# Patient Record
Sex: FEMALE | Race: WHITE | Employment: OTHER | URBAN - METROPOLITAN AREA
[De-identification: names, ages, dates, MRNs, and addresses within clinical notes are randomized per-mention and may not be internally consistent; named-entity substitution may affect disease eponyms.]

---

## 2017-06-15 PROBLEM — K21.9 GASTROESOPHAGEAL REFLUX DISEASE WITHOUT ESOPHAGITIS: Status: ACTIVE | Noted: 2017-06-15

## 2017-06-15 PROBLEM — J00 ACUTE NASOPHARYNGITIS: Status: ACTIVE | Noted: 2017-06-15

## 2017-10-05 PROBLEM — N95.0 POSTMENOPAUSAL BLEEDING: Status: ACTIVE | Noted: 2017-10-05

## 2017-10-05 PROBLEM — M79.672 LEFT FOOT PAIN: Status: ACTIVE | Noted: 2017-10-05

## 2017-11-07 PROBLEM — M48.00 CENTRAL STENOSIS OF SPINAL CANAL: Status: ACTIVE | Noted: 2017-11-07

## 2017-11-07 PROBLEM — M43.16 SPONDYLOLISTHESIS AT L4-L5 LEVEL: Status: ACTIVE | Noted: 2017-11-07

## 2017-12-08 ENCOUNTER — CARE COORDINATION (OUTPATIENT)
Dept: CARE COORDINATION | Age: 62
End: 2017-12-08
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
## 2017-12-11 PROBLEM — S00.12XA: Status: ACTIVE | Noted: 2017-12-11
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
## 2017-12-11 PROBLEM — S31.815A: Status: ACTIVE | Noted: 2017-12-11

## 2017-12-28 PROBLEM — M54.16 LUMBAR RADICULITIS: Status: ACTIVE | Noted: 2017-12-28

## 2018-01-05 PROBLEM — J00 ACUTE NASOPHARYNGITIS: Status: RESOLVED | Noted: 2017-06-15 | Resolved: 2018-01-05

## 2018-01-05 PROBLEM — S00.12XA: Status: RESOLVED | Noted: 2017-12-11 | Resolved: 2018-01-05
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
## 2018-01-05 PROBLEM — M10.072 ACUTE IDIOPATHIC GOUT INVOLVING TOE OF LEFT FOOT: Status: ACTIVE | Noted: 2018-01-05

## 2018-01-05 PROBLEM — S31.815A: Status: RESOLVED | Noted: 2017-12-11 | Resolved: 2018-01-05

## 2018-04-10 ENCOUNTER — OFFICE VISIT (OUTPATIENT)
Dept: FAMILY MEDICINE CLINIC | Age: 63
End: 2018-04-10
Payer: MEDICAID

## 2018-04-10 VITALS
DIASTOLIC BLOOD PRESSURE: 80 MMHG | HEIGHT: 60 IN | SYSTOLIC BLOOD PRESSURE: 124 MMHG | RESPIRATION RATE: 17 BRPM | TEMPERATURE: 97.6 F | WEIGHT: 199 LBS | OXYGEN SATURATION: 97 % | BODY MASS INDEX: 39.07 KG/M2 | HEART RATE: 81 BPM

## 2018-04-10 DIAGNOSIS — M10.072 ACUTE IDIOPATHIC GOUT INVOLVING TOE OF LEFT FOOT: Primary | ICD-10-CM

## 2018-04-10 PROCEDURE — 99213 OFFICE O/P EST LOW 20 MIN: CPT | Performed by: NURSE PRACTITIONER

## 2018-04-10 PROCEDURE — 3017F COLORECTAL CA SCREEN DOC REV: CPT | Performed by: NURSE PRACTITIONER

## 2018-04-10 PROCEDURE — G8417 CALC BMI ABV UP PARAM F/U: HCPCS | Performed by: NURSE PRACTITIONER

## 2018-04-10 PROCEDURE — 1036F TOBACCO NON-USER: CPT | Performed by: NURSE PRACTITIONER

## 2018-04-10 PROCEDURE — 3014F SCREEN MAMMO DOC REV: CPT | Performed by: NURSE PRACTITIONER

## 2018-04-10 PROCEDURE — G8427 DOCREV CUR MEDS BY ELIG CLIN: HCPCS | Performed by: NURSE PRACTITIONER

## 2018-04-10 ASSESSMENT — ENCOUNTER SYMPTOMS
VOMITING: 0
COUGH: 0
SHORTNESS OF BREATH: 0
COLOR CHANGE: 0
NAUSEA: 0
DIARRHEA: 0
CONSTIPATION: 1
ABDOMINAL PAIN: 0
WHEEZING: 0
BACK PAIN: 1
ABDOMINAL DISTENTION: 0
ANAL BLEEDING: 0
BLOOD IN STOOL: 0
RECTAL PAIN: 0

## 2018-05-29 LAB
BASOPHILS ABSOLUTE: 30 /ΜL
BASOPHILS RELATIVE PERCENT: 0 %
EOSINOPHILS ABSOLUTE: 600 /ΜL
EOSINOPHILS RELATIVE PERCENT: 7 %
HCT VFR BLD CALC: 39.7 % (ref 36–46)
HEMOGLOBIN: 13.3 G/DL (ref 12–16)
LYMPHOCYTES ABSOLUTE: 2030 /ΜL
LYMPHOCYTES RELATIVE PERCENT: 25 %
MCH RBC QN AUTO: 30 PG
MCHC RBC AUTO-ENTMCNC: 33.5 G/DL
MCV RBC AUTO: 89.4 FL
MONOCYTES ABSOLUTE: 590 /ΜL
MONOCYTES RELATIVE PERCENT: 7 %
NEUTROPHILS ABSOLUTE: 4860 /ΜL
NEUTROPHILS RELATIVE PERCENT: 61 %
PDW BLD-RTO: 13.6 %
PLATELET # BLD: 208 K/ΜL
PMV BLD AUTO: NORMAL FL
RBC # BLD: 4.44 10^6/ΜL
WBC # BLD: 8.1 10^3/ML

## 2018-07-10 ENCOUNTER — OFFICE VISIT (OUTPATIENT)
Dept: FAMILY MEDICINE CLINIC | Age: 63
End: 2018-07-10
Payer: MEDICAID

## 2018-07-10 VITALS
HEIGHT: 60 IN | SYSTOLIC BLOOD PRESSURE: 128 MMHG | TEMPERATURE: 97.8 F | WEIGHT: 198 LBS | OXYGEN SATURATION: 97 % | DIASTOLIC BLOOD PRESSURE: 76 MMHG | HEART RATE: 84 BPM | RESPIRATION RATE: 19 BRPM | BODY MASS INDEX: 38.87 KG/M2

## 2018-07-10 DIAGNOSIS — Z13.31 DEPRESSION SCREENING: ICD-10-CM

## 2018-07-10 DIAGNOSIS — L98.9 BACK SKIN LESION: ICD-10-CM

## 2018-07-10 DIAGNOSIS — E78.2 MIXED HYPERLIPIDEMIA: Primary | ICD-10-CM

## 2018-07-10 DIAGNOSIS — G89.29 CHRONIC PAIN OF LEFT KNEE: ICD-10-CM

## 2018-07-10 DIAGNOSIS — Z72.89 OTHER PROBLEMS RELATED TO LIFESTYLE: ICD-10-CM

## 2018-07-10 DIAGNOSIS — Z23 NEED FOR PROPHYLACTIC VACCINATION AND INOCULATION AGAINST VARICELLA: ICD-10-CM

## 2018-07-10 DIAGNOSIS — M25.562 CHRONIC PAIN OF LEFT KNEE: ICD-10-CM

## 2018-07-10 DIAGNOSIS — R73.03 PREDIABETES: ICD-10-CM

## 2018-07-10 DIAGNOSIS — M1A.0721 IDIOPATHIC CHRONIC GOUT OF LEFT FOOT WITH TOPHUS: ICD-10-CM

## 2018-07-10 DIAGNOSIS — I10 ESSENTIAL HYPERTENSION: ICD-10-CM

## 2018-07-10 LAB
GLUCOSE BLD-MCNC: 120 MG/DL
HBA1C MFR BLD: 6 %

## 2018-07-10 PROCEDURE — 83036 HEMOGLOBIN GLYCOSYLATED A1C: CPT | Performed by: NURSE PRACTITIONER

## 2018-07-10 PROCEDURE — 3017F COLORECTAL CA SCREEN DOC REV: CPT | Performed by: NURSE PRACTITIONER

## 2018-07-10 PROCEDURE — 82962 GLUCOSE BLOOD TEST: CPT | Performed by: NURSE PRACTITIONER

## 2018-07-10 PROCEDURE — G8427 DOCREV CUR MEDS BY ELIG CLIN: HCPCS | Performed by: NURSE PRACTITIONER

## 2018-07-10 PROCEDURE — 99214 OFFICE O/P EST MOD 30 MIN: CPT | Performed by: NURSE PRACTITIONER

## 2018-07-10 PROCEDURE — G8417 CALC BMI ABV UP PARAM F/U: HCPCS | Performed by: NURSE PRACTITIONER

## 2018-07-10 PROCEDURE — 1036F TOBACCO NON-USER: CPT | Performed by: NURSE PRACTITIONER

## 2018-07-10 RX ORDER — MELOXICAM 15 MG/1
TABLET ORAL
Refills: 4 | COMMUNITY
Start: 2018-06-21 | End: 2018-09-17 | Stop reason: SDUPTHER

## 2018-07-10 RX ORDER — OMEPRAZOLE 20 MG/1
20 CAPSULE, DELAYED RELEASE ORAL DAILY
Qty: 30 CAPSULE | Refills: 3 | Status: SHIPPED | OUTPATIENT
Start: 2018-07-10 | End: 2018-11-09 | Stop reason: SDUPTHER

## 2018-07-10 RX ORDER — FEBUXOSTAT 40 MG/1
TABLET, FILM COATED ORAL
Qty: 30 TABLET | Refills: 5 | Status: SHIPPED | OUTPATIENT
Start: 2018-07-10 | End: 2018-10-31 | Stop reason: SDUPTHER

## 2018-07-10 ASSESSMENT — ENCOUNTER SYMPTOMS
SORE THROAT: 0
TROUBLE SWALLOWING: 0
COUGH: 0
SINUS PRESSURE: 0
VOMITING: 0
VOICE CHANGE: 0
SHORTNESS OF BREATH: 0
FACIAL SWELLING: 0
NAUSEA: 0
DIARRHEA: 0
COLOR CHANGE: 0
BACK PAIN: 0
CHEST TIGHTNESS: 0
ABDOMINAL PAIN: 0
RHINORRHEA: 0
WHEEZING: 0
SINUS PAIN: 0
CONSTIPATION: 0

## 2018-07-10 ASSESSMENT — PATIENT HEALTH QUESTIONNAIRE - PHQ9
SUM OF ALL RESPONSES TO PHQ9 QUESTIONS 1 & 2: 2
2. FEELING DOWN, DEPRESSED OR HOPELESS: 1
SUM OF ALL RESPONSES TO PHQ QUESTIONS 1-9: 2
1. LITTLE INTEREST OR PLEASURE IN DOING THINGS: 1

## 2018-07-10 NOTE — PATIENT INSTRUCTIONS
any other component of shingles vaccine. Tell your doctor if you have any severe allergies. · Has a weakened immune system because of current:  ¨ AIDS or another disease that affects the immune system. ¨ Treatment with drugs that affect the immune system, such as prolonged use of high-dose steroids. ¨ Cancer treatment such as radiation or chemotherapy. ¨ Cancer affecting the bone marrow or lymphatic system, such as leukemia or lymphoma. · Is pregnant, or might be pregnant. Women should not become pregnant until at least 4 weeks after getting shingles vaccine. Someone with a minor acute illness, such as a cold, may be vaccinated. But anyone with a moderate or severe acute illness should usually wait until they recover before getting the vaccine. This includes anyone with a temperature of 101.3° F or higher. What are the risks from shingles vaccine? A vaccine, like any medicine, could possibly cause serious problems, such as severe allergic reactions. However, the risk of a vaccine causing serious harm, or death, is extremely small. No serious problems have been identified with shingles vaccine. Mild problems  · Redness, soreness, swelling, or itching at the site of the injection (about 1 person in 3)  · Headache (about 1 person in 70)  Like all vaccines, shingles vaccine is being closely monitored for unusual or severe problems. What if there is a serious reaction? What should I look for? · Look for anything that concerns you, such as signs of a severe allergic reaction, very high fever, or behavior changes. Signs of a severe allergic reaction can include hives, swelling of the face and throat, difficulty breathing, a fast heartbeat, dizziness, and weakness. These would start a few minutes to a few hours after the vaccination. What should I do?   · If you think it is a severe allergic reaction or other emergency that can't wait, call 9-1-1 or get the person to the nearest hospital. Otherwise, call your

## 2018-08-09 PROBLEM — Z13.31 DEPRESSION SCREENING: Status: RESOLVED | Noted: 2018-07-10 | Resolved: 2018-08-09

## 2018-08-14 RX ORDER — ATORVASTATIN CALCIUM 10 MG/1
10 TABLET, FILM COATED ORAL NIGHTLY
Qty: 30 TABLET | Refills: 3 | Status: SHIPPED | OUTPATIENT
Start: 2018-08-14 | End: 2018-10-31 | Stop reason: SDUPTHER

## 2018-08-21 ENCOUNTER — TELEPHONE (OUTPATIENT)
Dept: FAMILY MEDICINE CLINIC | Age: 63
End: 2018-08-21

## 2018-08-21 NOTE — TELEPHONE ENCOUNTER
Ava called and rescheduled her 9/10/18 AWV to 9/17/18 due to being scheduled for her colonoscopy on 9/10/18.     Thanks

## 2018-09-17 ENCOUNTER — OFFICE VISIT (OUTPATIENT)
Dept: FAMILY MEDICINE CLINIC | Age: 63
End: 2018-09-17
Payer: MEDICARE

## 2018-09-17 VITALS
TEMPERATURE: 97.8 F | RESPIRATION RATE: 16 BRPM | HEART RATE: 78 BPM | OXYGEN SATURATION: 94 % | HEIGHT: 64 IN | BODY MASS INDEX: 34.16 KG/M2 | SYSTOLIC BLOOD PRESSURE: 122 MMHG | WEIGHT: 200.12 LBS | DIASTOLIC BLOOD PRESSURE: 70 MMHG

## 2018-09-17 DIAGNOSIS — Z72.89 OTHER PROBLEMS RELATED TO LIFESTYLE: ICD-10-CM

## 2018-09-17 DIAGNOSIS — Z11.59 NEED FOR HEPATITIS C SCREENING TEST: ICD-10-CM

## 2018-09-17 DIAGNOSIS — Z00.00 ROUTINE GENERAL MEDICAL EXAMINATION AT A HEALTH CARE FACILITY: ICD-10-CM

## 2018-09-17 DIAGNOSIS — Z00.00 WELCOME TO MEDICARE PREVENTIVE VISIT: Primary | ICD-10-CM

## 2018-09-17 PROCEDURE — G0404 EKG TRACING FOR INITIAL PREV: HCPCS | Performed by: NURSE PRACTITIONER

## 2018-09-17 PROCEDURE — G0402 INITIAL PREVENTIVE EXAM: HCPCS | Performed by: NURSE PRACTITIONER

## 2018-09-17 RX ORDER — MELOXICAM 15 MG/1
15 TABLET ORAL DAILY
Qty: 30 TABLET | Refills: 4 | Status: SHIPPED | OUTPATIENT
Start: 2018-09-17 | End: 2018-10-31 | Stop reason: SDUPTHER

## 2018-09-17 RX ORDER — MELOXICAM 15 MG/1
15 TABLET ORAL DAILY
Qty: 30 TABLET | Refills: 4 | Status: CANCELLED | OUTPATIENT
Start: 2018-09-17

## 2018-09-17 ASSESSMENT — PATIENT HEALTH QUESTIONNAIRE - PHQ9
SUM OF ALL RESPONSES TO PHQ QUESTIONS 1-9: 0
SUM OF ALL RESPONSES TO PHQ QUESTIONS 1-9: 0

## 2018-09-17 ASSESSMENT — ANXIETY QUESTIONNAIRES: GAD7 TOTAL SCORE: 0

## 2018-09-17 NOTE — PROGRESS NOTES
LIGATION         Family History   Problem Relation Age of Onset   Jeet Jasmine Asthma Mother     Mental Illness Mother     Heart Disease Father     Thyroid Disease Sister     Thyroid Disease Sister     Thyroid Disease Sister        CareTeam (Including outside providers/suppliers regularly involved in providing care):   Patient Care Team:  VISHNU Valdez - CNP as PCP - General (Nurse Practitioner)    Wt Readings from Last 3 Encounters:   09/17/18 200 lb 1.9 oz (90.8 kg)   07/10/18 198 lb (89.8 kg)   04/10/18 199 lb (90.3 kg)     Vitals:    09/17/18 1027   BP: 122/70   Pulse: 78   Resp: 16   Temp: 97.8 °F (36.6 °C)   TempSrc: Temporal   SpO2: 94%   Weight: 200 lb 1.9 oz (90.8 kg)   Height: 5' 4\" (1.626 m)     Body mass index is 34.35 kg/m².     General Appearance: alert and oriented to person, place and time, overweight, well developed and well- nourished, in no acute distress  Skin: warm and dry, small red bites to the ankles  Eyes: pupils equal, round, and reactive to light, extraocular eye movements intact, conjunctivae normal  ENT: tympanic membrane, external ear and ear canal normal bilaterally, nose without deformity, nasal mucosa and turbinates normal without polyps  Neck: supple and non-tender without mass, no thyromegaly or thyroid nodules, no cervical lymphadenopathy  Pulmonary/Chest: clear to auscultation bilaterally- no wheezes, rales or rhonchi, normal air movement, no respiratory distress  Cardiovascular: normal rate, regular rhythm, normal S1 and S2, no murmurs, rubs, clicks, or gallops, distal pulses intact, no carotid bruits  Abdomen: soft, non-tender, non-distended, normal bowel sounds, no hepatosplenomegaly  Extremities: no cyanosis, clubbing or edema  Musculoskeletal: normal range of motion, no joint swelling, deformity or tenderness  Neurologic: reflexes normal and symmetric, no cranial nerve deficit, gait, coordination and speech normal    Patient's complete Health Risk Assessment and screening values have been reviewed and are found in Flowsheets. The following problems were reviewed today and where indicated follow up appointments were made and/or referrals ordered. Positive Risk Factor Screenings with Interventions:     General Health:  General  In the past 7 days, have you experienced any of the following?: (P) None of These  Do you get the social and emotional support that you need?: (P) Yes  Do you have a Living Will?: (!) (P) No  General Health Risk Interventions:  · No Living Will: additional information provided    Health Habits/Nutrition:  Health Habits/Nutrition  Do you exercise for at least 20 minutes 2-3 times per week?: (P) Yes  Have you lost any weight without trying in the past 3 months?: (P) No  Do you eat fewer than 2 meals per day?: (P) No  Have you seen a dentist within the past year?: (P) Yes  Body mass index is 34.35 kg/m².   Health Habits/Nutrition Interventions:  · Inadequate physical activity:  educational materials provided to promote increased physical activity    Hearing/Vision:  Hearing/Vision  Do you or your family notice any trouble with your hearing?: (P) No  Do you have difficulty driving, watching TV, or doing any of your daily activities because of your eyesight?: (P) No  Have you had an eye exam within the past year?: (!) (P) No  Hearing/Vision Interventions:  · Vision concerns:  patient encouraged to make appointment with his/her eye specialist    Safety:  Safety  Do you have working smoke detectors?: (P) Yes  Have all throw rugs been removed or fastened?: (P) Yes  Do you have non-slip mats in all bathtubs?: (!) (P) No  Do all of your stairways have a railing or banister?: (P) Yes  Are your doorways, halls and stairs free of clutter?: (P) Yes  Do you always fasten your seatbelt when you are in a car?: (P) Yes  Safety Interventions:  · Home safety tips provided    ADL:  ADLs  In the past 7 days, did you need help from others to perform any of the following everyday

## 2018-09-19 ENCOUNTER — TELEPHONE (OUTPATIENT)
Dept: FAMILY MEDICINE CLINIC | Age: 63
End: 2018-09-19

## 2018-10-08 ENCOUNTER — HOSPITAL ENCOUNTER (OUTPATIENT)
Age: 63
Discharge: HOME | End: 2018-10-10
Payer: MEDICARE

## 2018-10-08 DIAGNOSIS — Z11.59 NEED FOR HEPATITIS C SCREENING TEST: ICD-10-CM

## 2018-10-08 DIAGNOSIS — Z72.89 OTHER PROBLEMS RELATED TO LIFESTYLE: ICD-10-CM

## 2018-10-08 PROCEDURE — 86803 HEPATITIS C AB TEST: CPT

## 2018-10-08 PROCEDURE — 86703 HIV-1/HIV-2 1 RESULT ANTBDY: CPT

## 2018-10-09 LAB
HEPATITIS C ANTIBODY INTERPRETATION: NORMAL
HIV-1 AND HIV-2 ANTIBODIES: NORMAL

## 2018-10-31 ENCOUNTER — OFFICE VISIT (OUTPATIENT)
Dept: FAMILY MEDICINE CLINIC | Age: 63
End: 2018-10-31
Payer: MEDICARE

## 2018-10-31 VITALS
TEMPERATURE: 98.7 F | BODY MASS INDEX: 40.68 KG/M2 | HEART RATE: 77 BPM | WEIGHT: 201.8 LBS | SYSTOLIC BLOOD PRESSURE: 138 MMHG | OXYGEN SATURATION: 98 % | RESPIRATION RATE: 20 BRPM | DIASTOLIC BLOOD PRESSURE: 74 MMHG | HEIGHT: 59 IN

## 2018-10-31 DIAGNOSIS — M54.16 LUMBAR RADICULITIS: ICD-10-CM

## 2018-10-31 DIAGNOSIS — E78.2 MIXED HYPERLIPIDEMIA: ICD-10-CM

## 2018-10-31 DIAGNOSIS — M48.00 CENTRAL STENOSIS OF SPINAL CANAL: ICD-10-CM

## 2018-10-31 DIAGNOSIS — M25.562 CHRONIC PAIN OF LEFT KNEE: ICD-10-CM

## 2018-10-31 DIAGNOSIS — I10 ESSENTIAL HYPERTENSION: Primary | ICD-10-CM

## 2018-10-31 DIAGNOSIS — M1A.0721 IDIOPATHIC CHRONIC GOUT OF LEFT FOOT WITH TOPHUS: ICD-10-CM

## 2018-10-31 DIAGNOSIS — E66.01 MORBID OBESITY WITH BMI OF 40.0-44.9, ADULT (HCC): ICD-10-CM

## 2018-10-31 DIAGNOSIS — G89.29 CHRONIC PAIN OF LEFT KNEE: ICD-10-CM

## 2018-10-31 DIAGNOSIS — M43.16 SPONDYLOLISTHESIS AT L4-L5 LEVEL: ICD-10-CM

## 2018-10-31 PROCEDURE — 99214 OFFICE O/P EST MOD 30 MIN: CPT | Performed by: NURSE PRACTITIONER

## 2018-10-31 PROCEDURE — G8484 FLU IMMUNIZE NO ADMIN: HCPCS | Performed by: NURSE PRACTITIONER

## 2018-10-31 PROCEDURE — 3017F COLORECTAL CA SCREEN DOC REV: CPT | Performed by: NURSE PRACTITIONER

## 2018-10-31 PROCEDURE — G8427 DOCREV CUR MEDS BY ELIG CLIN: HCPCS | Performed by: NURSE PRACTITIONER

## 2018-10-31 PROCEDURE — G8417 CALC BMI ABV UP PARAM F/U: HCPCS | Performed by: NURSE PRACTITIONER

## 2018-10-31 PROCEDURE — 1036F TOBACCO NON-USER: CPT | Performed by: NURSE PRACTITIONER

## 2018-10-31 RX ORDER — ATORVASTATIN CALCIUM 10 MG/1
10 TABLET, FILM COATED ORAL NIGHTLY
Qty: 90 TABLET | Refills: 1 | Status: SHIPPED | OUTPATIENT
Start: 2018-10-31 | End: 2019-02-26 | Stop reason: SDUPTHER

## 2018-10-31 RX ORDER — FEBUXOSTAT 40 MG/1
TABLET, FILM COATED ORAL
Qty: 90 TABLET | Refills: 1 | Status: SHIPPED | OUTPATIENT
Start: 2018-10-31 | End: 2019-02-26 | Stop reason: ALTCHOICE

## 2018-10-31 RX ORDER — MELOXICAM 15 MG/1
15 TABLET ORAL DAILY
Qty: 90 TABLET | Refills: 1 | Status: SHIPPED | OUTPATIENT
Start: 2018-10-31 | End: 2019-02-26 | Stop reason: SDUPTHER

## 2018-10-31 RX ORDER — ACETAMINOPHEN 160 MG
2000 TABLET,DISINTEGRATING ORAL DAILY
COMMUNITY

## 2018-10-31 ASSESSMENT — ENCOUNTER SYMPTOMS
COLOR CHANGE: 0
SHORTNESS OF BREATH: 0
COUGH: 0
ABDOMINAL PAIN: 0
VOICE CHANGE: 0
TROUBLE SWALLOWING: 0
VOMITING: 0
WHEEZING: 0
CHEST TIGHTNESS: 0
RHINORRHEA: 0
CONSTIPATION: 0
FACIAL SWELLING: 0
BACK PAIN: 1
NAUSEA: 0
DIARRHEA: 0
SINUS PAIN: 0
SINUS PRESSURE: 0
SORE THROAT: 0

## 2018-10-31 NOTE — PROGRESS NOTES
pressure, sneezing, sore throat, tinnitus, trouble swallowing and voice change. Eyes: Negative for visual disturbance. Respiratory: Negative for cough, chest tightness, shortness of breath and wheezing. Cardiovascular: Negative for chest pain, palpitations and leg swelling. Gastrointestinal: Negative for abdominal pain, constipation, diarrhea, nausea and vomiting. Endocrine: Negative for cold intolerance, heat intolerance, polydipsia, polyphagia and polyuria. Genitourinary: Negative for difficulty urinating, frequency and urgency. Musculoskeletal: Positive for arthralgias, back pain and gait problem ( left knee pain, arthritis). Negative for joint swelling, myalgias, neck pain and neck stiffness. Skin: Negative for color change, pallor, rash and wound. Allergic/Immunologic: Negative for environmental allergies, food allergies and immunocompromised state. Neurological: Negative for dizziness, tremors, seizures, syncope, facial asymmetry, speech difficulty, weakness, light-headedness, numbness and headaches. Hematological: Negative for adenopathy. Does not bruise/bleed easily. Psychiatric/Behavioral: Negative for agitation, behavioral problems, confusion, decreased concentration, dysphoric mood, hallucinations, self-injury, sleep disturbance and suicidal ideas. The patient is not nervous/anxious and is not hyperactive.         OBJECTIVE:     VS:  Wt Readings from Last 3 Encounters:   10/31/18 201 lb 12.8 oz (91.5 kg)   09/17/18 200 lb 1.9 oz (90.8 kg)   07/10/18 198 lb (89.8 kg)                       Vitals:    10/31/18 1016 10/31/18 1021 10/31/18 1102   BP: (!) 148/72 (!) 146/74 138/74   Site:   Left Upper Arm   Position:   Sitting   Cuff Size:   Medium Adult   Pulse: 77     Resp: 20     Temp: 98.7 °F (37.1 °C)     TempSrc: Temporal     SpO2: 98%     Weight: 201 lb 12.8 oz (91.5 kg)     Height: 4' 11\" (1.499 m)         General: Alert and oriented to person, place, and time, well developed and well nourished, in no acute distress  SKIN: Warm and dry, intact without any rash, masses or lesions  HEAD: normocephalic, atraumatic  Eyes: sclera/conjunctiva clear, PERRLA, EOMI's intact  Neck: supple and non-tender without mass, trachea midline, no cervical lymphadenopathy, no bruit, no thyromegaly or nodules  Cardiovascular: regular rate and regular rhythm, normal S1 and S2,  no murmurs, rubs, clicks, or gallop. Distal pulses intact, no carotid bruits. No edema  Pulmonary/Chest: clear to auscultation bilaterally, no wheezes, rales or rhonchi, normal air movement, no respiratory distress  Abdomen: soft, non-tender, non-distended, normal bowel sounds, no masses or hepatosplenomegaly  Musculoskeletal: Normal ROM noted on exam the left leg is 1-1/2 inches shorter, patella is off, pelvis and left shoulder concern this is what is causing some of her pain, Negative straight leg raises until 70 degrees bilateral.   Neurologic: reflexes normal and symmetric, no cranial nerve deficit, gait, coordination and speech normal  Extremities: no clubbing, cyanosis, or edema. Psychiatric: Good eye contact, normal mood and affect, answers questions appropriately    ASSESSMENT/PLAN   Rocío Min was seen today for 3 month follow-up and hypertension. Diagnoses and all orders for this visit:    Essential hypertension stable  -Continue atenolol  -Discussed taking medication and directed every day. -Discussed exercising daily 30 minutes 5 times a week for 150 minutes weekly.  -Discussed weight reduction if needed.  -Discussed low sodium diet. Chronic pain of left knee worsening  -     Amb External Referral To Orthopedic Surgery  -     meloxicam (MOBIC) 15 MG tablet;  Take 1 tablet by mouth daily  -Discussed the combination of her back and the arthritis with her weight is making the knee worse.   -Discussed walking every day if can only do 15 minutes then try to do twice a day    Morbid obesity with BMI of 40.0-44.9, adult

## 2018-11-06 PROBLEM — Z85.038 PERSONAL HISTORY OF COLON CANCER: Status: ACTIVE | Noted: 2017-08-01

## 2018-11-09 ENCOUNTER — OFFICE VISIT (OUTPATIENT)
Dept: FAMILY MEDICINE CLINIC | Age: 63
End: 2018-11-09
Payer: MEDICARE

## 2018-11-09 VITALS
OXYGEN SATURATION: 97 % | SYSTOLIC BLOOD PRESSURE: 136 MMHG | TEMPERATURE: 98.1 F | RESPIRATION RATE: 19 BRPM | DIASTOLIC BLOOD PRESSURE: 74 MMHG | HEART RATE: 86 BPM | BODY MASS INDEX: 40.66 KG/M2 | HEIGHT: 59 IN | WEIGHT: 201.69 LBS

## 2018-11-09 DIAGNOSIS — M10.071 ACUTE IDIOPATHIC GOUT OF RIGHT FOOT: Primary | ICD-10-CM

## 2018-11-09 PROCEDURE — 3017F COLORECTAL CA SCREEN DOC REV: CPT | Performed by: NURSE PRACTITIONER

## 2018-11-09 PROCEDURE — 99213 OFFICE O/P EST LOW 20 MIN: CPT | Performed by: NURSE PRACTITIONER

## 2018-11-09 PROCEDURE — G8427 DOCREV CUR MEDS BY ELIG CLIN: HCPCS | Performed by: NURSE PRACTITIONER

## 2018-11-09 PROCEDURE — G8484 FLU IMMUNIZE NO ADMIN: HCPCS | Performed by: NURSE PRACTITIONER

## 2018-11-09 PROCEDURE — 1036F TOBACCO NON-USER: CPT | Performed by: NURSE PRACTITIONER

## 2018-11-09 PROCEDURE — G8417 CALC BMI ABV UP PARAM F/U: HCPCS | Performed by: NURSE PRACTITIONER

## 2018-11-09 RX ORDER — OMEPRAZOLE 20 MG/1
20 CAPSULE, DELAYED RELEASE ORAL DAILY
Qty: 90 CAPSULE | Refills: 3 | Status: SHIPPED | OUTPATIENT
Start: 2018-11-09 | End: 2019-11-18 | Stop reason: SDUPTHER

## 2018-11-09 RX ORDER — ATORVASTATIN CALCIUM 10 MG/1
10 TABLET, FILM COATED ORAL NIGHTLY
Qty: 90 TABLET | Refills: 1 | Status: CANCELLED | OUTPATIENT
Start: 2018-11-09

## 2018-11-09 RX ORDER — METHYLPREDNISOLONE 4 MG/1
TABLET ORAL
Qty: 1 KIT | Refills: 0 | Status: SHIPPED | OUTPATIENT
Start: 2018-11-09 | End: 2018-11-15

## 2018-11-09 ASSESSMENT — ENCOUNTER SYMPTOMS
COUGH: 0
SHORTNESS OF BREATH: 0
BACK PAIN: 0
COLOR CHANGE: 1

## 2018-11-09 NOTE — PROGRESS NOTES
OFFICE PROGRESS NOTE  [facility] PRIMARY CARE  NITZA Harrington [address] [place] [id number]  Dept: [phone]   Chief Complaint   Patient presents with    Foot Pain     Patient complains of right foot pain and swelling on the top of the foot. HPI:     Here today for right foot pain, started on Monday with cramping sensation in the top of her toes and moving up the foot, pain is described as constant stabbing pain, redness, and slightly swollen. She does have history of gout but usually affecting the left foot. She has been using ice and tried heat in the shower which has helped some. She woke up in the night as it was aching and put a sock on the foot felt cold to touch. She did take a tylenol with codeine from DR. Samantha Roldan. Which didn't do anything.      Current Outpatient Prescriptions:     omeprazole (PRILOSEC) 20 MG delayed release capsule, Take 1 capsule by mouth daily, Disp: 90 capsule, Rfl: 3    methylPREDNISolone (MEDROL DOSEPACK) 4 MG tablet, Take by mouth., Disp: 1 kit, Rfl: 0    Cholecalciferol (VITAMIN D3) 2000 units CAPS, Take 2,000 Units by mouth daily, Disp: , Rfl:     atorvastatin (LIPITOR) 10 MG tablet, Take 1 tablet by mouth nightly, Disp: 90 tablet, Rfl: 1    meloxicam (MOBIC) 15 MG tablet, Take 1 tablet by mouth daily, Disp: 90 tablet, Rfl: 1    febuxostat (ULORIC) 40 MG TABS tablet, TAKE 1 TABLET BY MOUTH DAILY (STOP ALLOPURINOL), Disp: 90 tablet, Rfl: 1    atenolol (TENORMIN) 50 MG tablet, Take 1 tablet by mouth daily, Disp: 90 tablet, Rfl: 3    clotrimazole-betamethasone (LOTRISONE) 1-0.05 % cream, APPLY TOPICALLY TWICE A DAY, Disp: 45 g, Rfl: 0    Biotin 1000 MCG TABS, Take 1,000 mcg by mouth daily, Disp: , Rfl:   Social History     Social History    Marital status: Single     Spouse name: N/A    Number of children: N/A    Years of education: N/A     Social History Main Topics    Smoking status: Never Smoker    Smokeless tobacco: Never Used    Alcohol use 0.0 oz/week      Comment: Rarely    Drug use: No    Sexual activity: Not Asked     Other Topics Concern    None     Social History Narrative    None       I have reviewed Margaret's allergies, medications, problem list, medical, social and family history and have updated as needed in the electronic medical record    Review of Systems   Constitutional: Positive for activity change. Negative for appetite change, chills, diaphoresis, fatigue, fever and unexpected weight change. Respiratory: Negative for cough and shortness of breath. Cardiovascular: Negative for chest pain, palpitations and leg swelling. Musculoskeletal: Positive for gait problem and joint swelling ( foot swelling and painful). Negative for arthralgias, back pain, myalgias, neck pain and neck stiffness. Skin: Positive for color change. OBJECTIVE:     VS:  Wt Readings from Last 3 Encounters:   11/09/18 201 lb 11 oz (91.5 kg)   10/31/18 201 lb 12.8 oz (91.5 kg)   09/17/18 200 lb 1.9 oz (90.8 kg)                       Vitals:    11/09/18 1047   BP: 136/74   Pulse: 86   Resp: 19   Temp: 98.1 °F (36.7 °C)   TempSrc: Temporal   SpO2: 97%   Weight: 201 lb 11 oz (91.5 kg)   Height: 4' 11\" (1.499 m)       General: Alert and oriented to person, place, and time, well developed and well nourished, in no acute distress  SKIN: Warm and dry, intact without any rash, masses or lesions  Neck: supple and non-tender without mass, trachea midline, no cervical lymphadenopathy, no bruit, no thyromegaly or nodules  Cardiovascular: regular rate and regular rhythm, normal S1 and S2,  no murmurs, rubs, clicks, or gallop. Distal pulses intact, no carotid bruits.  No edema  Pulmonary/Chest: clear to auscultation bilaterally, no wheezes, rales or rhonchi, normal air movement, no respiratory distress  Abdomen: soft, non-tender, non-distended, normal bowel sounds, no masses or hepatosplenomegaly  Musculoskeletal: Limited ROM in right foot with

## 2018-11-26 DIAGNOSIS — M1A.0721 IDIOPATHIC CHRONIC GOUT OF LEFT FOOT WITH TOPHUS: ICD-10-CM

## 2018-11-26 DIAGNOSIS — E78.2 MIXED HYPERLIPIDEMIA: ICD-10-CM

## 2018-11-27 ENCOUNTER — PREP FOR PROCEDURE (OUTPATIENT)
Dept: PHYSICAL MEDICINE AND REHAB | Age: 63
End: 2018-11-27

## 2018-11-27 ENCOUNTER — OFFICE VISIT (OUTPATIENT)
Dept: PHYSICAL MEDICINE AND REHAB | Age: 63
End: 2018-11-27
Payer: MEDICARE

## 2018-11-27 VITALS
WEIGHT: 202 LBS | BODY MASS INDEX: 40.72 KG/M2 | SYSTOLIC BLOOD PRESSURE: 124 MMHG | DIASTOLIC BLOOD PRESSURE: 61 MMHG | HEIGHT: 59 IN | HEART RATE: 75 BPM

## 2018-11-27 DIAGNOSIS — M46.1 SACROILIITIS (HCC): ICD-10-CM

## 2018-11-27 DIAGNOSIS — M46.1 SACROILIITIS (HCC): Primary | ICD-10-CM

## 2018-11-27 PROCEDURE — G8427 DOCREV CUR MEDS BY ELIG CLIN: HCPCS | Performed by: PHYSICAL MEDICINE & REHABILITATION

## 2018-11-27 PROCEDURE — 99213 OFFICE O/P EST LOW 20 MIN: CPT | Performed by: PHYSICAL MEDICINE & REHABILITATION

## 2018-11-27 PROCEDURE — G8417 CALC BMI ABV UP PARAM F/U: HCPCS | Performed by: PHYSICAL MEDICINE & REHABILITATION

## 2018-11-27 PROCEDURE — 1036F TOBACCO NON-USER: CPT | Performed by: PHYSICAL MEDICINE & REHABILITATION

## 2018-11-27 PROCEDURE — 3017F COLORECTAL CA SCREEN DOC REV: CPT | Performed by: PHYSICAL MEDICINE & REHABILITATION

## 2018-11-27 PROCEDURE — G8484 FLU IMMUNIZE NO ADMIN: HCPCS | Performed by: PHYSICAL MEDICINE & REHABILITATION

## 2018-11-29 ENCOUNTER — HOSPITAL ENCOUNTER (OUTPATIENT)
Age: 63
Setting detail: OUTPATIENT SURGERY
Discharge: HOME | End: 2018-11-29
Attending: PHYSICAL MEDICINE & REHABILITATION | Admitting: PHYSICAL MEDICINE & REHABILITATION
Payer: MEDICARE

## 2018-11-29 ENCOUNTER — HOSPITAL ENCOUNTER (OUTPATIENT)
Dept: OPERATING ROOM | Age: 63
Setting detail: OUTPATIENT SURGERY
Discharge: HOME | End: 2018-11-29
Attending: PHYSICAL MEDICINE & REHABILITATION
Payer: MEDICARE

## 2018-11-29 VITALS
SYSTOLIC BLOOD PRESSURE: 149 MMHG | RESPIRATION RATE: 16 BRPM | OXYGEN SATURATION: 98 % | HEART RATE: 76 BPM | TEMPERATURE: 98 F | DIASTOLIC BLOOD PRESSURE: 71 MMHG

## 2018-11-29 DIAGNOSIS — M46.1 SACROILIITIS (HCC): ICD-10-CM

## 2018-11-29 PROCEDURE — 3600000005 HC SURGERY LEVEL 5 BASE: Performed by: PHYSICAL MEDICINE & REHABILITATION

## 2018-11-29 PROCEDURE — 6360000004 HC RX CONTRAST MEDICATION: Performed by: PHYSICAL MEDICINE & REHABILITATION

## 2018-11-29 PROCEDURE — 2709999900 HC NON-CHARGEABLE SUPPLY: Performed by: PHYSICAL MEDICINE & REHABILITATION

## 2018-11-29 PROCEDURE — 27096 INJECT SACROILIAC JOINT: CPT | Performed by: PHYSICAL MEDICINE & REHABILITATION

## 2018-11-29 PROCEDURE — 7100000010 HC PHASE II RECOVERY - FIRST 15 MIN: Performed by: PHYSICAL MEDICINE & REHABILITATION

## 2018-11-29 PROCEDURE — 7100000011 HC PHASE II RECOVERY - ADDTL 15 MIN: Performed by: PHYSICAL MEDICINE & REHABILITATION

## 2018-11-29 PROCEDURE — 3209999900 FLUORO FOR SURGICAL PROCEDURES

## 2018-11-29 PROCEDURE — 6360000002 HC RX W HCPCS: Performed by: PHYSICAL MEDICINE & REHABILITATION

## 2018-11-29 PROCEDURE — 2500000003 HC RX 250 WO HCPCS: Performed by: PHYSICAL MEDICINE & REHABILITATION

## 2018-11-29 PROCEDURE — 2580000003 HC RX 258: Performed by: PHYSICAL MEDICINE & REHABILITATION

## 2018-11-29 RX ORDER — LIDOCAINE HYDROCHLORIDE 10 MG/ML
INJECTION, SOLUTION EPIDURAL; INFILTRATION; INTRACAUDAL; PERINEURAL PRN
Status: DISCONTINUED | OUTPATIENT
Start: 2018-11-29 | End: 2018-11-29 | Stop reason: HOSPADM

## 2018-11-29 ASSESSMENT — PAIN DESCRIPTION - ORIENTATION: ORIENTATION: LEFT

## 2018-11-29 ASSESSMENT — PAIN - FUNCTIONAL ASSESSMENT: PAIN_FUNCTIONAL_ASSESSMENT: 0-10

## 2018-11-29 ASSESSMENT — PAIN SCALES - GENERAL
PAINLEVEL_OUTOF10: 4
PAINLEVEL_OUTOF10: 4

## 2018-11-29 ASSESSMENT — PAIN DESCRIPTION - PAIN TYPE
TYPE: CHRONIC PAIN
TYPE: CHRONIC PAIN

## 2018-11-29 ASSESSMENT — PAIN DESCRIPTION - LOCATION
LOCATION: BACK
LOCATION: BACK

## 2018-11-29 ASSESSMENT — PAIN DESCRIPTION - DESCRIPTORS: DESCRIPTORS: ACHING;DISCOMFORT

## 2018-12-19 ENCOUNTER — OFFICE VISIT (OUTPATIENT)
Dept: PHYSICAL MEDICINE AND REHAB | Age: 63
End: 2018-12-19
Payer: MEDICARE

## 2018-12-19 VITALS
SYSTOLIC BLOOD PRESSURE: 121 MMHG | HEIGHT: 60 IN | DIASTOLIC BLOOD PRESSURE: 64 MMHG | WEIGHT: 208 LBS | HEART RATE: 73 BPM | BODY MASS INDEX: 40.84 KG/M2

## 2018-12-19 DIAGNOSIS — M43.16 SPONDYLOLISTHESIS AT L4-L5 LEVEL: ICD-10-CM

## 2018-12-19 DIAGNOSIS — M54.16 LUMBAR RADICULITIS: ICD-10-CM

## 2018-12-19 DIAGNOSIS — M53.3 SACROILIAC JOINT DYSFUNCTION: Primary | ICD-10-CM

## 2018-12-19 PROCEDURE — 1036F TOBACCO NON-USER: CPT | Performed by: PHYSICAL MEDICINE & REHABILITATION

## 2018-12-19 PROCEDURE — 3017F COLORECTAL CA SCREEN DOC REV: CPT | Performed by: PHYSICAL MEDICINE & REHABILITATION

## 2018-12-19 PROCEDURE — G8427 DOCREV CUR MEDS BY ELIG CLIN: HCPCS | Performed by: PHYSICAL MEDICINE & REHABILITATION

## 2018-12-19 PROCEDURE — 99213 OFFICE O/P EST LOW 20 MIN: CPT | Performed by: PHYSICAL MEDICINE & REHABILITATION

## 2018-12-19 PROCEDURE — G8484 FLU IMMUNIZE NO ADMIN: HCPCS | Performed by: PHYSICAL MEDICINE & REHABILITATION

## 2018-12-19 PROCEDURE — G8417 CALC BMI ABV UP PARAM F/U: HCPCS | Performed by: PHYSICAL MEDICINE & REHABILITATION

## 2018-12-19 NOTE — PROGRESS NOTES
palpitations, edema      Gastrointestinal: Denies abdominal pain,  N/V, constipation, or diarrhea    Genitourinary: Denies urinary symptoms    Neurologic: See HPI.     MSK: See HPI. Psychiatric: Denies sleep disturbance, anxiety, depression    Hematologic/Lymphatic/Immunologic: Denies bruising       Physical Exam:   Blood pressure 121/64, pulse 73, height 5' (1.524 m), weight 208 lb (94.3 kg), not currently breastfeeding. General: well developed and well nourished in no acute distress. Body habitus is obese  HEENT: No rhinorrhea, sneezing, yawning, or lacrimation. No scleral icterus or conjunctival injection. Resp: symmetrical chest expansion, unlabored breathing, respirations unlabored. CV: Heart rate is regular. Peripheral pulses are palpable  Lymph: No visible regional lymphadenopathy. Skin: No rashes or ecchymosis. Normal turgor. Psych: Mood is calm. Affect is normal.   Ext: No edema noted     MSK:   Back/Hip Exam:   Inspection: Pelvis was asymmetric. Lumbar lordotic curvature was decreased. There was no scoliosis. No ecchymoses or erythema. Palpation: Palpatory exam revealed no tenderness along lumbosacral paraspinals, midline spine, concordant tenderness left SI joint sulcus- much less. There was no paraspinal spasms. There were no trigger points. Imaging: (personally reviewed by me 12/19/18)  MRI L Spine 11/2017:  T11-T12: Seen only on the axial images, there is a mild diffuse disc   bulge with mild bilateral facet hypertrophy. No central canal   narrowing.       T12-L1: Mild diffuse disc bulge with superimposed shallow right   paracentral disc protrusion with slight superior migration. No central   canal or foraminal narrowing.       L1-L2: Mild diffuse disc bulge with superimposed shallow left   paracentral disc protrusion with slight inferior migration. No central   canal or foraminal narrowing.       L2-L3: Diffuse disc bulge.  Mild bilateral facet hypertrophy and   ligamentum

## 2019-01-11 DIAGNOSIS — Z12.39 BREAST CANCER SCREENING: Primary | ICD-10-CM

## 2019-01-15 ENCOUNTER — OFFICE VISIT (OUTPATIENT)
Dept: FAMILY MEDICINE CLINIC | Age: 64
End: 2019-01-15
Payer: MEDICARE

## 2019-01-15 VITALS
SYSTOLIC BLOOD PRESSURE: 124 MMHG | HEIGHT: 60 IN | OXYGEN SATURATION: 98 % | DIASTOLIC BLOOD PRESSURE: 64 MMHG | TEMPERATURE: 97.8 F | RESPIRATION RATE: 16 BRPM | HEART RATE: 83 BPM | WEIGHT: 208 LBS | BODY MASS INDEX: 40.84 KG/M2

## 2019-01-15 DIAGNOSIS — B35.9 RINGWORM: Primary | ICD-10-CM

## 2019-01-15 DIAGNOSIS — I10 ESSENTIAL HYPERTENSION: ICD-10-CM

## 2019-01-15 PROCEDURE — 99213 OFFICE O/P EST LOW 20 MIN: CPT | Performed by: NURSE PRACTITIONER

## 2019-01-15 PROCEDURE — 1036F TOBACCO NON-USER: CPT | Performed by: NURSE PRACTITIONER

## 2019-01-15 PROCEDURE — 3017F COLORECTAL CA SCREEN DOC REV: CPT | Performed by: NURSE PRACTITIONER

## 2019-01-15 PROCEDURE — G8417 CALC BMI ABV UP PARAM F/U: HCPCS | Performed by: NURSE PRACTITIONER

## 2019-01-15 PROCEDURE — G8427 DOCREV CUR MEDS BY ELIG CLIN: HCPCS | Performed by: NURSE PRACTITIONER

## 2019-01-15 PROCEDURE — G8484 FLU IMMUNIZE NO ADMIN: HCPCS | Performed by: NURSE PRACTITIONER

## 2019-01-15 RX ORDER — ATENOLOL 50 MG/1
50 TABLET ORAL DAILY
Qty: 90 TABLET | Refills: 3 | Status: SHIPPED | OUTPATIENT
Start: 2019-01-15 | End: 2019-11-27 | Stop reason: SDUPTHER

## 2019-01-15 RX ORDER — CLOTRIMAZOLE AND BETAMETHASONE DIPROPIONATE 10; .64 MG/G; MG/G
CREAM TOPICAL
Qty: 45 G | Refills: 1 | Status: SHIPPED | OUTPATIENT
Start: 2019-01-15 | End: 2019-02-19 | Stop reason: SDUPTHER

## 2019-01-15 ASSESSMENT — ENCOUNTER SYMPTOMS
SHORTNESS OF BREATH: 0
COUGH: 0
WHEEZING: 0
COLOR CHANGE: 1

## 2019-01-29 ENCOUNTER — HOSPITAL ENCOUNTER (OUTPATIENT)
Dept: MAMMOGRAPHY | Age: 64
Discharge: HOME | End: 2019-01-31
Payer: MEDICARE

## 2019-01-29 DIAGNOSIS — Z12.39 BREAST CANCER SCREENING: ICD-10-CM

## 2019-01-29 PROCEDURE — 77063 BREAST TOMOSYNTHESIS BI: CPT

## 2019-02-26 ENCOUNTER — HOSPITAL ENCOUNTER (OUTPATIENT)
Age: 64
Discharge: HOME | End: 2019-02-28
Payer: MEDICARE

## 2019-02-26 ENCOUNTER — OFFICE VISIT (OUTPATIENT)
Dept: FAMILY MEDICINE CLINIC | Age: 64
End: 2019-02-26
Payer: MEDICARE

## 2019-02-26 VITALS
OXYGEN SATURATION: 99 % | TEMPERATURE: 97 F | RESPIRATION RATE: 16 BRPM | WEIGHT: 205 LBS | DIASTOLIC BLOOD PRESSURE: 78 MMHG | SYSTOLIC BLOOD PRESSURE: 130 MMHG | HEART RATE: 68 BPM | BODY MASS INDEX: 40.25 KG/M2 | HEIGHT: 60 IN

## 2019-02-26 DIAGNOSIS — E55.9 VITAMIN D INSUFFICIENCY: ICD-10-CM

## 2019-02-26 DIAGNOSIS — M25.562 CHRONIC PAIN OF LEFT KNEE: ICD-10-CM

## 2019-02-26 DIAGNOSIS — M10.072 ACUTE IDIOPATHIC GOUT INVOLVING TOE OF LEFT FOOT: ICD-10-CM

## 2019-02-26 DIAGNOSIS — E78.2 MIXED HYPERLIPIDEMIA: ICD-10-CM

## 2019-02-26 DIAGNOSIS — R73.03 PREDIABETES: ICD-10-CM

## 2019-02-26 DIAGNOSIS — E66.01 MORBID OBESITY WITH BMI OF 40.0-44.9, ADULT (HCC): ICD-10-CM

## 2019-02-26 DIAGNOSIS — B35.9 RINGWORM: Primary | ICD-10-CM

## 2019-02-26 DIAGNOSIS — Z13.31 SCREENING FOR DEPRESSION: ICD-10-CM

## 2019-02-26 DIAGNOSIS — M43.16 SPONDYLOLISTHESIS AT L4-L5 LEVEL: ICD-10-CM

## 2019-02-26 DIAGNOSIS — I10 ESSENTIAL HYPERTENSION: ICD-10-CM

## 2019-02-26 DIAGNOSIS — G89.29 CHRONIC PAIN OF LEFT KNEE: ICD-10-CM

## 2019-02-26 LAB
ALBUMIN SERPL-MCNC: 4.3 G/DL (ref 3.5–5.2)
ALP BLD-CCNC: 88 U/L (ref 35–104)
ALT SERPL-CCNC: 20 U/L (ref 0–32)
ANION GAP SERPL CALCULATED.3IONS-SCNC: 11 MMOL/L (ref 7–16)
AST SERPL-CCNC: 18 U/L (ref 0–31)
BASOPHILS ABSOLUTE: 0.07 E9/L (ref 0–0.2)
BASOPHILS RELATIVE PERCENT: 0.9 % (ref 0–2)
BILIRUB SERPL-MCNC: 0.6 MG/DL (ref 0–1.2)
BUN BLDV-MCNC: 14 MG/DL (ref 8–23)
CALCIUM SERPL-MCNC: 9.8 MG/DL (ref 8.6–10.2)
CHLORIDE BLD-SCNC: 99 MMOL/L (ref 98–107)
CHOLESTEROL, TOTAL: 159 MG/DL (ref 0–199)
CO2: 28 MMOL/L (ref 22–29)
CREAT SERPL-MCNC: 0.7 MG/DL (ref 0.5–1)
EOSINOPHILS ABSOLUTE: 0.47 E9/L (ref 0.05–0.5)
EOSINOPHILS RELATIVE PERCENT: 6.1 % (ref 0–6)
GFR AFRICAN AMERICAN: >60
GFR NON-AFRICAN AMERICAN: >60 ML/MIN/1.73
GLUCOSE BLD-MCNC: 112 MG/DL (ref 74–99)
HBA1C MFR BLD: 6.2 % (ref 4–5.6)
HCT VFR BLD CALC: 42 % (ref 34–48)
HDLC SERPL-MCNC: 43 MG/DL
HEMOGLOBIN: 13.6 G/DL (ref 11.5–15.5)
IMMATURE GRANULOCYTES #: 0.07 E9/L
IMMATURE GRANULOCYTES %: 0.9 % (ref 0–5)
LDL CHOLESTEROL CALCULATED: 67 MG/DL (ref 0–99)
LYMPHOCYTES ABSOLUTE: 2.11 E9/L (ref 1.5–4)
LYMPHOCYTES RELATIVE PERCENT: 27.2 % (ref 20–42)
MCH RBC QN AUTO: 29.8 PG (ref 26–35)
MCHC RBC AUTO-ENTMCNC: 32.4 % (ref 32–34.5)
MCV RBC AUTO: 91.9 FL (ref 80–99.9)
MONOCYTES ABSOLUTE: 0.6 E9/L (ref 0.1–0.95)
MONOCYTES RELATIVE PERCENT: 7.7 % (ref 2–12)
NEUTROPHILS ABSOLUTE: 4.44 E9/L (ref 1.8–7.3)
NEUTROPHILS RELATIVE PERCENT: 57.2 % (ref 43–80)
PDW BLD-RTO: 13.7 FL (ref 11.5–15)
PLATELET # BLD: 253 E9/L (ref 130–450)
PMV BLD AUTO: 10.7 FL (ref 7–12)
POTASSIUM SERPL-SCNC: 4.2 MMOL/L (ref 3.5–5)
RBC # BLD: 4.57 E12/L (ref 3.5–5.5)
SODIUM BLD-SCNC: 138 MMOL/L (ref 132–146)
TOTAL PROTEIN: 7.2 G/DL (ref 6.4–8.3)
TRIGL SERPL-MCNC: 246 MG/DL (ref 0–149)
URIC ACID, SERUM: 3.5 MG/DL (ref 2.4–5.7)
VITAMIN D 25-HYDROXY: 37 NG/ML (ref 30–100)
VLDLC SERPL CALC-MCNC: 49 MG/DL
WBC # BLD: 7.8 E9/L (ref 4.5–11.5)

## 2019-02-26 PROCEDURE — G0444 DEPRESSION SCREEN ANNUAL: HCPCS | Performed by: NURSE PRACTITIONER

## 2019-02-26 PROCEDURE — 80053 COMPREHEN METABOLIC PANEL: CPT

## 2019-02-26 PROCEDURE — G8427 DOCREV CUR MEDS BY ELIG CLIN: HCPCS | Performed by: NURSE PRACTITIONER

## 2019-02-26 PROCEDURE — 82306 VITAMIN D 25 HYDROXY: CPT

## 2019-02-26 PROCEDURE — 3017F COLORECTAL CA SCREEN DOC REV: CPT | Performed by: NURSE PRACTITIONER

## 2019-02-26 PROCEDURE — G8484 FLU IMMUNIZE NO ADMIN: HCPCS | Performed by: NURSE PRACTITIONER

## 2019-02-26 PROCEDURE — 1036F TOBACCO NON-USER: CPT | Performed by: NURSE PRACTITIONER

## 2019-02-26 PROCEDURE — 85025 COMPLETE CBC W/AUTO DIFF WBC: CPT

## 2019-02-26 PROCEDURE — 84550 ASSAY OF BLOOD/URIC ACID: CPT

## 2019-02-26 PROCEDURE — 80061 LIPID PANEL: CPT

## 2019-02-26 PROCEDURE — 99214 OFFICE O/P EST MOD 30 MIN: CPT | Performed by: NURSE PRACTITIONER

## 2019-02-26 PROCEDURE — G8417 CALC BMI ABV UP PARAM F/U: HCPCS | Performed by: NURSE PRACTITIONER

## 2019-02-26 PROCEDURE — 83036 HEMOGLOBIN GLYCOSYLATED A1C: CPT

## 2019-02-26 RX ORDER — ALLOPURINOL 300 MG/1
300 TABLET ORAL DAILY
Qty: 30 TABLET | Refills: 3 | Status: SHIPPED | OUTPATIENT
Start: 2019-02-26 | End: 2019-05-21 | Stop reason: SDUPTHER

## 2019-02-26 RX ORDER — OXYBUTYNIN CHLORIDE 5 MG/1
TABLET ORAL
Refills: 4 | COMMUNITY
Start: 2019-02-19 | End: 2019-07-08

## 2019-02-26 ASSESSMENT — PATIENT HEALTH QUESTIONNAIRE - PHQ9
SUM OF ALL RESPONSES TO PHQ QUESTIONS 1-9: 0
1. LITTLE INTEREST OR PLEASURE IN DOING THINGS: 0
SUM OF ALL RESPONSES TO PHQ9 QUESTIONS 1 & 2: 0
2. FEELING DOWN, DEPRESSED OR HOPELESS: 0
SUM OF ALL RESPONSES TO PHQ QUESTIONS 1-9: 0

## 2019-02-26 ASSESSMENT — ENCOUNTER SYMPTOMS
CHEST TIGHTNESS: 0
SORE THROAT: 0
NAUSEA: 0
WHEEZING: 0
COUGH: 0
BACK PAIN: 1
DIARRHEA: 0
VOICE CHANGE: 0
COLOR CHANGE: 0
SINUS PAIN: 0
VOMITING: 0
CONSTIPATION: 0
ABDOMINAL PAIN: 0
FACIAL SWELLING: 0
RHINORRHEA: 0
SHORTNESS OF BREATH: 0
SINUS PRESSURE: 0
TROUBLE SWALLOWING: 0

## 2019-02-27 RX ORDER — MELOXICAM 15 MG/1
15 TABLET ORAL DAILY
Qty: 90 TABLET | Refills: 1 | Status: SHIPPED | OUTPATIENT
Start: 2019-02-27 | End: 2019-05-14 | Stop reason: ALTCHOICE

## 2019-02-27 RX ORDER — ATORVASTATIN CALCIUM 10 MG/1
10 TABLET, FILM COATED ORAL NIGHTLY
Qty: 90 TABLET | Refills: 1 | Status: SHIPPED | OUTPATIENT
Start: 2019-02-27 | End: 2019-06-10 | Stop reason: SDUPTHER

## 2019-04-22 ENCOUNTER — OFFICE VISIT (OUTPATIENT)
Dept: FAMILY MEDICINE CLINIC | Age: 64
End: 2019-04-22
Payer: MEDICARE

## 2019-04-22 VITALS
HEIGHT: 60 IN | WEIGHT: 202.25 LBS | RESPIRATION RATE: 17 BRPM | TEMPERATURE: 98 F | BODY MASS INDEX: 39.71 KG/M2 | SYSTOLIC BLOOD PRESSURE: 112 MMHG | DIASTOLIC BLOOD PRESSURE: 68 MMHG | OXYGEN SATURATION: 95 % | HEART RATE: 80 BPM

## 2019-04-22 DIAGNOSIS — R53.82 CHRONIC FATIGUE: Primary | ICD-10-CM

## 2019-04-22 DIAGNOSIS — J20.9 BRONCHITIS, ACUTE, WITH BRONCHOSPASM: ICD-10-CM

## 2019-04-22 PROBLEM — M79.672 LEFT FOOT PAIN: Status: RESOLVED | Noted: 2017-10-05 | Resolved: 2019-04-22

## 2019-04-22 PROBLEM — M10.072 ACUTE IDIOPATHIC GOUT INVOLVING TOE OF LEFT FOOT: Status: RESOLVED | Noted: 2018-01-05 | Resolved: 2019-04-22

## 2019-04-22 PROBLEM — L65.0 TELOGEN EFFLUVIUM: Status: ACTIVE | Noted: 2019-04-22

## 2019-04-22 PROBLEM — L25.9 CONTACT DERMATITIS: Status: ACTIVE | Noted: 2019-04-22

## 2019-04-22 LAB
INFLUENZA A ANTIBODY: NORMAL
INFLUENZA B ANTIBODY: NORMAL

## 2019-04-22 PROCEDURE — 94640 AIRWAY INHALATION TREATMENT: CPT | Performed by: NURSE PRACTITIONER

## 2019-04-22 PROCEDURE — 3017F COLORECTAL CA SCREEN DOC REV: CPT | Performed by: NURSE PRACTITIONER

## 2019-04-22 PROCEDURE — 99214 OFFICE O/P EST MOD 30 MIN: CPT | Performed by: NURSE PRACTITIONER

## 2019-04-22 PROCEDURE — 87804 INFLUENZA ASSAY W/OPTIC: CPT | Performed by: NURSE PRACTITIONER

## 2019-04-22 PROCEDURE — G8427 DOCREV CUR MEDS BY ELIG CLIN: HCPCS | Performed by: NURSE PRACTITIONER

## 2019-04-22 PROCEDURE — 1036F TOBACCO NON-USER: CPT | Performed by: NURSE PRACTITIONER

## 2019-04-22 PROCEDURE — G8417 CALC BMI ABV UP PARAM F/U: HCPCS | Performed by: NURSE PRACTITIONER

## 2019-04-22 RX ORDER — LEVOFLOXACIN 500 MG/1
500 TABLET, FILM COATED ORAL DAILY
Qty: 7 TABLET | Refills: 0 | Status: SHIPPED | OUTPATIENT
Start: 2019-04-22 | End: 2019-04-29

## 2019-04-22 RX ORDER — ALBUTEROL SULFATE 2.5 MG/3ML
2.5 SOLUTION RESPIRATORY (INHALATION) ONCE
Status: COMPLETED | OUTPATIENT
Start: 2019-04-22 | End: 2019-04-22

## 2019-04-22 RX ADMIN — ALBUTEROL SULFATE 2.5 MG: 2.5 SOLUTION RESPIRATORY (INHALATION) at 14:06

## 2019-04-22 RX ADMIN — Medication 0.5 MG: at 14:07

## 2019-04-22 ASSESSMENT — ENCOUNTER SYMPTOMS
SINUS PRESSURE: 0
RHINORRHEA: 1
COUGH: 1
CHEST TIGHTNESS: 0
SORE THROAT: 0
ABDOMINAL PAIN: 0
FACIAL SWELLING: 0
RECTAL PAIN: 0
CONSTIPATION: 0
SINUS PAIN: 0
COLOR CHANGE: 0
VOMITING: 0
SHORTNESS OF BREATH: 0
NAUSEA: 0
DIARRHEA: 1
TROUBLE SWALLOWING: 0
VOICE CHANGE: 0
WHEEZING: 1

## 2019-04-22 NOTE — PROGRESS NOTES
OFFICE PROGRESS NOTE  [address] [place] [id number]  Dept: [phone]   Chief Complaint   Patient presents with    Fatigue    Cough    Diarrhea         HPI:     Acute Bronchitis: Patient presents for presents evaluation of chills, dyspnea, fever, nasal congestion, nonproductive cough, rhinorrhea bilateral ear pressure, sneezing, sweats, wheezing and fatigue as she can't sleep from the cough. Symptoms began 6 days ago and are gradually improving since that time. Past history is significant for occasional episodes of bronchitis.       Current Outpatient Medications:     levofloxacin (LEVAQUIN) 500 MG tablet, Take 1 tablet by mouth daily for 7 days, Disp: 7 tablet, Rfl: 0    meloxicam (MOBIC) 15 MG tablet, TAKE 1 TABLET BY MOUTH DAILY, Disp: 90 tablet, Rfl: 1    atorvastatin (LIPITOR) 10 MG tablet, TAKE 1 TABLET BY MOUTH NIGHTLY, Disp: 90 tablet, Rfl: 1    oxybutynin (DITROPAN) 5 MG tablet, TK 1 T PO UP TO TID, Disp: , Rfl: 4    allopurinol (ZYLOPRIM) 300 MG tablet, Take 1 tablet by mouth daily, Disp: 30 tablet, Rfl: 3    atenolol (TENORMIN) 50 MG tablet, Take 1 tablet by mouth daily, Disp: 90 tablet, Rfl: 3    omeprazole (PRILOSEC) 20 MG delayed release capsule, Take 1 capsule by mouth daily, Disp: 90 capsule, Rfl: 3    Cholecalciferol (VITAMIN D3) 2000 units CAPS, Take 2,000 Units by mouth daily, Disp: , Rfl:     Biotin 1000 MCG TABS, Take 1,000 mcg by mouth daily, Disp: , Rfl:   Social History     Socioeconomic History    Marital status: Single     Spouse name: None    Number of children: None    Years of education: None    Highest education level: None   Occupational History    None   Social Needs    Financial resource strain: None    Food insecurity:     Worry: None     Inability: None    Transportation needs:     Medical: None     Non-medical: None   Tobacco Use    Smoking status: Never Smoker    Smokeless tobacco: Never state.   Neurological: Positive for headaches. Negative for dizziness, tremors, seizures, syncope, facial asymmetry, speech difficulty, weakness, light-headedness and numbness. Psychiatric/Behavioral: Positive for sleep disturbance. Negative for agitation, behavioral problems, confusion, decreased concentration, dysphoric mood, hallucinations, self-injury and suicidal ideas. The patient is not nervous/anxious and is not hyperactive. OBJECTIVE:     VS:  Wt Readings from Last 3 Encounters:   04/22/19 202 lb 4 oz (91.7 kg)   02/26/19 205 lb (93 kg)   01/15/19 208 lb (94.3 kg)                       Vitals:    04/22/19 1331   BP: 112/68   Pulse: 80   Resp: 17   Temp: 98 °F (36.7 °C)   TempSrc: Temporal   SpO2: 95%   Weight: 202 lb 4 oz (91.7 kg)   Height: 5' (1.524 m)       General: Alert and oriented to person, place, and time, well developed and well nourished, in no acute distress  SKIN: Warm and dry, intact without any rash, masses or lesions  HEAD: normocephalic, atraumatic  Eyes: sclera/conjunctiva clear, PERRLA, EOMI's intact  ENT: tympanic membranes, external ear and ear canal normal bilaterally, normal hearing, Nose without deformity, nasal mucosa and turbinates normal without polyps   Throat: clear, tongue midline,clear drainage, no masses or lesions noted, good dentition  Neck: supple and non-tender without mass, trachea midline, no cervical lymphadenopathy, no bruit, no thyromegaly or nodules  Cardiovascular: regular rate and regular rhythm, normal S1 and S2,  no murmurs, rubs, clicks, or gallop. Distal pulses intact, no carotid bruits. No edema  Pulmonary/Chest: Right lung fields with faint expiratory wheeze, crackles in base to auscultation,  no  rhonchi, diminished air movement, no respiratory distress. Nebulizer with albuterol/ipratropium given with good improvement in air flow in right lung, improvement in breathing and coughing.    Abdomen: soft, non-tender, non-distended, normal bowel sounds,

## 2019-04-22 NOTE — PATIENT INSTRUCTIONS
Patient Education        Bronchitis: Care Instructions  Your Care Instructions    Bronchitis is inflammation of the bronchial tubes, which carry air to the lungs. The tubes swell and produce mucus, or phlegm. The mucus and inflamed bronchial tubes make you cough. You may have trouble breathing. Most cases of bronchitis are caused by viruses like those that cause colds. Antibiotics usually do not help and they may be harmful. Bronchitis usually develops rapidly and lasts about 2 to 3 weeks in otherwise healthy people. Follow-up care is a key part of your treatment and safety. Be sure to make and go to all appointments, and call your doctor if you are having problems. It's also a good idea to know your test results and keep a list of the medicines you take. How can you care for yourself at home? · Take all medicines exactly as prescribed. Call your doctor if you think you are having a problem with your medicine. · Get some extra rest.  · Take an over-the-counter pain medicine, such as acetaminophen (Tylenol), ibuprofen (Advil, Motrin), or naproxen (Aleve) to reduce fever and relieve body aches. Read and follow all instructions on the label. · Do not take two or more pain medicines at the same time unless the doctor told you to. Many pain medicines have acetaminophen, which is Tylenol. Too much acetaminophen (Tylenol) can be harmful. · Take an over-the-counter cough medicine that contains dextromethorphan to help quiet a dry, hacking cough so that you can sleep. Avoid cough medicines that have more than one active ingredient. Read and follow all instructions on the label. · Breathe moist air from a humidifier, hot shower, or sink filled with hot water. The heat and moisture will thin mucus so you can cough it out. · Do not smoke. Smoking can make bronchitis worse. If you need help quitting, talk to your doctor about stop-smoking programs and medicines.  These can increase your chances of quitting for good.  When should you call for help? Call 911 anytime you think you may need emergency care. For example, call if:    · You have severe trouble breathing.    Call your doctor now or seek immediate medical care if:    · You have new or worse trouble breathing.     · You cough up dark brown or bloody mucus (sputum).     · You have a new or higher fever.     · You have a new rash.    Watch closely for changes in your health, and be sure to contact your doctor if:    · You cough more deeply or more often, especially if you notice more mucus or a change in the color of your mucus.     · You are not getting better as expected. Where can you learn more? Go to https://LeadCloud.MeBeam. org and sign in to your HOMETRAX account. Enter H333 in the Acer box to learn more about \"Bronchitis: Care Instructions. \"     If you do not have an account, please click on the \"Sign Up Now\" link. Current as of: September 5, 2018  Content Version: 11.9  © 0034-8994 [employer], [employer]. Care instructions adapted under license by [facility] ([facility]). If you have questions about a medical condition or this instruction, always ask your healthcare professional. [place] [address] any warranty or liability for your use of this information. Patient Education        Fatigue: Care Instructions  Your Care Instructions    Fatigue is a feeling of tiredness, exhaustion, or lack of energy. You may feel fatigue because of too much or not enough activity. It can also come from stress, lack of sleep, boredom, and poor diet. Many medical problems, such as viral infections, can cause fatigue. Emotional problems, especially depression, are often the cause of fatigue. Fatigue is most often a symptom of another problem. Treatment for fatigue depends on the cause. For example, if you have fatigue because you have a certain health problem, treating this problem also treats your fatigue.  If depression or anxiety is the cause, treatment may help. Follow-up care is a key part of your treatment and safety. Be sure to make and go to all appointments, and call your doctor if you are having problems. It's also a good idea to know your test results and keep a list of the medicines you take. How can you care for yourself at home? · Get regular exercise. But don't overdo it. Go back and forth between rest and exercise. · Get plenty of rest.  · Eat a healthy diet. Do not skip meals, especially breakfast.  · Reduce your use of caffeine, tobacco, and alcohol. Caffeine is most often found in coffee, tea, cola drinks, and chocolate. · Limit medicines that can cause fatigue. This includes tranquilizers and cold and allergy medicines. When should you call for help? Watch closely for changes in your health, and be sure to contact your doctor if:    · You have new symptoms such as fever or a rash.     · Your fatigue gets worse.     · You have been feeling down, depressed, or hopeless. Or you may have lost interest in things that you usually enjoy.     · You are not getting better as expected. Where can you learn more? Go to [email or web address] org and sign in to your Flow Studio account. Enter O047 in the TapTrak box to learn more about \"Fatigue: Care Instructions. \"     If you do not have an account, please click on the \"Sign Up Now\" link. Current as of: September 23, 2018  Content Version: 11.9  © 7038-9198 [employer]. Care instructions adapted under license by [facility] ([facility]). If you have questions about a medical condition or this instruction, always ask your healthcare professional. [place] [address] any warranty or liability for your use of this information. Patient Education        levofloxacin (oral)  Pronunciation:  HUMA GUERRERO a sin  Brand:  Levaquin  What is the most important information I should know about levofloxacin?   Levofloxacin may cause swelling or tearing of a tendon, especially if you are over 60, if you take steroid medication, or if you have had a kidney, heart, or lung transplant. What is levofloxacin? Levofloxacin is a fluoroquinolone (iqzo-g-IZUS-o-lone) antibiotic that fights bacteria in the body. Levofloxacin is used to treat bacterial infections of the skin, sinuses, kidneys, bladder, or prostate. Levofloxacin is also used to treat bacterial infections that cause bronchitis or pneumonia, and to treat people who have been exposed to anthrax. Fluoroquinolone antibiotics can cause serious or disabling side effects that may not be reversible. Levofloxacin should be used only for infections that cannot be treated with a safer antibiotic. Levofloxacin may also be used for purposes not listed in this medication guide. What should I discuss with my healthcare provider before taking levofloxacin? You should not use this medicine if you are allergic to levofloxacin or other fluoroquinolones (ciprofloxacin, gemifloxacin, moxifloxacin, norfloxacin, ofloxacin, and others). To make sure levofloxacin is safe for you, tell your doctor if you have ever had:  · tendon problems, bone problems, arthritis or other joint problems (especially in children);  · slow heartbeats or other heart rhythm disorder (especially if you take medication to treat it);  · long QT syndrome (in you or a family member);  · liver or kidney disease;  · epilepsy or other seizure disorder;  · a nerve disorder;  · low levels of potassium in your blood (hypokalemia); or  · if you use a blood thinner (warfarin, Coumadin, Severa Fritter) and have \"INR\" or prothrombin time tests. Levofloxacin may cause swelling or tearing of a tendon (the fiber that connects bones to muscles in the body), especially in the Achilles' tendon of the heel. This can happen during treatment or up to several months after you stop taking levofloxacin.  Tendon problems may be more likely to occur if you are over 61, if you take steroid medication, or if you have had a kidney, heart, or lung transplant. Do not give this medicine to a child without medical advice. Tendon and joint problems may be more likely in a child taking levofloxacin. It is not known whether this medicine will harm an unborn baby. Tell your doctor if you are pregnant or plan to become pregnant. Levofloxacin can pass into breast milk and may harm a nursing baby. You should not breast-feed while using this medicine. How should I take levofloxacin? Follow all directions on your prescription label. Do not take this medicine in larger or smaller amounts or for longer than recommended. Take levofloxacin with water, at the same time each day. Drink extra fluids to keep your kidneys working properly while taking this medicine. You may take levofloxacin tablets with or without food. Take levofloxacin oral solution (liquid)  on an empty stomach, at least 1 hour before or 2 hours after a meal.  Measure liquid medicine with the dosing syringe provided, or with a special dose-measuring spoon or medicine cup. If you do not have a dose-measuring device, ask your pharmacist for one. Use this medicine for the full prescribed length of time. Your symptoms may improve before the infection is completely cleared. Skipping doses may also increase your risk of further infection that is resistant to antibiotics. Levofloxacin will not treat a viral infection such as the flu or a common cold. Do not share this medication with another person (especially a child), even if they have the same symptoms you have. This medication can cause you to have a false positive drug screening test. If you provide a urine sample for drug screening, tell the laboratory staff that you are taking levofloxacin. Store at room temperature away from moisture and heat. Keep the bottle tightly closed when not in use. What happens if I miss a dose? Take the missed dose as soon as you remember. Skip the missed dose if it is almost time for your next scheduled dose. Do not take extra medicine to make up the missed dose. What happens if I overdose? Seek emergency medical attention or call the Poison Help line at 1-448.209.1878. What should I avoid while taking levofloxacin? This medication may impair your thinking or reactions. Be careful if you drive or do anything that requires you to be alert. Antibiotic medicines can cause diarrhea, which may be a sign of a new infection. If you have diarrhea that is watery or bloody, call your doctor. Do not use anti-diarrhea medicine unless your doctor tells you to. Avoid exposure to sunlight or tanning beds. Levofloxacin can make you sunburn more easily. Wear protective clothing and use sunscreen (SPF 30 or higher) when you are outdoors. Call your doctor if you have severe burning, redness, itching, rash, or swelling after being in the sun. What are the possible side effects of levofloxacin? Get emergency medical help if you have signs of an allergic reaction (hives, difficult breathing, swelling in your face or throat) or a severe skin reaction (fever, sore throat, burning in your eyes, skin pain, red or purple skin rash that spreads and causes blistering and peeling). Levofloxacin may cause swelling or tearing of (rupture) a tendon. Levofloxacin can also have serious effects on your nerves, and may cause permanent nerve damage. Stop using this medicine and call your doctor at once if you have:  · signs of tendon rupture --sudden pain, swelling, bruising, tenderness, stiffness, movement problems, or a snapping or popping sound in any of your joints (rest the joint until you receive medical care or instructions); or  · nerve symptoms --numbness, tingling, burning pain, or being more sensitive to temperature, light touch, or the sense of your body position.   Stop taking levofloxacin and call your doctor at once if you have:  · severe stomach pain, diarrhea clomipramine, desipramine, iloperidone, imipramine, nortriptyline, and others; or  · NSAIDs (nonsteroidal anti-inflammatory drugs) --aspirin, ibuprofen (Advil, Motrin), naproxen (Aleve), celecoxib, diclofenac, indomethacin, meloxicam, and others. This list is not complete. Other drugs may interact with levofloxacin, including prescription and over-the-counter medicines, vitamins, and herbal products. Not all possible interactions are listed in this medication guide. Where can I get more information? Your pharmacist can provide more information about levofloxacin. Remember, keep this and all other medicines out of the reach of children, never share your medicines with others, and use this medication only for the indication prescribed. Every effort has been made to ensure that the information provided by Imani Cast[address]eda [address] is accurate, up-to-date, and complete, but no guarantee is made to that effect. Drug information contained herein may be time sensitive. [facility] information has been compiled for use by healthcare practitioners and consumers in the [place] and therefore [facility] does not warrant that uses outside of the [place] are appropriate, unless specifically indicated otherwise. [facility]'s drug information does not endorse drugs, diagnose patients or recommend therapy. [facility] drug information is an informational resource designed to assist licensed healthcare practitioners in caring for their patients and/or to serve consumers viewing this service as a supplement to, and not a substitute for, the expertise, skill, knowledge and judgment of healthcare practitioners. The absence of a warning for a given drug or drug combination in no way should be construed to indicate that the drug or drug combination is safe, effective or appropriate for any given patient. [facility] does not assume any responsibility for any aspect of healthcare administered with the aid of information [facility] provides. The information contained herein is not intended to cover all possible uses, directions, precautions, warnings, drug interactions, allergic reactions, or adverse effects. If you have questions about the drugs you are taking, check with your doctor, nurse or pharmacist.  Copyright 4265-6966 [address]: 12.01. Revision date: 12/21/2017. Care instructions adapted under license by [facility]. If you have questions about a medical condition or this instruction, always ask your healthcare professional. [place] [address] any warranty or liability for your use of this information.

## 2019-05-01 ENCOUNTER — OFFICE VISIT (OUTPATIENT)
Dept: FAMILY MEDICINE CLINIC | Age: 64
End: 2019-05-01
Payer: MEDICARE

## 2019-05-01 VITALS
TEMPERATURE: 97.8 F | BODY MASS INDEX: 40.05 KG/M2 | HEART RATE: 98 BPM | RESPIRATION RATE: 20 BRPM | SYSTOLIC BLOOD PRESSURE: 118 MMHG | OXYGEN SATURATION: 99 % | DIASTOLIC BLOOD PRESSURE: 70 MMHG | WEIGHT: 204 LBS | HEIGHT: 60 IN

## 2019-05-01 DIAGNOSIS — J20.9 BRONCHITIS, ACUTE, WITH BRONCHOSPASM: Primary | ICD-10-CM

## 2019-05-01 PROCEDURE — 1036F TOBACCO NON-USER: CPT | Performed by: NURSE PRACTITIONER

## 2019-05-01 PROCEDURE — G8427 DOCREV CUR MEDS BY ELIG CLIN: HCPCS | Performed by: NURSE PRACTITIONER

## 2019-05-01 PROCEDURE — 3017F COLORECTAL CA SCREEN DOC REV: CPT | Performed by: NURSE PRACTITIONER

## 2019-05-01 PROCEDURE — 99213 OFFICE O/P EST LOW 20 MIN: CPT | Performed by: NURSE PRACTITIONER

## 2019-05-01 PROCEDURE — G8417 CALC BMI ABV UP PARAM F/U: HCPCS | Performed by: NURSE PRACTITIONER

## 2019-05-01 RX ORDER — GUAIFENESIN AND CODEINE PHOSPHATE 100; 10 MG/5ML; MG/5ML
5 SOLUTION ORAL 3 TIMES DAILY PRN
Qty: 45 ML | Refills: 0 | Status: SHIPPED | OUTPATIENT
Start: 2019-05-01 | End: 2019-05-04

## 2019-05-01 ASSESSMENT — ENCOUNTER SYMPTOMS
SINUS PRESSURE: 0
COLOR CHANGE: 0
COUGH: 1
VOICE CHANGE: 0
WHEEZING: 0
SHORTNESS OF BREATH: 0
FACIAL SWELLING: 0
TROUBLE SWALLOWING: 0
RHINORRHEA: 1
SORE THROAT: 0
SINUS PAIN: 0

## 2019-05-01 NOTE — PROGRESS NOTES
OFFICE PROGRESS NOTE  [address] [facility] [address]  Dept: [phone]   Chief Complaint   Patient presents with    Cough     patient is still coughing with head/nasal congestion    Nasal Congestion         HPI:     Here for follow up on bronchitis, She finished the Levaquin but is continuing to cough dry non productive. She now has a runny nose, feels some nasal congestion. Denies any fever, chills. Has been taking mucinex DM, increased fluids, having bronchospasm. Current Outpatient Medications:     guaiFENesin-codeine (TUSSI-ORGANIDIN NR) 100-10 MG/5ML syrup, Take 5 mLs by mouth 3 times daily as needed for Cough for up to 3 days. , Disp: 45 mL, Rfl: 0    meloxicam (MOBIC) 15 MG tablet, TAKE 1 TABLET BY MOUTH DAILY, Disp: 90 tablet, Rfl: 1    atorvastatin (LIPITOR) 10 MG tablet, TAKE 1 TABLET BY MOUTH NIGHTLY, Disp: 90 tablet, Rfl: 1    oxybutynin (DITROPAN) 5 MG tablet, TK 1 T PO UP TO TID, Disp: , Rfl: 4    allopurinol (ZYLOPRIM) 300 MG tablet, Take 1 tablet by mouth daily, Disp: 30 tablet, Rfl: 3    atenolol (TENORMIN) 50 MG tablet, Take 1 tablet by mouth daily, Disp: 90 tablet, Rfl: 3    omeprazole (PRILOSEC) 20 MG delayed release capsule, Take 1 capsule by mouth daily, Disp: 90 capsule, Rfl: 3    Cholecalciferol (VITAMIN D3) 2000 units CAPS, Take 2,000 Units by mouth daily, Disp: , Rfl:     Biotin 1000 MCG TABS, Take 1,000 mcg by mouth daily, Disp: , Rfl:   Social History     Socioeconomic History    Marital status: Single     Spouse name: None    Number of children: None    Years of education: None    Highest education level: None   Occupational History    None   Social Needs    Financial resource strain: None    Food insecurity:     Worry: None     Inability: None    Transportation needs:     Medical: None     Non-medical: None   Tobacco Use    Smoking status: Never Smoker    Smokeless tobacco: Never Used Substance and Sexual Activity    Alcohol use: Yes     Alcohol/week: 0.0 oz     Comment: Rarely    Drug use: No    Sexual activity: None   Lifestyle    Physical activity:     Days per week: None     Minutes per session: None    Stress: None   Relationships    Social connections:     Talks on phone: None     Gets together: None     Attends Restorationism service: None     Active member of club or organization: None     Attends meetings of clubs or organizations: None     Relationship status: None    Intimate partner violence:     Fear of current or ex partner: None     Emotionally abused: None     Physically abused: None     Forced sexual activity: None   Other Topics Concern    None   Social History Narrative    None       I have reviewed Margaret's allergies, medications, problem list, medical, social and family history and have updated as needed in the electronic medical record    Review of Systems   Constitutional: Negative for activity change, appetite change, chills, diaphoresis, fatigue, fever and unexpected weight change. HENT: Positive for congestion, postnasal drip and rhinorrhea. Negative for dental problem, drooling, ear discharge, ear pain, facial swelling, hearing loss, mouth sores, nosebleeds, sinus pressure, sinus pain, sneezing, sore throat, tinnitus, trouble swallowing and voice change. Respiratory: Positive for cough. Negative for shortness of breath and wheezing. Cardiovascular: Negative for chest pain, palpitations and leg swelling. Skin: Negative for color change, pallor, rash and wound. Neurological: Negative for dizziness, tremors, seizures, syncope, facial asymmetry, speech difficulty, weakness, light-headedness, numbness and headaches.        OBJECTIVE:     VS:  Wt Readings from Last 3 Encounters:   05/01/19 204 lb (92.5 kg)   04/22/19 202 lb 4 oz (91.7 kg)   02/26/19 205 lb (93 kg)                       Vitals:    05/01/19 1418 05/01/19 1512   BP: (!) 148/82 118/70   Site: Left Upper Arm   Position:  Sitting   Cuff Size:  Medium Adult   Pulse: 98    Resp: 20    Temp: 97.8 °F (36.6 °C)    TempSrc: Temporal    SpO2: 99%    Weight: 204 lb (92.5 kg)    Height: 5' (1.524 m)        General: Alert and oriented to person, place, and time, well developed and well nourished, in no acute distress  SKIN: Warm and dry, intact without any rash, masses or lesions  HEAD: normocephalic, atraumatic  Eyes: sclera/conjunctiva clear, PERRLA, EOMI's intact  ENT: tympanic membranes, external ear and ear canal normal bilaterally, normal hearing, Nose without deformity, nasal mucosa and turbinates normal without polyps   Throat: clear, tongue midline, clear post nasal drainage, no masses or lesions noted, good dentition  Neck: supple and non-tender without mass, trachea midline, no cervical lymphadenopathy, no bruit, no thyromegaly or nodules  Cardiovascular: regular rate and regular rhythm, normal S1 and S2,  no murmurs, rubs, clicks, or gallop. Distal pulses intact, no carotid bruits. No edema  Pulmonary/Chest: clear to auscultation bilaterally, no wheezes, rales or rhonchi, normal air movement, no respiratory distress  Abdomen: soft, non-tender, non-distended, normal bowel sounds, no masses or hepatosplenomegaly  Musculoskeletal: Normal ROM, no joint swelling, deformity or tenderness   Neurologic: reflexes normal and symmetric, no cranial nerve deficit, gait, coordination and speech normal  Extremities: no clubbing, cyanosis, or edema. Psychiatric: Good eye contact, normal mood and affect, answers questions appropriately    ASSESSMENT/PLAN   Cata Au was seen today for cough and nasal congestion. Diagnoses and all orders for this visit:    Bronchitis, acute, with bronchospasm  -     guaiFENesin-codeine (TUSSI-ORGANIDIN NR) 100-10 MG/5ML syrup; Take 5 mLs by mouth 3 times daily as needed for Cough for up to 3 days. Do not drive taking codeine cough syrup            Return for keep appt on 5/8/19. Discussed taking medications as directed and adverse effects        I have reviewed my findings and recommendations with Jyoti Galaviz.     Ryan Davidson, NP-C, FNP-BC

## 2019-05-08 ENCOUNTER — OFFICE VISIT (OUTPATIENT)
Dept: FAMILY MEDICINE CLINIC | Age: 64
End: 2019-05-08
Payer: MEDICARE

## 2019-05-08 VITALS
WEIGHT: 208.06 LBS | HEIGHT: 60 IN | TEMPERATURE: 98.1 F | RESPIRATION RATE: 17 BRPM | DIASTOLIC BLOOD PRESSURE: 80 MMHG | HEART RATE: 76 BPM | BODY MASS INDEX: 40.85 KG/M2 | OXYGEN SATURATION: 96 % | SYSTOLIC BLOOD PRESSURE: 158 MMHG

## 2019-05-08 DIAGNOSIS — J20.9 BRONCHITIS, ACUTE, WITH BRONCHOSPASM: Primary | ICD-10-CM

## 2019-05-08 DIAGNOSIS — R05.9 COUGH: ICD-10-CM

## 2019-05-08 DIAGNOSIS — M25.511 ACUTE PAIN OF RIGHT SHOULDER: ICD-10-CM

## 2019-05-08 PROBLEM — N95.0 POSTMENOPAUSAL BLEEDING: Status: RESOLVED | Noted: 2017-10-05 | Resolved: 2019-05-08

## 2019-05-08 PROCEDURE — G8427 DOCREV CUR MEDS BY ELIG CLIN: HCPCS | Performed by: NURSE PRACTITIONER

## 2019-05-08 PROCEDURE — G8417 CALC BMI ABV UP PARAM F/U: HCPCS | Performed by: NURSE PRACTITIONER

## 2019-05-08 PROCEDURE — 1036F TOBACCO NON-USER: CPT | Performed by: NURSE PRACTITIONER

## 2019-05-08 PROCEDURE — 3017F COLORECTAL CA SCREEN DOC REV: CPT | Performed by: NURSE PRACTITIONER

## 2019-05-08 PROCEDURE — 99213 OFFICE O/P EST LOW 20 MIN: CPT | Performed by: NURSE PRACTITIONER

## 2019-05-08 RX ORDER — CLOBETASOL PROPIONATE 0.5 MG/G
CREAM TOPICAL
Refills: 0 | COMMUNITY
Start: 2019-05-01 | End: 2019-07-08

## 2019-05-08 ASSESSMENT — ENCOUNTER SYMPTOMS
SORE THROAT: 0
RHINORRHEA: 0
BACK PAIN: 0
SINUS PAIN: 0
SHORTNESS OF BREATH: 0
COLOR CHANGE: 0
CHEST TIGHTNESS: 0
SINUS PRESSURE: 0
VOMITING: 0
VOICE CHANGE: 0
TROUBLE SWALLOWING: 0
DIARRHEA: 0
FACIAL SWELLING: 0
CONSTIPATION: 0
NAUSEA: 0
ABDOMINAL PAIN: 0
COUGH: 1
WHEEZING: 0

## 2019-05-08 NOTE — PROGRESS NOTES
OFFICE PROGRESS NOTE  [address] [facility] [address]  Dept: [phone]   Chief Complaint   Patient presents with    2 Week Follow-Up     bronchitis    Shoulder Pain     right shoulder pain         HPI:     Here today for follow up on bronchitis she still feels tired and the cough was gone and now returned after she stopped the Tussi-organdin. She feels some congestion in the head and chest, this morning just a small amount of yellow mucous then clear again. She had trouble catching her breath last night in the middle of the night. She is complaining of right shoulder pain since she saw the chiropractor a week ago, she feels like a stab at times but then will come down into the upper arm area. Difficulty sleeping at night, has been using Meloxicam with some relief, denies any injury. Difficulty raising her over her head due to the pain.          Current Outpatient Medications:     clobetasol (TEMOVATE) 0.05 % cream, , Disp: , Rfl: 0    meloxicam (MOBIC) 15 MG tablet, TAKE 1 TABLET BY MOUTH DAILY, Disp: 90 tablet, Rfl: 1    atorvastatin (LIPITOR) 10 MG tablet, TAKE 1 TABLET BY MOUTH NIGHTLY, Disp: 90 tablet, Rfl: 1    oxybutynin (DITROPAN) 5 MG tablet, TK 1 T PO UP TO TID, Disp: , Rfl: 4    allopurinol (ZYLOPRIM) 300 MG tablet, Take 1 tablet by mouth daily, Disp: 30 tablet, Rfl: 3    atenolol (TENORMIN) 50 MG tablet, Take 1 tablet by mouth daily, Disp: 90 tablet, Rfl: 3    omeprazole (PRILOSEC) 20 MG delayed release capsule, Take 1 capsule by mouth daily, Disp: 90 capsule, Rfl: 3    Cholecalciferol (VITAMIN D3) 2000 units CAPS, Take 2,000 Units by mouth daily, Disp: , Rfl:     Biotin 1000 MCG TABS, Take 1,000 mcg by mouth daily, Disp: , Rfl:   Social History     Socioeconomic History    Marital status: Single     Spouse name: None    Number of children: None    Years of education: None    Highest education level: None Occupational History    None   Social Needs    Financial resource strain: None    Food insecurity:     Worry: None     Inability: None    Transportation needs:     Medical: None     Non-medical: None   Tobacco Use    Smoking status: Never Smoker    Smokeless tobacco: Never Used   Substance and Sexual Activity    Alcohol use: Yes     Alcohol/week: 0.0 oz     Comment: Rarely    Drug use: No    Sexual activity: None   Lifestyle    Physical activity:     Days per week: None     Minutes per session: None    Stress: None   Relationships    Social connections:     Talks on phone: None     Gets together: None     Attends [employer] service: None     Active member of club or organization: None     Attends meetings of clubs or organizations: None     Relationship status: None    Intimate partner violence:     Fear of current or ex partner: None     Emotionally abused: None     Physically abused: None     Forced sexual activity: None   Other Topics Concern    None   Social History Narrative    None       I have reviewed Margaret's allergies, medications, problem list, medical, social and family history and have updated as needed in the electronic medical record    Review of Systems   Constitutional: Negative for activity change, appetite change, chills, diaphoresis, fatigue, fever and unexpected weight change. HENT: Positive for postnasal drip. Negative for congestion, dental problem, drooling, ear discharge, ear pain, facial swelling, hearing loss, mouth sores, nosebleeds, rhinorrhea, sinus pressure, sinus pain, sneezing, sore throat, tinnitus, trouble swallowing and voice change. Eyes: Negative for visual disturbance. Respiratory: Positive for cough. Negative for chest tightness, shortness of breath and wheezing. Cardiovascular: Negative for chest pain, palpitations and leg swelling. Gastrointestinal: Negative for abdominal pain, constipation, diarrhea, nausea and vomiting.    Endocrine: Negative for cold intolerance, heat intolerance, polydipsia, polyphagia and polyuria. Genitourinary: Negative for difficulty urinating, frequency and urgency. Musculoskeletal: Positive for arthralgias ( right shoulder). Negative for back pain, gait problem, joint swelling, myalgias, neck pain and neck stiffness. Skin: Negative for color change, pallor, rash and wound. Allergic/Immunologic: Negative for environmental allergies, food allergies and immunocompromised state. Neurological: Negative for dizziness, tremors, seizures, syncope, facial asymmetry, speech difficulty, weakness, light-headedness, numbness and headaches. Hematological: Negative for adenopathy. Does not bruise/bleed easily. Psychiatric/Behavioral: Positive for sleep disturbance. Negative for agitation, behavioral problems, confusion, decreased concentration, dysphoric mood, hallucinations, self-injury and suicidal ideas. The patient is not nervous/anxious and is not hyperactive.         OBJECTIVE:     VS:  Wt Readings from Last 3 Encounters:   05/08/19 208 lb 1 oz (94.4 kg)   05/01/19 204 lb (92.5 kg)   04/22/19 202 lb 4 oz (91.7 kg)                       Vitals:    05/08/19 0910   BP: (!) 158/80   Pulse: 76   Resp: 17   Temp: 98.1 °F (36.7 °C)   TempSrc: Temporal   SpO2: 96%   Weight: 208 lb 1 oz (94.4 kg)   Height: 5' (1.524 m)       General: Alert and oriented to person, place, and time, well developed and well nourished, in no acute distress  SKIN: Warm and dry, intact without any rash, masses or lesions  HEAD: normocephalic, atraumatic  Eyes: sclera/conjunctiva clear, PERRLA, EOMI's intact  ENT: tympanic membranes, external ear and ear canal normal bilaterally, normal hearing, Nose without deformity, nasal mucosa and turbinates normal without polyps   Throat: clear, tongue midline, white post nasal  drainage, no masses or lesions noted, good dentition  Neck: supple and non-tender without mass, trachea midline, no cervical lymphadenopathy, no bruit, no thyromegaly or nodules  Cardiovascular: regular rate and regular rhythm, normal S1 and S2,  no murmurs, rubs, clicks, or gallop. Distal pulses intact, no carotid bruits. No edema  Pulmonary/Chest: clear to auscultation bilaterally, no wheezes, rales or rhonchi, normal air movement, no respiratory distress  Abdomen: soft, non-tender, non-distended, normal bowel sounds, no masses or hepatosplenomegaly  Musculoskeletal: Limited  ROM in right shoulder and tender along the scapular area and in the anterior biceps region on palpation, her right  shoulder is much lower than the left, no joint swelling, deformity  Neurologic: reflexes normal and symmetric, no cranial nerve deficit, gait, coordination and speech normal  Extremities: no clubbing, cyanosis, or edema. Psychiatric: Good eye contact, normal mood and affect, answers questions appropriately    ASSESSMENT/PLAN   Vu Lemus was seen today for 2 week follow-up and shoulder pain. Diagnoses and all orders for this visit:    Bronchitis, acute, with bronchospasm resolving    Cough secondary to the post nasal drip  Discussed salt water irrigations  Discussed Ocean Sinus Complete irrigate 2 - 3 times a day as needed  Discussed plain mucinex as directed with full glass of water  Avoid using Afrin to prevent increased nasal congestion    Acute pain of right shoulder  exercises given do not go to chiropractor 3 days a week. Return in about 1 month (around 6/8/2019), or if symptoms worsen or fail to improve, for shoulder pain. Discussed weight loss, Discussed exercising 30 minutes daily and Discussed taking medications as directed and adverse effects        I have reviewed my findings and recommendations with Danna Jones.     Jasmyn Mitchell, NP-C, FNP-BC

## 2019-05-08 NOTE — PATIENT INSTRUCTIONS
Patient Education        Shoulder Stretches: Exercises  Your Care Instructions  Here are some examples of exercises for your shoulder. Start each exercise slowly. Ease off the exercise if you start to have pain. Your doctor or physical therapist will tell you when you can start these exercises and which ones will work best for you. How to do the exercises  Shoulder stretch    1.  a doorway and place one arm against the door frame. Your elbow should be a little higher than your shoulder. 2. Relax your shoulders as you lean forward, allowing your chest and shoulder muscles to stretch. You can also turn your body slightly away from your arm to stretch the muscles even more. 3. Hold for 15 to 30 seconds. 4. Repeat 2 to 4 times with each arm. Shoulder and chest stretch    1. Shoulder and chest stretch  2. While sitting, relax your upper body so you slump slightly in your chair. 3. As you breathe in, straighten your back and open your arms out to the sides. 4. Gently pull your shoulder blades back and downward. 5. Hold for 15 to 30 seconds as your breathe normally. 6. Repeat 2 to 4 times. Overhead stretch    1. Reach up over your head with both arms. 2. Hold for 15 to 30 seconds. 3. Repeat 2 to 4 times. Follow-up care is a key part of your treatment and safety. Be sure to make and go to all appointments, and call your doctor if you are having problems. It's also a good idea to know your test results and keep a list of the medicines you take. Where can you learn more? Go to [email or web address] and sign in to your [employer] account. Enter S254 in the [facility] box to learn more about \"Shoulder Stretches: Exercises. \"     If you do not have an account, please click on the \"Sign Up Now\" link. Current as of: September 20, 2018  Content Version: 12.0  © 1881-8424 Healthwise, Incorporated. Care instructions adapted under license by [facility] [facility] ([facility]).  If you have questions about a medical condition or this instruction, always ask your healthcare professional. [address] any warranty or liability for your use of this information. Patient Education        Saline Nasal Washes: Care Instructions  Your Care Instructions  Saline nasal washes help keep the nasal passages open by washing out thick or dried mucus. This simple remedy can help relieve symptoms of allergies, sinusitis, and colds. It also can make the nose feel more comfortable by keeping the mucous membranes moist. You may notice a little burning sensation in your nose the first few times you use the solution, but this usually gets better in a few days. Follow-up care is a key part of your treatment and safety. Be sure to make and go to all appointments, and call your doctor if you are having problems. It's also a good idea to know your test results and keep a list of the medicines you take. How can you care for yourself at home? · You can buy premixed saline solution in a squeeze bottle or other sinus rinse products at a drugstore. Read and follow the instructions on the label. · You also can make your own saline solution by adding 1 teaspoon of salt and 1 teaspoon of baking soda to 2 cups of distilled water. · If you use a homemade solution, pour a small amount into a clean bowl. Using a rubber bulb syringe, squeeze the syringe and place the tip in the salt water. Pull a small amount of the salt water into the syringe by relaxing your hand. · Sit down with your head tilted slightly back. Do not lie down. Put the tip of the bulb syringe or the squeeze bottle a little way into one of your nostrils. Gently drip or squirt a few drops into the nostril. Repeat with the other nostril. Some sneezing and gagging are normal at first.  · Gently blow your nose. · Wipe the syringe or bottle tip clean after each use. · Repeat this 2 or 3 times a day.   · Use nasal washes gently if you have

## 2019-05-13 ENCOUNTER — TELEPHONE (OUTPATIENT)
Dept: FAMILY MEDICINE CLINIC | Age: 64
End: 2019-05-13

## 2019-05-13 DIAGNOSIS — M25.511 ACUTE PAIN OF RIGHT SHOULDER: Primary | ICD-10-CM

## 2019-05-13 NOTE — TELEPHONE ENCOUNTER
Pt was seen last week (5.8.19) about her shoulder pain. Says you advised her to call Dr Keely Dominguez. Pt says she called and was advised since this is a new problem (pt says pain in neck and back also) she'll need a referral to Dr Davis Mixon. Do you want to see her or just refer? Please advise.

## 2019-05-14 ENCOUNTER — OFFICE VISIT (OUTPATIENT)
Dept: PHYSICAL MEDICINE AND REHAB | Age: 64
End: 2019-05-14
Payer: MEDICARE

## 2019-05-14 ENCOUNTER — TELEPHONE (OUTPATIENT)
Dept: PHYSICAL MEDICINE AND REHAB | Age: 64
End: 2019-05-14

## 2019-05-14 VITALS
BODY MASS INDEX: 39.46 KG/M2 | DIASTOLIC BLOOD PRESSURE: 72 MMHG | HEIGHT: 60 IN | SYSTOLIC BLOOD PRESSURE: 127 MMHG | HEART RATE: 72 BPM | WEIGHT: 201 LBS

## 2019-05-14 DIAGNOSIS — G89.29 CHRONIC NECK PAIN: ICD-10-CM

## 2019-05-14 DIAGNOSIS — M75.01 ADHESIVE CAPSULITIS OF RIGHT SHOULDER: ICD-10-CM

## 2019-05-14 DIAGNOSIS — M54.2 CHRONIC NECK PAIN: ICD-10-CM

## 2019-05-14 DIAGNOSIS — M48.02 FORAMINAL STENOSIS OF CERVICAL REGION: ICD-10-CM

## 2019-05-14 DIAGNOSIS — M54.12 CERVICAL RADICULITIS: ICD-10-CM

## 2019-05-14 DIAGNOSIS — M47.812 SPONDYLOSIS OF CERVICAL REGION WITHOUT MYELOPATHY OR RADICULOPATHY: ICD-10-CM

## 2019-05-14 DIAGNOSIS — S13.4XXS WHIPLASH INJURIES, SEQUELA: Primary | ICD-10-CM

## 2019-05-14 PROCEDURE — 1036F TOBACCO NON-USER: CPT | Performed by: PHYSICAL MEDICINE & REHABILITATION

## 2019-05-14 PROCEDURE — G8427 DOCREV CUR MEDS BY ELIG CLIN: HCPCS | Performed by: PHYSICAL MEDICINE & REHABILITATION

## 2019-05-14 PROCEDURE — 3017F COLORECTAL CA SCREEN DOC REV: CPT | Performed by: PHYSICAL MEDICINE & REHABILITATION

## 2019-05-14 PROCEDURE — G8417 CALC BMI ABV UP PARAM F/U: HCPCS | Performed by: PHYSICAL MEDICINE & REHABILITATION

## 2019-05-14 PROCEDURE — 99214 OFFICE O/P EST MOD 30 MIN: CPT | Performed by: PHYSICAL MEDICINE & REHABILITATION

## 2019-05-14 RX ORDER — PREDNISONE 20 MG/1
60 TABLET ORAL DAILY
Qty: 21 TABLET | Refills: 0 | Status: SHIPPED | OUTPATIENT
Start: 2019-05-14 | End: 2019-05-21

## 2019-05-14 RX ORDER — CYCLOBENZAPRINE HCL 10 MG
10 TABLET ORAL NIGHTLY PRN
Qty: 30 TABLET | Refills: 0 | Status: SHIPPED | OUTPATIENT
Start: 2019-05-14 | End: 2019-05-24
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
## 2019-05-14 NOTE — PATIENT INSTRUCTIONS
We appreciate that you chose [facility] to provide your healthcare needs today. We took great pleasure in caring for you. You may receive a survey to help us learn how to make your next visit to a [facility] facility better than the last.   Your feedback is important to help us continually improve the service we can provide you. Please take the time to complete it thoughtfully if you receive one as we value the feedback in every survey. In this survey we would appreciate that you answer \"always\" or \"yes\" for as many questions as possible (this is the answer we get credit for doing a good job). If you feel there is a question you cannot answer \"always\" or \"yes\", please let us know before you leave today so we can remedy the situation right away. We look forward to continuing to provide you with excellent care. Considering the survey questions related to access to care, please keep in mind the urgency of your problem (i.e. was it an emergent or urgent visit or more routine)  and whether the urgency of that problem was handled appropriately by our office. We always do our best to prioritize the patients who need the care most urgently given our specialty is in short supply and there is a high demand for visits. Thank you for your time and consideration.

## 2019-05-15 ENCOUNTER — TELEPHONE (OUTPATIENT)
Dept: PHYSICAL MEDICINE AND REHAB | Age: 64
End: 2019-05-15

## 2019-05-15 NOTE — TELEPHONE ENCOUNTER
Called and spoke with the patient to inform her that her x-ray of her shoulder was normal.  Patient aware and voiced understanding.

## 2019-05-15 NOTE — TELEPHONE ENCOUNTER
----- Message from Juvenal Fox DO sent at 5/14/2019  5:38 PM EDT -----  Test results are normal please notify patient.

## 2019-05-21 ENCOUNTER — TELEPHONE (OUTPATIENT)
Dept: PHYSICAL MEDICINE AND REHAB | Age: 64
End: 2019-05-21

## 2019-05-21 ENCOUNTER — OFFICE VISIT (OUTPATIENT)
Dept: PHYSICAL MEDICINE AND REHAB | Age: 64
End: 2019-05-21
Payer: MEDICARE

## 2019-05-21 VITALS — WEIGHT: 195 LBS | HEIGHT: 60 IN | BODY MASS INDEX: 38.28 KG/M2

## 2019-05-21 DIAGNOSIS — G56.03 BILATERAL CARPAL TUNNEL SYNDROME: Primary | ICD-10-CM

## 2019-05-21 DIAGNOSIS — M10.072 ACUTE IDIOPATHIC GOUT INVOLVING TOE OF LEFT FOOT: ICD-10-CM

## 2019-05-21 PROCEDURE — 95886 MUSC TEST DONE W/N TEST COMP: CPT | Performed by: PHYSICAL MEDICINE & REHABILITATION

## 2019-05-21 PROCEDURE — 99999 PR OFFICE/OUTPT VISIT,PROCEDURE ONLY: CPT | Performed by: PHYSICAL MEDICINE & REHABILITATION

## 2019-05-21 PROCEDURE — 95912 NRV CNDJ TEST 11-12 STUDIES: CPT | Performed by: PHYSICAL MEDICINE & REHABILITATION

## 2019-05-21 RX ORDER — CELECOXIB 200 MG/1
200 CAPSULE ORAL 2 TIMES DAILY
Qty: 60 CAPSULE | Refills: 2 | Status: SHIPPED | OUTPATIENT
Start: 2019-05-21 | End: 2019-06-28 | Stop reason: DRUGHIGH

## 2019-05-21 RX ORDER — ALLOPURINOL 300 MG/1
300 TABLET ORAL DAILY
Qty: 90 TABLET | Refills: 3 | Status: SHIPPED
Start: 2019-05-21 | End: 2020-06-09

## 2019-05-21 NOTE — PATIENT INSTRUCTIONS
Electrodiagnotic Laboratory  Accredited by the [facility] with Exemplary status  JAMILA Monet D.O. [facility]  [address] [place]  Phone: [phone]  Fax: [phone]        Today you had an electrodiagnostic exam which included nerve conduction studies (NCS) and electromyography (EMG). This test evaluated the electrical activity of your nerves and muscles to help determine if you have a nerve or muscle disease. This test can help determine the location and type of a nerve or muscle problem. This will help your referring doctor diagnose your condition and determine the appropriate next step in your treatment plan. After your test:    1. There are no long lasting side effects of the test.     2. You may resume your normal activities without restrictions. 3.  Resume any medications that were stopped for the test.     4  If you have sore areas or bruising in your muscles where the needle was placed, apply a cold pack to the sore area for 15-20 minutes three to four times a day as needed for pain. The soreness should go away in about 1-2 days. 5. Your results were provided  Briefly at the end of your test and the final detailed report will be provided to your referring physician, and/or primary care physician and any other parties you requested within 1-2 days of the examination. You may wish to contact your referring provider after a few days to determine what they would like you to do next. 6.  Please call [phone] with any questions or concerns and if you develop increased body temperature/fever, swelling, tenderness, increased pain and/or drainage from the sites where the needle was placed. Thank you for choosing us for your health care needs.

## 2019-05-21 NOTE — TELEPHONE ENCOUNTER
Patient called stating that she was in earlier for a 1pm EMG. She stated that she was told you were going to write her a prescription for celebrex and wanted to check on the status of that prescription. Please advise.

## 2019-05-21 NOTE — PROGRESS NOTES
[address] [facility]  Electrodiagnostic Laboratory  *Accredited by the [address] [place] [address] with exemplary status  [address] [place]  Phone: [phone]  Fax: [phone]    Referring Provider: VISHNU Miguel - *  Primary Care Physician: VISHNU Bose - CNP  Patient Name: Britton Yu  Patient YOB: 1955  Gender: female  BMI: Body mass index is 38.08 kg/m². Height 5' (1.524 m), weight 195 lb (88.5 kg), not currently breastfeeding.    5/21/2019    Description of clinical problem:   Chief Complaint   Patient presents with    Neck Pain     Generalized neck pain with radiation to the right upper extremity     Extremity Weakness     Right upper extremity weakness     Pain Yes  Pain Score:   5; Numbness/tingling  No; Weakness  Yes       Brief physical exam:   Sensory deficit No; Weakness No; Atrophy  No; Reflex abnormality No    Sensory NCS      Nerve / Sites Rec. Site Peak Lat PP Amp Segments Distance Velocity Temp. ms µV  cm m/s °C   R Median - Digit II (Antidromic)      Palm Dig II 2.03 22.9 Palm - Dig II 7 50 32.6      Wrist Dig II 3.91 17.1 Wrist - Dig II 14 46 32.6   L Median - Digit II (Antidromic)      Palm Dig II 1.82 34.4 Palm - Dig II 7 58 33.2      Wrist Dig II 4.17* 26.4 Wrist - Dig II 14 41 33.2   R Ulnar - Digit V (Antidromic)      Wrist Dig V 3.28 24.7 Wrist - Dig V 14 57 33.4   R Radial - Anatomical snuff box (Forearm)      Forearm Wrist 2.50 31.0 Forearm - Wrist 10 55 33.5       Combined Sensory Index      Nerve / Sites Rec. Site Peak Lat NP Amp PP Amp Segments Dist. Peak Diff Temp.      ms µV µV  cm ms °C   R Median - CSI      Median Thumb 3.44 13.4 18.2 Median - Radial 10 1.25 32.8      Radial Thumb 2.19 7.4 10.7 Median - Ulnar 14        CSI     CSI  1.25*        Motor NCS      Nerve / Sites Muscle Onset Amplitude Segments Distance Velocity Temp.     ms mV  cm m/s °C   R Median - APB      Palm APB 1.93 8.3 Palm - APB   33.7      Wrist APB 4.48* 4.9 Wrist - Palm 8 31* 33.7      Elbow APB 7.92 3.7 Elbow - Wrist 18.5 54 33.7   L Median - APB      Palm APB 1.98 7.8 Palm - APB   33.5      Wrist APB 4.27 6.2 Wrist - Palm 8 35* 33.5      Elbow APB 7.76 5.6 Elbow - Wrist 18.5 53 33.5   R Ulnar - ADM      Wrist ADM 2.81 14.6 Wrist - ADM 8  33.5      B. Elbow ADM 5.68 12.0 B. Elbow - Wrist 16.5 58 33.5      A. Elbow ADM 7.34 11.8 A. Elbow - B. Elbow 10 60 33.5   R Musculocutaneous - Biceps      Axilla Biceps 2.66 4.6    32.1       F Wave      Nerve Fmin % F    ms %   R Median - APB 26.77 80   R Ulnar - ADM 25.42 80   L Median - APB 26.98 90       EMG      EMG Summary Table     Spontaneous MUAP Recruitment   Muscle Nerve Roots IA Fib PSW Fasc Amp Dur. PPP Pattern   L. Biceps brachii Musculocutaneous C5-C6 N None None None N N N N   L. Triceps brachii Radial C6-C8 N None None None N N N N   L. Pronator teres Median C6-C7 N None None None N N N N   L. First dorsal interosseous Ulnar C8-T1 N None None None N N N N   L. Abductor pollicis brevis Median H8-F3 N None None None N N N N   R. Biceps brachii Musculocutaneous C5-C6 N None None None N N N N   R. Triceps brachii Radial C6-C8 N None None None N N N N   R. Pronator teres Median C6-C7 N None None None N N N N   R. First dorsal interosseous Ulnar C8-T1 N None None None N N N N   R. Abductor pollicis brevis Median D0-Y8 N None None None N N N N   R. Cervical paraspinals (low)  - N None None None N N N N          Study Limitations:  obesity    Summary of Findings:   Nerve conduction studies:   · The following nerve conduction studies were abnormal:   · The left median sensory latency at the wrist is prolonged. · The right median combined sensory index is abnormal.   · The bilateral median motor is focally slow across the wrist. The latency at the right wrist is prolonged. · All other nerve conduction studies listed in the table above were normal in latency, amplitude and conduction velocity.       Needle EMG:   · Needle EMG

## 2019-06-07 ENCOUNTER — TELEPHONE (OUTPATIENT)
Dept: ADMINISTRATIVE | Age: 64
End: 2019-06-07

## 2019-06-07 NOTE — TELEPHONE ENCOUNTER
Patient called to get her MRI results from 6/7/19. She did not want to wait until July, which was next available appointment. Please call her if she can get the results. Thank you.

## 2019-06-07 NOTE — TELEPHONE ENCOUNTER
This was just completed a short time ago today. The doctor has been seeing patients all day and has not reviewed this. I will send patient a [employer] message letting her know we will contact her when dr Regina Youssef reviews the testing.

## 2019-06-10 ENCOUNTER — TELEPHONE (OUTPATIENT)
Dept: PHYSICAL MEDICINE AND REHAB | Age: 64
End: 2019-06-10

## 2019-06-10 ENCOUNTER — OFFICE VISIT (OUTPATIENT)
Dept: FAMILY MEDICINE CLINIC | Age: 64
End: 2019-06-10
Payer: MEDICARE

## 2019-06-10 VITALS
BODY MASS INDEX: 39.93 KG/M2 | TEMPERATURE: 97.5 F | WEIGHT: 203.4 LBS | HEART RATE: 86 BPM | HEIGHT: 60 IN | SYSTOLIC BLOOD PRESSURE: 148 MMHG | DIASTOLIC BLOOD PRESSURE: 76 MMHG | RESPIRATION RATE: 20 BRPM | OXYGEN SATURATION: 96 %

## 2019-06-10 DIAGNOSIS — M48.00 CENTRAL STENOSIS OF SPINAL CANAL: Primary | ICD-10-CM

## 2019-06-10 DIAGNOSIS — M25.511 ACUTE PAIN OF RIGHT SHOULDER: ICD-10-CM

## 2019-06-10 DIAGNOSIS — E78.2 MIXED HYPERLIPIDEMIA: ICD-10-CM

## 2019-06-10 DIAGNOSIS — K21.9 GASTROESOPHAGEAL REFLUX DISEASE WITHOUT ESOPHAGITIS: ICD-10-CM

## 2019-06-10 DIAGNOSIS — I10 ESSENTIAL HYPERTENSION: ICD-10-CM

## 2019-06-10 PROBLEM — B35.9 RINGWORM: Status: RESOLVED | Noted: 2019-01-15 | Resolved: 2019-06-10

## 2019-06-10 PROCEDURE — 3017F COLORECTAL CA SCREEN DOC REV: CPT | Performed by: NURSE PRACTITIONER

## 2019-06-10 PROCEDURE — 99214 OFFICE O/P EST MOD 30 MIN: CPT | Performed by: NURSE PRACTITIONER

## 2019-06-10 PROCEDURE — G8417 CALC BMI ABV UP PARAM F/U: HCPCS | Performed by: NURSE PRACTITIONER

## 2019-06-10 PROCEDURE — 1036F TOBACCO NON-USER: CPT | Performed by: NURSE PRACTITIONER

## 2019-06-10 PROCEDURE — G8427 DOCREV CUR MEDS BY ELIG CLIN: HCPCS | Performed by: NURSE PRACTITIONER

## 2019-06-10 RX ORDER — CYCLOBENZAPRINE HCL 10 MG
10 TABLET ORAL 3 TIMES DAILY PRN
COMMUNITY
End: 2019-08-02 | Stop reason: ALTCHOICE

## 2019-06-10 RX ORDER — ATORVASTATIN CALCIUM 10 MG/1
10 TABLET, FILM COATED ORAL NIGHTLY
Qty: 90 TABLET | Refills: 1 | Status: SHIPPED | OUTPATIENT
Start: 2019-06-10 | End: 2019-11-27 | Stop reason: SDUPTHER

## 2019-06-10 NOTE — PROGRESS NOTES
OFFICE PROGRESS NOTE  [address] [facility] [address]  Dept: [phone]   Chief Complaint   Patient presents with    3 Month Follow-Up    Hypertension    Gastroesophageal Reflux    Results         HPI:     She is having a lot of pain today in her neck and right arm, she had MRI done. She states her pain is 9/10 today in neck and shoulder. She has been using celebrex BID but doesn't help much. She had Tylenol #3 at home from Dr. Rhianna Zapata but it didn't help much either other than she could sleep a little longer. She is uncomfortable no matter what she is doing. She is having pain in the neck, right shoulder, left thumb and left knee, if she lays flat on her back then her lower back starts to hurt. Discussed calling DR Maria D Miles and see if DR Maru mSyth can do her epidural sooner than the end of July  Reading location: [address]           INDICATION: Pain       FINDINGS: Multiplanar, multisequence MR images of the cervical spine.       Unremarkable prevertebral soft tissues. Intact cervical vertebral   height and alignment. No acute osseous signal abnormality. Multilevel   disc space narrowing with marginal spurring. Cervical spinal cord   caliber and signal appear normal.       C2-3, no disc herniation or central spinal canal or foraminal   stenosis.       C3-4, no disc herniation or significant central spinal canal stenosis. Vertebral and facet spurs result in mild bilateral neural foraminal   stenosis.       C4-5, no disc herniation or central spinal canal stenosis. Facet and   vertebral spurs result in moderate right and mild left neural   foraminal stenosis.       C5-6, broad-based disc protrusion partially effacing the ventral   epidural space.  Facet and vertebral spurs and disc material results in   very severe right and severe left neural foraminal stenosis.       C6-7, disc bulge and facet and vertebral spurs with partial effacement   of the ventral epidural space. Very severe right and moderate to   severe left neural foraminal stenosis.       C7-T1, disc and facet spurring resulting in mild right and moderate to   severe left neural foraminal stenosis.         Impression   1. No evidence of cervical disc herniation or high-grade central   spinal canal stenosis. 2. Significant multilevel neural foraminal stenosis. Hypertension: Patient here for follow-up of elevated blood pressure. She is not exercising and is adherent to low salt diet. Blood pressure is not checking at home. Cardiac symptoms none. Patient denies chest pain, chest pressure/discomfort, claudication, dyspnea, exertional chest pressure/discomfort, fatigue, irregular heart beat, lower extremity edema, near-syncope, orthopnea, palpitations, paroxysmal nocturnal dyspnea and syncope. Cardiovascular risk factors: dyslipidemia, hypertension, obesity (BMI >= 30 kg/m2) and sedentary lifestyle. Use of agents associated with hypertension: NSAIDS. History of target organ damage: none. GERD has been stable but worse in the evenings before the second celebrex, she doesn't always take it with food.      Current Outpatient Medications:     cyclobenzaprine (FLEXERIL) 10 MG tablet, Take 10 mg by mouth 3 times daily as needed for Muscle spasms, Disp: , Rfl:     atorvastatin (LIPITOR) 10 MG tablet, Take 1 tablet by mouth nightly Indications: High Amount of Fats in the Blood, Disp: 90 tablet, Rfl: 1    allopurinol (ZYLOPRIM) 300 MG tablet, Take 1 tablet by mouth daily, Disp: 90 tablet, Rfl: 3    celecoxib (CELEBREX) 200 MG capsule, Take 1 capsule by mouth 2 times daily, Disp: 60 capsule, Rfl: 2    clobetasol (TEMOVATE) 0.05 % cream, , Disp: , Rfl: 0    oxybutynin (DITROPAN) 5 MG tablet, TK 1 T PO UP TO TID, Disp: , Rfl: 4    atenolol (TENORMIN) 50 MG tablet, Take 1 tablet by mouth daily, Disp: 90 tablet, Rfl: 3    omeprazole (PRILOSEC) 20 MG delayed release capsule, Take 1 capsule by sore throat, tinnitus, trouble swallowing and voice change. Eyes: Negative for visual disturbance. Respiratory: Negative for cough, chest tightness, shortness of breath and wheezing. Cardiovascular: Negative for chest pain, palpitations and leg swelling. Gastrointestinal: Negative for abdominal pain, constipation, diarrhea, nausea and vomiting. GERD, heartburn in evenings   Endocrine: Negative for cold intolerance, heat intolerance, polydipsia, polyphagia and polyuria. Genitourinary: Negative for difficulty urinating, frequency and urgency. Musculoskeletal: Positive for arthralgias, back pain, gait problem, neck pain and neck stiffness. Negative for joint swelling and myalgias. Skin: Negative for color change, pallor, rash and wound. Allergic/Immunologic: Negative for environmental allergies, food allergies and immunocompromised state. Neurological: Negative for dizziness, tremors, seizures, syncope, facial asymmetry, speech difficulty, weakness, light-headedness, numbness and headaches. Hematological: Negative for adenopathy. Does not bruise/bleed easily. Psychiatric/Behavioral: Positive for sleep disturbance ( due to pain). Negative for agitation, behavioral problems, confusion, decreased concentration, dysphoric mood, hallucinations, self-injury and suicidal ideas. The patient is not nervous/anxious and is not hyperactive.         OBJECTIVE:     VS:  Wt Readings from Last 3 Encounters:   06/10/19 203 lb 6.4 oz (92.3 kg)   05/21/19 195 lb (88.5 kg)   05/14/19 201 lb (91.2 kg)                       Vitals:    06/10/19 1040 06/10/19 1045   BP: (!) 160/82 (!) 148/76   Pulse: 86    Resp: 20    Temp: 97.5 °F (36.4 °C)    TempSrc: Temporal    SpO2: 96%    Weight: 203 lb 6.4 oz (92.3 kg)    Height: 5' (1.524 m)        General: Alert and oriented to person, place, and time, well developed and well nourished, in no acute distress  SKIN: Warm and dry, intact without any rash, masses or lesions  HEAD: normocephalic, atraumatic  Eyes: sclera/conjunctiva clear, PERRLA, EOMI's intact  Neck: supple and non-tender without mass, trachea midline, no cervical lymphadenopathy, no bruit, no thyromegaly or nodules  Cardiovascular: regular rate and regular rhythm, normal S1 and S2,  no murmurs, rubs, clicks, or gallop. Distal pulses intact, no carotid bruits. No edema  Pulmonary/Chest: clear to auscultation bilaterally, no wheezes, rales or rhonchi, normal air movement, no respiratory distress  Abdomen: soft, non-tender, non-distended, normal bowel sounds, no masses or hepatosplenomegaly  Musculoskeletal: Decreased ROM in cervical spine, tenderness on palpation and pain radiating into the right upper arm/shoulder  Neurologic: reflexes normal and symmetric, no cranial nerve deficit, antalgic gait, coordination and speech normal  Extremities: no clubbing, cyanosis, or edema. Psychiatric: Good eye contact, normal mood and affect, answers questions appropriately    ASSESSMENT/PLAN   Collin Garza was seen today for 3 month follow-up, hypertension, gastroesophageal reflux and results. Diagnoses and all orders for this visit:    Central stenosis of spinal canal worsening  -     [address] Garduno DO, Pain Medicine, [place]    Acute pain of right shoulder  -     Cassidy Mackay DO, Pain Medicine, [place]    Essential hypertension uncontrolled today  Monitor BP at home rise may be secondary to either her pain or the Celebrex if continue to be elevated will need to adjust her medications. Continue current medications at this time    Gastroesophageal reflux disease without esophagitis  Continue omeprazole as directed and eat with the Celebrex each time it is taken. Mixed hyperlipidemia  -     atorvastatin (LIPITOR) 10 MG tablet;  Take 1 tablet by mouth nightly Indications: High Amount of Fats in the Blood                  Return in about 2 weeks (around 6/24/2019) for HTN bring log with you, check and see

## 2019-06-10 NOTE — PATIENT INSTRUCTIONS
Monitor BP at home rise may be secondary to either her pain or the Celebrex if continue to be elevated will need to adjust her medications.

## 2019-06-10 NOTE — TELEPHONE ENCOUNTER
Patient was just seen with Ivett Kan and she came down to the office to let us know that is is willing to have the epidural that was discussed. Please advise if you want her to come back in or just place order.

## 2019-06-11 ASSESSMENT — ENCOUNTER SYMPTOMS
VOICE CHANGE: 0
ABDOMINAL PAIN: 0
RHINORRHEA: 0
SINUS PAIN: 0
WHEEZING: 0
DIARRHEA: 0
SINUS PRESSURE: 0
SHORTNESS OF BREATH: 0
COLOR CHANGE: 0
CHEST TIGHTNESS: 0
COUGH: 0
TROUBLE SWALLOWING: 0
VOMITING: 0
BACK PAIN: 1
FACIAL SWELLING: 0
SORE THROAT: 0
NAUSEA: 0
CONSTIPATION: 0

## 2019-06-12 NOTE — TELEPHONE ENCOUNTER
Called and spoke with the patient to inform her that she would need cervical epidurals and Dr. Donell Curtis does not do those and Daylin Arnett referred her to Dr. Tyra Gonzalez. Patient stated that Dr. Tyra Gonzalez office called and left message to schedule her.

## 2019-06-20 NOTE — PROGRESS NOTES
severe right and moderate to   severe left neural foraminal stenosis.       C7-T1, disc and facet spurring resulting in mild right and moderate to   severe left neural foraminal stenosis. EMG BUE 2019 -   Diagnostic Interpretation: This study was abnormal.      Electrodiagnosis: There is electrodiagnostic evidence of a median mononeuropathy. · Location: bilateral at the wrist.   · Nature: [  ] Axonal   [ X ] Demyelinating  [  ] Mixed axonal and demyelinating                     [  ] Sensory [  ] Motor               Brittaney.Sicard ] Mixed sensorimotor                     [  ] with active denervation       [ X ] without active denervation  · Duration: Acute  · Severity: moderate  · Prognosis: Good. The prognosis for recovery of demyelinating lesions is good if the cause is alleviated. Previous treatments: medications. .      Past Medical History:   Diagnosis Date    Cancer ([address]     Chronic back pain     Fibromyalgia     GERD (gastroesophageal reflux disease)     Hypertension     Obesity     Osteoarthritis     Postmenopausal bleeding 10/5/2017    Ringworm 1/15/2019    Urinary incontinence        Past Surgical History:   Procedure Laterality Date     SECTION      CHOLECYSTECTOMY      COLON SURGERY      FOOT SURGERY Bilateral     NERVE BLOCK Right 2017    right L4-5 transforaminal epidural #1    NERVE BLOCK Left 2018    si inj    IA INJECT SI JOINT ARTHRGRPHY&/ANES/STEROID W/IMAGE Left 2018    LEFT SACROILIAC JOINT INJECTION WITH X-RAY performed by Kris Lr DO at [address] [facility] Left     TONSILLECTOMY  28 YRS OLD    TUBAL LIGATION         Prior to Admission medications    Medication Sig Start Date End Date Taking?  Authorizing Provider   cyclobenzaprine (FLEXERIL) 10 MG tablet Take 10 mg by mouth 3 times daily as needed for Muscle spasms   Yes Historical Provider, MD   atorvastatin (LIPITOR) 10 MG tablet Take 1 tablet by mouth nightly Indications: High Amount of Fats in the Blood 6/10/19  Yes VISHNU Shipman CNP   allopurinol (ZYLOPRIM) 300 MG tablet Take 1 tablet by mouth daily 5/21/19  Yes VISHNU Shipman CNP   clobetasol (TEMOVATE) 0.05 % cream  5/1/19  Yes Historical Provider, MD   atenolol (TENORMIN) 50 MG tablet Take 1 tablet by mouth daily 1/15/19  Yes VISHNU Shipman CNP   omeprazole (PRILOSEC) 20 MG delayed release capsule Take 1 capsule by mouth daily 11/9/18  Yes VISHNU Shipman CNP   Cholecalciferol (VITAMIN D3) 2000 units CAPS Take 2,000 Units by mouth daily   Yes Historical Provider, MD   Biotin 1000 MCG TABS Take 1,000 mcg by mouth daily   Yes Historical Provider, MD   celecoxib (CELEBREX) 200 MG capsule Take 1 capsule by mouth 2 times daily 5/21/19 6/20/19  Nikia Baxtre,    oxybutynin (DITROPAN) 5 MG tablet TK 1 T PO UP TO TID 2/19/19   Historical Provider, MD       Allergies   Allergen Reactions    Lyrica [Pregabalin] Other (See Comments)     Confusion    Sulfa Antibiotics Rash       Social History     Socioeconomic History    Marital status: Single     Spouse name: Not on file    Number of children: Not on file    Years of education: Not on file    Highest education level: Not on file   Occupational History    Not on file   Social Needs    Financial resource strain: Not on file    Food insecurity:     Worry: Not on file     Inability: Not on file    Transportation needs:     Medical: Not on file     Non-medical: Not on file   Tobacco Use    Smoking status: Never Smoker    Smokeless tobacco: Never Used   Substance and Sexual Activity    Alcohol use:  Yes     Alcohol/week: 0.0 oz     Comment: Rarely    Drug use: No    Sexual activity: Not on file   Lifestyle    Physical activity:     Days per week: Not on file     Minutes per session: Not on file    Stress: Not on file   Relationships    Social connections:     Talks on phone: Not on file     Gets together: Not on file     Attends [employer] service: Not on file     Active member of club or organization: Not on file     Attends meetings of clubs or organizations: Not on file     Relationship status: Not on file    Intimate partner violence:     Fear of current or ex partner: Not on file     Emotionally abused: Not on file     Physically abused: Not on file     Forced sexual activity: Not on file   Other Topics Concern    Not on file   Social History Narrative    Not on file       Family History   Problem Relation Age of Onset    Asthma Mother     Mental Illness Mother     Heart Disease Father     Thyroid Disease Sister     Thyroid Disease Sister     Thyroid Disease Sister        REVIEW OF SYSTEMS:     Patient specifically denies fever/chills, chest pain, shortness of breath, new bowel or bladder complaints. All other review of systems was negative. PHYSICAL EXAMINATION:      BP (!) 140/78   Pulse 80   Temp 98.6 °F (37 °C)   Resp 18   Ht 5' (1.524 m)   Wt 201 lb (91.2 kg)   LMP  (LMP Unknown)   SpO2 98%   BMI 39.26 kg/m²     General:      General appearance:pleasant and well-hydrated, in no distress and A & O x3  Build:Overweight  Function:Rises from a seated position with difficulty    HEENT:    Head:normocephalic, atraumatic  Pupils:regular, round, equal  Sclera: icterus absent    Lungs:    Breathing:normal breathing pattern    Abdomen:    Shape:non-distended  Tenderness:none  Guarding:none    Cervical spine:    Inspection:normal  Palpation:tenderness paravertebral muscles, tenderness trapezium, left negative, right positive. Range of motion:abnormal mildly in flexion, extension rotation bilateral and is painful.     Musculoskeletal:    Trigger points in trapezius:absent bilaterally  Trigger points in rhomboids:absent bilaterally  Trigger points in Paraveteral:absent bilaterally  Trigger points in supraspinatus/infraspinatus:absent  Spurling's:positive right, negative left    Ardon's:negative right, negative left Extremities:    Tremors:None bilaterally upper and lower  Range of motion:Generally normal shoulders  Intact:Yes  Varicose veins:absent   Pulses:present Lt radial  Cyanosis:none  Edema:none x all 4 extremities    Neurological:    Sensory:normal to light touch BUE    Motor:     Right Grip5/5              Left Grip5/5               Right Bicep5/5           Left Bicep5/5              Right Triceps5/5       Left Triceps5/5          Right Deltoid5/5     Left Deltoid5/5                    Reflexes:      Right Brachioradialis reflex2+  Left Brachioradialis reflex2+  Right Biceps reflex2+  Left Biceps reflex2+  Right Triceps reflex2+  Left Triceps reflex2+    Gait:normal    Dermatology:    Skin:no rashes or lesions noted    Assessment/Plan:  Right cervical pain radiating into the RUE (deltoid/upper arm region)  Cervical MRI shows severe and very severe, respectively, right-sided foraminal stenosis at C5-6 and C6-7  Follows with Dr. Kala Wilson and Dr. Hal Peña for lumbar pain  PMHx: colon CA treated surgically, HTN, GERD, DLD    Reviewed referral documents and imaging  OARRS report reviewed  Pt would like to avoid additional medications  C7-T1 MAKAYLA #1 - r/b discussed extensively  Patient encouraged to stay active and to lose weight  Treatment plan discussed with the patient including medication and procedure side effects     CC:  Referring physician    NORA Guerrero.

## 2019-06-21 ENCOUNTER — PREP FOR PROCEDURE (OUTPATIENT)
Dept: PAIN MANAGEMENT | Age: 64
End: 2019-06-21

## 2019-06-21 ENCOUNTER — OFFICE VISIT (OUTPATIENT)
Dept: PAIN MANAGEMENT | Age: 64
End: 2019-06-21
Payer: MEDICARE

## 2019-06-21 VITALS
BODY MASS INDEX: 39.46 KG/M2 | HEIGHT: 60 IN | WEIGHT: 201 LBS | DIASTOLIC BLOOD PRESSURE: 78 MMHG | TEMPERATURE: 98.6 F | RESPIRATION RATE: 18 BRPM | OXYGEN SATURATION: 98 % | HEART RATE: 80 BPM | SYSTOLIC BLOOD PRESSURE: 140 MMHG

## 2019-06-21 DIAGNOSIS — M54.12 CERVICAL RADICULOPATHY: ICD-10-CM

## 2019-06-21 DIAGNOSIS — M54.12 CERVICAL RADICULOPATHY: Primary | ICD-10-CM

## 2019-06-21 DIAGNOSIS — M50.90 CERVICAL DISC DISORDER: ICD-10-CM

## 2019-06-21 DIAGNOSIS — G89.4 CHRONIC PAIN SYNDROME: Primary | ICD-10-CM

## 2019-06-21 PROCEDURE — 99204 OFFICE O/P NEW MOD 45 MIN: CPT | Performed by: PAIN MEDICINE

## 2019-06-21 PROCEDURE — G8417 CALC BMI ABV UP PARAM F/U: HCPCS | Performed by: PAIN MEDICINE

## 2019-06-21 PROCEDURE — 1036F TOBACCO NON-USER: CPT | Performed by: PAIN MEDICINE

## 2019-06-21 PROCEDURE — G8427 DOCREV CUR MEDS BY ELIG CLIN: HCPCS | Performed by: PAIN MEDICINE

## 2019-06-21 PROCEDURE — 3017F COLORECTAL CA SCREEN DOC REV: CPT | Performed by: PAIN MEDICINE

## 2019-06-28 ENCOUNTER — OFFICE VISIT (OUTPATIENT)
Dept: FAMILY MEDICINE CLINIC | Age: 64
End: 2019-06-28
Payer: MEDICARE

## 2019-06-28 VITALS
OXYGEN SATURATION: 96 % | DIASTOLIC BLOOD PRESSURE: 76 MMHG | HEIGHT: 60 IN | TEMPERATURE: 97.5 F | SYSTOLIC BLOOD PRESSURE: 138 MMHG | WEIGHT: 202.5 LBS | HEART RATE: 97 BPM | RESPIRATION RATE: 17 BRPM | BODY MASS INDEX: 39.76 KG/M2

## 2019-06-28 DIAGNOSIS — I10 ESSENTIAL HYPERTENSION: Primary | ICD-10-CM

## 2019-06-28 DIAGNOSIS — K90.9 DIARRHEA DUE TO MALABSORPTION: ICD-10-CM

## 2019-06-28 DIAGNOSIS — R19.7 DIARRHEA DUE TO MALABSORPTION: ICD-10-CM

## 2019-06-28 PROCEDURE — 99214 OFFICE O/P EST MOD 30 MIN: CPT | Performed by: NURSE PRACTITIONER

## 2019-06-28 PROCEDURE — G8427 DOCREV CUR MEDS BY ELIG CLIN: HCPCS | Performed by: NURSE PRACTITIONER

## 2019-06-28 PROCEDURE — G8417 CALC BMI ABV UP PARAM F/U: HCPCS | Performed by: NURSE PRACTITIONER

## 2019-06-28 PROCEDURE — 1036F TOBACCO NON-USER: CPT | Performed by: NURSE PRACTITIONER

## 2019-06-28 PROCEDURE — 3017F COLORECTAL CA SCREEN DOC REV: CPT | Performed by: NURSE PRACTITIONER

## 2019-06-28 RX ORDER — CELECOXIB 200 MG/1
200 CAPSULE ORAL DAILY
Qty: 60 CAPSULE | Refills: 2 | Status: SHIPPED
Start: 2019-06-28 | End: 2019-08-08

## 2019-06-28 ASSESSMENT — ENCOUNTER SYMPTOMS
COLOR CHANGE: 0
FACIAL SWELLING: 0
CONSTIPATION: 0
RHINORRHEA: 0
VOICE CHANGE: 0
SINUS PRESSURE: 0
CHEST TIGHTNESS: 0
DIARRHEA: 1
WHEEZING: 0
SHORTNESS OF BREATH: 0
NAUSEA: 0
ABDOMINAL PAIN: 0
SORE THROAT: 0
COUGH: 0
VOMITING: 0
SINUS PAIN: 0
BACK PAIN: 1
TROUBLE SWALLOWING: 0

## 2019-07-11 ENCOUNTER — HOSPITAL ENCOUNTER (OUTPATIENT)
Age: 64
Setting detail: OUTPATIENT SURGERY
Discharge: HOME | End: 2019-07-11
Attending: PAIN MEDICINE | Admitting: PAIN MEDICINE
Payer: MEDICARE

## 2019-07-11 ENCOUNTER — HOSPITAL ENCOUNTER (OUTPATIENT)
Dept: GENERAL RADIOLOGY | Age: 64
Discharge: HOME | End: 2019-07-13
Attending: PAIN MEDICINE
Payer: MEDICARE

## 2019-07-11 VITALS
DIASTOLIC BLOOD PRESSURE: 86 MMHG | HEART RATE: 84 BPM | WEIGHT: 200 LBS | BODY MASS INDEX: 39.27 KG/M2 | TEMPERATURE: 97 F | HEIGHT: 60 IN | OXYGEN SATURATION: 97 % | RESPIRATION RATE: 16 BRPM | SYSTOLIC BLOOD PRESSURE: 177 MMHG

## 2019-07-11 DIAGNOSIS — R52 PAIN: ICD-10-CM

## 2019-07-11 PROCEDURE — 3209999900 FLUORO FOR SURGICAL PROCEDURES

## 2019-07-11 PROCEDURE — 2500000003 HC RX 250 WO HCPCS: Performed by: PAIN MEDICINE

## 2019-07-11 PROCEDURE — 3600000002 HC SURGERY LEVEL 2 BASE: Performed by: PAIN MEDICINE

## 2019-07-11 PROCEDURE — 6360000004 HC RX CONTRAST MEDICATION: Performed by: PAIN MEDICINE

## 2019-07-11 PROCEDURE — 6360000002 HC RX W HCPCS: Performed by: PAIN MEDICINE

## 2019-07-11 PROCEDURE — 62321 NJX INTERLAMINAR CRV/THRC: CPT | Performed by: PAIN MEDICINE

## 2019-07-11 PROCEDURE — 7100000010 HC PHASE II RECOVERY - FIRST 15 MIN: Performed by: PAIN MEDICINE

## 2019-07-11 PROCEDURE — 7100000011 HC PHASE II RECOVERY - ADDTL 15 MIN: Performed by: PAIN MEDICINE

## 2019-07-11 PROCEDURE — 2580000003 HC RX 258: Performed by: PAIN MEDICINE

## 2019-07-11 PROCEDURE — 2709999900 HC NON-CHARGEABLE SUPPLY: Performed by: PAIN MEDICINE

## 2019-07-11 RX ORDER — 0.9 % SODIUM CHLORIDE 0.9 %
VIAL (ML) INJECTION PRN
Status: DISCONTINUED | OUTPATIENT
Start: 2019-07-11 | End: 2019-07-11 | Stop reason: ALTCHOICE

## 2019-07-11 RX ORDER — METHYLPREDNISOLONE ACETATE 40 MG/ML
INJECTION, SUSPENSION INTRA-ARTICULAR; INTRALESIONAL; INTRAMUSCULAR; SOFT TISSUE PRN
Status: DISCONTINUED | OUTPATIENT
Start: 2019-07-11 | End: 2019-07-11 | Stop reason: ALTCHOICE

## 2019-07-11 RX ORDER — LIDOCAINE HYDROCHLORIDE 5 MG/ML
INJECTION, SOLUTION INFILTRATION; INTRAVENOUS PRN
Status: DISCONTINUED | OUTPATIENT
Start: 2019-07-11 | End: 2019-07-11 | Stop reason: ALTCHOICE

## 2019-07-11 ASSESSMENT — PAIN - FUNCTIONAL ASSESSMENT: PAIN_FUNCTIONAL_ASSESSMENT: 0-10

## 2019-07-11 NOTE — PROGRESS NOTES
[address] [facility]        [address] [facility] [place]  Dept: [phone]        Follow up Note      Karissa Oliva     Date of Visit:  07/24/19     CC:  Patient presents for follow up   Chief Complaint   Patient presents with    Pain     knee and back      HPI:    Pain is better. Change in quality of symptoms:no. Medication side effects:not applicable . Recent diagnostic testing:none. Recent interventional procedures:C7-T1 MAKAYLA with 60% relief that has faded somewhat in the past week but she is still improved from the injection (she feels her relief has been significant). She has been on anticoagulation medications to include NSAIDS (Celebrex) and has not been on herbal supplements. She is not diabetic.     Imaging:   Cervical MRI 2019 -  FINDINGS: Multiplanar, multisequence MR images of the cervical spine.       Unremarkable prevertebral soft tissues. Intact cervical vertebral   height and alignment. No acute osseous signal abnormality. Multilevel   disc space narrowing with marginal spurring. Cervical spinal cord   caliber and signal appear normal.       C2-3, no disc herniation or central spinal canal or foraminal   stenosis.       C3-4, no disc herniation or significant central spinal canal stenosis. Vertebral and facet spurs result in mild bilateral neural foraminal   stenosis.       C4-5, no disc herniation or central spinal canal stenosis. Facet and   vertebral spurs result in moderate right and mild left neural   foraminal stenosis.       C5-6, broad-based disc protrusion partially effacing the ventral   epidural space. Facet and vertebral spurs and disc material results in   very severe right and severe left neural foraminal stenosis.       C6-7, disc bulge and facet and vertebral spurs with partial effacement   of the ventral epidural space.  Very severe right and moderate to   severe left neural foraminal stenosis.       C7-T1, disc and facet spurring resulting in mild right and moderate to   severe left neural foraminal stenosis.      EMG BUE 2019 -   Diagnostic Interpretation: This study was abnormal.      Electrodiagnosis: There is electrodiagnostic evidence of a median mononeuropathy. · Location: bilateral at the wrist.   · Nature: [  ] Axonal   [ X ] Demyelinating  [  ] Mixed axonal and demyelinating                     [  ] Sensory [  ] Motor               [X ] Mixed sensorimotor                     [  ] with active denervation       [ X ] without active denervation  · Duration: Acute  · Severity: moderate  · Prognosis: Good. The prognosis for recovery of demyelinating lesions is good if the cause is alleviated. ·      Potential Aberrant Drug-Related Behavior:  no    Urine Drug Screening:    OARRS report:    Past Medical History:   Diagnosis Date    Cancer ([facility] 2017    colon (treated surgically)    Chronic back pain     Fibromyalgia     GERD (gastroesophageal reflux disease)     Hyperlipidemia     Hypertension     Obesity     Osteoarthritis     PONV (postoperative nausea and vomiting)     Postmenopausal bleeding 10/5/2017    Urinary incontinence        Past Surgical History:   Procedure Laterality Date     SECTION      CHOLECYSTECTOMY      COLON SURGERY      EPIDURAL STEROID INJECTION Right 2019    C7-T1 EPIDURAL STEROID INJECTION #1 TOWARD THE RIGHT performed by Terris Dancer, DO at [address] Bilateral     NERVE BLOCK Right 2017    right L4-5 transforaminal epidural #1    NERVE BLOCK Left 2018    si inj    AL INJECT SI JOINT ARTHRGRPHY&/ANES/STEROID W/IMAGE Left 2018    LEFT SACROILIAC JOINT INJECTION WITH X-RAY performed by Ariella Carvalho DO at [address] [facility]     TONSILLECTOMY  28 YRS OLD    TUBAL LIGATION         Prior to Admission medications    Medication Sig Start Date End Date Taking?  Authorizing Provider   celecoxib (CELEBREX) 200 MG capsule Take 1 capsule by mouth daily 6/28/19 7/28/19 Yes VISHNU Colorado CNP   cyclobenzaprine (FLEXERIL) 10 MG tablet Take 10 mg by mouth 3 times daily as needed for Muscle spasms   Yes Historical Provider, MD   atorvastatin (LIPITOR) 10 MG tablet Take 1 tablet by mouth nightly Indications: High Amount of Fats in the Blood 6/10/19  Yes VISHNU Ambrocio CNP   allopurinol (ZYLOPRIM) 300 MG tablet Take 1 tablet by mouth daily 5/21/19  Yes VISHNU Ambrocio CNP   atenolol (TENORMIN) 50 MG tablet Take 1 tablet by mouth daily 1/15/19  Yes VISHNU Ambrocio CNP   omeprazole (PRILOSEC) 20 MG delayed release capsule Take 1 capsule by mouth daily 11/9/18  Yes VISHNU Ambrocio CNP   Cholecalciferol (VITAMIN D3) 2000 units CAPS Take 2,000 Units by mouth daily   Yes Historical Provider, MD   Biotin 1000 MCG TABS Take 1,000 mcg by mouth daily   Yes Historical Provider, MD       Allergies   Allergen Reactions    Lyrica [Pregabalin] Other (See Comments)     Confusion    Sulfa Antibiotics Rash       Social History     Socioeconomic History    Marital status: Single     Spouse name: Not on file    Number of children: Not on file    Years of education: Not on file    Highest education level: Not on file   Occupational History    Not on file   Social Needs    Financial resource strain: Not on file    Food insecurity:     Worry: Not on file     Inability: Not on file    Transportation needs:     Medical: Not on file     Non-medical: Not on file   Tobacco Use    Smoking status: Never Smoker    Smokeless tobacco: Never Used   Substance and Sexual Activity    Alcohol use: Not Currently     Alcohol/week: 0.0 standard drinks     Comment: Rarely    Drug use: No    Sexual activity: Not on file   Lifestyle    Physical activity:     Days per week: Not on file     Minutes per session: Not on file    Stress: Not on file   Relationships    Social connections:     Talks on phone: Not on file     Gets together: Not on

## 2019-07-11 NOTE — H&P
[facility] Pain [facility]        [address] [facility], [address]  Dept: [phone]    Procedure History & Physical      Rosalva Zuleta     HPI:    Patient  is here for cervical and RUE pain for C7-T1 MAKAYLA  Labs/imaging studies reviewed   All question and concerns addressed including R/B/A associated with the procedure    Past Medical History:   Diagnosis Date    Cancer [facility])     colon (treated surgically)    Chronic back pain     Fibromyalgia     GERD (gastroesophageal reflux disease)     Hyperlipidemia     Hypertension     Obesity     Osteoarthritis     PONV (postoperative nausea and vomiting)     Postmenopausal bleeding 10/5/2017    Urinary incontinence        Past Surgical History:   Procedure Laterality Date     SECTION      CHOLECYSTECTOMY      COLON SURGERY      FOOT SURGERY Bilateral     NERVE BLOCK Right 2017    right L4-5 transforaminal epidural #1    NERVE BLOCK Left 2018    si inj    AR INJECT SI JOINT ARTHRGRPHY&/ANES/STEROID W/IMAGE Left 2018    LEFT SACROILIAC JOINT INJECTION WITH X-RAY performed by Kenny Babin DO at [address] [facility]     TONSILLECTOMY  28 YRS OLD    TUBAL LIGATION         Prior to Admission medications    Medication Sig Start Date End Date Taking?  Authorizing Provider   celecoxib (CELEBREX) 200 MG capsule Take 1 capsule by mouth daily 19 Yes VISHNU Bray CNP   cyclobenzaprine (FLEXERIL) 10 MG tablet Take 10 mg by mouth 3 times daily as needed for Muscle spasms   Yes Historical Provider, MD   atorvastatin (LIPITOR) 10 MG tablet Take 1 tablet by mouth nightly Indications: High Amount of Fats in the Blood 6/10/19  Yes VISHNU Gan CNP   allopurinol (ZYLOPRIM) 300 MG tablet Take 1 tablet by mouth daily 19  Yes VISHNU Gan CNP   atenolol (TENORMIN) 50 MG tablet Take 1 tablet by mouth daily 1/15/19  Yes VISHNU Bray CNP   omeprazole

## 2019-07-11 NOTE — OP NOTE
2019    Patient: Ajay Salas  :  1955  Age:  66 y.o. Sex:  female     PRE-PROCEDURE DIAGNOSIS: Cervical degenerative disc disease, cervical radicular pain. POST-PROCEDURE DIAGNOSIS: Same. PROCEDURE: Fluoroscopic guided cervical epidural steroid injection, #1 at C7-T1 level. SURGEON: NORA Rose. ANESTHESIA: local    ESTIMATED BLOOD LOSS: None.  ______________________________________________________________________  BRIEF HISTORY:  Ajay Salas comes in today for the first  therapeutic cervical epidural injection under fluoroscopic guidance. The potential complications of this procedure were discussed with the her again today. She has elected to undergo the aforementioned procedure. Amado complete History & Physical examination were reviewed in depth, a copy of which is in the chart. DESCRIPTION OF PROCEDURE:    After confirming written and informed consent, a time-out was performed and Amado name and date of birth, the procedure to be performed as well as the plan for the location of the needle insertion were confirmed. The patient was brought into the procedure room and placed in the prone position with the head flexed midline on the fluoroscopy table. A pillow was placed under the patient's chest and forehead to increase cervical interlaminar space. Standard monitors were placed, and vital signs were observed throughout the procedure. The area was prepped with chloraprep and the C7-T1 interspace was marked under fluoroscopy. The skin and subcutaneous tissues at the above level were anesthestized with 0.5% lidocaine. A # 18 gauge 3 1/2 tuohy epidural needle was inserted and advanced toward the inferior lamina until bony contact was made. The needle was then advanced superiorly toward the epidural space.  From this point on the loss of resistance technique with a 5 cc glass syringe was used to confirm entrance of the needle into the epidural space under intermittent lateral fluoroscopy. Once in the epidural space , negative aspiration for blood and CSF was confirmed . Epidural needle tip placement was confirmed by visualizing epidural spread of 2 ml of Isovue-M 200 in both live lateral and live AP fluoroscopic views. Then after negative aspiration, a solution of preservative free saline, 2 ml, and 40 mg DepoMedrol was easily injected. The needle was gently removed intact. The patient neck was cleaned and a Band-Aid was placed over the needle insertion point. Disposition the patient tolerated the procedure well and there were no complications . Vital signs remained stable throughout the procedure. The patient was escorted to the recovery area where they remained until discharge and written discharge instructions for the procedure were given. Plan: Jocelyn Garces will return to our pain management center as scheduled.      Norma Kern DO

## 2019-07-24 ENCOUNTER — OFFICE VISIT (OUTPATIENT)
Dept: PAIN MANAGEMENT | Age: 64
End: 2019-07-24
Payer: MEDICARE

## 2019-07-24 VITALS
BODY MASS INDEX: 38.28 KG/M2 | OXYGEN SATURATION: 98 % | HEIGHT: 60 IN | RESPIRATION RATE: 18 BRPM | SYSTOLIC BLOOD PRESSURE: 138 MMHG | WEIGHT: 195 LBS | DIASTOLIC BLOOD PRESSURE: 78 MMHG | HEART RATE: 75 BPM

## 2019-07-24 DIAGNOSIS — M54.12 CERVICAL RADICULOPATHY: ICD-10-CM

## 2019-07-24 DIAGNOSIS — G89.4 CHRONIC PAIN SYNDROME: Primary | ICD-10-CM

## 2019-07-24 DIAGNOSIS — M50.90 CERVICAL DISC DISORDER: ICD-10-CM

## 2019-07-24 PROCEDURE — G8417 CALC BMI ABV UP PARAM F/U: HCPCS | Performed by: PAIN MEDICINE

## 2019-07-24 PROCEDURE — 99213 OFFICE O/P EST LOW 20 MIN: CPT | Performed by: PAIN MEDICINE

## 2019-07-24 PROCEDURE — 1036F TOBACCO NON-USER: CPT | Performed by: PAIN MEDICINE

## 2019-07-24 PROCEDURE — G8427 DOCREV CUR MEDS BY ELIG CLIN: HCPCS | Performed by: PAIN MEDICINE

## 2019-07-24 PROCEDURE — 3017F COLORECTAL CA SCREEN DOC REV: CPT | Performed by: PAIN MEDICINE

## 2019-08-01 ENCOUNTER — OFFICE VISIT (OUTPATIENT)
Dept: FAMILY MEDICINE CLINIC | Age: 64
End: 2019-08-01
Payer: MEDICARE

## 2019-08-01 ENCOUNTER — HOSPITAL ENCOUNTER (OUTPATIENT)
Age: 64
Discharge: HOME | End: 2019-08-03
Payer: MEDICARE

## 2019-08-01 VITALS
WEIGHT: 199.31 LBS | RESPIRATION RATE: 17 BRPM | OXYGEN SATURATION: 97 % | SYSTOLIC BLOOD PRESSURE: 124 MMHG | HEIGHT: 60 IN | HEART RATE: 93 BPM | BODY MASS INDEX: 39.13 KG/M2 | DIASTOLIC BLOOD PRESSURE: 72 MMHG | TEMPERATURE: 97.9 F

## 2019-08-01 DIAGNOSIS — E11.9 TYPE 2 DIABETES MELLITUS WITHOUT COMPLICATION, WITHOUT LONG-TERM CURRENT USE OF INSULIN (HCC): Primary | ICD-10-CM

## 2019-08-01 DIAGNOSIS — L25.8 CONTACT DERMATITIS DUE TO OTHER AGENT, UNSPECIFIED CONTACT DERMATITIS TYPE: ICD-10-CM

## 2019-08-01 DIAGNOSIS — E11.9 TYPE 2 DIABETES MELLITUS WITHOUT COMPLICATION, WITHOUT LONG-TERM CURRENT USE OF INSULIN (HCC): ICD-10-CM

## 2019-08-01 LAB
ALBUMIN SERPL-MCNC: 4.7 G/DL (ref 3.5–5.2)
ALP BLD-CCNC: 100 U/L (ref 35–104)
ALT SERPL-CCNC: 23 U/L (ref 0–32)
ANION GAP SERPL CALCULATED.3IONS-SCNC: 8 MMOL/L (ref 7–16)
AST SERPL-CCNC: 20 U/L (ref 0–31)
BASOPHILS ABSOLUTE: 0.04 E9/L (ref 0–0.2)
BASOPHILS RELATIVE PERCENT: 0.7 % (ref 0–2)
BILIRUB SERPL-MCNC: 0.5 MG/DL (ref 0–1.2)
BUN BLDV-MCNC: 11 MG/DL (ref 8–23)
CALCIUM SERPL-MCNC: 10.4 MG/DL (ref 8.6–10.2)
CHLORIDE BLD-SCNC: 103 MMOL/L (ref 98–107)
CHP ED QC CHECK: NORMAL
CO2: 31 MMOL/L (ref 22–29)
CREAT SERPL-MCNC: 0.7 MG/DL (ref 0.5–1)
CREATININE URINE POCT: NORMAL
EOSINOPHILS ABSOLUTE: 0.59 E9/L (ref 0.05–0.5)
EOSINOPHILS RELATIVE PERCENT: 9.8 % (ref 0–6)
GFR AFRICAN AMERICAN: >60
GFR NON-AFRICAN AMERICAN: >60 ML/MIN/1.73
GLUCOSE BLD-MCNC: 116 MG/DL (ref 74–99)
GLUCOSE BLD-MCNC: 118 MG/DL
HBA1C MFR BLD: 6.8 %
HCT VFR BLD CALC: 41.6 % (ref 34–48)
HEMOGLOBIN: 13.2 G/DL (ref 11.5–15.5)
IMMATURE GRANULOCYTES #: 0.02 E9/L
IMMATURE GRANULOCYTES %: 0.3 % (ref 0–5)
LYMPHOCYTES ABSOLUTE: 1.21 E9/L (ref 1.5–4)
LYMPHOCYTES RELATIVE PERCENT: 20.1 % (ref 20–42)
MCH RBC QN AUTO: 29.6 PG (ref 26–35)
MCHC RBC AUTO-ENTMCNC: 31.7 % (ref 32–34.5)
MCV RBC AUTO: 93.3 FL (ref 80–99.9)
MICROALBUMIN/CREAT 24H UR: NORMAL MG/G{CREAT}
MICROALBUMIN/CREAT UR-RTO: NORMAL
MONOCYTES ABSOLUTE: 0.46 E9/L (ref 0.1–0.95)
MONOCYTES RELATIVE PERCENT: 7.6 % (ref 2–12)
NEUTROPHILS ABSOLUTE: 3.71 E9/L (ref 1.8–7.3)
NEUTROPHILS RELATIVE PERCENT: 61.5 % (ref 43–80)
PDW BLD-RTO: 14.6 FL (ref 11.5–15)
PLATELET # BLD: 221 E9/L (ref 130–450)
PMV BLD AUTO: 10.7 FL (ref 7–12)
POTASSIUM SERPL-SCNC: 4.8 MMOL/L (ref 3.5–5)
RBC # BLD: 4.46 E12/L (ref 3.5–5.5)
SODIUM BLD-SCNC: 142 MMOL/L (ref 132–146)
TOTAL PROTEIN: 7.7 G/DL (ref 6.4–8.3)
WBC # BLD: 6 E9/L (ref 4.5–11.5)

## 2019-08-01 PROCEDURE — 85025 COMPLETE CBC W/AUTO DIFF WBC: CPT

## 2019-08-01 PROCEDURE — 2022F DILAT RTA XM EVC RTNOPTHY: CPT | Performed by: NURSE PRACTITIONER

## 2019-08-01 PROCEDURE — 3017F COLORECTAL CA SCREEN DOC REV: CPT | Performed by: NURSE PRACTITIONER

## 2019-08-01 PROCEDURE — 82044 UR ALBUMIN SEMIQUANTITATIVE: CPT | Performed by: NURSE PRACTITIONER

## 2019-08-01 PROCEDURE — 3044F HG A1C LEVEL LT 7.0%: CPT | Performed by: NURSE PRACTITIONER

## 2019-08-01 PROCEDURE — 1036F TOBACCO NON-USER: CPT | Performed by: NURSE PRACTITIONER

## 2019-08-01 PROCEDURE — 83036 HEMOGLOBIN GLYCOSYLATED A1C: CPT | Performed by: NURSE PRACTITIONER

## 2019-08-01 PROCEDURE — G8427 DOCREV CUR MEDS BY ELIG CLIN: HCPCS | Performed by: NURSE PRACTITIONER

## 2019-08-01 PROCEDURE — 82962 GLUCOSE BLOOD TEST: CPT | Performed by: NURSE PRACTITIONER

## 2019-08-01 PROCEDURE — 99214 OFFICE O/P EST MOD 30 MIN: CPT | Performed by: NURSE PRACTITIONER

## 2019-08-01 PROCEDURE — 36415 COLL VENOUS BLD VENIPUNCTURE: CPT

## 2019-08-01 PROCEDURE — G8417 CALC BMI ABV UP PARAM F/U: HCPCS | Performed by: NURSE PRACTITIONER

## 2019-08-01 PROCEDURE — 80053 COMPREHEN METABOLIC PANEL: CPT

## 2019-08-01 RX ORDER — GLUCOSAMINE HCL/CHONDROITIN SU 500-400 MG
CAPSULE ORAL
Qty: 50 STRIP | Refills: 3 | Status: SHIPPED | OUTPATIENT
Start: 2019-08-01 | End: 2020-06-17 | Stop reason: SDUPTHER

## 2019-08-01 RX ORDER — LANCETS 28 GAUGE
1 EACH MISCELLANEOUS DAILY
Qty: 100 EACH | Refills: 3 | Status: SHIPPED | OUTPATIENT
Start: 2019-08-01 | End: 2020-09-21

## 2019-08-01 RX ORDER — BLOOD-GLUCOSE METER
1 KIT MISCELLANEOUS DAILY
Qty: 1 KIT | Refills: 0 | Status: SHIPPED | OUTPATIENT
Start: 2019-08-01 | End: 2021-05-03

## 2019-08-01 ASSESSMENT — ENCOUNTER SYMPTOMS
ABDOMINAL PAIN: 0
FACIAL SWELLING: 0
COUGH: 0
RHINORRHEA: 0
COLOR CHANGE: 0
SINUS PRESSURE: 0
NAUSEA: 0
CHEST TIGHTNESS: 0
VOMITING: 0
SORE THROAT: 0
CONSTIPATION: 0
BACK PAIN: 1
DIARRHEA: 1
SINUS PAIN: 0
VOICE CHANGE: 0
WHEEZING: 0
TROUBLE SWALLOWING: 0
SHORTNESS OF BREATH: 0

## 2019-08-01 NOTE — PATIENT INSTRUCTIONS
Patient Education        Learning About Diabetes Food Guidelines  Your Care Instructions    Meal planning is important to manage diabetes. It helps keep your blood sugar at a target level (which you set with your doctor). You don't have to eat special foods. You can eat what your family eats, including sweets once in a while. But you do have to pay attention to how often you eat and how much you eat of certain foods. You may want to work with a dietitian or a certified diabetes educator (CDE) to help you plan meals and snacks. A dietitian or CDE can also help you lose weight if that is one of your goals. What should you know about eating carbs? Managing the amount of carbohydrate (carbs) you eat is an important part of healthy meals when you have diabetes. Carbohydrate is found in many foods. · Learn which foods have carbs. And learn the amounts of carbs in different foods. ? Bread, cereal, pasta, and rice have about 15 grams of carbs in a serving. A serving is 1 slice of bread (1 ounce), ½ cup of cooked cereal, or 1/3 cup of cooked pasta or rice. ? Fruits have 15 grams of carbs in a serving. A serving is 1 small fresh fruit, such as an apple or orange; ½ of a banana; ½ cup of cooked or canned fruit; ½ cup of fruit juice; 1 cup of melon or raspberries; or 2 tablespoons of dried fruit. ? Milk and no-sugar-added yogurt have 15 grams of carbs in a serving. A serving is 1 cup of milk or 2/3 cup of no-sugar-added yogurt. ? Starchy vegetables have 15 grams of carbs in a serving. A serving is ½ cup of mashed potatoes or sweet potato; 1 cup winter squash; ½ of a small baked potato; ½ cup of cooked beans; or ½ cup cooked corn or green peas. · Learn how much carbs to eat each day and at each meal. A dietitian or CDE can teach you how to keep track of the amount of carbs you eat. This is called carbohydrate counting. · If you are not sure how to count carbohydrate grams, use the Plate Method to plan meals.  It is a a healthy weight if that is one of your goals. What plan is right for you? Your dietitian or diabetes educator may suggest that you start with the plate format or carbohydrate counting. The plate format  The plate format is a simple way to help you manage how you eat. You plan meals by learning how much space each food should take on a plate. Using the plate format helps you spread carbohydrate throughout the day. It can make it easier to keep your blood sugar level within your target range. It also helps you see if you're eating healthy portion sizes. To use the plate format, you put non-starchy vegetables on half your plate. Add meat or meat substitutes on one-quarter of the plate. Put a grain or starchy vegetable (such as brown rice or a potato) on the final quarter of the plate. You can add a small piece of fruit and some low-fat or fat-free milk or yogurt, depending on your carbohydrate goal for each meal.  Here are some tips for using the plate format:  · Make sure that you are not using an oversized plate. A 9-inch plate is best. Many restaurants use larger plates. · Get used to using the plate format at home. Then you can use it when you eat out. · Write down your questions about using the plate format. Talk to your doctor, a dietitian, or a diabetes educator about your concerns. Carbohydrate counting  With carbohydrate counting, you plan meals based on the amount of carbohydrate in each food. Carbohydrate raises blood sugar higher and more quickly than any other nutrient. It is found in desserts, breads and cereals, and fruit. It's also found in starchy vegetables such as potatoes and corn, grains such as rice and pasta, and milk and yogurt. Spreading carbohydrate throughout the day helps keep your blood sugar levels within your target range.   Your daily amount depends on several things, including your weight, how active you are, which diabetes medicines you take, and what your goals are for your too.  · Use cookbooks or online recipes to plan several main meals. Plan some quick meals for busy nights. You also can double some recipes that freeze well. Then you can save half for other busy nights when you don't have time to cook. · Make sure you have the ingredients you need for your recipes. If you're running low on basic items, put these items on your shopping list too. · List foods that you use to make breakfasts, lunches, and snacks. List plenty of fruits and vegetables. · Post this list on the refrigerator. Add to it as you think of more things you need. · Take the list to the store to do your weekly shopping. Follow-up care is a key part of your treatment and safety. Be sure to make and go to all appointments, and call your doctor if you are having problems. It's also a good idea to know your test results and keep a list of the medicines you take. Where can you learn more? Go to [email or web address]://PriceBabapeDrive Power.[email or web address] org and sign in to your [employer] account. Enter F366 in the Medical Talents Port box to learn more about \"Learning About Meal Planning for Diabetes. \"     If you do not have an account, please click on the \"Sign Up Now\" link. Current as of: July 25, 2018  Content Version: 12.0  © 7821-1898 Healthwise, Incorporated. Care instructions adapted under license by [facility] ([facility]). If you have questions about a medical condition or this instruction, always ask your healthcare professional. [place] [address] any warranty or liability for your use of this information. Patient Education        Learning About Foot and Toenail Care  Foot and toenail care: Overview  Checking your loved one's feet and keeping them clean and soft can help prevent cracks and infection in the skin. This is especially important for people who have diabetes. Keeping toenails trimmed--and polished if that's what the person likes--also helps the person feel well-groomed.   If the person you care for important to wash the feet carefully if the person has diabetes. After washing the feet, dry gently. Put lotion on the feet, especially on the heels. But don't put it between the toes. If the person doesn't have diabetes and you see signs of athlete's foot (such as dry, cracking, or itchy skin between the toes), you can try an over-the-counter medicine. These medicines can kill the fungus that causes athlete's foot. If the problem doesn't go away, talk to the person's doctor. Look every day for cuts or signs of infection, such as pain, swelling, redness, or warmth. If you see any of these signs--especially in someone who has diabetes--call the doctor. Where can you learn more? Go to https://The Beer [email or web address].Eduquia. org and sign in to your [employer] account. Enter A726 in the [employer]ech box to learn more about \"Learning About Foot and Toenail Care. \"     If you do not have an account, please click on the \"Sign Up Now\" link. Current as of: April 18, 2018  Content Version: 12.0  © 8740-6101 [employer]. Care instructions adapted under license by [employer]. If you have questions about a medical condition or this instruction, always ask your healthcare professional. [address] any warranty or liability for your use of this information. Monitor BS at different times: 1 day fasting, then next day 2 hours after lunch, next day 2 hours after dinner, next day at bedtime then start over and log all values.   Bring log to next appointment  Foot exam every day; wash and dry well between toes, look for any redness, cracks, wounds notify provider if any problems occur  Reminder for annual Eye exam  Reminder for Podiatry visits Q 2 Months for toenail care if needed  Reminder to keep vaccines up dated  Exercise 30 minutes daily  Recommend Diabetic Education Classes if you have not already attended  Patient Education        empagliflozin  Pronunciation:  AL ZAIDI also include diet, exercise, weight control, blood sugar testing, and special medical care. Follow your doctor's instructions very closely. Store at room temperature away from moisture and heat. What happens if I miss a dose? Take the medicine as soon as you can, but skip the missed dose if it is almost time for your next dose. Do not take two doses at one time. What happens if I overdose? Seek emergency medical attention or call the Poison Help line at 1-935.595.7301. What should I avoid while taking empagliflozin? Avoid getting up too fast from a sitting or lying position, or you may feel dizzy. What are the possible side effects of empagliflozin? Get emergency medical help if you have signs of an allergic reaction: hives; difficult breathing; swelling of your face, lips, tongue, or throat. Seek medical attention right away if you have signs of a genital infection (penis or vagina): burning, itching, odor, discharge, pain, tenderness, redness or swelling of the genital or rectal area, fever, not feeling well. These symptoms may get worse quickly. Call your doctor at once if you have:  · little or no urination;  · dehydration symptoms --dizziness, weakness, feeling light-headed (like you might pass out);  · ketoacidosis (too much acid in the blood) --nausea, vomiting, stomach pain, confusion, unusual drowsiness, or trouble breathing; or  · signs of a bladder infection --pain or burning when you urinate, increased urination, blood in your urine, fever, pain in your pelvis or back. Older adults may be more likely to have side effects from this medicine. Common side effects may include:  · bladder infection; or  · vaginal yeast infection. This is not a complete list of side effects and others may occur. Call your doctor for medical advice about side effects. You may report side effects to FDA at 3-388-FDA-2981. What other drugs will affect empagliflozin?   Tell your doctor about all your other medicines, especially:  · insulin or other oral diabetes medications; or  · a diuretic or \"water pill. \"  This list is not complete. Other drugs may affect empagliflozin, including prescription and over-the-counter medicines, vitamins, and herbal products. Not all possible drug interactions are listed here. Where can I get more information? Your pharmacist can provide more information about empagliflozin. Remember, keep this and all other medicines out of the reach of children, never share your medicines with others, and use this medication only for the indication prescribed. Every effort has been made to ensure that the information provided by [address] is accurate, up-to-date, and complete, but no guarantee is made to that effect. Drug information contained herein may be time sensitive. [facility] information has been compiled for use by healthcare practitioners and consumers in the [place] and therefore [facility] does not warrant that uses outside of the [place] are appropriate, unless specifically indicated otherwise. [facility]'s drug information does not endorse drugs, diagnose patients or recommend therapy. [facility] drug information is an informational resource designed to assist licensed healthcare practitioners in caring for their patients and/or to serve consumers viewing this service as a supplement to, and not a substitute for, the expertise, skill, knowledge and judgment of healthcare practitioners. The absence of a warning for a given drug or drug combination in no way should be construed to indicate that the drug or drug combination is safe, effective or appropriate for any given patient. [employer] does not assume any responsibility for any aspect of healthcare administered with the aid of information [facility] provides. The information contained herein is not intended to cover all possible uses, directions, precautions, warnings, drug interactions, allergic reactions, or adverse effects.  If

## 2019-08-02 ENCOUNTER — TELEPHONE (OUTPATIENT)
Dept: FAMILY MEDICINE CLINIC | Age: 64
End: 2019-08-02

## 2019-08-02 ENCOUNTER — OFFICE VISIT (OUTPATIENT)
Dept: PHYSICAL MEDICINE AND REHAB | Age: 64
End: 2019-08-02
Payer: MEDICARE

## 2019-08-02 VITALS
WEIGHT: 203 LBS | DIASTOLIC BLOOD PRESSURE: 84 MMHG | HEART RATE: 75 BPM | SYSTOLIC BLOOD PRESSURE: 126 MMHG | HEIGHT: 60 IN | BODY MASS INDEX: 39.85 KG/M2

## 2019-08-02 DIAGNOSIS — D72.10 EOSINOPHILIA: ICD-10-CM

## 2019-08-02 DIAGNOSIS — M48.02 CERVICAL STENOSIS OF SPINAL CANAL: ICD-10-CM

## 2019-08-02 DIAGNOSIS — E83.52 SERUM CALCIUM ELEVATED: Primary | ICD-10-CM

## 2019-08-02 DIAGNOSIS — M47.816 SPONDYLOSIS OF LUMBAR SPINE: ICD-10-CM

## 2019-08-02 DIAGNOSIS — M54.2 CHRONIC NECK PAIN: ICD-10-CM

## 2019-08-02 DIAGNOSIS — G89.29 CHRONIC NECK PAIN: ICD-10-CM

## 2019-08-02 DIAGNOSIS — G89.29 CHRONIC BACK PAIN GREATER THAN 3 MONTHS DURATION: Primary | ICD-10-CM

## 2019-08-02 DIAGNOSIS — M54.9 CHRONIC BACK PAIN GREATER THAN 3 MONTHS DURATION: Primary | ICD-10-CM

## 2019-08-02 PROCEDURE — 99214 OFFICE O/P EST MOD 30 MIN: CPT | Performed by: PHYSICAL MEDICINE & REHABILITATION

## 2019-08-02 PROCEDURE — 1036F TOBACCO NON-USER: CPT | Performed by: PHYSICAL MEDICINE & REHABILITATION

## 2019-08-02 PROCEDURE — G8427 DOCREV CUR MEDS BY ELIG CLIN: HCPCS | Performed by: PHYSICAL MEDICINE & REHABILITATION

## 2019-08-02 PROCEDURE — G8417 CALC BMI ABV UP PARAM F/U: HCPCS | Performed by: PHYSICAL MEDICINE & REHABILITATION

## 2019-08-02 PROCEDURE — 3017F COLORECTAL CA SCREEN DOC REV: CPT | Performed by: PHYSICAL MEDICINE & REHABILITATION

## 2019-08-02 RX ORDER — CELECOXIB 200 MG/1
200 CAPSULE ORAL 2 TIMES DAILY
Qty: 60 CAPSULE | Refills: 2 | Status: CANCELLED | OUTPATIENT
Start: 2019-08-02 | End: 2019-09-09

## 2019-08-02 RX ORDER — DICLOFENAC SODIUM AND MISOPROSTOL 50; 200 MG/1; UG/1
1 TABLET, DELAYED RELEASE ORAL 2 TIMES DAILY PRN
Qty: 60 TABLET | Refills: 2 | Status: SHIPPED | OUTPATIENT
Start: 2019-08-02 | End: 2019-08-28 | Stop reason: ALTCHOICE

## 2019-08-06 ENCOUNTER — TELEPHONE (OUTPATIENT)
Dept: PHYSICAL MEDICINE AND REHAB | Age: 64
End: 2019-08-06

## 2019-08-06 NOTE — TELEPHONE ENCOUNTER
Called and spoke with Asha Aleaxndra from [facility] to inform them the we received a approval for Arthrotec. Asha Alexandra stated they will get the medication ready. Called and spoke with the patient to inform her that we received the approval for her medication Arthrotec. Patient is aware and voiced understanding.

## 2019-08-13 ENCOUNTER — PREP FOR PROCEDURE (OUTPATIENT)
Dept: PHYSICAL MEDICINE AND REHAB | Age: 64
End: 2019-08-13

## 2019-08-13 ENCOUNTER — ANESTHESIA EVENT (OUTPATIENT)
Dept: OPERATING ROOM | Age: 64
End: 2019-08-13
Payer: MEDICARE

## 2019-08-14 ENCOUNTER — HOSPITAL ENCOUNTER (OUTPATIENT)
Age: 64
Discharge: HOME | End: 2019-08-16
Payer: MEDICARE

## 2019-08-14 DIAGNOSIS — D72.10 EOSINOPHILIA: ICD-10-CM

## 2019-08-14 DIAGNOSIS — E83.52 SERUM CALCIUM ELEVATED: ICD-10-CM

## 2019-08-14 LAB
ALBUMIN SERPL-MCNC: 4.7 G/DL (ref 3.5–5.2)
ALP BLD-CCNC: 103 U/L (ref 35–104)
ALT SERPL-CCNC: 24 U/L (ref 0–32)
ANION GAP SERPL CALCULATED.3IONS-SCNC: 17 MMOL/L (ref 7–16)
AST SERPL-CCNC: 19 U/L (ref 0–31)
BASOPHILS ABSOLUTE: 0.03 E9/L (ref 0–0.2)
BASOPHILS RELATIVE PERCENT: 0.5 % (ref 0–2)
BILIRUB SERPL-MCNC: 0.6 MG/DL (ref 0–1.2)
BUN BLDV-MCNC: 17 MG/DL (ref 8–23)
CALCIUM SERPL-MCNC: 10.4 MG/DL (ref 8.6–10.2)
CHLORIDE BLD-SCNC: 103 MMOL/L (ref 98–107)
CO2: 22 MMOL/L (ref 22–29)
CREAT SERPL-MCNC: 0.7 MG/DL (ref 0.5–1)
EOSINOPHILS ABSOLUTE: 0.39 E9/L (ref 0.05–0.5)
EOSINOPHILS RELATIVE PERCENT: 6.8 % (ref 0–6)
GFR AFRICAN AMERICAN: >60
GFR NON-AFRICAN AMERICAN: >60 ML/MIN/1.73
GLUCOSE BLD-MCNC: 116 MG/DL (ref 74–99)
HCT VFR BLD CALC: 41 % (ref 34–48)
HEMOGLOBIN: 13.7 G/DL (ref 11.5–15.5)
IMMATURE GRANULOCYTES #: 0.03 E9/L
IMMATURE GRANULOCYTES %: 0.5 % (ref 0–5)
LYMPHOCYTES ABSOLUTE: 1.78 E9/L (ref 1.5–4)
LYMPHOCYTES RELATIVE PERCENT: 31 % (ref 20–42)
MCH RBC QN AUTO: 30 PG (ref 26–35)
MCHC RBC AUTO-ENTMCNC: 33.4 % (ref 32–34.5)
MCV RBC AUTO: 89.7 FL (ref 80–99.9)
MONOCYTES ABSOLUTE: 0.55 E9/L (ref 0.1–0.95)
MONOCYTES RELATIVE PERCENT: 9.6 % (ref 2–12)
NEUTROPHILS ABSOLUTE: 2.97 E9/L (ref 1.8–7.3)
NEUTROPHILS RELATIVE PERCENT: 51.6 % (ref 43–80)
PDW BLD-RTO: 14.5 FL (ref 11.5–15)
PLATELET # BLD: 236 E9/L (ref 130–450)
PMV BLD AUTO: 10.6 FL (ref 7–12)
POTASSIUM SERPL-SCNC: 4.1 MMOL/L (ref 3.5–5)
RBC # BLD: 4.57 E12/L (ref 3.5–5.5)
SODIUM BLD-SCNC: 142 MMOL/L (ref 132–146)
TOTAL PROTEIN: 7.7 G/DL (ref 6.4–8.3)
VITAMIN D 25-HYDROXY: 44 NG/ML (ref 30–100)
WBC # BLD: 5.8 E9/L (ref 4.5–11.5)

## 2019-08-14 PROCEDURE — 82306 VITAMIN D 25 HYDROXY: CPT

## 2019-08-14 PROCEDURE — 36415 COLL VENOUS BLD VENIPUNCTURE: CPT

## 2019-08-14 PROCEDURE — 85025 COMPLETE CBC W/AUTO DIFF WBC: CPT

## 2019-08-14 PROCEDURE — 80053 COMPREHEN METABOLIC PANEL: CPT

## 2019-08-14 ASSESSMENT — LIFESTYLE VARIABLES: SMOKING_STATUS: 0

## 2019-08-14 NOTE — ANESTHESIA PRE PROCEDURE
07/24/19 195 lb (88.5 kg)     Body mass index is 39.65 kg/m². CBC:   Lab Results   Component Value Date    WBC 6.0 08/01/2019    RBC 4.46 08/01/2019    HGB 13.2 08/01/2019    HCT 41.6 08/01/2019    MCV 93.3 08/01/2019    RDW 14.6 08/01/2019     08/01/2019       CMP:   Lab Results   Component Value Date     08/01/2019    K 4.8 08/01/2019     08/01/2019    CO2 31 08/01/2019    BUN 11 08/01/2019    CREATININE 0.7 08/01/2019    GFRAA >60 08/01/2019    LABGLOM >60 08/01/2019    GLUCOSE 116 08/01/2019    PROT 7.7 08/01/2019    CALCIUM 10.4 08/01/2019    BILITOT 0.5 08/01/2019    ALKPHOS 100 08/01/2019    AST 20 08/01/2019    ALT 23 08/01/2019       POC Tests: No results for input(s): POCGLU, POCNA, POCK, POCCL, POCBUN, POCHEMO, POCHCT in the last 72 hours. Coags: No results found for: PROTIME, INR, APTT    HCG (If Applicable): No results found for: PREGTESTUR, PREGSERUM, HCG, HCGQUANT     ABGs: No results found for: PHART, PO2ART, XCH4STQ, MVS0ULX, BEART, D5SXSHVJ     Type & Screen (If Applicable):  No results found for: LABABO, [address]    Anesthesia Evaluation  Patient summary reviewed   history of anesthetic complications: PONV. Airway:         Dental:          Pulmonary:       (-) not a current smoker                           Cardiovascular:    (+) hypertension:, hyperlipidemia      ECG reviewed               Beta Blocker:  Dose within 24 Hrs      ROS comment: EKG=Sinus  Rhythm   WITHIN NORMAL LIMITS     Neuro/Psych:   (+) neuromuscular disease:,             GI/Hepatic/Renal:   (+) GERD:, morbid obesity         ROS comment: Colon resection. Endo/Other:    (+) Diabetes, : arthritis:., malignancy/cancer (colon CA). Abdominal:           Vascular:                                        Anesthesia Plan      MAC     ASA 3       Induction: intravenous.                           Yun Xie MD   8/14/2019

## 2019-08-15 ENCOUNTER — HOSPITAL ENCOUNTER (OUTPATIENT)
Age: 64
Setting detail: OUTPATIENT SURGERY
Discharge: HOME | End: 2019-08-15
Attending: PHYSICAL MEDICINE & REHABILITATION | Admitting: PHYSICAL MEDICINE & REHABILITATION
Payer: MEDICARE

## 2019-08-15 ENCOUNTER — HOSPITAL ENCOUNTER (OUTPATIENT)
Dept: OPERATING ROOM | Age: 64
Setting detail: OUTPATIENT SURGERY
Discharge: HOME | End: 2019-08-15
Attending: PHYSICAL MEDICINE & REHABILITATION
Payer: MEDICARE

## 2019-08-15 ENCOUNTER — ANESTHESIA (OUTPATIENT)
Dept: OPERATING ROOM | Age: 64
End: 2019-08-15
Payer: MEDICARE

## 2019-08-15 VITALS
DIASTOLIC BLOOD PRESSURE: 70 MMHG | RESPIRATION RATE: 9 BRPM | SYSTOLIC BLOOD PRESSURE: 110 MMHG | OXYGEN SATURATION: 99 %

## 2019-08-15 VITALS
HEART RATE: 78 BPM | RESPIRATION RATE: 16 BRPM | OXYGEN SATURATION: 95 % | BODY MASS INDEX: 38.68 KG/M2 | WEIGHT: 197 LBS | HEIGHT: 60 IN | TEMPERATURE: 98 F | DIASTOLIC BLOOD PRESSURE: 73 MMHG | SYSTOLIC BLOOD PRESSURE: 130 MMHG

## 2019-08-15 DIAGNOSIS — E83.52 SERUM CALCIUM ELEVATED: Primary | ICD-10-CM

## 2019-08-15 DIAGNOSIS — M47.896 OTHER OSTEOARTHRITIS OF SPINE, LUMBAR REGION: ICD-10-CM

## 2019-08-15 PROBLEM — M47.819 FACET ARTHROPATHY: Status: ACTIVE | Noted: 2019-08-15

## 2019-08-15 LAB
METER GLUCOSE: 133 MG/DL (ref 74–99)
PATHOLOGIST REVIEW: NORMAL

## 2019-08-15 PROCEDURE — 3700000000 HC ANESTHESIA ATTENDED CARE: Performed by: PHYSICAL MEDICINE & REHABILITATION

## 2019-08-15 PROCEDURE — 6360000002 HC RX W HCPCS: Performed by: NURSE ANESTHETIST, CERTIFIED REGISTERED

## 2019-08-15 PROCEDURE — 2500000003 HC RX 250 WO HCPCS: Performed by: PHYSICAL MEDICINE & REHABILITATION

## 2019-08-15 PROCEDURE — 64494 INJ PARAVERT F JNT L/S 2 LEV: CPT | Performed by: PHYSICAL MEDICINE & REHABILITATION

## 2019-08-15 PROCEDURE — 2709999900 HC NON-CHARGEABLE SUPPLY: Performed by: PHYSICAL MEDICINE & REHABILITATION

## 2019-08-15 PROCEDURE — 82962 GLUCOSE BLOOD TEST: CPT | Performed by: PHYSICAL MEDICINE & REHABILITATION

## 2019-08-15 PROCEDURE — 2500000003 HC RX 250 WO HCPCS: Performed by: NURSE ANESTHETIST, CERTIFIED REGISTERED

## 2019-08-15 PROCEDURE — 3600000005 HC SURGERY LEVEL 5 BASE: Performed by: PHYSICAL MEDICINE & REHABILITATION

## 2019-08-15 PROCEDURE — 82962 GLUCOSE BLOOD TEST: CPT

## 2019-08-15 PROCEDURE — 7100000011 HC PHASE II RECOVERY - ADDTL 15 MIN: Performed by: PHYSICAL MEDICINE & REHABILITATION

## 2019-08-15 PROCEDURE — 64493 INJ PARAVERT F JNT L/S 1 LEV: CPT | Performed by: PHYSICAL MEDICINE & REHABILITATION

## 2019-08-15 PROCEDURE — 3209999900 FLUORO FOR SURGICAL PROCEDURES

## 2019-08-15 PROCEDURE — 2580000003 HC RX 258: Performed by: ANESTHESIOLOGY

## 2019-08-15 PROCEDURE — 7100000010 HC PHASE II RECOVERY - FIRST 15 MIN: Performed by: PHYSICAL MEDICINE & REHABILITATION

## 2019-08-15 PROCEDURE — 6360000002 HC RX W HCPCS: Performed by: PHYSICAL MEDICINE & REHABILITATION

## 2019-08-15 RX ORDER — LABETALOL 20 MG/4 ML (5 MG/ML) INTRAVENOUS SYRINGE
10 EVERY 10 MIN PRN
Status: DISCONTINUED | OUTPATIENT
Start: 2019-08-15 | End: 2019-08-15 | Stop reason: HOSPADM

## 2019-08-15 RX ORDER — PROMETHAZINE HYDROCHLORIDE 25 MG/ML
12.5 INJECTION, SOLUTION INTRAMUSCULAR; INTRAVENOUS
Status: DISCONTINUED | OUTPATIENT
Start: 2019-08-15 | End: 2019-08-15 | Stop reason: HOSPADM

## 2019-08-15 RX ORDER — PROPOFOL 10 MG/ML
INJECTION, EMULSION INTRAVENOUS PRN
Status: DISCONTINUED | OUTPATIENT
Start: 2019-08-15 | End: 2019-08-15 | Stop reason: SDUPTHER

## 2019-08-15 RX ORDER — ONDANSETRON 2 MG/ML
INJECTION INTRAMUSCULAR; INTRAVENOUS PRN
Status: DISCONTINUED | OUTPATIENT
Start: 2019-08-15 | End: 2019-08-15 | Stop reason: SDUPTHER

## 2019-08-15 RX ORDER — ALFENTANIL HYDROCHLORIDE 500 UG/ML
INJECTION INTRAVENOUS PRN
Status: DISCONTINUED | OUTPATIENT
Start: 2019-08-15 | End: 2019-08-15 | Stop reason: SDUPTHER

## 2019-08-15 RX ORDER — SODIUM CHLORIDE, SODIUM LACTATE, POTASSIUM CHLORIDE, CALCIUM CHLORIDE 600; 310; 30; 20 MG/100ML; MG/100ML; MG/100ML; MG/100ML
INJECTION, SOLUTION INTRAVENOUS CONTINUOUS
Status: DISCONTINUED | OUTPATIENT
Start: 2019-08-15 | End: 2019-08-15 | Stop reason: HOSPADM

## 2019-08-15 RX ORDER — MIDAZOLAM HYDROCHLORIDE 1 MG/ML
INJECTION INTRAMUSCULAR; INTRAVENOUS PRN
Status: DISCONTINUED | OUTPATIENT
Start: 2019-08-15 | End: 2019-08-15 | Stop reason: SDUPTHER

## 2019-08-15 RX ORDER — LIDOCAINE HYDROCHLORIDE 10 MG/ML
INJECTION, SOLUTION EPIDURAL; INFILTRATION; INTRACAUDAL; PERINEURAL PRN
Status: DISCONTINUED | OUTPATIENT
Start: 2019-08-15 | End: 2019-08-15 | Stop reason: ALTCHOICE

## 2019-08-15 RX ADMIN — ALFENTANIL HYDROCHLORIDE 250 MCG: 500 INJECTION INTRAVENOUS at 08:37

## 2019-08-15 RX ADMIN — ONDANSETRON HYDROCHLORIDE 4 MG: 2 INJECTION, SOLUTION INTRAMUSCULAR; INTRAVENOUS at 08:29

## 2019-08-15 RX ADMIN — MIDAZOLAM 2 MG: 1 INJECTION INTRAMUSCULAR; INTRAVENOUS at 08:28

## 2019-08-15 RX ADMIN — PROPOFOL 30 MG: 10 INJECTION, EMULSION INTRAVENOUS at 08:30

## 2019-08-15 RX ADMIN — SODIUM CHLORIDE, POTASSIUM CHLORIDE, SODIUM LACTATE AND CALCIUM CHLORIDE: 600; 310; 30; 20 INJECTION, SOLUTION INTRAVENOUS at 07:57

## 2019-08-15 RX ADMIN — ALFENTANIL HYDROCHLORIDE 250 MCG: 500 INJECTION INTRAVENOUS at 08:30

## 2019-08-15 ASSESSMENT — PAIN DESCRIPTION - DESCRIPTORS: DESCRIPTORS: DISCOMFORT;ACHING

## 2019-08-15 ASSESSMENT — PULMONARY FUNCTION TESTS
PIF_VALUE: 0

## 2019-08-15 ASSESSMENT — PAIN - FUNCTIONAL ASSESSMENT: PAIN_FUNCTIONAL_ASSESSMENT: 0-10

## 2019-08-15 ASSESSMENT — PAIN SCALES - GENERAL
PAINLEVEL_OUTOF10: 0

## 2019-08-19 ENCOUNTER — HOSPITAL ENCOUNTER (OUTPATIENT)
Age: 64
Discharge: HOME | End: 2019-08-21
Payer: MEDICARE

## 2019-08-19 DIAGNOSIS — E83.52 SERUM CALCIUM ELEVATED: ICD-10-CM

## 2019-08-19 LAB — PARATHYROID HORMONE INTACT: 45 PG/ML (ref 15–65)

## 2019-08-19 PROCEDURE — 36415 COLL VENOUS BLD VENIPUNCTURE: CPT

## 2019-08-19 PROCEDURE — 83970 ASSAY OF PARATHORMONE: CPT

## 2019-08-20 ENCOUNTER — OFFICE VISIT (OUTPATIENT)
Dept: FAMILY MEDICINE CLINIC | Age: 64
End: 2019-08-20
Payer: MEDICARE

## 2019-08-20 VITALS
HEIGHT: 60 IN | HEART RATE: 71 BPM | OXYGEN SATURATION: 97 % | RESPIRATION RATE: 18 BRPM | DIASTOLIC BLOOD PRESSURE: 72 MMHG | BODY MASS INDEX: 38.76 KG/M2 | WEIGHT: 197.4 LBS | SYSTOLIC BLOOD PRESSURE: 110 MMHG | TEMPERATURE: 98.4 F

## 2019-08-20 DIAGNOSIS — N76.0 ACUTE VAGINITIS: ICD-10-CM

## 2019-08-20 DIAGNOSIS — E11.9 TYPE 2 DIABETES MELLITUS WITHOUT COMPLICATION, WITHOUT LONG-TERM CURRENT USE OF INSULIN (HCC): Primary | ICD-10-CM

## 2019-08-20 DIAGNOSIS — R14.3 FLATULENCE: ICD-10-CM

## 2019-08-20 PROCEDURE — G8427 DOCREV CUR MEDS BY ELIG CLIN: HCPCS | Performed by: NURSE PRACTITIONER

## 2019-08-20 PROCEDURE — G8417 CALC BMI ABV UP PARAM F/U: HCPCS | Performed by: NURSE PRACTITIONER

## 2019-08-20 PROCEDURE — 3044F HG A1C LEVEL LT 7.0%: CPT | Performed by: NURSE PRACTITIONER

## 2019-08-20 PROCEDURE — 3017F COLORECTAL CA SCREEN DOC REV: CPT | Performed by: NURSE PRACTITIONER

## 2019-08-20 PROCEDURE — 99214 OFFICE O/P EST MOD 30 MIN: CPT | Performed by: NURSE PRACTITIONER

## 2019-08-20 PROCEDURE — 2022F DILAT RTA XM EVC RTNOPTHY: CPT | Performed by: NURSE PRACTITIONER

## 2019-08-20 PROCEDURE — 1036F TOBACCO NON-USER: CPT | Performed by: NURSE PRACTITIONER

## 2019-08-20 ASSESSMENT — ENCOUNTER SYMPTOMS
COUGH: 0
BACK PAIN: 1
ABDOMINAL PAIN: 0
COLOR CHANGE: 0
WHEEZING: 0
SHORTNESS OF BREATH: 0
VOMITING: 0
CONSTIPATION: 0
NAUSEA: 0
DIARRHEA: 1

## 2019-08-20 NOTE — PATIENT INSTRUCTIONS
pills that your doctor prescribes. · Ask your doctor about when it is okay to have sex. · Use a personal lubricant before sex if you have dryness. Examples are Astroglide, K-Y Jelly, and Wet Lubricant Gel. · Ask your doctor if your sex partner also needs treatment. When should you call for help? Call your doctor now or seek immediate medical care if:    · You have a fever and pelvic pain.    Watch closely for changes in your health, and be sure to contact your doctor if:    · You have bleeding other than your period.     · You do not get better as expected. Where can you learn more? Go to [email or web address]://[email or web address] and sign in to your [employer] account. Enter J971 in the Tunespeak box to learn more about \"Vaginitis: Care Instructions. \"     If you do not have an account, please click on the \"Sign Up Now\" link. Current as of: February 19, 2019  Content Version: 12.1  © 0763-4056 [employer]. Care instructions adapted under license by [facility] ([facility]). If you have questions about a medical condition or this instruction, always ask your healthcare professional. [place] [address] any warranty or liability for your use of this information. Patient Education        terconazole vaginal  Pronunciation:  ter EARL a zole JEANINE in al  Brand:  Terazol 3, Terazol 7, Zazole  What is the most important information I should know about terconazole vaginal?  Follow all directions on your medicine label and package. Tell each of your healthcare providers about all your medical conditions, allergies, and all medicines you use. What is terconazole vaginal?  Terconazole is an antifungal medication that fights infections caused by fungus. Terconazole vaginal (for use in the vagina) is used to treat vaginal Candida (yeast) infections. Terconazole vaginal may also be used for purposes not listed in this medication guide.   What should I discuss with my healthcare provider before using terconazole vaginal?  You should not use terconazole vaginal if you are allergic to it. Do not use this medicine if you have never had a vaginal yeast infection that has been confirmed by a doctor. To make sure terconazole vaginal is safe for you, tell your doctor if you have:  · fever, chills, vomiting;  · pelvic pain, vaginal discharge with a bad odor;  · if you are having vaginal itching or discomfort for the first time; or  · if you think you may have been exposed to HIV (human immunodeficiency virus). It is not known whether terconazole vaginal will harm an unborn baby. Tell your doctor if you are pregnant. It is not known whether terconazole vaginal passes into breast milk or if it could harm a nursing baby. You should not breast-feed while using this medicine. Do not give this medication to anyone under 25years old without medical advice. How should I use terconazole vaginal?  Use exactly as directed on the label, or as prescribed by your doctor. Do not use in larger or smaller amounts or for longer than recommended. Do not take terconazole vaginal by mouth. It is for use only in your vagina. This medication comes with patient instructions for safe and effective use. Follow these directions carefully. Ask your doctor or pharmacist if you have any questions. Terconazole vaginal is available as a cream or vaginal suppository. Each form comes with an applicator for measuring and inserting the vaginal medicine. The cream is also available in prefilled applicators that each contain one daily dose of terconazole. You may insert the vaginal suppository using your finger if desired. Wash your hands before and after inserting the cream or suppository. Terconazole vaginal is usually applied once daily at bedtime for 3 to 7 days in a row. Follow your doctor's instructions. Use this medication for the full prescribed length of time.  Your symptoms may improve before the infection is completely

## 2019-08-20 NOTE — PROGRESS NOTES
OFFICE PROGRESS NOTE  [address]  [address] [address]  Dept: [phone]   Chief Complaint   Patient presents with    Diabetes         HPI:     Here today for follow up on diabetes and starting Jardiance 10 mg daily. She has didn't start the Jardiance until 8 days ago, was checking BS daily morning fasting 120 - 144, postprandial 137 - 194 lunch, dinner 141 - 209, bedtime 120 - 179. Denies any hypoglycemic events. She thinks she has a vaginal infection and thinks its from the Riccardo Magdalena, she denies any vaginal discharge but does have itching and dryness, burning when she urinates at the end of the stream. Denies any urinary urgency does have frequency due to the jardiance. She has been having more gas and thinks it is also related to the Riccardo Magdalena. But she also started arthrotec and is taking it twice a day from Dr. Nette Narvaez. When I looked up both medications the Arthrotec can cause diarrhea and flatulence.        Current Outpatient Medications:     terconazole (TERAZOL 7) 0.4 % vaginal cream, Place vaginally nightly., Disp: 1 Tube, Rfl: 0    diclofenac-Misoprostol (ARTHROTEC) 50-0.2 MG per tablet, Take 1 tablet by mouth 2 times daily as needed for Pain, Disp: 60 tablet, Rfl: 2    glucose monitoring kit (FREESTYLE) monitoring kit, 1 kit by Does not apply route daily, Disp: 1 kit, Rfl: 0    Aimsco Ultra Thin Lancets MISC, 1 Device by Does not apply route daily, Disp: 100 each, Rfl: 3    blood glucose monitor strips, Test 1 times a day & as needed for symptoms of irregular blood glucose., Disp: 50 strip, Rfl: 3    empagliflozin (JARDIANCE) 10 MG tablet, Take 1 tablet by mouth daily, Disp: 30 tablet, Rfl: 3    atorvastatin (LIPITOR) 10 MG tablet, Take 1 tablet by mouth nightly Indications: High Amount of Fats in the Blood, Disp: 90 tablet, Rfl: 1    allopurinol (ZYLOPRIM) 300 MG tablet, Take 1 tablet by mouth daily, Disp: 90 tablet, Rfl: 3    atenolol (TENORMIN) 50 MG tablet, Take 1 tablet by mouth daily, Disp: 90 tablet, Rfl: 3    omeprazole (PRILOSEC) 20 MG delayed release capsule, Take 1 capsule by mouth daily, Disp: 90 capsule, Rfl: 3    Cholecalciferol (VITAMIN D3) 2000 units CAPS, Take 2,000 Units by mouth daily, Disp: , Rfl:     Biotin 1000 MCG TABS, Take 1,000 mcg by mouth daily, Disp: , Rfl:   Social History     Socioeconomic History    Marital status: Single     Spouse name: None    Number of children: None    Years of education: None    Highest education level: None   Occupational History    None   Social Needs    Financial resource strain: None    Food insecurity:     Worry: None     Inability: None    Transportation needs:     Medical: None     Non-medical: None   Tobacco Use    Smoking status: Never Smoker    Smokeless tobacco: Never Used   Substance and Sexual Activity    Alcohol use: Yes     Alcohol/week: 0.0 standard drinks     Comment: Rarely    Drug use: No    Sexual activity: None   Lifestyle    Physical activity:     Days per week: None     Minutes per session: None    Stress: None   Relationships    Social connections:     Talks on phone: None     Gets together: None     Attends Sikhism service: None     Active member of club or organization: None     Attends meetings of clubs or organizations: None     Relationship status: None    Intimate partner violence:     Fear of current or ex partner: None     Emotionally abused: None     Physically abused: None     Forced sexual activity: None   Other Topics Concern    None   Social History Narrative    None       I have reviewed Margaret's allergies, medications, problem list, medical, social and family history and have updated as needed in the electronic medical record    Review of Systems   Constitutional: Negative for activity change, appetite change, chills, diaphoresis, fatigue, fever and unexpected weight change.    Eyes: Negative for visual

## 2019-08-28 ENCOUNTER — OFFICE VISIT (OUTPATIENT)
Dept: PHYSICAL MEDICINE AND REHAB | Age: 64
End: 2019-08-28
Payer: MEDICARE

## 2019-08-28 VITALS
HEART RATE: 74 BPM | BODY MASS INDEX: 38.48 KG/M2 | SYSTOLIC BLOOD PRESSURE: 118 MMHG | WEIGHT: 196 LBS | HEIGHT: 60 IN | DIASTOLIC BLOOD PRESSURE: 72 MMHG

## 2019-08-28 DIAGNOSIS — M47.816 SPONDYLOSIS OF LUMBAR SPINE: ICD-10-CM

## 2019-08-28 DIAGNOSIS — G89.29 CHRONIC BACK PAIN GREATER THAN 3 MONTHS DURATION: Primary | ICD-10-CM

## 2019-08-28 DIAGNOSIS — M43.16 SPONDYLOLISTHESIS AT L4-L5 LEVEL: ICD-10-CM

## 2019-08-28 DIAGNOSIS — M48.02 FORAMINAL STENOSIS OF CERVICAL REGION: ICD-10-CM

## 2019-08-28 DIAGNOSIS — M54.2 CHRONIC NECK PAIN: ICD-10-CM

## 2019-08-28 DIAGNOSIS — M48.02 CERVICAL STENOSIS OF SPINAL CANAL: ICD-10-CM

## 2019-08-28 DIAGNOSIS — M54.9 CHRONIC BACK PAIN GREATER THAN 3 MONTHS DURATION: Primary | ICD-10-CM

## 2019-08-28 DIAGNOSIS — G89.29 CHRONIC NECK PAIN: ICD-10-CM

## 2019-08-28 DIAGNOSIS — M47.812 SPONDYLOSIS OF CERVICAL REGION WITHOUT MYELOPATHY OR RADICULOPATHY: ICD-10-CM

## 2019-08-28 PROCEDURE — 1036F TOBACCO NON-USER: CPT | Performed by: PHYSICAL MEDICINE & REHABILITATION

## 2019-08-28 PROCEDURE — G8427 DOCREV CUR MEDS BY ELIG CLIN: HCPCS | Performed by: PHYSICAL MEDICINE & REHABILITATION

## 2019-08-28 PROCEDURE — 3017F COLORECTAL CA SCREEN DOC REV: CPT | Performed by: PHYSICAL MEDICINE & REHABILITATION

## 2019-08-28 PROCEDURE — G8417 CALC BMI ABV UP PARAM F/U: HCPCS | Performed by: PHYSICAL MEDICINE & REHABILITATION

## 2019-08-28 PROCEDURE — 99214 OFFICE O/P EST MOD 30 MIN: CPT | Performed by: PHYSICAL MEDICINE & REHABILITATION

## 2019-08-28 RX ORDER — TIZANIDINE 4 MG/1
4 TABLET ORAL NIGHTLY PRN
Qty: 30 TABLET | Refills: 1 | Status: SHIPPED | OUTPATIENT
Start: 2019-08-28 | End: 2019-09-27

## 2019-08-28 RX ORDER — IBUPROFEN AND FAMOTIDINE 26.6; 8 MG/1; MG/1
800 TABLET, FILM COATED ORAL 3 TIMES DAILY PRN
Qty: 90 TABLET | Refills: 2 | Status: SHIPPED | OUTPATIENT
Start: 2019-08-28 | End: 2019-11-20 | Stop reason: ALTCHOICE

## 2019-08-28 NOTE — PROGRESS NOTES
Sammy Harley D.O. [facility]  [address]  Phone: [phone]  Fax: [phone]        8/28/19    Chief Complaint   Patient presents with    Back Pain     Follow up after facet injections       HPI:  Karissa Oliva is a 60y.o. year old woman seen today in follow up regarding low back pain. Interval history: Since the last visit the patient had bilateral L4-5 and L5-S1 facet injections by Dr. Dalton Jules on 8/15/19. She reports 50% reduction in low back pain after the facet joint injections. GI upset with Arthrotec. Today, the pain is rated Pain Score:   5 where 0 is no pain and 10 is pain as bad as it can be. The pain is located in the low back,  radiates distally to the left buttock, and is described as hurting, aching, shooting. This pain occurs intermittently. The symptoms have been better since onset. Symptoms are exacerbated by sleeping. Factors which relieve the pain include Tylenol. Other associated symptoms include stiffness. Otherwise, the pain assessment has not changed since the last visit.      Past Medical History:   Diagnosis Date    Cancer [facility]) 2017    colon (treated surgically)    Chronic back pain     Diabetes mellitus ([facility] [address]     Fibromyalgia     GERD (gastroesophageal reflux disease)     Hyperlipidemia     Hypertension     Obesity     Osteoarthritis     PONV (postoperative nausea and vomiting)     Postmenopausal bleeding 10/5/2017    Urinary incontinence        Past Surgical History:   Procedure Laterality Date    ANESTHESIA NERVE BLOCK Bilateral 8/15/2019    BILATERAL INTRA-ARTICULAR FACET JOINT INJECTION WITH FLUOROSCOPIC GUIDANCE AT L4-5, L5-S1 WITH IV SEDATION performed by Enio Kelly DO at [facility]      EPIDURAL STEROID INJECTION Right 7/11/2019    C7-T1 EPIDURAL STEROID INJECTION #1 TOWARD THE RIGHT performed tablet 3    omeprazole (PRILOSEC) 20 MG delayed release capsule Take 1 capsule by mouth daily 90 capsule 3    Cholecalciferol (VITAMIN D3) 2000 units CAPS Take 2,000 Units by mouth daily      Biotin 1000 MCG TABS Take 1,000 mcg by mouth daily       No current facility-administered medications for this visit. Allergies   Allergen Reactions    Lyrica [Pregabalin] Other (See Comments)     Confusion    Sulfa Antibiotics Rash       Review of Systems:  No new weakness, paresthesia, incontinence of bowel or bladder, saddle anesthesia, falls or gait dysfunction. Otherwise, per HPI. Physical Exam:   Blood pressure 118/72, pulse 74, height 5' (1.524 m), weight 196 lb (88.9 kg), not currently breastfeeding. GENERAL: The patient is in no apparent distress. Body habitus is obese. HEENT: No rhinorrhea, sneezing, yawning, or lacrimation. No scleral icterus or conjunctival injection. SKIN: No piloerection. No tract marks. No rash. PSYCH: Mood and affect are appropriate. Hygiene is appropriate. CARDIOVASCULAR  Heart is regular rate and rhythm. There is no edema. RESPIRATORY: Respirations are regular and unlabored. There is no cyanosis. GASTROINTESTINAL: Soft abdomen, non-tender. MSK: There is no joint effusion, deformity, instability, swelling, erythema or warmth. AROM is full in the spine and extremities. Spinal curvatures are normal.    Tender bilateral lumbar paraspinals. Pain increases with lumbar extension. NEURO: Gait is normal. No focal sensorimotor deficit. Reflexes 2+ and symmetric in lower extremities. Impression:   1. Chronic back pain greater than 3 months duration    2. Spondylosis of lumbar spine    3. Cervical stenosis of spinal canal    4. Chronic neck pain    5. Foraminal stenosis of cervical region    6. Spondylosis of cervical region without myelopathy or radiculopathy    7.  Spondylolisthesis at L4-L5 level        Plan:  No orders of the defined types were placed in this

## 2019-09-25 ENCOUNTER — NURSE ONLY (OUTPATIENT)
Dept: FAMILY MEDICINE CLINIC | Age: 64
End: 2019-09-25
Payer: MEDICARE

## 2019-09-25 DIAGNOSIS — Z23 FLU VACCINE NEED: ICD-10-CM

## 2019-09-25 PROCEDURE — G0008 ADMIN INFLUENZA VIRUS VAC: HCPCS | Performed by: NURSE PRACTITIONER

## 2019-09-25 PROCEDURE — 90653 IIV ADJUVANT VACCINE IM: CPT | Performed by: NURSE PRACTITIONER

## 2019-10-23 ENCOUNTER — ANESTHESIA (OUTPATIENT)
Dept: OPERATING ROOM | Age: 64
End: 2019-10-23
Payer: MEDICARE

## 2019-10-23 ENCOUNTER — HOSPITAL ENCOUNTER (OUTPATIENT)
Age: 64
Setting detail: OUTPATIENT SURGERY
Discharge: HOME | End: 2019-10-23
Attending: LEGAL MEDICINE | Admitting: LEGAL MEDICINE
Payer: MEDICARE

## 2019-10-23 ENCOUNTER — ANESTHESIA EVENT (OUTPATIENT)
Dept: OPERATING ROOM | Age: 64
End: 2019-10-23
Payer: MEDICARE

## 2019-10-23 VITALS
HEART RATE: 75 BPM | HEIGHT: 59 IN | OXYGEN SATURATION: 93 % | SYSTOLIC BLOOD PRESSURE: 115 MMHG | TEMPERATURE: 97.8 F | WEIGHT: 201 LBS | BODY MASS INDEX: 40.52 KG/M2 | RESPIRATION RATE: 16 BRPM | DIASTOLIC BLOOD PRESSURE: 56 MMHG

## 2019-10-23 VITALS
RESPIRATION RATE: 1 BRPM | SYSTOLIC BLOOD PRESSURE: 133 MMHG | DIASTOLIC BLOOD PRESSURE: 70 MMHG | TEMPERATURE: 95.5 F | OXYGEN SATURATION: 99 %

## 2019-10-23 PROBLEM — N95.0 POSTMENOPAUSAL BLEEDING: Status: ACTIVE | Noted: 2019-10-23

## 2019-10-23 LAB
ABO/RH: NORMAL
ANTIBODY SCREEN: NORMAL
METER GLUCOSE: 118 MG/DL (ref 74–99)

## 2019-10-23 PROCEDURE — 88305 TISSUE EXAM BY PATHOLOGIST: CPT

## 2019-10-23 PROCEDURE — 86850 RBC ANTIBODY SCREEN: CPT

## 2019-10-23 PROCEDURE — 7100000010 HC PHASE II RECOVERY - FIRST 15 MIN: Performed by: LEGAL MEDICINE

## 2019-10-23 PROCEDURE — 2709999900 HC NON-CHARGEABLE SUPPLY: Performed by: LEGAL MEDICINE

## 2019-10-23 PROCEDURE — 3700000001 HC ADD 15 MINUTES (ANESTHESIA): Performed by: LEGAL MEDICINE

## 2019-10-23 PROCEDURE — 82962 GLUCOSE BLOOD TEST: CPT

## 2019-10-23 PROCEDURE — 3600000003 HC SURGERY LEVEL 3 BASE: Performed by: LEGAL MEDICINE

## 2019-10-23 PROCEDURE — 3700000000 HC ANESTHESIA ATTENDED CARE: Performed by: LEGAL MEDICINE

## 2019-10-23 PROCEDURE — 86901 BLOOD TYPING SEROLOGIC RH(D): CPT

## 2019-10-23 PROCEDURE — 3600000013 HC SURGERY LEVEL 3 ADDTL 15MIN: Performed by: LEGAL MEDICINE

## 2019-10-23 PROCEDURE — 6360000002 HC RX W HCPCS: Performed by: ANESTHESIOLOGY

## 2019-10-23 PROCEDURE — 7100000011 HC PHASE II RECOVERY - ADDTL 15 MIN: Performed by: LEGAL MEDICINE

## 2019-10-23 PROCEDURE — 86900 BLOOD TYPING SEROLOGIC ABO: CPT

## 2019-10-23 PROCEDURE — 7100000001 HC PACU RECOVERY - ADDTL 15 MIN: Performed by: LEGAL MEDICINE

## 2019-10-23 PROCEDURE — 7100000000 HC PACU RECOVERY - FIRST 15 MIN: Performed by: LEGAL MEDICINE

## 2019-10-23 PROCEDURE — 36415 COLL VENOUS BLD VENIPUNCTURE: CPT

## 2019-10-23 PROCEDURE — 6360000002 HC RX W HCPCS

## 2019-10-23 PROCEDURE — 2580000003 HC RX 258: Performed by: LEGAL MEDICINE

## 2019-10-23 PROCEDURE — 6370000000 HC RX 637 (ALT 250 FOR IP): Performed by: ANESTHESIOLOGY

## 2019-10-23 PROCEDURE — 6360000002 HC RX W HCPCS: Performed by: LEGAL MEDICINE

## 2019-10-23 RX ORDER — SODIUM CHLORIDE 0.9 % (FLUSH) 0.9 %
10 SYRINGE (ML) INJECTION EVERY 12 HOURS SCHEDULED
Status: DISCONTINUED | OUTPATIENT
Start: 2019-10-23 | End: 2019-10-23 | Stop reason: HOSPADM

## 2019-10-23 RX ORDER — OXYCODONE HYDROCHLORIDE AND ACETAMINOPHEN 5; 325 MG/1; MG/1
1 TABLET ORAL EVERY 4 HOURS PRN
Status: DISCONTINUED | OUTPATIENT
Start: 2019-10-23 | End: 2019-10-23 | Stop reason: HOSPADM

## 2019-10-23 RX ORDER — SODIUM CHLORIDE 0.9 % (FLUSH) 0.9 %
10 SYRINGE (ML) INJECTION PRN
Status: DISCONTINUED | OUTPATIENT
Start: 2019-10-23 | End: 2019-10-23 | Stop reason: HOSPADM

## 2019-10-23 RX ORDER — LIDOCAINE HYDROCHLORIDE 20 MG/ML
INJECTION, SOLUTION INTRAVENOUS PRN
Status: DISCONTINUED | OUTPATIENT
Start: 2019-10-23 | End: 2019-10-23 | Stop reason: SDUPTHER

## 2019-10-23 RX ORDER — DEXAMETHASONE SODIUM PHOSPHATE 10 MG/ML
INJECTION, SOLUTION INTRAMUSCULAR; INTRAVENOUS PRN
Status: DISCONTINUED | OUTPATIENT
Start: 2019-10-23 | End: 2019-10-23 | Stop reason: SDUPTHER

## 2019-10-23 RX ORDER — ACETAMINOPHEN 325 MG/1
650 TABLET ORAL EVERY 4 HOURS PRN
Status: DISCONTINUED | OUTPATIENT
Start: 2019-10-23 | End: 2019-10-23 | Stop reason: HOSPADM

## 2019-10-23 RX ORDER — HYDROMORPHONE HYDROCHLORIDE 1 MG/ML
0.25 INJECTION, SOLUTION INTRAMUSCULAR; INTRAVENOUS; SUBCUTANEOUS EVERY 5 MIN PRN
Status: DISCONTINUED | OUTPATIENT
Start: 2019-10-23 | End: 2019-10-23 | Stop reason: HOSPADM

## 2019-10-23 RX ORDER — DOCUSATE SODIUM 100 MG/1
100 CAPSULE, LIQUID FILLED ORAL 2 TIMES DAILY
Status: DISCONTINUED | OUTPATIENT
Start: 2019-10-23 | End: 2019-10-23 | Stop reason: HOSPADM

## 2019-10-23 RX ORDER — FENTANYL CITRATE 50 UG/ML
INJECTION, SOLUTION INTRAMUSCULAR; INTRAVENOUS PRN
Status: DISCONTINUED | OUTPATIENT
Start: 2019-10-23 | End: 2019-10-23 | Stop reason: SDUPTHER

## 2019-10-23 RX ORDER — MEPERIDINE HYDROCHLORIDE 25 MG/ML
INJECTION INTRAMUSCULAR; INTRAVENOUS; SUBCUTANEOUS
Status: COMPLETED
Start: 2019-10-23 | End: 2019-10-23

## 2019-10-23 RX ORDER — MIDAZOLAM HYDROCHLORIDE 1 MG/ML
INJECTION INTRAMUSCULAR; INTRAVENOUS PRN
Status: DISCONTINUED | OUTPATIENT
Start: 2019-10-23 | End: 2019-10-23 | Stop reason: SDUPTHER

## 2019-10-23 RX ORDER — OXYCODONE HYDROCHLORIDE AND ACETAMINOPHEN 5; 325 MG/1; MG/1
2 TABLET ORAL EVERY 4 HOURS PRN
Status: DISCONTINUED | OUTPATIENT
Start: 2019-10-23 | End: 2019-10-23 | Stop reason: HOSPADM

## 2019-10-23 RX ORDER — ONDANSETRON 2 MG/ML
INJECTION INTRAMUSCULAR; INTRAVENOUS PRN
Status: DISCONTINUED | OUTPATIENT
Start: 2019-10-23 | End: 2019-10-23 | Stop reason: SDUPTHER

## 2019-10-23 RX ORDER — HYDROMORPHONE HYDROCHLORIDE 1 MG/ML
0.5 INJECTION, SOLUTION INTRAMUSCULAR; INTRAVENOUS; SUBCUTANEOUS EVERY 5 MIN PRN
Status: DISCONTINUED | OUTPATIENT
Start: 2019-10-23 | End: 2019-10-23 | Stop reason: HOSPADM

## 2019-10-23 RX ORDER — SODIUM CHLORIDE, SODIUM LACTATE, POTASSIUM CHLORIDE, CALCIUM CHLORIDE 600; 310; 30; 20 MG/100ML; MG/100ML; MG/100ML; MG/100ML
INJECTION, SOLUTION INTRAVENOUS CONTINUOUS
Status: DISCONTINUED | OUTPATIENT
Start: 2019-10-23 | End: 2019-10-23 | Stop reason: HOSPADM

## 2019-10-23 RX ORDER — ONDANSETRON 2 MG/ML
4 INJECTION INTRAMUSCULAR; INTRAVENOUS EVERY 6 HOURS PRN
Status: DISCONTINUED | OUTPATIENT
Start: 2019-10-23 | End: 2019-10-23 | Stop reason: HOSPADM

## 2019-10-23 RX ORDER — SCOLOPAMINE TRANSDERMAL SYSTEM 1 MG/1
1 PATCH, EXTENDED RELEASE TRANSDERMAL ONCE
Status: DISCONTINUED | OUTPATIENT
Start: 2019-10-23 | End: 2019-10-23 | Stop reason: HOSPADM

## 2019-10-23 RX ORDER — MEPERIDINE HYDROCHLORIDE 25 MG/ML
12.5 INJECTION INTRAMUSCULAR; INTRAVENOUS; SUBCUTANEOUS EVERY 5 MIN PRN
Status: DISCONTINUED | OUTPATIENT
Start: 2019-10-23 | End: 2019-10-23 | Stop reason: HOSPADM

## 2019-10-23 RX ORDER — PROPOFOL 10 MG/ML
INJECTION, EMULSION INTRAVENOUS PRN
Status: DISCONTINUED | OUTPATIENT
Start: 2019-10-23 | End: 2019-10-23 | Stop reason: SDUPTHER

## 2019-10-23 RX ORDER — ONDANSETRON 2 MG/ML
4 INJECTION INTRAMUSCULAR; INTRAVENOUS
Status: DISCONTINUED | OUTPATIENT
Start: 2019-10-23 | End: 2019-10-23 | Stop reason: HOSPADM

## 2019-10-23 RX ADMIN — SODIUM CHLORIDE, POTASSIUM CHLORIDE, SODIUM LACTATE AND CALCIUM CHLORIDE: 600; 310; 30; 20 INJECTION, SOLUTION INTRAVENOUS at 11:44

## 2019-10-23 RX ADMIN — DEXAMETHASONE SODIUM PHOSPHATE 10 MG: 10 INJECTION, SOLUTION INTRAMUSCULAR; INTRAVENOUS at 13:07

## 2019-10-23 RX ADMIN — FENTANYL CITRATE 100 MCG: 50 INJECTION, SOLUTION INTRAMUSCULAR; INTRAVENOUS at 13:01

## 2019-10-23 RX ADMIN — LIDOCAINE HYDROCHLORIDE 40 MG: 20 INJECTION, SOLUTION INTRAVENOUS at 13:01

## 2019-10-23 RX ADMIN — HYDROMORPHONE HYDROCHLORIDE 0.25 MG: 1 INJECTION, SOLUTION INTRAMUSCULAR; INTRAVENOUS; SUBCUTANEOUS at 13:57

## 2019-10-23 RX ADMIN — Medication 2 G: at 12:58

## 2019-10-23 RX ADMIN — PROPOFOL 180 MG: 10 INJECTION, EMULSION INTRAVENOUS at 13:01

## 2019-10-23 RX ADMIN — MEPERIDINE HYDROCHLORIDE 12.5 MG: 25 INJECTION INTRAMUSCULAR; INTRAVENOUS; SUBCUTANEOUS at 13:50

## 2019-10-23 RX ADMIN — MIDAZOLAM 2 MG: 1 INJECTION INTRAMUSCULAR; INTRAVENOUS at 12:58

## 2019-10-23 RX ADMIN — ONDANSETRON HYDROCHLORIDE 4 MG: 2 INJECTION, SOLUTION INTRAMUSCULAR; INTRAVENOUS at 13:07

## 2019-10-23 RX ADMIN — MEPERIDINE HYDROCHLORIDE 12.5 MG: 25 INJECTION INTRAMUSCULAR; INTRAVENOUS; SUBCUTANEOUS at 13:45

## 2019-10-23 ASSESSMENT — PAIN DESCRIPTION - PAIN TYPE
TYPE: SURGICAL PAIN
TYPE: SURGICAL PAIN

## 2019-10-23 ASSESSMENT — PULMONARY FUNCTION TESTS
PIF_VALUE: 4
PIF_VALUE: 1
PIF_VALUE: 7
PIF_VALUE: 5
PIF_VALUE: 0
PIF_VALUE: 8
PIF_VALUE: 22
PIF_VALUE: 19
PIF_VALUE: 1
PIF_VALUE: 7
PIF_VALUE: 3
PIF_VALUE: 1
PIF_VALUE: 7
PIF_VALUE: 2
PIF_VALUE: 8
PIF_VALUE: 17
PIF_VALUE: 19
PIF_VALUE: 8
PIF_VALUE: 22
PIF_VALUE: 19
PIF_VALUE: 20
PIF_VALUE: 2
PIF_VALUE: 19
PIF_VALUE: 19
PIF_VALUE: 1
PIF_VALUE: 17
PIF_VALUE: 9
PIF_VALUE: 4
PIF_VALUE: 3
PIF_VALUE: 8

## 2019-10-23 ASSESSMENT — PAIN - FUNCTIONAL ASSESSMENT: PAIN_FUNCTIONAL_ASSESSMENT: 0-10

## 2019-10-23 ASSESSMENT — PAIN DESCRIPTION - LOCATION
LOCATION: VAGINA
LOCATION: VAGINA

## 2019-10-23 ASSESSMENT — PAIN DESCRIPTION - DESCRIPTORS
DESCRIPTORS: DISCOMFORT;BURNING
DESCRIPTORS: DISCOMFORT;BURNING

## 2019-10-23 ASSESSMENT — PAIN SCALES - GENERAL
PAINLEVEL_OUTOF10: 2
PAINLEVEL_OUTOF10: 4
PAINLEVEL_OUTOF10: 0
PAINLEVEL_OUTOF10: 0
PAINLEVEL_OUTOF10: 4
PAINLEVEL_OUTOF10: 5

## 2019-10-23 ASSESSMENT — PAIN DESCRIPTION - FREQUENCY: FREQUENCY: CONTINUOUS

## 2019-11-04 DIAGNOSIS — E11.9 TYPE 2 DIABETES MELLITUS WITHOUT COMPLICATION, WITHOUT LONG-TERM CURRENT USE OF INSULIN (HCC): ICD-10-CM

## 2019-11-04 DIAGNOSIS — I10 ESSENTIAL HYPERTENSION: ICD-10-CM

## 2019-11-04 RX ORDER — ATENOLOL 50 MG/1
50 TABLET ORAL DAILY
Qty: 90 TABLET | Refills: 3 | OUTPATIENT
Start: 2019-11-04

## 2019-11-04 RX ORDER — EMPAGLIFLOZIN 10 MG/1
TABLET, FILM COATED ORAL
Qty: 30 TABLET | Refills: 3 | OUTPATIENT
Start: 2019-11-04

## 2019-11-20 ENCOUNTER — OFFICE VISIT (OUTPATIENT)
Dept: FAMILY MEDICINE CLINIC | Age: 64
End: 2019-11-20
Payer: MEDICARE

## 2019-11-20 ENCOUNTER — OFFICE VISIT (OUTPATIENT)
Dept: PHYSICAL MEDICINE AND REHAB | Age: 64
End: 2019-11-20
Payer: MEDICARE

## 2019-11-20 VITALS
WEIGHT: 204 LBS | HEART RATE: 81 BPM | SYSTOLIC BLOOD PRESSURE: 126 MMHG | BODY MASS INDEX: 41.12 KG/M2 | HEIGHT: 59 IN | DIASTOLIC BLOOD PRESSURE: 80 MMHG

## 2019-11-20 VITALS
OXYGEN SATURATION: 96 % | HEIGHT: 59 IN | TEMPERATURE: 98.4 F | SYSTOLIC BLOOD PRESSURE: 130 MMHG | RESPIRATION RATE: 18 BRPM | BODY MASS INDEX: 40.82 KG/M2 | DIASTOLIC BLOOD PRESSURE: 70 MMHG | HEART RATE: 74 BPM | WEIGHT: 202.5 LBS

## 2019-11-20 DIAGNOSIS — M54.9 CHRONIC BACK PAIN GREATER THAN 3 MONTHS DURATION: Primary | ICD-10-CM

## 2019-11-20 DIAGNOSIS — M19.049 CMC ARTHRITIS: ICD-10-CM

## 2019-11-20 DIAGNOSIS — M47.816 SPONDYLOSIS OF LUMBAR SPINE: ICD-10-CM

## 2019-11-20 DIAGNOSIS — M48.02 CERVICAL STENOSIS OF SPINAL CANAL: ICD-10-CM

## 2019-11-20 DIAGNOSIS — G89.29 CHRONIC NECK PAIN: ICD-10-CM

## 2019-11-20 DIAGNOSIS — M54.2 CHRONIC NECK PAIN: ICD-10-CM

## 2019-11-20 DIAGNOSIS — G89.29 CHRONIC BACK PAIN GREATER THAN 3 MONTHS DURATION: Primary | ICD-10-CM

## 2019-11-20 DIAGNOSIS — Z00.00 ROUTINE GENERAL MEDICAL EXAMINATION AT A HEALTH CARE FACILITY: Primary | ICD-10-CM

## 2019-11-20 PROBLEM — R89.8 EOSINOPHIL COUNT RAISED: Status: ACTIVE | Noted: 2019-11-20

## 2019-11-20 PROBLEM — E83.52 HYPERCALCEMIA: Status: ACTIVE | Noted: 2019-11-20

## 2019-11-20 PROCEDURE — G0438 PPPS, INITIAL VISIT: HCPCS | Performed by: NURSE PRACTITIONER

## 2019-11-20 PROCEDURE — 99214 OFFICE O/P EST MOD 30 MIN: CPT | Performed by: PHYSICAL MEDICINE & REHABILITATION

## 2019-11-20 PROCEDURE — G8482 FLU IMMUNIZE ORDER/ADMIN: HCPCS | Performed by: NURSE PRACTITIONER

## 2019-11-20 PROCEDURE — 1036F TOBACCO NON-USER: CPT | Performed by: PHYSICAL MEDICINE & REHABILITATION

## 2019-11-20 PROCEDURE — G8482 FLU IMMUNIZE ORDER/ADMIN: HCPCS | Performed by: PHYSICAL MEDICINE & REHABILITATION

## 2019-11-20 PROCEDURE — 3017F COLORECTAL CA SCREEN DOC REV: CPT | Performed by: NURSE PRACTITIONER

## 2019-11-20 PROCEDURE — 3017F COLORECTAL CA SCREEN DOC REV: CPT | Performed by: PHYSICAL MEDICINE & REHABILITATION

## 2019-11-20 PROCEDURE — G8427 DOCREV CUR MEDS BY ELIG CLIN: HCPCS | Performed by: PHYSICAL MEDICINE & REHABILITATION

## 2019-11-20 PROCEDURE — G8417 CALC BMI ABV UP PARAM F/U: HCPCS | Performed by: PHYSICAL MEDICINE & REHABILITATION

## 2019-11-20 RX ORDER — ACETAMINOPHEN AND CODEINE PHOSPHATE 300; 30 MG/1; MG/1
1 TABLET ORAL DAILY PRN
Qty: 20 TABLET | Refills: 0 | Status: SHIPPED | OUTPATIENT
Start: 2019-11-20 | End: 2019-12-20

## 2019-11-20 RX ORDER — DULOXETIN HYDROCHLORIDE 60 MG/1
60 CAPSULE, DELAYED RELEASE ORAL DAILY
Qty: 30 CAPSULE | Refills: 2 | Status: SHIPPED | OUTPATIENT
Start: 2019-11-20 | End: 2019-11-27 | Stop reason: DRUGHIGH

## 2019-11-20 RX ORDER — OMEPRAZOLE 20 MG/1
20 CAPSULE, DELAYED RELEASE ORAL DAILY
Qty: 90 CAPSULE | Refills: 0 | Status: SHIPPED | OUTPATIENT
Start: 2019-11-20 | End: 2019-11-27 | Stop reason: SDUPTHER

## 2019-11-20 RX ORDER — ACETAMINOPHEN AND CODEINE PHOSPHATE 300; 30 MG/1; MG/1
TABLET ORAL
COMMUNITY
Start: 2019-10-23 | End: 2019-11-20 | Stop reason: SDUPTHER

## 2019-11-20 RX ORDER — DULOXETIN HYDROCHLORIDE 20 MG/1
CAPSULE, DELAYED RELEASE ORAL
Qty: 18 CAPSULE | Refills: 0 | Status: SHIPPED | OUTPATIENT
Start: 2019-11-20 | End: 2019-11-27 | Stop reason: SDUPTHER

## 2019-11-20 RX ORDER — KETOCONAZOLE 20 MG/G
CREAM TOPICAL
Refills: 0 | COMMUNITY
Start: 2019-09-05 | End: 2020-02-09

## 2019-11-20 ASSESSMENT — PATIENT HEALTH QUESTIONNAIRE - PHQ9
SUM OF ALL RESPONSES TO PHQ QUESTIONS 1-9: 0
SUM OF ALL RESPONSES TO PHQ QUESTIONS 1-9: 0

## 2019-11-20 ASSESSMENT — LIFESTYLE VARIABLES: HOW OFTEN DO YOU HAVE A DRINK CONTAINING ALCOHOL: 0

## 2019-11-21 ENCOUNTER — TELEPHONE (OUTPATIENT)
Dept: PHYSICAL MEDICINE AND REHAB | Age: 64
End: 2019-11-21

## 2019-11-25 ENCOUNTER — OFFICE VISIT (OUTPATIENT)
Dept: PHYSICAL MEDICINE AND REHAB | Age: 64
End: 2019-11-25
Payer: MEDICARE

## 2019-11-25 VITALS
BODY MASS INDEX: 39.27 KG/M2 | HEART RATE: 72 BPM | DIASTOLIC BLOOD PRESSURE: 75 MMHG | WEIGHT: 200 LBS | TEMPERATURE: 97.6 F | SYSTOLIC BLOOD PRESSURE: 134 MMHG | HEIGHT: 60 IN

## 2019-11-25 DIAGNOSIS — M19.049 CMC ARTHRITIS: Primary | ICD-10-CM

## 2019-11-25 PROCEDURE — 20604 DRAIN/INJ JOINT/BURSA W/US: CPT | Performed by: PHYSICAL MEDICINE & REHABILITATION

## 2019-11-25 RX ORDER — TRIAMCINOLONE ACETONIDE 40 MG/ML
40 INJECTION, SUSPENSION INTRA-ARTICULAR; INTRAMUSCULAR ONCE
Status: COMPLETED | OUTPATIENT
Start: 2019-11-25 | End: 2019-11-25

## 2019-11-25 RX ORDER — LIDOCAINE HYDROCHLORIDE 10 MG/ML
1 INJECTION, SOLUTION INFILTRATION; PERINEURAL ONCE
Status: COMPLETED | OUTPATIENT
Start: 2019-11-25 | End: 2019-11-25

## 2019-11-25 RX ORDER — DULOXETIN HYDROCHLORIDE 20 MG/1
20 CAPSULE, DELAYED RELEASE ORAL DAILY
Qty: 30 CAPSULE | Refills: 0 | Status: SHIPPED | OUTPATIENT
Start: 2019-11-25 | End: 2019-12-26 | Stop reason: SDUPTHER

## 2019-11-25 RX ADMIN — TRIAMCINOLONE ACETONIDE 40 MG: 40 INJECTION, SUSPENSION INTRA-ARTICULAR; INTRAMUSCULAR at 10:03

## 2019-11-25 RX ADMIN — LIDOCAINE HYDROCHLORIDE 1 ML: 10 INJECTION, SOLUTION INFILTRATION; PERINEURAL at 09:53

## 2019-11-27 ENCOUNTER — OFFICE VISIT (OUTPATIENT)
Dept: FAMILY MEDICINE CLINIC | Age: 64
End: 2019-11-27
Payer: MEDICARE

## 2019-11-27 VITALS
OXYGEN SATURATION: 96 % | HEIGHT: 60 IN | DIASTOLIC BLOOD PRESSURE: 72 MMHG | HEART RATE: 77 BPM | SYSTOLIC BLOOD PRESSURE: 128 MMHG | BODY MASS INDEX: 39.27 KG/M2 | WEIGHT: 200 LBS

## 2019-11-27 DIAGNOSIS — M25.562 CHRONIC PAIN OF LEFT KNEE: ICD-10-CM

## 2019-11-27 DIAGNOSIS — M43.16 SPONDYLOLISTHESIS AT L4-L5 LEVEL: ICD-10-CM

## 2019-11-27 DIAGNOSIS — G89.29 CHRONIC BACK PAIN GREATER THAN 3 MONTHS DURATION: ICD-10-CM

## 2019-11-27 DIAGNOSIS — I10 ESSENTIAL HYPERTENSION: ICD-10-CM

## 2019-11-27 DIAGNOSIS — Z23 NEED FOR 23-POLYVALENT PNEUMOCOCCAL POLYSACCHARIDE VACCINE: ICD-10-CM

## 2019-11-27 DIAGNOSIS — G89.29 CHRONIC PAIN OF LEFT KNEE: ICD-10-CM

## 2019-11-27 DIAGNOSIS — M17.12 PRIMARY OSTEOARTHRITIS OF LEFT KNEE: ICD-10-CM

## 2019-11-27 DIAGNOSIS — E11.9 TYPE 2 DIABETES MELLITUS WITHOUT COMPLICATION, WITHOUT LONG-TERM CURRENT USE OF INSULIN (HCC): Primary | ICD-10-CM

## 2019-11-27 DIAGNOSIS — M48.00 CENTRAL STENOSIS OF SPINAL CANAL: ICD-10-CM

## 2019-11-27 DIAGNOSIS — M54.9 CHRONIC BACK PAIN GREATER THAN 3 MONTHS DURATION: ICD-10-CM

## 2019-11-27 DIAGNOSIS — M54.16 LUMBAR RADICULITIS: ICD-10-CM

## 2019-11-27 DIAGNOSIS — K21.9 GASTROESOPHAGEAL REFLUX DISEASE WITHOUT ESOPHAGITIS: ICD-10-CM

## 2019-11-27 DIAGNOSIS — E78.2 MIXED HYPERLIPIDEMIA: ICD-10-CM

## 2019-11-27 LAB
CHP ED QC CHECK: NORMAL
GLUCOSE BLD-MCNC: 128 MG/DL
HBA1C MFR BLD: 6.5 %

## 2019-11-27 PROCEDURE — G0009 ADMIN PNEUMOCOCCAL VACCINE: HCPCS | Performed by: NURSE PRACTITIONER

## 2019-11-27 PROCEDURE — 83036 HEMOGLOBIN GLYCOSYLATED A1C: CPT | Performed by: NURSE PRACTITIONER

## 2019-11-27 PROCEDURE — G8482 FLU IMMUNIZE ORDER/ADMIN: HCPCS | Performed by: NURSE PRACTITIONER

## 2019-11-27 PROCEDURE — 2022F DILAT RTA XM EVC RTNOPTHY: CPT | Performed by: NURSE PRACTITIONER

## 2019-11-27 PROCEDURE — G8427 DOCREV CUR MEDS BY ELIG CLIN: HCPCS | Performed by: NURSE PRACTITIONER

## 2019-11-27 PROCEDURE — 99214 OFFICE O/P EST MOD 30 MIN: CPT | Performed by: NURSE PRACTITIONER

## 2019-11-27 PROCEDURE — 1036F TOBACCO NON-USER: CPT | Performed by: NURSE PRACTITIONER

## 2019-11-27 PROCEDURE — 3017F COLORECTAL CA SCREEN DOC REV: CPT | Performed by: NURSE PRACTITIONER

## 2019-11-27 PROCEDURE — 3044F HG A1C LEVEL LT 7.0%: CPT | Performed by: NURSE PRACTITIONER

## 2019-11-27 PROCEDURE — 82962 GLUCOSE BLOOD TEST: CPT | Performed by: NURSE PRACTITIONER

## 2019-11-27 PROCEDURE — G8417 CALC BMI ABV UP PARAM F/U: HCPCS | Performed by: NURSE PRACTITIONER

## 2019-11-27 PROCEDURE — 90732 PPSV23 VACC 2 YRS+ SUBQ/IM: CPT | Performed by: NURSE PRACTITIONER

## 2019-11-27 RX ORDER — ATENOLOL 50 MG/1
50 TABLET ORAL DAILY
Qty: 90 TABLET | Refills: 3 | Status: SHIPPED
Start: 2019-11-27 | End: 2020-10-15 | Stop reason: SDUPTHER

## 2019-11-27 RX ORDER — ATORVASTATIN CALCIUM 10 MG/1
10 TABLET, FILM COATED ORAL NIGHTLY
Qty: 90 TABLET | Refills: 3 | Status: SHIPPED
Start: 2019-11-27 | End: 2020-02-28 | Stop reason: DRUGHIGH

## 2019-11-27 RX ORDER — OMEPRAZOLE 20 MG/1
20 CAPSULE, DELAYED RELEASE ORAL DAILY
Qty: 90 CAPSULE | Refills: 3 | Status: SHIPPED
Start: 2019-11-27 | End: 2020-10-15 | Stop reason: SDUPTHER

## 2019-11-27 ASSESSMENT — ENCOUNTER SYMPTOMS
VOICE CHANGE: 0
CONSTIPATION: 0
BACK PAIN: 1
SORE THROAT: 0
SHORTNESS OF BREATH: 0
DIARRHEA: 0
ABDOMINAL PAIN: 0
VOMITING: 0
SINUS PRESSURE: 0
NAUSEA: 0
TROUBLE SWALLOWING: 0
WHEEZING: 0
CHEST TIGHTNESS: 0
COUGH: 1
FACIAL SWELLING: 0
RHINORRHEA: 0
SINUS PAIN: 0
COLOR CHANGE: 0

## 2019-12-18 ENCOUNTER — OFFICE VISIT (OUTPATIENT)
Dept: PHYSICAL MEDICINE AND REHAB | Age: 64
End: 2019-12-18
Payer: MEDICARE

## 2019-12-18 VITALS
SYSTOLIC BLOOD PRESSURE: 126 MMHG | WEIGHT: 198 LBS | DIASTOLIC BLOOD PRESSURE: 78 MMHG | HEIGHT: 60 IN | HEART RATE: 86 BPM | BODY MASS INDEX: 38.87 KG/M2

## 2019-12-18 DIAGNOSIS — M43.16 SPONDYLOLISTHESIS AT L4-L5 LEVEL: ICD-10-CM

## 2019-12-18 DIAGNOSIS — M54.2 CHRONIC NECK PAIN: ICD-10-CM

## 2019-12-18 DIAGNOSIS — G89.29 CHRONIC BACK PAIN GREATER THAN 3 MONTHS DURATION: Primary | ICD-10-CM

## 2019-12-18 DIAGNOSIS — G56.03 BILATERAL CARPAL TUNNEL SYNDROME: ICD-10-CM

## 2019-12-18 DIAGNOSIS — M54.9 CHRONIC BACK PAIN GREATER THAN 3 MONTHS DURATION: Primary | ICD-10-CM

## 2019-12-18 DIAGNOSIS — M47.812 SPONDYLOSIS OF CERVICAL REGION WITHOUT MYELOPATHY OR RADICULOPATHY: ICD-10-CM

## 2019-12-18 DIAGNOSIS — M48.02 CERVICAL STENOSIS OF SPINAL CANAL: ICD-10-CM

## 2019-12-18 DIAGNOSIS — M47.816 SPONDYLOSIS OF LUMBAR SPINE: ICD-10-CM

## 2019-12-18 DIAGNOSIS — G89.29 CHRONIC NECK PAIN: ICD-10-CM

## 2019-12-18 PROCEDURE — 3017F COLORECTAL CA SCREEN DOC REV: CPT | Performed by: PHYSICAL MEDICINE & REHABILITATION

## 2019-12-18 PROCEDURE — 99214 OFFICE O/P EST MOD 30 MIN: CPT | Performed by: PHYSICAL MEDICINE & REHABILITATION

## 2019-12-18 PROCEDURE — G8427 DOCREV CUR MEDS BY ELIG CLIN: HCPCS | Performed by: PHYSICAL MEDICINE & REHABILITATION

## 2019-12-18 PROCEDURE — G8482 FLU IMMUNIZE ORDER/ADMIN: HCPCS | Performed by: PHYSICAL MEDICINE & REHABILITATION

## 2019-12-18 PROCEDURE — G8417 CALC BMI ABV UP PARAM F/U: HCPCS | Performed by: PHYSICAL MEDICINE & REHABILITATION

## 2019-12-18 PROCEDURE — 1036F TOBACCO NON-USER: CPT | Performed by: PHYSICAL MEDICINE & REHABILITATION

## 2019-12-18 RX ORDER — DULOXETIN HYDROCHLORIDE 30 MG/1
30 CAPSULE, DELAYED RELEASE ORAL DAILY
Qty: 7 CAPSULE | Refills: 0 | Status: SHIPPED | OUTPATIENT
Start: 2019-12-18 | End: 2019-12-26

## 2019-12-26 ENCOUNTER — TELEPHONE (OUTPATIENT)
Dept: PHYSICAL MEDICINE AND REHAB | Age: 64
End: 2019-12-26

## 2019-12-26 RX ORDER — DULOXETIN HYDROCHLORIDE 20 MG/1
20 CAPSULE, DELAYED RELEASE ORAL DAILY
Qty: 30 CAPSULE | Refills: 0 | Status: SHIPPED
Start: 2019-12-26 | End: 2020-02-13 | Stop reason: SDUPTHER

## 2020-01-14 ENCOUNTER — TELEPHONE (OUTPATIENT)
Dept: PHYSICAL MEDICINE AND REHAB | Age: 65
End: 2020-01-14

## 2020-01-14 NOTE — TELEPHONE ENCOUNTER
Called and left message on patient voicemail that we need to reschedule her appointment due to physician is sick.

## 2020-01-16 ENCOUNTER — TELEPHONE (OUTPATIENT)
Dept: PHYSICAL MEDICINE AND REHAB | Age: 65
End: 2020-01-16

## 2020-01-16 NOTE — TELEPHONE ENCOUNTER
Patient called and left message on office voicemail that she was calling to reschedule her appointment that was canceled on 1- due to the physician was sick. Called and spoke with the patient to rescheduled her she wanted to see if we had any opening on 1- patient was informed that we were full she stated that she will call us back when she comes back from [place].

## 2020-02-06 ENCOUNTER — TELEPHONE (OUTPATIENT)
Dept: FAMILY MEDICINE CLINIC | Age: 65
End: 2020-02-06

## 2020-02-06 NOTE — TELEPHONE ENCOUNTER
Patient called asking for an appt to see Sonali Dash on Friday, stating she fell yesterday, her back is bruised, lt arm hurts and she hit her head. Jaquan Patel MA overheard my conversation and instructed me to advise patient to go to the ED. Patient stated she is traveling back from [facility] and is in [address] [place] [address]. Patient asked to see [facility] on Friday. I advised patient that she should go to ED, especially if she hit her head, because we would not want anything to happen to patient between now and tomorrow. Patient asked again for appt. I advised there are no opening on the schedule and that I will send Chichi a msg. I advised patient to go to ED and that the ofc would call back w/response. Patient stated she will go to ED when she gets home.

## 2020-02-09 ENCOUNTER — APPOINTMENT (OUTPATIENT)
Dept: GENERAL RADIOLOGY | Age: 65
End: 2020-02-09
Payer: MEDICARE

## 2020-02-09 ENCOUNTER — HOSPITAL ENCOUNTER (EMERGENCY)
Age: 65
Discharge: HOME | End: 2020-02-09
Payer: MEDICARE

## 2020-02-09 VITALS
WEIGHT: 195 LBS | HEIGHT: 59 IN | RESPIRATION RATE: 18 BRPM | DIASTOLIC BLOOD PRESSURE: 69 MMHG | BODY MASS INDEX: 39.31 KG/M2 | OXYGEN SATURATION: 98 % | HEART RATE: 80 BPM | TEMPERATURE: 98.1 F | SYSTOLIC BLOOD PRESSURE: 145 MMHG

## 2020-02-09 PROCEDURE — 99283 EMERGENCY DEPT VISIT LOW MDM: CPT

## 2020-02-09 PROCEDURE — 71101 X-RAY EXAM UNILAT RIBS/CHEST: CPT

## 2020-02-09 PROCEDURE — 6360000002 HC RX W HCPCS: Performed by: PHYSICIAN ASSISTANT

## 2020-02-09 PROCEDURE — 72072 X-RAY EXAM THORAC SPINE 3VWS: CPT

## 2020-02-09 PROCEDURE — 96372 THER/PROPH/DIAG INJ SC/IM: CPT

## 2020-02-09 RX ORDER — NAPROXEN 500 MG/1
500 TABLET ORAL 2 TIMES DAILY WITH MEALS
Qty: 28 TABLET | Refills: 0 | Status: SHIPPED | OUTPATIENT
Start: 2020-02-09 | End: 2020-02-28 | Stop reason: ALTCHOICE

## 2020-02-09 RX ORDER — KETOROLAC TROMETHAMINE 30 MG/ML
30 INJECTION, SOLUTION INTRAMUSCULAR; INTRAVENOUS ONCE
Status: COMPLETED | OUTPATIENT
Start: 2020-02-09 | End: 2020-02-09

## 2020-02-09 RX ORDER — LIDOCAINE 50 MG/G
1 PATCH TOPICAL EVERY 24 HOURS
Qty: 10 PATCH | Refills: 0 | Status: SHIPPED | OUTPATIENT
Start: 2020-02-09 | End: 2020-02-19

## 2020-02-09 RX ORDER — CYCLOBENZAPRINE HCL 10 MG
10 TABLET ORAL 3 TIMES DAILY PRN
Qty: 30 TABLET | Refills: 0 | Status: SHIPPED | OUTPATIENT
Start: 2020-02-09 | End: 2020-02-19

## 2020-02-09 RX ADMIN — KETOROLAC TROMETHAMINE 30 MG: 30 INJECTION, SOLUTION INTRAMUSCULAR; INTRAVENOUS at 16:59

## 2020-02-09 ASSESSMENT — ENCOUNTER SYMPTOMS
BACK PAIN: 1
NAUSEA: 0
CONSTIPATION: 0
VOMITING: 0
DIARRHEA: 0
SHORTNESS OF BREATH: 0
COLOR CHANGE: 0
CHEST TIGHTNESS: 0
COUGH: 0

## 2020-02-09 ASSESSMENT — PAIN SCALES - GENERAL
PAINLEVEL_OUTOF10: 7
PAINLEVEL_OUTOF10: 7

## 2020-02-09 ASSESSMENT — PAIN DESCRIPTION - LOCATION: LOCATION: BACK;NECK

## 2020-02-09 ASSESSMENT — PAIN DESCRIPTION - PAIN TYPE: TYPE: ACUTE PAIN;CHRONIC PAIN

## 2020-02-09 ASSESSMENT — PAIN DESCRIPTION - DESCRIPTORS: DESCRIPTORS: THROBBING

## 2020-02-09 NOTE — ED PROVIDER NOTES
[facility]        Department of Emergency Medicine   ED  Provider Note  Admit Date/RoomTime: 2/9/2020  4:23 PM  ED Room: [place] [address]/INT-04  HPI:  2/9/20, Time: 6:09 PM      The patient is a 73-year-old female presenting to the emergency department with upper left-sided back and rib pain after a mechanical fall. The fall happened 2 days ago while she was on vacation. She states that her shoes got stuck on the carpet and she fell backward landing mostly on her left side. She not hit her head or have loss of consciousness. She does state that she had some bruising to her left upper back. The pain is not getting better over the last 3 days. She has been taking her regular medications and has tried Tylenol without any relief. She denies any chest pain, shortness of breath, numbness/tingling/weakness of the extremities, headache, dizziness. The history is provided by the patient. No  was used. REVIEW OF SYSTEMS:  Review of Systems   Constitutional: Negative for activity change, chills, fatigue and fever. Respiratory: Negative for cough, chest tightness and shortness of breath. Cardiovascular: Negative for chest pain, palpitations and leg swelling. Gastrointestinal: Negative for constipation, diarrhea, nausea and vomiting. Musculoskeletal: Positive for back pain. Negative for neck pain and neck stiffness. Left-sided rib pain. Skin: Negative for color change, pallor and rash. Neurological: Negative for dizziness, weakness, light-headedness and headaches. Psychiatric/Behavioral: Negative for agitation, behavioral problems and confusion.       Pertinent positives and negatives are stated within HPI, all other systems reviewed and are negative.      --------------------------------------------- PAST HISTORY ---------------------------------------------  Past Medical History:  has a past medical history of Cancer ([facility] Chronic back pain, Diabetes mellitus ([facility] Fibromyalgia, GERD (gastroesophageal reflux disease), Hyperlipidemia, Hypertension, Obesity, Osteoarthritis, PONV (postoperative nausea and vomiting), Postmenopausal bleeding, and Urinary incontinence. Past Surgical History:  has a past surgical history that includes  section; Tubal ligation; Tonsillectomy (28 YRS OLD); Foot surgery (Bilateral); shoulder surgery (Left, ); Cholecystectomy; Colon surgery; Nerve Block (Right, 2017); Nerve Block (Left, 2018); pr inject si joint arthrgrphy&/anes/steroid w/image (Left, 2018); epidural steroid injection (Right, 2019); Nerve Block (Bilateral, 08/15/2019); Anesthesia Nerve Block (Bilateral, 8/15/2019); and Dilation and curettage of uterus (N/A, 10/23/2019). Social History:  reports that she has never smoked. She has never used smokeless tobacco. She reports current alcohol use. She reports that she does not use drugs. Family History: family history includes Asthma in her mother; Heart Disease in her father; Mental Illness in her mother; Thyroid Disease in her sister, sister, and sister. The patients home medications have been reviewed. Allergies: Diclofenac sodium; Lisinopril; Lyrica [pregabalin]; and Sulfa antibiotics    -------------------------------------------------- RESULTS -------------------------------------------------  All laboratory and radiology results have been personally reviewed by myself   LABS:  No results found for this visit on 20. RADIOLOGY:  Interpreted by Radiologist.  XR RIBS LEFT INCLUDE CHEST (MIN 3 VIEWS)   Final Result   1. No acute displaced rib fracture is identified. 2. No acute cardiopulmonary disease. XR THORACIC SPINE (3 VIEWS)   Final Result   1. No acute thoracic spine fractures identified.    2. Mild to moderate multilevel thoracic spondylosis.          ------------------------- NURSING NOTES AND VITALS REVIEWED ---------------------------   The nursing notes within the ED encounter and vital signs as below have been reviewed. BP (!) 145/69   Pulse 80   Temp 98.1 °F (36.7 °C) (Oral)   Resp 18   Ht 4' 11\" (1.499 m)   Wt 195 lb (88.5 kg)   LMP  (LMP Unknown)   SpO2 98%   BMI 39.39 kg/m²   Oxygen Saturation Interpretation: Normal      ---------------------------------------------------PHYSICAL EXAM--------------------------------------    Physical Exam  Vitals signs and nursing note reviewed. Constitutional:       General: She is not in acute distress. Appearance: Normal appearance. She is well-developed. She is not ill-appearing. HENT:      Head: Normocephalic and atraumatic. Mouth/Throat:      Pharynx: No oropharyngeal exudate or posterior oropharyngeal erythema. Neck:      Musculoskeletal: Normal range of motion and neck supple. Vascular: No JVD. Trachea: No tracheal deviation. Cardiovascular:      Rate and Rhythm: Normal rate and regular rhythm. Heart sounds: Normal heart sounds. No murmur. Pulmonary:      Effort: Pulmonary effort is normal. No respiratory distress. Breath sounds: Normal breath sounds. Abdominal:      General: Bowel sounds are normal. There is no distension. Palpations: Abdomen is soft. Musculoskeletal: Normal range of motion. General: No deformity. Comments: ML thoracic tenderness. No crepitus or step-off. FROM of BUE with +5/5 BUE strength. Faint aging ecchymosis over the lateral left thoracic paraspinal muscle. Left lateral rib tenderness just adjacent to the breast.  No crepitus or deformity. Skin:     General: Skin is warm and dry. Capillary Refill: Capillary refill takes less than 2 seconds. Coloration: Skin is not pale. Findings: No erythema. Neurological:      Mental Status: She is alert and oriented to person, place, and time. Cranial Nerves: No cranial nerve deficit. Psychiatric:         Behavior: Behavior normal.         Thought Content:  Thought content normal.            ------------------------------ ED COURSE/MEDICAL DECISION MAKING----------------------  Medications   ketorolac (TORADOL) injection 30 mg (30 mg Intramuscular Given 2/9/20 0116)         ED COURSE:      XR RIBS LEFT INCLUDE CHEST (MIN 3 VIEWS)   Final Result   1. No acute displaced rib fracture is identified. 2. No acute cardiopulmonary disease. XR THORACIC SPINE (3 VIEWS)   Final Result   1. No acute thoracic spine fractures identified. 2. Mild to moderate multilevel thoracic spondylosis. Procedures:  Procedures     Medical Decision Making:   MDM   21-year-old female presenting to the ED with upper back and left-sided rib pain after mechanical fall 3 days ago. X-rays of the thoracic spine and of the ribs showed no acute fractures. This is likely rib and back contusion. She has faint aging ecchymosis. She is afebrile and hemodynamically stable. She is given IM Toradol in the ED. Patient will be treated with short course of naproxen, Flexeril and Lidoderm patches. She is encouraged to follow-up with her PCP or immediate return with any new or worsening symptoms. Counseling: The emergency provider has spoken with the patient and spouse/SO and discussed todays results, in addition to providing specific details for the plan of care and counseling regarding the diagnosis and prognosis. Questions are answered at this time and they are agreeable with the plan.      --------------------------------- IMPRESSION AND DISPOSITION ---------------------------------    IMPRESSION  1. Contusion of rib on left side, initial encounter    2. Acute left-sided thoracic back pain        DISPOSITION  Disposition: Discharge to home  Patient condition is good      Electronically signed by Willis Akhtar PA-C   DD: 2/9/20  **This report was transcribed using voice recognition software.  Every effort was made to ensure accuracy; however, inadvertent computerized transcription errors may be present.   END OF ED PROVIDER NOTE          Gio Saenz PA-C  02/09/20 1812

## 2020-02-13 ENCOUNTER — OFFICE VISIT (OUTPATIENT)
Dept: PHYSICAL MEDICINE AND REHAB | Age: 65
End: 2020-02-13
Payer: MEDICARE

## 2020-02-13 VITALS
HEART RATE: 78 BPM | HEIGHT: 59 IN | DIASTOLIC BLOOD PRESSURE: 82 MMHG | SYSTOLIC BLOOD PRESSURE: 138 MMHG | BODY MASS INDEX: 41.73 KG/M2 | WEIGHT: 207 LBS

## 2020-02-13 PROCEDURE — G8427 DOCREV CUR MEDS BY ELIG CLIN: HCPCS | Performed by: PHYSICAL MEDICINE & REHABILITATION

## 2020-02-13 PROCEDURE — 3017F COLORECTAL CA SCREEN DOC REV: CPT | Performed by: PHYSICAL MEDICINE & REHABILITATION

## 2020-02-13 PROCEDURE — 96372 THER/PROPH/DIAG INJ SC/IM: CPT | Performed by: PHYSICAL MEDICINE & REHABILITATION

## 2020-02-13 PROCEDURE — 1036F TOBACCO NON-USER: CPT | Performed by: PHYSICAL MEDICINE & REHABILITATION

## 2020-02-13 PROCEDURE — G8482 FLU IMMUNIZE ORDER/ADMIN: HCPCS | Performed by: PHYSICAL MEDICINE & REHABILITATION

## 2020-02-13 PROCEDURE — 99214 OFFICE O/P EST MOD 30 MIN: CPT | Performed by: PHYSICAL MEDICINE & REHABILITATION

## 2020-02-13 PROCEDURE — G8417 CALC BMI ABV UP PARAM F/U: HCPCS | Performed by: PHYSICAL MEDICINE & REHABILITATION

## 2020-02-13 RX ORDER — DULOXETIN HYDROCHLORIDE 20 MG/1
20 CAPSULE, DELAYED RELEASE ORAL DAILY
Qty: 90 CAPSULE | Refills: 3 | Status: SHIPPED
Start: 2020-02-13 | End: 2020-06-01

## 2020-02-13 RX ORDER — KETOROLAC TROMETHAMINE 15 MG/ML
15 INJECTION, SOLUTION INTRAMUSCULAR; INTRAVENOUS ONCE
Status: COMPLETED | OUTPATIENT
Start: 2020-02-13 | End: 2020-02-13

## 2020-02-13 RX ADMIN — KETOROLAC TROMETHAMINE 15 MG: 15 INJECTION, SOLUTION INTRAMUSCULAR; INTRAVENOUS at 12:17

## 2020-02-13 NOTE — PROGRESS NOTES
STEROID INJECTION #1 TOWARD THE RIGHT performed by Jose R Hare DO at [address] Bilateral     NERVE BLOCK Right 12/28/2017    right L4-5 transforaminal epidural #1    NERVE BLOCK Left 11/29/2018    si inj    NERVE BLOCK Bilateral 08/15/2019    bilateral intra-articular facet joint injection with flouroscopic guidance at L4-S1 with iv sedation    AL INJECT SI JOINT ARTHRGRPHY&/ANES/STEROID W/IMAGE Left 11/29/2018    LEFT SACROILIAC JOINT INJECTION WITH X-RAY performed by Micah Suárez DO at [address] Left 2005    TONSILLECTOMY  28 YRS OLD    TUBAL LIGATION         Social History     Tobacco Use    Smoking status: Never Smoker    Smokeless tobacco: Never Used   Substance Use Topics    Alcohol use: Yes     Alcohol/week: 0.0 standard drinks     Comment: Rarely    Drug use: No       Family History   Problem Relation Age of Onset    Asthma Mother     Mental Illness Mother     Heart Disease Father     Thyroid Disease Sister     Thyroid Disease Sister     Thyroid Disease Sister        Current Outpatient Medications   Medication Sig Dispense Refill    DULoxetine (CYMBALTA) 20 MG extended release capsule Take 1 capsule by mouth daily 90 capsule 3    naproxen (NAPROSYN) 500 MG tablet Take 1 tablet by mouth 2 times daily (with meals) for 14 days 28 tablet 0    cyclobenzaprine (FLEXERIL) 10 MG tablet Take 1 tablet by mouth 3 times daily as needed for Muscle spasms 30 tablet 0    lidocaine (LIDODERM) 5 % Place 1 patch onto the skin every 24 hours for 10 days 12 hours on, 12 hours off.  10 patch 0    empagliflozin (JARDIANCE) 10 MG tablet Take 1 tablet by mouth daily 90 tablet 3    atenolol (TENORMIN) 50 MG tablet Take 1 tablet by mouth daily 90 tablet 3    atorvastatin (LIPITOR) 10 MG tablet Take 1 tablet by mouth nightly Indications: High Amount of Fats in the Blood 90 tablet 3    omeprazole (PRILOSEC) 20 MG delayed release capsule Take 1 capsule by bruising on the left flank. NEURO: Gait is normal. No focal sensorimotor deficit. Reflexes 2+ and symmetric in lower extremities. Impression:   1. Chronic back pain greater than 3 months duration    2. Spondylosis of lumbar spine    3. Cervical stenosis of spinal canal    4. Spondylosis of cervical region without myelopathy or radiculopathy    5. Spondylolisthesis at L4-L5 level    6. Bilateral carpal tunnel syndrome    7. CMC arthritis    8. Foraminal stenosis of cervical region        Plan:      Orders Placed This Encounter   Medications    DULoxetine (CYMBALTA) 20 MG extended release capsule     Sig: Take 1 capsule by mouth daily     Dispense:  90 capsule     Refill:  3    ketorolac (TORADOL) injection 15 mg       The patient was educated about the diagnosis, prognosis, indications, risks and benefits of treatment. An opportunity to ask questions was given to the patient and questions were answered. The patient agreed to proceed with the recommended treatment as described above. Follow prn. Thank you for allowing me to participate in the care of your patient. Shelly Gutierrez D.O., P.T.   Board Certified Physical Medicine and Rehabilitation  Board Certified Electrodiagnostic Medicine

## 2020-02-13 NOTE — PATIENT INSTRUCTIONS
bowel movement. · Support your feet with a small step stool when you sit on the toilet. This helps flex your hips and places your pelvis in a squatting position. · Your doctor may recommend an over-the-counter laxative to relieve your constipation. Examples are Milk of Magnesia and MiraLax. Read and follow all instructions on the label. Do not use laxatives on a long-term basis. When should you call for help? Call your doctor now or seek immediate medical care if:    · You have new or worse belly pain.     · You have new or worse nausea or vomiting.     · You have blood in your stools.    Watch closely for changes in your health, and be sure to contact your doctor if:    · Your constipation is getting worse.     · You do not get better as expected. Where can you learn more? Go to [email or web address] org and sign in to your [employer] account. Enter 21 [phone] in the [employer] box to learn more about \"Constipation: Care Instructions. \"     If you do not have an account, please click on the \"Sign Up Now\" link. Current as of: June 26, 2019  Content Version: 12.3  © 8333-4358 Healthwise, Incorporated. Care instructions adapted under license by [facility] ([facility]). If you have questions about a medical condition or this instruction, always ask your healthcare professional. [address] any warranty or liability for your use of this information.

## 2020-02-18 ENCOUNTER — OFFICE VISIT (OUTPATIENT)
Dept: FAMILY MEDICINE CLINIC | Age: 65
End: 2020-02-18
Payer: MEDICARE

## 2020-02-18 VITALS
HEIGHT: 59 IN | RESPIRATION RATE: 18 BRPM | DIASTOLIC BLOOD PRESSURE: 80 MMHG | SYSTOLIC BLOOD PRESSURE: 124 MMHG | OXYGEN SATURATION: 98 % | WEIGHT: 207.1 LBS | BODY MASS INDEX: 41.75 KG/M2 | HEART RATE: 68 BPM

## 2020-02-18 PROBLEM — S20.212A RIB CONTUSION, LEFT, INITIAL ENCOUNTER: Status: ACTIVE | Noted: 2020-02-18
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
## 2020-02-18 PROBLEM — K59.03 DRUG-INDUCED CONSTIPATION: Status: ACTIVE | Noted: 2020-02-18

## 2020-02-18 PROCEDURE — G8427 DOCREV CUR MEDS BY ELIG CLIN: HCPCS | Performed by: NURSE PRACTITIONER

## 2020-02-18 PROCEDURE — 99214 OFFICE O/P EST MOD 30 MIN: CPT | Performed by: NURSE PRACTITIONER

## 2020-02-18 PROCEDURE — G8417 CALC BMI ABV UP PARAM F/U: HCPCS | Performed by: NURSE PRACTITIONER

## 2020-02-18 PROCEDURE — 3046F HEMOGLOBIN A1C LEVEL >9.0%: CPT | Performed by: NURSE PRACTITIONER

## 2020-02-18 PROCEDURE — G8482 FLU IMMUNIZE ORDER/ADMIN: HCPCS | Performed by: NURSE PRACTITIONER

## 2020-02-18 PROCEDURE — 2022F DILAT RTA XM EVC RTNOPTHY: CPT | Performed by: NURSE PRACTITIONER

## 2020-02-18 PROCEDURE — 1036F TOBACCO NON-USER: CPT | Performed by: NURSE PRACTITIONER

## 2020-02-18 PROCEDURE — 3017F COLORECTAL CA SCREEN DOC REV: CPT | Performed by: NURSE PRACTITIONER

## 2020-02-18 ASSESSMENT — ENCOUNTER SYMPTOMS
COUGH: 0
NAUSEA: 0
FACIAL SWELLING: 0
WHEEZING: 0
ABDOMINAL PAIN: 0
COLOR CHANGE: 0
SINUS PRESSURE: 0
SHORTNESS OF BREATH: 0
RHINORRHEA: 0
CONSTIPATION: 1
DIARRHEA: 0
BACK PAIN: 0
VOMITING: 0
VOICE CHANGE: 0
TROUBLE SWALLOWING: 0
SORE THROAT: 0
SINUS PAIN: 0
CHEST TIGHTNESS: 0

## 2020-02-18 NOTE — PROGRESS NOTES
OFFICE PROGRESS NOTE  [address] [place] [address] [address]  [address] [id number]  Dept: [phone]   Chief Complaint   Patient presents with    Back Pain     Patient fell. Bruised middle of back and down. Patient went to ER 02/09/2020. Pain in back is gone but has pain in ribs on the left side. HPI:     Here today for ER follow up 2/9/2020 for a fall while on vacation she was getting up out of bed and her slipper got caught in the carpet and she fell backwards onto the night stand. She did have X rays which were negative in the ED of her thoracic spine and left ribs. She is doing better but still having some pain in the left anterior lateral rib area. Reading location: 200       Indication: Back pain post fall       Comparison: None available.       Technique: 5 views of the thoracic spine were obtained.       Findings: There is normal thoracic kyphosis. Vertebral bodies maintain normal   height. Alignment is maintained. The pedicles are preserved. No acute   fracture is identified. There is multilevel intervertebral disc space   narrowing with hypertrophic endplate changes. Visualized portion   bilateral lungs are clear.           Impression   1. No acute thoracic spine fractures identified. 2. Mild to moderate multilevel thoracic spondylosis. Reading location: 200       Indication: Injury, pain.       Comparison: None available.       Technique: PA chest and 4 left rib series radiographs were obtained.       Findings:   No acute displaced rib fracture is identified.       The cardiomediastinal silhouette is stable in size and contours. Bilateral lungs and costophrenic angles are clear. There is no   evidence of pneumothorax.            Impression   1. No acute displaced rib fracture is identified. 2. No acute cardiopulmonary disease.      She is having problems with constipation since starting the Duloxetine which has made it worse but not having diarrhea all the time like she was after her bowel surgery. She hasn't tried any stool softener.        Current Outpatient Medications:     DULoxetine (CYMBALTA) 20 MG extended release capsule, Take 1 capsule by mouth daily, Disp: 90 capsule, Rfl: 3    naproxen (NAPROSYN) 500 MG tablet, Take 1 tablet by mouth 2 times daily (with meals) for 14 days, Disp: 28 tablet, Rfl: 0    cyclobenzaprine (FLEXERIL) 10 MG tablet, Take 1 tablet by mouth 3 times daily as needed for Muscle spasms, Disp: 30 tablet, Rfl: 0    lidocaine (LIDODERM) 5 %, Place 1 patch onto the skin every 24 hours for 10 days 12 hours on, 12 hours off., Disp: 10 patch, Rfl: 0    empagliflozin (JARDIANCE) 10 MG tablet, Take 1 tablet by mouth daily, Disp: 90 tablet, Rfl: 3    atenolol (TENORMIN) 50 MG tablet, Take 1 tablet by mouth daily, Disp: 90 tablet, Rfl: 3    atorvastatin (LIPITOR) 10 MG tablet, Take 1 tablet by mouth nightly Indications: High Amount of Fats in the Blood, Disp: 90 tablet, Rfl: 3    omeprazole (PRILOSEC) 20 MG delayed release capsule, Take 1 capsule by mouth daily, Disp: 90 capsule, Rfl: 3    glucose monitoring kit (FREESTYLE) monitoring kit, 1 kit by Does not apply route daily, Disp: 1 kit, Rfl: 0    Aimsco Ultra Thin Lancets MISC, 1 Device by Does not apply route daily, Disp: 100 each, Rfl: 3    blood glucose monitor strips, Test 1 times a day & as needed for symptoms of irregular blood glucose., Disp: 50 strip, Rfl: 3    allopurinol (ZYLOPRIM) 300 MG tablet, Take 1 tablet by mouth daily, Disp: 90 tablet, Rfl: 3    Cholecalciferol (VITAMIN D3) 2000 units CAPS, Take 2,000 Units by mouth daily, Disp: , Rfl:     Biotin 1000 MCG TABS, Take 1,000 mcg by mouth daily, Disp: , Rfl:   Social History     Socioeconomic History    Marital status:      Spouse name: None    Number of children: None    Years of education: None    Highest education level: None   Occupational History    None   Social Needs    Financial and polyuria. Genitourinary: Negative for difficulty urinating, frequency and urgency. Musculoskeletal: Positive for arthralgias ( left ribs). Negative for back pain, gait problem, joint swelling, myalgias, neck pain and neck stiffness. Skin: Negative for color change, pallor, rash and wound. Allergic/Immunologic: Negative for environmental allergies, food allergies and immunocompromised state. Neurological: Negative for dizziness, tremors, seizures, syncope, facial asymmetry, speech difficulty, weakness, light-headedness, numbness and headaches. Hematological: Negative for adenopathy. Does not bruise/bleed easily. Psychiatric/Behavioral: Negative for agitation, behavioral problems, confusion, decreased concentration, dysphoric mood, hallucinations, self-injury, sleep disturbance and suicidal ideas. The patient is not nervous/anxious and is not hyperactive. OBJECTIVE:     VS:  Wt Readings from Last 3 Encounters:   02/18/20 207 lb 1.6 oz (93.9 kg)   02/13/20 207 lb (93.9 kg)   02/09/20 195 lb (88.5 kg)                       Vitals:    02/18/20 1153   BP: 124/80   Pulse: 68   Resp: 18   SpO2: 98%   Weight: 207 lb 1.6 oz (93.9 kg)   Height: 4' 11\" (1.499 m)       General: Alert and oriented to person, place, and time, well developed and well nourished, in no acute distress  SKIN: Warm and dry, intact without any rash, masses or lesions  HEAD: normocephalic, atraumatic  Eyes: sclera/conjunctiva clear, PERRLA, EOMI's intact  Neck: supple and non-tender without mass, trachea midline, no cervical lymphadenopathy, no bruit, no thyromegaly or nodules  Cardiovascular: regular rate and regular rhythm, normal S1 and S2,  no murmurs, rubs, clicks, or gallop. Distal pulses intact, no carotid bruits.  No edema  Pulmonary/Chest: clear to auscultation bilaterally, no wheezes, rales or rhonchi, normal air movement, no respiratory distress  Abdomen: soft, non-tender, non-distended, normal bowel sounds, no masses or

## 2020-02-25 ENCOUNTER — HOSPITAL ENCOUNTER (OUTPATIENT)
Age: 65
Discharge: HOME | End: 2020-02-27
Payer: MEDICARE

## 2020-02-25 LAB
ALBUMIN SERPL-MCNC: 4.5 G/DL (ref 3.5–5.2)
ALP BLD-CCNC: 102 U/L (ref 35–104)
ALT SERPL-CCNC: 19 U/L (ref 0–32)
ANION GAP SERPL CALCULATED.3IONS-SCNC: 14 MMOL/L (ref 7–16)
AST SERPL-CCNC: 17 U/L (ref 0–31)
BASOPHILS ABSOLUTE: 0.06 E9/L (ref 0–0.2)
BASOPHILS RELATIVE PERCENT: 0.7 % (ref 0–2)
BILIRUB SERPL-MCNC: 0.5 MG/DL (ref 0–1.2)
BUN BLDV-MCNC: 20 MG/DL (ref 8–23)
CALCIUM SERPL-MCNC: 10 MG/DL (ref 8.6–10.2)
CHLORIDE BLD-SCNC: 102 MMOL/L (ref 98–107)
CHOLESTEROL, TOTAL: 221 MG/DL (ref 0–199)
CO2: 26 MMOL/L (ref 22–29)
CREAT SERPL-MCNC: 0.8 MG/DL (ref 0.5–1)
EOSINOPHILS ABSOLUTE: 0.51 E9/L (ref 0.05–0.5)
EOSINOPHILS RELATIVE PERCENT: 5.6 % (ref 0–6)
GFR AFRICAN AMERICAN: >60
GFR NON-AFRICAN AMERICAN: >60 ML/MIN/1.73
GLUCOSE BLD-MCNC: 128 MG/DL (ref 74–99)
HBA1C MFR BLD: 6.7 % (ref 4–5.6)
HCT VFR BLD CALC: 43.9 % (ref 34–48)
HDLC SERPL-MCNC: 41 MG/DL
HEMOGLOBIN: 13.9 G/DL (ref 11.5–15.5)
IMMATURE GRANULOCYTES #: 0.13 E9/L
IMMATURE GRANULOCYTES %: 1.4 % (ref 0–5)
LDL CHOLESTEROL CALCULATED: 122 MG/DL (ref 0–99)
LYMPHOCYTES ABSOLUTE: 2.22 E9/L (ref 1.5–4)
LYMPHOCYTES RELATIVE PERCENT: 24.5 % (ref 20–42)
MCH RBC QN AUTO: 29.3 PG (ref 26–35)
MCHC RBC AUTO-ENTMCNC: 31.7 % (ref 32–34.5)
MCV RBC AUTO: 92.6 FL (ref 80–99.9)
MONOCYTES ABSOLUTE: 0.65 E9/L (ref 0.1–0.95)
MONOCYTES RELATIVE PERCENT: 7.2 % (ref 2–12)
NEUTROPHILS ABSOLUTE: 5.49 E9/L (ref 1.8–7.3)
NEUTROPHILS RELATIVE PERCENT: 60.6 % (ref 43–80)
PDW BLD-RTO: 14.3 FL (ref 11.5–15)
PLATELET # BLD: 249 E9/L (ref 130–450)
PMV BLD AUTO: 10.9 FL (ref 7–12)
POTASSIUM SERPL-SCNC: 4.4 MMOL/L (ref 3.5–5)
RBC # BLD: 4.74 E12/L (ref 3.5–5.5)
SODIUM BLD-SCNC: 142 MMOL/L (ref 132–146)
TOTAL PROTEIN: 7.1 G/DL (ref 6.4–8.3)
TRIGL SERPL-MCNC: 290 MG/DL (ref 0–149)
VLDLC SERPL CALC-MCNC: 58 MG/DL
WBC # BLD: 9.1 E9/L (ref 4.5–11.5)

## 2020-02-25 PROCEDURE — 80053 COMPREHEN METABOLIC PANEL: CPT

## 2020-02-25 PROCEDURE — 83036 HEMOGLOBIN GLYCOSYLATED A1C: CPT

## 2020-02-25 PROCEDURE — 85025 COMPLETE CBC W/AUTO DIFF WBC: CPT

## 2020-02-25 PROCEDURE — 36415 COLL VENOUS BLD VENIPUNCTURE: CPT

## 2020-02-25 PROCEDURE — 80061 LIPID PANEL: CPT

## 2020-02-28 ENCOUNTER — OFFICE VISIT (OUTPATIENT)
Dept: FAMILY MEDICINE CLINIC | Age: 65
End: 2020-02-28
Payer: MEDICARE

## 2020-02-28 VITALS
BODY MASS INDEX: 40.7 KG/M2 | DIASTOLIC BLOOD PRESSURE: 82 MMHG | SYSTOLIC BLOOD PRESSURE: 126 MMHG | RESPIRATION RATE: 16 BRPM | OXYGEN SATURATION: 95 % | HEART RATE: 86 BPM | HEIGHT: 59 IN | WEIGHT: 201.9 LBS

## 2020-02-28 PROCEDURE — 1036F TOBACCO NON-USER: CPT | Performed by: NURSE PRACTITIONER

## 2020-02-28 PROCEDURE — 3017F COLORECTAL CA SCREEN DOC REV: CPT | Performed by: NURSE PRACTITIONER

## 2020-02-28 PROCEDURE — 2022F DILAT RTA XM EVC RTNOPTHY: CPT | Performed by: NURSE PRACTITIONER

## 2020-02-28 PROCEDURE — G8417 CALC BMI ABV UP PARAM F/U: HCPCS | Performed by: NURSE PRACTITIONER

## 2020-02-28 PROCEDURE — G8427 DOCREV CUR MEDS BY ELIG CLIN: HCPCS | Performed by: NURSE PRACTITIONER

## 2020-02-28 PROCEDURE — 99214 OFFICE O/P EST MOD 30 MIN: CPT | Performed by: NURSE PRACTITIONER

## 2020-02-28 PROCEDURE — 3044F HG A1C LEVEL LT 7.0%: CPT | Performed by: NURSE PRACTITIONER

## 2020-02-28 PROCEDURE — G8482 FLU IMMUNIZE ORDER/ADMIN: HCPCS | Performed by: NURSE PRACTITIONER

## 2020-02-28 RX ORDER — ATORVASTATIN CALCIUM 10 MG/1
20 TABLET, FILM COATED ORAL NIGHTLY
Qty: 90 TABLET | Refills: 3 | Status: SHIPPED
Start: 2020-02-28 | End: 2020-03-18 | Stop reason: DRUGHIGH

## 2020-02-28 RX ORDER — ATORVASTATIN CALCIUM 10 MG/1
10 TABLET, FILM COATED ORAL NIGHTLY
Qty: 90 TABLET | Refills: 3 | Status: CANCELLED | OUTPATIENT
Start: 2020-02-28

## 2020-02-28 ASSESSMENT — ENCOUNTER SYMPTOMS
WHEEZING: 0
ABDOMINAL PAIN: 0
NAUSEA: 0
VOMITING: 0
COUGH: 0
TROUBLE SWALLOWING: 0
BACK PAIN: 0
SORE THROAT: 0
CHEST TIGHTNESS: 0
COLOR CHANGE: 0
SHORTNESS OF BREATH: 0
SINUS PRESSURE: 0
RHINORRHEA: 0
SINUS PAIN: 0
VOICE CHANGE: 0
CONSTIPATION: 0
FACIAL SWELLING: 0
DIARRHEA: 0

## 2020-02-28 ASSESSMENT — PATIENT HEALTH QUESTIONNAIRE - PHQ9
SUM OF ALL RESPONSES TO PHQ9 QUESTIONS 1 & 2: 0
1. LITTLE INTEREST OR PLEASURE IN DOING THINGS: 0
2. FEELING DOWN, DEPRESSED OR HOPELESS: 0
SUM OF ALL RESPONSES TO PHQ QUESTIONS 1-9: 0
SUM OF ALL RESPONSES TO PHQ QUESTIONS 1-9: 0

## 2020-02-28 NOTE — PROGRESS NOTES
CAPS, Take 2,000 Units by mouth daily, Disp: , Rfl:     Biotin 1000 MCG TABS, Take 1,000 mcg by mouth daily, Disp: , Rfl:   Social History     Socioeconomic History    Marital status:      Spouse name: None    Number of children: None    Years of education: None    Highest education level: None   Occupational History    None   Social Needs    Financial resource strain: None    Food insecurity:     Worry: None     Inability: None    Transportation needs:     Medical: None     Non-medical: None   Tobacco Use    Smoking status: Never Smoker    Smokeless tobacco: Never Used   Substance and Sexual Activity    Alcohol use: Yes     Alcohol/week: 0.0 standard drinks     Comment: Rarely    Drug use: No    Sexual activity: None   Lifestyle    Physical activity:     Days per week: None     Minutes per session: None    Stress: None   Relationships    Social connections:     Talks on phone: None     Gets together: None     Attends Denominational service: None     Active member of club or organization: None     Attends meetings of clubs or organizations: None     Relationship status: None    Intimate partner violence:     Fear of current or ex partner: None     Emotionally abused: None     Physically abused: None     Forced sexual activity: None   Other Topics Concern    None   Social History Narrative    None       I have reviewed Margaret's allergies, medications, problem list, medical, social and family history and have updated as needed in the electronic medical record    Review of Systems   Constitutional: Positive for fatigue. Negative for activity change, appetite change, chills, diaphoresis, fever and unexpected weight change. HENT: Negative for congestion, dental problem, drooling, ear discharge, ear pain, facial swelling, hearing loss, mouth sores, nosebleeds, postnasal drip, rhinorrhea, sinus pressure, sinus pain, sneezing, sore throat, tinnitus, trouble swallowing and voice change.     Eyes: tablet; Take 2 tablets by mouth nightly Indications: High Amount of Fats in the Blood  -     Lipid Panel; Future  -     Hepatic Function Panel; Future  -Discussed low fat diet, limit fast food, goodies, breads and pastas if consuming several days a week,  limit any alcohol consumption.  -Discussed weight reduction and exercise 30 minutes 5 days a week for total of 150 minutes weekly.  -Discussed if any unusual muscle aching/pain to contact the office, discussed medication and risk of muscle pain/damage from Rhabdomyolysis. -Discussed repeat labs in 8 weeks. Return in about 1 month (around 3/28/2020) for fatigue. Discussed weight loss, Discussed exercising 30 minutes daily and Discussed taking medications as directed and adverse effects        I have reviewed my findings and recommendations with Sharron Zhou.     Delfino Estrella, NP-C, FNP-BC

## 2020-03-16 ENCOUNTER — HOSPITAL ENCOUNTER (OUTPATIENT)
Age: 65
Discharge: HOME | End: 2020-03-18
Payer: MEDICARE

## 2020-03-16 LAB
ALBUMIN SERPL-MCNC: 4.3 G/DL (ref 3.5–5.2)
ALP BLD-CCNC: 104 U/L (ref 35–104)
ALT SERPL-CCNC: 18 U/L (ref 0–32)
AST SERPL-CCNC: 19 U/L (ref 0–31)
BILIRUB SERPL-MCNC: 0.8 MG/DL (ref 0–1.2)
BILIRUBIN DIRECT: <0.2 MG/DL (ref 0–0.3)
BILIRUBIN, INDIRECT: NORMAL MG/DL (ref 0–1)
CHOLESTEROL, TOTAL: 133 MG/DL (ref 0–199)
HBA1C MFR BLD: 6.6 % (ref 4–5.6)
HDLC SERPL-MCNC: 41 MG/DL
LDL CHOLESTEROL CALCULATED: 44 MG/DL (ref 0–99)
TOTAL PROTEIN: 7 G/DL (ref 6.4–8.3)
TRIGL SERPL-MCNC: 241 MG/DL (ref 0–149)
VLDLC SERPL CALC-MCNC: 48 MG/DL

## 2020-03-16 PROCEDURE — 83036 HEMOGLOBIN GLYCOSYLATED A1C: CPT

## 2020-03-16 PROCEDURE — 80076 HEPATIC FUNCTION PANEL: CPT

## 2020-03-16 PROCEDURE — 80061 LIPID PANEL: CPT

## 2020-03-16 PROCEDURE — 36415 COLL VENOUS BLD VENIPUNCTURE: CPT

## 2020-03-18 ENCOUNTER — OFFICE VISIT (OUTPATIENT)
Dept: FAMILY MEDICINE CLINIC | Age: 65
End: 2020-03-18
Payer: MEDICARE

## 2020-03-18 VITALS
HEART RATE: 71 BPM | OXYGEN SATURATION: 99 % | RESPIRATION RATE: 18 BRPM | WEIGHT: 203.2 LBS | TEMPERATURE: 97.8 F | HEIGHT: 59 IN | DIASTOLIC BLOOD PRESSURE: 70 MMHG | BODY MASS INDEX: 40.96 KG/M2 | SYSTOLIC BLOOD PRESSURE: 118 MMHG

## 2020-03-18 PROBLEM — R89.8 EOSINOPHIL COUNT RAISED: Status: RESOLVED | Noted: 2019-11-20 | Resolved: 2020-03-18

## 2020-03-18 PROBLEM — N95.0 POSTMENOPAUSAL BLEEDING: Status: RESOLVED | Noted: 2019-10-23 | Resolved: 2020-03-18

## 2020-03-18 PROBLEM — S20.212A RIB CONTUSION, LEFT, INITIAL ENCOUNTER: Status: RESOLVED | Noted: 2020-02-18 | Resolved: 2020-03-18

## 2020-03-18 PROBLEM — E83.52 HYPERCALCEMIA: Status: RESOLVED | Noted: 2019-11-20 | Resolved: 2020-03-18

## 2020-03-18 LAB
T4 FREE: 1.26 NG/DL (ref 0.93–1.7)
TSH SERPL DL<=0.05 MIU/L-ACNC: 4.18 UIU/ML (ref 0.27–4.2)

## 2020-03-18 PROCEDURE — 36415 COLL VENOUS BLD VENIPUNCTURE: CPT

## 2020-03-18 PROCEDURE — 3017F COLORECTAL CA SCREEN DOC REV: CPT | Performed by: NURSE PRACTITIONER

## 2020-03-18 PROCEDURE — G8482 FLU IMMUNIZE ORDER/ADMIN: HCPCS | Performed by: NURSE PRACTITIONER

## 2020-03-18 PROCEDURE — 99214 OFFICE O/P EST MOD 30 MIN: CPT | Performed by: NURSE PRACTITIONER

## 2020-03-18 PROCEDURE — G8427 DOCREV CUR MEDS BY ELIG CLIN: HCPCS | Performed by: NURSE PRACTITIONER

## 2020-03-18 PROCEDURE — 1036F TOBACCO NON-USER: CPT | Performed by: NURSE PRACTITIONER

## 2020-03-18 PROCEDURE — 84443 ASSAY THYROID STIM HORMONE: CPT

## 2020-03-18 PROCEDURE — 84439 ASSAY OF FREE THYROXINE: CPT

## 2020-03-18 PROCEDURE — G8417 CALC BMI ABV UP PARAM F/U: HCPCS | Performed by: NURSE PRACTITIONER

## 2020-03-18 RX ORDER — GABAPENTIN 300 MG/1
CAPSULE ORAL
COMMUNITY
Start: 2020-03-09 | End: 2020-06-01

## 2020-03-18 RX ORDER — ATORVASTATIN CALCIUM 10 MG/1
10 TABLET, FILM COATED ORAL NIGHTLY
Qty: 90 TABLET | Refills: 3 | Status: SHIPPED
Start: 2020-03-18 | End: 2020-12-17 | Stop reason: SDUPTHER

## 2020-03-18 RX ORDER — TRIAMCINOLONE ACETONIDE 1 MG/G
CREAM TOPICAL
COMMUNITY
Start: 2020-02-24 | End: 2022-08-01

## 2020-03-18 ASSESSMENT — ENCOUNTER SYMPTOMS
FACIAL SWELLING: 0
CONSTIPATION: 0
COLOR CHANGE: 0
BACK PAIN: 1
VOMITING: 0
NAUSEA: 0
SINUS PRESSURE: 0
COUGH: 0
SHORTNESS OF BREATH: 0
RHINORRHEA: 0
ABDOMINAL PAIN: 0
TROUBLE SWALLOWING: 0
VOICE CHANGE: 0
WHEEZING: 0
DIARRHEA: 0
SINUS PAIN: 0
SORE THROAT: 0
CHEST TIGHTNESS: 0

## 2020-03-18 NOTE — PROGRESS NOTES
OFFICE PROGRESS NOTE  [address]  [address] [facility] [address]  Dept: [phone]   Chief Complaint   Patient presents with    Fatigue         HPI:     Fatigue: Patient complains of fatigue. Symptoms began several months ago. Sentinal symptom the patient feels fatigue began with: cold intolerance, constipation and change in hair texture. . Symptoms of her fatigue have been change in appetite, fatigue with paradoxical insomnia, general malaise and hypersomnolence. Patient describes the following psychologic symptoms: none. Patient denies none. Symptoms have gradually worsened. Severity has been severe, symptoms bothersome, but easily able to carry out all usual work/school/family activities. Previous visits for this problem: none. Lab Results   Component Value Date     02/25/2020    K 4.4 02/25/2020     02/25/2020    CO2 26 02/25/2020    BUN 20 02/25/2020    CREATININE 0.8 02/25/2020    GLUCOSE 128 (H) 02/25/2020    CALCIUM 10.0 02/25/2020    PROT 7.0 03/16/2020    LABALBU 4.3 03/16/2020    BILITOT 0.8 03/16/2020    ALKPHOS 104 03/16/2020    AST 19 03/16/2020    ALT 18 03/16/2020    LABGLOM >60 02/25/2020    GFRAA >60 02/25/2020       Lab Results   Component Value Date    WBC 9.1 02/25/2020    HGB 13.9 02/25/2020    HCT 43.9 02/25/2020    MCV 92.6 02/25/2020     02/25/2020    LYMPHOPCT 24.5 02/25/2020    RBC 4.74 02/25/2020    MCH 29.3 02/25/2020    MCHC 31.7 (L) 02/25/2020    RDW 14.3 02/25/2020       She did get cervical injection today from [facility]. She was started on gabapentin 1 week ago not sure she likes it due to the increased fatigue.      She has lost a little bit of weight, having trouble exercising due to chronic back pain, sacroiliitis, central stenosis of spinal canal.     Current Outpatient Medications:     gabapentin (NEURONTIN) 300 MG capsule, , Disp: , Rfl:     triamcinolone (KENALOG) 0.1 % Stress: None   Relationships    Social connections     Talks on phone: None     Gets together: None     Attends Quaker service: None     Active member of club or organization: None     Attends meetings of clubs or organizations: None     Relationship status: None    Intimate partner violence     Fear of current or ex partner: None     Emotionally abused: None     Physically abused: None     Forced sexual activity: None   Other Topics Concern    None   Social History Narrative    None       I have reviewed Margaret's allergies, medications, problem list, medical, social and family history and have updated as needed in the electronic medical record    Review of Systems   Constitutional: Positive for appetite change and fatigue. Negative for activity change, chills, diaphoresis, fever and unexpected weight change. HENT: Negative for congestion, dental problem, drooling, ear discharge, ear pain, facial swelling, hearing loss, mouth sores, nosebleeds, postnasal drip, rhinorrhea, sinus pressure, sinus pain, sneezing, sore throat, tinnitus, trouble swallowing and voice change. Eyes: Negative for visual disturbance. Respiratory: Negative for cough, chest tightness, shortness of breath and wheezing. Cardiovascular: Negative for chest pain, palpitations and leg swelling. Gastrointestinal: Negative for abdominal pain, constipation, diarrhea, nausea and vomiting. Endocrine: Positive for cold intolerance. Negative for heat intolerance, polydipsia, polyphagia and polyuria. Genitourinary: Negative for difficulty urinating, frequency and urgency. Musculoskeletal: Positive for back pain and neck pain. Negative for arthralgias, gait problem, joint swelling, myalgias and neck stiffness. Skin: Negative for color change, pallor, rash and wound. Allergic/Immunologic: Negative for environmental allergies, food allergies and immunocompromised state.    Neurological: Negative for dizziness, tremors, seizures, cranial nerve deficit, gait, coordination and speech normal  Extremities: no clubbing, cyanosis, or edema. Psychiatric: Good eye contact, normal mood and affect, answers questions appropriately    ASSESSMENT/PLAN   Debra Granda was seen today for fatigue. Diagnoses and all orders for this visit:    Fatigue, unspecified type New  -     TSH without Reflex; Future  -     T4, Free; Future    Morbid obesity with BMI of 40.0-44.9, adult ([facility] [address]  -Discussed weight loss and to walk a little better several times a day to help decrease her weight.   -Use my Fitness pal to track food intake and exercise can give incentive for weight loss. -Cut back on carbohydrate intake and try to avoid eating out multiple meals a day. Mixed hyperlipidemia improving  -     atorvastatin (LIPITOR) 10 MG tablet; Take 1 tablet by mouth nightly Indications: High Amount of Fats in the Blood    Screening mammogram, encounter for  -     MAGO DIGITAL SCREEN W CAD BILATERAL; Future                    Return in about 3 months (around 6/18/2020) for fatigue. Discussed weight loss, Discussed exercising 30 minutes daily and Discussed taking medications as directed and adverse effects        I have reviewed my findings and recommendations with Ian Rogel.     Kasie Reyes, NP-C, FNP-BC

## 2020-03-19 RX ORDER — ALLOPURINOL 300 MG/1
300 TABLET ORAL DAILY
Qty: 90 TABLET | Refills: 3 | OUTPATIENT
Start: 2020-03-19

## 2020-04-08 VITALS
HEIGHT: 60 IN | BODY MASS INDEX: 39.27 KG/M2 | DIASTOLIC BLOOD PRESSURE: 108 MMHG | HEART RATE: 78 BPM | WEIGHT: 200 LBS | SYSTOLIC BLOOD PRESSURE: 156 MMHG

## 2020-05-05 ENCOUNTER — TELEPHONE (OUTPATIENT)
Dept: FAMILY MEDICINE CLINIC | Age: 65
End: 2020-05-05

## 2020-05-05 NOTE — TELEPHONE ENCOUNTER
Pt called saying she's scheduled on Thursday at [facility] for a steroid injection and she's wondering if you want her to have a HgBA1C done prior. Please advise.

## 2020-05-29 RX ORDER — ALLOPURINOL 300 MG/1
300 TABLET ORAL DAILY
Qty: 90 TABLET | Refills: 3 | OUTPATIENT
Start: 2020-05-29

## 2020-06-01 ENCOUNTER — OFFICE VISIT (OUTPATIENT)
Dept: FAMILY MEDICINE CLINIC | Age: 65
End: 2020-06-01
Payer: MEDICARE

## 2020-06-01 VITALS
BODY MASS INDEX: 40.52 KG/M2 | WEIGHT: 201 LBS | SYSTOLIC BLOOD PRESSURE: 126 MMHG | OXYGEN SATURATION: 98 % | HEART RATE: 77 BPM | DIASTOLIC BLOOD PRESSURE: 72 MMHG | RESPIRATION RATE: 18 BRPM | TEMPERATURE: 97.8 F | HEIGHT: 59 IN

## 2020-06-01 PROBLEM — M25.572 ACUTE LEFT ANKLE PAIN: Status: ACTIVE | Noted: 2020-06-01

## 2020-06-01 PROCEDURE — 99213 OFFICE O/P EST LOW 20 MIN: CPT | Performed by: NURSE PRACTITIONER

## 2020-06-01 PROCEDURE — G8417 CALC BMI ABV UP PARAM F/U: HCPCS | Performed by: NURSE PRACTITIONER

## 2020-06-01 PROCEDURE — 1036F TOBACCO NON-USER: CPT | Performed by: NURSE PRACTITIONER

## 2020-06-01 PROCEDURE — G8427 DOCREV CUR MEDS BY ELIG CLIN: HCPCS | Performed by: NURSE PRACTITIONER

## 2020-06-01 PROCEDURE — 3017F COLORECTAL CA SCREEN DOC REV: CPT | Performed by: NURSE PRACTITIONER

## 2020-06-01 ASSESSMENT — ENCOUNTER SYMPTOMS
WHEEZING: 0
COLOR CHANGE: 0
COUGH: 0
SHORTNESS OF BREATH: 0
BACK PAIN: 1

## 2020-06-01 ASSESSMENT — PATIENT HEALTH QUESTIONNAIRE - PHQ9
SUM OF ALL RESPONSES TO PHQ QUESTIONS 1-9: 0
SUM OF ALL RESPONSES TO PHQ9 QUESTIONS 1 & 2: 0
SUM OF ALL RESPONSES TO PHQ QUESTIONS 1-9: 0
1. LITTLE INTEREST OR PLEASURE IN DOING THINGS: 0
2. FEELING DOWN, DEPRESSED OR HOPELESS: 0

## 2020-06-01 NOTE — PROGRESS NOTES
OFFICE PROGRESS NOTE  [address] [place] [address] Rd  [address] [facility] [address]  Dept: [phone]   Chief Complaint   Patient presents with    Ankle Pain     x3 days         HPI:     Ankle Pain: Patient complains of left ankle pain. Onset of the symptoms was 3 days ago. Inciting event: none known. Current symptoms include ability to bear weight, but with some pain, pain at the medial aspect of the ankle, pain with eversion of the foot and worsening symptoms after a period of inactivity. Aggravating symptoms: standing and walking. Patient's overall course: stable. Patient has had no prior ankle problems. Previous visits for this problem: none. Evaluation to date: none. Treatment to date: ice. She did miss an allopurinol dose not quite a week ago.          Current Outpatient Medications:     triamcinolone (KENALOG) 0.1 % cream, MARY EXT AA 2 TO 3 TIMES PER DAY UNTIL RESOLVED, Disp: , Rfl:     atorvastatin (LIPITOR) 10 MG tablet, Take 1 tablet by mouth nightly Indications: High Amount of Fats in the Blood, Disp: 90 tablet, Rfl: 3    empagliflozin (JARDIANCE) 10 MG tablet, Take 1 tablet by mouth daily, Disp: 90 tablet, Rfl: 3    atenolol (TENORMIN) 50 MG tablet, Take 1 tablet by mouth daily, Disp: 90 tablet, Rfl: 3    omeprazole (PRILOSEC) 20 MG delayed release capsule, Take 1 capsule by mouth daily, Disp: 90 capsule, Rfl: 3    glucose monitoring kit (FREESTYLE) monitoring kit, 1 kit by Does not apply route daily, Disp: 1 kit, Rfl: 0    Aimsco Ultra Thin Lancets MISC, 1 Device by Does not apply route daily, Disp: 100 each, Rfl: 3    blood glucose monitor strips, Test 1 times a day & as needed for symptoms of irregular blood glucose., Disp: 50 strip, Rfl: 3    allopurinol (ZYLOPRIM) 300 MG tablet, Take 1 tablet by mouth daily, Disp: 90 tablet, Rfl: 3    Cholecalciferol (VITAMIN D3) 2000 units CAPS, Take 2,000 Units by mouth daily, Disp: , Rfl:     Biotin 1000

## 2020-06-01 NOTE — PATIENT INSTRUCTIONS
Patient Education        Foot Pain: Care Instructions  Your Care Instructions  Foot injuries that cause pain and swelling are fairly common. Almost all sports or home repair projects can cause a misstep that ends up as foot pain. Normal wear and tear, especially as you get older, also can cause foot pain. Most minor foot injuries will heal on their own, and home treatment is usually all you need to do. If you have a severe injury, you may need tests and treatment. Follow-up care is a key part of your treatment and safety. Be sure to make and go to all appointments, and call your doctor if you are having problems. It's also a good idea to know your test results and keep a list of the medicines you take. How can you care for yourself at home? · Take pain medicines exactly as directed. ? If the doctor gave you a prescription medicine for pain, take it as prescribed. ? If you are not taking a prescription pain medicine, ask your doctor if you can take an over-the-counter medicine. · Rest and protect your foot. Take a break from any activity that may cause pain. · Put ice or a cold pack on your foot for 10 to 20 minutes at a time. Put a thin cloth between the ice and your skin. · Prop up the sore foot on a pillow when you ice it or anytime you sit or lie down during the next 3 days. Try to keep it above the level of your heart. This will help reduce swelling. · Your doctor may recommend that you wrap your foot with an elastic bandage. Keep your foot wrapped for as long as your doctor advises. · If your doctor recommends crutches, use them as directed. · Wear roomy footwear. · As soon as pain and swelling end, begin gentle exercises of your foot. Your doctor can tell you which exercises will help. When should you call for help? [id number] anytime you think you may need emergency care. For example, call if:  · Your foot turns pale, white, blue, or cold.   Call your doctor now or seek immediate medical care

## 2020-06-09 RX ORDER — ALLOPURINOL 300 MG/1
300 TABLET ORAL DAILY
Qty: 90 TABLET | Refills: 3 | Status: SHIPPED
Start: 2020-06-09 | End: 2021-04-28

## 2020-06-17 ENCOUNTER — OFFICE VISIT (OUTPATIENT)
Dept: FAMILY MEDICINE CLINIC | Age: 65
End: 2020-06-17
Payer: MEDICARE

## 2020-06-17 VITALS
HEART RATE: 94 BPM | RESPIRATION RATE: 18 BRPM | SYSTOLIC BLOOD PRESSURE: 153 MMHG | HEIGHT: 59 IN | BODY MASS INDEX: 40.72 KG/M2 | WEIGHT: 202 LBS | DIASTOLIC BLOOD PRESSURE: 89 MMHG | TEMPERATURE: 96.7 F | OXYGEN SATURATION: 98 %

## 2020-06-17 PROBLEM — C18.7 MALIGNANT NEOPLASM OF SIGMOID COLON (HCC): Status: ACTIVE | Noted: 2020-06-17
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
## 2020-06-17 PROBLEM — M25.572 ACUTE LEFT ANKLE PAIN: Status: RESOLVED | Noted: 2020-06-01 | Resolved: 2020-06-17
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
## 2020-06-17 PROBLEM — K59.03 DRUG-INDUCED CONSTIPATION: Status: RESOLVED | Noted: 2020-02-18 | Resolved: 2020-06-17

## 2020-06-17 LAB
CHP ED QC CHECK: ABNORMAL
GLUCOSE BLD-MCNC: 140 MG/DL
HBA1C MFR BLD: 6.9 %

## 2020-06-17 PROCEDURE — 82962 GLUCOSE BLOOD TEST: CPT | Performed by: NURSE PRACTITIONER

## 2020-06-17 PROCEDURE — G8417 CALC BMI ABV UP PARAM F/U: HCPCS | Performed by: NURSE PRACTITIONER

## 2020-06-17 PROCEDURE — 99214 OFFICE O/P EST MOD 30 MIN: CPT | Performed by: NURSE PRACTITIONER

## 2020-06-17 PROCEDURE — 83036 HEMOGLOBIN GLYCOSYLATED A1C: CPT | Performed by: NURSE PRACTITIONER

## 2020-06-17 PROCEDURE — 3017F COLORECTAL CA SCREEN DOC REV: CPT | Performed by: NURSE PRACTITIONER

## 2020-06-17 PROCEDURE — G8427 DOCREV CUR MEDS BY ELIG CLIN: HCPCS | Performed by: NURSE PRACTITIONER

## 2020-06-17 PROCEDURE — 1036F TOBACCO NON-USER: CPT | Performed by: NURSE PRACTITIONER

## 2020-06-17 PROCEDURE — 3044F HG A1C LEVEL LT 7.0%: CPT | Performed by: NURSE PRACTITIONER

## 2020-06-17 PROCEDURE — 2022F DILAT RTA XM EVC RTNOPTHY: CPT | Performed by: NURSE PRACTITIONER

## 2020-06-17 RX ORDER — MELOXICAM 15 MG/1
15 TABLET ORAL DAILY
COMMUNITY
End: 2020-07-02 | Stop reason: ALTCHOICE

## 2020-06-17 RX ORDER — GLUCOSAMINE HCL/CHONDROITIN SU 500-400 MG
CAPSULE ORAL
Qty: 50 STRIP | Refills: 11 | Status: SHIPPED | OUTPATIENT
Start: 2020-06-17

## 2020-06-17 ASSESSMENT — ENCOUNTER SYMPTOMS
DIARRHEA: 0
WHEEZING: 0
COLOR CHANGE: 0
BACK PAIN: 1
ABDOMINAL PAIN: 0
TROUBLE SWALLOWING: 0
FACIAL SWELLING: 0
NAUSEA: 0
SORE THROAT: 0
SHORTNESS OF BREATH: 0
CONSTIPATION: 0
SINUS PAIN: 0
COUGH: 1
VOICE CHANGE: 0
RHINORRHEA: 0
SINUS PRESSURE: 0
VOMITING: 0
CHEST TIGHTNESS: 0

## 2020-06-17 NOTE — PATIENT INSTRUCTIONS

## 2020-06-17 NOTE — PROGRESS NOTES
OFFICE PROGRESS NOTE  [address] [place] [address] Rd  [address] [id number]  Dept: [phone]   Chief Complaint   Patient presents with    Diabetes         HPI:        60 y.o. female presents today for follow-up of diabetes kurtitius. In-office bloodsugar is:   Results for POC orders placed in visit on 06/17/20   POCT glycosylated hemoglobin (Hb A1C)   Result Value Ref Range    Hemoglobin A1C 6.9 %   POCT Glucose   Result Value Ref Range    Glucose 140 mg/dL    QC OK? Patient reports being compliant to low carbohydrate diet and increasing weekly exercises. But A1c is worsening up from 6.7% to 6.9%  Currently, the patient is treated with medication(s): Jardiance 10 mg daily she would like to come off as she feels it is causing her problems with vaginal dryness. Unable to take metformin due to diarrhea. Patient reports checking home blood sugar 1 times per day. Patient states home blood sugar ranges fasting  from 111 - 156. She isn't eating 3 meals a day some days only one. Patient denies hypoglycemic episodes and understand to take sweetened beverages or a snack if hypoglycemic symptoms are suspected. Hypertension: Patient here for follow-up of elevated blood pressure. She is exercising walking more and is adherent to low salt diet. Blood pressure is well controlled at home. Cardiac symptoms none. Patient denies chest pain, chest pressure/discomfort, claudication, dyspnea, exertional chest pressure/discomfort, fatigue, irregular heart beat, lower extremity edema, near-syncope, orthopnea, palpitations, paroxysmal nocturnal dyspnea, syncope and tachypnea. Cardiovascular risk factors: diabetes mellitus, dyslipidemia, hypertension, obesity (BMI >= 30 kg/m2) and sedentary lifestyle. Use of agents associated with hypertension: NSAIDS. History of target organ damage: none. She took her medication at 8:50 this morning and is also now taking Meloxicam daily. bankruptcy  -Discussed taking medication and directed every day. -Discussed exercising daily 30 minutes 5 times a week for 150 minutes weekly.  -Discussed weight reduction if needed.  -Discussed low sodium diet.  -Discussed limiting caffeine consumption and tobacco cessation and the effects they have on the heart and blood pressure. Sacroiliitis ([facility] [address] stable    [facility]                Return in about 1 month (around 7/17/2020) for DM, HTN. Discussed weight loss, Discussed exercising 30 minutes daily and Discussed taking medications as directed and adverse effects        I have reviewed my findings and recommendations with Ace Quan.     Teofilo Corey, NP-C, FNP-BC

## 2020-07-02 ENCOUNTER — TELEPHONE (OUTPATIENT)
Dept: FAMILY MEDICINE CLINIC | Age: 65
End: 2020-07-02

## 2020-07-02 RX ORDER — NAPROXEN 500 MG
500 TABLET, DELAYED RELEASE (ENTERIC COATED) ORAL DAILY
Qty: 30 TABLET | Refills: 1 | Status: SHIPPED
Start: 2020-07-02 | End: 2020-08-11

## 2020-07-02 NOTE — TELEPHONE ENCOUNTER
The pharmacist called the script for EC Naprosyn is not covered by insurance and is expensive if she pays out of pocket.   Did you mean to send in the plain Naprosyn

## 2020-07-15 ENCOUNTER — OFFICE VISIT (OUTPATIENT)
Dept: FAMILY MEDICINE CLINIC | Age: 65
End: 2020-07-15
Payer: MEDICAID

## 2020-07-15 ENCOUNTER — HOSPITAL ENCOUNTER (OUTPATIENT)
Age: 65
Discharge: HOME | End: 2020-07-17
Payer: COMMERCIAL

## 2020-07-15 VITALS
HEART RATE: 73 BPM | HEIGHT: 59 IN | SYSTOLIC BLOOD PRESSURE: 132 MMHG | OXYGEN SATURATION: 98 % | RESPIRATION RATE: 18 BRPM | DIASTOLIC BLOOD PRESSURE: 78 MMHG | TEMPERATURE: 97.8 F | BODY MASS INDEX: 42.13 KG/M2 | WEIGHT: 209 LBS

## 2020-07-15 PROBLEM — G89.29 CHRONIC PAIN OF MULTIPLE JOINTS: Status: ACTIVE | Noted: 2020-07-15

## 2020-07-15 PROBLEM — M25.50 CHRONIC PAIN OF MULTIPLE JOINTS: Status: ACTIVE | Noted: 2020-07-15

## 2020-07-15 LAB
RHEUMATOID FACTOR: <10 IU/ML (ref 0–13)
SEDIMENTATION RATE, ERYTHROCYTE: 9 MM/HR (ref 0–20)

## 2020-07-15 PROCEDURE — 86200 CCP ANTIBODY: CPT

## 2020-07-15 PROCEDURE — 99214 OFFICE O/P EST MOD 30 MIN: CPT | Performed by: NURSE PRACTITIONER

## 2020-07-15 PROCEDURE — 3017F COLORECTAL CA SCREEN DOC REV: CPT | Performed by: NURSE PRACTITIONER

## 2020-07-15 PROCEDURE — 1036F TOBACCO NON-USER: CPT | Performed by: NURSE PRACTITIONER

## 2020-07-15 PROCEDURE — 86431 RHEUMATOID FACTOR QUANT: CPT

## 2020-07-15 PROCEDURE — 36415 COLL VENOUS BLD VENIPUNCTURE: CPT

## 2020-07-15 PROCEDURE — 85651 RBC SED RATE NONAUTOMATED: CPT

## 2020-07-15 PROCEDURE — G8427 DOCREV CUR MEDS BY ELIG CLIN: HCPCS | Performed by: NURSE PRACTITIONER

## 2020-07-15 PROCEDURE — 3044F HG A1C LEVEL LT 7.0%: CPT | Performed by: NURSE PRACTITIONER

## 2020-07-15 PROCEDURE — 2022F DILAT RTA XM EVC RTNOPTHY: CPT | Performed by: NURSE PRACTITIONER

## 2020-07-15 PROCEDURE — 86038 ANTINUCLEAR ANTIBODIES: CPT

## 2020-07-15 PROCEDURE — G8417 CALC BMI ABV UP PARAM F/U: HCPCS | Performed by: NURSE PRACTITIONER

## 2020-07-15 ASSESSMENT — ENCOUNTER SYMPTOMS
SORE THROAT: 0
VOICE CHANGE: 0
SINUS PRESSURE: 0
COUGH: 0
ABDOMINAL PAIN: 0
CHEST TIGHTNESS: 0
WHEEZING: 0
RHINORRHEA: 0
BACK PAIN: 1
NAUSEA: 0
SINUS PAIN: 0
VOMITING: 0
DIARRHEA: 0
CONSTIPATION: 0
COLOR CHANGE: 0
SHORTNESS OF BREATH: 0
TROUBLE SWALLOWING: 0
FACIAL SWELLING: 0

## 2020-07-15 NOTE — PROGRESS NOTES
OFFICE PROGRESS NOTE  [address] [place] [address] [address]  [address] [id number]  Dept: [phone]   Chief Complaint   Patient presents with    Diabetes    Hypertension    Arthritis         HPI:    Here today for follow up on diabetes and changing medications from [place]  to Januvia due to vaginal dryness. She is tolerating it well and her BS is ranging 123 - 154 . She denies any hypoglycemic events. She has started eating 3 meals a day and is walking daily but has gained 7 pounds in a month. Hypertension: Patient here for follow-up of elevated blood pressure. She is exercising walking more and is adherent to low salt diet. Blood pressure is well controlled at home. Cardiac symptoms none. Patient denies chest pain, chest pressure/discomfort, claudication, dyspnea, exertional chest pressure/discomfort, fatigue, irregular heart beat, lower extremity edema, near-syncope, orthopnea, palpitations, paroxysmal nocturnal dyspnea, syncope and tachypnea. Cardiovascular risk factors: diabetes mellitus, dyslipidemia, hypertension, obesity (BMI >= 30 kg/m2) and sedentary lifestyle. Use of agents associated with hypertension: NSAIDS. History of target organ damage: none. Follow up on arthritis which is a little better since starting the Naprosyn EC better than the Meloxicam. She tells me today her mom had RA and she would like to be checked due to all the arthritis in her body. She had cervical injections into her neck and back has seen a few different pain management people but just isn't getting good relief. Which helps some but not enough as the pain is already back. She is getting the squeezing feeling in the right upper arm and into the hand just like prior to the injections. She isn't sure she wants to get any more injections. At one time she was seeing Dr Trey Michel and he was Rx pain medication which she really didn't want.  Explained today she may be at the level that No    Sexual activity: None   Lifestyle    Physical activity     Days per week: None     Minutes per session: None    Stress: None   Relationships    Social connections     Talks on phone: None     Gets together: None     Attends [employer] service: None     Active member of club or organization: None     Attends meetings of clubs or organizations: None     Relationship status: None    Intimate partner violence     Fear of current or ex partner: None     Emotionally abused: None     Physically abused: None     Forced sexual activity: None   Other Topics Concern    None   Social History Narrative    None       I have reviewed Margaret's allergies, medications, problem list, medical, social and family history and have updated as needed in the electronic medical record    Review of Systems   Constitutional: Positive for unexpected weight change. Negative for activity change, appetite change, chills, diaphoresis, fatigue and fever. HENT: Negative for congestion, dental problem, drooling, ear discharge, ear pain, facial swelling, hearing loss, mouth sores, nosebleeds, postnasal drip, rhinorrhea, sinus pressure, sinus pain, sneezing, sore throat, tinnitus, trouble swallowing and voice change. Eyes: Negative for visual disturbance. Respiratory: Negative for cough, chest tightness, shortness of breath and wheezing. Cardiovascular: Negative for chest pain, palpitations and leg swelling. Gastrointestinal: Negative for abdominal pain, constipation, diarrhea, nausea and vomiting. Endocrine: Negative for cold intolerance, heat intolerance, polydipsia, polyphagia and polyuria. Genitourinary: Negative for difficulty urinating, frequency and urgency. Musculoskeletal: Positive for arthralgias, back pain, gait problem and neck pain. Negative for joint swelling, myalgias and neck stiffness. Skin: Negative for color change, pallor, rash and wound.    Allergic/Immunologic: Negative for environmental allergies, food allergies and immunocompromised state. Neurological: Negative for dizziness, tremors, seizures, syncope, facial asymmetry, speech difficulty, weakness, light-headedness, numbness and headaches. Hematological: Negative for adenopathy. Does not bruise/bleed easily. Psychiatric/Behavioral: Negative for agitation, behavioral problems, confusion, decreased concentration, dysphoric mood, hallucinations, self-injury, sleep disturbance and suicidal ideas. The patient is not nervous/anxious and is not hyperactive. OBJECTIVE:     VS:  Wt Readings from Last 3 Encounters:   07/15/20 209 lb (94.8 kg)   06/17/20 202 lb (91.6 kg)   06/01/20 201 lb (91.2 kg)                       Vitals:    07/15/20 1126   BP: 132/78   Pulse: 73   Resp: 18   Temp: 97.8 °F (36.6 °C)   SpO2: 98%   Weight: 209 lb (94.8 kg)   Height: 4' 11\" (1.499 m)       General: Alert and oriented to person, place, and time, well developed and well nourished, in no acute distress  SKIN: Warm and dry, intact without any rash, masses or lesions  HEAD: normocephalic, atraumatic  Eyes: sclera/conjunctiva clear, PERRLA, EOMI's intact  Neck: supple and non-tender without mass, trachea midline, no cervical lymphadenopathy, no bruit, no thyromegaly or nodules  Cardiovascular: regular rate and regular rhythm, normal S1 and S2,  no murmurs, rubs, clicks, or gallop. Distal pulses intact, no carotid bruits. No edema  Pulmonary/Chest: clear to auscultation bilaterally, no wheezes, rales or rhonchi, normal air movement, no respiratory distress  Abdomen: soft, non-tender, non-distended, normal bowel sounds, no masses or hepatosplenomegaly  Musculoskeletal:limited ROM in back and neck tenderness along entire spine, no joint swelling, deformity   Neurologic: reflexes normal and symmetric, no cranial nerve deficit, antalgic gait, coordination and speech normal  Extremities: no clubbing, cyanosis, or edema.    Psychiatric: Good eye contact, normal mood and affect, answers questions appropriately    ASSESSMENT/PLAN   Rafi Or was seen today for diabetes, hypertension and arthritis. Diagnoses and all orders for this visit:    Type 2 diabetes mellitus without complication, without long-term current use of insulin (HCC)  Continue Januvia   Monitor BS at different times: 1 day fasting, then next day 2 hours after lunch, next day 2 hours after dinner, next day at bedtime then start over and log all values. Bring log to next appointment  Foot exam every day; wash and dry well between toes, look for any redness, cracks, wounds notify provider if any problems occur  Reminder for annual Eye exam  Reminder for Podiatry visits Q 2 Months for toenail care if needed  Reminder to keep vaccines up dated  Exercise 30 minutes daily  Recommend Diabetic Education Classes if you have not already attended    Essential hypertension improving    Chronic pain syndrome    Chronic pain of multiple joints  -     Sedimentation Rate; Future  -     Rheumatoid Factor; Future  -     GORAN; Future  -     CCP Antibodies, IGG/IGA; Future    If labs are positive will refer to Rheumatology    Return in about 3 months (around 10/15/2020) for DM, HTN. Discussed weight loss, Discussed exercising 30 minutes daily and Discussed taking medications as directed and adverse effects        I have reviewed my findings and recommendations with Leslie Escalante.     Kirill Garcia, NP-C, FNP-BC

## 2020-07-16 LAB — ANTI-NUCLEAR ANTIBODY (ANA): NEGATIVE

## 2020-07-18 LAB — CCP IGG ANTIBODIES: 2 UNITS (ref 0–19)

## 2020-08-17 NOTE — TELEPHONE ENCOUNTER
Patient called to follow up on RX. Saran Rodrigues was sent 8/11/20. Patient stated just came from pharmacy and it was not rec'd. I called [facility], spoke w/ Dominique Ortiz, pharmacist, and gave verbal order for Naproxen.

## 2020-09-16 RX ORDER — EMPAGLIFLOZIN 10 MG/1
TABLET, FILM COATED ORAL
Qty: 90 TABLET | Refills: 3 | OUTPATIENT
Start: 2020-09-16

## 2020-09-21 RX ORDER — LANCETS 28 GAUGE
EACH MISCELLANEOUS
Qty: 100 EACH | Refills: 3 | Status: SHIPPED
Start: 2020-09-21 | End: 2021-05-03

## 2020-10-15 ENCOUNTER — OFFICE VISIT (OUTPATIENT)
Dept: FAMILY MEDICINE CLINIC | Age: 65
End: 2020-10-15
Payer: COMMERCIAL

## 2020-10-15 VITALS
TEMPERATURE: 97.7 F | RESPIRATION RATE: 18 BRPM | HEIGHT: 59 IN | SYSTOLIC BLOOD PRESSURE: 126 MMHG | WEIGHT: 204.5 LBS | OXYGEN SATURATION: 97 % | HEART RATE: 77 BPM | DIASTOLIC BLOOD PRESSURE: 80 MMHG | BODY MASS INDEX: 41.23 KG/M2

## 2020-10-15 PROBLEM — M15.9 OSTEOARTHRITIS INVOLVING MULTIPLE JOINTS ON BOTH SIDES OF BODY: Status: ACTIVE | Noted: 2020-10-15

## 2020-10-15 PROBLEM — K59.09 OTHER CONSTIPATION: Status: ACTIVE | Noted: 2020-02-18

## 2020-10-15 LAB
CHP ED QC CHECK: NORMAL
CREATININE URINE POCT: 50
GLUCOSE BLD-MCNC: 170 MG/DL
HBA1C MFR BLD: 6.7 %
MICROALBUMIN/CREAT 24H UR: 10 MG/G{CREAT}
MICROALBUMIN/CREAT UR-RTO: ABNORMAL

## 2020-10-15 PROCEDURE — 99214 OFFICE O/P EST MOD 30 MIN: CPT | Performed by: NURSE PRACTITIONER

## 2020-10-15 PROCEDURE — G8484 FLU IMMUNIZE NO ADMIN: HCPCS | Performed by: NURSE PRACTITIONER

## 2020-10-15 PROCEDURE — 90694 VACC AIIV4 NO PRSRV 0.5ML IM: CPT | Performed by: NURSE PRACTITIONER

## 2020-10-15 PROCEDURE — 83036 HEMOGLOBIN GLYCOSYLATED A1C: CPT | Performed by: NURSE PRACTITIONER

## 2020-10-15 PROCEDURE — 82044 UR ALBUMIN SEMIQUANTITATIVE: CPT | Performed by: NURSE PRACTITIONER

## 2020-10-15 PROCEDURE — 2022F DILAT RTA XM EVC RTNOPTHY: CPT | Performed by: NURSE PRACTITIONER

## 2020-10-15 PROCEDURE — G0008 ADMIN INFLUENZA VIRUS VAC: HCPCS | Performed by: NURSE PRACTITIONER

## 2020-10-15 PROCEDURE — 1036F TOBACCO NON-USER: CPT | Performed by: NURSE PRACTITIONER

## 2020-10-15 PROCEDURE — G8427 DOCREV CUR MEDS BY ELIG CLIN: HCPCS | Performed by: NURSE PRACTITIONER

## 2020-10-15 PROCEDURE — 82962 GLUCOSE BLOOD TEST: CPT | Performed by: NURSE PRACTITIONER

## 2020-10-15 PROCEDURE — G8417 CALC BMI ABV UP PARAM F/U: HCPCS | Performed by: NURSE PRACTITIONER

## 2020-10-15 PROCEDURE — 3044F HG A1C LEVEL LT 7.0%: CPT | Performed by: NURSE PRACTITIONER

## 2020-10-15 PROCEDURE — 3017F COLORECTAL CA SCREEN DOC REV: CPT | Performed by: NURSE PRACTITIONER

## 2020-10-15 RX ORDER — ATENOLOL 50 MG/1
50 TABLET ORAL DAILY
Qty: 90 TABLET | Refills: 3 | Status: SHIPPED
Start: 2020-10-15 | End: 2021-08-04 | Stop reason: SDUPTHER

## 2020-10-15 RX ORDER — SULINDAC 200 MG/1
TABLET ORAL
COMMUNITY
Start: 2020-09-15 | End: 2020-11-23 | Stop reason: ALTCHOICE

## 2020-10-15 RX ORDER — OMEPRAZOLE 20 MG/1
20 CAPSULE, DELAYED RELEASE ORAL DAILY
Qty: 90 CAPSULE | Refills: 3 | Status: SHIPPED
Start: 2020-10-15 | End: 2021-06-01

## 2020-10-15 ASSESSMENT — ENCOUNTER SYMPTOMS
DIARRHEA: 0
COUGH: 1
BACK PAIN: 1
VOICE CHANGE: 0
COLOR CHANGE: 0
VOMITING: 0
CONSTIPATION: 1
SINUS PRESSURE: 0
SINUS PAIN: 0
SORE THROAT: 0
CHEST TIGHTNESS: 0
ABDOMINAL PAIN: 0
TROUBLE SWALLOWING: 0
WHEEZING: 0
SHORTNESS OF BREATH: 0
NAUSEA: 0
FACIAL SWELLING: 0
RHINORRHEA: 0

## 2020-10-15 NOTE — PROGRESS NOTES
OFFICE PROGRESS NOTE  [address]  [address] [place] [address]  Dept: [phone]   Chief Complaint   Patient presents with    Diabetes    Hypertension    Health Maintenance     dr Jess Benitez feet([place]), pap(carlos manuel),          HPI:     63 y.o. female presents today for follow-up of diabetes [place]. In-office bloodsugar is:   Results for POC orders placed in visit on 10/15/20   POCT glycosylated hemoglobin (Hb A1C)   Result Value Ref Range    Hemoglobin A1C 6.7 %   POCT Glucose   Result Value Ref Range    Glucose 170 mg/dL    QC OK? Patient reports being compliant to low carbohydrate diet and increasing weekly exercises. Currently, the patient is treated with medication(s): Januvia 50 mg daily. Patient reports checking home blood sugar 1 times per day. Patient states home blood sugar ranges from 118 - 167. Patient denies hypoglycemic episodes and understand to take sweetened beverages or a snack if hypoglycemic symptoms are suspected. Hypertension: Patient here for follow-up of elevated blood pressure. She is exercising walking more and is adherent to low salt diet.  Blood pressure is well controlled at home. Cardiac symptoms none. Patient denies chest pain, chest pressure/discomfort, claudication, dyspnea, exertional chest pressure/discomfort, fatigue, irregular heart beat, lower extremity edema, near-syncope, orthopnea, palpitations, paroxysmal nocturnal dyspnea, syncope and tachypnea.  Cardiovascular risk factors: diabetes mellitus, dyslipidemia, hypertension, obesity (BMI >= 30 kg/m2) and sedentary lifestyle. Use of agents associated with hypertension: NSAIDS. History of target organ damage: none. She saw Dr Cesar North at [place] [address] and told her she has OA she was Rx Sulindac 200 mg BID which was really working but is getting constipated with it.  She called them and they tried Etodoloc 400 mg daily but didn't feel it was helping so she went back to the Sulindac 200 mg once a day. She has an appointment with  [facility] 10/27/@ 11:30. She is complaining of pain in the left knee medial aspect again today but never had the x ray done last week. If she keeps the leg straight in the night and then bends it will wake her up. She was seeing [facility] pain management and they placed her on Norco and Tramadol but she didn't like how she felt on them so she stopped them. She did  a laxative and took it last night but no results at this time. She really isn't eating any fruit but is eating vegetables. She is drinking plenty of water and had no problems until the Sulindac. Last BM last week.      Today's vital signs are as follows:  /80   Pulse 77   Temp 97.7 °F (36.5 °C)   Resp 18   Ht 4' 11\" (1.499 m)   Wt 204 lb 8 oz (92.8 kg)   LMP  (LMP Unknown)   SpO2 97%   BMI 41.30 kg/m²     Lab Results   Component Value Date    LABA1C 6.7 10/15/2020     Pain scale: 4/10        Current Outpatient Medications:     sulindac (CLINORIL) 200 MG tablet, , Disp: , Rfl:     atenolol (TENORMIN) 50 MG tablet, Take 1 tablet by mouth daily, Disp: 90 tablet, Rfl: 3    SITagliptin (JANUVIA) 50 MG tablet, TAKE 1 TABLET BY MOUTH DAILY, Disp: 30 tablet, Rfl: 5    omeprazole (PRILOSEC) 20 MG delayed release capsule, Take 1 capsule by mouth daily, Disp: 90 capsule, Rfl: 3    metFORMIN (GLUCOPHAGE) 500 MG tablet, Take 1 tablet by mouth daily (with breakfast), Disp: 30 tablet, Rfl: 5    FreeStyle Lancets MISC, TEST EVERY DAY, Disp: 100 each, Rfl: 3    blood glucose monitor strips, Test 1 times a day & as needed for symptoms of irregular blood glucose., Disp: 50 strip, Rfl: 11    allopurinol (ZYLOPRIM) 300 MG tablet, TAKE 1 TABLET BY MOUTH DAILY, Disp: 90 tablet, Rfl: 3    triamcinolone (KENALOG) 0.1 % cream, MARY EXT AA 2 TO 3 TIMES PER DAY UNTIL RESOLVED, Disp: , Rfl:     atorvastatin (LIPITOR) 10 MG tablet, Take 1 tablet by mouth nightly Indications: High Amount of Fats in the Blood, Disp: 90 tablet, Rfl: 3    glucose monitoring kit (FREESTYLE) monitoring kit, 1 kit by Does not apply route daily, Disp: 1 kit, Rfl: 0    Cholecalciferol (VITAMIN D3) 2000 units CAPS, Take 2,000 Units by mouth daily, Disp: , Rfl:     Biotin 1000 MCG TABS, Take 1,000 mcg by mouth daily, Disp: , Rfl:   Social History     Socioeconomic History    Marital status:      Spouse name: None    Number of children: None    Years of education: None    Highest education level: None   Occupational History    None   Social Needs    Financial resource strain: None    Food insecurity     Worry: None     Inability: None    Transportation needs     Medical: None     Non-medical: None   Tobacco Use    Smoking status: Never Smoker    Smokeless tobacco: Never Used   Substance and Sexual Activity    Alcohol use: Yes     Alcohol/week: 0.0 standard drinks     Comment: Rarely    Drug use: No    Sexual activity: None   Lifestyle    Physical activity     Days per week: None     Minutes per session: None    Stress: None   Relationships    Social connections     Talks on phone: None     Gets together: None     Attends Muslim service: None     Active member of club or organization: None     Attends meetings of clubs or organizations: None     Relationship status: None    Intimate partner violence     Fear of current or ex partner: None     Emotionally abused: None     Physically abused: None     Forced sexual activity: None   Other Topics Concern    None   Social History Narrative    None       I have reviewed Margaret's allergies, medications, problem list, medical, social and family history and have updated as needed in the electronic medical record    Review of Systems   Constitutional: Negative for activity change, appetite change, chills, diaphoresis, fatigue, fever and unexpected weight change.    HENT: Negative for congestion, dental problem, drooling, ear discharge, ear pain, facial swelling, hearing loss, mouth sores, nosebleeds, postnasal drip, rhinorrhea, sinus pressure, sinus pain, sneezing, sore throat, tinnitus, trouble swallowing and voice change. Eyes: Negative for visual disturbance. Respiratory: Positive for cough. Negative for chest tightness, shortness of breath and wheezing. Cardiovascular: Negative for chest pain, palpitations and leg swelling. Gastrointestinal: Positive for constipation. Negative for abdominal pain, diarrhea, nausea and vomiting. Endocrine: Negative for cold intolerance, heat intolerance, polydipsia, polyphagia and polyuria. Genitourinary: Negative for difficulty urinating, frequency and urgency. Musculoskeletal: Positive for arthralgias, back pain and gait problem. Negative for joint swelling, myalgias, neck pain and neck stiffness. Skin: Negative for color change, pallor, rash and wound. Allergic/Immunologic: Negative for environmental allergies, food allergies and immunocompromised state. Neurological: Negative for dizziness, tremors, seizures, syncope, facial asymmetry, speech difficulty, weakness, light-headedness, numbness and headaches. Hematological: Negative for adenopathy. Does not bruise/bleed easily. Psychiatric/Behavioral: Negative for agitation, behavioral problems, confusion, decreased concentration, dysphoric mood, hallucinations, self-injury, sleep disturbance and suicidal ideas. The patient is not nervous/anxious and is not hyperactive.         OBJECTIVE:     VS:  Wt Readings from Last 3 Encounters:   10/15/20 204 lb 8 oz (92.8 kg)   07/15/20 209 lb (94.8 kg)   06/17/20 202 lb (91.6 kg)                       Vitals:    10/15/20 1111   BP: 126/80   Pulse: 77   Resp: 18   Temp: 97.7 °F (36.5 °C)   SpO2: 97%   Weight: 204 lb 8 oz (92.8 kg)   Height: 4' 11\" (1.499 m)       General: Alert and oriented to person, place, and time, well developed and well nourished, obese, in no acute distress  SKIN: Warm and dry, intact without any rash, masses or lesions  HEAD: normocephalic, atraumatic  Eyes: sclera/conjunctiva clear, PERRLA, EOMI's intact  Neck: supple and non-tender without mass, trachea midline, no cervical lymphadenopathy, no bruit, no thyromegaly or nodules  Cardiovascular: regular rate and regular rhythm, normal S1 and S2,  no murmurs, rubs, clicks, or gallop. Distal pulses intact, no carotid bruits. No edema  Pulmonary/Chest: clear to auscultation bilaterally, no wheezes, rales or rhonchi, normal air movement, no respiratory distress  Abdomen: soft, non-tender, non-distended, normal bowel sounds, no masses or hepatosplenomegaly  Musculoskeletal: Limited ROM in several joints and painful on exam, no joint swelling,    Neurologic: reflexes normal and symmetric, no cranial nerve deficit, antalgic gait, coordination and speech normal  Extremities: no clubbing, cyanosis, or edema. Psychiatric: Good eye contact, normal mood and affect, answers questions appropriately  Foot Exam:  No signs of infection and/or necrosis noted. Sensation intact and equal bilaterally. Monofilament normal bilateral,  Interdigit spaces exhibit no abnormal skin changes and/or surface growth. ASSESSMENT/PLAN   Alexia Steiner was seen today for diabetes, hypertension and health maintenance. Diagnoses and all orders for this visit:    Type 2 diabetes mellitus without complication, without long-term current use of insulin (HCC) improving down from 6.9% to 6.7%  -     SITagliptin (JANUVIA) 50 MG tablet; TAKE 1 TABLET BY MOUTH DAILY  -     POCT glycosylated hemoglobin (Hb A1C)  -     POCT Microalbumin + today will repeat at next visit  -     POCT Glucose  -      DIABETES FOOT EXAM  -     metFORMIN (GLUCOPHAGE) 500 MG tablet;  Take 1 tablet by mouth daily (with breakfast)  Monitor BS at different times: 1 day fasting, then next day 2 hours after lunch, next day 2 hours after dinner, next day at bedtime then start over and log all values. Bring log to next appointment  Foot exam every day; wash and dry well between toes, look for any redness, cracks, wounds notify provider if any problems occur  Reminder for annual Eye exam  Reminder for Podiatry visits Q 2 Months for toenail care if needed  Reminder to keep vaccines up dated  Exercise 30 minutes daily  Recommend Diabetic Education Classes if you have not already attended    Essential hypertension stable  -     atenolol (TENORMIN) 50 MG tablet; Take 1 tablet by mouth daily  -Discussed taking medication and directed every day. -Discussed exercising daily 30 minutes 5 times a week for 150 minutes weekly.  -Discussed weight reduction if needed.  -Discussed low sodium diet.  -Discussed limiting caffeine consumption and tobacco cessation and the effects they have on the heart and blood pressure. Osteoarthritis involving multiple joints on both sides of body worsening  Continue Sulindac and keep follow up appointment with  [facility] [place]    Drug induced constipation New  Increase water, fresh fruits and vegetables, daily walking    Gastroesophageal reflux disease without esophagitis  -     omeprazole (PRILOSEC) 20 MG delayed release capsule; Take 1 capsule by mouth daily    Flu vaccine need  -     INFLUENZA, QUADV, ADJUVANTED, 65 YRS =, IM, PF, PREFILL SYR, 0.5ML (FLUAD)  · Wash your hands regularly, and keep your hands away from your face. · Cover your mouth when you cough or sneeze. · Rest, plenty of fluids, Tylenol for body aches, fever. · Stay home from school, work, and other public places until you are feeling better and your fever has been gone for at least 24 hours. The fever needs to have gone away on its own without the help of medicine. · Ask people living with you to talk to their doctors about preventing the flu. They may get antiviral medicine to keep from getting the flu from you. · To prevent the flu in the future, get a flu vaccine every fall.  Encourage people living with you to get the vaccine. ·         Return in about 3 months (around 1/15/2021) for keep november appt as scheduled then see me in 3 months for DM. Discussed weight loss, Discussed exercising 30 minutes daily and Discussed taking medications as directed and adverse effects        I have reviewed my findings and recommendations with Justin Marino.     Daljit Cole, NP-C, FNP-BC

## 2020-10-15 NOTE — PATIENT INSTRUCTIONS
Patient Education        Constipation: Care Instructions  Your Care Instructions     Constipation means that you have a hard time passing stools (bowel movements). People pass stools from 3 times a day to once every 3 days. What is normal for you may be different. Constipation may occur with pain in the rectum and cramping. The pain may get worse when you try to pass stools. Sometimes there are small amounts of bright red blood on toilet paper or the surface of stools. This is because of enlarged veins near the rectum (hemorrhoids). A few changes in your diet and lifestyle may help you avoid ongoing constipation. Your doctor may also prescribe medicine to help loosen your stool. Some medicines can cause constipation. These include pain medicines and antidepressants. Tell your doctor about all the medicines you take. Your doctor may want to make a medicine change to ease your symptoms. Follow-up care is a key part of your treatment and safety. Be sure to make and go to all appointments, and call your doctor if you are having problems. It's also a good idea to know your test results and keep a list of the medicines you take. How can you care for yourself at home? · Drink plenty of fluids, enough so that your urine is light yellow or clear like water. If you have kidney, heart, or liver disease and have to limit fluids, talk with your doctor before you increase the amount of fluids you drink. · Include high-fiber foods in your diet each day. These include fruits, vegetables, beans, and whole grains. · Get at least 30 minutes of exercise on most days of the week. Walking is a good choice. You also may want to do other activities, such as running, swimming, cycling, or playing tennis or team sports. · Take a fiber supplement, such as Citrucel or Metamucil, every day. Read and follow all instructions on the label. · Schedule time each day for a bowel movement. A daily routine may help.  Take your time having your bowel movement. · Support your feet with a small step stool when you sit on the toilet. This helps flex your hips and places your pelvis in a squatting position. · Your doctor may recommend an over-the-counter laxative to relieve your constipation. Examples are Milk of Magnesia and MiraLax. Read and follow all instructions on the label. Do not use laxatives on a long-term basis. When should you call for help? Call your doctor now or seek immediate medical care if:    · You have new or worse belly pain.     · You have new or worse nausea or vomiting.     · You have blood in your stools. Watch closely for changes in your health, and be sure to contact your doctor if:    · Your constipation is getting worse.     · You do not get better as expected. Where can you learn more? Go to [email or web address] org and sign in to your [employer] account. Enter 21 [phone] in the [employer] box to learn more about \"Constipation: Care Instructions. \"     If you do not have an account, please click on the \"Sign Up Now\" link. Current as of: June 26, 2019               Content Version: 12.6  © 9309-5784 [employer]. Care instructions adapted under license by [facility] ([facility]). If you have questions about a medical condition or this instruction, always ask your healthcare professional. [address] any warranty or liability for your use of this information.

## 2020-11-06 RX ORDER — SAXAGLIPTIN 2.5 MG/1
TABLET, FILM COATED ORAL
Qty: 30 TABLET | Refills: 0 | Status: SHIPPED
Start: 2020-11-06 | End: 2020-11-23 | Stop reason: SDUPTHER

## 2020-11-23 ENCOUNTER — OFFICE VISIT (OUTPATIENT)
Dept: FAMILY MEDICINE CLINIC | Age: 65
End: 2020-11-23
Payer: COMMERCIAL

## 2020-11-23 VITALS
DIASTOLIC BLOOD PRESSURE: 78 MMHG | RESPIRATION RATE: 16 BRPM | HEART RATE: 78 BPM | TEMPERATURE: 98.1 F | OXYGEN SATURATION: 97 % | WEIGHT: 205.5 LBS | SYSTOLIC BLOOD PRESSURE: 124 MMHG | HEIGHT: 59 IN | BODY MASS INDEX: 41.43 KG/M2

## 2020-11-23 PROBLEM — M51.36 DDD (DEGENERATIVE DISC DISEASE), LUMBAR: Status: ACTIVE | Noted: 2020-10-27

## 2020-11-23 PROBLEM — M47.812 CERVICAL SPONDYLOSIS WITHOUT MYELOPATHY: Status: ACTIVE | Noted: 2020-10-27

## 2020-11-23 PROBLEM — M50.30 DDD (DEGENERATIVE DISC DISEASE), CERVICAL: Status: ACTIVE | Noted: 2020-10-27

## 2020-11-23 PROBLEM — M51.369 DDD (DEGENERATIVE DISC DISEASE), LUMBAR: Status: ACTIVE | Noted: 2020-10-27

## 2020-11-23 PROCEDURE — G0439 PPPS, SUBSEQ VISIT: HCPCS | Performed by: NURSE PRACTITIONER

## 2020-11-23 PROCEDURE — 3044F HG A1C LEVEL LT 7.0%: CPT | Performed by: NURSE PRACTITIONER

## 2020-11-23 PROCEDURE — G8484 FLU IMMUNIZE NO ADMIN: HCPCS | Performed by: NURSE PRACTITIONER

## 2020-11-23 PROCEDURE — 3017F COLORECTAL CA SCREEN DOC REV: CPT | Performed by: NURSE PRACTITIONER

## 2020-11-23 RX ORDER — AMITRIPTYLINE HYDROCHLORIDE 25 MG/1
25 TABLET, FILM COATED ORAL
COMMUNITY
Start: 2020-10-27 | End: 2021-11-09

## 2020-11-23 RX ORDER — ETODOLAC 400 MG/1
400 TABLET, FILM COATED ORAL 2 TIMES DAILY
COMMUNITY
End: 2021-02-01 | Stop reason: ALTCHOICE

## 2020-11-23 ASSESSMENT — PATIENT HEALTH QUESTIONNAIRE - PHQ9
1. LITTLE INTEREST OR PLEASURE IN DOING THINGS: 0
SUM OF ALL RESPONSES TO PHQ QUESTIONS 1-9: 0
SUM OF ALL RESPONSES TO PHQ9 QUESTIONS 1 & 2: 0
SUM OF ALL RESPONSES TO PHQ QUESTIONS 1-9: 0
2. FEELING DOWN, DEPRESSED OR HOPELESS: 0
SUM OF ALL RESPONSES TO PHQ QUESTIONS 1-9: 0

## 2020-11-23 NOTE — PATIENT INSTRUCTIONS
Learning About Healthy Weight  What is a healthy weight? A healthy weight is the weight at which you feel good about yourself and have energy for work and play. It's also one that lowers your risk for health problems. What can you do to stay at a healthy weight? It can be hard to stay at a healthy weight, especially when fast food, vending-machine snacks, and processed foods are so easy to find. And with your busy lifestyle, activity may be low on your list of things to do. But staying at a healthy weight may be easier than you think. Here are some dos and don'ts for staying at a healthy weight:  Do eat healthy foods  The kinds of foods you eat have a big impact on both your weight and your health. Reaching and staying at a healthy weight is not about going on a diet. It's about making healthier food choices every day and changing your diet for good. Healthy eating means eating a variety of foods so that you get all the nutrients you need. Your body needs protein, carbohydrate, and fats for energy. They keep your heart beating, your brain active, and your muscles working. On most days, try to eat from each food group. This means eating a variety of:  · Whole grains, such as whole wheat breads and pastas. · Fruits and vegetables. · Dairy products, such as low-fat milk, yogurt, and cheese. · Lean proteins, such as all types of fish, chicken without the skin, and beans. Don't have too much or too little of one thing. All foods, if eaten in moderation, can be part of healthy eating. Even sweets can be okay. If your favorite foods are high in fat, salt, sugar, or calories, limit how often you eat them. Eat smaller servings, or look for healthy substitutes. Do watch what you eat  Many people eat more than their bodies need. Part of staying at a healthy weight means learning how much food you really need from day to day and not eating more than that.  Even with healthy foods, eating too much can make you gain weight. Having a well-balanced diet means that you eat enough, but not too much, and that your food gives you the nutrients you need to stay healthy. So listen to your body. Eat when you're hungry. Stop when you feel satisfied. It's a good idea to have healthy snacks ready for when you get hungry. Keep healthy snacks with you at work, in your car, and at home. If you have a healthy snack easily available, you'll be less likely to pick a candy bar or bag of chips from a vending machine instead. Some healthy snacks you might want to keep on hand are fruit, low-fat yogurt, string cheese, low-fat microwave popcorn, raisins and other dried fruit, nuts, whole wheat crackers, pretzels, carrots, celery sticks, and broccoli. Do some physical activity   A big part of reaching and staying at a healthy weight is being active. When you're active, you burn calories. This makes it easier to reach and stay at a healthy weight. When you're active on a regular basis, your body burns more calories, even when you're at rest. Being active helps you lose fat and build lean muscle. Try to be active for at least 1 hour every day. This may sound like a lot, but it's okay to be active in smaller blocks of time that add up to 1 hour a day. Any activity that makes your heart beat faster and keeps it there for a while counts. A brisk walk, run, or swim will get your heart beating faster. So will climbing stairs, shooting baskets, or cycling. Even some household chores like vacuuming and mowing the lawn will get your heart rate up. Pick activities that you enjoy--ones that make your heart beat faster, your muscles stronger, and your muscles and joints more flexible. If you find more than one thing you like doing, do them all. You don't have to do the same thing every day. Don't diet  Diets don't work. Diets are temporary. Because you give up so much when you diet, you may be hungry and think about food all the time.  And after you stop dieting, you also may overeat to make up for what you missed. Most people who diet end up gaining back the pounds they lost--and more. Remember that healthy bodies come in lots of shapes and sizes. Everyone can get healthier by eating better and being more active. Where can you learn more? Go to [email or web address] org and sign in to your vushaper account. Enter 801 1645 in the [address] box to learn more about \"Learning About Healthy Weight. \"     If you do not have an account, please click on the \"Sign Up Now\" link. Current as of: December 11, 2019               Content Version: 12.6  © 1457-5015 [employer], [employer]. Care instructions adapted under license by [facility] [facility] If you have questions about a medical condition or this instruction, always ask your healthcare professional. [address] any warranty or liability for your use of this information. Learning About Low-Carbohydrate Diets  What is a low-carbohydrate diet? A low-carbohydrate (or \"low-carb\") diet limits foods and drinks that have carbohydrates. This includes grains, fruits, milk and yogurt, and starchy vegetables like potatoes, beans, and corn. It also avoids foods and drinks that have added sugar. Instead, low-carb diets include foods that are high in protein and fat. Why might you follow a low-carb diet? Low-carb diets may be used for a variety of reasons, such as for weight loss. People who have diabetes may use a low-carb diet to help manage their blood sugar levels. What should you do before you start the diet? Talk to your doctor before you try any diet. This is even more important if you have health problems like kidney disease, heart disease, or diabetes. Your doctor may suggest that you meet with a registered dietitian. A dietitian can help you make an eating plan that works for you. What foods do you eat on a low-carb diet?   On a low-carb diet, you or download the FREE \"Exercise & Physical Activity: Your Everyday Guide\" from [employer]. Call 3-607.575.6758 or search [employer] online. · You need 7924-7443 mg of calcium and 7941-0573 IU of vitamin D per day. It is possible to meet your calcium requirement with diet alone, but a vitamin D supplement is usually necessary to meet this goal.  · When exposed to the sun, use a sunscreen that protects against both UVA and UVB radiation with an SPF of 30 or greater. Reapply every 2 to 3 hours or after sweating, drying off with a towel, or swimming. · Always wear a seat belt when traveling in a car. Always wear a helmet when riding a bicycle or motorcycle.

## 2020-11-23 NOTE — PROGRESS NOTES
Medicare Annual Wellness Visit  Name: Yovani Quiñones Date: 2020   MRN: [id number] Sex: Female   Age: 63 y.o. Ethnicity: Non-/Non    : 1955 Race: Caroline Victor is here for Medicare AWV (needs handicap letter) and Health Maintenance (due for shingles, pap with khavari,)    Screenings for behavioral, psychosocial and functional/safety risks, and cognitive dysfunction are all negative except as indicated below. These results, as well as other patient data from the [address] form, are documented in Flowsheets linked to this Encounter. Allergies   Allergen Reactions    Diclofenac     Diclofenac Sodium      Nauseated, Diarrhea    Lisinopril Other (See Comments)     Profound malaise    Lyrica [Pregabalin] Other (See Comments)     Confusion    Sulfa Antibiotics Rash         Prior to Visit Medications    Medication Sig Taking? Authorizing Provider   amitriptyline (ELAVIL) 25 MG tablet Take 25 mg by mouth At bedtime as needed Yes Historical Provider, MD   etodolac (LODINE) 400 MG tablet Take 400 mg by mouth 2 times daily Yes Historical Provider, MD   sAXagliptin (ONGLYZA) 2.5 MG TABS tablet TAKE 1 TABLET DAILY Yes VISHNU Grace CNP   zoster recombinant adjuvanted vaccine (SHINGRIX) 50 MCG/0.5ML SUSR injection Inject 0.5 mLs into the muscle once for 1 dose Yes VISHNU Grace CNP   atenolol (TENORMIN) 50 MG tablet Take 1 tablet by mouth daily Yes VISHNU Grace CNP   omeprazole (PRILOSEC) 20 MG delayed release capsule Take 1 capsule by mouth daily Yes VISHNU Grace CNP   metFORMIN (GLUCOPHAGE) 500 MG tablet Take 1 tablet by mouth daily (with breakfast) Yes VISHNU Grace CNP   FreeStyle Lancets MISC TEST EVERY DAY Yes VISHNU Grace CNP   blood glucose monitor strips Test 1 times a day & as needed for symptoms of irregular blood glucose.  Yes VISHNU Grace CNP   allopurinol (ZYLOPRIM) 300 MG tablet TAKE 1 TABLET BY MOUTH DAILY Yes VISHNU Montez CNP   triamcinolone (KENALOG) 0.1 % cream MARY EXT AA 2 TO 3 TIMES PER DAY UNTIL RESOLVED Yes Historical Provider, MD   atorvastatin (LIPITOR) 10 MG tablet Take 1 tablet by mouth nightly Indications: High Amount of Fats in the Blood Yes VISHNU Montez CNP   glucose monitoring kit (FREESTYLE) monitoring kit 1 kit by Does not apply route daily Yes VISHNU Montez CNP   Cholecalciferol (VITAMIN D3) 2000 units CAPS Take 2,000 Units by mouth daily Yes Historical Provider, MD   Biotin 1000 MCG TABS Take 1,000 mcg by mouth daily Yes Historical Provider, MD         Past Medical History:   Diagnosis Date    Acute left ankle pain 6/1/2020    Cancer ([facility] [address] 2017    colon (treated surgically)    Chronic back pain     Diabetes mellitus ([facility] [address]     Drug-induced constipation 2/18/2020    Eosinophil count raised 11/20/2019    Fall at home 12/11/2017    Fibromyalgia     GERD (gastroesophageal reflux disease)     Hypercalcemia 11/20/2019    Hyperlipidemia     Hypertension     Obesity     Osteoarthritis     PONV (postoperative nausea and vomiting)     Postmenopausal bleeding 10/5/2017    Rib contusion, left, initial encounter 2/18/2020    Urinary incontinence        Past Surgical History:   Procedure Laterality Date    ANESTHESIA NERVE BLOCK Bilateral 8/15/2019    BILATERAL INTRA-ARTICULAR FACET JOINT INJECTION WITH FLUOROSCOPIC GUIDANCE AT L4-5, L5-S1 WITH IV SEDATION performed by Jacquie Donaldson DO at [address] N/A 10/23/2019    DILATATION AND CURETTAGE HYSTEROSCOPY performed by Neeraj Crisostomo MD at [address] [facility] Right 7/11/2019    C7-T1 EPIDURAL STEROID INJECTION #1 TOWARD THE RIGHT performed by Ibeth Delgado DO at [facility] Bilateral     NERVE BLOCK Right 12/28/2017    right L4-5 transforaminal epidural #1  NERVE BLOCK Left 11/29/2018    si inj    NERVE BLOCK Bilateral 08/15/2019    bilateral intra-articular facet joint injection with flouroscopic guidance at L4-S1 with iv sedation    NC INJECT SI JOINT ARTHRGRPHY&/ANES/STEROID W/IMAGE Left 11/29/2018    LEFT SACROILIAC JOINT INJECTION WITH X-RAY performed by Sarbjit Negrete DO at [address] Left 2005    TONSILLECTOMY  28 YRS OLD    TUBAL LIGATION           Family History   Problem Relation Age of Onset    Asthma Mother     Mental Illness Mother     Heart Disease Father     Thyroid Disease Sister     Thyroid Disease Sister     Thyroid Disease Sister        CareTeam (Including outside providers/suppliers regularly involved in providing care):   Patient Care Team:  VISHNU Hooper CNP as PCP - General (Nurse Practitioner)  VISHNU Hooper CNP as PCP - [facility] Leonel Paganled Provider    Wt Readings from Last 3 Encounters:   11/23/20 205 lb 8 oz (93.2 kg)   10/15/20 204 lb 8 oz (92.8 kg)   07/15/20 209 lb (94.8 kg)     Vitals:    11/23/20 0828   BP: 124/78   Pulse: 78   Resp: 16   Temp: 98.1 °F (36.7 °C)   SpO2: 97%   Weight: 205 lb 8 oz (93.2 kg)   Height: 4' 11\" (1.499 m)     Body mass index is 41.51 kg/m². Based upon direct observation of the patient, evaluation of cognition reveals recent and remote memory intact.     General Appearance: alert and oriented to person, place and time, well developed and well- nourished, in no acute distress  Skin: warm and dry, no rash or erythema  Head: normocephalic and atraumatic  Eyes: pupils equal, round, and reactive to light, extraocular eye movements intact, conjunctivae normal  ENT: tympanic membrane, external ear and ear canal normal bilaterally, nose without deformity, nasal mucosa and turbinates normal without polyps  Neck: supple and non-tender without mass, no thyromegaly or thyroid nodules, no cervical lymphadenopathy  Pulmonary/Chest: clear to auscultation bilaterally- no wheezes, rales or rhonchi, normal air movement, no respiratory distress  Cardiovascular: normal rate, regular rhythm, normal S1 and S2, no murmurs, rubs, clicks, or gallops, distal pulses intact, no carotid bruits  Abdomen: soft, non-tender, non-distended, normal bowel sounds, no masses or organomegaly  Breast: appear normal, no suspicious masses, no skin or nipple changes or axillary nodes  Extremities: no cyanosis, clubbing or edema  Musculoskeletal: Limited range of motion in neck, left knee, no joint swelling, deformity   Neurologic: reflexes normal and symmetric, no cranial nerve deficit, gait, coordination and speech normal    Patient's complete Health Risk Assessment and screening values have been reviewed and are found in Flowsheets. The following problems were reviewed today and where indicated follow up appointments were made and/or referrals ordered. Positive Risk Factor Screenings with Interventions:       General Health and ACP:  General  In general, how would you say your health is?: Good  In the past 7 days, have you experienced any of the following?  New or Increased Pain, New or Increased Fatigue, Loneliness, Social Isolation, Stress or Anger?: None of These  Do you get the social and emotional support that you need?: (!) No  Do you have a Living Will?: (!) No  Advance Directives     Power of [address] [place] [address] Will ACP-Advance Directive ACP-Power of     Not on File Not on File Filed [address] [place] [address] Risk Interventions:  · Inadequate social/emotional support: patient states she is getting the social and emotional support  · No Living Will: Advance Care Planning addressed with patient today    Health Habits/Nutrition:  Health Habits/Nutrition  Do you exercise for at least 20 minutes 2-3 times per week?: Yes  Have you lost any weight without trying in the past 3 months?: No  Do you eat fewer than 2 meals per day?: No  Have you seen a dentist within the past year?: Yes  Body mass index: (!) 41.5  Health Habits/Nutrition Interventions:  · She has lost 5 pounds    Personalized Preventive Plan   Current Health Maintenance Status  Immunization History   Administered Date(s) Administered    Influenza 11/05/2015    Influenza Vaccine, unspecified formulation 12/19/2014, 10/17/2016    Influenza Virus Vaccine 11/04/2003, 01/07/2005, 10/21/2005, 11/08/2006, 10/07/2008, 09/13/2011, 12/01/2014, 11/05/2015, 10/10/2017, 09/17/2018    Influenza, MDCK Quadv, with preserv IM (Flucelvax 4 yrs and older) 10/05/2017    Influenza, Quadv, adjuvanted, 65 yrs +, IM, PF (Fluad) 10/15/2020    Influenza, Triv, inactivated, subunit, adjuvanted, IM (Fluad 65 yrs and older) 09/25/2019    Pneumococcal Polysaccharide ([id number]) 11/27/2019    Td Vaccine >6yo [facility] 07/08/1998    Tdap (Boostrix, Adacel) 12/07/2017    Zoster Recombinant (Shingrix) 07/12/2018        Health Maintenance   Topic Date Due    Shingles Vaccine (2 of 2) 09/06/2018    Annual Wellness Visit (AWV)  05/29/2019    Cervical cancer screen  10/11/2020    Potassium monitoring  02/25/2021    Creatinine monitoring  02/25/2021    Lipid screen  03/16/2021    Diabetic retinal exam  04/28/2021    Colon cancer screen colonoscopy  09/24/2021    Diabetic foot exam  10/15/2021    A1C test (Diabetic or Prediabetic)  10/15/2021    Diabetic microalbuminuria test  10/15/2021    Breast cancer screen  08/12/2022    DTaP/Tdap/Td vaccine (2 - Td) 12/07/2027    Flu vaccine  Completed    Hepatitis C screen  Completed    HIV screen  Completed    Hepatitis A vaccine  Aged Out    Hib vaccine  Aged Out    Meningococcal (ACWY) vaccine  Aged Out    Pneumococcal 0-64 years Vaccine  Aged Out     Recommendations for [facility] Due: see orders and patient instructions/AVS.  .   Recommended screening schedule for the next 5-10 years is provided to the patient in written form: see Patient Instructions/AVS.    Ann White was seen today for medicare awv and health maintenance. Diagnoses and all orders for this visit:    Routine general medical examination at a health care facility    Need for prophylactic vaccination and inoculation against varicella  -     zoster recombinant adjuvanted vaccine [facility]) 50 MCG/0.5ML SUSR injection; Inject 0.5 mLs into the muscle once for 1 dose      Not addressed only refill medication  Type 2 diabetes mellitus without complication, without long-term current use of insulin (HCC)  -     sAXagliptin (ONGLYZA) 2.5 MG TABS tablet; TAKE 1 TABLET DAILY                Advance Care Planning   Advanced Care Planning: Discussed the patients choices for care and treatment in case of a health event that adversely affects decision-making abilities. Also discussed the patients long-term treatment options. Reviewed with the patient the [address] [place] [address] of [address] [place] [address] Declaration forms  Reviewed the process of designating a competent adult as an Agent (or -in-fact) that could take make health care decisions for the patient if incompetent. Patient was asked to complete the declaration forms, either acknowledge the forms by a public notary or an eligible witness and provide a signed copy to the practice office. Time spent (minutes): 15      Obesity Counseling: Assessed behavioral health risks and factors affecting choice of behavior. Suggested weight control approaches, including dietary changes behavioral modification and follow up plan. Provided educational and support documentation. She is getting set up at the weight center at [facility]. Time spent (minutes): 10    Cardiovascular Disease Risk Counseling: Assessed the patient's risk to develop cardiovascular disease and reviewed main risk factors.    Reviewed steps to reduce disease risk including:   · Quitting tobacco use, reducing amount smoked, or not starting the habit  · Making healthy food choices  · Being physically active and gradualy increasing activity levels   · Reduce weight and determine a healthy BMI goal  · Monitor blood pressure and treat if higher than 140/90 mmHg  · Maintain blood total cholesterol levels under 5 mmol/l or 190 mg/dl  · Maintain LDL cholesterol levels under 3.0 mmol/l or 115 mg/dl   · Control blood glucose levels  · Consider taking aspirin (75 mg daily), once blood pressure is controlled   Provided a follow up plan.   Time spent (minutes): 10

## 2020-12-14 ENCOUNTER — HOSPITAL ENCOUNTER (EMERGENCY)
Age: 65
Discharge: HOME | End: 2020-12-14
Payer: COMMERCIAL

## 2020-12-14 ENCOUNTER — APPOINTMENT (OUTPATIENT)
Dept: CT IMAGING | Age: 65
End: 2020-12-14
Payer: COMMERCIAL

## 2020-12-14 ENCOUNTER — TELEPHONE (OUTPATIENT)
Dept: FAMILY MEDICINE CLINIC | Age: 65
End: 2020-12-14

## 2020-12-14 VITALS
HEART RATE: 72 BPM | RESPIRATION RATE: 18 BRPM | WEIGHT: 190 LBS | BODY MASS INDEX: 38.3 KG/M2 | TEMPERATURE: 98.2 F | DIASTOLIC BLOOD PRESSURE: 76 MMHG | OXYGEN SATURATION: 95 % | SYSTOLIC BLOOD PRESSURE: 186 MMHG | HEIGHT: 59 IN

## 2020-12-14 PROCEDURE — 72131 CT LUMBAR SPINE W/O DYE: CPT

## 2020-12-14 PROCEDURE — 99283 EMERGENCY DEPT VISIT LOW MDM: CPT

## 2020-12-14 RX ORDER — OXYCODONE HYDROCHLORIDE AND ACETAMINOPHEN 5; 325 MG/1; MG/1
1-2 TABLET ORAL EVERY 6 HOURS PRN
Qty: 20 TABLET | Refills: 0 | Status: SHIPPED | OUTPATIENT
Start: 2020-12-14 | End: 2020-12-19

## 2020-12-14 RX ORDER — DEXAMETHASONE SODIUM PHOSPHATE 10 MG/ML
10 INJECTION, SOLUTION INTRAMUSCULAR; INTRAVENOUS ONCE
Status: DISCONTINUED | OUTPATIENT
Start: 2020-12-14 | End: 2020-12-14 | Stop reason: HOSPADM

## 2020-12-14 RX ORDER — MORPHINE SULFATE 10 MG/ML
6 INJECTION, SOLUTION INTRAMUSCULAR; INTRAVENOUS ONCE
Status: DISCONTINUED | OUTPATIENT
Start: 2020-12-14 | End: 2020-12-14 | Stop reason: HOSPADM

## 2020-12-14 ASSESSMENT — PAIN SCALES - GENERAL: PAINLEVEL_OUTOF10: 10

## 2020-12-14 ASSESSMENT — PAIN DESCRIPTION - PAIN TYPE: TYPE: CHRONIC PAIN

## 2020-12-14 ASSESSMENT — PAIN DESCRIPTION - LOCATION: LOCATION: BACK

## 2020-12-14 ASSESSMENT — PAIN DESCRIPTION - FREQUENCY: FREQUENCY: CONTINUOUS

## 2020-12-14 NOTE — ED PROVIDER NOTES
[facility]                                                                                                                                    Department of Emergency Medicine   ED  Provider Note  Admit Date/RoomTime: 12/14/2020  3:24 PM  ED Room: [place] [address]/[facility]-02        HPI:  12/14/20,   Time: 3:34 PM ANNE MARIE Parks is a 63 y.o. female presenting to the ED for back and left leg pain, beginning over a week ago. The complaint has been persistent, moderate in severity, and worsened by walking. Pt states she has had back pain off and on for years. She is in process of establishing with new specialist at [facility]. Last MRI was 2017. Denies new fall or trauma. States pain just got worse out of the blue. Feels like her left leg is weak and numb. .   She has been using a walker at home. She is on NSAIDs for pain without relief. New onset DM Type 2. No insulin at this time. Denies fever/chills. Denies loss of B/B control   No saddle numbness or parasthesia.            ROS:     Constitutional: Negative for fever and chills  HENT: Negative for ear pain, sore throat and sinus pressure  Eyes: Negative for pain, discharge and redness  Respiratory:  Negative for shortness of breath, cough and wheezing  Cardiovascular: Negative for CP, edema or palpitations  Gastrointestinal: Negative for nausea, vomiting, diarrhea and abdominal distention  Genitourinary: Negative for dysuria and frequency  Musculoskeletal: See HPI  Skin: Negative for rash and wound  Neurological:  See HPI  Hematological: Negative for adenopathy    All other systems reviewed and are negative      -------------------------------- PAST HISTORY ----------------------------------  Past Medical History:  has a past medical history of Acute left ankle pain, Cancer ([address] Chronic back pain, Diabetes mellitus ([address] Drug-induced constipation, Eosinophil count raised, Fall at home, Fibromyalgia, GERD (gastroesophageal reflux disease), Hypercalcemia, Hyperlipidemia, Hypertension, Obesity, Osteoarthritis, PONV (postoperative nausea and vomiting), Postmenopausal bleeding, Rib contusion, left, initial encounter, and Urinary incontinence. Past Surgical History:  has a past surgical history that includes  section; Tubal ligation; Tonsillectomy (28 YRS OLD); Foot surgery (Bilateral); shoulder surgery (Left, ); Cholecystectomy; Colon surgery; Nerve Block (Right, 2017); Nerve Block (Left, 2018); pr inject si joint arthrgrphy&/anes/steroid w/image (Left, 2018); epidural steroid injection (Right, 2019); Nerve Block (Bilateral, 08/15/2019); Anesthesia Nerve Block (Bilateral, 8/15/2019); and Dilation and curettage of uterus (N/A, 10/23/2019). Social History:  reports that she has never smoked. She has never used smokeless tobacco. She reports previous alcohol use. She reports that she does not use drugs. Family History: family history includes Asthma in her mother; Heart Disease in her father; Mental Illness in her mother; Thyroid Disease in her sister, sister, and sister. The patients home medications have been reviewed. Allergies: Diclofenac, Diclofenac sodium, Lisinopril, Lyrica [pregabalin], and Sulfa antibiotics    --------------------------------- RESULTS ------------------------------------------  All laboratory and radiology results have been personally reviewed by myself   LABS:  No results found for this visit on 20. RADIOLOGY:  Interpreted by RadiologistKiana Oro   Final Result   No CT evidence of definite acute skeletal pathology.    Multilevel degenerative change, spondylolisthesis, disc narrowing, and   degenerative gas formation   Soft tissue windows suggest multilevel neural foraminal stenosis, posterior   disc bulge, and central canal stenosis          ----------------- NURSING NOTES AND VITALS REVIEWED ---------------   The nursing notes within the ED encounter diabetic. She is advised that her sugar may be up in the next few days. Follow-up with her PCP. MRI if symptoms persist        Counseling: The emergency provider has spoken with the patient and discussed todays results, in addition to providing specific details for the plan of care and counseling regarding the diagnosis and prognosis. Questions are answered at this time and they are agreeable with the plan.      ------------------------ IMPRESSION AND DISPOSITION -------------------------------    IMPRESSION  1. Left leg pain    2.  Sciatica of left side        DISPOSITION  Disposition: Discharge to home  Patient condition is stable                   Pablo Perez PA-C  12/15/20 5437

## 2020-12-17 ENCOUNTER — TELEPHONE (OUTPATIENT)
Dept: FAMILY MEDICINE CLINIC | Age: 65
End: 2020-12-17

## 2020-12-17 PROBLEM — R20.0 NUMBNESS IN LEFT LEG: Status: ACTIVE | Noted: 2020-12-17

## 2020-12-17 RX ORDER — ATORVASTATIN CALCIUM 10 MG/1
10 TABLET, FILM COATED ORAL NIGHTLY
Qty: 90 TABLET | Refills: 3 | Status: SHIPPED
Start: 2020-12-17 | End: 2021-08-04 | Stop reason: SDUPTHER

## 2020-12-17 NOTE — TELEPHONE ENCOUNTER
Pt called she went to ER was given rx for percocet and 2 injection and was told to F/U with pcp, Left leg is numb.      Please call patient [phone]

## 2020-12-17 NOTE — TELEPHONE ENCOUNTER
Patient called was in the ER on Monday had CT scan done for new onset of numbness left leg numbness from the hip down to the toes in the left foot worsening on Saturday. If she moves to quickly will get an electric shock through her body. She has been to several pain management places and has had multiple injections. She is now using a walker and has fallen twice, once on Friday and then again on Saturday. She was given multiple injections at ER and percocet. Recommendation is MRI of the lumbar spine. She denies any loss of bowel or bladder function. .     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 Moderate abdominal aortic plaque without evidence of aneurysm. Slight lumbar spine curvature with mid left convex apex. Vertebral compression fracture: None   Spondylolysis: None   Anterolisthesis: L4-5 grade 1   Retrolisthesis: T12-L1 trace, L1-2 trace   Disc narrowing: T11-12 moderate, L2-3 slight to moderate, L4-5 slight   Discogenic degenerative gas formation: T11-12, T12-L1, L2-3, L4-5   Multilevel degenerative hypertrophic change is present at the facet joints,   ligamentum flavum, and vertebral endplates. Along with disc bulge, these   contribute to neural foraminal stenosis. Soft tissue window suggest stenosis   as follows:   Right neural foraminal stenosis: L3-4 minimal, L4-5 moderate to severe   Left neural foraminal stenosis: L2-3 minimal, L4-5 slight   Posterior vertebral endplate bone spurring: T11-12 slight, T12-L1 slight,   L2-3 slight, L4-5 slight   Disc pathology and the central canal are difficult to assess with CT and   better evaluated with MRI.    The visualized soft tissues suggest:   Diffuse disc bulge: T12-L1 slight, L1-2 slight, L2-3 slight to moderate, L3-4   slight, L4-5 slight   Central canal stenosis: L2-3 slight, L3-4 slight, L4-5 moderate to severe   Status post cholecystectomy.       Impression   No CT evidence of definite acute skeletal pathology. Multilevel degenerative change, spondylolisthesis, disc narrowing, and   degenerative gas formation   Soft tissue windows suggest multilevel neural foraminal stenosis, posterior   disc bulge, and central canal stenosis     Hospital will call to get scheduled.

## 2020-12-20 ENCOUNTER — APPOINTMENT (OUTPATIENT)
Dept: MRI IMAGING | Age: 65
End: 2020-12-20
Payer: MEDICAID

## 2020-12-20 ENCOUNTER — HOSPITAL ENCOUNTER (INPATIENT)
Age: 65
LOS: 10 days | Discharge: NURSING, REHAB OR LONG-TERM CARE | DRG: 459 | End: 2020-12-30
Attending: FAMILY MEDICINE | Admitting: INTERNAL MEDICINE
Payer: MEDICAID

## 2020-12-20 ENCOUNTER — HOSPITAL ENCOUNTER (EMERGENCY)
Age: 65
Discharge: ANOTHER HOSPITAL | End: 2020-12-20
Attending: EMERGENCY MEDICINE
Payer: MEDICAID

## 2020-12-20 VITALS
DIASTOLIC BLOOD PRESSURE: 81 MMHG | OXYGEN SATURATION: 98 % | BODY MASS INDEX: 40.32 KG/M2 | HEART RATE: 77 BPM | TEMPERATURE: 98.3 F | WEIGHT: 200 LBS | SYSTOLIC BLOOD PRESSURE: 152 MMHG | HEIGHT: 59 IN | RESPIRATION RATE: 16 BRPM

## 2020-12-20 PROBLEM — M48.00 SPINAL STENOSIS: Status: ACTIVE | Noted: 2020-12-20

## 2020-12-20 LAB
ANION GAP SERPL CALCULATED.3IONS-SCNC: 15 MMOL/L (ref 7–16)
BASOPHILS ABSOLUTE: 0.03 E9/L (ref 0–0.2)
BASOPHILS RELATIVE PERCENT: 0.5 % (ref 0–2)
BUN BLDV-MCNC: 12 MG/DL (ref 8–23)
CALCIUM SERPL-MCNC: 9.4 MG/DL (ref 8.6–10.2)
CHLORIDE BLD-SCNC: 98 MMOL/L (ref 98–107)
CO2: 27 MMOL/L (ref 22–29)
CREAT SERPL-MCNC: 0.6 MG/DL (ref 0.5–1)
EOSINOPHILS ABSOLUTE: 0.36 E9/L (ref 0.05–0.5)
EOSINOPHILS RELATIVE PERCENT: 5.5 % (ref 0–6)
GFR AFRICAN AMERICAN: >60
GFR NON-AFRICAN AMERICAN: >60 ML/MIN/1.73
GLUCOSE BLD-MCNC: 151 MG/DL (ref 74–99)
HCT VFR BLD CALC: 42.5 % (ref 34–48)
HEMOGLOBIN: 14.2 G/DL (ref 11.5–15.5)
IMMATURE GRANULOCYTES #: 0.11 E9/L
IMMATURE GRANULOCYTES %: 1.7 % (ref 0–5)
LYMPHOCYTES ABSOLUTE: 1.64 E9/L (ref 1.5–4)
LYMPHOCYTES RELATIVE PERCENT: 24.8 % (ref 20–42)
MAGNESIUM: 1.8 MG/DL (ref 1.6–2.6)
MCH RBC QN AUTO: 29.3 PG (ref 26–35)
MCHC RBC AUTO-ENTMCNC: 33.4 % (ref 32–34.5)
MCV RBC AUTO: 87.8 FL (ref 80–99.9)
METER GLUCOSE: 205 MG/DL (ref 74–99)
MONOCYTES ABSOLUTE: 0.43 E9/L (ref 0.1–0.95)
MONOCYTES RELATIVE PERCENT: 6.5 % (ref 2–12)
NEUTROPHILS ABSOLUTE: 4.03 E9/L (ref 1.8–7.3)
NEUTROPHILS RELATIVE PERCENT: 61 % (ref 43–80)
PDW BLD-RTO: 13.1 FL (ref 11.5–15)
PHOSPHORUS: 2.8 MG/DL (ref 2.5–4.5)
PLATELET # BLD: 225 E9/L (ref 130–450)
PMV BLD AUTO: 10.1 FL (ref 7–12)
POTASSIUM REFLEX MAGNESIUM: 3.9 MMOL/L (ref 3.5–5)
RBC # BLD: 4.84 E12/L (ref 3.5–5.5)
SODIUM BLD-SCNC: 140 MMOL/L (ref 132–146)
WBC # BLD: 6.6 E9/L (ref 4.5–11.5)

## 2020-12-20 PROCEDURE — 96374 THER/PROPH/DIAG INJ IV PUSH: CPT

## 2020-12-20 PROCEDURE — 2580000003 HC RX 258: Performed by: FAMILY MEDICINE

## 2020-12-20 PROCEDURE — 6360000002 HC RX W HCPCS: Performed by: EMERGENCY MEDICINE

## 2020-12-20 PROCEDURE — 6370000000 HC RX 637 (ALT 250 FOR IP): Performed by: FAMILY MEDICINE

## 2020-12-20 PROCEDURE — 80048 BASIC METABOLIC PNL TOTAL CA: CPT

## 2020-12-20 PROCEDURE — 82962 GLUCOSE BLOOD TEST: CPT

## 2020-12-20 PROCEDURE — 72148 MRI LUMBAR SPINE W/O DYE: CPT

## 2020-12-20 PROCEDURE — 84100 ASSAY OF PHOSPHORUS: CPT

## 2020-12-20 PROCEDURE — 36415 COLL VENOUS BLD VENIPUNCTURE: CPT

## 2020-12-20 PROCEDURE — 83735 ASSAY OF MAGNESIUM: CPT

## 2020-12-20 PROCEDURE — 96375 TX/PRO/DX INJ NEW DRUG ADDON: CPT

## 2020-12-20 PROCEDURE — 99285 EMERGENCY DEPT VISIT HI MDM: CPT

## 2020-12-20 PROCEDURE — 85025 COMPLETE CBC W/AUTO DIFF WBC: CPT

## 2020-12-20 PROCEDURE — 1200000000 HC SEMI PRIVATE

## 2020-12-20 PROCEDURE — 6360000002 HC RX W HCPCS: Performed by: FAMILY MEDICINE

## 2020-12-20 RX ORDER — NICOTINE POLACRILEX 4 MG
15 LOZENGE BUCCAL PRN
Status: DISCONTINUED | OUTPATIENT
Start: 2020-12-20 | End: 2020-12-30 | Stop reason: HOSPADM

## 2020-12-20 RX ORDER — MORPHINE SULFATE 2 MG/ML
2 INJECTION, SOLUTION INTRAMUSCULAR; INTRAVENOUS ONCE
Status: COMPLETED | OUTPATIENT
Start: 2020-12-20 | End: 2020-12-20

## 2020-12-20 RX ORDER — DEXAMETHASONE SODIUM PHOSPHATE 4 MG/ML
4 INJECTION, SOLUTION INTRA-ARTICULAR; INTRALESIONAL; INTRAMUSCULAR; INTRAVENOUS; SOFT TISSUE EVERY 6 HOURS
Status: COMPLETED | OUTPATIENT
Start: 2020-12-20 | End: 2020-12-21

## 2020-12-20 RX ORDER — ONDANSETRON 2 MG/ML
4 INJECTION INTRAMUSCULAR; INTRAVENOUS EVERY 6 HOURS PRN
Status: DISCONTINUED | OUTPATIENT
Start: 2020-12-20 | End: 2020-12-30 | Stop reason: HOSPADM

## 2020-12-20 RX ORDER — OXYCODONE HYDROCHLORIDE AND ACETAMINOPHEN 5; 325 MG/1; MG/1
1 TABLET ORAL EVERY 4 HOURS PRN
Status: DISCONTINUED | OUTPATIENT
Start: 2020-12-20 | End: 2020-12-26

## 2020-12-20 RX ORDER — CYCLOBENZAPRINE HCL 10 MG
10 TABLET ORAL 3 TIMES DAILY PRN
Status: DISCONTINUED | OUTPATIENT
Start: 2020-12-20 | End: 2020-12-30 | Stop reason: HOSPADM

## 2020-12-20 RX ORDER — ACETAMINOPHEN 325 MG/1
650 TABLET ORAL EVERY 6 HOURS PRN
Status: DISCONTINUED | OUTPATIENT
Start: 2020-12-20 | End: 2020-12-30 | Stop reason: HOSPADM

## 2020-12-20 RX ORDER — DEXTROSE MONOHYDRATE 50 MG/ML
100 INJECTION, SOLUTION INTRAVENOUS PRN
Status: DISCONTINUED | OUTPATIENT
Start: 2020-12-20 | End: 2020-12-30 | Stop reason: HOSPADM

## 2020-12-20 RX ORDER — ONDANSETRON 2 MG/ML
4 INJECTION INTRAMUSCULAR; INTRAVENOUS ONCE
Status: COMPLETED | OUTPATIENT
Start: 2020-12-20 | End: 2020-12-20

## 2020-12-20 RX ORDER — SODIUM CHLORIDE 0.9 % (FLUSH) 0.9 %
10 SYRINGE (ML) INJECTION PRN
Status: DISCONTINUED | OUTPATIENT
Start: 2020-12-20 | End: 2020-12-30 | Stop reason: HOSPADM

## 2020-12-20 RX ORDER — METHYLPREDNISOLONE SODIUM SUCCINATE 125 MG/2ML
80 INJECTION, POWDER, LYOPHILIZED, FOR SOLUTION INTRAMUSCULAR; INTRAVENOUS ONCE
Status: COMPLETED | OUTPATIENT
Start: 2020-12-20 | End: 2020-12-20

## 2020-12-20 RX ORDER — MORPHINE SULFATE 2 MG/ML
2 INJECTION, SOLUTION INTRAMUSCULAR; INTRAVENOUS EVERY 4 HOURS PRN
Status: DISCONTINUED | OUTPATIENT
Start: 2020-12-20 | End: 2020-12-26

## 2020-12-20 RX ORDER — ACETAMINOPHEN 650 MG/1
650 SUPPOSITORY RECTAL EVERY 6 HOURS PRN
Status: DISCONTINUED | OUTPATIENT
Start: 2020-12-20 | End: 2020-12-30 | Stop reason: HOSPADM

## 2020-12-20 RX ORDER — SODIUM CHLORIDE 0.9 % (FLUSH) 0.9 %
10 SYRINGE (ML) INJECTION EVERY 12 HOURS SCHEDULED
Status: DISCONTINUED | OUTPATIENT
Start: 2020-12-20 | End: 2020-12-30 | Stop reason: HOSPADM

## 2020-12-20 RX ORDER — DEXTROSE MONOHYDRATE 25 G/50ML
12.5 INJECTION, SOLUTION INTRAVENOUS PRN
Status: DISCONTINUED | OUTPATIENT
Start: 2020-12-20 | End: 2020-12-30 | Stop reason: HOSPADM

## 2020-12-20 RX ORDER — PROMETHAZINE HYDROCHLORIDE 25 MG/1
12.5 TABLET ORAL EVERY 6 HOURS PRN
Status: DISCONTINUED | OUTPATIENT
Start: 2020-12-20 | End: 2020-12-30 | Stop reason: HOSPADM

## 2020-12-20 RX ADMIN — Medication 10 ML: at 23:00

## 2020-12-20 RX ADMIN — DEXAMETHASONE SODIUM PHOSPHATE 4 MG: 4 INJECTION, SOLUTION INTRA-ARTICULAR; INTRALESIONAL; INTRAMUSCULAR; INTRAVENOUS; SOFT TISSUE at 23:00

## 2020-12-20 RX ADMIN — MORPHINE SULFATE 2 MG: 2 INJECTION, SOLUTION INTRAMUSCULAR; INTRAVENOUS at 19:04

## 2020-12-20 RX ADMIN — ONDANSETRON 4 MG: 2 INJECTION INTRAMUSCULAR; INTRAVENOUS at 17:59

## 2020-12-20 RX ADMIN — OXYCODONE AND ACETAMINOPHEN 1 TABLET: 5; 325 TABLET ORAL at 23:00

## 2020-12-20 RX ADMIN — INSULIN LISPRO 4 UNITS: 100 INJECTION, SOLUTION INTRAVENOUS; SUBCUTANEOUS at 23:02

## 2020-12-20 RX ADMIN — METHYLPREDNISOLONE SODIUM SUCCINATE 80 MG: 125 INJECTION, POWDER, FOR SOLUTION INTRAMUSCULAR; INTRAVENOUS at 19:03

## 2020-12-20 ASSESSMENT — ENCOUNTER SYMPTOMS
NAUSEA: 0
DIARRHEA: 0
BACK PAIN: 1
ABDOMINAL PAIN: 0
WHEEZING: 0
CONSTIPATION: 1
COUGH: 0
SORE THROAT: 0
EYE PAIN: 0
SHORTNESS OF BREATH: 0
VOMITING: 0

## 2020-12-20 ASSESSMENT — PAIN DESCRIPTION - FREQUENCY: FREQUENCY: CONTINUOUS

## 2020-12-20 ASSESSMENT — PAIN SCALES - GENERAL
PAINLEVEL_OUTOF10: 0
PAINLEVEL_OUTOF10: 10

## 2020-12-20 ASSESSMENT — PAIN DESCRIPTION - PAIN TYPE
TYPE: ACUTE PAIN
TYPE: ACUTE PAIN

## 2020-12-20 ASSESSMENT — PAIN DESCRIPTION - ONSET: ONSET: ON-GOING

## 2020-12-20 ASSESSMENT — PAIN DESCRIPTION - LOCATION: LOCATION: LEG

## 2020-12-20 ASSESSMENT — PAIN - FUNCTIONAL ASSESSMENT: PAIN_FUNCTIONAL_ASSESSMENT: PREVENTS OR INTERFERES SOME ACTIVE ACTIVITIES AND ADLS

## 2020-12-20 ASSESSMENT — PAIN DESCRIPTION - ORIENTATION
ORIENTATION: RIGHT;LEFT
ORIENTATION: RIGHT;LEFT

## 2020-12-20 NOTE — ED NOTES
Bed: 21  Expected date:   Expected time:   Means of arrival:   Comments:  Geno Lopez RN  12/20/20 9361

## 2020-12-21 ENCOUNTER — APPOINTMENT (OUTPATIENT)
Dept: MRI IMAGING | Age: 65
DRG: 459 | End: 2020-12-21
Attending: FAMILY MEDICINE
Payer: MEDICAID

## 2020-12-21 PROBLEM — R29.898 WEAKNESS OF BOTH LEGS: Status: ACTIVE | Noted: 2020-12-21

## 2020-12-21 PROBLEM — M54.9 BACK PAIN: Status: ACTIVE | Noted: 2020-12-21

## 2020-12-21 PROBLEM — K59.00 CONSTIPATION: Status: ACTIVE | Noted: 2020-02-18

## 2020-12-21 PROBLEM — R33.9 URINARY RETENTION: Status: ACTIVE | Noted: 2020-12-21

## 2020-12-21 PROBLEM — R26.2 AMBULATORY DYSFUNCTION: Status: ACTIVE | Noted: 2020-12-21

## 2020-12-21 LAB
ANION GAP SERPL CALCULATED.3IONS-SCNC: 13 MMOL/L (ref 7–16)
BASOPHILS ABSOLUTE: 0.01 E9/L (ref 0–0.2)
BASOPHILS RELATIVE PERCENT: 0.1 % (ref 0–2)
BUN BLDV-MCNC: 13 MG/DL (ref 8–23)
CALCIUM SERPL-MCNC: 10.1 MG/DL (ref 8.6–10.2)
CHLORIDE BLD-SCNC: 101 MMOL/L (ref 98–107)
CO2: 25 MMOL/L (ref 22–29)
CREAT SERPL-MCNC: 0.7 MG/DL (ref 0.5–1)
EOSINOPHILS ABSOLUTE: 0.01 E9/L (ref 0.05–0.5)
EOSINOPHILS RELATIVE PERCENT: 0.1 % (ref 0–6)
GFR AFRICAN AMERICAN: >60
GFR NON-AFRICAN AMERICAN: >60 ML/MIN/1.73
GLUCOSE BLD-MCNC: 281 MG/DL (ref 74–99)
HBA1C MFR BLD: 6.9 % (ref 4–5.6)
HCT VFR BLD CALC: 43.5 % (ref 34–48)
HEMOGLOBIN: 14.4 G/DL (ref 11.5–15.5)
IMMATURE GRANULOCYTES #: 0.13 E9/L
IMMATURE GRANULOCYTES %: 1.2 % (ref 0–5)
LYMPHOCYTES ABSOLUTE: 0.75 E9/L (ref 1.5–4)
LYMPHOCYTES RELATIVE PERCENT: 6.9 % (ref 20–42)
MCH RBC QN AUTO: 28.8 PG (ref 26–35)
MCHC RBC AUTO-ENTMCNC: 33.1 % (ref 32–34.5)
MCV RBC AUTO: 87 FL (ref 80–99.9)
METER GLUCOSE: 227 MG/DL (ref 74–99)
METER GLUCOSE: 247 MG/DL (ref 74–99)
METER GLUCOSE: 276 MG/DL (ref 74–99)
METER GLUCOSE: 292 MG/DL (ref 74–99)
MONOCYTES ABSOLUTE: 0.12 E9/L (ref 0.1–0.95)
MONOCYTES RELATIVE PERCENT: 1.1 % (ref 2–12)
NEUTROPHILS ABSOLUTE: 9.84 E9/L (ref 1.8–7.3)
NEUTROPHILS RELATIVE PERCENT: 90.6 % (ref 43–80)
PDW BLD-RTO: 13.1 FL (ref 11.5–15)
PLATELET # BLD: 235 E9/L (ref 130–450)
PMV BLD AUTO: 10.4 FL (ref 7–12)
POTASSIUM REFLEX MAGNESIUM: 4.3 MMOL/L (ref 3.5–5)
RBC # BLD: 5 E12/L (ref 3.5–5.5)
SODIUM BLD-SCNC: 139 MMOL/L (ref 132–146)
WBC # BLD: 10.9 E9/L (ref 4.5–11.5)

## 2020-12-21 PROCEDURE — 6360000004 HC RX CONTRAST MEDICATION: Performed by: RADIOLOGY

## 2020-12-21 PROCEDURE — 6360000002 HC RX W HCPCS: Performed by: FAMILY MEDICINE

## 2020-12-21 PROCEDURE — 83036 HEMOGLOBIN GLYCOSYLATED A1C: CPT

## 2020-12-21 PROCEDURE — 70553 MRI BRAIN STEM W/O & W/DYE: CPT

## 2020-12-21 PROCEDURE — 1200000000 HC SEMI PRIVATE

## 2020-12-21 PROCEDURE — 80048 BASIC METABOLIC PNL TOTAL CA: CPT

## 2020-12-21 PROCEDURE — 82962 GLUCOSE BLOOD TEST: CPT

## 2020-12-21 PROCEDURE — 85025 COMPLETE CBC W/AUTO DIFF WBC: CPT

## 2020-12-21 PROCEDURE — 2580000003 HC RX 258: Performed by: FAMILY MEDICINE

## 2020-12-21 PROCEDURE — 6360000002 HC RX W HCPCS: Performed by: NEUROLOGICAL SURGERY

## 2020-12-21 PROCEDURE — A9577 INJ MULTIHANCE: HCPCS | Performed by: RADIOLOGY

## 2020-12-21 PROCEDURE — 6370000000 HC RX 637 (ALT 250 FOR IP): Performed by: FAMILY MEDICINE

## 2020-12-21 PROCEDURE — 72146 MRI CHEST SPINE W/O DYE: CPT

## 2020-12-21 PROCEDURE — 36415 COLL VENOUS BLD VENIPUNCTURE: CPT

## 2020-12-21 RX ORDER — LORAZEPAM 2 MG/ML
1 INJECTION INTRAMUSCULAR ONCE
Status: COMPLETED | OUTPATIENT
Start: 2020-12-21 | End: 2020-12-21

## 2020-12-21 RX ORDER — LORAZEPAM 2 MG/ML
0.5 INJECTION INTRAMUSCULAR
Status: COMPLETED | OUTPATIENT
Start: 2020-12-21 | End: 2020-12-21

## 2020-12-21 RX ORDER — LORAZEPAM 1 MG/1
1 TABLET ORAL ONCE
Status: DISCONTINUED | OUTPATIENT
Start: 2020-12-21 | End: 2020-12-21

## 2020-12-21 RX ORDER — ATORVASTATIN CALCIUM 10 MG/1
10 TABLET, FILM COATED ORAL NIGHTLY
Status: DISCONTINUED | OUTPATIENT
Start: 2020-12-21 | End: 2020-12-30 | Stop reason: HOSPADM

## 2020-12-21 RX ORDER — AMITRIPTYLINE HYDROCHLORIDE 25 MG/1
25 TABLET, FILM COATED ORAL NIGHTLY PRN
Status: DISCONTINUED | OUTPATIENT
Start: 2020-12-21 | End: 2020-12-30 | Stop reason: HOSPADM

## 2020-12-21 RX ORDER — ALOGLIPTIN 12.5 MG/1
12.5 TABLET, FILM COATED ORAL DAILY
Refills: 2 | Status: DISCONTINUED | OUTPATIENT
Start: 2020-12-21 | End: 2020-12-30 | Stop reason: HOSPADM

## 2020-12-21 RX ORDER — CHOLECALCIFEROL (VITAMIN D3) 50 MCG
2000 TABLET ORAL DAILY
Status: DISCONTINUED | OUTPATIENT
Start: 2020-12-21 | End: 2020-12-30 | Stop reason: HOSPADM

## 2020-12-21 RX ORDER — PANTOPRAZOLE SODIUM 20 MG/1
20 TABLET, DELAYED RELEASE ORAL
Status: DISCONTINUED | OUTPATIENT
Start: 2020-12-21 | End: 2020-12-30 | Stop reason: HOSPADM

## 2020-12-21 RX ORDER — ALLOPURINOL 300 MG/1
300 TABLET ORAL DAILY
Status: DISCONTINUED | OUTPATIENT
Start: 2020-12-21 | End: 2020-12-30 | Stop reason: HOSPADM

## 2020-12-21 RX ORDER — ATENOLOL 50 MG/1
50 TABLET ORAL DAILY
Status: DISCONTINUED | OUTPATIENT
Start: 2020-12-21 | End: 2020-12-30 | Stop reason: HOSPADM

## 2020-12-21 RX ORDER — DOCUSATE SODIUM 100 MG/1
100 CAPSULE, LIQUID FILLED ORAL DAILY
Status: DISCONTINUED | OUTPATIENT
Start: 2020-12-21 | End: 2020-12-26

## 2020-12-21 RX ADMIN — DOCUSATE SODIUM 100 MG: 100 CAPSULE, LIQUID FILLED ORAL at 09:19

## 2020-12-21 RX ADMIN — Medication 2000 UNITS: at 09:19

## 2020-12-21 RX ADMIN — ALOGLIPTIN 12.5 MG: 12.5 TABLET, FILM COATED ORAL at 09:19

## 2020-12-21 RX ADMIN — DEXAMETHASONE SODIUM PHOSPHATE 4 MG: 4 INJECTION, SOLUTION INTRA-ARTICULAR; INTRALESIONAL; INTRAMUSCULAR; INTRAVENOUS; SOFT TISSUE at 06:00

## 2020-12-21 RX ADMIN — Medication 10 ML: at 09:23

## 2020-12-21 RX ADMIN — ATENOLOL 50 MG: 50 TABLET ORAL at 09:21

## 2020-12-21 RX ADMIN — CYCLOBENZAPRINE 10 MG: 10 TABLET, FILM COATED ORAL at 20:51

## 2020-12-21 RX ADMIN — DEXAMETHASONE SODIUM PHOSPHATE 4 MG: 4 INJECTION, SOLUTION INTRA-ARTICULAR; INTRALESIONAL; INTRAMUSCULAR; INTRAVENOUS; SOFT TISSUE at 17:16

## 2020-12-21 RX ADMIN — INSULIN LISPRO 6 UNITS: 100 INJECTION, SOLUTION INTRAVENOUS; SUBCUTANEOUS at 20:56

## 2020-12-21 RX ADMIN — LORAZEPAM 1 MG: 2 INJECTION INTRAMUSCULAR; INTRAVENOUS at 15:45

## 2020-12-21 RX ADMIN — ATORVASTATIN CALCIUM 10 MG: 10 TABLET, FILM COATED ORAL at 20:51

## 2020-12-21 RX ADMIN — OXYCODONE AND ACETAMINOPHEN 1 TABLET: 5; 325 TABLET ORAL at 20:53

## 2020-12-21 RX ADMIN — OXYCODONE AND ACETAMINOPHEN 1 TABLET: 5; 325 TABLET ORAL at 15:45

## 2020-12-21 RX ADMIN — SODIUM CHLORIDE, PRESERVATIVE FREE 10 ML: 5 INJECTION INTRAVENOUS at 09:37

## 2020-12-21 RX ADMIN — DEXAMETHASONE SODIUM PHOSPHATE 4 MG: 4 INJECTION, SOLUTION INTRA-ARTICULAR; INTRALESIONAL; INTRAMUSCULAR; INTRAVENOUS; SOFT TISSUE at 12:30

## 2020-12-21 RX ADMIN — SODIUM CHLORIDE, PRESERVATIVE FREE 10 ML: 5 INJECTION INTRAVENOUS at 17:17

## 2020-12-21 RX ADMIN — SODIUM CHLORIDE, PRESERVATIVE FREE 10 ML: 5 INJECTION INTRAVENOUS at 12:30

## 2020-12-21 RX ADMIN — OXYCODONE AND ACETAMINOPHEN 1 TABLET: 5; 325 TABLET ORAL at 09:17

## 2020-12-21 RX ADMIN — INSULIN LISPRO 4 UNITS: 100 INJECTION, SOLUTION INTRAVENOUS; SUBCUTANEOUS at 12:31

## 2020-12-21 RX ADMIN — INSULIN LISPRO 6 UNITS: 100 INJECTION, SOLUTION INTRAVENOUS; SUBCUTANEOUS at 09:06

## 2020-12-21 RX ADMIN — ALLOPURINOL 300 MG: 300 TABLET ORAL at 09:17

## 2020-12-21 RX ADMIN — LORAZEPAM 0.5 MG: 2 INJECTION INTRAMUSCULAR; INTRAVENOUS at 09:34

## 2020-12-21 RX ADMIN — INSULIN LISPRO 4 UNITS: 100 INJECTION, SOLUTION INTRAVENOUS; SUBCUTANEOUS at 17:26

## 2020-12-21 RX ADMIN — OXYCODONE AND ACETAMINOPHEN 1 TABLET: 5; 325 TABLET ORAL at 04:23

## 2020-12-21 RX ADMIN — GADOBENATE DIMEGLUMINE 18 ML: 529 INJECTION, SOLUTION INTRAVENOUS at 16:34

## 2020-12-21 ASSESSMENT — PAIN SCALES - GENERAL
PAINLEVEL_OUTOF10: 8
PAINLEVEL_OUTOF10: 5
PAINLEVEL_OUTOF10: 5
PAINLEVEL_OUTOF10: 0
PAINLEVEL_OUTOF10: 3
PAINLEVEL_OUTOF10: 7
PAINLEVEL_OUTOF10: 3

## 2020-12-21 ASSESSMENT — PAIN DESCRIPTION - ORIENTATION: ORIENTATION: RIGHT;LEFT

## 2020-12-21 ASSESSMENT — PAIN - FUNCTIONAL ASSESSMENT: PAIN_FUNCTIONAL_ASSESSMENT: PREVENTS OR INTERFERES SOME ACTIVE ACTIVITIES AND ADLS

## 2020-12-21 ASSESSMENT — PAIN DESCRIPTION - PAIN TYPE: TYPE: ACUTE PAIN

## 2020-12-21 ASSESSMENT — PAIN DESCRIPTION - ONSET: ONSET: OTHER (COMMENT)

## 2020-12-21 NOTE — ED PROVIDER NOTES
Patient is a 28-year-old female with a past medical history of chronic back pain, stenosis, hypertension, hyperlipidemia, diabetes presented to emergency department with leg pain and numbness. Symptoms severe in severity and constant since onset. Patient states she has an extensive history of back pain and stenosis. She has had injections in her back in the past.  Patient states she has had chronic back pain as worsened over the last 3 months and even worsening over the last week. She states she was seen and evaluated last week for back pain as she started having numbness and tingling down her left leg. She states her symptoms are controlled and she was discharged home. She has been doing okay at home and states the numbness and weakness in the left leg is gradually gotten worse. Today she woke up but she cannot even lift up her leg or lift it off the side of the bed. This is new for her. She states she was weak a little before but has never been this bad. Attempting to ambulate makes her symptoms worse because she cannot move the leg, nothing makes her symptoms better. She states she has not felt like she has had to urinate. She states she will go and sit on the toilet because she knows she has not peed in a while and she will start peeing, she hears the fluid hitting the toilet bowl but does not feel like she is peeing. She also states she has been constipated over the last few days. Patient has an appointment to follow-up with neurosurgeon in [facility] on Tuesday. Review of Systems   Constitutional: Negative for chills and fever. HENT: Negative for congestion and sore throat. Eyes: Negative for pain and visual disturbance. Respiratory: Negative for cough, shortness of breath and wheezing. Cardiovascular: Negative for chest pain. Gastrointestinal: Positive for constipation. Negative for abdominal pain, diarrhea, nausea and vomiting.    Genitourinary: Positive for decreased urine volume. Negative for dysuria and frequency. Musculoskeletal: Positive for back pain and gait problem. Negative for arthralgias. Skin: Negative for rash and wound. Neurological: Positive for weakness and numbness. Negative for headaches. Hematological: Negative for adenopathy. All other systems reviewed and are negative. Physical Exam  Vitals signs and nursing note reviewed. Constitutional:       General: She is not in acute distress. Appearance: She is well-developed. She is not ill-appearing. HENT:      Head: Normocephalic and atraumatic. Eyes:      Extraocular Movements: Extraocular movements intact. Neck:      Musculoskeletal: Normal range of motion. Cardiovascular:      Rate and Rhythm: Normal rate and regular rhythm. Pulses: Normal pulses. Pulmonary:      Effort: Pulmonary effort is normal. No respiratory distress. Breath sounds: Normal breath sounds. No wheezing or rales. Abdominal:      Palpations: Abdomen is soft. Tenderness: There is no abdominal tenderness. There is no guarding or rebound. Musculoskeletal:      Comments: 0 out of 5 muscle strength in the left lower extremity. When you lift up her leg falls completely to the ground. Cannot wiggle toes. Palpable DP and PT pulses bilaterally. Compartments soft and compressible. Diminished sensation in the left lower extremity and saddle paresthesias. No midline back tenderness. Skin:     General: Skin is warm and dry. Capillary Refill: Capillary refill takes less than 2 seconds. Neurological:      Mental Status: She is alert and oriented to person, place, and time. Cranial Nerves: No cranial nerve deficit. Sensory: Sensory deficit present. Motor: Weakness present. Procedures     MDM  Number of Diagnoses or Management Options  Patient presented to the emergency room with weakness and numbness.   On initial exam patient with 0 out of 5 muscle strength the left lower extremity and sensation deficits. There is also concern for possible bladder dysfunction as patient does not feel like she is urinating. There is immediate concern for possible cauda equina or other acute neurological problem of the lumbar spine. Patient afebrile, denying history of IV drug abuse, no infectious-like symptoms, epidural abscess less concerning at this time. Patient with history of severe stenosis. Lab work and imaging studies obtained. Labs essentially unremarkable. An MRI was performed demonstrating L4-L5 severe spinal canal stenosis. On repeat evaluation patient has a mild improvement in has 1 out of 5 muscle strength in the left lower extremity. With patient's significant deficits and inability to ambulate, she would benefit from inpatient evaluation and care. We have no neurosurgeon services at this hospital and long discussion had with patient if she would want to go to [facility] versus [facility] as she had an appointment with a neurosurgeon from [facility] on Tuesday. Patient states she would like to be transferred to [facility] for consultation. Consult placed to Dr. Brisa Iraheta who will see in consultation as well as on-call hospitalist who accepted for admission. Patient will be transferred to [facility].      ED Course as of Dec 20 2035   Sun Dec 20, 2020   1531 ATTENDING PROVIDER ATTESTATION:     Lord Max presented to the emergency department for evaluation of [unfilled]  I have reviewed and discussed the case, including pertinent history (medical, surgical, family and social) and exam findings with the Midlevel and the Nurse assigned to Lord Max. I have personally performed and/or participated in the history, exam, medical decision making, and procedures and agree with all pertinent clinical information.        I have reviewed my findings and recommendations with Lord Max and members of family present at the time of disposition. My findings/plan: Patient is a 64 y/o female who presents with a chief complaint of left leg weakness and pain. For the past couple of months the patient has been experiencing low back pain mostly on the left side that goes down to the back of her left knee. The patient states is her getting worse but a week ago when she was presented to the emergency department. CT of the lumbar spine which showed neural foraminal stenosis, posterior disc bulging and central canal stenosis. Patient was given steroids at that time she is been taking Percocet for her pain. Her pain and symptoms were not improving so she contacted her PCP and they arrange for an MRI tomorrow. She supposed be seeing a spine specialist from [facility] through [facility] in 2 days. Patient that the pain got so bad and she now has weakness she could not get out of bed today so she called came into the emergency department. The patient states that she will file she has used restroom when she sits on the toilet she does not know she is urinating till she hears the urine hitting the water. She has not had a bowel movement in a week, but is not sure if this is normal due to her multiple bowel surgeries. The patient cannot move her left leg which is abnormal for her. No recent trauma or falls. Kiara Ruff DO      [MS]   7175 Continuation of attestation:Patient on examination is resting comfortably in bed. Clear breath sounds in all lung fields. Regular rate and rhythm of the heart. No abdominal tenderness to palpation. No lower extremity edema present. Patient does not have any midline lumbar tenderness to palpation. She does have mild paraspinal tenderness of the left low back. The patient does have weakness of the left lower extremity, 0 effort against gravity. Patient is not able to wiggle her toes.   She does have sensory deficit mostly of the saddle area and the entire left lower extremity. DP and PT +2.    [MS]   0932 Spoke with MRI, recommending MRI questionnaire and she will be here in 35 to 40 minutes. [KP]   7983 Spoke with ruby and updated about imaging studies. Discussed the need to transfer to somewhere that has neurosurgery availability. Discussed sales with [facility] as well as [facility] as that is where she has an appointment scheduled for Tuesday. Patient wants to speak with family and decide. [KP]   0755 Patient requesting [facility] for transfer for neurosurgical evaluation. [KP]   [id number] Spoke with Dr. Rivero Radha will provide consultation. [KP]   1924 Spoke with Dr. Sruthi Rivera, will admit. []      ED Course User Index  [] Freda Mas DO  [MS] Yaz Eduardo, DO      ----------------------------------------------- PAST HISTORY --------------------------------------------  Past Medical History:  has a past medical history of Acute left ankle pain, Cancer ([facility] [address] Chronic back pain, Diabetes mellitus ([facility] [address] Drug-induced constipation, Eosinophil count raised, Fall at home, Fibromyalgia, GERD (gastroesophageal reflux disease), Hypercalcemia, Hyperlipidemia, Hypertension, Obesity, Osteoarthritis, PONV (postoperative nausea and vomiting), Postmenopausal bleeding, Rib contusion, left, initial encounter, and Urinary incontinence. Past Surgical History:  has a past surgical history that includes  section; Tubal ligation; Tonsillectomy (28 YRS OLD); Foot surgery (Bilateral); shoulder surgery (Left, ); Cholecystectomy; Colon surgery; Nerve Block (Right, 2017); Nerve Block (Left, 2018); pr inject si joint arthrgrphy&/anes/steroid w/image (Left, 2018); epidural steroid injection (Right, 2019); Nerve Block (Bilateral, 08/15/2019); Anesthesia Nerve Block (Bilateral, 8/15/2019); and Dilation and curettage of uterus (N/A, 10/23/2019).     Social History:  reports that she has never smoked. She has never used smokeless tobacco. She reports previous alcohol use. She reports that she does not use drugs. Family History: family history includes Asthma in her mother; Heart Disease in her father; Mental Illness in her mother; Thyroid Disease in her sister, sister, and sister. The patients home medications have been reviewed.     Allergies: Diclofenac, Diclofenac sodium, Lisinopril, Lyrica [pregabalin], and Sulfa antibiotics    ------------------------------------------------ RESULTS ---------------------------------------------------    LABS:  Results for orders placed or performed during the hospital encounter of 12/20/20   CBC Auto Differential   Result Value Ref Range    WBC 6.6 4.5 - 11.5 E9/L    RBC 4.84 3.50 - 5.50 E12/L    Hemoglobin 14.2 11.5 - 15.5 g/dL    Hematocrit 42.5 34.0 - 48.0 %    MCV 87.8 80.0 - 99.9 fL    MCH 29.3 26.0 - 35.0 pg    MCHC 33.4 32.0 - 34.5 %    RDW 13.1 11.5 - 15.0 fL    Platelets 588 392 - 477 E9/L    MPV 10.1 7.0 - 12.0 fL    Neutrophils % 61.0 43.0 - 80.0 %    Immature Granulocytes % 1.7 0.0 - 5.0 %    Lymphocytes % 24.8 20.0 - 42.0 %    Monocytes % 6.5 2.0 - 12.0 %    Eosinophils % 5.5 0.0 - 6.0 %    Basophils % 0.5 0.0 - 2.0 %    Neutrophils Absolute 4.03 1.80 - 7.30 E9/L    Immature Granulocytes # 0.11 E9/L    Lymphocytes Absolute 1.64 1.50 - 4.00 E9/L    Monocytes Absolute 0.43 0.10 - 0.95 E9/L    Eosinophils Absolute 0.36 0.05 - 0.50 E9/L    Basophils Absolute 0.03 0.00 - 0.20 Q4/H   Basic Metabolic Panel w/ Reflex to MG   Result Value Ref Range    Sodium 140 132 - 146 mmol/L    Potassium reflex Magnesium 3.9 3.5 - 5.0 mmol/L    Chloride 98 98 - 107 mmol/L    CO2 27 22 - 29 mmol/L    Anion Gap 15 7 - 16 mmol/L    Glucose 151 (H) 74 - 99 mg/dL    BUN 12 8 - 23 mg/dL    CREATININE 0.6 0.5 - 1.0 mg/dL    GFR Non-African American >60 >=60 mL/min/1.73    GFR African American >60     Calcium 9.4 8.6 - 10.2 mg/dL   Magnesium   Result Value Ref Range Magnesium 1.8 1.6 - 2.6 mg/dL   Phosphorus   Result Value Ref Range    Phosphorus 2.8 2.5 - 4.5 mg/dL       RADIOLOGY:    All Radiology results interpreted by Radiologist unless otherwise noted. MRI LUMBAR SPINE WO CONTRAST   Preliminary Result   L4-5 severe spinal canal stenosis.          ---------------------------- NURSING NOTES AND VITALS REVIEWED -------------------------   The nursing notes within the ED encounter and vital signs as below have been reviewed. BP (!) 169/86   Pulse 80   Temp 98.4 °F (36.9 °C)   Resp 18   Ht 4' 11\" (1.499 m)   Wt 200 lb (90.7 kg)   LMP  (LMP Unknown)   SpO2 98%   BMI 40.40 kg/m²   Oxygen Saturation Interpretation: Normal      ------------------------------------------PROGRESS NOTES -------------------------------------------    ED COURSE MEDICATIONS:                Medications   ondansetron (ZOFRAN) injection 4 mg (4 mg Intravenous Given 12/20/20 1759)   morphine (PF) injection 2 mg (2 mg Intravenous Given 12/20/20 1904)   methylPREDNISolone sodium (SOLU-MEDROL) injection 80 mg (80 mg Intravenous Given 12/20/20 1903)       CONSULTATIONS:            Consultation:  I Spoke with Dr. Belinda López (neurosurgery). Discussed case. They will provide consultation. Consultation:  I Spoke with Dr. Glenroy Mckay (Medicine). Discussed case. They will admit this patient. PROCEDURES:            none. COUNSELING:   I have spoken with the patient and discussed todays results, in addition to providing specific details for the plan of care and counseling regarding the diagnosis and prognosis.     --------------------------------------- IMPRESSION & DISPOSITION --------------------------------     IMPRESSION(s):  1. Spinal stenosis at L4-L5 level    2.  Inability to walk        This patient's ED course included: a personal history and physicial examination, re-evaluation prior to disposition, multiple bedside re-evaluations, cardiac monitoring and continuous pulse oximetry    This patient has been closely monitored during their ED course. DISPOSITION:  Disposition: Transfer to [facility] 5 floor for neurosurgery evaluation. Patient condition is serious. END OF PROVIDER NOTE.           Sulma Mas DO  Resident  12/20/20 2035

## 2020-12-21 NOTE — PROGRESS NOTES
Patient pre medicated with LORazepam (ATIVAN) injection 0.5 mg and is now being transported for MRI.

## 2020-12-21 NOTE — PLAN OF CARE
Problem: Pain:  Goal: Pain level will decrease  Description: Pain level will decrease  12/21/2020 1517 by Serg Corcoran RN  Outcome: Ongoing  12/21/2020 1515 by Serg Corcoran RN  Outcome: Ongoing  Goal: Control of acute pain  Description: Control of acute pain  12/21/2020 1517 by Serg Corcoran RN  Outcome: Ongoing  12/21/2020 1515 by Serg Corcoran RN  Outcome: Met This Shift     Problem: Skin Integrity:  Goal: Absence of new skin breakdown  Description: Absence of new skin breakdown  Outcome: Met This Shift     Problem: Falls - Risk of:  Goal: Will remain free from falls  Description: Will remain free from falls  12/21/2020 1517 by Serg Corcoran RN  Outcome: Met This Shift  12/21/2020 1515 by Serg Corcoran RN  Outcome: Met This Shift  Goal: Absence of physical injury  Description: Absence of physical injury  12/21/2020 1517 by Serg Corcoran RN  Outcome: Met This Shift  12/21/2020 1515 by Serg Corcoran RN  Outcome: Met This Shift     Problem: Safety:  Goal: Free from intentional harm  Description: Free from intentional harm  Outcome: Met This Shift     Problem: Daily Care:  Goal: Daily care needs are met  Description: Daily care needs are met  Outcome: Met This Shift     Problem: Discharge Planning:  Goal: Patients continuum of care needs are met  Description: Patients continuum of care needs are met  Outcome: Ongoing

## 2020-12-21 NOTE — ED NOTES
Called nurse to nurse to [facility]. Spoke with [facility] DYANA MERAZ.      Sarika Deleon RN  12/20/20 2049

## 2020-12-21 NOTE — PROGRESS NOTES
Date: 2020       Patient Name: Achille Sandifer  : 1955      MRN: [id number]    PT order received and chart reviewed. PT evaluation held await neurosurgery recommendations and plan of care.    Will follow up as appropriate    Obed Marino PT, DPT  [id number]

## 2020-12-21 NOTE — CARE COORDINATION
Met with the pt at the bedside. She lives with her significant other, Lizeth Roa. Her home is handicapped accessible. She has been using a rollator, lately. She does not have a wheeled walker. Her plan is home when medically stable. Her PCP is Dr Stephanie Donis and pharmacy is [address] [place] [facility]

## 2020-12-21 NOTE — PROGRESS NOTES
OT SESSION ATTEMPT     Date:2020  Patient Name: Carlen Cowden  MRN: [id number]  : 1955  Room: 5403/5403-A     Attempted OT session this date:    [] unavailable due to other medical staff currently with pt   [x] On hold - await MRI/neuro surgery recommendation    [] on hold per nursing staff   [] on hold per nursing staff secondary to lab / radiology results    [] declined treatment  this date due to ____. Benefits of participation in therapy reviewed with pt.    [] off unit   [] Other:     Will reattempt OT eval at a later time.     Omid Foley OTR/L #[id number]

## 2020-12-21 NOTE — ED NOTES
Transport arrived to  patient. Notified [facility] that patient will be in route.      Olinda Joe RN  12/20/20 212

## 2020-12-21 NOTE — H&P
Hospital Medicine History & Physical      PCP: Vinay Sharp, VISHNU - CNP    Date of Admission: 12/20/2020    Date of Service: Pt seen/examined on 12/20/2020 and Admitted to Inpatient with expected LOS greater than two midnights due to medical therapy. Chief Complaint: Ambulatory dysfunction      History Of Present Illness:      63 y.o. female who presented to [facility] with past medical history of DJD of the spine, chronic back pain, osteoarthritis of multiple joints, hypertension, fibromyalgia, upper lipidemia, colon cancer status post colectomy 3 years ago, did not require any chemotherapy or radiation, sacroiliitis, diabetes and vitamin D deficiency. Patient started having numbness involving the left leg yesterday. Progressed from the waist area down to the foot. She is now beginning to have similar sensation on the right side. This is constant, moderate to severe in intensity on the left and associated with bilateral lower extremity weakness that is worse on the left than the right. Patient was walking with a walker in the past week and now requires a wheelchair. Today she fell because she could not get in or out of bed. Complaining of low back pain as well which is more towards the left side, pain is dull, constant, radiate down the left leg and is worse with movement. She has not been able to urinate as much and has not had a bowel movement for a week. She is now beginning to experience \"shocklike\" painful sensation in the xiphisternal area that radiates into the back, numbness in the abdomen. No cough, chest pain or shortness of breath. No fever. Vital signs notable for blood pressure of 149/71. Labs showed normal CBC, glucose of 151 otherwise normal chemistry. MRI of the lumbar spine shows severe spinal stenosis at L4-L5. She is being admitted for further management.     Past Medical History:          Diagnosis Date    Acute left ankle pain 6/1/2020    Cancer [facility]) 2017    colon (treated surgically)    Chronic back pain     Diabetes mellitus ([facility] [address]     Drug-induced constipation 2/18/2020    Eosinophil count raised 11/20/2019    Fall at home 12/11/2017    Fibromyalgia     GERD (gastroesophageal reflux disease)     Hypercalcemia 11/20/2019    Hyperlipidemia     Hypertension     Obesity     Osteoarthritis     PONV (postoperative nausea and vomiting)     Postmenopausal bleeding 10/5/2017    Rib contusion, left, initial encounter 2/18/2020    Urinary incontinence        Past Surgical History:          Procedure Laterality Date    ANESTHESIA NERVE BLOCK Bilateral 8/15/2019    BILATERAL INTRA-ARTICULAR FACET JOINT INJECTION WITH FLUOROSCOPIC GUIDANCE AT L4-5, L5-S1 WITH IV SEDATION performed by Trevor Horn DO at [address] OF UTERUS N/A 10/23/2019    DILATATION AND CURETTAGE HYSTEROSCOPY performed by Carissa Stewart MD at [address] Right 7/11/2019    C7-T1 EPIDURAL STEROID INJECTION #1 TOWARD THE RIGHT performed by Casey Stewart DO at [address] [place] [address] Bilateral    [address] Right 12/28/2017    right L4-5 transforaminal epidural #1    NERVE BLOCK Left 11/29/2018    si inj    NERVE BLOCK Bilateral 08/15/2019    bilateral intra-articular facet joint injection with flouroscopic guidance at L4-S1 with iv sedation    MI INJECT SI JOINT ARTHRGRPHY&/ANES/STEROID W/IMAGE Left 11/29/2018    LEFT SACROILIAC JOINT INJECTION WITH X-RAY performed by Trevor Horn DO at [address] [facility] 2005    TONSILLECTOMY  28 YRS OLD    TUBAL LIGATION         Medications Prior to Admission:      Prior to Admission medications    Medication Sig Start Date End Date Taking?  Authorizing Provider   atorvastatin (LIPITOR) 10 MG tablet Take 1 tablet by mouth nightly Indications: High Amount of Fats in the Blood 12/17/20  Yes VISHNU Henao CNP   amitriptyline (ELAVIL) 25 MG tablet Take 25 mg by mouth At bedtime as needed 10/27/20  Yes Historical Provider, MD   etodolac (LODINE) 400 MG tablet Take 400 mg by mouth 2 times daily   Yes Historical Provider, MD   sAXagliptin (ONGLYZA) 2.5 MG TABS tablet TAKE 1 TABLET DAILY 11/23/20  Yes VISHNU Henao CNP   atenolol (TENORMIN) 50 MG tablet Take 1 tablet by mouth daily 10/15/20  Yes VISHNU Henao CNP   omeprazole (PRILOSEC) 20 MG delayed release capsule Take 1 capsule by mouth daily 10/15/20  Yes VISHNU Henao CNP   metFORMIN (GLUCOPHAGE) 500 MG tablet Take 1 tablet by mouth daily (with breakfast) 10/15/20  Yes VISHNU Henao CNP   allopurinol (ZYLOPRIM) 300 MG tablet TAKE 1 TABLET BY MOUTH DAILY 6/9/20  Yes VISHNU Henao CNP   Cholecalciferol (VITAMIN D3) 2000 units CAPS Take 2,000 Units by mouth daily   Yes Historical Provider, MD   Biotin 1000 MCG TABS Take 1,000 mcg by mouth daily   Yes Historical Provider, MD   [facility] [address] [facility] by Does not apply route Start 11/23/2020 expires 11/22/2023 11/23/20   VISHNU Henao CNP   FreeStyle Lancets MISC TEST EVERY DAY 9/21/20   VISHNU Henao CNP   blood glucose monitor strips Test 1 times a day & as needed for symptoms of irregular blood glucose. 6/17/20   VISHNU Henao CNP   triamcinolone (KENALOG) 0.1 % cream MARY EXT AA 2 TO 3 TIMES PER DAY UNTIL RESOLVED 2/24/20   Historical Provider, MD   glucose monitoring kit (FREESTYLE) monitoring kit 1 kit by Does not apply route daily 8/1/19   VISHNU Henao CNP       Allergies:  Diclofenac, Diclofenac sodium, Lisinopril, Lyrica [pregabalin], and Sulfa antibiotics    Social History:      The patient currently lives at home. TOBACCO:   reports that she has never smoked. She has never used smokeless tobacco.  ETOH:   reports previous alcohol use.       Family History:     Reviewed in detail Positive as follows:        Problem Relation Age of Onset    Asthma Mother     Mental Illness Mother     Heart Disease Father     Thyroid Disease Sister     Thyroid Disease Sister     Thyroid Disease Sister        REVIEW OF SYSTEMS:   Pertinent positives as noted in the HPI. All other systems reviewed and negative. PHYSICAL EXAM:    BP (!) 149/71   Pulse 82   Temp 98.2 °F (36.8 °C) (Temporal)   Resp 16   Ht 4' 11\" (1.499 m)   Wt 200 lb (90.7 kg)   LMP  (LMP Unknown)   SpO2 92%   BMI 40.40 kg/m²     General appearance: Middle-aged female, mild to moderate painful distress, appears stated age and cooperative. Afebrile. HEENT:  Normal cephalic, atraumatic without obvious deformity. Pupils equal, round, and reactive to light. Extra ocular muscles intact. Conjunctivae/corneas clear. Neck: Supple, with full range of motion. No jugular venous distention. Trachea midline. Respiratory:  Normal respiratory effort. Clear to auscultation, bilaterally without Rales/Wheezes/Rhonchi. Cardiovascular:  Regular rate and rhythm with normal S1/S2 without murmurs, rubs or gallops. Abdomen: Soft, non-tender, non-distended with normal bowel sounds. Numbness in the abdomen. Musculoskeletal:  No clubbing, cyanosis or edema bilaterally. Limited range of motion without deformity. Skin: Skin color, texture, turgor normal.  No rashes or lesions. Neurologic:   Cranial nerves: II-XII intact, weakness in bilateral lower extremities worse on the left and right. Decreased sensation from upper abdomen downwards.   Psychiatric:  Alert and oriented, thought content appropriate, normal insight  Capillary Refill: Brisk,< 3 seconds   Peripheral Pulses: +2 palpable, equal bilaterally       Labs:     Recent Labs     12/20/20  1552   WBC 6.6   HGB 14.2   HCT 42.5        Recent Labs     12/20/20  1552      K 3.9   CL 98   CO2 27   BUN 12   CREATININE 0.6   CALCIUM 9.4   PHOS 2.8     No results for input(s): AST, ALT, BILIDIR, BILITOT, ALKPHOS in the last 72 hours. No results for input(s): INR in the last 72 hours. No results for input(s): Michael Jovani in the last 72 hours. Urinalysis:    No results found for: [address] [address] [facility] [address], [place] [facility] [address]    Radiology:   Reviewed and documented    MRI THORACIC SPINE WO CONTRAST    (Results Pending)       ASSESSMENT:    Active Hospital Problems    Diagnosis Date Noted    Back pain [M54.9] 12/21/2020    Weakness of both legs [R29.898] 12/21/2020    Ambulatory dysfunction [R26.2] 12/21/2020    Urinary retention [R33.9] 12/21/2020    Spinal stenosis [M48.00] 12/20/2020    Constipation [K59.00] 02/18/2020    Type 2 diabetes mellitus without complication, without long-term current use of insulin ([facility] [address] [E11.9] 08/01/2019    Chronic pain syndrome [G89.4] 06/21/2019    Morbid obesity with BMI of 40.0-44.9, adult ([facility] [address] [E66.01, Z68.41] 10/31/2018    Fibromyalgia [M79.7] 09/05/2013       PLAN:  1. Acute exacerbation of chronic back pain with radicular symptoms secondary to severe spinal stenosis at L4/L5. Consult neurosurgery. N.p.o. after midnight. Pain control. Steroids and muscle relaxant. 2.  Weakness of both legs secondary to above. Management as above. 3.  Paresthesia from upper abdomen down to the foot. Obtain additional MRI of the thoracic spine. 4.  Inability to void, had Tolliver catheterization in the ER, lots of urine drained. Send urinalysis. 5.  Constipation, bowel regimen. 6. Ambulatory dysfunction, physical therapy as tolerated  7. Type 2 diabetes, sliding scale coverage, monitor blood sugar. 8.  Fibromyalgia with chronic pain, continue home medication  9. Morbid obesity, dietary and lifestyle modification. DVT Prophylaxis: SCDs, avoid anticoagulation as she preps for surgery. Diet: Diet NPO, After Midnight Exceptions are: Sips with Meds  Code Status: Full Code    PT/OT Eval Status: Evaluation and treatment as appropriate    Dispo -inpatient/MedSurg. Alejandra Zavala MD    Thank you VISHNU Fuentes CNP for the opportunity to be involved in this patient's care.

## 2020-12-22 ENCOUNTER — TELEPHONE (OUTPATIENT)
Dept: FAMILY MEDICINE CLINIC | Age: 65
End: 2020-12-22

## 2020-12-22 ENCOUNTER — APPOINTMENT (OUTPATIENT)
Dept: MRI IMAGING | Age: 65
DRG: 459 | End: 2020-12-22
Attending: FAMILY MEDICINE
Payer: MEDICAID

## 2020-12-22 LAB
METER GLUCOSE: 194 MG/DL (ref 74–99)
METER GLUCOSE: 218 MG/DL (ref 74–99)
METER GLUCOSE: 282 MG/DL (ref 74–99)

## 2020-12-22 PROCEDURE — 2580000003 HC RX 258: Performed by: FAMILY MEDICINE

## 2020-12-22 PROCEDURE — 6360000002 HC RX W HCPCS: Performed by: NEUROLOGICAL SURGERY

## 2020-12-22 PROCEDURE — 6370000000 HC RX 637 (ALT 250 FOR IP): Performed by: FAMILY MEDICINE

## 2020-12-22 PROCEDURE — 99223 1ST HOSP IP/OBS HIGH 75: CPT | Performed by: PSYCHIATRY & NEUROLOGY

## 2020-12-22 PROCEDURE — 72141 MRI NECK SPINE W/O DYE: CPT

## 2020-12-22 PROCEDURE — 1200000000 HC SEMI PRIVATE

## 2020-12-22 PROCEDURE — 82962 GLUCOSE BLOOD TEST: CPT

## 2020-12-22 RX ORDER — LORAZEPAM 2 MG/ML
1 INJECTION INTRAMUSCULAR
Status: COMPLETED | OUTPATIENT
Start: 2020-12-22 | End: 2020-12-22

## 2020-12-22 RX ADMIN — Medication 10 ML: at 21:14

## 2020-12-22 RX ADMIN — OXYCODONE AND ACETAMINOPHEN 1 TABLET: 5; 325 TABLET ORAL at 21:04

## 2020-12-22 RX ADMIN — LORAZEPAM 1 MG: 2 INJECTION INTRAMUSCULAR; INTRAVENOUS at 19:20

## 2020-12-22 RX ADMIN — ATENOLOL 50 MG: 50 TABLET ORAL at 08:45

## 2020-12-22 RX ADMIN — OXYCODONE AND ACETAMINOPHEN 1 TABLET: 5; 325 TABLET ORAL at 08:46

## 2020-12-22 RX ADMIN — ATORVASTATIN CALCIUM 10 MG: 10 TABLET, FILM COATED ORAL at 21:00

## 2020-12-22 RX ADMIN — OXYCODONE AND ACETAMINOPHEN 1 TABLET: 5; 325 TABLET ORAL at 12:59

## 2020-12-22 RX ADMIN — OXYCODONE AND ACETAMINOPHEN 1 TABLET: 5; 325 TABLET ORAL at 01:11

## 2020-12-22 ASSESSMENT — PAIN SCALES - GENERAL
PAINLEVEL_OUTOF10: 10
PAINLEVEL_OUTOF10: 8
PAINLEVEL_OUTOF10: 0

## 2020-12-22 ASSESSMENT — PAIN DESCRIPTION - PROGRESSION: CLINICAL_PROGRESSION: NOT CHANGED

## 2020-12-22 ASSESSMENT — PAIN DESCRIPTION - ORIENTATION
ORIENTATION: RIGHT;LEFT
ORIENTATION: RIGHT;LEFT

## 2020-12-22 ASSESSMENT — PAIN DESCRIPTION - LOCATION
LOCATION: LEG
LOCATION: LEG

## 2020-12-22 ASSESSMENT — PAIN DESCRIPTION - PAIN TYPE
TYPE: ACUTE PAIN
TYPE: ACUTE PAIN

## 2020-12-22 ASSESSMENT — PAIN - FUNCTIONAL ASSESSMENT
PAIN_FUNCTIONAL_ASSESSMENT: PREVENTS OR INTERFERES SOME ACTIVE ACTIVITIES AND ADLS
PAIN_FUNCTIONAL_ASSESSMENT: PREVENTS OR INTERFERES SOME ACTIVE ACTIVITIES AND ADLS

## 2020-12-22 ASSESSMENT — PAIN DESCRIPTION - ONSET
ONSET: ON-GOING

## 2020-12-22 ASSESSMENT — PAIN DESCRIPTION - FREQUENCY: FREQUENCY: CONTINUOUS

## 2020-12-22 NOTE — PROGRESS NOTES
Hospitalist Progress Note      SYNOPSIS: Patient admitted on 2020 presented to [facility] with past medical history of DJD of the spine, chronic back pain, osteoarthritis of multiple joints, hypertension, fibromyalgia, upper lipidemia, colon cancer status post colectomy 3 years ago, did not require any chemotherapy or radiation, sacroiliitis, diabetes and vitamin D deficiency. Patient started having numbness involving the left leg yesterday. Progressed from the waist area down to the foot. She is now beginning to have similar sensation on the right side. This is constant, moderate to severe in intensity on the left and associated with bilateral lower extremity weakness that is worse on the left than the right. Patient was walking with a walker in the past week and now requires a wheelchair. Today she fell because she could not get in or out of bed. Complaining of low back pain as well which is more towards the left side, pain is dull, constant, radiate down the left leg and is worse with movement. She has not been able to urinate as much and has not had a bowel movement for a week. She is now beginning to experience \"shocklike\" painful sensation in the xiphisternal area that radiates into the back, numbness in the abdomen. No cough, chest pain or shortness of breath. No fever.     Vital signs notable for blood pressure of 149/71. Labs showed normal CBC, glucose of 151 otherwise normal chemistry. MRI of the lumbar spine shows severe spinal stenosis at L4-L5. SUBJECTIVE:  Stable overnight. No other overnight issues reported. Patient seen and examined  Records reviewed.    Complaining of left leg pain today  Awaiting neurosurgery plan  MRI head negative      Temp (24hrs), Av.7 °F (37.1 °C), Min:98.4 °F (36.9 °C), Max:99.2 °F (37.3 °C)    DIET: Diet NPO, After Midnight Exceptions are: Sips with Meds  CODE: Full Code    Intake/Output Summary (Last 24 hours) at 2020 1023  Last data filed at 12/22/2020 0618  Gross per 24 hour   Intake 500 ml   Output 1350 ml   Net -850 ml       Review of Systems  All bolded are positive; please see HPI  General:  Fever, chills, diaphoresis, fatigue, malaise, night sweats, weight loss  Psychological:  Anxiety, disorientation, hallucinations. ENT:  Epistaxis, headaches, vertigo, visual changes. Cardiovascular:  Chest pain, irregular heartbeats, palpitations, paroxysmal nocturnal dyspnea. Respiratory:  Shortness of breath, coughing, sputum production, hemoptysis, wheezing, orthopnea. Gastrointestinal:  Nausea, vomiting, diarrhea, heartburn, constipation, abdominal pain, hematemesis, hematochezia, melena, acholic stools  Genito-Urinary:  Dysuria, urgency, frequency, hematuria  Musculoskeletal:  Joint pain, joint stiffness, joint swelling, muscle pain back pain, leg pain  Neurology:  Headache, focal neurological deficits, weakness, numbness, paresthesia  Derm:  Rashes, ulcers, excoriations, bruising  Extremities:  Decreased ROM, peripheral edema, mottling      OBJECTIVE:    /88   Pulse 90   Temp 98.5 °F (36.9 °C) (Temporal)   Resp 18   Ht 4' 11\" (1.499 m)   Wt 200 lb (90.7 kg)   LMP  (LMP Unknown)   SpO2 90%   BMI 40.40 kg/m²     General appearance:  awake, alert, and oriented to person, place, time, and purpose; appears stated age and cooperative; no apparent distress no labored breathing +mcclain  HEENT:  Conjunctivae/corneas clear. Neck: Supple. No jugular venous distention. Respiratory: symmetrical; clear to auscultation bilaterally; no wheezes; no rhonchi; no rales  Cardiovascular: rhythm regular; rate controlled; no murmurs  Abdomen: Soft, nontender, nondistended  Extremities:  peripheral pulses present; no peripheral edema; no ulcers  Musculoskeletal: No clubbing, cyanosis, no bilateral lower extremity edema. Brisk capillary refill.    Skin:  No rashes  on visible skin  Neurologic: awake, alert and following commands     ASSESSMENT and PLAN:  · Acute exacerbation of chronic back pain with radicular symptoms secondary to severe spinal stenosis at L4/L5-  Consult neurosurgery. Pain control. Steroids and muscle relaxant. · Weakness of both legs secondary to above-  Management as above. · Paresthesia from upper abdomen down to the foot-  MRI thoracic spine showing multilevel central canal stenosis with multiple disc protrusions, some which cause slight impression on the thoracic spinal cord  · Inability to void- had Tolliver catheterization in the ER, lots of urine drained. Send urinalysis. · Constipation- bowel regimen. · Ambulatory dysfunction- physical therapy as tolerated  · Type 2 diabetes- sliding scale coverage, monitor blood sugar. · Fibromyalgia with chronic pain- continue home medication  · Morbid obesity- dietary and lifestyle modification. DISPOSITION: Continue current plan of care.  Awaiting neurosurgery plan    Medications:  REVIEWED DAILY    Infusion Medications    dextrose       Scheduled Medications    allopurinol  300 mg Oral Daily    atenolol  50 mg Oral Daily    atorvastatin  10 mg Oral Nightly    vitamin D  2,000 Units Oral Daily    pantoprazole  20 mg Oral QAM AC    alogliptin  12.5 mg Oral Daily    docusate sodium  100 mg Oral Daily    insulin lispro  0-10 Units Subcutaneous 4x Daily AC & HS    sodium chloride flush  10 mL Intravenous 2 times per day     PRN Meds: amitriptyline, glucose, dextrose, glucagon (rDNA), dextrose, sodium chloride flush, promethazine **OR** ondansetron, acetaminophen **OR** acetaminophen, oxyCODONE-acetaminophen, morphine, cyclobenzaprine    Labs:     Recent Labs     12/20/20  1552 12/21/20  0647   WBC 6.6 10.9   HGB 14.2 14.4   HCT 42.5 43.5    235       Recent Labs     12/20/20  1552 12/21/20  0647    139   K 3.9 4.3   CL 98 101   CO2 27 25   BUN 12 13   CREATININE 0.6 0.7   CALCIUM 9.4 10.1   PHOS 2.8  --        No results for input(s): PROT, ALB, ALKPHOS, ALT, AST, BILITOT, AMYLASE, LIPASE in the last 72 hours. No results for input(s): INR in the last 72 hours. No results for input(s): Towana Josue in the last 72 hours. Chronic labs:    Lab Results   Component Value Date    CHOL 133 03/16/2020    TRIG 241 (H) 03/16/2020    HDL 41 03/16/2020    LDLCALC 44 03/16/2020    TSH 4.180 03/18/2020    LABA1C 6.9 (H) 12/21/2020       Radiology: REVIEWED DAILY    +++++++++++++++++++++++++++++++++++++++++++++++++  Mansoor Hunter Physician - 2020 [place], [place]  +++++++++++++++++++++++++++++++++++++++++++++++++  NOTE: This report was transcribed using voice recognition software. Every effort was made to ensure accuracy; however, inadvertent computerized transcription errors may be present.

## 2020-12-22 NOTE — CONSULTS
NEUROLOGY CONSULT NOTE      Requesting Physician: Lamin Nava MD    Reason for Consult:  Evaluate for lower extremity weakness. History of Present Illness:  Franky Sue is a 63 y.o. female  with h/o HTN, HLD, DM, chronic low back pain, and lumbar spinal stenosis who was admitted to [facility]  on 12/20/2020 with presentation of leg pain and numbness. Patient has a longstanding history of low back pain with spinal stenosis that has been worsening over the last 3 months and more so in the last week. Recent evaluation for left leg numbness and tingling in hospital with subsequent discharge home. Unfortunately left leg symptoms returned with note of inability to lift her left leg off the bed. She was unable to walk or engage in usual activities secondary to pain and impaired mobility on the left side. There was tingling and tightness noted in the left torso from about breast level to mid abdomen. Sensation was diminished from the left upper abdomen and down. A few weeks prior she had tightness  In the left upper arm that felt like there was a BP cuff around the arm. This was decreased with a cervical injection from her pain Physician. That has since resolved. Patient also reporting problems with pain in the left leg from the hip down to the knee laterally. With urination, she does not feel the emptying of her bladder recently. She has a h/o colon cancer with resection. Typically bowel movements occur about every 3 days. She has not had a bowel movement in the last week. Due to the atypical nature of her presentation and associated symptoms. It was felt that there may have been involvement elsewhere prompting MRI of brain and thoracic spine. Brain MRI was negative for any acute intracranial abnormalities. Thoracic MRI showed degenerative changes with multilevel central canal stenosis that was worse at T2-T3 where there was moderate central canal stenosis.  Review of chart showed last cervical MRI done in June 2019 which did not show any acute changes. Prachi Gonzalez was evidence of multi level DJD with prominent foraminal stenosis.        Past Medical History:        Diagnosis Date    Acute left ankle pain 6/1/2020    Cancer ([facility] [address] 2017    colon (treated surgically)    Chronic back pain     Diabetes mellitus ([facility] [address]     Drug-induced constipation 2/18/2020    Eosinophil count raised 11/20/2019    Fall at home 12/11/2017    Fibromyalgia     GERD (gastroesophageal reflux disease)     Hypercalcemia 11/20/2019    Hyperlipidemia     Hypertension     Obesity     Osteoarthritis     PONV (postoperative nausea and vomiting)     Postmenopausal bleeding 10/5/2017    Rib contusion, left, initial encounter 2/18/2020    Urinary incontinence            Procedure Laterality Date    ANESTHESIA NERVE BLOCK Bilateral 8/15/2019    BILATERAL INTRA-ARTICULAR FACET JOINT INJECTION WITH FLUOROSCOPIC GUIDANCE AT L4-5, L5-S1 WITH IV SEDATION performed by Christiane Strickland DO at [address] OF UTERUS N/A 10/23/2019    DILATATION AND CURETTAGE HYSTEROSCOPY performed by Cailin Jerome MD at [address] [facility] Right 7/11/2019    C7-T1 EPIDURAL STEROID INJECTION #1 TOWARD THE RIGHT performed by Garfield Dunham DO at [address] Bilateral     NERVE BLOCK Right 12/28/2017    right L4-5 transforaminal epidural #1    NERVE BLOCK Left 11/29/2018    si inj    NERVE BLOCK Bilateral 08/15/2019    bilateral intra-articular facet joint injection with flouroscopic guidance at L4-S1 with iv sedation    TN INJECT SI JOINT ARTHRGRPHY&/ANES/STEROID W/IMAGE Left 11/29/2018    LEFT SACROILIAC JOINT INJECTION WITH X-RAY performed by Christiane Strickland DO at [address] [facility] Left 2005    TONSILLECTOMY  28 YRS OLD    TUBAL LIGATION         Social History:  Social History     Tobacco Use Smoking Status Never Smoker   Smokeless Tobacco Never Used     Social History     Substance and Sexual Activity   Alcohol Use Not Currently    Alcohol/week: 0.0 standard drinks    Comment: Rarely     Social History     Substance and Sexual Activity   Drug Use No         Family History:       Problem Relation Age of Onset   [facility] Asthma Mother     Mental Illness Mother     Heart Disease Father     Thyroid Disease Sister     Thyroid Disease Sister     Thyroid Disease Sister        Review of Systems:  Constitutional: Negative for chills and fever. HENT: Negative for congestion and sore throat. Eyes: Negative for pain and visual disturbance. Respiratory: Negative for cough, shortness of breath and wheezing. Cardiovascular: Negative for chest pain. Gastrointestinal: Positive for constipation. Negative for abdominal pain, diarrhea, nausea and vomiting. Genitourinary: Positive for decreased urine volume. Negative for dysuria and frequency. Musculoskeletal: Positive for back pain and gait problem. Negative for arthralgias. Skin: Negative for rash and wound. Neurological: Positive for weakness and numbness. Negative for headaches. Hematological: Negative for adenopathy. All other systems reviewed and are negative. Allergies:     Allergies   Allergen Reactions    Diclofenac     Diclofenac Sodium      Nauseated, Diarrhea    Lisinopril Other (See Comments)     Profound malaise    Lyrica [Pregabalin] Other (See Comments)     Confusion    Sulfa Antibiotics Rash        Current Medications:       [START ON 12/23/2020] metFORMIN (GLUCOPHAGE) tablet 500 mg, Daily with breakfast      allopurinol (ZYLOPRIM) tablet 300 mg, Daily      amitriptyline (ELAVIL) tablet 25 mg, Nightly PRN      atenolol (TENORMIN) tablet 50 mg, Daily      atorvastatin (LIPITOR) tablet 10 mg, Nightly      vitamin D (CHOLECALCIFEROL) tablet 2,000 Units, Daily      pantoprazole (PROTONIX) tablet 20 mg, [facility]     alogliptin (NESINA) tablet 12.5 mg, Daily      docusate sodium (COLACE) capsule 100 mg, Daily      insulin lispro (HUMALOG) injection vial 0-10 Units, 4x Daily AC & HS      glucose (GLUTOSE) 40 % oral gel 15 g, PRN      dextrose 50 % IV solution, PRN      glucagon (rDNA) injection 1 mg, PRN      dextrose 5 % solution, PRN      sodium chloride flush 0.9 % injection 10 mL, 2 times per day      sodium chloride flush 0.9 % injection 10 mL, PRN      promethazine (PHENERGAN) tablet 12.5 mg, Q6H PRN    Or      ondansetron (ZOFRAN) injection 4 mg, Q6H PRN      acetaminophen (TYLENOL) tablet 650 mg, Q6H PRN    Or      acetaminophen (TYLENOL) suppository 650 mg, Q6H PRN      oxyCODONE-acetaminophen (PERCOCET) 5-325 MG per tablet 1 tablet, Q4H PRN      morphine (PF) injection 2 mg, Q4H PRN      cyclobenzaprine (FLEXERIL) tablet 10 mg, TID PRN         Physical Exam:  BP (!) 153/69   Pulse 81   Temp 99.1 °F (37.3 °C) (Temporal)   Resp 17   Ht 4' 11\" (1.499 m)   Wt 200 lb (90.7 kg)   LMP  (LMP Unknown)   SpO2 92%   BMI 40.40 kg/m²  I Body mass index is 40.4 kg/m². I   Wt Readings from Last 1 Encounters:   12/21/20 200 lb (90.7 kg)          HEENT: Normocephalic, atraumatic, no lesions or abnormalities noted. Neck:  supple with full ROM; no masses, nodes or bruits; no cervical tenderness on palpation. Lungs:  clear to auscultation  bilaterally     CV: RRR without gallops or murmurs     Extremities: no c/c/e      Back: Tenderness to palpation in the lower back with positive straight leg raising on the left side. Neurologic Exam   Mental Status:  Patient was alert, responsive, oriented, appropriate, answering questions, and following commands. Speech was fluent with normal sensorium and cognition. Cranial Nerves: Pupils were equal round and reactive to light and accommodation;    Visual fields were full on confrontation; Extraocular movements were intact; no nystagmus;      Intact facial sensation to temp, pinprick, and light touch; Symmetric facial movements with good lip and eye closure bilaterally; Hearing was intact; Mckeon was midline;    Normal palatal elevation with midline uvula  Trapezius strength of 5/5. Tongue was midline with no atrophy or fasciculations  Motor Exam:  Weakness in left LE with inability to dorsiflex left ankle; plantar flexion was 2/5 on left; Dorsiflexion was stronger at 3/5; Unable to lift left leg off bed    Strength was 5/5 throughout; normal tone and bulk; no atrophy or fasiculations noted. Sensory Exam:  Sensory level starting just below T4 and more prominent at T7-8. Normal sensation to light touch, pin-prick, and temperature on the right side; No sensory extinction. Cerebella Exam:  Intact finger-nose-finger, rapidly alternating movements, fine motor movements; No cerebellar rebound or drift; No tremors    Gait:  Gait was not tested. Reflexes: Hyperreflexia bilateralkk bilaterally; Babinski is negative      Labs:    CBC:   Recent Labs     12/20/20  1552 12/21/20  0647   WBC 6.6 10.9   HGB 14.2 14.4    235   MCV 87.8 87.0   MCH 29.3 28.8   MCHC 33.4 33.1   RDW 13.1 13.1     CMP:  Recent Labs     12/20/20  1552 12/21/20  0647    139   K 3.9 4.3   CL 98 101   CO2 27 25   BUN 12 13   CREATININE 0.6 0.7   GFRAA >60 >60   LABGLOM >60 >60   GLUCOSE 151* 281*   CALCIUM 9.4 10.1     Liver: No results for input(s): AST, ALT, ALKPHOS, PROT, LABALBU, BILITOT in the last 72 hours. Invalid input(s): BILDIR  INR: No results for input(s): PROTIME, INR in the last 72 hours. ToxicologyNo results for input(s): PHENYTOIN, CARBTOT, PHENOBARB, VALPROATE in the last 72 hours. Invalid input(s): LAMOTRIG,  KEPPRA  No results for input(s): AMPMETHURSCR, BARBTQTU, BDZQTU, CANNABQUANT, COCMETQTU, OPIAU, PCPQUANT in the last 72 hours.     Radiology:  Ct Lumbar Spine Wo Contrast    Result Date: 12/14/2020  EXAMINATION: CT OF THE LUMBAR SPINE WITHOUT CONTRAST  12/14/2020 TECHNIQUE: CT of the lumbar spine was performed without the administration of intravenous contrast. Multiplanar reformatted images are provided for review. Dose modulation, iterative reconstruction, and/or weight based adjustment of the mA/kV was utilized to reduce the radiation dose to as low as reasonably achievable. COMPARISON: Lumbar spine radiographs 11/21/2017 HISTORY: ORDERING SYSTEM PROVIDED HISTORY: Pain TECHNOLOGIST PROVIDED HISTORY: Reason for exam:->Pain FINDINGS: Developmental variation with absent left and hypoplastic right rib at T12. Moderate abdominal aortic plaque without evidence of aneurysm. Slight lumbar spine curvature with mid left convex apex. Vertebral compression fracture: None Spondylolysis: None Anterolisthesis: L4-5 grade 1 Retrolisthesis: T12-L1 trace, L1-2 trace Disc narrowing: T11-12 moderate, L2-3 slight to moderate, L4-5 slight Discogenic degenerative gas formation: T11-12, T12-L1, L2-3, L4-5 Multilevel degenerative hypertrophic change is present at the facet joints, ligamentum flavum, and vertebral endplates. Along with disc bulge, these contribute to neural foraminal stenosis. Soft tissue window suggest stenosis as follows: Right neural foraminal stenosis: L3-4 minimal, L4-5 moderate to severe Left neural foraminal stenosis: L2-3 minimal, L4-5 slight Posterior vertebral endplate bone spurring: T11-12 slight, T12-L1 slight, L2-3 slight, L4-5 slight Disc pathology and the central canal are difficult to assess with CT and better evaluated with MRI. The visualized soft tissues suggest: Diffuse disc bulge: T12-L1 slight, L1-2 slight, L2-3 slight to moderate, L3-4 slight, L4-5 slight Central canal stenosis: L2-3 slight, L3-4 slight, L4-5 moderate to severe Status post cholecystectomy. No CT evidence of definite acute skeletal pathology.  Multilevel degenerative change, spondylolisthesis, disc narrowing, and degenerative gas formation Soft tissue windows suggest slight impression on the right ventral aspect of the spinal cord. Overall, mild central canal stenosis at this level. T4-5: No evidence of significant central canal stenosis or disc herniation. T5-6: There is disc bulge with small bilateral disc osteophyte complexes that contacts the ventral surface of the spinal cord without evidence of cord compression. Mild central canal stenosis. T6-7: There is diffuse disc bulge causing moderate central canal stenosis with slight impression on the ventral aspect of the spinal cord. T7-8: There is disc bulge and small left paramedian disc protrusion, causing slight impression on the thecal sac without evidence of significant central canal stenosis or cord compression. T8-9: There is a large central and right paramedian disc protrusion causing slight impression on the right side of the spinal cord. T9-10: There is mild disc bulge and bilateral posterior facet degenerative change causing mild central canal stenosis without evidence of cord compression. T10-11: There is a small left paramedian disc protrusion causing slight impression on the ventral portion of the thecal sac without evidence of cord compression or significant central canal stenosis. T11-12: There is mild disc bulge without evidence of significant central canal stenosis or cord compression. T12-L1: There is slight grade 1 retrolisthesis of T12 on L1 with tiny right paramedian disc protrusion causing minimal impression on the thecal sac. No significant central canal stenosis or compression of the conus. 1. Degenerative change. Few mild old midthoracic compression deformities. No acute fracture or osseous destructive lesion. 2. Multilevel central canal stenosis as described above with multiple disc protrusions, some which cause slight impression on the thoracic spinal cord. Clinical correlation is suggested.     Mri Lumbar Spine Wo Contrast    Result Date: 12/20/2020  EXAMINATION: MRI OF THE LUMBAR SPINE WITHOUT CONTRAST, 12/20/2020 4:49 pm TECHNIQUE: Multiplanar multisequence MRI of the lumbar spine was performed without the administration of intravenous contrast. COMPARISON: Lumbar spine CT from 12/14/2020 HISTORY: ORDERING SYSTEM PROVIDED HISTORY: leg paresthesia, saddle paresthesis can't feel she is urinating TECHNOLOGIST PROVIDED HISTORY: Reason for exam:->leg paresthesia, saddle paresthesis can't feel she is urinating FINDINGS: BONES/ALIGNMENT: Grade 1 anterolisthesis of L4 on L5 is unchanged. Vertebral heights are normal.  Bone marrow signal is normal. SPINAL CORD: The conus terminates normally. SOFT TISSUES: No paraspinal mass identified. L1-L2: Disc bulge causes no stenosis L2-L3: Disc bulge, facet and ligament flavum hypertrophy cause mild thecal sac and mild bilateral foraminal stenosis. L3-L4: Disc bulge, facet and ligament flavum hypertrophy cause no stenosis. L4-L5: Disc bulge, facet and ligament flavum hypertrophy cause severe canal, moderate right foraminal and mild left foraminal stenosis. L5-S1: There is no significant disc herniation, spinal canal stenosis or neural foraminal narrowing. L4-5 severe spinal canal stenosis. Mri Brain W Wo Contrast    Result Date: 12/21/2020  EXAMINATION: MRI OF THE BRAIN WITHOUT AND WITH CONTRAST  12/21/2020 4:14 pm TECHNIQUE: Multiplanar multisequence MRI of the head/brain was performed without and with the administration of intravenous contrast. COMPARISON: None. HISTORY: ORDERING SYSTEM PROVIDED HISTORY: New left leg weakness TECHNOLOGIST PROVIDED HISTORY: Reason for exam:->New left leg weakness What reading provider will be dictating this exam?->CRC FINDINGS: INTRACRANIAL STRUCTURES/VENTRICLES:  There is no acute infarct. No mass, mass effect, edema or hemorrhage is seen. Mild age-appropriate volume loss is seen in the brain.   Mild moderate chronic microvascular ischemic changes are also in the range that may be seen at this age an are somewhat asymmetrically greater in the left corona radiata. No abnormal enhancement is seen in the brain. ORBITS: The visualized portion of the orbits demonstrate no acute abnormality. SINUSES: The visualized paranasal sinuses and mastoid air cells are well aerated. BONES/SOFT TISSUES: The bone marrow signal intensity appears normal. The soft tissues demonstrate no acute abnormality. 1. No acute intracranial abnormality. 2. No mass, mass effect, edema or hemorrhage. The patient's records from referring provider and available information in the EHR was reviewed. Impression:  1. Lumbar radiculopathy with prominent left leg weakness and radicular pain. 2. Spinal DJD involving cervical thoracic and lumbar regions. 3. Thoracis sensory level starting around T3-T4 level and down. 4. Chronic Low Back Pain:   5. Impaired mobility secondary to lumbar radiculopathy and pain. 6. Constipation with h/o colon cancer and possible spinal mediated bowel/bladder dysfunction. Active Problems: Morbid obesity with BMI of 40.0-44.9, adult (HCC)    Chronic pain syndrome    Type 2 diabetes mellitus without complication, without long-term current use of insulin (HCC)    Fibromyalgia    Constipation    Spinal stenosis    Back pain    Weakness of both legs    Ambulatory dysfunction    Urinary retention  Resolved Problems:    * No resolved hospital problems. *      Recommendations:                                            1. MRI of the cervical spine  2. Decadron 4 mg q6 hrs for spinal arthropathy with associated formainal and spinal stenosis. 3. PT and OT evaluation and therapy. 4. Further pending resu;ts of MRI      It was my pleasure to evaluate Nigel Mas today. Please call with questions.       Electronically signed by Eunice Marsh MD on 12/22/2020 at 3:00 PM

## 2020-12-22 NOTE — PROGRESS NOTES
Date: 2020       Patient Name: Marilyn Hobson  : 1955      MRN: [id number]    PT order received and chart reviewed. PT evaluation held await neurosurgery recommendations.    Will follow up as appropriate    Greg Watson PT, DPT  [id number]

## 2020-12-22 NOTE — PROGRESS NOTES
OT BEDSIDE TREATMENT NOTE      Date:2020  Patient Name: Nimisha Monday  MRN: [id number]  : 1955  Room: 8555/9455-Z     OT order received and chart reviewed. OT evaluation held await neurosurgery recommendations. Will follow up as appropriate     Trista Shelby, OTR/L   License #  [id number]

## 2020-12-22 NOTE — CONSULTS
[address]                  [address] [place],  [facility]                                  CONSULTATION    PATIENT NAME: Leticia Dooley                      :        1955  MED REC NO:   [id number]                            ROOM:       6413  ACCOUNT NO:   [de-identified]                           ADMIT DATE: 2020  PROVIDER:     Cary Del Toro MD    CONSULT DATE:  2020    CHIEF COMPLAINT:  Ambulatory dysfunction and pain in the back. HISTORY:  This 62-year-old female who has been having numbness involving  the left leg since couple of days ago. The numb feeling has progressed  down from the base to the foot. She also has some sensation in the  right lower extremity. She feels weak in the lower extremities. She  was initially walking with a walker over the past 1 week or so but not  required a wheelchair. She apparently fell and could not get out of bed  because of pain in the back and in the leg. She feels some odd symptoms  from the lower back to the upper part of the abdomen. The patient  underwent an MRI scan of the lumbar spine and it was reported to show  severe spinal canal stenosis at the level of L4-5. Today she also  underwent MRI scan of the thoracic spine which revealed degenerative  changes and mild old mid thoracic compression deformities and multiple  level central canal stenosis causing slight impression on the thoracic  spinal cord. The patient denies any headache, any trauma to the back. PAST HISTORY:  Acute left ankle pain, cancer, chronic back pain,  diabetes mellitus, drug-induced constipation, fall at home,  fibromyalgia, GERD, hypercalcemia, hyperlipidemia, hypertension,  obesity, osteoarthritis, urinary incontinence and rib contusion.     Rest on next dictation      Elvi Giron MD    D: 2020 12:31:55       T: 2020 3:43:34     [email or web address]  Job#: [id number]     Doc#: [id number]

## 2020-12-22 NOTE — PLAN OF CARE
Problem: Pain:  Goal: Pain level will decrease  Description: Pain level will decrease  Outcome: Ongoing  Goal: Control of acute pain  Description: Control of acute pain  Outcome: Ongoing     Problem: Skin Integrity:  Goal: Will show no infection signs and symptoms  Description: Will show no infection signs and symptoms  Outcome: Met This Shift  Goal: Absence of new skin breakdown  Description: Absence of new skin breakdown  Outcome: Met This Shift     Problem: Falls - Risk of:  Goal: Will remain free from falls  Description: Will remain free from falls  Outcome: Met This Shift  Goal: Absence of physical injury  Description: Absence of physical injury  Outcome: Met This Shift     Problem: Daily Care:  Goal: Daily care needs are met  Description: Daily care needs are met  Outcome: Met This Shift

## 2020-12-22 NOTE — PROGRESS NOTES
Spoke to patients boyfriend Saray Maderar at patients request to give update on status. All questions were answered and concerns addressed.

## 2020-12-22 NOTE — PROGRESS NOTES
MRI called to see if pt will be having MRI done tonight per Dr. Geovany Powell request. Per MRI, will most likely be able to take pt tonight and will call a half hour prior so pt may be pre-medicated.

## 2020-12-22 NOTE — CARE COORDINATION
12/22/2020 social work transition of care planning  Pt would like to return home, awaiting ns plan to assist with transition of care planning. Sw/cm will follow.   Electronically signed by LALO Ma on 12/22/2020 at 12:16 PM

## 2020-12-22 NOTE — PROGRESS NOTES
Neurology consult noted. MRI scan of cervical spine ordered. Will await the results  Explained to the patient.

## 2020-12-23 ENCOUNTER — ANESTHESIA EVENT (OUTPATIENT)
Dept: OPERATING ROOM | Age: 65
DRG: 459 | End: 2020-12-23
Payer: MEDICAID

## 2020-12-23 ENCOUNTER — APPOINTMENT (OUTPATIENT)
Dept: GENERAL RADIOLOGY | Age: 65
DRG: 459 | End: 2020-12-23
Attending: FAMILY MEDICINE
Payer: MEDICAID

## 2020-12-23 ENCOUNTER — APPOINTMENT (OUTPATIENT)
Dept: MRI IMAGING | Age: 65
DRG: 459 | End: 2020-12-23
Attending: FAMILY MEDICINE
Payer: MEDICAID

## 2020-12-23 LAB
ANION GAP SERPL CALCULATED.3IONS-SCNC: 9 MMOL/L (ref 7–16)
BUN BLDV-MCNC: 25 MG/DL (ref 8–23)
CALCIUM SERPL-MCNC: 9.5 MG/DL (ref 8.6–10.2)
CHLORIDE BLD-SCNC: 103 MMOL/L (ref 98–107)
CO2: 27 MMOL/L (ref 22–29)
CREAT SERPL-MCNC: 0.7 MG/DL (ref 0.5–1)
GFR AFRICAN AMERICAN: >60
GFR NON-AFRICAN AMERICAN: >60 ML/MIN/1.73
GLUCOSE BLD-MCNC: 151 MG/DL (ref 74–99)
METER GLUCOSE: 133 MG/DL (ref 74–99)
METER GLUCOSE: 151 MG/DL (ref 74–99)
METER GLUCOSE: 156 MG/DL (ref 74–99)
POTASSIUM SERPL-SCNC: 3.8 MMOL/L (ref 3.5–5)
SODIUM BLD-SCNC: 139 MMOL/L (ref 132–146)

## 2020-12-23 PROCEDURE — 71045 X-RAY EXAM CHEST 1 VIEW: CPT

## 2020-12-23 PROCEDURE — 82962 GLUCOSE BLOOD TEST: CPT

## 2020-12-23 PROCEDURE — 6360000004 HC RX CONTRAST MEDICATION: Performed by: STUDENT IN AN ORGANIZED HEALTH CARE EDUCATION/TRAINING PROGRAM

## 2020-12-23 PROCEDURE — 1200000000 HC SEMI PRIVATE

## 2020-12-23 PROCEDURE — 80048 BASIC METABOLIC PNL TOTAL CA: CPT

## 2020-12-23 PROCEDURE — 2580000003 HC RX 258: Performed by: FAMILY MEDICINE

## 2020-12-23 PROCEDURE — 6370000000 HC RX 637 (ALT 250 FOR IP): Performed by: INTERNAL MEDICINE

## 2020-12-23 PROCEDURE — 99232 SBSQ HOSP IP/OBS MODERATE 35: CPT | Performed by: CLINICAL NURSE SPECIALIST

## 2020-12-23 PROCEDURE — 72142 MRI NECK SPINE W/DYE: CPT

## 2020-12-23 PROCEDURE — 6360000002 HC RX W HCPCS: Performed by: INTERNAL MEDICINE

## 2020-12-23 PROCEDURE — 99222 1ST HOSP IP/OBS MODERATE 55: CPT | Performed by: NEUROLOGICAL SURGERY

## 2020-12-23 PROCEDURE — 6370000000 HC RX 637 (ALT 250 FOR IP): Performed by: FAMILY MEDICINE

## 2020-12-23 PROCEDURE — 36415 COLL VENOUS BLD VENIPUNCTURE: CPT

## 2020-12-23 PROCEDURE — A9579 GAD-BASE MR CONTRAST NOS,1ML: HCPCS | Performed by: STUDENT IN AN ORGANIZED HEALTH CARE EDUCATION/TRAINING PROGRAM

## 2020-12-23 PROCEDURE — 2580000003 HC RX 258: Performed by: INTERNAL MEDICINE

## 2020-12-23 RX ORDER — POLYETHYLENE GLYCOL 3350 17 G/17G
17 POWDER, FOR SOLUTION ORAL DAILY
Status: DISCONTINUED | OUTPATIENT
Start: 2020-12-23 | End: 2020-12-30 | Stop reason: HOSPADM

## 2020-12-23 RX ORDER — DOXYCYCLINE HYCLATE 100 MG/1
100 CAPSULE ORAL EVERY 12 HOURS SCHEDULED
Status: DISCONTINUED | OUTPATIENT
Start: 2020-12-23 | End: 2020-12-30 | Stop reason: HOSPADM

## 2020-12-23 RX ADMIN — INSULIN LISPRO 2 UNITS: 100 INJECTION, SOLUTION INTRAVENOUS; SUBCUTANEOUS at 21:14

## 2020-12-23 RX ADMIN — INSULIN LISPRO 2 UNITS: 100 INJECTION, SOLUTION INTRAVENOUS; SUBCUTANEOUS at 17:30

## 2020-12-23 RX ADMIN — ALOGLIPTIN 12.5 MG: 12.5 TABLET, FILM COATED ORAL at 13:31

## 2020-12-23 RX ADMIN — OXYCODONE AND ACETAMINOPHEN 1 TABLET: 5; 325 TABLET ORAL at 04:47

## 2020-12-23 RX ADMIN — ALLOPURINOL 300 MG: 300 TABLET ORAL at 13:31

## 2020-12-23 RX ADMIN — OXYCODONE AND ACETAMINOPHEN 1 TABLET: 5; 325 TABLET ORAL at 13:32

## 2020-12-23 RX ADMIN — ATORVASTATIN CALCIUM 10 MG: 10 TABLET, FILM COATED ORAL at 21:12

## 2020-12-23 RX ADMIN — ATENOLOL 50 MG: 50 TABLET ORAL at 10:39

## 2020-12-23 RX ADMIN — Medication 2000 UNITS: at 13:31

## 2020-12-23 RX ADMIN — Medication 10 ML: at 21:14

## 2020-12-23 RX ADMIN — DOXYCYCLINE HYCLATE 100 MG: 100 CAPSULE ORAL at 21:13

## 2020-12-23 RX ADMIN — DOXYCYCLINE HYCLATE 100 MG: 100 CAPSULE ORAL at 13:52

## 2020-12-23 RX ADMIN — PANTOPRAZOLE SODIUM 20 MG: 20 TABLET, DELAYED RELEASE ORAL at 13:31

## 2020-12-23 RX ADMIN — GADOTERIDOL 20 ML: 279.3 INJECTION, SOLUTION INTRAVENOUS at 12:51

## 2020-12-23 RX ADMIN — OXYCODONE AND ACETAMINOPHEN 1 TABLET: 5; 325 TABLET ORAL at 21:13

## 2020-12-23 RX ADMIN — Medication 10 ML: at 09:00

## 2020-12-23 RX ADMIN — CYCLOBENZAPRINE 10 MG: 10 TABLET, FILM COATED ORAL at 21:13

## 2020-12-23 RX ADMIN — DOCUSATE SODIUM 100 MG: 100 CAPSULE, LIQUID FILLED ORAL at 13:31

## 2020-12-23 RX ADMIN — ACETAMINOPHEN 650 MG: 325 TABLET ORAL at 21:12

## 2020-12-23 ASSESSMENT — PAIN SCALES - WONG BAKER: WONGBAKER_NUMERICALRESPONSE: 0

## 2020-12-23 ASSESSMENT — PAIN DESCRIPTION - FREQUENCY: FREQUENCY: CONTINUOUS

## 2020-12-23 ASSESSMENT — PAIN DESCRIPTION - DESCRIPTORS: DESCRIPTORS: ACHING

## 2020-12-23 ASSESSMENT — PAIN SCALES - GENERAL
PAINLEVEL_OUTOF10: 9
PAINLEVEL_OUTOF10: 3
PAINLEVEL_OUTOF10: 10
PAINLEVEL_OUTOF10: 8

## 2020-12-23 ASSESSMENT — PAIN DESCRIPTION - LOCATION: LOCATION: LEG

## 2020-12-23 ASSESSMENT — PAIN DESCRIPTION - PAIN TYPE: TYPE: ACUTE PAIN

## 2020-12-23 ASSESSMENT — PAIN DESCRIPTION - ORIENTATION: ORIENTATION: RIGHT;LEFT

## 2020-12-23 NOTE — PROGRESS NOTES
Reviewed MRI scan of  Thoracic apine. Pre- and post-contrast T1 weighted images of the cervical spine was performed. No abnormal enhancement in the cervical spinal cord.  The previously seen T2   hyperintensity in the cervical spinal cord is likely related to artifacts. Follow-up is recommended. Prominent left paracentral disc protrusion at T2-3, contributing to moderate   spinal canal narrowing and moderate spinal cord compression.  There is likely   focal spinal cord edema at T2-3 level on MRI cervical spine December 22, 2020.      Will get another neusurogical opinion

## 2020-12-23 NOTE — PROGRESS NOTES
Emily Adams is a 63 y.o. right handed female     Patient was admitted 12/20/20 with leg pain and numbness. Long history of chronic back difficulties    She did noted tingling sensations in her abdomen but denies any discomfort.     No arm involvement - no weakness or numbness    States she has trouble lifting her leg d/t \"pain more than anything\"    She does have a history of colon cancer with resection - no need for chemo or radiation    MRI c-spine was obtained which did demonstrate some abnormalities on T2 flair and contrasted images were recommended - this was discussed with her     No chest pain or palpitations  No SOB  No vertigo, lightheadedness or loss of consciousness  No incontinence of bowels or bladder  No itching or bruising appreciated    ROS otherwise negative     Allergies as of 12/20/2020 - Review Complete 12/20/2020   Allergen Reaction Noted    Diclofenac  07/08/2015    Diclofenac sodium  07/08/2015    Lisinopril Other (See Comments) 09/05/2013    Lyrica [pregabalin] Other (See Comments) 06/08/2016    Sulfa antibiotics Rash 06/08/2016       Objective:     BP (!) 161/74   Pulse 71   Temp 97.8 °F (36.6 °C) (Temporal)   Resp 18   Ht 4' 11\" (1.499 m)   Wt 200 lb (90.7 kg)   LMP  (LMP Unknown)   SpO2 93%   BMI 40.40 kg/m²      General appearance: alert, appears stated age and cooperative  Head: Normocephalic, without obvious abnormality, atraumatic  Extremities: no cyanosis or edema  Pulses: 2+ and symmetric  Skin: no rashes or lesions    Mental Status: Alert, oriented, thought content appropriate    Speech: clear  Language: appropriate    Cranial Nerves:  I: smell    II: visual acuity     II: visual fields Full   II: pupils TASHA   III,VII: ptosis None   III,IV,VI: extraocular muscles  EOMI without nystagmus    V: mastication Normal   V: facial light touch sensation  Normal   V,VII: corneal reflex  Present   VII: facial muscle function - upper     VII: facial muscle function - lower Normal   VIII: hearing Normal   IX: soft palate elevation  Normal   IX,X: gag reflex    XI: trapezius strength  5/5   XI: sternocleidomastoid strength 5/5   XI: neck extension strength  5/5   XII: tongue strength  Normal     Motor:  5/5 arms  1-2/5 left IPS  0/5 left gastroc/ant tibs  4/5 right IPS  5/5 right gastroc and ant tibs    Sensory:  decreased LT left leg  Appears to have T3 sensory level     Coordination:   FN, FFM and MELANIE symmetrical      DTR:   Right Brachioradialis reflex 1+  Left Brachioradialis reflex 1+  Right Biceps reflex 1+  Left Biceps reflex 1+  Right Quadriceps reflex 1+  Left Quadriceps reflex 1+  Right Achilles reflex 0  Left Achilles reflex 1+    No Babinski  right Ardon's     Laboratory/Radiology:     CBC with Differential:    Lab Results   Component Value Date    WBC 10.9 12/21/2020    RBC 5.00 12/21/2020    HGB 14.4 12/21/2020    HCT 43.5 12/21/2020     12/21/2020    MCV 87.0 12/21/2020    MCH 28.8 12/21/2020    MCHC 33.1 12/21/2020    RDW 13.1 12/21/2020    LYMPHOPCT 6.9 12/21/2020    MONOPCT 1.1 12/21/2020    EOSPCT 7 05/29/2018    BASOPCT 0.1 12/21/2020    MONOSABS 0.12 12/21/2020    LYMPHSABS 0.75 12/21/2020    EOSABS 0.01 12/21/2020    BASOSABS 0.01 12/21/2020     CMP:    Lab Results   Component Value Date     12/23/2020    K 3.8 12/23/2020    K 4.3 12/21/2020     12/23/2020    CO2 27 12/23/2020    BUN 25 12/23/2020    CREATININE 0.7 12/23/2020    GFRAA >60 12/23/2020    LABGLOM >60 12/23/2020    GLUCOSE 151 12/23/2020    PROT 7.0 03/16/2020    LABALBU 4.3 03/16/2020    CALCIUM 9.5 12/23/2020    BILITOT 0.8 03/16/2020    ALKPHOS 104 03/16/2020    AST 19 03/16/2020    ALT 18 03/16/2020       MRI C-spine without:  1. Tiny foci of signal abnormality in the upper cervical cord, from the level  of C3-4 to C5, which is only evident on the axial T2 weighted images and have  developed in the interim since the previous study.  The etiology is unclear.   It may be infectious, inflammatory or neoplastic or artifactual.  Evaluation  with contrast enhanced MRI of the cervical spine is recommended. 2. Mild central canal stenoses at C5-6, C6-7 and C7-T1. 3.  Multilevel neural foraminal stenoses, worst (severe) at the bilateral  C5-6 and C6-7 levels, as well as the left C7-T1 levels. MRI T-spine:  1. Degenerative change.  Few mild old midthoracic compression deformities. No acute fracture or osseous destructive lesion. 2. Multilevel central canal stenosis as described above with multiple disc  protrusions, some which cause slight impression on the thoracic spinal cord. Clinical correlation is suggested. MRI Brain:  1. No acute intracranial abnormality. 2. No mass, mass effect, edema or hemorrhage. MRI L-spine:  L4-5 severe spinal canal stenosis. I personally reviewed the patient's lab and imaging studies at this time.     Assessment:     Patient with back discomfort and leg weakness   Probably from her marked stenosis, but given her findings on cervical spine and history of cancer, will obtain contrasted images    I do find reflexes in her knees and right ankle therefore doubt peripheral nerve injury       Plan:     MRI c-spine with gadolinium     Bozena Query  11:08 AM  12/23/2020

## 2020-12-23 NOTE — PROGRESS NOTES
Physical Therapy    Date: 2020       Patient Name: Barbara Guidry  : 1955      MRN: [id number]    PT order received. Chart has been reviewed. PT evaluation will be on hold due to awaiting POC from NS following cervical mri. Will continue to follow and complete evaluation at later time.      Mendy Solorzano, PT

## 2020-12-23 NOTE — PROGRESS NOTES
Hospitalist Progress Note      SYNOPSIS: Patient admitted on 12/20/2020 presented to [facility] with past medical history of DJD of the spine, chronic back pain, osteoarthritis of multiple joints, hypertension, fibromyalgia, upper lipidemia, colon cancer status post colectomy 3 years ago, did not require any chemotherapy or radiation, sacroiliitis, diabetes and vitamin D deficiency. Patient started having numbness involving the left leg yesterday. Progressed from the waist area down to the foot. She is now beginning to have similar sensation on the right side. This is constant, moderate to severe in intensity on the left and associated with bilateral lower extremity weakness that is worse on the left than the right. Patient was walking with a walker in the past week and now requires a wheelchair. Today she fell because she could not get in or out of bed. Complaining of low back pain as well which is more towards the left side, pain is dull, constant, radiate down the left leg and is worse with movement. She has not been able to urinate as much and has not had a bowel movement for a week. She is now beginning to experience \"shocklike\" painful sensation in the xiphisternal area that radiates into the back, numbness in the abdomen. No cough, chest pain or shortness of breath. No fever.     Vital signs notable for blood pressure of 149/71. Labs showed normal CBC, glucose of 151 otherwise normal chemistry. MRI of the lumbar spine shows severe spinal stenosis at L4-L5. SUBJECTIVE:  Stable overnight. No other overnight issues reported. Patient seen and examined  Records reviewed. MRI cervical spine showed Tiny foci of signal abnormality in the upper cervical cord, from the level   of C3-4 to C5, which is only evident on the axial T2 weighted images and have developed in the interim since the previous study.  The etiology is unclear.   It may be infectious, inflammatory or neoplastic or artifactual. Multilevel neural foraminal stenoses, worst (severe) at the bilateral C5-6 and C6-7 levels, as well as the left C7-T1 levels. Mild central canal stenoses at C5-6, C6-7 and C7-T1. CXR showing WILFRID pneumonia  Awaiting neurosurgery plan, she is again set to NPO  Constipation      Temp (24hrs), Av.5 °F (36.9 °C), Min:97.8 °F (36.6 °C), Max:99.1 °F (37.3 °C)    DIET: Diet NPO Effective Now Exceptions are: Sips with Meds  CODE: Full Code    Intake/Output Summary (Last 24 hours) at 2020 0846  Last data filed at 2020 1931  Gross per 24 hour   Intake 480 ml   Output 550 ml   Net -70 ml       Review of Systems  All bolded are positive; please see HPI  General:  Fever, chills, diaphoresis, fatigue, malaise, night sweats, weight loss  Psychological:  Anxiety, disorientation, hallucinations. ENT:  Epistaxis, headaches, vertigo, visual changes. Cardiovascular:  Chest pain, irregular heartbeats, palpitations, paroxysmal nocturnal dyspnea. Respiratory:  Shortness of breath, coughing, sputum production, hemoptysis, wheezing, orthopnea. Gastrointestinal:  Nausea, vomiting, diarrhea, heartburn, constipation, abdominal pain, hematemesis, hematochezia, melena, acholic stools  Genito-Urinary:  Dysuria, urgency, frequency, hematuria  Musculoskeletal:  Joint pain, joint stiffness, joint swelling, muscle pain back pain, leg pain  Neurology:  Headache, focal neurological deficits, weakness, numbness, paresthesia  Derm:  Rashes, ulcers, excoriations, bruising  Extremities:  Decreased ROM, peripheral edema, mottling      OBJECTIVE:    BP (!) 161/74   Pulse 71   Temp 97.8 °F (36.6 °C) (Temporal)   Resp 18   Ht 4' 11\" (1.499 m)   Wt 200 lb (90.7 kg)   LMP  (LMP Unknown)   SpO2 93%   BMI 40.40 kg/m²     General appearance:  awake, alert, and oriented to person, place, time, and purpose; appears stated age and cooperative; no apparent distress no labored breathing +mcclain  HEENT:  Conjunctivae/corneas clear.    Neck: Supple. No jugular venous distention. Respiratory: symmetrical; clear to auscultation bilaterally; no wheezes; no rhonchi; no rales  Cardiovascular: rhythm regular; rate controlled; no murmurs  Abdomen: Soft, nontender, nondistended  Extremities:  peripheral pulses present; no peripheral edema; no ulcers  Musculoskeletal: No clubbing, cyanosis, no bilateral lower extremity edema. Brisk capillary refill. Skin:  No rashes  on visible skin  Neurologic: awake, alert and following commands     ASSESSMENT and PLAN:  · Acute exacerbation of chronic back pain with radicular symptoms secondary to severe spinal stenosis at L4/L5-  Consult neurosurgery. · Weakness of both legs secondary to above-  Management as above. · Paresthesia from upper abdomen down to the foot-  MRI thoracic spine showing multilevel central canal stenosis with multiple disc protrusions, some which cause slight impression on the thoracic spinal cord  · Cervical spine C3-C5 signal abnormality- infectious, inflammatory, neoplastic, or artifactual.   · Cervical spine stenosis  · Inability to void- had Tolliver catheterization in the ER, lots of urine drained. · Constipation- bowel regimen. · Ambulatory dysfunction- physical therapy as tolerated  · Type 2 diabetes- sliding scale coverage, monitor blood sugar. · CAP- Rocephin and doxy  · Fibromyalgia with chronic pain- continue home medication  · Morbid obesity- dietary and lifestyle modification. DISPOSITION: Continue current plan of care.  Awaiting neurosurgery plan    Medications:  REVIEWED DAILY    Infusion Medications    dextrose       Scheduled Medications    metFORMIN  500 mg Oral Daily with breakfast    allopurinol  300 mg Oral Daily    atenolol  50 mg Oral Daily    atorvastatin  10 mg Oral Nightly    vitamin D  2,000 Units Oral Daily    pantoprazole  20 mg Oral QAM AC    alogliptin  12.5 mg Oral Daily    docusate sodium  100 mg Oral Daily    insulin lispro  0-10 Units Subcutaneous 4x Daily AC & HS    sodium chloride flush  10 mL Intravenous 2 times per day     PRN Meds: amitriptyline, glucose, dextrose, glucagon (rDNA), dextrose, sodium chloride flush, promethazine **OR** ondansetron, acetaminophen **OR** acetaminophen, oxyCODONE-acetaminophen, morphine, cyclobenzaprine    Labs:     Recent Labs     12/20/20  1552 12/21/20  0647   WBC 6.6 10.9   HGB 14.2 14.4   HCT 42.5 43.5    235       Recent Labs     12/20/20  1552 12/21/20  0647 12/23/20  0508    139 139   K 3.9 4.3 3.8   CL 98 101 103   CO2 27 25 27   BUN 12 13 25*   CREATININE 0.6 0.7 0.7   CALCIUM 9.4 10.1 9.5   PHOS 2.8  --   --        No results for input(s): PROT, ALB, ALKPHOS, ALT, AST, BILITOT, AMYLASE, LIPASE in the last 72 hours. No results for input(s): INR in the last 72 hours. No results for input(s): Michael Jovani in the last 72 hours. Chronic labs:    Lab Results   Component Value Date    CHOL 133 03/16/2020    TRIG 241 (H) 03/16/2020    HDL 41 03/16/2020    LDLCALC 44 03/16/2020    TSH 4.180 03/18/2020    LABA1C 6.9 (H) 12/21/2020       Radiology: REVIEWED DAILY    +++++++++++++++++++++++++++++++++++++++++++++++++  Betzaida Hunter Physician - 2020 [facility], [place]  +++++++++++++++++++++++++++++++++++++++++++++++++  NOTE: This report was transcribed using voice recognition software. Every effort was made to ensure accuracy; however, inadvertent computerized transcription errors may be present.

## 2020-12-23 NOTE — PROGRESS NOTES
OT SESSION ATTEMPT     Date:2020  Patient Name: Grzegorz Perry  MRN: [id number]  : 1955  Room: [address] [place] [address]     Attempted OT session this date:    [] unavailable due to other medical staff currently with pt   [x] On hold - await MRI/neuro surgery recommendation   [] on hold per nursing staff   [] on hold per nursing staff secondary to lab / radiology results    [] declined treatment  this date due to ____. Benefits of participation in therapy reviewed with pt.    [] off unit   [] Other:     Will reattempt OT eval at a later time.     Neo Francisco OTR/L #[id number]

## 2020-12-23 NOTE — ANESTHESIA PRE PROCEDURE
Department of Anesthesiology  Preprocedure Note       Name:  Kwame Castrejon   Age:  63 y.o.  :  1955                                          MRN:  [id number]         Date:  2020      Surgeon: Sandra Tabares):  Leoncio Veras MD    Procedure: Procedure(s):  L4-L5  POSTERIOR LUMBAR INTERBODY FUSION    Medications prior to admission:   Prior to Admission medications    Medication Sig Start Date End Date Taking? Authorizing Provider   atorvastatin (LIPITOR) 10 MG tablet Take 1 tablet by mouth nightly Indications: High Amount of Fats in the Blood 20  Yes Jeannette Dandy, APRN - CNP   amitriptyline (ELAVIL) 25 MG tablet Take 25 mg by mouth At bedtime as needed 10/27/20  Yes Historical Provider, MD   etodolac (LODINE) 400 MG tablet Take 400 mg by mouth 2 times daily   Yes Historical Provider, MD   sAXagliptin (ONGLYZA) 2.5 MG TABS tablet TAKE 1 TABLET DAILY 20  Yes Jeannette Dandy, APRN - CNP   atenolol (TENORMIN) 50 MG tablet Take 1 tablet by mouth daily 10/15/20  Yes Jeannette Dandy, APRN - CNP   omeprazole (PRILOSEC) 20 MG delayed release capsule Take 1 capsule by mouth daily 10/15/20  Yes Jeannette Dandy, APRN - CNP   metFORMIN (GLUCOPHAGE) 500 MG tablet Take 1 tablet by mouth daily (with breakfast) 10/15/20  Yes Jeannette Dandy, APRN - CNP   allopurinol (ZYLOPRIM) 300 MG tablet TAKE 1 TABLET BY MOUTH DAILY 20  Yes Jeannette Dandy, APRN - CNP   Cholecalciferol (VITAMIN D3) 2000 units CAPS Take 2,000 Units by mouth daily   Yes Historical Provider, MD   Biotin 1000 MCG TABS Take 1,000 mcg by mouth daily   Yes Historical Provider, MD   [facility] [address] [facility] by Does not apply route Start 2020 expires 2023   Jeannette Dandy, APRN - CNP   FreeStyle Lancets MISC TEST EVERY DAY 20   Jeannette Dandy, APRN - CNP   blood glucose monitor strips Test 1 times a day & as needed for symptoms of irregular blood glucose.  20   Jeni Dandy, APRN - CNP   triamcinolone (KENALOG) 0.1 % cream MARY EXT AA 2 TO 3 TIMES PER DAY UNTIL RESOLVED 2/24/20   Historical Provider, MD   glucose monitoring kit (FREESTYLE) monitoring kit 1 kit by Does not apply route daily 8/1/19   VISHNU Morales - CNP       Current medications:    Current Facility-Administered Medications   Medication Dose Route Frequency Provider Last Rate Last Admin    cefTRIAXone (ROCEPHIN) 1 g in sterile water 10 mL IV syringe  1 g Intravenous Q24H Krish Hewitt, DO   1 g at 12/23/20 1353    doxycycline hyclate (VIBRAMYCIN) capsule 100 mg  100 mg Oral 2 times per day Ying Malou, DO   100 mg at 12/23/20 1352    polyethylene glycol (GLYCOLAX) packet 17 g  17 g Oral Daily Krish Hewitt DO        metFORMIN (GLUCOPHAGE) tablet 500 mg  500 mg Oral Daily with breakfast Krish Hewitt DO        allopurinol (ZYLOPRIM) tablet 300 mg  300 mg Oral Daily Randall Richards MD   300 mg at 12/23/20 1331    amitriptyline (ELAVIL) tablet 25 mg  25 mg Oral Nightly PRN Randall Richards MD        atenolol (TENORMIN) tablet 50 mg  50 mg Oral Daily Randall Richards MD   50 mg at 12/23/20 1039    atorvastatin (LIPITOR) tablet 10 mg  10 mg Oral Nightly Randall Richards MD   10 mg at 12/22/20 2100    vitamin D (CHOLECALCIFEROL) tablet 2,000 Units  2,000 Units Oral Daily Randall Richards MD   2,000 Units at 12/23/20 1331    pantoprazole (PROTONIX) tablet 20 mg  20 mg Oral QAM AC Randall Richards MD   20 mg at 12/23/20 1331    alogliptin (NESINA) tablet 12.5 mg  12.5 mg Oral Daily Randall Richards MD   12.5 mg at 12/23/20 1331    docusate sodium (COLACE) capsule 100 mg  100 mg Oral Daily Randall Richards MD   100 mg at 12/23/20 1331    insulin lispro (HUMALOG) injection vial 0-10 Units  0-10 Units Subcutaneous 4x Daily AC & HS Randall Richards MD   6 Units at 12/21/20 2056    glucose (GLUTOSE) 40 % oral gel 15 g  15 g Oral PRN Randall Richards MD        dextrose 50 % IV solution  12.5 g Intravenous PRN Randall Richards MD       Dossikhalida Chavez glucagon (rDNA) injection 1 mg  1 mg Intramuscular PRN Giovanni Ding MD        dextrose 5 % solution  100 mL/hr Intravenous PRN Giovanni Ding MD        sodium chloride flush 0.9 % injection 10 mL  10 mL Intravenous 2 times per day Giovanni Ding MD   10 mL at 12/22/20 2114    sodium chloride flush 0.9 % injection 10 mL  10 mL Intravenous PRN Giovanni Ding MD   10 mL at 12/21/20 1717    promethazine (PHENERGAN) tablet 12.5 mg  12.5 mg Oral Q6H PRN Giovanni Ding MD        Or    ondansetron (ZOFRAN) injection 4 mg  4 mg Intravenous Q6H PRN Giovanni Ding MD        acetaminophen (TYLENOL) tablet 650 mg  650 mg Oral Q6H PRN Giovanni Ding MD        Or    acetaminophen (TYLENOL) suppository 650 mg  650 mg Rectal Q6H PRN Giovanni Ding MD        oxyCODONE-acetaminophen (PERCOCET) 5-325 MG per tablet 1 tablet  1 tablet Oral Q4H PRN Giovanni Ding MD   1 tablet at 12/23/20 1332    morphine (PF) injection 2 mg  2 mg Intravenous Q4H PRN Giovanni Ding MD        cyclobenzaprine (FLEXERIL) tablet 10 mg  10 mg Oral TID PRN Giovanni Ding MD   10 mg at 12/21/20 2051       Allergies:     Allergies   Allergen Reactions    Diclofenac     Diclofenac Sodium      Nauseated, Diarrhea    Lisinopril Other (See Comments)     Profound malaise    Lyrica [Pregabalin] Other (See Comments)     Confusion    Sulfa Antibiotics Rash       Problem List:    Patient Active Problem List   Diagnosis Code    Essential hypertension I10    Chronic back pain greater than 3 months duration M54.9, G89.29    Fatigue R53.83    Mixed hyperlipidemia E78.2    Vitamin D insufficiency E55.9    Gastroesophageal reflux disease without esophagitis K21.9    Central stenosis of spinal canal M48.00    Spondylolisthesis at L4-L5 level M43.16    Acute left lumbar radiculopathy M54.16    Chronic pain of left knee M25.562, G89.29    Morbid obesity with BMI of 40.0-44.9, adult ([facility]) E66.01, Z68.41    Personal SURGERY      DILATION AND CURETTAGE OF UTERUS N/A 10/23/2019    DILATATION AND CURETTAGE HYSTEROSCOPY performed by Farhan Orozco MD at [address] Right 7/11/2019    C7-T1 EPIDURAL STEROID INJECTION #1 TOWARD THE RIGHT performed by Ryan Sampson DO at [address]    [address] Right 12/28/2017    right L4-5 transforaminal epidural #1    NERVE BLOCK Left 11/29/2018    si inj    NERVE BLOCK Bilateral 08/15/2019    bilateral intra-articular facet joint injection with flouroscopic guidance at L4-S1 with iv sedation    RI INJECT SI JOINT ARTHRGRPHY&/ANES/STEROID W/IMAGE Left 11/29/2018    LEFT SACROILIAC JOINT INJECTION WITH X-RAY performed by DO Tierney at [address] [facility] Left 2005    TONSILLECTOMY  28 YRS OLD    TUBAL LIGATION         Social History:    Social History     Tobacco Use    Smoking status: Never Smoker    Smokeless tobacco: Never Used   Substance Use Topics    Alcohol use: Not Currently     Alcohol/week: 0.0 standard drinks     Comment: Rarely                                Counseling given: Not Answered      Vital Signs (Current):   Vitals:    12/22/20 0845 12/22/20 1259 12/22/20 1835 12/23/20 0000   BP: 129/88 (!) 153/69 136/66 (!) 161/74   Pulse: 90 81 82 71   Resp: 18 17 16 18   Temp: 36.2 °C (97.2 °F) 37.3 °C (99.1 °F) 37.1 °C (98.7 °F) 36.6 °C (97.8 °F)   TempSrc: Oral Temporal Temporal Temporal   SpO2: 93% 92% 93%    Weight:       Height:                                                  BP Readings from Last 3 Encounters:   12/23/20 (!) 161/74   12/20/20 (!) 152/81   12/14/20 (!) 186/76       NPO Status:                                                                                 BMI:   Wt Readings from Last 3 Encounters:   12/21/20 200 lb (90.7 kg)   12/20/20 200 lb (90.7 kg)   12/14/20 190 lb (86.2 kg)     Body mass index is 40.4 kg/m².     CBC:   Lab Results   Component Value Date    WBC 10.9 12/21/2020    RBC 5.00 12/21/2020    HGB 14.4 12/21/2020    HCT 43.5 12/21/2020    MCV 87.0 12/21/2020    RDW 13.1 12/21/2020     12/21/2020       CMP:   Lab Results   Component Value Date     12/23/2020    K 3.8 12/23/2020    K 4.3 12/21/2020     12/23/2020    CO2 27 12/23/2020    BUN 25 12/23/2020    CREATININE 0.7 12/23/2020    GFRAA >60 12/23/2020    LABGLOM >60 12/23/2020    GLUCOSE 151 12/23/2020    PROT 7.0 03/16/2020    CALCIUM 9.5 12/23/2020    BILITOT 0.8 03/16/2020    ALKPHOS 104 03/16/2020    AST 19 03/16/2020    ALT 18 03/16/2020       POC Tests: No results for input(s): POCGLU, POCNA, POCK, POCCL, POCBUN, POCHEMO, POCHCT in the last 72 hours. Coags: No results found for: PROTIME, INR, APTT    HCG (If Applicable): No results found for: PREGTESTUR, PREGSERUM, HCG, HCGQUANT     ABGs: No results found for: PHART, PO2ART, OLA5KRN, EXQ7XQI, BEART, E1KHBCDU     Type & Screen (If Applicable):  No results found for: LABABO, LABRH    Drug/Infectious Status (If Applicable):  No results found for: HIV, HEPCAB    COVID-19 Screening (If Applicable): No results found for: COVID19      Anesthesia Evaluation  Patient summary reviewed and Nursing notes reviewed   history of anesthetic complications: PONV. Airway: Mallampati: III     Neck ROM: limited  Mouth opening: > = 3 FB Dental:          Pulmonary:normal exam  breath sounds clear to auscultation  (+) pneumonia:                             Cardiovascular:  Exercise tolerance: poor (<4 METS),   (+) hypertension:,       ECG reviewed  Rhythm: regular  Rate: normal           Beta Blocker:  Dose within 24 Hrs         Neuro/Psych:   (+) neuromuscular disease (history of Fibromyalgia):,             GI/Hepatic/Renal:   (+) GERD: poorly controlled, morbid obesity          Endo/Other:    (+) DiabetesType II DM, , : arthritis: OA., malignancy/cancer (history of colon cancer). Abdominal:           Vascular: negative vascular ROS. Anesthesia Plan      general     ASA 4       Induction: intravenous. BIS  MIPS: Postoperative opioids intended and Prophylactic antiemetics administered. Anesthetic plan and risks discussed with patient. Use of blood products discussed with patient whom consented to blood products. Plan discussed with CRNA and attending. Derek Camarena RN   12/23/2020      DOS STAFF ADDENDUM:    Pt seen and examined, physical exam updated, chart reviewed including anesthesia, drug and allergy history. H&P reviewed. No interval changes to history or physical examination (unless noted above). NPO status confirmed. Anesthetic plan, risks, benefits, alternatives discussed with patient. Patient verbalized an understanding and agrees to proceed.      Tito Rodríguez MD  Staff Anesthesiologist  1:05 PM

## 2020-12-23 NOTE — CARE COORDINATION
12/23/2020 social work transition of care planning  Sw followed up with pt at bedside. Pt eldon selvin to go home, but awaiting Ns plan. Pt given [facility] list,if needed. Pt chose [address] [facility],[address] [place],if needed-referral made. Sw will follow. The Plan for Transition of Care is related to the following treatment goals: improvement of functioning    The Patient and/or patient representative was provided with a choice of provider and agrees   with the discharge plan. [x] Yes [] No    Freedom of choice list was provided with basic dialogue that supports the patient's individualized plan of care/goals, treatment preferences and shares the quality data associated with the providers.  [x] Yes [] No   Electronically signed by LALO Stern on 12/23/2020 at 11:13 AM

## 2020-12-24 ENCOUNTER — APPOINTMENT (OUTPATIENT)
Dept: GENERAL RADIOLOGY | Age: 65
DRG: 459 | End: 2020-12-24
Attending: FAMILY MEDICINE
Payer: MEDICAID

## 2020-12-24 ENCOUNTER — ANESTHESIA (OUTPATIENT)
Dept: OPERATING ROOM | Age: 65
DRG: 459 | End: 2020-12-24
Payer: MEDICAID

## 2020-12-24 VITALS — OXYGEN SATURATION: 98 % | DIASTOLIC BLOOD PRESSURE: 100 MMHG | SYSTOLIC BLOOD PRESSURE: 131 MMHG | TEMPERATURE: 99.9 F

## 2020-12-24 LAB
ANION GAP SERPL CALCULATED.3IONS-SCNC: 10 MMOL/L (ref 7–16)
ANION GAP SERPL CALCULATED.3IONS-SCNC: 9 MMOL/L (ref 7–16)
BUN BLDV-MCNC: 23 MG/DL (ref 8–23)
BUN BLDV-MCNC: 25 MG/DL (ref 8–23)
CALCIUM SERPL-MCNC: 9.4 MG/DL (ref 8.6–10.2)
CALCIUM SERPL-MCNC: 9.7 MG/DL (ref 8.6–10.2)
CHLORIDE BLD-SCNC: 100 MMOL/L (ref 98–107)
CHLORIDE BLD-SCNC: 101 MMOL/L (ref 98–107)
CHOLESTEROL, TOTAL: 155 MG/DL (ref 0–199)
CO2: 26 MMOL/L (ref 22–29)
CO2: 30 MMOL/L (ref 22–29)
CREAT SERPL-MCNC: 0.7 MG/DL (ref 0.5–1)
CREAT SERPL-MCNC: 0.8 MG/DL (ref 0.5–1)
GFR AFRICAN AMERICAN: >60
GFR AFRICAN AMERICAN: >60
GFR NON-AFRICAN AMERICAN: >60 ML/MIN/1.73
GFR NON-AFRICAN AMERICAN: >60 ML/MIN/1.73
GLUCOSE BLD-MCNC: 148 MG/DL (ref 74–99)
GLUCOSE BLD-MCNC: 155 MG/DL (ref 74–99)
HDLC SERPL-MCNC: 40 MG/DL
LDL CHOLESTEROL CALCULATED: 47 MG/DL (ref 0–99)
METER GLUCOSE: 136 MG/DL (ref 74–99)
METER GLUCOSE: 140 MG/DL (ref 74–99)
METER GLUCOSE: 147 MG/DL (ref 74–99)
METER GLUCOSE: 170 MG/DL (ref 74–99)
POTASSIUM SERPL-SCNC: 3.8 MMOL/L (ref 3.5–5)
POTASSIUM SERPL-SCNC: 4 MMOL/L (ref 3.5–5)
SODIUM BLD-SCNC: 135 MMOL/L (ref 132–146)
SODIUM BLD-SCNC: 141 MMOL/L (ref 132–146)
TRIGL SERPL-MCNC: 340 MG/DL (ref 0–149)
VITAMIN D 25-HYDROXY: 37 NG/ML (ref 30–100)
VLDLC SERPL CALC-MCNC: 68 MG/DL

## 2020-12-24 PROCEDURE — 6360000002 HC RX W HCPCS: Performed by: NEUROLOGICAL SURGERY

## 2020-12-24 PROCEDURE — 6370000000 HC RX 637 (ALT 250 FOR IP): Performed by: INTERNAL MEDICINE

## 2020-12-24 PROCEDURE — 88304 TISSUE EXAM BY PATHOLOGIST: CPT

## 2020-12-24 PROCEDURE — 93005 ELECTROCARDIOGRAM TRACING: CPT | Performed by: NEUROLOGICAL SURGERY

## 2020-12-24 PROCEDURE — 2720000010 HC SURG SUPPLY STERILE: Performed by: NEUROLOGICAL SURGERY

## 2020-12-24 PROCEDURE — 3700000000 HC ANESTHESIA ATTENDED CARE: Performed by: NEUROLOGICAL SURGERY

## 2020-12-24 PROCEDURE — 2780000010 HC IMPLANT OTHER: Performed by: NEUROLOGICAL SURGERY

## 2020-12-24 PROCEDURE — P9041 ALBUMIN (HUMAN),5%, 50ML: HCPCS | Performed by: NURSE ANESTHETIST, CERTIFIED REGISTERED

## 2020-12-24 PROCEDURE — 01NB0ZZ RELEASE LUMBAR NERVE, OPEN APPROACH: ICD-10-PCS | Performed by: NEUROLOGICAL SURGERY

## 2020-12-24 PROCEDURE — 6360000002 HC RX W HCPCS: Performed by: NURSE ANESTHETIST, CERTIFIED REGISTERED

## 2020-12-24 PROCEDURE — 2580000003 HC RX 258: Performed by: NURSE PRACTITIONER

## 2020-12-24 PROCEDURE — 80048 BASIC METABOLIC PNL TOTAL CA: CPT

## 2020-12-24 PROCEDURE — 95938 SOMATOSENSORY TESTING: CPT | Performed by: AUDIOLOGIST

## 2020-12-24 PROCEDURE — 2500000003 HC RX 250 WO HCPCS: Performed by: NURSE ANESTHETIST, CERTIFIED REGISTERED

## 2020-12-24 PROCEDURE — 2580000003 HC RX 258: Performed by: NURSE ANESTHETIST, CERTIFIED REGISTERED

## 2020-12-24 PROCEDURE — 6370000000 HC RX 637 (ALT 250 FOR IP): Performed by: NEUROLOGICAL SURGERY

## 2020-12-24 PROCEDURE — 6360000002 HC RX W HCPCS: Performed by: FAMILY MEDICINE

## 2020-12-24 PROCEDURE — 2500000003 HC RX 250 WO HCPCS: Performed by: NEUROLOGICAL SURGERY

## 2020-12-24 PROCEDURE — 6370000000 HC RX 637 (ALT 250 FOR IP): Performed by: FAMILY MEDICINE

## 2020-12-24 PROCEDURE — 2580000003 HC RX 258: Performed by: FAMILY MEDICINE

## 2020-12-24 PROCEDURE — 2060000000 HC ICU INTERMEDIATE R&B

## 2020-12-24 PROCEDURE — 2580000003 HC RX 258: Performed by: INTERNAL MEDICINE

## 2020-12-24 PROCEDURE — 80061 LIPID PANEL: CPT

## 2020-12-24 PROCEDURE — C1713 ANCHOR/SCREW BN/BN,TIS/BN: HCPCS | Performed by: NEUROLOGICAL SURGERY

## 2020-12-24 PROCEDURE — 95940 IONM IN OPERATNG ROOM 15 MIN: CPT | Performed by: AUDIOLOGIST

## 2020-12-24 PROCEDURE — 2580000003 HC RX 258: Performed by: NEUROLOGICAL SURGERY

## 2020-12-24 PROCEDURE — 0SB20ZZ EXCISION OF LUMBAR VERTEBRAL DISC, OPEN APPROACH: ICD-10-PCS | Performed by: NEUROLOGICAL SURGERY

## 2020-12-24 PROCEDURE — P9047 ALBUMIN (HUMAN), 25%, 50ML: HCPCS | Performed by: NURSE ANESTHETIST, CERTIFIED REGISTERED

## 2020-12-24 PROCEDURE — 3600000015 HC SURGERY LEVEL 5 ADDTL 15MIN: Performed by: NEUROLOGICAL SURGERY

## 2020-12-24 PROCEDURE — 4A11X4G MONITORING OF PERIPHERAL NERVOUS ELECTRICAL ACTIVITY, INTRAOPERATIVE, EXTERNAL APPROACH: ICD-10-PCS | Performed by: NEUROLOGICAL SURGERY

## 2020-12-24 PROCEDURE — 6360000002 HC RX W HCPCS: Performed by: ANESTHESIOLOGY

## 2020-12-24 PROCEDURE — 3209999900 FLUORO FOR SURGICAL PROCEDURES

## 2020-12-24 PROCEDURE — 82306 VITAMIN D 25 HYDROXY: CPT

## 2020-12-24 PROCEDURE — 8E0WXBF COMPUTER ASSISTED PROCEDURE OF TRUNK REGION, WITH FLUOROSCOPY: ICD-10-PCS | Performed by: NEUROLOGICAL SURGERY

## 2020-12-24 PROCEDURE — 82962 GLUCOSE BLOOD TEST: CPT

## 2020-12-24 PROCEDURE — 7100000000 HC PACU RECOVERY - FIRST 15 MIN: Performed by: NEUROLOGICAL SURGERY

## 2020-12-24 PROCEDURE — 00NX0ZZ RELEASE THORACIC SPINAL CORD, OPEN APPROACH: ICD-10-PCS | Performed by: NEUROLOGICAL SURGERY

## 2020-12-24 PROCEDURE — 7100000001 HC PACU RECOVERY - ADDTL 15 MIN: Performed by: NEUROLOGICAL SURGERY

## 2020-12-24 PROCEDURE — 6360000002 HC RX W HCPCS: Performed by: INTERNAL MEDICINE

## 2020-12-24 PROCEDURE — 6370000000 HC RX 637 (ALT 250 FOR IP): Performed by: ANESTHESIOLOGY

## 2020-12-24 PROCEDURE — 36415 COLL VENOUS BLD VENIPUNCTURE: CPT

## 2020-12-24 PROCEDURE — 2709999900 HC NON-CHARGEABLE SUPPLY: Performed by: NEUROLOGICAL SURGERY

## 2020-12-24 PROCEDURE — 0SG00AJ FUSION OF LUMBAR VERTEBRAL JOINT WITH INTERBODY FUSION DEVICE, POSTERIOR APPROACH, ANTERIOR COLUMN, OPEN APPROACH: ICD-10-PCS | Performed by: NEUROLOGICAL SURGERY

## 2020-12-24 PROCEDURE — 95908 NRV CNDJ TST 3-4 STUDIES: CPT | Performed by: AUDIOLOGIST

## 2020-12-24 PROCEDURE — 00UT0KZ SUPPLEMENT SPINAL MENINGES WITH NONAUTOLOGOUS TISSUE SUBSTITUTE, OPEN APPROACH: ICD-10-PCS | Performed by: NEUROLOGICAL SURGERY

## 2020-12-24 PROCEDURE — 3700000001 HC ADD 15 MINUTES (ANESTHESIA): Performed by: NEUROLOGICAL SURGERY

## 2020-12-24 PROCEDURE — 3600000005 HC SURGERY LEVEL 5 BASE: Performed by: NEUROLOGICAL SURGERY

## 2020-12-24 DEVICE — DURASEAL® EXACT SPINAL SEALANT SYSTEM 5ML 5 PACK
Type: IMPLANTABLE DEVICE | Site: BACK | Status: FUNCTIONAL
Brand: DURASEAL EXACT SPINAL SEALANT SYSTEM

## 2020-12-24 DEVICE — CREO® THREADED 6.5 X 35MM POLYAXIAL SCREW
Type: IMPLANTABLE DEVICE | Site: BACK | Status: FUNCTIONAL
Brand: CREO

## 2020-12-24 DEVICE — THREADED LOCKING CAP, CREO
Type: IMPLANTABLE DEVICE | Site: BACK | Status: FUNCTIONAL
Brand: CREO

## 2020-12-24 DEVICE — 5.5MM CURVED ROD, TITANIUM ALLOY, 35MM LENGTH
Type: IMPLANTABLE DEVICE | Site: BACK | Status: FUNCTIONAL
Brand: CREO

## 2020-12-24 DEVICE — RISE SPACER 10X22MM, 7-13MM
Type: IMPLANTABLE DEVICE | Site: BACK | Status: FUNCTIONAL
Brand: RISE

## 2020-12-24 DEVICE — CREO® THREADED 6.5 X 45MM POLYAXIAL SCREW
Type: IMPLANTABLE DEVICE | Site: BACK | Status: FUNCTIONAL
Brand: CREO

## 2020-12-24 RX ORDER — MORPHINE SULFATE 1 MG/ML
INJECTION, SOLUTION EPIDURAL; INTRATHECAL; INTRAVENOUS PRN
Status: DISCONTINUED | OUTPATIENT
Start: 2020-12-24 | End: 2020-12-24 | Stop reason: ALTCHOICE

## 2020-12-24 RX ORDER — LIDOCAINE HYDROCHLORIDE 20 MG/ML
INJECTION, SOLUTION INTRAVENOUS PRN
Status: DISCONTINUED | OUTPATIENT
Start: 2020-12-24 | End: 2020-12-24 | Stop reason: SDUPTHER

## 2020-12-24 RX ORDER — VANCOMYCIN HYDROCHLORIDE 1 G/20ML
INJECTION, POWDER, LYOPHILIZED, FOR SOLUTION INTRAVENOUS PRN
Status: DISCONTINUED | OUTPATIENT
Start: 2020-12-24 | End: 2020-12-24 | Stop reason: ALTCHOICE

## 2020-12-24 RX ORDER — ALBUMIN (HUMAN) 12.5 G/50ML
SOLUTION INTRAVENOUS PRN
Status: DISCONTINUED | OUTPATIENT
Start: 2020-12-24 | End: 2020-12-24 | Stop reason: SDUPTHER

## 2020-12-24 RX ORDER — KETAMINE HCL IN NACL, ISO-OSM 100MG/10ML
SYRINGE (ML) INJECTION PRN
Status: DISCONTINUED | OUTPATIENT
Start: 2020-12-24 | End: 2020-12-24 | Stop reason: SDUPTHER

## 2020-12-24 RX ORDER — ALBUMIN, HUMAN INJ 5% 5 %
SOLUTION INTRAVENOUS PRN
Status: DISCONTINUED | OUTPATIENT
Start: 2020-12-24 | End: 2020-12-24 | Stop reason: SDUPTHER

## 2020-12-24 RX ORDER — ZOLPIDEM TARTRATE 5 MG/1
5 TABLET ORAL ONCE
Status: COMPLETED | OUTPATIENT
Start: 2020-12-24 | End: 2020-12-24

## 2020-12-24 RX ORDER — CEFAZOLIN SODIUM 1 G/3ML
INJECTION, POWDER, FOR SOLUTION INTRAMUSCULAR; INTRAVENOUS PRN
Status: DISCONTINUED | OUTPATIENT
Start: 2020-12-24 | End: 2020-12-24 | Stop reason: SDUPTHER

## 2020-12-24 RX ORDER — MIDAZOLAM HYDROCHLORIDE 1 MG/ML
INJECTION INTRAMUSCULAR; INTRAVENOUS PRN
Status: DISCONTINUED | OUTPATIENT
Start: 2020-12-24 | End: 2020-12-24 | Stop reason: SDUPTHER

## 2020-12-24 RX ORDER — PROPOFOL 10 MG/ML
INJECTION, EMULSION INTRAVENOUS PRN
Status: DISCONTINUED | OUTPATIENT
Start: 2020-12-24 | End: 2020-12-24 | Stop reason: SDUPTHER

## 2020-12-24 RX ORDER — LIDOCAINE HYDROCHLORIDE AND EPINEPHRINE 10; 10 MG/ML; UG/ML
INJECTION, SOLUTION INFILTRATION; PERINEURAL PRN
Status: DISCONTINUED | OUTPATIENT
Start: 2020-12-24 | End: 2020-12-24 | Stop reason: ALTCHOICE

## 2020-12-24 RX ORDER — SODIUM CHLORIDE, SODIUM LACTATE, POTASSIUM CHLORIDE, CALCIUM CHLORIDE 600; 310; 30; 20 MG/100ML; MG/100ML; MG/100ML; MG/100ML
INJECTION, SOLUTION INTRAVENOUS CONTINUOUS
Status: DISCONTINUED | OUTPATIENT
Start: 2020-12-24 | End: 2020-12-25

## 2020-12-24 RX ORDER — ROCURONIUM BROMIDE 10 MG/ML
INJECTION, SOLUTION INTRAVENOUS PRN
Status: DISCONTINUED | OUTPATIENT
Start: 2020-12-24 | End: 2020-12-24 | Stop reason: SDUPTHER

## 2020-12-24 RX ORDER — FENTANYL CITRATE 50 UG/ML
INJECTION, SOLUTION INTRAMUSCULAR; INTRAVENOUS PRN
Status: DISCONTINUED | OUTPATIENT
Start: 2020-12-24 | End: 2020-12-24 | Stop reason: SDUPTHER

## 2020-12-24 RX ORDER — SCOLOPAMINE TRANSDERMAL SYSTEM 1 MG/1
1 PATCH, EXTENDED RELEASE TRANSDERMAL ONCE
Status: DISCONTINUED | OUTPATIENT
Start: 2020-12-24 | End: 2020-12-27

## 2020-12-24 RX ORDER — BUPIVACAINE HYDROCHLORIDE 2.5 MG/ML
INJECTION, SOLUTION EPIDURAL; INFILTRATION; INTRACAUDAL PRN
Status: DISCONTINUED | OUTPATIENT
Start: 2020-12-24 | End: 2020-12-24 | Stop reason: ALTCHOICE

## 2020-12-24 RX ORDER — DIPHENHYDRAMINE HYDROCHLORIDE 50 MG/ML
12.5 INJECTION INTRAMUSCULAR; INTRAVENOUS
Status: DISCONTINUED | OUTPATIENT
Start: 2020-12-24 | End: 2020-12-24 | Stop reason: HOSPADM

## 2020-12-24 RX ORDER — MEPERIDINE HYDROCHLORIDE 25 MG/ML
12.5 INJECTION INTRAMUSCULAR; INTRAVENOUS; SUBCUTANEOUS EVERY 5 MIN PRN
Status: DISCONTINUED | OUTPATIENT
Start: 2020-12-24 | End: 2020-12-24 | Stop reason: HOSPADM

## 2020-12-24 RX ORDER — SODIUM CHLORIDE, SODIUM LACTATE, POTASSIUM CHLORIDE, CALCIUM CHLORIDE 600; 310; 30; 20 MG/100ML; MG/100ML; MG/100ML; MG/100ML
INJECTION, SOLUTION INTRAVENOUS CONTINUOUS PRN
Status: DISCONTINUED | OUTPATIENT
Start: 2020-12-24 | End: 2020-12-24 | Stop reason: SDUPTHER

## 2020-12-24 RX ADMIN — ONDANSETRON 4 MG: 2 INJECTION INTRAMUSCULAR; INTRAVENOUS at 23:52

## 2020-12-24 RX ADMIN — DOXYCYCLINE HYCLATE 100 MG: 100 CAPSULE ORAL at 09:16

## 2020-12-24 RX ADMIN — Medication 25 MG: at 14:02

## 2020-12-24 RX ADMIN — PHENYLEPHRINE HYDROCHLORIDE 100 MCG: 10 INJECTION INTRAVENOUS at 16:15

## 2020-12-24 RX ADMIN — ROCURONIUM BROMIDE 10 MG: 10 INJECTION, SOLUTION INTRAVENOUS at 16:25

## 2020-12-24 RX ADMIN — PHENYLEPHRINE HYDROCHLORIDE 200 MCG: 10 INJECTION INTRAVENOUS at 16:34

## 2020-12-24 RX ADMIN — PROPOFOL 30 MG: 10 INJECTION, EMULSION INTRAVENOUS at 14:44

## 2020-12-24 RX ADMIN — OXYCODONE AND ACETAMINOPHEN 1 TABLET: 5; 325 TABLET ORAL at 12:14

## 2020-12-24 RX ADMIN — PANTOPRAZOLE SODIUM 20 MG: 20 TABLET, DELAYED RELEASE ORAL at 07:49

## 2020-12-24 RX ADMIN — SUGAMMADEX 181 MG: 100 INJECTION, SOLUTION INTRAVENOUS at 18:25

## 2020-12-24 RX ADMIN — FENTANYL CITRATE 50 MCG: 50 INJECTION, SOLUTION INTRAMUSCULAR; INTRAVENOUS at 17:38

## 2020-12-24 RX ADMIN — HYDROMORPHONE HYDROCHLORIDE 0.5 MG: 1 INJECTION, SOLUTION INTRAMUSCULAR; INTRAVENOUS; SUBCUTANEOUS at 19:39

## 2020-12-24 RX ADMIN — ZOLPIDEM TARTRATE 5 MG: 5 TABLET ORAL at 23:00

## 2020-12-24 RX ADMIN — SODIUM CHLORIDE, POTASSIUM CHLORIDE, SODIUM LACTATE AND CALCIUM CHLORIDE: 600; 310; 30; 20 INJECTION, SOLUTION INTRAVENOUS at 13:45

## 2020-12-24 RX ADMIN — PHENYLEPHRINE HYDROCHLORIDE 100 MCG: 10 INJECTION INTRAVENOUS at 17:56

## 2020-12-24 RX ADMIN — PHENYLEPHRINE HYDROCHLORIDE 100 MCG: 10 INJECTION INTRAVENOUS at 16:25

## 2020-12-24 RX ADMIN — FENTANYL CITRATE 50 MCG: 50 INJECTION, SOLUTION INTRAMUSCULAR; INTRAVENOUS at 16:24

## 2020-12-24 RX ADMIN — CEFTRIAXONE SODIUM 1 G: 1 INJECTION, POWDER, FOR SOLUTION INTRAMUSCULAR; INTRAVENOUS at 12:19

## 2020-12-24 RX ADMIN — CEFAZOLIN 2000 MG: 1 INJECTION, POWDER, FOR SOLUTION INTRAMUSCULAR; INTRAVENOUS at 17:59

## 2020-12-24 RX ADMIN — ROCURONIUM BROMIDE 20 MG: 10 INJECTION, SOLUTION INTRAVENOUS at 14:44

## 2020-12-24 RX ADMIN — PHENYLEPHRINE HYDROCHLORIDE 200 MCG: 10 INJECTION INTRAVENOUS at 17:08

## 2020-12-24 RX ADMIN — FENTANYL CITRATE 50 MCG: 50 INJECTION, SOLUTION INTRAMUSCULAR; INTRAVENOUS at 17:21

## 2020-12-24 RX ADMIN — Medication 2 MG: at 09:18

## 2020-12-24 RX ADMIN — DOXYCYCLINE HYCLATE 100 MG: 100 CAPSULE ORAL at 23:00

## 2020-12-24 RX ADMIN — SODIUM CHLORIDE, POTASSIUM CHLORIDE, SODIUM LACTATE AND CALCIUM CHLORIDE: 600; 310; 30; 20 INJECTION, SOLUTION INTRAVENOUS at 09:23

## 2020-12-24 RX ADMIN — OXYCODONE AND ACETAMINOPHEN 1 TABLET: 5; 325 TABLET ORAL at 22:59

## 2020-12-24 RX ADMIN — PHENYLEPHRINE HYDROCHLORIDE 100 MCG: 10 INJECTION INTRAVENOUS at 17:54

## 2020-12-24 RX ADMIN — OXYCODONE AND ACETAMINOPHEN 1 TABLET: 5; 325 TABLET ORAL at 01:32

## 2020-12-24 RX ADMIN — PHENYLEPHRINE HYDROCHLORIDE 100 MCG: 10 INJECTION INTRAVENOUS at 15:33

## 2020-12-24 RX ADMIN — PHENYLEPHRINE HYDROCHLORIDE 200 MCG: 10 INJECTION INTRAVENOUS at 16:49

## 2020-12-24 RX ADMIN — Medication 10 ML: at 09:24

## 2020-12-24 RX ADMIN — PHENYLEPHRINE HYDROCHLORIDE 50 MCG/MIN: 10 INJECTION, SOLUTION INTRAMUSCULAR; INTRAVENOUS; SUBCUTANEOUS at 14:32

## 2020-12-24 RX ADMIN — FENTANYL CITRATE 50 MCG: 50 INJECTION, SOLUTION INTRAMUSCULAR; INTRAVENOUS at 14:44

## 2020-12-24 RX ADMIN — ATORVASTATIN CALCIUM 10 MG: 10 TABLET, FILM COATED ORAL at 23:00

## 2020-12-24 RX ADMIN — FENTANYL CITRATE 100 MCG: 50 INJECTION, SOLUTION INTRAMUSCULAR; INTRAVENOUS at 13:50

## 2020-12-24 RX ADMIN — INSULIN LISPRO 2 UNITS: 100 INJECTION, SOLUTION INTRAVENOUS; SUBCUTANEOUS at 23:00

## 2020-12-24 RX ADMIN — CEFAZOLIN 2000 MG: 1 INJECTION, POWDER, FOR SOLUTION INTRAMUSCULAR; INTRAVENOUS at 14:04

## 2020-12-24 RX ADMIN — PROPOFOL 100 MG: 10 INJECTION, EMULSION INTRAVENOUS at 13:50

## 2020-12-24 RX ADMIN — FENTANYL CITRATE 50 MCG: 50 INJECTION, SOLUTION INTRAMUSCULAR; INTRAVENOUS at 15:19

## 2020-12-24 RX ADMIN — MIDAZOLAM 2 MG: 1 INJECTION INTRAMUSCULAR; INTRAVENOUS at 13:45

## 2020-12-24 RX ADMIN — Medication 2 MG: at 03:42

## 2020-12-24 RX ADMIN — PHENYLEPHRINE HYDROCHLORIDE 100 MCG: 10 INJECTION INTRAVENOUS at 16:36

## 2020-12-24 RX ADMIN — ALBUMIN (HUMAN) 500 ML: 12.5 INJECTION, SOLUTION INTRAVENOUS at 17:13

## 2020-12-24 RX ADMIN — PHENYLEPHRINE HYDROCHLORIDE 100 MCG: 10 INJECTION INTRAVENOUS at 16:14

## 2020-12-24 RX ADMIN — CEFAZOLIN 1 G: 1 INJECTION, POWDER, FOR SOLUTION INTRAMUSCULAR; INTRAVENOUS at 23:45

## 2020-12-24 RX ADMIN — Medication 25 MG: at 15:12

## 2020-12-24 RX ADMIN — SODIUM CHLORIDE, POTASSIUM CHLORIDE, SODIUM LACTATE AND CALCIUM CHLORIDE: 600; 310; 30; 20 INJECTION, SOLUTION INTRAVENOUS at 16:30

## 2020-12-24 RX ADMIN — ALBUMIN (HUMAN) 100 ML: 0.25 INJECTION, SOLUTION INTRAVENOUS at 14:45

## 2020-12-24 RX ADMIN — LIDOCAINE HYDROCHLORIDE 100 MG: 20 INJECTION, SOLUTION INTRAVENOUS at 13:50

## 2020-12-24 RX ADMIN — PHENYLEPHRINE HYDROCHLORIDE 100 MCG: 10 INJECTION INTRAVENOUS at 15:29

## 2020-12-24 RX ADMIN — ROCURONIUM BROMIDE 20 MG: 10 INJECTION, SOLUTION INTRAVENOUS at 14:03

## 2020-12-24 RX ADMIN — PHENYLEPHRINE HYDROCHLORIDE 100 MCG: 10 INJECTION INTRAVENOUS at 16:32

## 2020-12-24 RX ADMIN — FENTANYL CITRATE 50 MCG: 50 INJECTION, SOLUTION INTRAMUSCULAR; INTRAVENOUS at 15:08

## 2020-12-24 RX ADMIN — PHENYLEPHRINE HYDROCHLORIDE 200 MCG: 10 INJECTION INTRAVENOUS at 17:02

## 2020-12-24 RX ADMIN — HYDROMORPHONE HYDROCHLORIDE 0.5 MG: 1 INJECTION, SOLUTION INTRAMUSCULAR; INTRAVENOUS; SUBCUTANEOUS at 19:10

## 2020-12-24 RX ADMIN — ATENOLOL 50 MG: 50 TABLET ORAL at 09:16

## 2020-12-24 RX ADMIN — ROCURONIUM BROMIDE 30 MG: 10 INJECTION, SOLUTION INTRAVENOUS at 13:50

## 2020-12-24 ASSESSMENT — PAIN DESCRIPTION - LOCATION
LOCATION: BACK
LOCATION: BACK

## 2020-12-24 ASSESSMENT — PULMONARY FUNCTION TESTS
PIF_VALUE: 23
PIF_VALUE: 22
PIF_VALUE: 22
PIF_VALUE: 28
PIF_VALUE: 2
PIF_VALUE: 22
PIF_VALUE: 22
PIF_VALUE: 24
PIF_VALUE: 22
PIF_VALUE: 22
PIF_VALUE: 23
PIF_VALUE: 22
PIF_VALUE: 23
PIF_VALUE: 22
PIF_VALUE: 22
PIF_VALUE: 25
PIF_VALUE: 2
PIF_VALUE: 23
PIF_VALUE: 2
PIF_VALUE: 25
PIF_VALUE: 22
PIF_VALUE: 23
PIF_VALUE: 22
PIF_VALUE: 2
PIF_VALUE: 1
PIF_VALUE: 23
PIF_VALUE: 23
PIF_VALUE: 28
PIF_VALUE: 2
PIF_VALUE: 22
PIF_VALUE: 23
PIF_VALUE: 22
PIF_VALUE: 23
PIF_VALUE: 23
PIF_VALUE: 22
PIF_VALUE: 2
PIF_VALUE: 23
PIF_VALUE: 23
PIF_VALUE: 22
PIF_VALUE: 1
PIF_VALUE: 23
PIF_VALUE: 22
PIF_VALUE: 23
PIF_VALUE: 22
PIF_VALUE: 22
PIF_VALUE: 2
PIF_VALUE: 22
PIF_VALUE: 28
PIF_VALUE: 22
PIF_VALUE: 26
PIF_VALUE: 23
PIF_VALUE: 2
PIF_VALUE: 2
PIF_VALUE: 22
PIF_VALUE: 23
PIF_VALUE: 22
PIF_VALUE: 4
PIF_VALUE: 23
PIF_VALUE: 22
PIF_VALUE: 1
PIF_VALUE: 2
PIF_VALUE: 22
PIF_VALUE: 23
PIF_VALUE: 22
PIF_VALUE: 23
PIF_VALUE: 2
PIF_VALUE: 22
PIF_VALUE: 2
PIF_VALUE: 22
PIF_VALUE: 23
PIF_VALUE: 22
PIF_VALUE: 22
PIF_VALUE: 23
PIF_VALUE: 22
PIF_VALUE: 2
PIF_VALUE: 23
PIF_VALUE: 22
PIF_VALUE: 2
PIF_VALUE: 23
PIF_VALUE: 22
PIF_VALUE: 24
PIF_VALUE: 23
PIF_VALUE: 0
PIF_VALUE: 23
PIF_VALUE: 2
PIF_VALUE: 22
PIF_VALUE: 2
PIF_VALUE: 23
PIF_VALUE: 22
PIF_VALUE: 24
PIF_VALUE: 22
PIF_VALUE: 2
PIF_VALUE: 2
PIF_VALUE: 22
PIF_VALUE: 2
PIF_VALUE: 23
PIF_VALUE: 2
PIF_VALUE: 23
PIF_VALUE: 22
PIF_VALUE: 22
PIF_VALUE: 23
PIF_VALUE: 2
PIF_VALUE: 23
PIF_VALUE: 2
PIF_VALUE: 22
PIF_VALUE: 2
PIF_VALUE: 23
PIF_VALUE: 23
PIF_VALUE: 22
PIF_VALUE: 1
PIF_VALUE: 22
PIF_VALUE: 25
PIF_VALUE: 2
PIF_VALUE: 22
PIF_VALUE: 23
PIF_VALUE: 22
PIF_VALUE: 23
PIF_VALUE: 3
PIF_VALUE: 2
PIF_VALUE: 2
PIF_VALUE: 23
PIF_VALUE: 22
PIF_VALUE: 22
PIF_VALUE: 24
PIF_VALUE: 24
PIF_VALUE: 22
PIF_VALUE: 23
PIF_VALUE: 2
PIF_VALUE: 0
PIF_VALUE: 23
PIF_VALUE: 22
PIF_VALUE: 1
PIF_VALUE: 22
PIF_VALUE: 23
PIF_VALUE: 22
PIF_VALUE: 1
PIF_VALUE: 22
PIF_VALUE: 27
PIF_VALUE: 2
PIF_VALUE: 25
PIF_VALUE: 22
PIF_VALUE: 23
PIF_VALUE: 22
PIF_VALUE: 23
PIF_VALUE: 22
PIF_VALUE: 22
PIF_VALUE: 2
PIF_VALUE: 23
PIF_VALUE: 23
PIF_VALUE: 2
PIF_VALUE: 22
PIF_VALUE: 23
PIF_VALUE: 22
PIF_VALUE: 2
PIF_VALUE: 28
PIF_VALUE: 22
PIF_VALUE: 26
PIF_VALUE: 22
PIF_VALUE: 22
PIF_VALUE: 23
PIF_VALUE: 17
PIF_VALUE: 23
PIF_VALUE: 2
PIF_VALUE: 23
PIF_VALUE: 22
PIF_VALUE: 22
PIF_VALUE: 23
PIF_VALUE: 22
PIF_VALUE: 2
PIF_VALUE: 2
PIF_VALUE: 23
PIF_VALUE: 23
PIF_VALUE: 22
PIF_VALUE: 23
PIF_VALUE: 23
PIF_VALUE: 22
PIF_VALUE: 23
PIF_VALUE: 2
PIF_VALUE: 23
PIF_VALUE: 26
PIF_VALUE: 22
PIF_VALUE: 23
PIF_VALUE: 23
PIF_VALUE: 22
PIF_VALUE: 24
PIF_VALUE: 2
PIF_VALUE: 2
PIF_VALUE: 22
PIF_VALUE: 23
PIF_VALUE: 2
PIF_VALUE: 24
PIF_VALUE: 23
PIF_VALUE: 23
PIF_VALUE: 22
PIF_VALUE: 23
PIF_VALUE: 23
PIF_VALUE: 2
PIF_VALUE: 22
PIF_VALUE: 12
PIF_VALUE: 22
PIF_VALUE: 23
PIF_VALUE: 23
PIF_VALUE: 22
PIF_VALUE: 3
PIF_VALUE: 22
PIF_VALUE: 0
PIF_VALUE: 22
PIF_VALUE: 22
PIF_VALUE: 23
PIF_VALUE: 22
PIF_VALUE: 22
PIF_VALUE: 23
PIF_VALUE: 22
PIF_VALUE: 23
PIF_VALUE: 22
PIF_VALUE: 22
PIF_VALUE: 23
PIF_VALUE: 23
PIF_VALUE: 22
PIF_VALUE: 22
PIF_VALUE: 0
PIF_VALUE: 22
PIF_VALUE: 22
PIF_VALUE: 1
PIF_VALUE: 22
PIF_VALUE: 22
PIF_VALUE: 27
PIF_VALUE: 22
PIF_VALUE: 22
PIF_VALUE: 26
PIF_VALUE: 23
PIF_VALUE: 22
PIF_VALUE: 22
PIF_VALUE: 23
PIF_VALUE: 2
PIF_VALUE: 22
PIF_VALUE: 22
PIF_VALUE: 23

## 2020-12-24 ASSESSMENT — PAIN SCALES - GENERAL
PAINLEVEL_OUTOF10: 10
PAINLEVEL_OUTOF10: 8
PAINLEVEL_OUTOF10: 10
PAINLEVEL_OUTOF10: 8
PAINLEVEL_OUTOF10: 9
PAINLEVEL_OUTOF10: 7
PAINLEVEL_OUTOF10: 7
PAINLEVEL_OUTOF10: 0

## 2020-12-24 ASSESSMENT — PAIN DESCRIPTION - PAIN TYPE: TYPE: SURGICAL PAIN

## 2020-12-24 ASSESSMENT — PAIN DESCRIPTION - ORIENTATION
ORIENTATION: UPPER
ORIENTATION: UPPER
ORIENTATION: UPPER;LOWER

## 2020-12-24 ASSESSMENT — PAIN DESCRIPTION - FREQUENCY: FREQUENCY: CONTINUOUS

## 2020-12-24 ASSESSMENT — PAIN DESCRIPTION - DESCRIPTORS
DESCRIPTORS: ACHING;DISCOMFORT
DESCRIPTORS: ACHING;DISCOMFORT

## 2020-12-24 NOTE — PROGRESS NOTES
Patient off the floor for L4-L5 fusion and T2-T3 laminectomy (d/t herniated disc with spinal cord compression). No signs of metastatic disease on MRI cervical spine.     Neuro will defer further plans to NSGY and sign off--call with new issues    Héctor Hammond APRN-CNP  1:35 PM

## 2020-12-24 NOTE — DISCHARGE INSTR - COC
Continuity of Care Form    Patient Name: Leticia Riddle   :  1955  MRN:  [id number]    Admit date:  2020  Discharge date:  20    Code Status Order: Full Code   Advance Directives:   [facility] Documentation       Date/Time Healthcare Directive Type of Healthcare Directive Copy in [facility] [address] Agent's Name Healthcare Agent's Phone Number    [phone]  No, patient does not have an advance directive for healthcare treatment -- -- -- -- --            Admitting Physician:  Gayathri Joseph DO  PCP: VISHNU Frances CNP    Discharging Nurse: [facility] Unit/Room#: 7173/3566-S  Discharging Unit Phone Number: [phone]    Emergency Contact:   Extended Emergency Contact Information  Primary Emergency Contact: Ignacia Poe  Address: [address] [place] [address], [address] Phone: [phone]  Mobile Phone: [phone]  Relation: Other   needed? No  Secondary Emergency Contact: Earl Stuart of [address] [place] [address] Phone: [phone]  Mobile Phone: [phone]  Relation: Child   needed?  No    Past Surgical History:  Past Surgical History:   Procedure Laterality Date    ANESTHESIA NERVE BLOCK Bilateral 8/15/2019    BILATERAL INTRA-ARTICULAR FACET JOINT INJECTION WITH FLUOROSCOPIC GUIDANCE AT L4-5, L5-S1 WITH IV SEDATION performed by Christiane Strickland DO at [facility] 1452 OF UTERUS N/A 10/23/2019    DILATATION AND CURETTAGE HYSTEROSCOPY performed by Cailin Jerome MD at [address] Right 2019    C7-T1 EPIDURAL STEROID INJECTION #1 TOWARD THE RIGHT performed by Garfield Dunham DO at [facility] 87 Bilateral     LUMBAR SPINE SURGERY N/A 2020    L4-L5  POSTERIOR LUMBAR INTERBODY FUSION, T2-T3 DECOMPRESSIVE LAMINECTOMY performed by Khadar Dockery L25.9    Acute pain of right shoulder M25.511    Chronic pain syndrome G89.4    Cervical radiculopathy M54.12    Cervical disc disorder M50.90    Type 2 diabetes mellitus without complication, without long-term current use of insulin (HCC) E11.9    Facet arthropathy M47.819    Fibromyalgia M79.7    Primary osteoarthritis of left knee M17.12    Constipation K59.00    Malignant neoplasm of sigmoid colon (HCC) C18.7    Chronic pain of multiple joints M25.50, G89.29    Osteoarthritis involving multiple joints on both sides of body M15.9    Cervical spondylosis without myelopathy M47.812    DDD (degenerative disc disease), cervical M50.30    DDD (degenerative disc disease), lumbar M51.36    Numbness in left leg R20.0    Spinal stenosis M48.00    Back pain M54.9    Weakness of both legs R29.898    Ambulatory dysfunction R26.2    Urinary retention R33.9       Isolation/Infection:   Isolation            No Isolation          Patient Infection Status       None to display            Nurse Assessment:  Last Vital Signs: /65   Pulse 92   Temp 97.9 °F (36.6 °C) (Oral)   Resp 18   Ht 4' 11\" (1.499 m)   Wt 212 lb (96.2 kg)   LMP  (LMP Unknown)   SpO2 92%   BMI 42.82 kg/m²     Last documented pain score (0-10 scale): Pain Level: 8  Last Weight:   Wt Readings from Last 1 Encounters:   12/29/20 212 lb (96.2 kg)     Mental Status:  oriented and alert    IV Access:  - None    Nursing Mobility/ADLs:  Walking   Assisted  Transfer  Assisted  Bathing  Assisted  Dressing  Assisted  Toileting  Assisted  Feeding  Independent  Med Admin  Assisted  Med Delivery   whole    Wound Care Documentation and Therapy:  Puncture 07/11/19 Neck Dorsal (Active)   Number of days: 538        Elimination:  Continence:    Bowel: Yes  Bladder: Yes  Urinary Catheter: Removal Date 12/30    Colostomy/Ileostomy/Ileal Conduit: No       Date of Last BM: 12/29    Intake/Output Summary (Last 24 hours) at 12/30/2020 1021  Last data filed at 12/30/2020 3589  Gross per 24 hour   Intake 240 ml   Output 850 ml   Net -610 ml     No intake/output data recorded. Safety Concerns:     History of Falls (last 30 days) and At Risk for Falls    Impairments/Disabilities:      None    Nutrition Therapy:  Current Nutrition Therapy:   - Oral Diet:  General and Carb Control 5 carbs/meal (2000kcals/day)    Routes of Feeding: Oral  Liquids: No Restrictions  Daily Fluid Restriction: no  Last Modified Barium Swallow with Video (Video Swallowing Test): not done    Treatments at the Time of Hospital Discharge:   Respiratory Treatments: ***  Oxygen Therapy:  is not on home oxygen therapy.   Ventilator:    - No ventilator support    Rehab Therapies: Physical Therapy and Occupational Therapy  Weight Bearing Status/Restrictions: No weight bearing restirctions  Other Medical Equipment (for information only, NOT a DME order):  walker and braces TLSO  Other Treatments: ***    Patient's personal belongings (please select all that are sent with patient):  Glasses, cell phone    RN SIGNATURE:  Electronically signed by John Catalan RN on 12/30/20 at 10:20 AM EST    CASE MANAGEMENT/SOCIAL WORK SECTION    Inpatient Status Date: ***    Readmission Risk Assessment Score:  Readmission Risk              Risk of Unplanned Readmission:        13           Discharging to Facility/ Agency   Name:   Address:  Phone:  Fax:    Dialysis Facility (if applicable)   Name:  Address:  Dialysis Schedule:  Phone:  Fax:    / signature: Electronically signed by LALO Lockwood on 12/24/2020 at 9:51 AM      PHYSICIAN SECTION    Prognosis: Good    Condition at Discharge: Stable    Rehab Potential (if transferring to Rehab): Good    Recommended Labs or Other Treatments After Discharge: none    Physician Certification: I certify the above information and transfer of Nnamdi Delgado  is necessary for the continuing treatment of the diagnosis listed and that she requires Skilled Nursing Facility for less 30 days.      Update Admission H&P: No change in H&P    PHYSICIAN SIGNATURE:  Electronically signed by Lindsay Conde MD on 12/30/20 at 10:46 AM EST

## 2020-12-24 NOTE — PROGRESS NOTES
INTRAOPERATIVE SSEP REPORT    Diagnosis: Stenosis, spondylolisthesis  Procedure: Laminectomy T2-3  Anesthesia: Isoflurane, Fentanyl  Surgeon: Letty Phoenix M.D. Intra-op Monitorin.25    Somatosensory evoked potentials (SSEPs) for ulnar and tibial nerve stimulation were used to monitor upper and lower limb function during surgery. Responses were elicited at the aforementioned sites and were recorded peripherally, cervically and over the somatosensory cortex. Standard elicitation and recording parameters were used. Pre-incision responses were obtained at close of Lumbar procedure. Baseline data for upper and lower limb SSEPs were obtained. Testing was paused due to difficulty reading blood pressure and cuff site was changed. Anesthesia request pause stimulation and pause surgical procedure until problem resolved. At this time, the BP cuff was moved to the right arm and the ulnar leads were dislodged and could not be re-placed. Upon resuming testing, the left leg was found to have significant changes; however there was now significant noise. The surgeon was notified. After noise resolved, left leg showed a significant change in morphology. The surgeon was informed. Responses continued to show changes and at closing, responses were greater than 50% reduced for the left leg and comparable to baseline for the right leg. The left arm showed a 40% reduction just prior to closing and did not return to baseline. The right arm could not be tested due to leads dislodged. The surgeon was informed of the status of all responses.           ____________________________________Calvin Pulido M.D., Interpreting Physician

## 2020-12-24 NOTE — PROGRESS NOTES
Hospitalist Progress Note      SYNOPSIS: Patient admitted on 12/20/2020 presented to [facility] with past medical history of DJD of the spine, chronic back pain, osteoarthritis of multiple joints, hypertension, fibromyalgia, upper lipidemia, colon cancer status post colectomy 3 years ago, did not require any chemotherapy or radiation, sacroiliitis, diabetes and vitamin D deficiency.  Patient started having numbness involving the left leg yesterday.  Progressed from the waist area down to the foot.  She is now beginning to have similar sensation on the right side.  This is constant, moderate to severe in intensity on the left and associated with bilateral lower extremity weakness that is worse on the left than the right.  Patient was walking with a walker in the past week and now requires a wheelchair.  Today she fell because she could not get in or out of bed.  Complaining of low back pain as well which is more towards the left side, pain is dull, constant, radiate down the left leg and is worse with movement.  She has not been able to urinate as much and has not had a bowel movement for a week. Rafi Parson is now beginning to experience \"shocklike\" painful sensation in the xiphisternal area that radiates into the back, numbness in the abdomen. MRI of the lumbar spine shows severe spinal stenosis at L4-L5. MRI cervical spine showed Tiny foci of signal abnormality in the upper cervical cord, from the level of C3-4 to C5, which is only evident on the axial T2 weighted images and have developed in the interim since the previous study.  The etiology is unclear. It may be infectious, inflammatory or neoplastic or artifactual. Multilevel neural foraminal stenoses, worst (severe) at the bilateral C5-6 and C6-7 levels, as well as the left C7-T1 levels. Mild central canal stenoses at C5-6, C6-7 and C7-T1. Neurosurgery was consulted. CXR showing WILFRID pneumonia.   12/24: OR for an L4-L5 posterior lumbar interbody fusion and T2-T3 laminectomy today. Doing well, anxious for surgery. Complaining of left leg pain and tingling.       Hospital day 4      SUBJECTIVE:  Stable overnight. No issues reported. Patient seen and examined  Records reviewed. Temp (24hrs), Av °F (36.7 °C), Min:97.5 °F (36.4 °C), Max:98.5 °F (36.9 °C)    DIET: Diet NPO, After Midnight Exceptions are: Sips with Meds  CODE: Full Code    Intake/Output Summary (Last 24 hours) at 2020 0804  Last data filed at 2020 0558  Gross per 24 hour   Intake --   Output 700 ml   Net -700 ml       OBJECTIVE:    /63   Pulse 73   Temp 98.5 °F (36.9 °C) (Temporal)   Resp 18   Ht 4' 11\" (1.499 m)   Wt 200 lb (90.7 kg)   LMP  (LMP Unknown)   SpO2 96%   BMI 40.40 kg/m²     General appearance:  Obese female, awake, alert, and oriented to person, place, time, and purpose; appears stated age and cooperative; no apparent distress no labored breathing. HEENT:  Conjunctivae/corneas clear. Neck: Supple. No jugular venous distention. Respiratory: symmetrical; clear to auscultation bilaterally; no wheezes; no rhonchi; no rales  Cardiovascular: rhythm regular; rate controlled; no murmurs  Abdomen: Soft, nontender, nondistended  Extremities:  Peripheral pulses present; no peripheral edema; no ulcers  Musculoskeletal: No clubbing, cyanosis, no bilateral lower extremity edema. Brisk capillary refill.    Skin:  No rashes  on visible skin  Neurologic: awake, alert and following commands     Medications:  REVIEWED DAILY    Infusion Medications    dextrose       Scheduled Medications    cefTRIAXone (ROCEPHIN) IV  1 g Intravenous Q24H    doxycycline hyclate  100 mg Oral 2 times per day    polyethylene glycol  17 g Oral Daily    metFORMIN  500 mg Oral Daily with breakfast    allopurinol  300 mg Oral Daily    atenolol  50 mg Oral Daily    atorvastatin  10 mg Oral Nightly    vitamin D  2,000 Units Oral Daily    pantoprazole  20 mg Oral QAM AC    alogliptin  12.5 mg Oral Daily    docusate sodium  100 mg Oral Daily    insulin lispro  0-10 Units Subcutaneous 4x Daily AC & HS    sodium chloride flush  10 mL Intravenous 2 times per day     PRN Meds: amitriptyline, glucose, dextrose, glucagon (rDNA), dextrose, sodium chloride flush, promethazine **OR** ondansetron, acetaminophen **OR** acetaminophen, oxyCODONE-acetaminophen, morphine, cyclobenzaprine    Labs:       Recent Labs     12/23/20  0508 12/24/20  0535    141   K 3.8 4.0    101   CO2 27 30*   BUN 25* 25*   CREATININE 0.7 0.8   CALCIUM 9.5 9.7         Chronic labs:    Lab Results   Component Value Date    CHOL 133 03/16/2020    TRIG 241 (H) 03/16/2020    HDL 41 03/16/2020    LDLCALC 44 03/16/2020    TSH 4.180 03/18/2020    LABA1C 6.9 (H) 12/21/2020       Radiology: REVIEWED DAILY    ASSESSMENT:  Active Problems: Morbid obesity with BMI of 40.0-44.9, adult (HCC)    Chronic pain syndrome    Type 2 diabetes mellitus without complication, without long-term current use of insulin (HCC)    Fibromyalgia    Constipation    Spinal stenosis    Back pain    Weakness of both legs    Ambulatory dysfunction    Urinary retention  Resolved Problems:    * No resolved hospital problems. *      PLAN:   Acute exacerbation of chronic back pain with  radicular symptoms secondary to severe spinal  stenosis at L4/L5   Neurosurgery and neurology following    Planning for an L4-L5 posterior lumbar interbody fusion  and T2-T3 laminectomy today  Weakness of both legs secondary to above  Paresthesia from upper abdomen down to the foot  MRI thoracic spine showing multilevel central canal stenosis with multiple disc protrusions, some which cause slight impression on the thoracic spinal cord  Cervical spine C3-C5 signal abnormality  infectious, inflammatory, neoplastic, or artifactual.   Cervical spine stenosis  Inability to void  Tolliver placed, output adequate     Constipation  Bowel regimen.   Ambulatory dysfunction  PT/OT  Type 2 diabetes  MDSS   monitor blood sugar. CAP  Rocephin and doxy   Fibromyalgia with chronic pain  continue home medication  Morbid obesity  dietary and lifestyle modification. Hyperlipidemia  Continue home lipitor and alogliptin  Check lipids  Hypertension, controlled  Continue home meds      DISPOSITION: Home with [facility] AT [facility] vs [address] [facility] s/p surgery    +++++++++++++++++++++++++++++++++++++++++++++++++  SUNIL Friedman/ Jose [address]  +++++++++++++++++++++++++++++++++++++++++++++++++  NOTE: This report was transcribed using voice recognition software. Every effort was made to ensure accuracy; however, inadvertent computerized transcription errors may be present.

## 2020-12-24 NOTE — PROGRESS NOTES
INTRAOPERATIVE MONITORING EMG REPORT    Diagnosis: Stenosis, spondylolisthesis  Procedure: PLIF L4-5   Anesthesia: Isoflurane, Fentanyl  Surgeon: Kristofer Palmer M.D. Intra-op Monitorin.25 hours    Procedure:     EMG recording electrodes were placed over the vastus medialis, vastus lateralis, anterior tibialis, and medial gastrocnemius musles for recording spontaneous EMG activity. A silent control was performed to test the integrity of the system prior to the procedure. Results:  Spontaneous EMG: No sustained EMG activity was observed throughout the procedure.      Evoked EMG:   LEFT    Direct Nerve (mA)            Screw (mA)   L4               1                                       27  L5               2                                       30      RIGHT     L4               Not tested                          17  L5               Not tested                           27

## 2020-12-24 NOTE — PROGRESS NOTES
OT BEDSIDE TREATMENT NOTE      Date:2020  Patient Name: Barrett Chavira  MRN: [id number]  : 1955  Room: [address] [place] [address]     OT orders received & appreciated, chart reviewed, pt. scheduled to OR this day. Will follow post-op in accordance with NS orders/POC. Thank you,  Lenora Segura.  MATT Shelby/LAURYN   License #  [id number]

## 2020-12-24 NOTE — CONSULTS
[address]                  [address] [place],  [facility]                                  CONSULTATION    PATIENT NAME: Mg Zamudio                      :        1955  MED REC NO:   [id number]                            ROOM:       2409  ACCOUNT NO:   [de-identified]                           ADMIT DATE: 2020  PROVIDER:     Mally Parker MD    CONSULT DATE:  2020    REASON FOR CONSULTATION:  Second opinion for thoracic stenosis, lumbar  stenosis, and lumbar spondylolisthesis and left lower extremity  weakness. HISTORY OF PRESENT ILLNESS:  The patient is a 73-year-old lady who  presents with about a two-week history of left lower extremity numbness  and weakness. She also had a little bit of tingling. She has had  difficulty ambulating. She ultimately presented to the hospital and she  had a lumbar MRI that showed she had a spondylolisthesis at L4-L5 with  severe stenosis and T2-T3 herniated disk with some spinal cord  compression. She has been seen by Dr. Ned Bhakta. Dr. Ned Bhakta has made a stop  in on her for second opinion. PAST MEDICAL HISTORY:  Positive for colon cancer, hypertension,  fibromyalgia, chronic back pain, osteoarthritis, gastroesophageal  reflux, obesity, urinary incontinence, hyperlipidemia. PAST SURGICAL HISTORY:  Positive for colon resection,  section,  tubal ligation, tonsillectomy, foot surgery, left shoulder surgery,  cholecystectomy. FAMILY HISTORY:  Positive for heart disease in both her mother and  father and also stroke in her mother and father. SOCIAL HISTORY:  Negative for tobacco or alcohol use. ALLERGIES:  Include DICLOFENAC, LISINOPRIL, LYRICA, and SULFA. HOME MEDICATIONS:  Include Lipitor, Elavil, allopurinol. REVIEW OF SYSTEMS:  HEENT:  Negative for headache, double vision, or  blurry vision.   CARDIOVASCULAR:  Negative for chest pain, arrhythmia, or palpitations. RESPIRATORY:  Negative for shortness of breath, asthma,  bronchitis, or pneumonia. GASTROINTESTINAL:   Positive for  gastroesophageal reflux. GENITOURINARY:  Negative for hematuria,  dysuria. HEMATOLOGIC:  Positive for easy bruising. INFECTIOUS:   Negative for any recent infection. MUSCULOSKELETAL:  Positive for back  pain. PSYCHIATRIC:  Positive for anxiety. NEUROLOGIC:  Negative for  seizure or stroke. ENDOCRINE:  Positive for diabetes. PHYSICAL EXAMINATION:  VITAL SIGNS:  She is currently afebrile with a T-current of 36.4 degrees  Celsius, pulse 73, blood pressure is 136/77, her respiratory rate is 18. GENERAL:  She is resting in bed. Does not appear to be in any acute  distress. Appears her stated age. HEENT:  Her head is normocephalic and atraumatic. Pupils are 3 to 2 mm  and reactive. She has no drainage out of her eyes, ears, nose, or  throat. SKIN:  Skin is warm and dry. MUSCULOSKELETAL:  She has got decreased range of motion in her left  lower extremity. ABDOMEN:  Soft, nontender, and nondistended. RESPIRATORY:  She is not using any accessory muscles of respiration. NEUROLOGIC:  On the rest of her neurologic exam, she is awake, alert,  and oriented x3. Cranial nerves II through XII are intact bilaterally. Motor exam reveals 4 to -5/5 strength in her left lower extremity, 5/5  strength in her right lower extremity, and 5/5 strength in bilateral  upper extremity. Sensation, she has got decreased sensation in the left  hemibody and also decreased sensation in her left lower extremity. LABORATORY VALUES:  Show sodium 139, potassium of 3.8, chloride of 103,  CO2 of 27, BUN is 25, creatinine is 0.7. REVIEW OF IMAGING:  She has an MRI of her lumbar spine, shows that she  has spondylolisthesis at L4-L5 with severe stenosis at L4-L5.   She also  has a cervical MRI that does show some mild stenosis and some mild degenerative changes within the cervical spine; and finally on review of  her thoracic MRI, she does have evidence of herniated disk at T2-T3 with  some evidence of spinal cord compression. ASSESSMENT AND PLAN:  The patient is a 32-year-old lady with T2-T3  stenosis and also L4-L5 spondylolisthesis and stenosis. At this point,  surgical intervention would be her best option and I think she would be  better served with an L4-L5 posterior lumbar interbody fusion and T2-T3  laminectomy. I will go ahead and let Dr. Moriah Nunez know what my thoughts  are with regards to what is going on.         Ayo Dos Santos MD    D: 12/23/2020 19:51:57       T: 12/23/2020 22:32:52     TAYLER/[email or web address]  Job#: [id number]     Doc#: [id number]    CC:

## 2020-12-24 NOTE — PROGRESS NOTES
Physical Therapy    Date: 2020       Patient Name: Rebeca Eason  : 1955      MRN: [id number]    PT order received. Chart has been reviewed. PT evaluation will be on hold due to planned surgery 20. Will continue to follow and complete evaluation at later time. Order cancelled in error will need new PT order following surgery.       Hannah Swan, PT

## 2020-12-24 NOTE — CARE COORDINATION
12/24/2020 social work [facility] (will need to see therapy and clinicals before accepting). Pt for sx soon. Sw/cm will follow.   Electronically signed by LALO Beckett on 12/24/2020 at 9:50 AM

## 2020-12-25 ENCOUNTER — APPOINTMENT (OUTPATIENT)
Dept: GENERAL RADIOLOGY | Age: 65
DRG: 459 | End: 2020-12-25
Attending: FAMILY MEDICINE
Payer: MEDICAID

## 2020-12-25 ENCOUNTER — APPOINTMENT (OUTPATIENT)
Dept: CT IMAGING | Age: 65
DRG: 459 | End: 2020-12-25
Attending: FAMILY MEDICINE
Payer: MEDICAID

## 2020-12-25 LAB
ANION GAP SERPL CALCULATED.3IONS-SCNC: 6 MMOL/L (ref 7–16)
BUN BLDV-MCNC: 17 MG/DL (ref 8–23)
CALCIUM SERPL-MCNC: 9.2 MG/DL (ref 8.6–10.2)
CHLORIDE BLD-SCNC: 98 MMOL/L (ref 98–107)
CO2: 28 MMOL/L (ref 22–29)
CREAT SERPL-MCNC: 0.7 MG/DL (ref 0.5–1)
EKG ATRIAL RATE: 70 BPM
EKG P AXIS: 39 DEGREES
EKG P-R INTERVAL: 136 MS
EKG Q-T INTERVAL: 430 MS
EKG QRS DURATION: 88 MS
EKG QTC CALCULATION (BAZETT): 464 MS
EKG R AXIS: 9 DEGREES
EKG T AXIS: 30 DEGREES
EKG VENTRICULAR RATE: 70 BPM
GFR AFRICAN AMERICAN: >60
GFR NON-AFRICAN AMERICAN: >60 ML/MIN/1.73
GLUCOSE BLD-MCNC: 161 MG/DL (ref 74–99)
HCT VFR BLD CALC: 32.8 % (ref 34–48)
HEMOGLOBIN: 11 G/DL (ref 11.5–15.5)
METER GLUCOSE: 164 MG/DL (ref 74–99)
METER GLUCOSE: 172 MG/DL (ref 74–99)
METER GLUCOSE: 182 MG/DL (ref 74–99)
METER GLUCOSE: 229 MG/DL (ref 74–99)
POTASSIUM SERPL-SCNC: 4.2 MMOL/L (ref 3.5–5)
SODIUM BLD-SCNC: 132 MMOL/L (ref 132–146)

## 2020-12-25 PROCEDURE — 6360000002 HC RX W HCPCS: Performed by: NEUROLOGICAL SURGERY

## 2020-12-25 PROCEDURE — 6370000000 HC RX 637 (ALT 250 FOR IP): Performed by: NEUROLOGICAL SURGERY

## 2020-12-25 PROCEDURE — 6370000000 HC RX 637 (ALT 250 FOR IP): Performed by: FAMILY MEDICINE

## 2020-12-25 PROCEDURE — 2580000003 HC RX 258: Performed by: NEUROLOGICAL SURGERY

## 2020-12-25 PROCEDURE — 93010 ELECTROCARDIOGRAM REPORT: CPT | Performed by: INTERNAL MEDICINE

## 2020-12-25 PROCEDURE — 71045 X-RAY EXAM CHEST 1 VIEW: CPT

## 2020-12-25 PROCEDURE — 2700000000 HC OXYGEN THERAPY PER DAY

## 2020-12-25 PROCEDURE — 72128 CT CHEST SPINE W/O DYE: CPT

## 2020-12-25 PROCEDURE — 82962 GLUCOSE BLOOD TEST: CPT

## 2020-12-25 PROCEDURE — 85014 HEMATOCRIT: CPT

## 2020-12-25 PROCEDURE — 80048 BASIC METABOLIC PNL TOTAL CA: CPT

## 2020-12-25 PROCEDURE — 36415 COLL VENOUS BLD VENIPUNCTURE: CPT

## 2020-12-25 PROCEDURE — 85018 HEMOGLOBIN: CPT

## 2020-12-25 PROCEDURE — 72131 CT LUMBAR SPINE W/O DYE: CPT

## 2020-12-25 PROCEDURE — 2060000000 HC ICU INTERMEDIATE R&B

## 2020-12-25 RX ADMIN — CEFAZOLIN 1 G: 1 INJECTION, POWDER, FOR SOLUTION INTRAMUSCULAR; INTRAVENOUS at 16:29

## 2020-12-25 RX ADMIN — INSULIN LISPRO 2 UNITS: 100 INJECTION, SOLUTION INTRAVENOUS; SUBCUTANEOUS at 16:30

## 2020-12-25 RX ADMIN — PANTOPRAZOLE SODIUM 20 MG: 20 TABLET, DELAYED RELEASE ORAL at 05:27

## 2020-12-25 RX ADMIN — Medication 2000 UNITS: at 08:25

## 2020-12-25 RX ADMIN — Medication 2 MG: at 09:55

## 2020-12-25 RX ADMIN — ATENOLOL 50 MG: 50 TABLET ORAL at 08:25

## 2020-12-25 RX ADMIN — DOXYCYCLINE HYCLATE 100 MG: 100 CAPSULE ORAL at 08:24

## 2020-12-25 RX ADMIN — DOCUSATE SODIUM 100 MG: 100 CAPSULE, LIQUID FILLED ORAL at 08:24

## 2020-12-25 RX ADMIN — ALLOPURINOL 300 MG: 300 TABLET ORAL at 08:23

## 2020-12-25 RX ADMIN — SODIUM CHLORIDE, PRESERVATIVE FREE 10 ML: 5 INJECTION INTRAVENOUS at 11:52

## 2020-12-25 RX ADMIN — POLYETHYLENE GLYCOL 3350 17 G: 17 POWDER, FOR SOLUTION ORAL at 08:23

## 2020-12-25 RX ADMIN — ATORVASTATIN CALCIUM 10 MG: 10 TABLET, FILM COATED ORAL at 19:40

## 2020-12-25 RX ADMIN — DOXYCYCLINE HYCLATE 100 MG: 100 CAPSULE ORAL at 19:40

## 2020-12-25 RX ADMIN — CEFTRIAXONE SODIUM 1 G: 1 INJECTION, POWDER, FOR SOLUTION INTRAMUSCULAR; INTRAVENOUS at 11:52

## 2020-12-25 RX ADMIN — INSULIN LISPRO 2 UNITS: 100 INJECTION, SOLUTION INTRAVENOUS; SUBCUTANEOUS at 19:41

## 2020-12-25 RX ADMIN — ALOGLIPTIN 12.5 MG: 12.5 TABLET, FILM COATED ORAL at 08:24

## 2020-12-25 RX ADMIN — Medication 2 MG: at 14:37

## 2020-12-25 RX ADMIN — SODIUM CHLORIDE, POTASSIUM CHLORIDE, SODIUM LACTATE AND CALCIUM CHLORIDE: 600; 310; 30; 20 INJECTION, SOLUTION INTRAVENOUS at 05:07

## 2020-12-25 RX ADMIN — CEFAZOLIN 1 G: 1 INJECTION, POWDER, FOR SOLUTION INTRAMUSCULAR; INTRAVENOUS at 08:23

## 2020-12-25 RX ADMIN — Medication 2 MG: at 23:35

## 2020-12-25 RX ADMIN — OXYCODONE AND ACETAMINOPHEN 1 TABLET: 5; 325 TABLET ORAL at 08:24

## 2020-12-25 RX ADMIN — Medication 2 MG: at 19:00

## 2020-12-25 RX ADMIN — INSULIN LISPRO 2 UNITS: 100 INJECTION, SOLUTION INTRAVENOUS; SUBCUTANEOUS at 05:27

## 2020-12-25 RX ADMIN — INSULIN LISPRO 4 UNITS: 100 INJECTION, SOLUTION INTRAVENOUS; SUBCUTANEOUS at 11:52

## 2020-12-25 RX ADMIN — SODIUM CHLORIDE, PRESERVATIVE FREE 10 ML: 5 INJECTION INTRAVENOUS at 09:56

## 2020-12-25 RX ADMIN — SODIUM CHLORIDE, PRESERVATIVE FREE 10 ML: 5 INJECTION INTRAVENOUS at 14:38

## 2020-12-25 RX ADMIN — Medication 10 ML: at 19:41

## 2020-12-25 ASSESSMENT — PAIN DESCRIPTION - DESCRIPTORS: DESCRIPTORS: ACHING;DISCOMFORT

## 2020-12-25 ASSESSMENT — PAIN DESCRIPTION - PROGRESSION: CLINICAL_PROGRESSION: NOT CHANGED

## 2020-12-25 ASSESSMENT — PAIN SCALES - GENERAL
PAINLEVEL_OUTOF10: 10
PAINLEVEL_OUTOF10: 9
PAINLEVEL_OUTOF10: 7
PAINLEVEL_OUTOF10: 10

## 2020-12-25 ASSESSMENT — PAIN DESCRIPTION - ORIENTATION: ORIENTATION: UPPER;LOWER;MID

## 2020-12-25 ASSESSMENT — PAIN DESCRIPTION - ONSET: ONSET: ON-GOING

## 2020-12-25 ASSESSMENT — PAIN DESCRIPTION - FREQUENCY: FREQUENCY: CONTINUOUS

## 2020-12-25 ASSESSMENT — PAIN DESCRIPTION - LOCATION: LOCATION: BACK

## 2020-12-25 NOTE — PROGRESS NOTES
(24hrs), Av.6 °F (37 °C), Min:97.5 °F (36.4 °C), Max:99.9 °F (37.7 °C)    DIET: DIET GENERAL; Carb Control: 5 carb choices (75 gms)/meal  CODE: Full Code    Intake/Output Summary (Last 24 hours) at 2020 0943  Last data filed at 2020 0530  Gross per 24 hour   Intake 3677.46 ml   Output 1425 ml   Net 2252.46 ml       Review of Systems  All bolded are positive; please see HPI  General:  Fever, chills, diaphoresis, fatigue, malaise, night sweats, weight loss  Psychological:  Anxiety, disorientation, hallucinations. ENT:  Epistaxis, headaches, vertigo, visual changes. Cardiovascular:  Chest pain, irregular heartbeats, palpitations, paroxysmal nocturnal dyspnea. Respiratory:  Shortness of breath, coughing, sputum production, hemoptysis, wheezing, orthopnea. Gastrointestinal:  Nausea, vomiting, diarrhea, heartburn, constipation, abdominal pain, hematemesis, hematochezia, melena, acholic stools  Genito-Urinary:  Dysuria, urgency, frequency, hematuria  Musculoskeletal:  Joint pain, joint stiffness, joint swelling, muscle pain back pain, leg pain  Neurology:  Headache, focal neurological deficits, weakness, numbness, paresthesia  Derm:  Rashes, ulcers, excoriations, bruising  Extremities:  Decreased ROM, peripheral edema, mottling      OBJECTIVE:    /66   Pulse 82   Temp 98 °F (36.7 °C) (Temporal)   Resp 16   Ht 4' 11\" (1.499 m)   Wt 200 lb (90.7 kg)   LMP  (LMP Unknown)   SpO2 96%   BMI 40.40 kg/m²     General appearance:  awake, alert, and oriented to person, place, time, and purpose; appears stated age and cooperative; no apparent distress no labored breathing +mcclain  HEENT:  Conjunctivae/corneas clear. Neck: Supple. No jugular venous distention.    Respiratory: symmetrical; clear to auscultation bilaterally; no wheezes; no rhonchi; no rales  Cardiovascular: rhythm regular; rate controlled; no murmurs  Abdomen: Soft, nontender, nondistended  Extremities:  peripheral pulses present; no Subcutaneous 4x Daily AC & HS    sodium chloride flush  10 mL Intravenous 2 times per day     PRN Meds: amitriptyline, glucose, dextrose, glucagon (rDNA), dextrose, sodium chloride flush, promethazine **OR** ondansetron, acetaminophen **OR** acetaminophen, oxyCODONE-acetaminophen, morphine, cyclobenzaprine    Labs:     Recent Labs     12/25/20  0452   HGB 11.0*   HCT 32.8*       Recent Labs     12/23/20  0508 12/24/20  0535 12/24/20  0920    141 135   K 3.8 4.0 3.8    101 100   CO2 27 30* 26   BUN 25* 25* 23   CREATININE 0.7 0.8 0.7   CALCIUM 9.5 9.7 9.4       No results for input(s): PROT, ALB, ALKPHOS, ALT, AST, BILITOT, AMYLASE, LIPASE in the last 72 hours. No results for input(s): INR in the last 72 hours. No results for input(s): Hopkins Scrivener in the last 72 hours. Chronic labs:    Lab Results   Component Value Date    CHOL 155 12/24/2020    TRIG 340 (H) 12/24/2020    HDL 40 12/24/2020    LDLCALC 47 12/24/2020    TSH 4.180 03/18/2020    LABA1C 6.9 (H) 12/21/2020       Radiology: REVIEWED DAILY    +++++++++++++++++++++++++++++++++++++++++++++++++  Bettye Hunter Physician - 2020 [facility], [place]  +++++++++++++++++++++++++++++++++++++++++++++++++  NOTE: This report was transcribed using voice recognition software. Every effort was made to ensure accuracy; however, inadvertent computerized transcription errors may be present.

## 2020-12-25 NOTE — PROGRESS NOTES
PO#1  PLIF L4-5  Decompressive Laminectomy L4-l5 & T2-3  Doing well. More comfortable. Moving left leg better. Incisions dry  CT scan reviewed:  1.  Interval bilateral laminectomy at T3 and T4, partially extending into the   posterior elements of T2, for probable subjacent multilevel partial   discectomy. 2.  Interval bilateral laminectomy at L4, partially extending into adjacent   levels, for partial discectomy and interbody fusion at L4/5, as well as   interval spinal instrumented fusion by bilateral spinal fixation rods,   anchored by bilateral pedicle screws, and L4 - L5. Beam-hardening artifacts   slightly limit evaluation of nearby structures. However, these components   appear to be intact and in usual customary position. 3.  Apparent post-operative relief of disc-related mass effect upon the   spinal cord within the above-described surgical beds, most significant at   T2/3, as described. 4.  Probable iatrogenic posterior epidural/paraspinal emphysema/hematoma   within the above-described surgical beds, as described. 5.  All other previously-demonstrated/described multilevel spinal and   bilateral sacroiliac joint degenerative disease, central spinal canal   stenosis, and neural foraminal narrowing, all appear otherwise not   significantly changed. 6.  Unchanged chronic multilevel asymmetric vertebral compression deformities   and related vertebral column curvature/alignment abnormalities. 7.  Incidental findings, most notable for minimal dependent bibasilar pleural   effusions and equivalent adjacent subsegmental atelectasis versus infiltrate   tracking to both lung apices, where this becomes slightly worse on the right. .   RECOMMENDATIONS:   1.  Consider follow-up thoracolumbar spinal and/or thoracic imaging, as   directed clinically.

## 2020-12-25 NOTE — PROGRESS NOTES
Patient had IV site in Right arm in or that infiltrated right forearm and hand swollen , Arm elevated and heated blanket applied

## 2020-12-25 NOTE — PLAN OF CARE
Problem: Pain:  Goal: Pain level will decrease  Description: Pain level will decrease  12/25/2020 1016 by Flor Dawn RN  Outcome: Met This Shift  12/25/2020 0026 by King Louie RN  Outcome: Met This Shift  Goal: Control of acute pain  Description: Control of acute pain  12/25/2020 1016 by Flor Dawn RN  Outcome: Met This Shift  12/25/2020 0026 by King Louie RN  Outcome: Met This Shift  Goal: Control of chronic pain  Description: Control of chronic pain  12/25/2020 1016 by Flor Dawn RN  Outcome: Met This Shift  12/25/2020 0026 by King Louie RN  Outcome: Met This Shift     Problem: Skin Integrity:  Goal: Will show no infection signs and symptoms  Description: Will show no infection signs and symptoms  12/25/2020 1016 by Flor Dawn RN  Outcome: Met This Shift  12/25/2020 0026 by King Louie RN  Outcome: Met This Shift  Goal: Absence of new skin breakdown  Description: Absence of new skin breakdown  12/25/2020 1016 by Flor Dawn RN  Outcome: Met This Shift  12/25/2020 0026 by King Louie RN  Outcome: Met This Shift     Problem: Falls - Risk of:  Goal: Will remain free from falls  Description: Will remain free from falls  12/25/2020 1016 by Flor Dawn RN  Outcome: Met This Shift  12/25/2020 0026 by King Louie RN  Outcome: Met This Shift  Goal: Absence of physical injury  Description: Absence of physical injury  12/25/2020 1016 by Flor Dawn RN  Outcome: Met This Shift  12/25/2020 0026 by King Louie RN  Outcome: Met This Shift     Problem: Safety:  Goal: Free from accidental physical injury  Description: Free from accidental physical injury  12/25/2020 1016 by Flor Dawn RN  Outcome: Met This Shift  12/25/2020 0026 by King Louie RN  Outcome: Met This Shift  Goal: Free from intentional harm  Description: Free from intentional harm  12/25/2020 1016 by Flor Dawn RN  Outcome: Met This Shift  12/25/2020 0026 by King Louie RN  Outcome: Met This Shift     Problem: Daily Care:  Goal: Daily care needs are met  Description: Daily care needs are met  12/25/2020 1016 by Linda Butler RN  Outcome: Met This Shift  12/25/2020 0026 by Denis Nogueira RN  Outcome: Met This Shift     Problem: Discharge Planning:  Goal: Patients continuum of care needs are met  Description: Patients continuum of care needs are met  12/25/2020 1016 by Linda Butler RN  Outcome: Met This Shift  12/25/2020 0026 by Denis Nogueira RN  Outcome: Met This Shift

## 2020-12-25 NOTE — PROGRESS NOTES
leaving I gave him patients new room number and nurses station number ,  old floor called ([phone]) to have belongs brought to new room ,

## 2020-12-25 NOTE — PLAN OF CARE
Problem: Pain:  Goal: Pain level will decrease  Description: Pain level will decrease  12/25/2020 1811 by Adán England RN  Outcome: Met This Shift     Problem: Pain:  Goal: Control of acute pain  Description: Control of acute pain  12/25/2020 1811 by Adán England RN  Outcome: Met This Shift     Problem: Pain:  Goal: Control of chronic pain  Description: Control of chronic pain  12/25/2020 1811 by Adán England RN  Outcome: Met This Shift     Problem: Skin Integrity:  Goal: Will show no infection signs and symptoms  Description: Will show no infection signs and symptoms  12/25/2020 1811 by Adán England RN  Outcome: Met This Shift     Problem: Skin Integrity:  Goal: Absence of new skin breakdown  Description: Absence of new skin breakdown  12/25/2020 1811 by Adán England RN  Outcome: Met This Shift     Problem: Falls - Risk of:  Goal: Will remain free from falls  Description: Will remain free from falls  12/25/2020 1811 by Adán England RN  Outcome: Met This Shift     Problem: Falls - Risk of:  Goal: Absence of physical injury  Description: Absence of physical injury  12/25/2020 1811 by Adán England RN  Outcome: Met This Shift

## 2020-12-25 NOTE — PROGRESS NOTES
Dr Nallely Kirkpatrick called to clarify order for upgrade to intermediate , he would like patient to do due to length of surgery to intermediate post op , admissions notified

## 2020-12-25 NOTE — ANESTHESIA POSTPROCEDURE EVALUATION
Department of Anesthesiology  Postprocedure Note    Patient: Pj Vazquez  MRN: [id number]  YOB: 1955  Date of evaluation: 12/25/2020  Time:  2:27 AM     Procedure Summary     Date: 12/24/20 Room / Location: [facility] OR 07 / [facility]    Anesthesia Start: 2850 Anesthesia Stop: 1620    Procedure: L4-L5  POSTERIOR LUMBAR INTERBODY FUSION, T2-T3 DECOMPRESSIVE LAMINECTOMY (N/A Back) Diagnosis: (.)    Surgeons: Vanesa Lorenzo MD Responsible Provider: Senait Velásquez MD    Anesthesia Type: general ASA Status: 4          Anesthesia Type: general    Isabella Phase I: Isabella Score: 9    Isabella Phase II:      Last vitals: Reviewed and per EMR flowsheets.        Anesthesia Post Evaluation    Patient location during evaluation: PACU  Patient participation: complete - patient participated  Level of consciousness: awake and alert  Airway patency: patent  Nausea & Vomiting: no nausea and no vomiting  Complications: no  Cardiovascular status: hemodynamically stable  Respiratory status: acceptable  Hydration status: euvolemic

## 2020-12-25 NOTE — PLAN OF CARE
Problem: Pain:  Goal: Pain level will decrease  Description: Pain level will decrease  Outcome: Met This Shift  Goal: Control of acute pain  Description: Control of acute pain  Outcome: Met This Shift  Goal: Control of chronic pain  Description: Control of chronic pain  Outcome: Met This Shift     Problem: Skin Integrity:  Goal: Will show no infection signs and symptoms  Description: Will show no infection signs and symptoms  Outcome: Met This Shift  Goal: Absence of new skin breakdown  Description: Absence of new skin breakdown  Outcome: Met This Shift     Problem: Falls - Risk of:  Goal: Will remain free from falls  Description: Will remain free from falls  Outcome: Met This Shift  Goal: Absence of physical injury  Description: Absence of physical injury  Outcome: Met This Shift     Problem: Safety:  Goal: Free from accidental physical injury  Description: Free from accidental physical injury  Outcome: Met This Shift  Goal: Free from intentional harm  Description: Free from intentional harm  Outcome: Met This Shift     Problem: Daily Care:  Goal: Daily care needs are met  Description: Daily care needs are met  Outcome: Met This Shift     Problem: Discharge Planning:  Goal: Patients continuum of care needs are met  Description: Patients continuum of care needs are met  Outcome: Met This Shift

## 2020-12-25 NOTE — PROGRESS NOTES
[facility] notified to send patient belongings and phone to [facility].  given room number and phone contact numbers.    leaving the hospital.

## 2020-12-26 LAB
ANION GAP SERPL CALCULATED.3IONS-SCNC: 12 MMOL/L (ref 7–16)
BUN BLDV-MCNC: 12 MG/DL (ref 8–23)
CALCIUM SERPL-MCNC: 9.4 MG/DL (ref 8.6–10.2)
CHLORIDE BLD-SCNC: 97 MMOL/L (ref 98–107)
CO2: 25 MMOL/L (ref 22–29)
CREAT SERPL-MCNC: 0.6 MG/DL (ref 0.5–1)
GFR AFRICAN AMERICAN: >60
GFR NON-AFRICAN AMERICAN: >60 ML/MIN/1.73
GLUCOSE BLD-MCNC: 163 MG/DL (ref 74–99)
HCT VFR BLD CALC: 33.3 % (ref 34–48)
HEMOGLOBIN: 11.3 G/DL (ref 11.5–15.5)
MCH RBC QN AUTO: 29.6 PG (ref 26–35)
MCHC RBC AUTO-ENTMCNC: 33.9 % (ref 32–34.5)
MCV RBC AUTO: 87.2 FL (ref 80–99.9)
METER GLUCOSE: 150 MG/DL (ref 74–99)
METER GLUCOSE: 162 MG/DL (ref 74–99)
METER GLUCOSE: 169 MG/DL (ref 74–99)
METER GLUCOSE: 173 MG/DL (ref 74–99)
PDW BLD-RTO: 13.4 FL (ref 11.5–15)
PLATELET # BLD: 160 E9/L (ref 130–450)
PMV BLD AUTO: 9.9 FL (ref 7–12)
POTASSIUM SERPL-SCNC: 4.1 MMOL/L (ref 3.5–5)
PROCALCITONIN: 0.16 NG/ML (ref 0–0.08)
RBC # BLD: 3.82 E12/L (ref 3.5–5.5)
SODIUM BLD-SCNC: 134 MMOL/L (ref 132–146)
WBC # BLD: 19.1 E9/L (ref 4.5–11.5)

## 2020-12-26 PROCEDURE — 6370000000 HC RX 637 (ALT 250 FOR IP): Performed by: INTERNAL MEDICINE

## 2020-12-26 PROCEDURE — 2580000003 HC RX 258: Performed by: NEUROLOGICAL SURGERY

## 2020-12-26 PROCEDURE — 6370000000 HC RX 637 (ALT 250 FOR IP): Performed by: NEUROLOGICAL SURGERY

## 2020-12-26 PROCEDURE — 6370000000 HC RX 637 (ALT 250 FOR IP): Performed by: FAMILY MEDICINE

## 2020-12-26 PROCEDURE — 84145 PROCALCITONIN (PCT): CPT

## 2020-12-26 PROCEDURE — 6360000002 HC RX W HCPCS: Performed by: NEUROLOGICAL SURGERY

## 2020-12-26 PROCEDURE — 36415 COLL VENOUS BLD VENIPUNCTURE: CPT

## 2020-12-26 PROCEDURE — 82962 GLUCOSE BLOOD TEST: CPT

## 2020-12-26 PROCEDURE — 80048 BASIC METABOLIC PNL TOTAL CA: CPT

## 2020-12-26 PROCEDURE — 94640 AIRWAY INHALATION TREATMENT: CPT

## 2020-12-26 PROCEDURE — 94664 DEMO&/EVAL PT USE INHALER: CPT

## 2020-12-26 PROCEDURE — 85027 COMPLETE CBC AUTOMATED: CPT

## 2020-12-26 PROCEDURE — 2700000000 HC OXYGEN THERAPY PER DAY

## 2020-12-26 PROCEDURE — 2060000000 HC ICU INTERMEDIATE R&B

## 2020-12-26 RX ORDER — OXYCODONE HYDROCHLORIDE AND ACETAMINOPHEN 5; 325 MG/1; MG/1
2 TABLET ORAL EVERY 4 HOURS PRN
Status: DISCONTINUED | OUTPATIENT
Start: 2020-12-26 | End: 2020-12-30 | Stop reason: HOSPADM

## 2020-12-26 RX ORDER — SENNA AND DOCUSATE SODIUM 50; 8.6 MG/1; MG/1
2 TABLET, FILM COATED ORAL DAILY
Status: DISCONTINUED | OUTPATIENT
Start: 2020-12-27 | End: 2020-12-30 | Stop reason: HOSPADM

## 2020-12-26 RX ORDER — OXYCODONE HYDROCHLORIDE AND ACETAMINOPHEN 5; 325 MG/1; MG/1
1 TABLET ORAL EVERY 4 HOURS PRN
Status: DISCONTINUED | OUTPATIENT
Start: 2020-12-26 | End: 2020-12-30 | Stop reason: HOSPADM

## 2020-12-26 RX ORDER — IPRATROPIUM BROMIDE AND ALBUTEROL SULFATE 2.5; .5 MG/3ML; MG/3ML
1 SOLUTION RESPIRATORY (INHALATION)
Status: DISCONTINUED | OUTPATIENT
Start: 2020-12-26 | End: 2020-12-30 | Stop reason: HOSPADM

## 2020-12-26 RX ORDER — MORPHINE SULFATE 2 MG/ML
2 INJECTION, SOLUTION INTRAMUSCULAR; INTRAVENOUS
Status: DISCONTINUED | OUTPATIENT
Start: 2020-12-26 | End: 2020-12-30 | Stop reason: HOSPADM

## 2020-12-26 RX ADMIN — SODIUM CHLORIDE, PRESERVATIVE FREE 10 ML: 5 INJECTION INTRAVENOUS at 12:32

## 2020-12-26 RX ADMIN — CEFAZOLIN 1 G: 1 INJECTION, POWDER, FOR SOLUTION INTRAMUSCULAR; INTRAVENOUS at 16:45

## 2020-12-26 RX ADMIN — PANTOPRAZOLE SODIUM 20 MG: 20 TABLET, DELAYED RELEASE ORAL at 06:33

## 2020-12-26 RX ADMIN — INSULIN LISPRO 2 UNITS: 100 INJECTION, SOLUTION INTRAVENOUS; SUBCUTANEOUS at 06:43

## 2020-12-26 RX ADMIN — Medication 2000 UNITS: at 08:23

## 2020-12-26 RX ADMIN — ALOGLIPTIN 12.5 MG: 12.5 TABLET, FILM COATED ORAL at 08:24

## 2020-12-26 RX ADMIN — MORPHINE SULFATE 2 MG: 2 INJECTION, SOLUTION INTRAMUSCULAR; INTRAVENOUS at 12:32

## 2020-12-26 RX ADMIN — OXYCODONE AND ACETAMINOPHEN 1 TABLET: 5; 325 TABLET ORAL at 02:33

## 2020-12-26 RX ADMIN — IPRATROPIUM BROMIDE AND ALBUTEROL SULFATE 1 AMPULE: .5; 3 SOLUTION RESPIRATORY (INHALATION) at 12:05

## 2020-12-26 RX ADMIN — ATENOLOL 50 MG: 50 TABLET ORAL at 08:23

## 2020-12-26 RX ADMIN — SODIUM CHLORIDE, PRESERVATIVE FREE 10 ML: 5 INJECTION INTRAVENOUS at 04:01

## 2020-12-26 RX ADMIN — Medication 10 ML: at 08:24

## 2020-12-26 RX ADMIN — INSULIN LISPRO 2 UNITS: 100 INJECTION, SOLUTION INTRAVENOUS; SUBCUTANEOUS at 11:23

## 2020-12-26 RX ADMIN — CEFAZOLIN 1 G: 1 INJECTION, POWDER, FOR SOLUTION INTRAMUSCULAR; INTRAVENOUS at 08:26

## 2020-12-26 RX ADMIN — IPRATROPIUM BROMIDE AND ALBUTEROL SULFATE 1 AMPULE: .5; 3 SOLUTION RESPIRATORY (INHALATION) at 20:02

## 2020-12-26 RX ADMIN — OXYCODONE AND ACETAMINOPHEN 2 TABLET: 5; 325 TABLET ORAL at 17:59

## 2020-12-26 RX ADMIN — CEFAZOLIN 1 G: 1 INJECTION, POWDER, FOR SOLUTION INTRAMUSCULAR; INTRAVENOUS at 00:07

## 2020-12-26 RX ADMIN — ALLOPURINOL 300 MG: 300 TABLET ORAL at 08:23

## 2020-12-26 RX ADMIN — DOXYCYCLINE HYCLATE 100 MG: 100 CAPSULE ORAL at 20:32

## 2020-12-26 RX ADMIN — ATORVASTATIN CALCIUM 10 MG: 10 TABLET, FILM COATED ORAL at 20:32

## 2020-12-26 RX ADMIN — Medication 10 ML: at 20:37

## 2020-12-26 RX ADMIN — INSULIN LISPRO 2 UNITS: 100 INJECTION, SOLUTION INTRAVENOUS; SUBCUTANEOUS at 20:32

## 2020-12-26 RX ADMIN — OXYCODONE AND ACETAMINOPHEN 2 TABLET: 5; 325 TABLET ORAL at 11:01

## 2020-12-26 RX ADMIN — DOXYCYCLINE HYCLATE 100 MG: 100 CAPSULE ORAL at 08:23

## 2020-12-26 RX ADMIN — POLYETHYLENE GLYCOL 3350 17 G: 17 POWDER, FOR SOLUTION ORAL at 08:23

## 2020-12-26 RX ADMIN — DOCUSATE SODIUM 100 MG: 100 CAPSULE, LIQUID FILLED ORAL at 08:23

## 2020-12-26 RX ADMIN — IPRATROPIUM BROMIDE AND ALBUTEROL SULFATE 1 AMPULE: .5; 3 SOLUTION RESPIRATORY (INHALATION) at 16:07

## 2020-12-26 RX ADMIN — Medication 2 MG: at 04:00

## 2020-12-26 RX ADMIN — CYCLOBENZAPRINE 10 MG: 10 TABLET, FILM COATED ORAL at 08:23

## 2020-12-26 RX ADMIN — OXYCODONE AND ACETAMINOPHEN 1 TABLET: 5; 325 TABLET ORAL at 06:38

## 2020-12-26 RX ADMIN — CEFTRIAXONE SODIUM 1 G: 1 INJECTION, POWDER, FOR SOLUTION INTRAMUSCULAR; INTRAVENOUS at 11:25

## 2020-12-26 ASSESSMENT — PAIN DESCRIPTION - PAIN TYPE: TYPE: CHRONIC PAIN

## 2020-12-26 ASSESSMENT — PAIN DESCRIPTION - PROGRESSION: CLINICAL_PROGRESSION: NOT CHANGED

## 2020-12-26 ASSESSMENT — PAIN SCALES - GENERAL
PAINLEVEL_OUTOF10: 8
PAINLEVEL_OUTOF10: 10
PAINLEVEL_OUTOF10: 9
PAINLEVEL_OUTOF10: 10

## 2020-12-26 ASSESSMENT — PAIN DESCRIPTION - FREQUENCY: FREQUENCY: CONTINUOUS

## 2020-12-26 ASSESSMENT — PAIN DESCRIPTION - LOCATION: LOCATION: BACK

## 2020-12-26 ASSESSMENT — PAIN DESCRIPTION - ORIENTATION: ORIENTATION: LEFT

## 2020-12-26 NOTE — PLAN OF CARE
Problem: Skin Integrity:  Goal: Will show no infection signs and symptoms  Description: Will show no infection signs and symptoms  12/26/2020 0248 by Chris Hogan RN  Outcome: Met This Shift  12/25/2020 1811 by Adán England RN  Outcome: Met This Shift  Goal: Absence of new skin breakdown  Description: Absence of new skin breakdown  12/26/2020 0248 by Chris Hogan RN  Outcome: Met This Shift  12/25/2020 1811 by Adán England RN  Outcome: Met This Shift     Problem: Falls - Risk of:  Goal: Will remain free from falls  Description: Will remain free from falls  12/26/2020 0248 by Chris Hogan RN  Outcome: Met This Shift  12/25/2020 1811 by Adán England RN  Outcome: Met This Shift  Goal: Absence of physical injury  Description: Absence of physical injury  12/26/2020 0248 by Chris Hogan RN  Outcome: Met This Shift  12/25/2020 1811 by Adán England RN  Outcome: Met This Shift     Problem: Safety:  Goal: Free from accidental physical injury  Description: Free from accidental physical injury  Outcome: Met This Shift  Goal: Free from intentional harm  Description: Free from intentional harm  Outcome: Met This Shift     Problem: Daily Care:  Goal: Daily care needs are met  Description: Daily care needs are met  Outcome: Met This Shift     Problem: Discharge Planning:  Goal: Patients continuum of care needs are met  Description: Patients continuum of care needs are met  Outcome: Met This Shift

## 2020-12-26 NOTE — PROGRESS NOTES
PO#2  PROCEDURES:  1. Posterior lumbar interbody fusion L4-L5 using screws, rods, and  cages. 2.  Decompressive lumbar laminectomy L4-L5. 3.  Decompressive laminectomy T2 to T4. Doing better as far as strength in legs is concerned,. Not as comfortable today. Incision healthy.   Consider rehab placemnent

## 2020-12-26 NOTE — PROGRESS NOTES
Hospitalist Progress Note      SYNOPSIS: Patient admitted on 12/20/2020 presented to [facility] with past medical history of DJD of the spine, chronic back pain, osteoarthritis of multiple joints, hypertension, fibromyalgia, upper lipidemia, colon cancer status post colectomy 3 years ago, did not require any chemotherapy or radiation, sacroiliitis, diabetes and vitamin D deficiency. Patient started having numbness involving the left leg yesterday. Progressed from the waist area down to the foot. She is now beginning to have similar sensation on the right side. This is constant, moderate to severe in intensity on the left and associated with bilateral lower extremity weakness that is worse on the left than the right. Patient was walking with a walker in the past week and now requires a wheelchair. Today she fell because she could not get in or out of bed. Complaining of low back pain as well which is more towards the left side, pain is dull, constant, radiate down the left leg and is worse with movement. She has not been able to urinate as much and has not had a bowel movement for a week. She is now beginning to experience \"shocklike\" painful sensation in the xiphisternal area that radiates into the back, numbness in the abdomen. No cough, chest pain or shortness of breath. No fever.     Vital signs notable for blood pressure of 149/71. Labs showed normal CBC, glucose of 151 otherwise normal chemistry. MRI of the lumbar spine shows severe spinal stenosis at L4-L5. Underwent L4-L5  POSTERIOR LUMBAR INTERBODY FUSION, T2-T3 DECOMPRESSIVE LAMINECTOMY 12/24    SUBJECTIVE:  Stable overnight. No other overnight issues reported. Patient seen and examined  Records reviewed.    Underwent surgery yesterday L4-L5  POSTERIOR LUMBAR INTERBODY FUSION, T2-T3 DECOMPRESSIVE LAMINECTOMY  Patient complains of post surgical pain  Says her left leg feels like it is getting numb again  She is on 2L NC, admits to shortness of breath. Using incentive spirometry      Temp (24hrs), Av.2 °F (36.8 °C), Min:97.8 °F (36.6 °C), Max:98.7 °F (37.1 °C)    DIET: DIET GENERAL; Carb Control: 5 carb choices (75 gms)/meal  CODE: Full Code    Intake/Output Summary (Last 24 hours) at 2020 1021  Last data filed at 2020 1413  Gross per 24 hour   Intake 200 ml   Output 1650 ml   Net -1450 ml       Review of Systems  All bolded are positive; please see HPI  General:  Fever, chills, diaphoresis, fatigue, malaise, night sweats, weight loss  Psychological:  Anxiety, disorientation, hallucinations. ENT:  Epistaxis, headaches, vertigo, visual changes. Cardiovascular:  Chest pain, irregular heartbeats, palpitations, paroxysmal nocturnal dyspnea. Respiratory:  Shortness of breath, coughing, sputum production, hemoptysis, wheezing, orthopnea. Gastrointestinal:  Nausea, vomiting, diarrhea, heartburn, constipation, abdominal pain, hematemesis, hematochezia, melena, acholic stools  Genito-Urinary:  Dysuria, urgency, frequency, hematuria  Musculoskeletal:  Joint pain, joint stiffness, joint swelling, muscle pain back pain, leg pain  Neurology:  Headache, focal neurological deficits, weakness, numbness, paresthesia  Derm:  Rashes, ulcers, excoriations, bruising  Extremities:  Decreased ROM, peripheral edema, mottling      OBJECTIVE:    /67   Pulse 89   Temp 98 °F (36.7 °C) (Oral)   Resp 18   Ht 4' 11\" (1.499 m)   Wt 200 lb (90.7 kg)   LMP  (LMP Unknown)   SpO2 96%   BMI 40.40 kg/m²     General appearance:  awake, alert, and oriented to person, place, time, and purpose; appears stated age and cooperative; no apparent distress no labored breathing +mcclain  HEENT:  Conjunctivae/corneas clear. Neck: Supple. No jugular venous distention.    Respiratory: symmetrical; clear to auscultation bilaterally; no wheezes; no rhonchi; no rales  Cardiovascular: rhythm regular; rate controlled; no murmurs  Abdomen: Soft, nontender, nondistended  Extremities:  peripheral pulses present; no peripheral edema; no ulcers  Musculoskeletal: No clubbing, cyanosis, no bilateral lower extremity edema. Brisk capillary refill. Skin:  No rashes  on visible skin  Neurologic: awake, alert and following commands     ASSESSMENT and PLAN:  · Acute exacerbation of chronic back pain with radicular symptoms secondary to severe spinal stenosis at L4/L5 S/p L4/L5 posteriod lumbar fusion 12/24-  Neurosurgery following. PTOT  · Weakness of both legs secondary to above-  Management as above. · Paresthesia from upper abdomen down to the foot s/p T2-T3 decompression laminectomy-  MRI thoracic spine showing multilevel central canal stenosis with multiple disc protrusions, some which cause slight impression on the thoracic spinal cord. · Cervical spine C3-C5 signal abnormality- infectious, inflammatory, neoplastic, or artifactual.   · Cervical spine stenosis  · Inability to void- had Tolliver catheterization in the ER, lots of urine drained. · Constipation- bowel regimen. · Ambulatory dysfunction- physical therapy as tolerated  · Type 2 diabetes- sliding scale coverage, monitor blood sugar. · CAP- Rocephin and doxy  · Fibromyalgia with chronic pain- continue home medication  · Morbid obesity- dietary and lifestyle modification. · Leukocytosis- likely reactive, afebrile. Continue to trend      DISPOSITION: Continue current plan of care.       Medications:  REVIEWED DAILY    Infusion Medications    dextrose       Scheduled Medications    ceFAZolin  1 g Intravenous Q8H    cefTRIAXone (ROCEPHIN) IV  1 g Intravenous Q24H    doxycycline hyclate  100 mg Oral 2 times per day    polyethylene glycol  17 g Oral Daily    allopurinol  300 mg Oral Daily    atenolol  50 mg Oral Daily    atorvastatin  10 mg Oral Nightly    vitamin D  2,000 Units Oral Daily    pantoprazole  20 mg Oral QAM AC    alogliptin  12.5 mg Oral Daily    docusate sodium  100 mg Oral Daily    insulin lispro  0-10 Units Subcutaneous 4x Daily AC & HS    sodium chloride flush  10 mL Intravenous 2 times per day     PRN Meds: oxyCODONE-acetaminophen **OR** oxyCODONE-acetaminophen, amitriptyline, glucose, dextrose, glucagon (rDNA), dextrose, sodium chloride flush, promethazine **OR** ondansetron, acetaminophen **OR** acetaminophen, morphine, cyclobenzaprine    Labs:     Recent Labs     12/25/20  0452 12/26/20  0623   WBC  --  19.1*   HGB 11.0* 11.3*   HCT 32.8* 33.3*   PLT  --  160       Recent Labs     12/24/20  0920 12/25/20  1115 12/26/20  0623    132 134   K 3.8 4.2 4.1    98 97*   CO2 26 28 25   BUN 23 17 12   CREATININE 0.7 0.7 0.6   CALCIUM 9.4 9.2 9.4       No results for input(s): PROT, ALB, ALKPHOS, ALT, AST, BILITOT, AMYLASE, LIPASE in the last 72 hours. No results for input(s): INR in the last 72 hours. No results for input(s): Joseph Washingtonon in the last 72 hours. Chronic labs:    Lab Results   Component Value Date    CHOL 155 12/24/2020    TRIG 340 (H) 12/24/2020    HDL 40 12/24/2020    LDLCALC 47 12/24/2020    TSH 4.180 03/18/2020    LABA1C 6.9 (H) 12/21/2020       Radiology: REVIEWED DAILY    +++++++++++++++++++++++++++++++++++++++++++++++++  Bert Hunter Physician - 2020 [facility], [place]  +++++++++++++++++++++++++++++++++++++++++++++++++  NOTE: This report was transcribed using voice recognition software. Every effort was made to ensure accuracy; however, inadvertent computerized transcription errors may be present.

## 2020-12-26 NOTE — OP NOTE
[address]                  [address] [facility],  [facility]                                OPERATIVE REPORT    PATIENT NAME: Tyra Vera                      :        1955  MED REC NO:   [id number]                            ROOM:       [facility]  ACCOUNT NO:   [de-identified]                           ADMIT DATE: 2020  PROVIDER:     Dolores Banegas MD    DATE OF PROCEDURE:  2020    SURGEON:  Dolores Banegas MD    PREOPERATIVE DIAGNOSES:  1. Lumbar stenosis and lumbar instability, L4-L5. 2.  Severe spinal canal stenosis T2-T3 causing myelopathy. POSTOPERATIVE DIAGNOSES:  1. Lumbar stenosis and lumbar instability, L4-L5. 2.  Severe spinal canal stenosis T2-T3 causing myelopathy. PROCEDURES:  1. Posterior lumbar interbody fusion L4-L5 using screws, rods, and  cages. 2.  Decompressive lumbar laminectomy L4-L5. 3.  Decompressive laminectomy T2 to T4.  4.  High BMI. 5.  Use of O-arm, C-arm, intraoperative EMG, SSEP and preparation of  morselized bone under the guidance of the operating neurosurgeon. TECHNIQUE:  The patient was placed on the operating room table in supine  position and after satisfactory endotracheal general anesthesia had been  administered, the patient was turned into prone position on the  operating room table. Lower back, mid back, and upper back were  prepared with Betadine scrub and solution and routinely draped. Starting at the level of L4-L5, incision was made starting at the level  of L3 and extending to the level of S1. It was taken down through the  skin and subcutaneous tissue. The fatty layer which was 1-1/2 inches  thick was  from the lumbosacral fascia and fascia was incised  on either side of the spinous process of L3, L4, and L5.   Paraspinal  muscle was  from the attachment to the interspinous ligament  and lamina and facet and self-retaining retractor was used to keep the paraspinal muscle retracted. After hemostasis had been secured, the  reference frame of O-arm was attached to the spinous process of L5 and  the O-arm was spun, thus reformatting the image of lumbar spine and also  calibrating the probe of O-arm which was then used to define the various  structures and for navigation purposes. Starting at the level of L5 on the right side with the probe, the  pedicle was defined and using drill and a guide, a hole was drilled  through the pedicle of L5 into body of L5. Then, flexible probe was  used to make sure that there was no rent in the wall of the pedicle. Then, tap was used to make room for the placement of a pedicular screw. Flexible probe was again used to make sure that there was no break in  the wall of the pedicle. Then, a 6.5 x 35 mm pedicular screw from  [employer] was placed through the pedicle of L5 into the body of L5 on the  right side. In a similar fashion, a 6.5 x 35 mm pedicular screw was  placed through the pedicle of L5 into body of L5 on the left side. Placement of these screws was satisfactory. Moving up to the level of L4 in a similar fashion using the same  technique, that is the probe followed by the drill and the guide,  followed by the flexible probe, followed by the tap, followed by the  flexible probe, followed by the placement of pedicular screw, a 6.5 x 45  mm pedicular screw was placed through the pedicle of L4 into the body of  L4 on the right side and a 6.5 x 45 mm pedicular screw was placed  through the pedicle of L4 on the left side. Placement of these screws  was satisfactory. The O-arm was spun again thus reformatting the image  of the lumbar spine and to confirm the placement of the screws. It was  noted that the screws were placed satisfactorily. The O-arm was moved away from the field and a C-arm was brought into the  field. Also, the reference frame of O-arm was removed from the spinous  process of L5. Then, using Leksell rongeur and Kerrison rongeur, the spinous process  and the lamina of L4 and L5 were removed, thus decompressing the canal  at this level. The partial facetectomy was also performed particularly  on the left side. Then, ligamentum flavum had been removed from this  window in the bone. As the nerve root and dura were retracted away from  the level of L4-L5 on the left side, it was noted that a large disk  herniation was noted more so towards the midline. The disk was felt  partially calcified. Disk space was entered into by incising the  posterior longitudinal ligament. Then, a #10 scraper from [employer] was  placed inside the disk space L4-L5, thus scraping the disk and the  endplate diagonally under the fluoroscopic guidance. Then, the disk and  the endplates were removed, thus making room for placement of a cage. The disk space itself was then filled with the patient's morselized  bone. A RISE cage from [employer] which expanded from 7 to 13 mm which was  filled with the patient's morselized bone was placed inside the disk  space L4-L5 from the left side diagonally. It was placed under  fluoroscopic guidance and was countersunk by 4 mm. Placement of this  cage was satisfactory. Then, the nerve roots L4 and L5 were tested for their conduction  velocities and were decompressed. The conduction velocity of L5 nerve  root was somewhat compromised while the L4-L5 root was satisfactory. The nerve roots were further decompressed. The decompression of L4-L5  root on the right side was also performed and was found to be  satisfactory. A piece of Gelfoam was used to cover the area of  decompressive laminectomy. Then, two rods measuring 35 mm each were placed inside the screw heads  and were held in place using cap screws which were tightened with torque  wrenches. The construct on both sides was satisfactory. The incision area was irrigated with solution.   Prior to placing the Gelfoam over the decompressive area of lumbar laminectomy, there was a  question if there was a tiny rent in the dura as no CSF was noted. A  4-0 Surgilon suture stitch was placed in the dura rent at this place and  subsequently, DuraSeal was placed, squirted over the dura followed by  the Gelfoam.  Vancomycin powder was sprinkled over the area of surgical  exposure. Then, self-retaining retractors were removed. The paraspinal  muscles were let fall into their normal anatomical position. They were  approximated with 2-0 Surgilon suture. The lumbosacral fascia was  closed with 2-0 Surgilon sutures. Again, vancomycin powder was  sprinkled over the lumbosacral fascia closure. The deeper subcutaneous  tissue with fatty layer was closed with 2-0 Surgilon sutures and  superficial subcutaneous tissue was closed with 3-0 Surgilon sutures  followed by subcuticular closure using 3-0 and 4-0 Vicryl suture. Dry dressing was applied over the incision area. Moving through the  thoracic level, the level of T2-T3 was identified and an incision was  made over this level and was taken down through the skin and  subcutaneous tissue. This midline incision had to be extended to the  level of T4 as the confirmation of the level was not definitely obtained  from the radiologist.    The fatty layer was  from the thoracic fascia and the fascia  was incised on either side of the spinous process of T2, T3, T4. Paraspinal muscles were  from their attachment to the  interspinous ligament, lamina, and the facet. Self-retaining retractors  were used to keep them retracted thus exposing the spinous process and  the lamina of T2, T3, and T4. Using [employer] and [employer]ison  rongeur, the spinous process and lamina of T2, T3, and T4 were removed  along with the lamina. The partial facetectomy extended more so on the  left side.   The ligamentum flavum was removed from this window in the bone.  After hemostasis had been secured, a piece of Gelfoam was used to  cover the area of thoracic laminectomy. As hemostasis was pretty  satisfactory, the self-retaining retractors were removed after  vancomycin powder had been sprinkled into the area of surgical exposure. Paraspinal muscles were approximated with 2-0 Surgilon sutures. Lumbosacral fascia was closed with 2-0 Surgilon sutures. Deeper  subcutaneous tissue was closed with 2-0 Surgilon suture and superficial  with 3-0 Surgilon sutures followed by subcuticular closure using 3-0 and  4-0 Vicryl suture. Dry dressing was applied over the incision area and  the patient sent to recovery room in satisfactory state. The patient  tolerated the procedure well. During the entire procedure, EMG and SSEP were monitored closely. Also,  Anesthesia was advised about keeping the blood pressure above 120. Cell Saver was used during the procedure and the estimated blood loss  was 700 mL from surgical procedure at both sides. The preparation of  morselized bone was under the guidance of the operating neurosurgeon. The use of O-arm and C-arm was under the guidance of the operating  neurosurgeon.         Camilo Trammell MD    D: 12/25/2020 16:57:28       T: 12/25/2020 22:58:56     [id number]  Job#: [id number]     Doc#: [id number]    CC:

## 2020-12-27 ENCOUNTER — APPOINTMENT (OUTPATIENT)
Dept: GENERAL RADIOLOGY | Age: 65
DRG: 459 | End: 2020-12-27
Attending: FAMILY MEDICINE
Payer: MEDICAID

## 2020-12-27 LAB
ANION GAP SERPL CALCULATED.3IONS-SCNC: 12 MMOL/L (ref 7–16)
BUN BLDV-MCNC: 14 MG/DL (ref 8–23)
CALCIUM SERPL-MCNC: 9.3 MG/DL (ref 8.6–10.2)
CHLORIDE BLD-SCNC: 97 MMOL/L (ref 98–107)
CO2: 24 MMOL/L (ref 22–29)
CREAT SERPL-MCNC: 0.6 MG/DL (ref 0.5–1)
GFR AFRICAN AMERICAN: >60
GFR NON-AFRICAN AMERICAN: >60 ML/MIN/1.73
GLUCOSE BLD-MCNC: 160 MG/DL (ref 74–99)
HCT VFR BLD CALC: 30.4 % (ref 34–48)
HEMOGLOBIN: 10.5 G/DL (ref 11.5–15.5)
MCH RBC QN AUTO: 30.1 PG (ref 26–35)
MCHC RBC AUTO-ENTMCNC: 34.5 % (ref 32–34.5)
MCV RBC AUTO: 87.1 FL (ref 80–99.9)
METER GLUCOSE: 133 MG/DL (ref 74–99)
METER GLUCOSE: 157 MG/DL (ref 74–99)
METER GLUCOSE: 173 MG/DL (ref 74–99)
METER GLUCOSE: 184 MG/DL (ref 74–99)
PDW BLD-RTO: 13.6 FL (ref 11.5–15)
PLATELET # BLD: 148 E9/L (ref 130–450)
PMV BLD AUTO: 10.1 FL (ref 7–12)
POTASSIUM SERPL-SCNC: 4 MMOL/L (ref 3.5–5)
RBC # BLD: 3.49 E12/L (ref 3.5–5.5)
SODIUM BLD-SCNC: 133 MMOL/L (ref 132–146)
WBC # BLD: 16.7 E9/L (ref 4.5–11.5)

## 2020-12-27 PROCEDURE — 6360000002 HC RX W HCPCS: Performed by: NEUROLOGICAL SURGERY

## 2020-12-27 PROCEDURE — 97535 SELF CARE MNGMENT TRAINING: CPT

## 2020-12-27 PROCEDURE — 71045 X-RAY EXAM CHEST 1 VIEW: CPT

## 2020-12-27 PROCEDURE — 6370000000 HC RX 637 (ALT 250 FOR IP): Performed by: INTERNAL MEDICINE

## 2020-12-27 PROCEDURE — 6370000000 HC RX 637 (ALT 250 FOR IP): Performed by: NEUROLOGICAL SURGERY

## 2020-12-27 PROCEDURE — 80048 BASIC METABOLIC PNL TOTAL CA: CPT

## 2020-12-27 PROCEDURE — 97166 OT EVAL MOD COMPLEX 45 MIN: CPT

## 2020-12-27 PROCEDURE — 36415 COLL VENOUS BLD VENIPUNCTURE: CPT

## 2020-12-27 PROCEDURE — 82962 GLUCOSE BLOOD TEST: CPT

## 2020-12-27 PROCEDURE — 97162 PT EVAL MOD COMPLEX 30 MIN: CPT

## 2020-12-27 PROCEDURE — 85027 COMPLETE CBC AUTOMATED: CPT

## 2020-12-27 PROCEDURE — 2060000000 HC ICU INTERMEDIATE R&B

## 2020-12-27 PROCEDURE — 2700000000 HC OXYGEN THERAPY PER DAY

## 2020-12-27 PROCEDURE — 94640 AIRWAY INHALATION TREATMENT: CPT

## 2020-12-27 PROCEDURE — 2580000003 HC RX 258: Performed by: NEUROLOGICAL SURGERY

## 2020-12-27 PROCEDURE — 6370000000 HC RX 637 (ALT 250 FOR IP): Performed by: FAMILY MEDICINE

## 2020-12-27 PROCEDURE — 97530 THERAPEUTIC ACTIVITIES: CPT

## 2020-12-27 RX ADMIN — INSULIN LISPRO 2 UNITS: 100 INJECTION, SOLUTION INTRAVENOUS; SUBCUTANEOUS at 10:29

## 2020-12-27 RX ADMIN — IPRATROPIUM BROMIDE AND ALBUTEROL SULFATE 1 AMPULE: .5; 3 SOLUTION RESPIRATORY (INHALATION) at 20:48

## 2020-12-27 RX ADMIN — CEFTRIAXONE SODIUM 1 G: 1 INJECTION, POWDER, FOR SOLUTION INTRAMUSCULAR; INTRAVENOUS at 12:05

## 2020-12-27 RX ADMIN — IPRATROPIUM BROMIDE AND ALBUTEROL SULFATE 1 AMPULE: .5; 3 SOLUTION RESPIRATORY (INHALATION) at 11:07

## 2020-12-27 RX ADMIN — INSULIN LISPRO 2 UNITS: 100 INJECTION, SOLUTION INTRAVENOUS; SUBCUTANEOUS at 12:06

## 2020-12-27 RX ADMIN — DOXYCYCLINE HYCLATE 100 MG: 100 CAPSULE ORAL at 10:25

## 2020-12-27 RX ADMIN — INSULIN LISPRO 2 UNITS: 100 INJECTION, SOLUTION INTRAVENOUS; SUBCUTANEOUS at 20:20

## 2020-12-27 RX ADMIN — ONDANSETRON 4 MG: 2 INJECTION INTRAMUSCULAR; INTRAVENOUS at 20:15

## 2020-12-27 RX ADMIN — ALOGLIPTIN 12.5 MG: 12.5 TABLET, FILM COATED ORAL at 10:25

## 2020-12-27 RX ADMIN — ALLOPURINOL 300 MG: 300 TABLET ORAL at 10:26

## 2020-12-27 RX ADMIN — DOXYCYCLINE HYCLATE 100 MG: 100 CAPSULE ORAL at 20:15

## 2020-12-27 RX ADMIN — ONDANSETRON 4 MG: 2 INJECTION INTRAMUSCULAR; INTRAVENOUS at 08:27

## 2020-12-27 RX ADMIN — OXYCODONE AND ACETAMINOPHEN 2 TABLET: 5; 325 TABLET ORAL at 10:25

## 2020-12-27 RX ADMIN — Medication 2000 UNITS: at 10:25

## 2020-12-27 RX ADMIN — OXYCODONE AND ACETAMINOPHEN 2 TABLET: 5; 325 TABLET ORAL at 18:12

## 2020-12-27 RX ADMIN — CYCLOBENZAPRINE 10 MG: 10 TABLET, FILM COATED ORAL at 00:31

## 2020-12-27 RX ADMIN — IPRATROPIUM BROMIDE AND ALBUTEROL SULFATE 1 AMPULE: .5; 3 SOLUTION RESPIRATORY (INHALATION) at 16:32

## 2020-12-27 RX ADMIN — OXYCODONE AND ACETAMINOPHEN 2 TABLET: 5; 325 TABLET ORAL at 04:27

## 2020-12-27 RX ADMIN — Medication 10 ML: at 10:29

## 2020-12-27 RX ADMIN — ATENOLOL 50 MG: 50 TABLET ORAL at 10:25

## 2020-12-27 RX ADMIN — ONDANSETRON 4 MG: 2 INJECTION INTRAMUSCULAR; INTRAVENOUS at 01:27

## 2020-12-27 RX ADMIN — CYCLOBENZAPRINE 10 MG: 10 TABLET, FILM COATED ORAL at 08:27

## 2020-12-27 RX ADMIN — DOCUSATE SODIUM 50 MG AND SENNOSIDES 8.6 MG 2 TABLET: 8.6; 5 TABLET, FILM COATED ORAL at 10:25

## 2020-12-27 RX ADMIN — ATORVASTATIN CALCIUM 10 MG: 10 TABLET, FILM COATED ORAL at 20:15

## 2020-12-27 RX ADMIN — Medication 10 ML: at 20:15

## 2020-12-27 RX ADMIN — OXYCODONE AND ACETAMINOPHEN 2 TABLET: 5; 325 TABLET ORAL at 00:31

## 2020-12-27 ASSESSMENT — PAIN SCALES - GENERAL
PAINLEVEL_OUTOF10: 9
PAINLEVEL_OUTOF10: 7
PAINLEVEL_OUTOF10: 9

## 2020-12-27 NOTE — PROGRESS NOTES
OCCUPATIONAL THERAPY INITIAL EVALUATION      Date:2020  Patient Name: Ludy Stoddard  MRN: [id number]  : 1955  Room: [facility]    Evaluating OT: DAPHNIE Munoz, OTR/L [id number]    AM-PAC Daily Activity Raw Score:   Recommended Adaptive Equipment: TBD     Diagnosis: spinal stenosis  Referring Practitioner: Juan Bloom DO  Surgery:  L4-L5 PLIF, T2-T3 decompressive laminectomy   Pertinent Medical History: obesity, HLD, HTN, DM CA, fibromyalgia   Precautions:  Falls, spinal, soft TLSO, O2     Home Living: Pt lives with significant other in a handicap accessible 1 story home; bed/bath on main level   Bathroom setup: tub/shower unit   Equipment owned: none  Prior Level of Function: assist with ADLs/IADLs; using rollator for functional mobility   Driving: no  Occupation: on disability    Pain Level: 12/10  Cognition: A&O: 4/4; Follows multi step directions   Memory:  good    Sequencing:  good    Problem solving:  fair    Judgement/safety:  fair     Functional Assessment:   Initial Eval Status  Date: 20 Treatment Status  Date: Short Term Goals  Treatment frequency: 4-5 x/week   Feeding SUP   IND   Grooming SUP (completed facial washing, oral hygiene, and hair combing bed level)   IND   UB Dressing Max A (assist to clive gown, did not clive TLSO due to declining OOB activity)   Mod A   LB Dressing Dep (assist with socks)  Mod A    Bathing Max A (simulated)  Mod A    Toileting Max A (assist with rolling and myra hygiene)  Mod A   Bed Mobility  Log roll: NT (pt refused OOB activity)  Supine to sit: NT   Sit to supine: NT   Log roll Min A  Supine to sit: Min A   Sit to supine: Min A   Functional Transfers Sit to stand:NT   Stand to sit:NT  Commode: NT  Min A   Functional Mobility NT  Min A   Balance Sitting: NT  Standing: NT     Activity Tolerance Fair- (SpO2 >93% during session, completed ADLs bed level)     Visual/  Perceptual Glasses: for reading              Hand dominance: R  UE ROM: RUE: grossly WFL  LUE: grossly WFL  Strength: RUE: grossly observed 3+/5 LUE: grossly observed 3+/5   Strength: B WFL  Fine Motor Coordination:  WFL     Hearing: WFL  Sensation:  No c/o numbness/tingling   Tone:  WFL  Edema: BLEs                            Comments:Cleared by RN to see pt. Upon arrival, patient supine in bed and agreeable to OT session. At end of session, patient supine in bed with call light and phone within reach, all lines and tubes intact. Pt would benefit from continued OT to increase functional independence and quality of life. Treatment: Pt required vc's for proper technique/safety with hand placement/body mechanics/posture for bed mobility/ADLs. Despite max encouragement for OOB activity, pt declined due to pain. Max x 2 to position pt to [facility]. Completed ADLs bed level as a result. Pt educated on spinal precautions. Pt required increased time to complete ADLs/functional transfers due to pain and SpO2 monitoring. Pt required skilled monitoring of SpO2 during session and education on energy conservation techniques. Pt required rest breaks during session and educated on pursed lip breathing. Pt appeared to have tolerated session well and appears cooperative/pleasant . Pt instructed on use of call light for assistance and fall prevention. Pt demo'ing fair understanding of education provided. Continue to educate. Eval Complexity: moderate  · History: Expanded chart review of medical records and additional review of physical, cognitive, or psychosocial history related to current functional performance  · Exam: 3+ performance deficits  · Assistance/Modification: Min/mod assistance or modifications required to perform tasks. May have comorbidities that affect occupational performance.     Assessment of current deficits   Functional mobility [x]  ADLs [x] Strength [x]  Cognition [x]  Functional transfers  [x] IADLs [x] Safety Awareness [x]  Endurance [x]  Fine Motor Coordination [] Balance Time In: 8484        Treatment Time Out: 7045           Treatment Charges: Mins Units   Ther Ex  73736       Manual Therapy 49717       Thera Activities 22236       ADL/Home Mgt [id number] 15 1   Neuro Re-ed [id number]       Group Therapy        Orthotic manage/training  11377       Non-Billable Time       Total Timed Treatment [address] [place], [place], OTR/L [id number]

## 2020-12-27 NOTE — PROGRESS NOTES
Hospitalist Progress Note      SYNOPSIS: Patient admitted on 2020 presented to [facility] with past medical history of DJD of the spine, chronic back pain, osteoarthritis of multiple joints, hypertension, fibromyalgia, upper lipidemia, colon cancer status post colectomy 3 years ago, did not require any chemotherapy or radiation, sacroiliitis, diabetes and vitamin D deficiency. Patient started having numbness involving the left leg yesterday. Progressed from the waist area down to the foot. She is now beginning to have similar sensation on the right side. This is constant, moderate to severe in intensity on the left and associated with bilateral lower extremity weakness that is worse on the left than the right. Patient was walking with a walker in the past week and now requires a wheelchair. Today she fell because she could not get in or out of bed. Complaining of low back pain as well which is more towards the left side, pain is dull, constant, radiate down the left leg and is worse with movement. She has not been able to urinate as much and has not had a bowel movement for a week. She is now beginning to experience \"shocklike\" painful sensation in the xiphisternal area that radiates into the back, numbness in the abdomen. No cough, chest pain or shortness of breath. No fever.     Vital signs notable for blood pressure of 149/71. Labs showed normal CBC, glucose of 151 otherwise normal chemistry. MRI of the lumbar spine shows severe spinal stenosis at L4-L5. Underwent L4-L5  POSTERIOR LUMBAR INTERBODY FUSION, T2-T3 DECOMPRESSIVE LAMINECTOMY     SUBJECTIVE:  Stable overnight. No other overnight issues reported. Patient seen and examined  Records reviewed.    Underwent surgery yesterday L4-L5  POSTERIOR LUMBAR INTERBODY FUSION, T2-T3 DECOMPRESSIVE LAMINECTOMY  Patient complains of post surgical pain  Needs to increase mobility  Needs rehab  Complaining of cough      Temp (24hrs), Av.3 °F no ulcers  Musculoskeletal: No clubbing, cyanosis, no bilateral lower extremity edema. Brisk capillary refill. Skin:  No rashes  on visible skin  Neurologic: awake, alert and following commands     ASSESSMENT and PLAN:  · Acute exacerbation of chronic back pain with radicular symptoms secondary to severe spinal stenosis at L4/L5 S/p L4/L5 posteriod lumbar fusion 12/24-  Neurosurgery following. PTOT. Needs rehab  · Weakness of both legs secondary to above-  Management as above. · Paresthesia from upper abdomen down to the foot s/p T2-T3 decompression laminectomy-  MRI thoracic spine showing multilevel central canal stenosis with multiple disc protrusions, some which cause slight impression on the thoracic spinal cord. · Cervical spine C3-C5 signal abnormality- infectious, inflammatory, neoplastic, or artifactual.   · Cervical spine stenosis  · Inability to void- had Tolliver catheterization in the ER, lots of urine drained. · Constipation- bowel regimen. · Ambulatory dysfunction- physical therapy as tolerated  · Type 2 diabetes- sliding scale coverage, monitor blood sugar. · CAP- Rocephin and doxy. Repeat CXR  · Acute hypoxic resp failure due to above   · Fibromyalgia with chronic pain- continue home medication  · Morbid obesity- dietary and lifestyle modification. · Leukocytosis- likely reactive, afebrile. Continue to trend    Have been awaiting PT evals  Likely needs rehab  Consider ARU  Repeat CXR      DISPOSITION: Continue current plan of care.       Medications:  REVIEWED DAILY    Infusion Medications    dextrose       Scheduled Medications    ipratropium-albuterol  1 ampule Inhalation Q4H WA    sennosides-docusate sodium  2 tablet Oral Daily    cefTRIAXone (ROCEPHIN) IV  1 g Intravenous Q24H    doxycycline hyclate  100 mg Oral 2 times per day    polyethylene glycol  17 g Oral Daily    allopurinol  300 mg Oral Daily    atenolol  50 mg Oral Daily    atorvastatin  10 mg Oral Nightly    vitamin D 2,000 Units Oral Daily    pantoprazole  20 mg Oral QAM AC    alogliptin  12.5 mg Oral Daily    insulin lispro  0-10 Units Subcutaneous 4x Daily AC & HS    sodium chloride flush  10 mL Intravenous 2 times per day     PRN Meds: oxyCODONE-acetaminophen **OR** oxyCODONE-acetaminophen, morphine, amitriptyline, glucose, dextrose, glucagon (rDNA), dextrose, sodium chloride flush, promethazine **OR** ondansetron, acetaminophen **OR** acetaminophen, cyclobenzaprine    Labs:     Recent Labs     12/25/20  0452 12/26/20  0623 12/27/20  0609   WBC  --  19.1* 16.7*   HGB 11.0* 11.3* 10.5*   HCT 32.8* 33.3* 30.4*   PLT  --  160 148       Recent Labs     12/25/20  1115 12/26/20  0623 12/27/20  0609    134 133   K 4.2 4.1 4.0   CL 98 97* 97*   CO2 28 25 24   BUN 17 12 14   CREATININE 0.7 0.6 0.6   CALCIUM 9.2 9.4 9.3       No results for input(s): PROT, ALB, ALKPHOS, ALT, AST, BILITOT, AMYLASE, LIPASE in the last 72 hours. No results for input(s): INR in the last 72 hours. No results for input(s): Francies Gathers in the last 72 hours. Chronic labs:    Lab Results   Component Value Date    CHOL 155 12/24/2020    TRIG 340 (H) 12/24/2020    HDL 40 12/24/2020    LDLCALC 47 12/24/2020    TSH 4.180 03/18/2020    LABA1C 6.9 (H) 12/21/2020       Radiology: REVIEWED DAILY    +++++++++++++++++++++++++++++++++++++++++++++++++  Guillermina Hunter Physician - 2020 [place], [place]  +++++++++++++++++++++++++++++++++++++++++++++++++  NOTE: This report was transcribed using voice recognition software. Every effort was made to ensure accuracy; however, inadvertent computerized transcription errors may be present.

## 2020-12-27 NOTE — PROGRESS NOTES
PO#3  PROCEDURES:  1.  Posterior lumbar interbody fusion L4-L5 using screws, rods, and  cages. 2.  Decompressive lumbar laminectomy L4-L5. 3.  Decompressive laminectomy T2 to T4. Doing better as far as strength in legs is concerned. Sensations better. More comfortable today. Incision healthy.   Consider rehab placemnent

## 2020-12-27 NOTE — PLAN OF CARE
Problem: Pain:  Goal: Pain level will decrease  Description: Pain level will decrease  Outcome: Met This Shift     Problem: Skin Integrity:  Goal: Will show no infection signs and symptoms  Description: Will show no infection signs and symptoms  Outcome: Met This Shift  Goal: Absence of new skin breakdown  Description: Absence of new skin breakdown  Outcome: Met This Shift     Problem: Daily Care:  Goal: Daily care needs are met  Description: Daily care needs are met  Outcome: Met This Shift

## 2020-12-27 NOTE — PROGRESS NOTES
Physical Therapy  Physical Therapy Initial Assessment     Name: Jacinto Chowdary  : 1955  MRN: [id number]    Referring Provider: Lucio Jessica DO    Date of Service: 2020    Evaluating PT: Karis Self, PT, DPT, [id number]    Room #: 0371/0629-Z  Diagnosis: Spinal stenosis  PMHx/PSHx: HTN, fibromyalgia, chronic back pain, OA, obesity, HLD, colon CA, DM  Procedure/Surgery: L4-L5 PLIF, T2-T3 decompressive laminectomy ()  Precautions: Fall risk, spinal neutrality, soft TLSO, O2, mcclain, alarm    SUBJECTIVE:    Pt lives with significant other in a single story house with 2 stair(s) and 1 rail(s) to enter. Bed is on first floor and bath is on first floor. Pt ambulated without AD prior to admission. Pt used rollator walker for 1 day due to inability to ambulate prior to being admitted to hospital.    OBJECTIVE:   Initial Evaluation  Date: 20 Treatment Date: Short Term/ Long Term   Goals   AM-PAC 6 Clicks 8/00     Was pt agreeable to Eval/treatment? Yes     Does pt have pain? 9/10 back and L LE pain; recently medicated     Bed Mobility  Rolling: Mod A  Supine to sit: Max A  Sit to supine: Max A x2  Scooting: Max A to EOB  Rolling: Min A  Supine to sit: Min A  Sit to supine: Min A  Scooting: Min A   Transfers Sit to stand: Max A (partial stand)  Stand to sit: Max A  Stand pivot: NT  Sit to stand: Min A  Stand to sit: Min A  Stand pivot: Min A with [employer]   Ambulation   NT  >25 feet with [employer] with Min A   Stair negotiation: ascended and descended NT  2 step(s) with 1 rail(s) with Mod A   ROM BUE: Refer to OT note  BLE: WFL     Strength BUE: Refer to OT note  BLE: NT     Balance Sitting EOB: Min A to SBA  Dynamic Standing: NT  Sitting EOB: Supervision  Dynamic Standing: Min A with [employer]     Pt is A & O x: 4 to person, place, month/year, and situation. Sensation: Pt L LE numbness and R LE tingling. Edema: Unremarkable. Patient education  Pt educated on PT role in acute care setting.     Patient response to education:   Pt verbalized understanding Pt demonstrated skill Pt requires further education in this area   Yes NA No     ASSESSMENT:    Comments:   Pt was supine in bed upon room entry, agreeable to PT evaluation. Pt rolled to L and R as she was assisted with donning of soft TLSO brace. Pt required assistance for trunk mobility and B LE during supine to sit transfer. Pt complained of pain initially once sitting but this improved once she was scooted to EOB. Pt tends to have posterior lean when sitting and static sitting balance ranged from Min A to SBA. Pt sat at EOB for 10+ minutes. Pt attempted x2 sit to stand transfers but was only able to partially clear buttocks. Pt fatigued easily while sitting EOB and complained of SOB. O2 sat was 97% on 4 L O2/min. Pt was assisted back to supine position and scooted to [facility]. Pt requested to have TLSO brace removed. PT encouraged pt to keep brace donned for lunch. Pt was left in bed with HOB elevated and brace donned at conclusion of session. Charge nurse aware of pt's performance. Treatment:  Patient practiced and was instructed in the following treatment:     Therapeutic activities: Pt completed all therapeutic activities noted above. Pt was educated on spinal neutrality and TLSO brace. Pt was assisted with skilled donning of TLSO brace. Pt was cued for technique during supine to sit transfer. Pt sat at EOB for 10+ minutes as sitting balance, core strength, and endurance were challenged. Pt was cued for hand placement during sit <> stand transfers. Pt completed x2 partial sit <> stand transfers. Vitals and symptoms were skillfully monitored throughout session. Pt's/family goals:   1. To return to PLOF. Patient and or family understand(s) diagnosis, prognosis, and plan of care. Yes.     PLAN:    Current Treatment Recommendations   [x] Strengthening     [x] ROM   [x] Balance Training   [x] Endurance Training   [x] Transfer Training   [x] Gait Training   [x] Stair Training   [x] Positioning   [x] Safety and Education Training   [x] Patient/Caregiver Education   [x] HEP  [] Other     PT care will be provided in accordance with the objectives noted above. Exercises and functional mobility practice will be used as well as appropriate assistive devices or modalities to obtain goals. Patient and family education will also be administered as needed. Frequency of treatments: Daily x 5-7 days. Time in: 1115  Time out: 1145    Total Treatment Time 25 minutes     Evaluation Time includes thorough review of current medical information, gathering information on past medical history/social history and prior level of function, completion of standardized testing/informal observation of tasks, assessment of data and education on plan of care and goals.     CPT codes:  [] Low Complexity PT evaluation 37817  [x] Moderate Complexity PT evaluation 44333  [] High Complexity PT evaluation 94667  [] PT Re-evaluation 83373  [] Gait training 10979 0 minutes  [] Manual therapy 73464 0 minutes  [x] Therapeutic activities 93517 25 minutes  [] Therapeutic exercises 84585 0 minutes  [] Neuromuscular reeducation 34949 0 minutes     Antoinette Joshi, PT, DPT  [id number]

## 2020-12-28 LAB
ANION GAP SERPL CALCULATED.3IONS-SCNC: 9 MMOL/L (ref 7–16)
BUN BLDV-MCNC: 15 MG/DL (ref 8–23)
CALCIUM SERPL-MCNC: 9.3 MG/DL (ref 8.6–10.2)
CHLORIDE BLD-SCNC: 94 MMOL/L (ref 98–107)
CO2: 27 MMOL/L (ref 22–29)
CREAT SERPL-MCNC: 0.5 MG/DL (ref 0.5–1)
GFR AFRICAN AMERICAN: >60
GFR NON-AFRICAN AMERICAN: >60 ML/MIN/1.73
GLUCOSE BLD-MCNC: 146 MG/DL (ref 74–99)
HCT VFR BLD CALC: 31.7 % (ref 34–48)
HEMOGLOBIN: 10.6 G/DL (ref 11.5–15.5)
MCH RBC QN AUTO: 29.4 PG (ref 26–35)
MCHC RBC AUTO-ENTMCNC: 33.4 % (ref 32–34.5)
MCV RBC AUTO: 87.8 FL (ref 80–99.9)
METER GLUCOSE: 153 MG/DL (ref 74–99)
METER GLUCOSE: 160 MG/DL (ref 74–99)
METER GLUCOSE: 165 MG/DL (ref 74–99)
METER GLUCOSE: 181 MG/DL (ref 74–99)
PDW BLD-RTO: 13.7 FL (ref 11.5–15)
PLATELET # BLD: 196 E9/L (ref 130–450)
PMV BLD AUTO: 10.2 FL (ref 7–12)
POTASSIUM SERPL-SCNC: 3.9 MMOL/L (ref 3.5–5)
RBC # BLD: 3.61 E12/L (ref 3.5–5.5)
SARS-COV-2, NAAT: NOT DETECTED
SODIUM BLD-SCNC: 130 MMOL/L (ref 132–146)
WBC # BLD: 13.8 E9/L (ref 4.5–11.5)

## 2020-12-28 PROCEDURE — 6370000000 HC RX 637 (ALT 250 FOR IP): Performed by: INTERNAL MEDICINE

## 2020-12-28 PROCEDURE — 85027 COMPLETE CBC AUTOMATED: CPT

## 2020-12-28 PROCEDURE — 6370000000 HC RX 637 (ALT 250 FOR IP): Performed by: NEUROLOGICAL SURGERY

## 2020-12-28 PROCEDURE — U0002 COVID-19 LAB TEST NON-CDC: HCPCS

## 2020-12-28 PROCEDURE — 6370000000 HC RX 637 (ALT 250 FOR IP): Performed by: FAMILY MEDICINE

## 2020-12-28 PROCEDURE — 82962 GLUCOSE BLOOD TEST: CPT

## 2020-12-28 PROCEDURE — 6360000002 HC RX W HCPCS: Performed by: NEUROLOGICAL SURGERY

## 2020-12-28 PROCEDURE — 97110 THERAPEUTIC EXERCISES: CPT

## 2020-12-28 PROCEDURE — 36415 COLL VENOUS BLD VENIPUNCTURE: CPT

## 2020-12-28 PROCEDURE — 2580000003 HC RX 258: Performed by: NEUROLOGICAL SURGERY

## 2020-12-28 PROCEDURE — 94640 AIRWAY INHALATION TREATMENT: CPT

## 2020-12-28 PROCEDURE — L0464 TLSO 4MOD SACRO-SCAP PRE: HCPCS

## 2020-12-28 PROCEDURE — 80048 BASIC METABOLIC PNL TOTAL CA: CPT

## 2020-12-28 PROCEDURE — 2580000003 HC RX 258: Performed by: INTERNAL MEDICINE

## 2020-12-28 PROCEDURE — 97530 THERAPEUTIC ACTIVITIES: CPT

## 2020-12-28 PROCEDURE — 1200000000 HC SEMI PRIVATE

## 2020-12-28 PROCEDURE — 2700000000 HC OXYGEN THERAPY PER DAY

## 2020-12-28 PROCEDURE — 97535 SELF CARE MNGMENT TRAINING: CPT

## 2020-12-28 RX ORDER — SODIUM CHLORIDE 9 MG/ML
INJECTION, SOLUTION INTRAVENOUS CONTINUOUS
Status: ACTIVE | OUTPATIENT
Start: 2020-12-28 | End: 2020-12-28

## 2020-12-28 RX ADMIN — MORPHINE SULFATE 2 MG: 2 INJECTION, SOLUTION INTRAMUSCULAR; INTRAVENOUS at 22:09

## 2020-12-28 RX ADMIN — ALLOPURINOL 300 MG: 300 TABLET ORAL at 09:53

## 2020-12-28 RX ADMIN — IPRATROPIUM BROMIDE AND ALBUTEROL SULFATE 1 AMPULE: .5; 3 SOLUTION RESPIRATORY (INHALATION) at 12:17

## 2020-12-28 RX ADMIN — Medication 10 ML: at 22:10

## 2020-12-28 RX ADMIN — ALOGLIPTIN 12.5 MG: 12.5 TABLET, FILM COATED ORAL at 09:53

## 2020-12-28 RX ADMIN — OXYCODONE AND ACETAMINOPHEN 2 TABLET: 5; 325 TABLET ORAL at 09:53

## 2020-12-28 RX ADMIN — DOXYCYCLINE HYCLATE 100 MG: 100 CAPSULE ORAL at 22:09

## 2020-12-28 RX ADMIN — IPRATROPIUM BROMIDE AND ALBUTEROL SULFATE 1 AMPULE: .5; 3 SOLUTION RESPIRATORY (INHALATION) at 08:29

## 2020-12-28 RX ADMIN — IPRATROPIUM BROMIDE AND ALBUTEROL SULFATE 1 AMPULE: .5; 3 SOLUTION RESPIRATORY (INHALATION) at 21:00

## 2020-12-28 RX ADMIN — PROMETHAZINE HYDROCHLORIDE 12.5 MG: 25 TABLET ORAL at 03:52

## 2020-12-28 RX ADMIN — SODIUM CHLORIDE: 9 INJECTION, SOLUTION INTRAVENOUS at 14:45

## 2020-12-28 RX ADMIN — CYCLOBENZAPRINE 10 MG: 10 TABLET, FILM COATED ORAL at 00:40

## 2020-12-28 RX ADMIN — IPRATROPIUM BROMIDE AND ALBUTEROL SULFATE 1 AMPULE: .5; 3 SOLUTION RESPIRATORY (INHALATION) at 16:11

## 2020-12-28 RX ADMIN — DOXYCYCLINE HYCLATE 100 MG: 100 CAPSULE ORAL at 09:53

## 2020-12-28 RX ADMIN — ACETAMINOPHEN 650 MG: 325 TABLET ORAL at 03:52

## 2020-12-28 RX ADMIN — ATENOLOL 50 MG: 50 TABLET ORAL at 09:53

## 2020-12-28 RX ADMIN — ATORVASTATIN CALCIUM 10 MG: 10 TABLET, FILM COATED ORAL at 22:09

## 2020-12-28 RX ADMIN — INSULIN LISPRO 2 UNITS: 100 INJECTION, SOLUTION INTRAVENOUS; SUBCUTANEOUS at 11:45

## 2020-12-28 RX ADMIN — OXYCODONE AND ACETAMINOPHEN 2 TABLET: 5; 325 TABLET ORAL at 20:04

## 2020-12-28 RX ADMIN — INSULIN LISPRO 2 UNITS: 100 INJECTION, SOLUTION INTRAVENOUS; SUBCUTANEOUS at 22:13

## 2020-12-28 RX ADMIN — Medication 10 ML: at 09:54

## 2020-12-28 RX ADMIN — Medication 2000 UNITS: at 09:53

## 2020-12-28 RX ADMIN — CEFTRIAXONE SODIUM 1 G: 1 INJECTION, POWDER, FOR SOLUTION INTRAMUSCULAR; INTRAVENOUS at 11:27

## 2020-12-28 RX ADMIN — OXYCODONE AND ACETAMINOPHEN 2 TABLET: 5; 325 TABLET ORAL at 15:03

## 2020-12-28 RX ADMIN — OXYCODONE AND ACETAMINOPHEN 2 TABLET: 5; 325 TABLET ORAL at 00:40

## 2020-12-28 ASSESSMENT — PAIN SCALES - GENERAL
PAINLEVEL_OUTOF10: 7
PAINLEVEL_OUTOF10: 9
PAINLEVEL_OUTOF10: 8
PAINLEVEL_OUTOF10: 7
PAINLEVEL_OUTOF10: 7
PAINLEVEL_OUTOF10: 0

## 2020-12-28 NOTE — PROGRESS NOTES
OT BEDSIDE TREATMENT NOTE      Date:2020  Patient Name: Latisha Jaramillo  MRN: [id number]  : 1955  Room: 8505/8505-A     Per OT Eval:    Evaluating OT: DAPHNIE Goins, OTR/L [id number]     AM-PAC Daily Activity Raw Score:   Recommended Adaptive Equipment: TBD      Diagnosis: spinal stenosis  Referring Practitioner: Shant Alaniz DO  Surgery:  L4-L5 PLIF, T2-T3 decompressive laminectomy   Pertinent Medical History: obesity, HLD, HTN, DM CA, fibromyalgia   Precautions:  Falls, spinal, soft TLSO, O2     Home Living: Pt lives with significant other in a handicap accessible 1 story home; bed/bath on main level   Bathroom setup: tub/shower unit   Equipment owned: none  Prior Level of Function: assist with ADLs/IADLs; using rollator for functional mobility   Driving: no  Occupation: on disability     Pain Level: 8/10 low back & L leg with movement, pain medication on board   Cognition: A&O: 3/4; pleasant & cooperative, intermittent confusing stating noted reporting she thinks she sees things but realizes she must be dreaming, fatigued & very soft spoken              Memory: Fair- 1/3 recall of spinal precautions               Sequencing: Fair              Problem solving: Fair               Judgement/safety:  fair-      Functional Assessment:    Initial Eval Status  Date: 20 Treatment Status  Date:  20 Short Term Goals  Treatment frequency: 4-5 x/week   Feeding SUP   Set up  Upright in bed IND   Grooming SUP (completed facial washing, oral hygiene, and hair combing bed level)   SBA  Washing off face, unable to comb hair thoroughly due to knot of hair in back of head  IND   UB Dressing Max A (assist to cilve gown, did not clive TLSO due to declining OOB activity)   Mod-Max A  [place] & donning gown bed level  Max A   donning TLSO brace Mod A   LB Dressing Dep (assist with socks)  Dep  Bed level Mod A    Bathing Max A (simulated) Max A  simulated  Mod A    Toileting Max A (assist with rolling and myra hygiene)  Gee Tolliver present, dep for  myra-care hygiene  Mod A   Bed Mobility  Log roll: NT (pt refused OOB activity)  Supine to sit: NT   Sit to supine: NT  Rolling: Max A   Max A x 2  Supine < > sit   Log roll Min A  Supine to sit: Min A   Sit to supine: Min A   Functional Transfers Sit to stand:NT   Stand to sit:NT  Commode: NT  Max A x 2  Sit < > stand   Completed 4 STS  L LE significant weakness, only trace movement bed level Min A   Functional Mobility NT NT  unable  Min A   Balance Sitting: NT  Standing: NT  Sitting: Min-SBA  Standing: Max x 2  With & without walker      Activity Tolerance Fair- (SpO2 >93% during session, completed ADLs bed level)  Fair/Fair-  Seated at EOB 20 minutes  Fatigued this date  O2 94-98% on 2L     Visual/  Perceptual Glasses: for reading         Education:  Pt was educated through out treatment regarding proper technique & safety with bed mobility, spinal precautions & placement of soft TLSO, sitting tolerance/balance, functional transfers & ADL compensatory strategies to ease tasks to improve safety & prevent falls and allow pt to return home safely. Instructed pt on B UE therapeutic exercises to complete while in bed with Fair+ results to increase strength to ease self care tasks, bed mobility & transfers. Comments: Upon arrival pt was in bed & agreeable for therapy. At end of session pt was returned to bed, brace remained in place & HOB upright, all lines and tubes intact, call light within reach. Recommending pt stay up in bed for an hour if tolerated with brace in place. · Pt has made Fair progress towards set goals.    · Continue with current plan of care    Treatment Time In: 10:30            Treatment Time Out: 11:11              Treatment Charges: Mins Units   Ther Ex  98035 8 1   Manual Therapy 29015     Thera Activities 15367 18 1   ADL/Home Mgt 58146 15 1   Neuro Re-ed 37702     Group Therapy      Orthotic manage/training  35636     Non-Billable Time Total Timed Treatment 41 3       Tatiana MUSE  [address] [place] [address] Drive, [address] [place], [place], OTR/L [id number]

## 2020-12-28 NOTE — PROGRESS NOTES
Met with pt to discuss ARU. She is requesting gto go to [facility]. Social work aware and already sent referral to [facility]. Will not follow.

## 2020-12-28 NOTE — PROGRESS NOTES
PO#4  PROCEDURES:  1.  Posterior lumbar interbody fusion L4-L5 using screws, rods, and  cages. 2.  Decompressive lumbar laminectomy L4-L5. 3.  Decompressive laminectomy T2 to T4. Doing better as far as strength in legs is concerned. Sensations better. More comfortable today. In better spirits  Incision healthy.   Consider rehab placemnent

## 2020-12-28 NOTE — CARE COORDINATION
PM and R consult noted. SW went to speak with patient in her room. She is very lethargic, awakes for conversation but keeps falling asleep during conversation. We talked about rehab and acute vs. [facility]. She is not sure she can tolerate the amount of therapy acute needs. She states she wants to try and go to [facility]. Referral made to [facility], they will need a covid test if able to accept. Dry covid given to charge RN. Await acceptance from [facility] at [facility].

## 2020-12-28 NOTE — PROGRESS NOTES
Comprehensive Nutrition Assessment    Type and Reason for Visit:  Initial, RD Nutrition Re-Screen/LOS    Nutrition Recommendations/Plan: Recommend pt continue current diet w/ addition of ONS BID for wound healing (Ensure HP w/ Vladimir BID). -Note pt hyponatremic 130 mmol/L (12/28). Should pt sodium trend low, suggest fluid restriction. Nutrition Assessment:  Pt w/ pmh colon CA, DM admit for ambulatory dysfunction 2/2 severe spinal stenosis L4/L5. S/P LUMBAR INTERBODY FUSION, DECOMPRESSIVE LAMINECTOMY 12/24. Pt w/ poor po intake during stay. Recs above.     Malnutrition Assessment:  Malnutrition Status:  Insufficient data    Context:  Acute Illness     Findings of the 6 clinical characteristics of malnutrition:  Energy Intake:  7 - 50% or less of estimated energy requirements for 5 or more days  Weight Loss:  No significant weight loss     Body Fat Loss:  No significant body fat loss     Muscle Mass Loss:  Unable to assess    Fluid Accumulation:  1 - Mild Generalized   Strength:  Not Performed    Estimated Daily Nutrient Needs:  Energy (kcal):  MSJ: 1368x1.2=1641; kcal; Weight Used for Energy Requirements:  Current     Protein (g):  85-95 g; Weight Used for Protein Requirements:  Ideal(2-2.2 g/kg IBW)        Fluid (ml/day):  Per clinical judgement; Method Used for Fluid Requirements:         Nutrition Related Findings:  A/O x4, abd wdl, +1 generalized ed, -I/O's, hyponatremic 130 mmol/L (12/28)      Wounds:  Surgical Incision(12/24 Lower back incision)       Current Nutrition Therapies:    DIET GENERAL; Carb Control: 5 carb choices (75 gms)/meal    Anthropometric Measures:  · Height: 4' 11\" (149.9 cm)  · Current Body Weight: 200 lb (90.7 kg)   · Admission Body Weight: (Same as CBW)    · Usual Body Weight: 204 lb (92.5 kg)(10/27 Per EMR)     · Ideal Body Weight: 95 lbs; % Ideal Body Weight 210.5 %   · BMI: 40.4    · BMI Categories: Obese Class 3 (BMI 40.0 or greater)       Nutrition Diagnosis: · Inadequate oral intake related to cognitive or neurological impairment as evidenced by intake 0-25%      Nutrition Interventions:   Food and/or Nutrient Delivery:  Continue Current Diet, Start Oral Nutrition Supplement  Nutrition Education/Counseling:  Education not indicated   Coordination of Nutrition Care:  Continue to monitor while inpatient    Goals:  Pt to consume >50% of all meals/ONS       Nutrition Monitoring and Evaluation:   Food/Nutrient Intake Outcomes:  Food and Nutrient Intake, Supplement Intake  Physical Signs/Symptoms Outcomes:  Biochemical Data, Nutrition Focused Physical Findings, Skin, Weight, GI Status, Fluid Status or Edema, Hemodynamic Status     Discharge Planning:    No discharge needs at this time     Electronically signed by Bren Lin RD, LD on 12/28/20 at 2:53 PM EST    Contact: [phone]

## 2020-12-28 NOTE — PROGRESS NOTES
Hospitalist Progress Note      SYNOPSIS: Patient admitted on 12/20/2020 presented to [facility] with past medical history of DJD of the spine, chronic back pain, osteoarthritis of multiple joints, hypertension, fibromyalgia, upper lipidemia, colon cancer status post colectomy 3 years ago, did not require any chemotherapy or radiation, sacroiliitis, diabetes and vitamin D deficiency. Patient started having numbness involving the left leg yesterday. Progressed from the waist area down to the foot. She is now beginning to have similar sensation on the right side. This is constant, moderate to severe in intensity on the left and associated with bilateral lower extremity weakness that is worse on the left than the right. Patient was walking with a walker in the past week and now requires a wheelchair. Today she fell because she could not get in or out of bed. Complaining of low back pain as well which is more towards the left side, pain is dull, constant, radiate down the left leg and is worse with movement. She has not been able to urinate as much and has not had a bowel movement for a week. She is now beginning to experience \"shocklike\" painful sensation in the xiphisternal area that radiates into the back, numbness in the abdomen. No cough, chest pain or shortness of breath. No fever.     Vital signs notable for blood pressure of 149/71. Labs showed normal CBC, glucose of 151 otherwise normal chemistry. MRI of the lumbar spine shows severe spinal stenosis at L4-L5. Underwent L4-L5  POSTERIOR LUMBAR INTERBODY FUSION, T2-T3 DECOMPRESSIVE LAMINECTOMY 12/24    SUBJECTIVE:  Stable overnight. No other overnight issues reported. Patient seen and examined  Records reviewed.    Underwent surgery yesterday L4-L5  POSTERIOR LUMBAR INTERBODY FUSION, T2-T3 DECOMPRESSIVE LAMINECTOMY  Patient complains of post surgical pain  Needs to increase mobility  Needs rehab  Awaiting rehab placement ARU vs SNF    Temp (24hrs), Av.2 °F (36.8 °C), Min:97.7 °F (36.5 °C), Max:98.5 °F (36.9 °C)    DIET: DIET GENERAL; Carb Control: 5 carb choices (75 gms)/meal  CODE: Full Code    Intake/Output Summary (Last 24 hours) at 2020 1119  Last data filed at 2020 0900  Gross per 24 hour   Intake 360 ml   Output 800 ml   Net -440 ml       Review of Systems  All bolded are positive; please see HPI  General:  Fever, chills, diaphoresis, fatigue, malaise, night sweats, weight loss  Psychological:  Anxiety, disorientation, hallucinations. ENT:  Epistaxis, headaches, vertigo, visual changes. Cardiovascular:  Chest pain, irregular heartbeats, palpitations, paroxysmal nocturnal dyspnea. Respiratory:  Shortness of breath, coughing, sputum production, hemoptysis, wheezing, orthopnea. Gastrointestinal:  Nausea, vomiting, diarrhea, heartburn, constipation, abdominal pain, hematemesis, hematochezia, melena, acholic stools  Genito-Urinary:  Dysuria, urgency, frequency, hematuria  Musculoskeletal:  Joint pain, joint stiffness, joint swelling, muscle pain back pain, leg pain  Neurology:  Headache, focal neurological deficits, weakness, numbness, paresthesia  Derm:  Rashes, ulcers, excoriations, bruising  Extremities:  Decreased ROM, peripheral edema, mottling      OBJECTIVE:    /66   Pulse 86   Temp 97.7 °F (36.5 °C) (Temporal)   Resp 18   Ht 4' 11\" (1.499 m)   Wt 200 lb (90.7 kg)   LMP  (LMP Unknown)   SpO2 97%   BMI 40.40 kg/m²     General appearance:  awake, alert, and oriented to person, place, time, and purpose; appears stated age and cooperative; no apparent distress no labored breathing +mcclain 2L NC  HEENT:  Conjunctivae/corneas clear. Neck: Supple. No jugular venous distention.    Respiratory: symmetrical; clear to auscultation bilaterally; no wheezes; no rhonchi; no rales  Cardiovascular: rhythm regular; rate controlled; no murmurs  Abdomen: Soft, nontender, nondistended  Extremities:  peripheral pulses present; no peripheral edema; no ulcers  Musculoskeletal: No clubbing, cyanosis, no bilateral lower extremity edema. Brisk capillary refill. Skin:  No rashes  on visible skin  Neurologic: awake, alert and following commands     ASSESSMENT and PLAN:  · Acute exacerbation of chronic back pain with radicular symptoms secondary to severe spinal stenosis at L4/L5 S/p L4/L5 posteriod lumbar fusion 12/24-  Neurosurgery following. PTOT. Needs rehab  · Weakness of both legs secondary to above-  Management as above. · Paresthesia from upper abdomen down to the foot s/p T2-T3 decompression laminectomy-  MRI thoracic spine showing multilevel central canal stenosis with multiple disc protrusions, some which cause slight impression on the thoracic spinal cord. · Cervical spine C3-C5 signal abnormality- infectious, inflammatory, neoplastic, or artifactual.   · Cervical spine stenosis  · Inability to void- had Tolliver catheterization in the ER, lots of urine drained. · Constipation- bowel regimen. · Ambulatory dysfunction- physical therapy as tolerated  · Type 2 diabetes- sliding scale coverage, monitor blood sugar. · CAP- Rocephin and doxy can likely be dc soon  · Acute hypoxic resp failure due to above   · Fibromyalgia with chronic pain- continue home medication  · Morbid obesity- dietary and lifestyle modification. · Leukocytosis- likely reactive, afebrile.  Continue to trend    Needs rehab  Consider ARU vs SNF  DC planning    DISPOSITION: DC planning ok for dc if place found      Medications:  REVIEWED DAILY    Infusion Medications    sodium chloride      dextrose       Scheduled Medications    ipratropium-albuterol  1 ampule Inhalation Q4H WA    sennosides-docusate sodium  2 tablet Oral Daily    cefTRIAXone (ROCEPHIN) IV  1 g Intravenous Q24H    doxycycline hyclate  100 mg Oral 2 times per day    polyethylene glycol  17 g Oral Daily    allopurinol  300 mg Oral Daily    atenolol  50 mg Oral Daily    atorvastatin  10 mg Oral Nightly  vitamin D  2,000 Units Oral Daily    pantoprazole  20 mg Oral QAM AC    alogliptin  12.5 mg Oral Daily    insulin lispro  0-10 Units Subcutaneous 4x Daily AC & HS    sodium chloride flush  10 mL Intravenous 2 times per day     PRN Meds: oxyCODONE-acetaminophen **OR** oxyCODONE-acetaminophen, morphine, amitriptyline, glucose, dextrose, glucagon (rDNA), dextrose, sodium chloride flush, promethazine **OR** ondansetron, acetaminophen **OR** acetaminophen, cyclobenzaprine    Labs:     Recent Labs     12/26/20  0623 12/27/20  0609 12/28/20  0504   WBC 19.1* 16.7* 13.8*   HGB 11.3* 10.5* 10.6*   HCT 33.3* 30.4* 31.7*    148 196       Recent Labs     12/26/20  0623 12/27/20  0609 12/28/20  0504    133 130*   K 4.1 4.0 3.9   CL 97* 97* 94*   CO2 25 24 27   BUN 12 14 15   CREATININE 0.6 0.6 0.5   CALCIUM 9.4 9.3 9.3       No results for input(s): PROT, ALB, ALKPHOS, ALT, AST, BILITOT, AMYLASE, LIPASE in the last 72 hours. No results for input(s): INR in the last 72 hours. No results for input(s): Quijano Tika in the last 72 hours. Chronic labs:    Lab Results   Component Value Date    CHOL 155 12/24/2020    TRIG 340 (H) 12/24/2020    HDL 40 12/24/2020    LDLCALC 47 12/24/2020    TSH 4.180 03/18/2020    LABA1C 6.9 (H) 12/21/2020       Radiology: REVIEWED DAILY    +++++++++++++++++++++++++++++++++++++++++++++++++  Cece Mahin Hunter Physician - 2020 [facility], [place]  +++++++++++++++++++++++++++++++++++++++++++++++++  NOTE: This report was transcribed using voice recognition software. Every effort was made to ensure accuracy; however, inadvertent computerized transcription errors may be present.

## 2020-12-28 NOTE — PROGRESS NOTES
Physical Therapy  Physical Therapy Treatment Note     Name: Jayde Orozco  : 1955  MRN: [id number]    Referring Provider: Sunny Lyle DO    Date of Service: 2020    Evaluating PT: Andrew Limon, PT, DPT, [id number]    Room #: 8240/9596-B  Diagnosis: Spinal stenosis  PMHx/PSHx: HTN, fibromyalgia, chronic back pain, OA, obesity, HLD, colon CA, DM  Procedure/Surgery: L4-L5 PLIF, T2-T3 decompressive laminectomy ()  Precautions: Fall risk, spinal neutrality, soft TLSO, O2, mcclain, alarm    SUBJECTIVE:    Pt lives with significant other in a single story house with 2 stair(s) and 1 rail(s) to enter. Bed is on first floor and bath is on first floor. Pt ambulated without AD prior to admission. Pt used rollator walker for 1 day due to inability to ambulate prior to being admitted to hospital.    OBJECTIVE:   Initial Evaluation  Date: 20 Treatment Date: 2020 Short Term/ Long Term   Goals   AM-PAC 6 Clicks     Was pt agreeable to Eval/treatment? Yes Yes     Does pt have pain? 9/10 back and L LE pain; recently medicated Reported 8/10 back and LLE pain. Pain medication given prior. Bed Mobility  Rolling: Mod A  Supine to sit: Max A  Sit to supine: Max A x2  Scooting: Max A to EOB Rolling: Max A  Supine to sit: Max A  Sit to supine: Max A +2  Scooting: Max A to EOB in seated Rolling: Min A  Supine to sit: Min A  Sit to supine: Min A  Scooting: Min A   Transfers Sit to stand: Max A (partial stand)  Stand to sit: Max A  Stand pivot: NT Sit to stand: Max A +2  Stand to sit:  Max A +2  Stand pivot: NT Sit to stand: Min A  Stand to sit: Min A  Stand pivot: Min A with [employer]   Ambulation   NT Unable to advance feet >25 feet with [employer] with Min A   Stair negotiation: ascended and descended NT  2 step(s) with 1 rail(s) with Mod A   ROM BUE: Refer to OT note  BLE: WFL     Strength BUE: Refer to OT note  BLE: NT     Balance Sitting EOB: Min A to SBA  Dynamic Standing: NT  Sitting EOB: Supervision  Dynamic Standing: Min A with [address]     Pt is A & O x 3, but disoriented/mild confusion noted due to medication per patient   Sensation: Reported N/T to BLEs  Edema: Unremarkable. Therapeutic exercise: STS x4, GS, QS 10x, APs 10x, HS 5x    Patient education  Pt educated on spine precautions, purpose of brace and proper wear, importance of mobility, safety with mobility, transfers, exercises    Patient response to education:   Pt verbalized understanding Pt demonstrated skill Pt requires further education in this area   Yes Partially with verbal cues and assist Yes      ASSESSMENT:    Comments:   Patient cleared by RN and found in high Aly's without TLSO in place. Patient able to recite spine precautions with cues/prompts and re-educated on spine precautions and purpose of wearing brace with HOB elevated and OOB activities. SpO2 skillfully monitored and at 92-93% on room air while in supine. Patient rolled side to side several times for gown management, hygiene, and donning TLSO. Patient on room air and SpO2 dropped to 83-86% with rolling and supplemental O2 replaced/donned. Patient with increased pain with flexion of LLE during rolling task. Patient assisted to seated EOB and denied dizziness with positional change, and SpO2 recovered to 98% once seated EOB with O2. Multiple STS transfers performed with patient unable to fully clear on first attempt, but with repetition and assist able to achieve partial erect stand. Lengthy seated rest needed between standing tasks. Patient sat EOB over 20 minutes. Patient with mild to moderate anterior lean while seated and hands on/or in close proximity maintained for safety. Patient with extremely weak BLEs (LLE more than RLE) and unable to advance feet for gait. Patient returned to supine and positioned to the [facility]. Patient instructed in and performed exercise. Brace maintained and HOB elevated to high Aly's. Call light and tray table in reach.     Treatment: Patient practiced and was instructed in the following treatment:     Therapeutic activities: As noted in grid.  Bed Mobility: verbal cues for sequencing/maintaining spine neutrality and log roll for task.  Transfers: Verbal cues and assist with hand placement and proper foot placement during task. PLAN:  Patient is making poor progress toward set goals. Continue with current plan of care.     Time in: 1025  Time out: 1115    Total Treatment Time 45 minutes     CPT codes:  [] Gait training 54984 0 minutes  [] Manual therapy 41270 0 minutes  [x] Therapeutic activities 82368 45 minutes  [] Therapeutic exercises 02507 0 minutes  [] Neuromuscular reeducation [address] 0 minutes     Roshan Dear, PT, DPT  License [id number]

## 2020-12-29 LAB
ANION GAP SERPL CALCULATED.3IONS-SCNC: 11 MMOL/L (ref 7–16)
BUN BLDV-MCNC: 17 MG/DL (ref 8–23)
CALCIUM SERPL-MCNC: 9.3 MG/DL (ref 8.6–10.2)
CHLORIDE BLD-SCNC: 94 MMOL/L (ref 98–107)
CO2: 27 MMOL/L (ref 22–29)
CREAT SERPL-MCNC: 0.6 MG/DL (ref 0.5–1)
GFR AFRICAN AMERICAN: >60
GFR NON-AFRICAN AMERICAN: >60 ML/MIN/1.73
GLUCOSE BLD-MCNC: 130 MG/DL (ref 74–99)
HCT VFR BLD CALC: 31.7 % (ref 34–48)
HEMOGLOBIN: 10.3 G/DL (ref 11.5–15.5)
MCH RBC QN AUTO: 29.1 PG (ref 26–35)
MCHC RBC AUTO-ENTMCNC: 32.5 % (ref 32–34.5)
MCV RBC AUTO: 89.5 FL (ref 80–99.9)
METER GLUCOSE: 134 MG/DL (ref 74–99)
METER GLUCOSE: 138 MG/DL (ref 74–99)
METER GLUCOSE: 159 MG/DL (ref 74–99)
METER GLUCOSE: 180 MG/DL (ref 74–99)
PDW BLD-RTO: 13.7 FL (ref 11.5–15)
PLATELET # BLD: 194 E9/L (ref 130–450)
PMV BLD AUTO: 9.9 FL (ref 7–12)
POTASSIUM SERPL-SCNC: 3.5 MMOL/L (ref 3.5–5)
RBC # BLD: 3.54 E12/L (ref 3.5–5.5)
SODIUM BLD-SCNC: 132 MMOL/L (ref 132–146)
WBC # BLD: 9.8 E9/L (ref 4.5–11.5)

## 2020-12-29 PROCEDURE — 6370000000 HC RX 637 (ALT 250 FOR IP): Performed by: INTERNAL MEDICINE

## 2020-12-29 PROCEDURE — 97110 THERAPEUTIC EXERCISES: CPT

## 2020-12-29 PROCEDURE — 6370000000 HC RX 637 (ALT 250 FOR IP): Performed by: FAMILY MEDICINE

## 2020-12-29 PROCEDURE — 97530 THERAPEUTIC ACTIVITIES: CPT

## 2020-12-29 PROCEDURE — 6360000002 HC RX W HCPCS: Performed by: NEUROLOGICAL SURGERY

## 2020-12-29 PROCEDURE — 2700000000 HC OXYGEN THERAPY PER DAY

## 2020-12-29 PROCEDURE — 82962 GLUCOSE BLOOD TEST: CPT

## 2020-12-29 PROCEDURE — 85027 COMPLETE CBC AUTOMATED: CPT

## 2020-12-29 PROCEDURE — 1200000000 HC SEMI PRIVATE

## 2020-12-29 PROCEDURE — 97535 SELF CARE MNGMENT TRAINING: CPT

## 2020-12-29 PROCEDURE — 2580000003 HC RX 258: Performed by: NEUROLOGICAL SURGERY

## 2020-12-29 PROCEDURE — 6370000000 HC RX 637 (ALT 250 FOR IP): Performed by: NEUROLOGICAL SURGERY

## 2020-12-29 PROCEDURE — 80048 BASIC METABOLIC PNL TOTAL CA: CPT

## 2020-12-29 PROCEDURE — 36415 COLL VENOUS BLD VENIPUNCTURE: CPT

## 2020-12-29 PROCEDURE — 94640 AIRWAY INHALATION TREATMENT: CPT

## 2020-12-29 RX ADMIN — POLYETHYLENE GLYCOL 3350 17 G: 17 POWDER, FOR SOLUTION ORAL at 09:12

## 2020-12-29 RX ADMIN — OXYCODONE AND ACETAMINOPHEN 2 TABLET: 5; 325 TABLET ORAL at 00:33

## 2020-12-29 RX ADMIN — ALLOPURINOL 300 MG: 300 TABLET ORAL at 09:10

## 2020-12-29 RX ADMIN — CEFTRIAXONE SODIUM 1 G: 1 INJECTION, POWDER, FOR SOLUTION INTRAMUSCULAR; INTRAVENOUS at 12:55

## 2020-12-29 RX ADMIN — OXYCODONE AND ACETAMINOPHEN 2 TABLET: 5; 325 TABLET ORAL at 19:42

## 2020-12-29 RX ADMIN — OXYCODONE AND ACETAMINOPHEN 2 TABLET: 5; 325 TABLET ORAL at 15:10

## 2020-12-29 RX ADMIN — Medication 2000 UNITS: at 09:10

## 2020-12-29 RX ADMIN — ATORVASTATIN CALCIUM 10 MG: 10 TABLET, FILM COATED ORAL at 20:53

## 2020-12-29 RX ADMIN — ATENOLOL 50 MG: 50 TABLET ORAL at 09:10

## 2020-12-29 RX ADMIN — Medication 10 ML: at 20:57

## 2020-12-29 RX ADMIN — INSULIN LISPRO 2 UNITS: 100 INJECTION, SOLUTION INTRAVENOUS; SUBCUTANEOUS at 20:55

## 2020-12-29 RX ADMIN — OXYCODONE AND ACETAMINOPHEN 2 TABLET: 5; 325 TABLET ORAL at 10:06

## 2020-12-29 RX ADMIN — IPRATROPIUM BROMIDE AND ALBUTEROL SULFATE 1 AMPULE: .5; 3 SOLUTION RESPIRATORY (INHALATION) at 17:01

## 2020-12-29 RX ADMIN — DOXYCYCLINE HYCLATE 100 MG: 100 CAPSULE ORAL at 09:10

## 2020-12-29 RX ADMIN — DOCUSATE SODIUM 50 MG AND SENNOSIDES 8.6 MG 2 TABLET: 8.6; 5 TABLET, FILM COATED ORAL at 09:10

## 2020-12-29 RX ADMIN — DOXYCYCLINE HYCLATE 100 MG: 100 CAPSULE ORAL at 20:53

## 2020-12-29 RX ADMIN — IPRATROPIUM BROMIDE AND ALBUTEROL SULFATE 1 AMPULE: .5; 3 SOLUTION RESPIRATORY (INHALATION) at 12:40

## 2020-12-29 RX ADMIN — ALOGLIPTIN 12.5 MG: 12.5 TABLET, FILM COATED ORAL at 09:10

## 2020-12-29 RX ADMIN — IPRATROPIUM BROMIDE AND ALBUTEROL SULFATE 1 AMPULE: .5; 3 SOLUTION RESPIRATORY (INHALATION) at 08:26

## 2020-12-29 RX ADMIN — INSULIN LISPRO 2 UNITS: 100 INJECTION, SOLUTION INTRAVENOUS; SUBCUTANEOUS at 12:13

## 2020-12-29 RX ADMIN — Medication 10 ML: at 09:10

## 2020-12-29 RX ADMIN — OXYCODONE AND ACETAMINOPHEN 2 TABLET: 5; 325 TABLET ORAL at 06:01

## 2020-12-29 RX ADMIN — PANTOPRAZOLE SODIUM 20 MG: 20 TABLET, DELAYED RELEASE ORAL at 06:01

## 2020-12-29 ASSESSMENT — PAIN SCALES - WONG BAKER: WONGBAKER_NUMERICALRESPONSE: 0

## 2020-12-29 ASSESSMENT — PAIN SCALES - GENERAL
PAINLEVEL_OUTOF10: 10
PAINLEVEL_OUTOF10: 7
PAINLEVEL_OUTOF10: 6

## 2020-12-29 NOTE — PLAN OF CARE
Problem: Pain:  Goal: Pain level will decrease  Description: Pain level will decrease  12/29/2020 1037 by Mitesh Main RN  Outcome: Met This Shift

## 2020-12-29 NOTE — PROGRESS NOTES
HOSPITALIST PROGRESS NOTE  Date: 12/29/2020   Name: Nigel Mas   MRN: [id number]   YOB: 1955        Hospital Course:   Ms Kelly Jean is 63 YF who was admitted on 12/20/2020 with a past history of dilated joint disease chronic back pain osteoarthritis, hypertension, fibromyalgia, hyperlipidemia.   Presented with chronic low back pain    Subjective/Interval Hx:   Patient is stable at this time states the medication is controlling her pain    Objective:   Physical Exam:   BP (!) 141/67   Pulse 95   Temp 97.9 °F (36.6 °C) (Oral)   Resp 18   Ht 4' 11\" (1.499 m)   Wt 212 lb (96.2 kg)   LMP  (LMP Unknown)   SpO2 94%   BMI 42.82 kg/m²   General: no acute distress, well nourished and well hydrated  HEENT: NCAT  Heart: S1S2 RRR  Lungs: Clear to ascultation bilaterally, respiratory effort normal  Abdomen: soft, NT/ND, positive bowel sounds  Extremities: no pitting edema, nontender   Neuro: patient is awake, alert and orientated times 3, no gross deficits  Skin: no rashes or ecchymosis        Meds:   Meds:    ipratropium-albuterol  1 ampule Inhalation Q4H WA    sennosides-docusate sodium  2 tablet Oral Daily    cefTRIAXone (ROCEPHIN) IV  1 g Intravenous Q24H    doxycycline hyclate  100 mg Oral 2 times per day    polyethylene glycol  17 g Oral Daily    allopurinol  300 mg Oral Daily    atenolol  50 mg Oral Daily    atorvastatin  10 mg Oral Nightly    vitamin D  2,000 Units Oral Daily    pantoprazole  20 mg Oral QAM AC    alogliptin  12.5 mg Oral Daily    insulin lispro  0-10 Units Subcutaneous 4x Daily AC & HS    sodium chloride flush  10 mL Intravenous 2 times per day      Infusions:    dextrose       PRN Meds:     oxyCODONE-acetaminophen, 1 tablet, Q4H PRN    Or      oxyCODONE-acetaminophen, 2 tablet, Q4H PRN      morphine, 2 mg, Q2H PRN      amitriptyline, 25 mg, Nightly PRN      glucose, 15 g, PRN      dextrose, 12.5 g, PRN      glucagon (rDNA), 1 mg, PRN      dextrose, 100 mL/hr, PRN      sodium chloride flush, 10 mL, PRN      promethazine, 12.5 mg, Q6H PRN    Or      ondansetron, 4 mg, Q6H PRN      acetaminophen, 650 mg, Q6H PRN    Or      acetaminophen, 650 mg, Q6H PRN      cyclobenzaprine, 10 mg, TID PRN        Data/Labs:     Recent Labs     12/27/20  0609 12/28/20  0504 12/29/20  0527   WBC 16.7* 13.8* 9.8   HGB 10.5* 10.6* 10.3*   HCT 30.4* 31.7* 31.7*    196 194      Recent Labs     12/27/20  0609 12/28/20  0504 12/29/20  0527    130* 132   K 4.0 3.9 3.5   CL 97* 94* 94*   CO2 24 27 27   BUN 14 15 17   CREATININE 0.6 0.5 0.6     No results for input(s): AST, ALT, ALB, BILIDIR, BILITOT, ALKPHOS in the last 72 hours. No results for input(s): INR in the last 72 hours. No results for input(s): CKTOTAL, CKMB, CKMBINDEX, TROPONINT in the last 72 hours. I/O last 3 completed shifts: In: 443 [I.V.:443]  Out: 550 [Urine:550]    Intake/Output Summary (Last 24 hours) at 12/29/2020 1416  Last data filed at 12/29/2020 0603  Gross per 24 hour   Intake 443 ml   Output 550 ml   Net -107 ml        Assessment/Plan:   1. Acute on chronic back pain with radiculopathy due to severe spinal stenosis-neurosurgery is following PT OT may need rehab post hospitalization  2. Hypertension-atenolol, monitor blood pressure  3. Non-insulin-dependent diabetes mellitus-sliding scale, monitor blood glucose diabetic diet  4. Hyperlipidemia-Lipitor at night  5. Gouty arthritis-allopurinol daily      DVT Prophylaxis: SCDs  Diet: DIET GENERAL; Carb Control: 5 carb choices (75 gms)/meal  Dietary Nutrition Supplements: Low Calorie High Protein Supplement  Dietary Nutrition Supplements: Wound Healing Oral Supplement  Code Status: Full Code    Dispo:  When stable    Electronically signed by Aidee Valentin MD on 12/29/2020 at 2:16 PM  [facility] Hospitalist

## 2020-12-29 NOTE — PROGRESS NOTES
PO#5  PROCEDURES:  1.  Posterior lumbar interbody fusion L4-L5 using screws, rods, and  cages. 2.  Decompressive lumbar laminectomy L4-L5. 3.  Decompressive laminectomy T2 to T4. Doing better as everyday  Sensations better. More comfortable today. In better spirits  Incision healthy.   Consider rehab placemnent

## 2020-12-29 NOTE — CARE COORDINATION
Care coordination :   Per notes pmr declined so I called Katherin Ruano at [facility] to check on if the patient was acceppted or not. Await a call back . PT [facility] 8/24 from 12/28. 0t eval  12/24 from 12/28. The patient was a negative covid patient as of 12/28/20 . I will await a call back .  Magdi Adrian [phone]

## 2020-12-29 NOTE — PROGRESS NOTES
OT BEDSIDE TREATMENT NOTE      Date:2020  Patient Name: Geln Charles  MRN: [id number]  : 1955  Room: [address] [place], [place] [address]     Per OT Eval:    Evaluating OT: DAPHNIE Ham, OTR/L [id number]     AM-PAC Daily Activity Raw Score:   Recommended Adaptive Equipment: TBD      Diagnosis: spinal stenosis  Referring Practitioner: Ana Silva DO  Surgery:  L4-L5 PLIF, T2-T3 decompressive laminectomy   Pertinent Medical History: obesity, HLD, HTN, DM CA, fibromyalgia   Precautions:  Falls, spinal, soft TLSO, O2     Home Living: Pt lives with significant other in a handicap accessible 1 story home; bed/bath on main level   Bathroom setup: tub/shower unit   Equipment owned: none  Prior Level of Function: assist with ADLs/IADLs; using rollator for functional mobility   Driving: no  Occupation: on disability     Pain Level: 6/10 low back & L leg with movement, pain medication requested    Cognition: A&O: 3/4; pleasant & cooperative, more alert this date, talkative with louder voice, improved recall noted as well               Memory: Tani Phillips 2/3 recall of spinal precautions with cues for 3rd              Sequencing: Fair              Problem solving: Fair               Judgement/safety:  fair- anxiety noted this date, more fearful of falling      Functional Assessment:    Initial Eval Status  Date: 20 Treatment Status  Date:  20 Short Term Goals  Treatment frequency: 4-5 x/week   Feeding SUP   Set up  Upright in bed IND   Grooming SUP (completed facial washing, oral hygiene, and hair combing bed level)   SBA  Washing off face, unable to comb hair thoroughly due to knot of hair in back of head  IND   UB Dressing Max A (assist to clive gown, did not clive TLSO due to declining OOB activity)   Min A  Doff/donning gown bed level  Max A   donning TLSO brace Mod A   LB Dressing Dep (assist with socks)  Max  Bed level simulated, pt able to lift R LE to assist with doffing/donning socks  Mod A    Bathing Max A (simulated) UB: Min   LB: Max A  Bed level sponge bathing, recommending LH sponge Mod A    Toileting Max A (assist with rolling and myra hygiene)  Dep  Sharee present, dep for  myra-care hygiene  Mod A   Bed Mobility  Log roll: NT (pt refused OOB activity)  Supine to sit: NT   Sit to supine: NT  Rolling: Mod-Max A   Max A  Supine < > sit   Log roll Min A  Supine to sit: Min A   Sit to supine: Min A   Functional Transfers Sit to stand:NT   Stand to sit:NT  Commode: NT  Max A x 2  Sit < > stand   Completed 3 reps  L LE significant weakness, only trace movement bed level Min A   Functional Mobility NT NT  unable  Min A   Balance Sitting: NT  Standing: NT  Sitting: Mod-SBA  Posterior lean, fearful this date of falling  Standing: Max x 2  without walker      Activity Tolerance Fair- (SpO2 >93% during session, completed ADLs bed level)  Fair  Seated at EOB 20 minutes    O2 on RA upon arrival 91-92%  Dropped 80% seated at EOB, nsg present, after cues for PLB able to recover 95%     Visual/  Perceptual Glasses: for reading         Education:  Pt was educated through out treatment regarding proper technique & safety with bed mobility, spinal precautions & placement of soft TLSO, sitting tolerance/balance, functional transfers & ADL compensatory strategies to ease tasks to improve safety & prevent falls and allow pt to return home safely. Instructed pt on B UE therapeutic exercises to complete while in bed with Fair+ results to increase strength to ease self care tasks, bed mobility & transfers. Comments: Upon arrival pt was in bed & agreeable for therapy. At end of session pt was returned to bed, brace remained in place & HOB upright, all lines and tubes intact, call light within reach. Recommending pt stay up in bed for an hour if tolerated with brace in place. · Pt has made Fair+ progress towards set goals.    · Continue with current plan of care    Treatment Time In: 2:45            Treatment Time Out: 3:45 Treatment Charges: Mins Units   Ther Ex  20508     Manual Therapy Gretchen Lui [id number] 24159 13 2   ADL/Home Mgt 01121 30 2   Neuro Re-ed 59105     Group Therapy      Orthotic manage/training  76702     Non-Billable Time     Total Timed Treatment 60 4       Tatiana MUSE  [address] [place] [address] Drive, [address] [place] [address]

## 2020-12-29 NOTE — PROGRESS NOTES
Physical Therapy  Physical Therapy Treatment Note     Name: Glen Charles  : 1955  MRN: [id number]    Referring Provider: Ana Silva DO    Date of Service: 2020    Evaluating PT: Michael Dove, PT, DPT, [id number]    Room #: 7220/0306-Q  Diagnosis: Spinal stenosis  PMHx/PSHx: HTN, fibromyalgia, chronic back pain, OA, obesity, HLD, colon CA, DM  Procedure/Surgery: L4-L5 PLIF, T2-T3 decompressive laminectomy ()  Precautions: Fall risk, spinal neutrality, soft TLSO, O2, mcclain, alarm    SUBJECTIVE:    Pt lives with significant other in a single story house with 2 stair(s) and 1 rail(s) to enter. Bed is on first floor and bath is on first floor. Pt ambulated without AD prior to admission. Pt used rollator walker for 1 day due to inability to ambulate prior to being admitted to hospital.    OBJECTIVE:   Initial Evaluation  Date: 20 Treatment Date: 2020 Short Term/ Long Term   Goals   AM-PAC 6 Clicks  0/92    Was pt agreeable to Eval/treatment? Yes Yes     Does pt have pain? 9/10 back and L LE pain; recently medicated Reported 6/10 back and LLE pain. Pain medication given prior    Bed Mobility  Rolling: Mod A  Supine to sit: Max A  Sit to supine: Max A x2  Scooting: Max A to EOB Rolling: Mod<>Max A   Supine to sit: Max A  Sit to supine: Max A   Scooting: Max A to EOB in seated Rolling: Min A  Supine to sit: Min A  Sit to supine: Min A  Scooting: Min A   Transfers Sit to stand: Max A (partial stand)  Stand to sit: Max A  Stand pivot: NT Sit to stand: Max A x2  Stand to sit:  Max A x2  Stand pivot: NT Sit to stand: Min A  Stand to sit: Min A  Stand pivot: Min A with [employer]   Ambulation   NT Unable to advance feet >25 feet with [employer] with Min A   Stair negotiation: ascended and descended NT  2 step(s) with 1 rail(s) with Mod A   ROM BUE: Refer to OT note  BLE: WFL BLE: WFL, but pain dominant in     Strength BUE: Refer to OT note  BLE: NT RLE: 3+ to 4-/5  LLE: 2/5 hip, 1/5 knee, 0/5 ankle Balance Sitting EOB: Min A to SBA  Dynamic Standing: NT Sitting EOB: Mod A initially improving to SBA  Dynamic standing: Max A x2 with HHA and B knees blocked. Sitting EOB: Supervision  Dynamic Standing: Min A with [employer]     Pt is A & O x 4  Sensation: Reported N/T to BLEs; pt unable to feel L foot  Edema: Unremarkable. Therapeutic exercise: STS x4, AAROM BLE    Patient education  Pt educated on spine precautions, purpose of brace and proper wear, importance of mobility, safety with mobility, transfers, exercises    Patient response to education:   Pt verbalized understanding Pt demonstrated skill Pt requires further education in this area   Yes Partially with verbal cues and assist Yes      ASSESSMENT:    Comments:   Pt received supine with BERRIOS at bedside. Pt agreeable to treatment and performed with OT collaboration due to pt's weakness and complexity. Rolling performed multiple times to R/L for TLSO donning and repositioning in bed. Pt anxious with mobility and required PT lead deep breathing to decrease RR. Pt's SaO2 was 90-92% on RA at rest and decreased to 80% with transfer to sitting EOB. Pt able to recover with PLB and VCs to 95% on RA. Pt exhibited fear of falling and required reassurance. Seated balance activity performed with focus on correcting posterior LOB and L lateral lean. STS completed 4 times with B knees blocked and PT advanced LLE towards HOB. Pt required stepwise VCs throughout session. Pt returned to supine via log roll technique. Pt repositioned in bed and left sitting up in semi-chair position with TLSO donned. Pt left with call button within reach and all needs met.       Treatment:  Patient practiced and was instructed in the following treatment:     Bed mobility training - pt given verbal and tactile cues to facilitate proper sequencing and safety during log rolling and supine>sit as well as provided with physical assistance to complete task    Sitting Balance EOB for >10 minutes for improved postural awareness, upright tolerance, effective breathing and decreased in light headedness. Assist to maintain upright posture and to correct posterior LOB.  Transfer training with VCs for hand placement, sequencing and technique. Physical assist to complete task and correct LOB and unsteadiness upon standing. B knees blocked and HHA with PT advancing LLE.  Standing balance retraining with VCs for upright posture and forward gaze. Physical assist to complete. Pt unable to maintain standing for 10 seconds  · Therapeutic Exercises/Activities as noted above.  Patient education provided with focus on upright tolerance, effective breathing, spinal precautions, safety, correcting posterior LOB and benefits of participating with therapy. PLAN:  Patient is making fair (-) progress toward set goals. Continue with current plan of care.     Time in: 1455  Time out: 1550    Total Treatment Time 55 minutes     CPT codes:  [] Gait training 68344 0 minutes  [] Manual therapy 57252 0 minutes  [x] Therapeutic activities 98889 92 minutes  [x] Therapeutic exercises 31031 8 minutes  [] Neuromuscular reeducation 28036 0 minutes     Tamanna Guajardo, PT, DPT  License [id number]

## 2020-12-29 NOTE — PLAN OF CARE
Problem: Pain:  Goal: Pain level will decrease  Description: Pain level will decrease  Outcome: Met This Shift     Problem: Pain:  Goal: Control of acute pain  Description: Control of acute pain  Outcome: Met This Shift     Problem: Skin Integrity:  Goal: Will show no infection signs and symptoms  Description: Will show no infection signs and symptoms  Outcome: Met This Shift     Problem: Skin Integrity:  Goal: Absence of new skin breakdown  Description: Absence of new skin breakdown  Outcome: Met This Shift     Problem: Falls - Risk of:  Goal: Will remain free from falls  Description: Will remain free from falls  Outcome: Met This Shift     Problem: Falls - Risk of:  Goal: Absence of physical injury  Description: Absence of physical injury  Outcome: Met This Shift

## 2020-12-29 NOTE — CARE COORDINATION
Care coordination - discharge planning   I received a call back from Shannon Fisher at [facility] and the patient was accepted there and  can go today if medically cleared. Pt ot evals done 8/25 [facility] . Covid is negative as of 12/28/20 . Hens envelope done .

## 2020-12-30 VITALS
HEART RATE: 92 BPM | TEMPERATURE: 97.9 F | BODY MASS INDEX: 42.74 KG/M2 | RESPIRATION RATE: 18 BRPM | SYSTOLIC BLOOD PRESSURE: 120 MMHG | WEIGHT: 212 LBS | DIASTOLIC BLOOD PRESSURE: 65 MMHG | HEIGHT: 59 IN | OXYGEN SATURATION: 92 %

## 2020-12-30 LAB
ANION GAP SERPL CALCULATED.3IONS-SCNC: 10 MMOL/L (ref 7–16)
BUN BLDV-MCNC: 15 MG/DL (ref 8–23)
CALCIUM SERPL-MCNC: 9.4 MG/DL (ref 8.6–10.2)
CHLORIDE BLD-SCNC: 96 MMOL/L (ref 98–107)
CO2: 29 MMOL/L (ref 22–29)
CREAT SERPL-MCNC: 0.6 MG/DL (ref 0.5–1)
GFR AFRICAN AMERICAN: >60
GFR NON-AFRICAN AMERICAN: >60 ML/MIN/1.73
GLUCOSE BLD-MCNC: 133 MG/DL (ref 74–99)
HCT VFR BLD CALC: 29.8 % (ref 34–48)
HEMOGLOBIN: 10 G/DL (ref 11.5–15.5)
MCH RBC QN AUTO: 29.7 PG (ref 26–35)
MCHC RBC AUTO-ENTMCNC: 33.6 % (ref 32–34.5)
MCV RBC AUTO: 88.4 FL (ref 80–99.9)
METER GLUCOSE: 138 MG/DL (ref 74–99)
METER GLUCOSE: 163 MG/DL (ref 74–99)
PDW BLD-RTO: 13.9 FL (ref 11.5–15)
PLATELET # BLD: 183 E9/L (ref 130–450)
PMV BLD AUTO: 9.6 FL (ref 7–12)
POTASSIUM SERPL-SCNC: 3.4 MMOL/L (ref 3.5–5)
RBC # BLD: 3.37 E12/L (ref 3.5–5.5)
SODIUM BLD-SCNC: 135 MMOL/L (ref 132–146)
WBC # BLD: 10.5 E9/L (ref 4.5–11.5)

## 2020-12-30 PROCEDURE — 80048 BASIC METABOLIC PNL TOTAL CA: CPT

## 2020-12-30 PROCEDURE — 6360000002 HC RX W HCPCS: Performed by: NEUROLOGICAL SURGERY

## 2020-12-30 PROCEDURE — 94640 AIRWAY INHALATION TREATMENT: CPT

## 2020-12-30 PROCEDURE — 36415 COLL VENOUS BLD VENIPUNCTURE: CPT

## 2020-12-30 PROCEDURE — 82962 GLUCOSE BLOOD TEST: CPT

## 2020-12-30 PROCEDURE — 2580000003 HC RX 258: Performed by: NEUROLOGICAL SURGERY

## 2020-12-30 PROCEDURE — 6370000000 HC RX 637 (ALT 250 FOR IP): Performed by: FAMILY MEDICINE

## 2020-12-30 PROCEDURE — 6370000000 HC RX 637 (ALT 250 FOR IP): Performed by: NEUROLOGICAL SURGERY

## 2020-12-30 PROCEDURE — 6370000000 HC RX 637 (ALT 250 FOR IP): Performed by: INTERNAL MEDICINE

## 2020-12-30 PROCEDURE — 85027 COMPLETE CBC AUTOMATED: CPT

## 2020-12-30 RX ORDER — CYCLOBENZAPRINE HCL 10 MG
10 TABLET ORAL 3 TIMES DAILY PRN
Qty: 20 TABLET | Refills: 0 | Status: SHIPPED | OUTPATIENT
Start: 2020-12-30 | End: 2021-01-09

## 2020-12-30 RX ADMIN — DOCUSATE SODIUM 50 MG AND SENNOSIDES 8.6 MG 2 TABLET: 8.6; 5 TABLET, FILM COATED ORAL at 09:52

## 2020-12-30 RX ADMIN — CYCLOBENZAPRINE 10 MG: 10 TABLET, FILM COATED ORAL at 12:26

## 2020-12-30 RX ADMIN — ATENOLOL 50 MG: 50 TABLET ORAL at 09:52

## 2020-12-30 RX ADMIN — CYCLOBENZAPRINE 10 MG: 10 TABLET, FILM COATED ORAL at 05:37

## 2020-12-30 RX ADMIN — CEFTRIAXONE SODIUM 1 G: 1 INJECTION, POWDER, FOR SOLUTION INTRAMUSCULAR; INTRAVENOUS at 12:26

## 2020-12-30 RX ADMIN — INSULIN LISPRO 2 UNITS: 100 INJECTION, SOLUTION INTRAVENOUS; SUBCUTANEOUS at 12:26

## 2020-12-30 RX ADMIN — Medication 2000 UNITS: at 09:52

## 2020-12-30 RX ADMIN — ALLOPURINOL 300 MG: 300 TABLET ORAL at 09:52

## 2020-12-30 RX ADMIN — OXYCODONE AND ACETAMINOPHEN 2 TABLET: 5; 325 TABLET ORAL at 05:30

## 2020-12-30 RX ADMIN — DOXYCYCLINE HYCLATE 100 MG: 100 CAPSULE ORAL at 09:52

## 2020-12-30 RX ADMIN — Medication 10 ML: at 09:53

## 2020-12-30 RX ADMIN — OXYCODONE AND ACETAMINOPHEN 2 TABLET: 5; 325 TABLET ORAL at 09:52

## 2020-12-30 RX ADMIN — IPRATROPIUM BROMIDE AND ALBUTEROL SULFATE 1 AMPULE: .5; 3 SOLUTION RESPIRATORY (INHALATION) at 11:33

## 2020-12-30 RX ADMIN — ALOGLIPTIN 12.5 MG: 12.5 TABLET, FILM COATED ORAL at 09:52

## 2020-12-30 RX ADMIN — PANTOPRAZOLE SODIUM 20 MG: 20 TABLET, DELAYED RELEASE ORAL at 05:29

## 2020-12-30 RX ADMIN — OXYCODONE AND ACETAMINOPHEN 2 TABLET: 5; 325 TABLET ORAL at 15:09

## 2020-12-30 ASSESSMENT — PAIN SCALES - GENERAL
PAINLEVEL_OUTOF10: 8
PAINLEVEL_OUTOF10: 10

## 2020-12-30 NOTE — CARE COORDINATION
Care coordination - The patient is [facility] to be picked up at 3 pm today to go to [facility] skilled. Last negative covid was 12/28/20 . Pt ot updates in for this am. I notified the patient and the daughter Becca Lofton at [phone]. Hens envelope done.  I will follow

## 2020-12-30 NOTE — PROGRESS NOTES
Occupational Therapy     Date:2020  Patient Name: Rebeca Eason  MRN: [id number]  : 1955  Room: [address] [place] [address]     Completed chart review & attempted this morning with pt declining, wanting to wait until she is more alert. Attempted again this afternoon with pt declining due to increased pain & that she will be able to take pain medication in 30 minutes. Pt also reports she d/c at 3 for SNF therefore declining therapy this date. Ramiro Medicus.  [address], [address] [place] [address]

## 2020-12-30 NOTE — PROGRESS NOTES
Physical Therapy  PT SESSION ATTEMPT     Date:2020  Patient Name: Jena Gloria  MRN: [id number]  : 1955  Room: [address] [place] [address]     Attempted PT session this date:    [] unavailable due to other medical staff currently with pt   [] on hold per nursing staff   [] on hold per nursing staff secondary to lab / radiology results    [x] declined Physical Therapy this date. Benefits of participation in therapy reviewed with pt.    [] off unit   [] Other:   Attempted to see pt x2 today. Pt declined both times stating pain was too bad and asked to come back later. Will reattempt PT at a later time as able.     Jennie Sheppard, PT, DPT  [id number]

## 2020-12-30 NOTE — CARE COORDINATION
[facility] skilled accepted the patient and they have a bed today. Hens /envelope done [employer] 8/24 ,OT [facility] 12/24. Last negative covid  Was 12/28/20 . The patient is post op Lumbar Interbody Fusion L4-L5. . I will follow Misha Edge [phone]

## 2020-12-30 NOTE — DISCHARGE SUMMARY
Discharge Summary    Patient ID   Salome Morales   1955  [id number]    Primary Care:   VISHNU Henao CNP    Admit date: 12/20/2020   Discharge date: 12/30/2020    Medical Record number: [id number]   Admitting Physician: Shay Solomon DO   Discharge Physician: Catrina Hogue MD    Consultants: Neurosurgery    Procedures: Lumbar Stenosis and lumbar instability L4-L5 status post decompression lumbar laminectomy at L4-L5    Discharge Diagnoses:      Patient Active Problem List   Diagnosis Code    Essential hypertension I10    Chronic back pain greater than 3 months duration M54.9, G89.29    Fatigue R53.83    Mixed hyperlipidemia E78.2    Vitamin D insufficiency E55.9    Gastroesophageal reflux disease without esophagitis K21.9    Central stenosis of spinal canal M48.00    Spondylolisthesis at L4-L5 level M43.16    Acute left lumbar radiculopathy M54.16    Chronic pain of left knee M25.562, G89.29    Morbid obesity with BMI of 40.0-44.9, adult ([facility] E66.01, Z68.41    Personal history of colon cancer Z85.038    Sacroiliitis ([facility] [address] M46.1    Telogen effluvium L65.0    Contact dermatitis L25.9    Acute pain of right shoulder M25.511    Chronic pain syndrome G89.4    Cervical radiculopathy M54.12    Cervical disc disorder M50.90    Type 2 diabetes mellitus without complication, without long-term current use of insulin ([facility] [address] [facility] E11.9    Facet arthropathy M47.819    Fibromyalgia M79.7    Primary osteoarthritis of left knee M17.12    Constipation K59.00    Malignant neoplasm of sigmoid colon (HCC) C18.7    Chronic pain of multiple joints M25.50, G89.29    Osteoarthritis involving multiple joints on both sides of body M15.9    Cervical spondylosis without myelopathy M47.812    DDD (degenerative disc disease), cervical M50.30    DDD (degenerative disc disease), lumbar M51.36    Numbness in left leg R20.0    Spinal stenosis M48.00    Back pain M54.9    Weakness of both legs R29.898 glucose. etodolac 400 MG tablet  Commonly known as: LODINE     FreeStyle Lancets Misc  TEST EVERY DAY     glucose monitoring kit monitoring kit  1 kit by Does not apply route daily     Handicazackary Mccray Misc  by Does not apply route Start 11/23/2020 expires 11/22/2023     metFORMIN 500 MG tablet  Commonly known as: GLUCOPHAGE  Take 1 tablet by mouth daily (with breakfast)     omeprazole 20 MG delayed release capsule  Commonly known as: PRILOSEC  Take 1 capsule by mouth daily     sAXagliptin 2.5 MG Tabs tablet  Commonly known as:  Onglyza  TAKE 1 TABLET DAILY     triamcinolone 0.1 % cream  Commonly known as: KENALOG     Vitamin D3 50 MCG (2000 UT) Caps           Where to Get Your Medications      These medications were sent to [address] [place], [place] [address] [address]    Phone: [phone]   · cyclobenzaprine 10 MG tablet       Patient Instructions: Resume home medications any changes while in hospital      Discharged Condition: good  Disposition: home  Activity: activity as tolerated  Diet: diabetic diet  Wound Care: none needed    Follow-up: Christina Box NP in 1-2 weeks        Electronically signed by Issa Tate MD on 12/30/2020 at 10:30 AM  [address] [place] [address]   Time spent on discharge 45  minutes

## 2020-12-30 NOTE — PROGRESS NOTES
PO#6  PROCEDURES:  1.  Posterior lumbar interbody fusion L4-L5 using screws, rods, and  cages. 2.  Decompressive lumbar laminectomy L4-L5. 3.  Decompressive laminectomy T2 to T4. Doing better as everyday  Sensations better. More comfortable today. In better spirits  Incision healthy. Patient stable neurosurgically. The patient may be discharged from neurosurgical standpoint. Follow up in the office in 3-4 weeks.  [phone]

## 2021-02-01 ENCOUNTER — VIRTUAL VISIT (OUTPATIENT)
Dept: FAMILY MEDICINE CLINIC | Age: 66
End: 2021-02-01
Payer: MEDICAID

## 2021-02-01 DIAGNOSIS — R26.2 AMBULATORY DYSFUNCTION: ICD-10-CM

## 2021-02-01 DIAGNOSIS — M48.04 SPINAL STENOSIS OF THORACIC REGION: ICD-10-CM

## 2021-02-01 DIAGNOSIS — M54.42 BILATERAL LOW BACK PAIN WITH LEFT-SIDED SCIATICA, UNSPECIFIED CHRONICITY: ICD-10-CM

## 2021-02-01 DIAGNOSIS — Z09 HOSPITAL DISCHARGE FOLLOW-UP: Primary | ICD-10-CM

## 2021-02-01 DIAGNOSIS — I82.4Z2 LOWER LEG DVT (DEEP VENOUS THROMBOEMBOLISM), ACUTE, LEFT (HCC): ICD-10-CM

## 2021-02-01 DIAGNOSIS — M43.16 SPONDYLOLISTHESIS AT L4-L5 LEVEL: ICD-10-CM

## 2021-02-01 PROCEDURE — 1111F DSCHRG MED/CURRENT MED MERGE: CPT | Performed by: NURSE PRACTITIONER

## 2021-02-01 PROCEDURE — 99214 OFFICE O/P EST MOD 30 MIN: CPT | Performed by: NURSE PRACTITIONER

## 2021-02-01 RX ORDER — RIVAROXABAN 20 MG/1
20 TABLET, FILM COATED ORAL DAILY
COMMUNITY
Start: 2021-01-26 | End: 2021-04-27 | Stop reason: SDUPTHER

## 2021-02-01 RX ORDER — ACETAMINOPHEN AND CODEINE PHOSPHATE 300; 30 MG/1; MG/1
TABLET ORAL
COMMUNITY
End: 2021-02-01 | Stop reason: ALTCHOICE

## 2021-02-01 ASSESSMENT — ENCOUNTER SYMPTOMS
DIARRHEA: 1
ABDOMINAL PAIN: 0
COUGH: 0
SHORTNESS OF BREATH: 0
TROUBLE SWALLOWING: 0
SINUS PRESSURE: 0
VOICE CHANGE: 0
NAUSEA: 0
CHEST TIGHTNESS: 0
VOMITING: 0
BACK PAIN: 1
FACIAL SWELLING: 0
COLOR CHANGE: 0
CONSTIPATION: 1
WHEEZING: 0
RHINORRHEA: 0
SINUS PAIN: 0
SORE THROAT: 0

## 2021-02-01 ASSESSMENT — PATIENT HEALTH QUESTIONNAIRE - PHQ9
1. LITTLE INTEREST OR PLEASURE IN DOING THINGS: 0
SUM OF ALL RESPONSES TO PHQ QUESTIONS 1-9: 0
SUM OF ALL RESPONSES TO PHQ QUESTIONS 1-9: 0
2. FEELING DOWN, DEPRESSED OR HOPELESS: 0

## 2021-02-01 NOTE — PROGRESS NOTES
Post-Discharge Transitional Care Management Services or Hospital Follow Up      Jacinto Chowdary   YOB: 1955    Date of Office Visit:  2/1/2021  Date of Hospital Admission: 12/20/20  Date of Hospital Discharge: 12/30/20  Readmission Risk Score(high >=14%.  Medium >=10%):Readmission Risk Score: 14      Care management risk score Rising risk (score 2-5) and Complex Care (Scores >=6): 5     Non face to face  following discharge, date last encounter closed (first attempt may have been earlier): *No documented post hospital discharge outreach found in the last 14 days *No documented post hospital discharge outreach found in the last 14 days    Call initiated 2 business days of discharge: *No response recorded in the last 14 days     Patient Active Problem List   Diagnosis    Essential hypertension    Chronic back pain greater than 3 months duration    Fatigue    Mixed hyperlipidemia    Vitamin D insufficiency    Gastroesophageal reflux disease without esophagitis    Central stenosis of spinal canal    Spondylolisthesis at L4-L5 level    Acute left lumbar radiculopathy    Chronic pain of left knee    Morbid obesity with BMI of 40.0-44.9, adult ([address]    Personal history of colon cancer    Sacroiliitis (HCC)    Telogen effluvium    Contact dermatitis    Acute pain of right shoulder    Chronic pain syndrome    Cervical radiculopathy    Cervical disc disorder    Type 2 diabetes mellitus without complication, without long-term current use of insulin (HCC)    Facet arthropathy    Fibromyalgia    Primary osteoarthritis of left knee    Constipation    Malignant neoplasm of sigmoid colon ([address]    Chronic pain of multiple joints    Osteoarthritis involving multiple joints on both sides of body    Cervical spondylosis without myelopathy    DDD (degenerative disc disease), cervical    DDD (degenerative disc disease), lumbar    Numbness in left leg    Spinal stenosis    Back pain  Weakness of both legs    Ambulatory dysfunction    Urinary retention    Lower leg DVT (deep venous thromboembolism), acute, left (HCC)       Allergies   Allergen Reactions    Diclofenac     Diclofenac Sodium      Nauseated, Diarrhea    Lisinopril Other (See Comments)     Profound malaise    Lyrica [Pregabalin] Other (See Comments)     Confusion    Sulfa Antibiotics Rash       Medications listed as ordered at the time of discharge from hospital   Suzy, [address] Medication Instructions ABNER:    Printed on:02/01/21 3028   Medication Information                      allopurinol (ZYLOPRIM) 300 MG tablet  TAKE 1 TABLET BY MOUTH DAILY             amitriptyline (ELAVIL) 25 MG tablet  Take 25 mg by mouth At bedtime as needed             atenolol (TENORMIN) 50 MG tablet  Take 1 tablet by mouth daily             atorvastatin (LIPITOR) 10 MG tablet  Take 1 tablet by mouth nightly Indications: High Amount of Fats in the Blood             blood glucose monitor strips  Test 1 times a day & as needed for symptoms of irregular blood glucose.              Cholecalciferol (VITAMIN D3) 2000 units CAPS  Take 2,000 Units by mouth daily             cyclobenzaprine (FLEXERIL) 10 MG tablet  Take 1 tablet by mouth 3 times daily as needed for Muscle spasms             FreeStyle Lancets MISC  TEST EVERY DAY             glucose monitoring kit (FREESTYLE) monitoring kit  1 kit by Does not apply route daily             Handicap Placard MISC  by Does not apply route Start 11/23/2020 expires 11/22/2023             metFORMIN (GLUCOPHAGE) 500 MG tablet  Take 1 tablet by mouth daily (with breakfast)             omeprazole (PRILOSEC) 20 MG delayed release capsule  Take 1 capsule by mouth daily             sAXagliptin (ONGLYZA) 2.5 MG TABS tablet  TAKE 1 TABLET DAILY             triamcinolone (KENALOG) 0.1 % cream  MARY EXT AA 2 TO 3 TIMES PER DAY UNTIL RESOLVED             XARELTO 20 MG TABS tablet  Take 20 mg by mouth daily Medications marked \"taking\" at this time  Outpatient Medications Marked as Taking for the 2/1/21 encounter (Virtual Visit) with VISHNU Baeza CNP   Medication Sig Dispense Refill    XARELTO 20 MG TABS tablet Take 20 mg by mouth daily      cyclobenzaprine (FLEXERIL) 10 MG tablet Take 1 tablet by mouth 3 times daily as needed for Muscle spasms 40 tablet 1    atorvastatin (LIPITOR) 10 MG tablet Take 1 tablet by mouth nightly Indications: High Amount of Fats in the Blood 90 tablet 3    amitriptyline (ELAVIL) 25 MG tablet Take 25 mg by mouth At bedtime as needed      sAXagliptin (ONGLYZA) 2.5 MG TABS tablet TAKE 1 TABLET DAILY 30 tablet 2    Handicap Placard MISC by Does not apply route Start 11/23/2020 expires 11/22/2023 1 each 0    atenolol (TENORMIN) 50 MG tablet Take 1 tablet by mouth daily 90 tablet 3    omeprazole (PRILOSEC) 20 MG delayed release capsule Take 1 capsule by mouth daily 90 capsule 3    metFORMIN (GLUCOPHAGE) 500 MG tablet Take 1 tablet by mouth daily (with breakfast) 30 tablet 5    FreeStyle Lancets MISC TEST EVERY  each 3    blood glucose monitor strips Test 1 times a day & as needed for symptoms of irregular blood glucose.  50 strip 11    allopurinol (ZYLOPRIM) 300 MG tablet TAKE 1 TABLET BY MOUTH DAILY 90 tablet 3    triamcinolone (KENALOG) 0.1 % cream MARY EXT AA 2 TO 3 TIMES PER DAY UNTIL RESOLVED      glucose monitoring kit (FREESTYLE) monitoring kit 1 kit by Does not apply route daily 1 kit 0    Cholecalciferol (VITAMIN D3) 2000 units CAPS Take 2,000 Units by mouth daily          Medications patient taking as of now reconciled against medications ordered at time of hospital discharge: Yes    Chief Complaint   Patient presents with    Follow-Up from Hospital     back surgery She is status post posterior lumbar interbody fusion L4-L5, decompressive lumbar laminectomy L4-L5, decompressive laminectomy T2 to T4 on 12/24/20 with Dr Lawence Aschoff at [address] [place] [address]. Transferred to [facility] on 12/30/2020. She was discharged from [facility] 1/23/2021 she is using a walker and can walk half way down the [place] for a very short distance. She is still unable to  her left leg due to pain in the knee and in the back. She developed the Blood clot in the left lower leg and was placed on Xarelto 20 mg daily. She still has swelling in the left lower leg and foot. She has a follow up with Dr Lawence Aschoff in 2 months. She denies any bowel or bladder incontinence. She does have pain in the left side of the back when straining for BM. She is on oxycodone - acetaminophen 5/325 mg 1 tablet twice a day. She is able to get in and out of bed now without assistance. Pain is 6-7/10 currently. She isn't taking any stool softener at home but did  clearlax. She is complaining of dry mouth. Denies any Chest pain or SOB. Inpatient course: Discharge summary reviewed- see chart. Interval history/Current status: stable    Review of Systems   Constitutional: Positive for activity change and appetite change. Negative for chills, diaphoresis, fatigue, fever and unexpected weight change. HENT: Negative for congestion, dental problem, drooling, ear discharge, ear pain, facial swelling, hearing loss, mouth sores, nosebleeds, postnasal drip, rhinorrhea, sinus pressure, sinus pain, sneezing, sore throat, tinnitus, trouble swallowing and voice change. Dry mouth   Respiratory: Negative for cough, chest tightness, shortness of breath and wheezing. Cardiovascular: Positive for leg swelling. Negative for chest pain and palpitations. Gastrointestinal: Positive for constipation and diarrhea. Negative for abdominal pain, nausea and vomiting. Endocrine: Negative for cold intolerance, heat intolerance, polydipsia, polyphagia and polyuria. Genitourinary: Negative for difficulty urinating, frequency and urgency. Musculoskeletal: Positive for arthralgias, back pain and gait problem. Negative for joint swelling, myalgias, neck pain and neck stiffness. Skin: Negative for color change, pallor, rash and wound. Allergic/Immunologic: Negative for environmental allergies, food allergies and immunocompromised state. Neurological: Positive for numbness. Negative for dizziness, tremors, seizures, syncope, facial asymmetry, speech difficulty, weakness, light-headedness and headaches. Hematological: Negative for adenopathy. Does not bruise/bleed easily. Psychiatric/Behavioral: Negative for agitation, behavioral problems, confusion, decreased concentration, dysphoric mood, hallucinations, self-injury, sleep disturbance and suicidal ideas. The patient is not nervous/anxious and is not hyperactive. There were no vitals filed for this visit. There is no height or weight on file to calculate BMI. Wt Readings from Last 3 Encounters:   12/29/20 212 lb (96.2 kg)   12/20/20 200 lb (90.7 kg)   12/14/20 190 lb (86.2 kg)     BP Readings from Last 3 Encounters:   12/30/20 120/65   12/24/20 (!) 131/100   12/20/20 (!) 152/81       Physical Exam  Constitutional:       Appearance: Normal appearance. She is obese. HENT:      Head: Normocephalic and atraumatic. Nose: Nose normal.   Eyes:      Extraocular Movements: Extraocular movements intact. Neck:      Musculoskeletal: Normal range of motion and neck supple. Pulmonary:      Effort: Pulmonary effort is normal.   Musculoskeletal:      Left lower leg: Edema present. Comments: Left DVT   Skin:     Comments: Surgical incisions well healing in the thoracic and lower spine. No drainage, scar is red on both. Neurological:      General: No focal deficit present. Mental Status: She is alert and oriented to person, place, and time. Psychiatric:         Mood and Affect: Mood normal.         Behavior: Behavior normal.         Thought Content: Thought content normal.         Judgment: Judgment normal.             Assessment/Plan:  1. Hospital discharge follow-up      2. Spondylolisthesis at L4-L5 level improving      3. Spinal stenosis of thoracic region improving    4. Ambulatory dysfunction New      5. Bilateral low back pain with left-sided sciatica, unspecified chronicity slightly improving      6.  Lower leg DVT (deep venous thromboembolism), acute, left (HCC)  New  Continue Xarelto 20 mg daily    Will obtain most recent labs from [address] [place] [address] Drive Making: moderate complexity

## 2021-02-16 DIAGNOSIS — E11.9 TYPE 2 DIABETES MELLITUS WITHOUT COMPLICATION, WITHOUT LONG-TERM CURRENT USE OF INSULIN (HCC): Primary | ICD-10-CM

## 2021-02-17 ENCOUNTER — TELEPHONE (OUTPATIENT)
Dept: FAMILY MEDICINE CLINIC | Age: 66
End: 2021-02-17

## 2021-02-17 NOTE — TELEPHONE ENCOUNTER
Xiao/ PT called to inform patient is having difficult time progressing and is asking for a referral for an Orthotic Consult and a hinged Rt knee brace.

## 2021-02-17 NOTE — TELEPHONE ENCOUNTER
Do they have a recommendation on who?  The only one I'm away of is [facility]    Do we have a phone number for PT?

## 2021-02-17 NOTE — TELEPHONE ENCOUNTER
Adri Ellsworth called back to confirm it was the Lt Side from hip to toe, she does believe it is from the back surgery and not the knee itself.

## 2021-02-18 NOTE — TELEPHONE ENCOUNTER
Called Estefanía York from [address] [place] [address] and advise her to contact the Surgeon, she stated she would. Estefanía York number is [phone] if further contact is needed.

## 2021-03-04 ENCOUNTER — OFFICE VISIT (OUTPATIENT)
Dept: FAMILY MEDICINE CLINIC | Age: 66
End: 2021-03-04
Payer: COMMERCIAL

## 2021-03-04 VITALS
HEIGHT: 59 IN | HEART RATE: 93 BPM | BODY MASS INDEX: 37.9 KG/M2 | TEMPERATURE: 97.6 F | WEIGHT: 188 LBS | DIASTOLIC BLOOD PRESSURE: 86 MMHG | RESPIRATION RATE: 18 BRPM | SYSTOLIC BLOOD PRESSURE: 136 MMHG | OXYGEN SATURATION: 98 %

## 2021-03-04 DIAGNOSIS — M25.562 CHRONIC PAIN OF LEFT KNEE: ICD-10-CM

## 2021-03-04 DIAGNOSIS — I82.4Z2 LOWER LEG DVT (DEEP VENOUS THROMBOEMBOLISM), ACUTE, LEFT (HCC): Primary | ICD-10-CM

## 2021-03-04 DIAGNOSIS — K59.09 OTHER CONSTIPATION: ICD-10-CM

## 2021-03-04 DIAGNOSIS — I82.4Z2 LOWER LEG DVT (DEEP VENOUS THROMBOEMBOLISM), ACUTE, LEFT (HCC): ICD-10-CM

## 2021-03-04 DIAGNOSIS — K64.0 GRADE I HEMORRHOIDS: ICD-10-CM

## 2021-03-04 DIAGNOSIS — G89.29 CHRONIC PAIN OF LEFT KNEE: ICD-10-CM

## 2021-03-04 LAB
ALBUMIN SERPL-MCNC: 4.7 G/DL (ref 3.5–5.2)
ALP BLD-CCNC: 115 U/L (ref 35–104)
ALT SERPL-CCNC: 13 U/L (ref 0–32)
ANION GAP SERPL CALCULATED.3IONS-SCNC: 11 MMOL/L (ref 7–16)
AST SERPL-CCNC: 17 U/L (ref 0–31)
BASOPHILS ABSOLUTE: 0.03 E9/L (ref 0–0.2)
BASOPHILS RELATIVE PERCENT: 0.3 % (ref 0–2)
BILIRUB SERPL-MCNC: 0.4 MG/DL (ref 0–1.2)
BUN BLDV-MCNC: 13 MG/DL (ref 8–23)
CALCIUM SERPL-MCNC: 10.2 MG/DL (ref 8.6–10.2)
CHLORIDE BLD-SCNC: 100 MMOL/L (ref 98–107)
CO2: 29 MMOL/L (ref 22–29)
CREAT SERPL-MCNC: 0.6 MG/DL (ref 0.5–1)
EOSINOPHILS ABSOLUTE: 0.46 E9/L (ref 0.05–0.5)
EOSINOPHILS RELATIVE PERCENT: 5.2 % (ref 0–6)
GFR AFRICAN AMERICAN: >60
GFR NON-AFRICAN AMERICAN: >60 ML/MIN/1.73
GLUCOSE BLD-MCNC: 117 MG/DL (ref 74–99)
HCT VFR BLD CALC: 39.7 % (ref 34–48)
HEMOGLOBIN: 12.8 G/DL (ref 11.5–15.5)
IMMATURE GRANULOCYTES #: 0.06 E9/L
IMMATURE GRANULOCYTES %: 0.7 % (ref 0–5)
LYMPHOCYTES ABSOLUTE: 1.83 E9/L (ref 1.5–4)
LYMPHOCYTES RELATIVE PERCENT: 20.8 % (ref 20–42)
MCH RBC QN AUTO: 28.4 PG (ref 26–35)
MCHC RBC AUTO-ENTMCNC: 32.2 % (ref 32–34.5)
MCV RBC AUTO: 88.2 FL (ref 80–99.9)
MONOCYTES ABSOLUTE: 0.63 E9/L (ref 0.1–0.95)
MONOCYTES RELATIVE PERCENT: 7.2 % (ref 2–12)
NEUTROPHILS ABSOLUTE: 5.77 E9/L (ref 1.8–7.3)
NEUTROPHILS RELATIVE PERCENT: 65.8 % (ref 43–80)
PDW BLD-RTO: 14.5 FL (ref 11.5–15)
PLATELET # BLD: 255 E9/L (ref 130–450)
PMV BLD AUTO: 10.7 FL (ref 7–12)
POTASSIUM SERPL-SCNC: 4.2 MMOL/L (ref 3.5–5)
RBC # BLD: 4.5 E12/L (ref 3.5–5.5)
SODIUM BLD-SCNC: 140 MMOL/L (ref 132–146)
TOTAL PROTEIN: 7.7 G/DL (ref 6.4–8.3)
WBC # BLD: 8.8 E9/L (ref 4.5–11.5)

## 2021-03-04 PROCEDURE — G8484 FLU IMMUNIZE NO ADMIN: HCPCS | Performed by: NURSE PRACTITIONER

## 2021-03-04 PROCEDURE — G8427 DOCREV CUR MEDS BY ELIG CLIN: HCPCS | Performed by: NURSE PRACTITIONER

## 2021-03-04 PROCEDURE — 3017F COLORECTAL CA SCREEN DOC REV: CPT | Performed by: NURSE PRACTITIONER

## 2021-03-04 PROCEDURE — 1036F TOBACCO NON-USER: CPT | Performed by: NURSE PRACTITIONER

## 2021-03-04 PROCEDURE — G8417 CALC BMI ABV UP PARAM F/U: HCPCS | Performed by: NURSE PRACTITIONER

## 2021-03-04 PROCEDURE — 99214 OFFICE O/P EST MOD 30 MIN: CPT | Performed by: NURSE PRACTITIONER

## 2021-03-04 ASSESSMENT — ENCOUNTER SYMPTOMS
DIARRHEA: 0
BACK PAIN: 1
COLOR CHANGE: 0
SHORTNESS OF BREATH: 0
CONSTIPATION: 1
COUGH: 0
WHEEZING: 0
NAUSEA: 0
ABDOMINAL PAIN: 0
VOMITING: 0
ANAL BLEEDING: 1

## 2021-03-04 NOTE — PROGRESS NOTES
OFFICE PROGRESS NOTE  [address]  [address] [place] [address] [place] [address]  Dept: [phone]   Chief Complaint   Patient presents with    Leg Swelling     since surgery         HPI:     Here today for complaints of swelling in the left leg, she had back surgery in December and was at [facility] for rehab when she developed swelling in her foot and leg and they had mobile US come in and scan she was found to have a acute extensive DVT. She was placed on Xarelto 20 mg daily. She is not walking at home, not drinking much fluids other than tea. [facility]. She is complaining of left knee pain does have tricompartmental arthritis. Explained at this time there is nothing she can have done because she is on Xarelto. She hasn't been walking due to the pain in her leg and knee. She isn't walking only just a little bit when therapy was coming she was walking more. She is complaining of hemorrhoids bleeding when she was constipated and straining for BM. she has miralax at home but not taking every day. She is also drinking a lot of tea and not much water. Discussed recommend she drink more water and not so much tea and is pulls fluid from the GI tract and hardens the stool. She is also complaining of pain in the left side of the upper back. When she uses a walker she has increased pain. She is not bearing any weight on the left leg and using her upper body more when on the walker.          Current Outpatient Medications:     dapagliflozin (FARXIGA) 5 MG tablet, Take 1 tablet by mouth every morning, Disp: 30 tablet, Rfl: 3    XARELTO 20 MG TABS tablet, Take 20 mg by mouth daily, Disp: , Rfl:     atorvastatin (LIPITOR) 10 MG tablet, Take 1 tablet by mouth nightly Indications: High Amount of Fats in the Blood, Disp: 90 tablet, Rfl: 3    amitriptyline (ELAVIL) 25 MG tablet, Take 25 mg by mouth At bedtime as needed, Disp: , Rfl:     Handicap [facility]hendra [facility], by Does not apply route Start 11/23/2020 expires 11/22/2023, Disp: 1 each, Rfl: 0    atenolol (TENORMIN) 50 MG tablet, Take 1 tablet by mouth daily, Disp: 90 tablet, Rfl: 3    omeprazole (PRILOSEC) 20 MG delayed release capsule, Take 1 capsule by mouth daily, Disp: 90 capsule, Rfl: 3    metFORMIN (GLUCOPHAGE) 500 MG tablet, Take 1 tablet by mouth daily (with breakfast), Disp: 30 tablet, Rfl: 5    FreeStyle Lancets MISC, TEST EVERY DAY, Disp: 100 each, Rfl: 3    blood glucose monitor strips, Test 1 times a day & as needed for symptoms of irregular blood glucose., Disp: 50 strip, Rfl: 11    allopurinol (ZYLOPRIM) 300 MG tablet, TAKE 1 TABLET BY MOUTH DAILY, Disp: 90 tablet, Rfl: 3    triamcinolone (KENALOG) 0.1 % cream, MARY EXT AA 2 TO 3 TIMES PER DAY UNTIL RESOLVED, Disp: , Rfl:     glucose monitoring kit (FREESTYLE) monitoring kit, 1 kit by Does not apply route daily, Disp: 1 kit, Rfl: 0    Cholecalciferol (VITAMIN D3) 2000 units CAPS, Take 2,000 Units by mouth daily, Disp: , Rfl:   Social History     Socioeconomic History    Marital status:      Spouse name: None    Number of children: None    Years of education: None    Highest education level: None   Occupational History    None   Social Needs    Financial resource strain: None    Food insecurity     Worry: None     Inability: None    Transportation needs     Medical: None     Non-medical: None   Tobacco Use    Smoking status: Never Smoker    Smokeless tobacco: Never Used   Substance and Sexual Activity    Alcohol use: Not Currently     Alcohol/week: 0.0 standard drinks     Comment: Rarely    Drug use: No    Sexual activity: None   Lifestyle    Physical activity     Days per week: None     Minutes per session: None    Stress: None   Relationships    Social connections     Talks on phone: None     Gets together: None     Attends Congregational service: None     Active member of club or organization: None     Attends tea  Take the miralax every day      Reviewed labs: CMP, CBC  Reviewed notes from DR Cobb reviewed from [facility]          Return for keep appt as scheduled. I have reviewed my findings and recommendations with Nash Myers.     Jamie Daniel, NP-C, FNP-BC

## 2021-03-05 DIAGNOSIS — I82.4Z2 LOWER LEG DVT (DEEP VENOUS THROMBOEMBOLISM), ACUTE, LEFT (HCC): Primary | ICD-10-CM

## 2021-03-15 ENCOUNTER — HOSPITAL ENCOUNTER (OUTPATIENT)
Dept: CT IMAGING | Age: 66
Discharge: HOME | End: 2021-03-15
Payer: MEDICAID

## 2021-03-15 DIAGNOSIS — M43.16 SPONDYLOLISTHESIS OF LUMBAR REGION: ICD-10-CM

## 2021-03-15 DIAGNOSIS — M51.26 LUMBAR DISC HERNIATION: ICD-10-CM

## 2021-03-15 PROCEDURE — 72131 CT LUMBAR SPINE W/O DYE: CPT

## 2021-04-01 ENCOUNTER — HOSPITAL ENCOUNTER (OUTPATIENT)
Dept: MRI IMAGING | Age: 66
Discharge: HOME | End: 2021-04-03
Payer: MEDICAID

## 2021-04-01 DIAGNOSIS — M43.16 SPONDYLOLISTHESIS OF LUMBAR REGION: ICD-10-CM

## 2021-04-01 DIAGNOSIS — M51.26 LUMBAR DISC HERNIATION: ICD-10-CM

## 2021-04-01 PROCEDURE — 72158 MRI LUMBAR SPINE W/O & W/DYE: CPT

## 2021-04-01 PROCEDURE — A9577 INJ MULTIHANCE: HCPCS | Performed by: RADIOLOGY

## 2021-04-01 PROCEDURE — 6360000004 HC RX CONTRAST MEDICATION: Performed by: RADIOLOGY

## 2021-04-01 RX ADMIN — GADOBENATE DIMEGLUMINE 18 ML: 529 INJECTION, SOLUTION INTRAVENOUS at 11:25

## 2021-04-08 DIAGNOSIS — E11.9 TYPE 2 DIABETES MELLITUS WITHOUT COMPLICATION, WITHOUT LONG-TERM CURRENT USE OF INSULIN (HCC): Primary | ICD-10-CM

## 2021-04-08 RX ORDER — FLUCONAZOLE 150 MG/1
150 TABLET ORAL ONCE
Qty: 1 TABLET | Refills: 0 | Status: SHIPPED | OUTPATIENT
Start: 2021-04-08 | End: 2021-04-08

## 2021-04-27 DIAGNOSIS — R20.0 NUMBNESS IN LEFT LEG: ICD-10-CM

## 2021-04-27 DIAGNOSIS — M17.12 PRIMARY OSTEOARTHRITIS OF LEFT KNEE: Primary | ICD-10-CM

## 2021-04-27 DIAGNOSIS — M10.072 ACUTE IDIOPATHIC GOUT INVOLVING TOE OF LEFT FOOT: ICD-10-CM

## 2021-04-27 DIAGNOSIS — I82.4Z2 LOWER LEG DVT (DEEP VENOUS THROMBOEMBOLISM), ACUTE, LEFT (HCC): Primary | ICD-10-CM

## 2021-04-27 RX ORDER — RIVAROXABAN 20 MG/1
20 TABLET, FILM COATED ORAL DAILY
Qty: 90 TABLET | Refills: 3 | Status: SHIPPED
Start: 2021-04-27 | End: 2021-11-09

## 2021-04-27 NOTE — TELEPHONE ENCOUNTER
Patient called and request a refill on her XARELTO 20 MG TABS tablet, she will be out before her next ov.

## 2021-04-28 RX ORDER — ALLOPURINOL 300 MG/1
300 TABLET ORAL DAILY
Qty: 90 TABLET | Refills: 3 | Status: SHIPPED
Start: 2021-04-28 | End: 2022-06-24

## 2021-05-03 ENCOUNTER — OFFICE VISIT (OUTPATIENT)
Dept: FAMILY MEDICINE CLINIC | Age: 66
End: 2021-05-03
Payer: COMMERCIAL

## 2021-05-03 VITALS
HEART RATE: 75 BPM | DIASTOLIC BLOOD PRESSURE: 76 MMHG | RESPIRATION RATE: 16 BRPM | SYSTOLIC BLOOD PRESSURE: 126 MMHG | HEIGHT: 59 IN | BODY MASS INDEX: 38.68 KG/M2 | TEMPERATURE: 97.6 F | WEIGHT: 191.9 LBS | OXYGEN SATURATION: 95 %

## 2021-05-03 DIAGNOSIS — G89.29 CHRONIC BACK PAIN GREATER THAN 3 MONTHS DURATION: ICD-10-CM

## 2021-05-03 DIAGNOSIS — M54.9 CHRONIC BACK PAIN GREATER THAN 3 MONTHS DURATION: ICD-10-CM

## 2021-05-03 DIAGNOSIS — I10 ESSENTIAL HYPERTENSION: ICD-10-CM

## 2021-05-03 DIAGNOSIS — L65.9 HAIR LOSS: ICD-10-CM

## 2021-05-03 DIAGNOSIS — N64.4 BREAST TENDERNESS IN FEMALE: ICD-10-CM

## 2021-05-03 DIAGNOSIS — E11.9 TYPE 2 DIABETES MELLITUS WITHOUT COMPLICATION, WITHOUT LONG-TERM CURRENT USE OF INSULIN (HCC): Primary | ICD-10-CM

## 2021-05-03 LAB
CHP ED QC CHECK: NORMAL
GLUCOSE BLD-MCNC: 132 MG/DL
HBA1C MFR BLD: 6.5 %

## 2021-05-03 PROCEDURE — 83036 HEMOGLOBIN GLYCOSYLATED A1C: CPT | Performed by: NURSE PRACTITIONER

## 2021-05-03 PROCEDURE — 1036F TOBACCO NON-USER: CPT | Performed by: NURSE PRACTITIONER

## 2021-05-03 PROCEDURE — G8427 DOCREV CUR MEDS BY ELIG CLIN: HCPCS | Performed by: NURSE PRACTITIONER

## 2021-05-03 PROCEDURE — 99214 OFFICE O/P EST MOD 30 MIN: CPT | Performed by: NURSE PRACTITIONER

## 2021-05-03 PROCEDURE — 82962 GLUCOSE BLOOD TEST: CPT | Performed by: NURSE PRACTITIONER

## 2021-05-03 PROCEDURE — 2022F DILAT RTA XM EVC RTNOPTHY: CPT | Performed by: NURSE PRACTITIONER

## 2021-05-03 PROCEDURE — G8417 CALC BMI ABV UP PARAM F/U: HCPCS | Performed by: NURSE PRACTITIONER

## 2021-05-03 PROCEDURE — 3044F HG A1C LEVEL LT 7.0%: CPT | Performed by: NURSE PRACTITIONER

## 2021-05-03 PROCEDURE — 3017F COLORECTAL CA SCREEN DOC REV: CPT | Performed by: NURSE PRACTITIONER

## 2021-05-03 RX ORDER — BLOOD-GLUCOSE METER
EACH MISCELLANEOUS
COMMUNITY
Start: 2021-03-08

## 2021-05-03 RX ORDER — ISOPROPYL ALCOHOL 0.7 ML/ML
SWAB TOPICAL
COMMUNITY
Start: 2021-03-08

## 2021-05-03 RX ORDER — BLOOD GLUCOSE CONTROL HIGH,LOW
EACH MISCELLANEOUS
COMMUNITY
Start: 2021-03-08

## 2021-05-03 ASSESSMENT — ENCOUNTER SYMPTOMS
BACK PAIN: 1
CONSTIPATION: 0
VOICE CHANGE: 0
RHINORRHEA: 0
SORE THROAT: 0
SINUS PRESSURE: 0
TROUBLE SWALLOWING: 0
ABDOMINAL PAIN: 0
VOMITING: 0
CHEST TIGHTNESS: 0
SINUS PAIN: 0
COLOR CHANGE: 0
DIARRHEA: 0
FACIAL SWELLING: 0
SHORTNESS OF BREATH: 0
NAUSEA: 0
COUGH: 0
WHEEZING: 0

## 2021-05-03 NOTE — PROGRESS NOTES
OFFICE PROGRESS NOTE  [address]  [address] [address]  Dept: [phone]   Chief Complaint   Patient presents with    Diabetes    Hypertension    Alopecia       ASSESSMENT/PLAN   1. Type 2 diabetes mellitus without complication, without long-term current use of insulin (HCC)  Assessment & Plan:   Well-controlled, continue current medications, continue current treatment plan, medication adherence emphasized and lifestyle modifications recommended  Orders:  -     POCT glycosylated hemoglobin (Hb A1C)  -     POCT Glucose  -     Lipid Panel; Future  2. Essential hypertension  Assessment & Plan:   Well-controlled, continue current medications and treatment plan. Work on weight loss and after labs reviewed may need to adjust lipids  Orders:  -     Lipid Panel; Future  3. Hair loss  Assessment & Plan:   New stop the Biotin and check labs in 6 weeks. Orders:  -     Fe+TIBC+Don; Future  -     Vitamin B12 & Folate; Future  -     TSH without Reflex; Future  -     T4, Free; Future  4. Breast tenderness in female  Assessment & Plan:   New exam normal today and normal mammogram in October will image in 3 months if symptoms persists as she had Covid vaccine 3/29/21  5. Chronic back pain greater than 3 months duration  Assessment & Plan:   Uncontrolled, continue current medications, continue current treatment plan, medication adherence emphasized, lifestyle modifications recommended and keep appt with [facility], explained it will take about a year for her symptoms to resolve. Reviewed notes from Dr Kaden Martino 4/2021  Reviewed radiology MRI 4/2021    Discussed weight loss, Discussed exercising 30 minutes daily and Discussed taking medications as directed and adverse effects    Return in about 3 months (around 8/3/2021) for DM, breast, hyperlipidemia. HPI:     63 y.o. female presents today for follow-up of diabetes albarous.   In-office bloodsugar is: Results for POC orders placed in visit on 05/03/21   POCT glycosylated hemoglobin (Hb A1C)   Result Value Ref Range    Hemoglobin A1C 6.5 %   POCT Glucose   Result Value Ref Range    Glucose 132 mg/dL    QC OK? Patient reports being compliant to low carbohydrate diet and increasing weekly exercises. Currently, the patient is treated with medication(s): metformin 500 mg daily, Januvia 25 mg daily. Patient reports checking home blood sugar 1 times per day. Patient states home blood sugar ranges from 106 - 150. Patient denies hypoglycemic episodes and understand to take sweetened beverages or a snack if hypoglycemic symptoms are suspected. Hypertension: Patient here for follow-up of elevated blood pressure. She is exercising walking more and is adherent to low salt diet.  Blood pressure is well controlled at home. Cardiac symptoms none. Patient denies chest pain, chest pressure/discomfort, claudication, dyspnea, exertional chest pressure/discomfort, fatigue, irregular heart beat, lower extremity edema, near-syncope, orthopnea, palpitations, paroxysmal nocturnal dyspnea, syncope and tachypnea.  Cardiovascular risk factors: diabetes mellitus, dyslipidemia, hypertension, obesity (BMI >= 30 kg/m2) and sedentary lifestyle. Use of agents associated with hypertension: NSAIDS. History of target organ damage: none. She is complaining of hair loss when brushing her hair over the last month or so and she is also pulling hair off her clothes. She did start Biotin  And discussed it will effect her TSH. Recommend holding the Biotin for 6 weeks and checking labs. She is having pain in the left breast for several months. She denies any discharge and no masses. SO states it has been long before her surgery. She did have normal mammogram in October. Had Covid vaccine 3/29/21 in left arm. She is going to [facility] on 5/5/21 and will be seeing [address] [place] [address] for second opinion.  She is complaining of numbness in her upper back and pain in the left thoracic, also pain in her lower back. She is in PT at [facility] and is slowly improving. She is complaining of excessive gas and can't hold it, recommend watching diet and taking Gas - X 30 minutes prior to eating. Continue omeprazole as directed. Today's vital signs are as follows:  /76   Pulse 75   Temp 97.6 °F (36.4 °C)   Resp 16   Ht 4' 11\" (1.499 m)   Wt 191 lb 14.4 oz (87 kg)   LMP  (LMP Unknown)   SpO2 95%   BMI 38.76 kg/m²     Lab Results   Component Value Date    LABA1C 6.5 05/03/2021     Pain scale:7/10 in her back she has made an appointment for second opinion to [facility]. Current Outpatient Medications:     allopurinol (ZYLOPRIM) 300 MG tablet, TAKE 1 TABLET BY MOUTH DAILY, Disp: 90 tablet, Rfl: 3    XARELTO 20 MG TABS tablet, Take 1 tablet by mouth daily, Disp: 90 tablet, Rfl: 3    baclofen (LIORESAL) 10 MG tablet, Take 1 tablet by mouth 2 times daily, Disp: 60 tablet, Rfl: 1    HYDROcodone-acetaminophen (NORCO) 5-325 MG per tablet, Take 1 tablet by mouth every 6 hours as needed for Pain for up to 30 days. Intended supply: 3 days.  Take lowest dose possible to manage pain, Disp: 120 tablet, Rfl: 0    diclofenac sodium (VOLTAREN) 1 % GEL, Apply topically 2 times daily, Disp: 100 g, Rfl: 2    SITagliptin (JANUVIA) 25 MG tablet, Take 1 tablet by mouth daily, Disp: 30 tablet, Rfl: 3    atorvastatin (LIPITOR) 10 MG tablet, Take 1 tablet by mouth nightly Indications: High Amount of Fats in the Blood, Disp: 90 tablet, Rfl: 3    amitriptyline (ELAVIL) 25 MG tablet, Take 25 mg by mouth At bedtime as needed, Disp: , Rfl:     Handicap Placard MISC, by Does not apply route Start 11/23/2020 expires 11/22/2023, Disp: 1 each, Rfl: 0    atenolol (TENORMIN) 50 MG tablet, Take 1 tablet by mouth daily, Disp: 90 tablet, Rfl: 3    omeprazole (PRILOSEC) 20 MG delayed release capsule, Take 1 capsule by mouth daily, Disp: 90 capsule, Readings from Last 3 Encounters:   05/03/21 191 lb 14.4 oz (87 kg)   03/04/21 188 lb (85.3 kg)   12/29/20 212 lb (96.2 kg)                       Vitals:    05/03/21 1058   BP: 126/76   Pulse: 75   Resp: 16   Temp: 97.6 °F (36.4 °C)   SpO2: 95%   Weight: 191 lb 14.4 oz (87 kg)   Height: 4' 11\" (1.499 m)       General: Alert and oriented to person, place, and time, well developed and well nourished, obese,  in no acute distress, sitting in [address] is able to walk to the exam table. SKIN: Warm and dry, intact without any rash, masses or lesions  HEAD: normocephalic, atraumatic  Neck: supple and non-tender without mass, trachea midline, no cervical lymphadenopathy, no bruit, no thyromegaly or nodules  Cardiovascular: regular rate and regular rhythm, normal S1 and S2,  no murmurs, rubs, clicks, or gallop. Distal pulses intact, no carotid bruits. No edema  Pulmonary/Chest: clear to auscultation bilaterally, no wheezes, rales or rhonchi, normal air movement, no respiratory distress  Abdomen: soft, non-tender, non-distended, normal bowel sounds, no masses or hepatosplenomegaly  Musculoskeletal: decreased ROM in lumbar spine and left knee with weakness in left lower leg, no joint swelling, deformity noted, well healed incisions in thoracic and lumbar spine. Subjective numbness across upper back. Extensive DVT left leg on Xarelto  Extremities: no clubbing, cyanosis, or edema. Psychiatric: Good eye contact, normal mood and affect, answers questions appropriately    I have reviewed my findings and recommendations with [place].     Myles Morales, NP-C, FNP-BC

## 2021-05-03 NOTE — ASSESSMENT & PLAN NOTE
Well-controlled, continue current medications, continue current treatment plan, medication adherence emphasized and lifestyle modifications recommended

## 2021-05-03 NOTE — ASSESSMENT & PLAN NOTE
New exam normal today and normal mammogram in October will image in 3 months if symptoms persists as she had Covid vaccine 3/29/21

## 2021-05-03 NOTE — ASSESSMENT & PLAN NOTE
Well-controlled, continue current medications and treatment plan.  Work on weight loss and after labs reviewed may need to adjust lipids

## 2021-05-20 ENCOUNTER — TELEPHONE (OUTPATIENT)
Dept: FAMILY MEDICINE CLINIC | Age: 66
End: 2021-05-20

## 2021-05-20 DIAGNOSIS — K64.4 EXTERNAL BLEEDING HEMORRHOIDS: Primary | ICD-10-CM

## 2021-05-20 NOTE — TELEPHONE ENCOUNTER
Patient called and stated she recently had a colonoscopy and there recommending that she has surgery for Hemorid removal.She stated her surgeon that she usually sees is retiring. Patient is wanting to know if you have any recommendation ? Danna aGo      Please advise thank you

## 2021-06-01 ENCOUNTER — OFFICE VISIT (OUTPATIENT)
Dept: SURGERY | Age: 66
End: 2021-06-01
Payer: MEDICAID

## 2021-06-01 ENCOUNTER — TELEPHONE (OUTPATIENT)
Dept: SURGERY | Age: 66
End: 2021-06-01

## 2021-06-01 VITALS
TEMPERATURE: 97.4 F | HEIGHT: 59 IN | DIASTOLIC BLOOD PRESSURE: 73 MMHG | SYSTOLIC BLOOD PRESSURE: 120 MMHG | HEART RATE: 79 BPM | BODY MASS INDEX: 38.51 KG/M2 | WEIGHT: 191 LBS | OXYGEN SATURATION: 98 % | RESPIRATION RATE: 18 BRPM

## 2021-06-01 DIAGNOSIS — Z01.818 PRE-OP TESTING: Primary | ICD-10-CM

## 2021-06-01 PROCEDURE — 99203 OFFICE O/P NEW LOW 30 MIN: CPT | Performed by: SURGERY

## 2021-06-01 PROCEDURE — 3017F COLORECTAL CA SCREEN DOC REV: CPT | Performed by: SURGERY

## 2021-06-01 PROCEDURE — G8427 DOCREV CUR MEDS BY ELIG CLIN: HCPCS | Performed by: SURGERY

## 2021-06-01 PROCEDURE — 1036F TOBACCO NON-USER: CPT | Performed by: SURGERY

## 2021-06-01 PROCEDURE — G8417 CALC BMI ABV UP PARAM F/U: HCPCS | Performed by: SURGERY

## 2021-06-01 RX ORDER — OMEPRAZOLE 40 MG/1
CAPSULE, DELAYED RELEASE ORAL
COMMUNITY
Start: 2021-05-19 | End: 2022-01-04 | Stop reason: SDUPTHER

## 2021-06-01 NOTE — TELEPHONE ENCOUNTER
Prior Authorization Form:      DEMOGRAPHICS:                     Patient Name:  Caralyn Councilman  Patient :  1955            Insurance:  Payor: [address] / Plan: [address] [place] [address] Way / Product Type: *No Product type* /   Insurance ID Number:    Payor/Plan Subscr  Sex Relation Sub. Ins. ID Effective Group Num   1. [employer] [facility] [facility] J 1955 Female Self [id number]                                   [address]   2.  [place] -* [facility] 1955 [facility] Self [id number] 3/15/21                                    [address]         DIAGNOSIS & PROCEDURE:                       Procedure/Operation: Hemorrhoidectomy           CPT Code: 05322    Diagnosis:  Hemorrhoids    ICD10 Code: K64.9    Location:  [address] [place] [address]    Surgeon:  Gloria Gonsalez    [facility] INFORMATION:                          Date: 6.10.2021    Time:               Anesthesia:  General                                                       Status:  Outpatient        Special Comments:         Electronically signed by Dexter Malin on 2021 at 3:39 PM

## 2021-06-01 NOTE — PROGRESS NOTES
General Surgery History and Physical  [facility]    Patient's Name/Date of Birth: Bryce Solorio / [birth year]    Date: June 1, 2021     Surgeon: Alyssa Mills MD    PCP: VISHNU Jonas CNP     Chief Complaint: Rectal bleeding, anal pain    HPI:   Brcye Solorio is a 63 y.o. female who presents for evaluation of rectal bleeding and anal pain. Timing is intermittent, radiation to anus, alleviated by abstaining from bowel movements, not eating and started several months ago and severity is 8/10. Patient has  colonoscopy in last 5 years. Denies previous interventions for hemorrhoid treatment. Has tried fiber supplementation for greater than 6 weeks with little to no improvement. Has tried Proctofoam, stool softeners with little to no improvement. No history of blood transfusion. Patient is on Xarelto for recent diagnosis of extensive DVT in the left lower extremity. She reports increased rectal bleeding since starting her blood thinner.     Patient Active Problem List   Diagnosis    Essential hypertension    Chronic back pain greater than 3 months duration    Fatigue    Mixed hyperlipidemia    Vitamin D insufficiency    Gastroesophageal reflux disease without esophagitis    Central stenosis of spinal canal    Spondylolisthesis at L4-L5 level    Acute left lumbar radiculopathy    Chronic pain of left knee    Morbid obesity with BMI of 40.0-44.9, adult ([facility] [address] [facility]    Personal history of colon cancer    Sacroiliitis (HCC)    Telogen effluvium    Contact dermatitis    Acute pain of right shoulder    Chronic pain syndrome    Cervical radiculopathy    Cervical disc disorder    Type 2 diabetes mellitus without complication, without long-term current use of insulin (HCC)    Facet arthropathy    Fibromyalgia    Primary osteoarthritis of left knee    Constipation    Malignant neoplasm of sigmoid colon (HCC)    Chronic pain of multiple joints    Osteoarthritis involving multiple joints on both sides of body    Cervical spondylosis without myelopathy    DDD (degenerative disc disease), cervical    DDD (degenerative disc disease), lumbar    Numbness in left leg    Spinal stenosis    Back pain    Weakness of both legs    Ambulatory dysfunction    Urinary retention    Lower leg DVT (deep venous thromboembolism), acute, left (HCC)    Grade I hemorrhoids    Hair loss    Breast tenderness in female       Past Medical History:   Diagnosis Date    Acute left ankle pain 6/1/2020    Cancer ([facility] [address] 2017    colon (treated surgically)    Chronic back pain     Diabetes mellitus ([facility] [address]     Drug-induced constipation 2/18/2020    Eosinophil count raised 11/20/2019    Fall at home 12/11/2017    Fibromyalgia     GERD (gastroesophageal reflux disease)     Hypercalcemia 11/20/2019    Hyperlipidemia     Hypertension     Obesity     Osteoarthritis     PONV (postoperative nausea and vomiting)     Postmenopausal bleeding 10/5/2017    Rib contusion, left, initial encounter 2/18/2020    Urinary incontinence        Past Surgical History:   Procedure Laterality Date    ANESTHESIA NERVE BLOCK Bilateral 8/15/2019    BILATERAL INTRA-ARTICULAR FACET JOINT INJECTION WITH FLUOROSCOPIC GUIDANCE AT L4-5, L5-S1 WITH IV SEDATION performed by Jackie Mccord DO at [address] [facility] AND CURETTAGE OF UTERUS N/A 10/23/2019    DILATATION AND CURETTAGE HYSTEROSCOPY performed by Gelacio You MD at [address] [facility] Right 7/11/2019    C7-T1 EPIDURAL STEROID INJECTION #1 TOWARD THE RIGHT performed by Sonia Barclay DO at [address] [facility] Bilateral     LUMBAR SPINE SURGERY N/A 12/24/2020    L4-L5  POSTERIOR LUMBAR INTERBODY FUSION, T2-T3 DECOMPRESSIVE LAMINECTOMY performed by Cordell Killian MD at [address] Right 12/28/2017    right L4-5 transforaminal epidural #1    NERVE BLOCK Left 11/29/2018    si inj    NERVE BLOCK Bilateral 08/15/2019    bilateral intra-articular facet joint injection with flouroscopic guidance at L4-S1 with iv sedation    ND INJECT SI JOINT ARTHRGRPHY&/ANES/STEROID W/IMAGE Left 11/29/2018    LEFT SACROILIAC JOINT INJECTION WITH X-RAY performed by Maybell Severe, DO at [address] Left 2005    TONSILLECTOMY  28 YRS OLD    TUBAL LIGATION         Allergies   Allergen Reactions    Diclofenac     Diclofenac Sodium      Nauseated, Diarrhea    Farxiga [Dapagliflozin] Itching     Vaginal itching with Farxiga, Jardiance     Lisinopril Other (See Comments)     Profound malaise    Lyrica [Pregabalin] Other (See Comments)     Confusion    Sulfa Antibiotics Rash       The patient has a family history that is negative for severe cardiovascular or respiratory issues, negative for reaction to anesthesia. Time spent reviewing past medical, surgical, social and family history, vitals, nursing assessment and images. No changes from above documented history.     Social History     Socioeconomic History    Marital status:      Spouse name: Not on file    Number of children: Not on file    Years of education: Not on file    Highest education level: Not on file   Occupational History    Not on file   Tobacco Use    Smoking status: Never Smoker    Smokeless tobacco: Never Used   Vaping Use    Vaping Use: Never used   Substance and Sexual Activity    Alcohol use: Not Currently     Alcohol/week: 0.0 standard drinks     Comment: Rarely    Drug use: No    Sexual activity: Not on file   Other Topics Concern    Not on file   Social History Narrative    Not on file     Social Determinants of Health     Financial Resource Strain:     Difficulty of Paying Living Expenses:    Food Insecurity:     Worried About Running Out of Food in the Last Year:     [address] in the Last Year:    Transportation Needs:     Lack of Transportation (Medical):  Lack of Transportation (Non-Medical):    Physical Activity:     Days of Exercise per Week:     Minutes of Exercise per Session:    Stress:     Feeling of Stress :    Social Connections:     Frequency of Communication with Friends and Family:     Frequency of Social Gatherings with Friends and Family:     Attends L[employer] Services:     Active Member of Clubs or Organizations:     Attends Club or Organization Meetings:     Marital Status:    Intimate Partner Violence:     Fear of Current or Ex-Partner:     Emotionally Abused:     Physically Abused:     Sexually Abused:            Review of Systems    A complete 10 system review was performed and are otherwise negative unless mentioned in the above HPI. Specific negatives are listed below but may not include all those reviewed.     General ROS: negative obtundation, AMS  ENT ROS: negative rhinorrhea, epistaxis  Allergy and Immunology ROS: negative itchy/watery eyes or nasal congestion  Hematological and Lymphatic ROS: negative spontaneous bleeding or bruising  Endocrine ROS: negative  lethargy, mood swings, palpitations or polydipsia/polyuria  Respiratory ROS: negative sputum changes, stridor, tachypnea or wheezing  Cardiovascular ROS: negative for - loss of consciousness, murmur or orthopnea  Gastrointestinal ROS: negative for -  hematemesis  Genito-Urinary ROS: negative for -  genital discharge or hematuria  Musculoskeletal ROS: negative for - focal weakness, gangrene  Psych/Neuro ROS: negative for - visual or auditory hallucinations, suicidal ideation    Physical exam:   /73 (Site: Right Upper Arm, Position: Sitting, Cuff Size: Medium Adult)   Pulse 79   Temp 97.4 °F (36.3 °C) (Temporal)   Resp 18   Ht 4' 11\" (1.499 m)   Wt 191 lb (86.6 kg)   LMP  (LMP Unknown)   SpO2 98%   BMI 38.58 kg/m²   General appearance:  NAD, appears stated age  Head: NCAT, PERRLA, EOMI, red conjunctiva  Neck: supple, no masses, trachea midline  Lungs: Equal chest rise bilateral, no retractions, no wheezing  Heart: Reg rate  Abdomen: soft, nontender, nondistended  Skin; warm and dry, no cyanosis  Gu: no cva tenderness  Rectal: No bleeding, small external hemorrhoids, non thrombosed, painful ELI, no masses  Extremities: atraumatic, no focal motor deficits, no open wounds  Psych: No tremor, visual hallucinations      Radiology:  N/A        Assessment:  Jim Escobar is a 63 y.o. female with anal pain and rectal bleeding, history of recent extensive DVT of the left lower extremity on Xarelto  Patient Active Problem List   Diagnosis    Essential hypertension    Chronic back pain greater than 3 months duration    Fatigue    Mixed hyperlipidemia    Vitamin D insufficiency    Gastroesophageal reflux disease without esophagitis    Central stenosis of spinal canal    Spondylolisthesis at L4-L5 level    Acute left lumbar radiculopathy    Chronic pain of left knee    Morbid obesity with BMI of 40.0-44.9, adult ([facility])    Personal history of colon cancer    Sacroiliitis ([facility])    Telogen effluvium    Contact dermatitis    Acute pain of right shoulder    Chronic pain syndrome    Cervical radiculopathy    Cervical disc disorder    Type 2 diabetes mellitus without complication, without long-term current use of insulin ([facility])    Facet arthropathy    Fibromyalgia    Primary osteoarthritis of left knee    Constipation    Malignant neoplasm of sigmoid colon ([facility])    Chronic pain of multiple joints    Osteoarthritis involving multiple joints on both sides of body    Cervical spondylosis without myelopathy    DDD (degenerative disc disease), cervical    DDD (degenerative disc disease), lumbar    Numbness in left leg    Spinal stenosis    Back pain    Weakness of both legs    Ambulatory dysfunction    Urinary retention    Lower leg DVT (deep venous thromboembolism), acute, left ([facility])    Grade I hemorrhoids    Hair loss    Breast tenderness in female         Plan:  Hold Xarelto 2 days prior to surgery   Bridge with Lovenox   OR for Exam under anesthesia hemorrhoidectomy  Discussed the risk, benefits and alternatives of surgery including wound infections, bleeding, recurrence, injury to surrounding structures, incontinence, pain and the risks of general anesthetic including MI, CVA, sudden death or reactions to anesthetic medications. The patient understands the risks and alternatives and the possibility of converting to an open procedure. All questions were answered to the patient's satisfaction and they freely signed the consent.              Sarah Hernandez MD  3:23 PM  6/1/2021

## 2021-06-01 NOTE — TELEPHONE ENCOUNTER
Per the order of Dr. Orin Collins, patient has been scheduled for Hemorrhoidectomy  on 6.10.2021. Patient provided with procedure information during office visit and scheduled for post op follow up appointment. Patient instructed to please contact our office with any questions. Patient instructed to hold her xarelto 2 days prior to procedure and ordered to take lovenox injection 12 hours prior to procedure. Surgery scheduling form faxed to [facility] surgery scheduling and fax confirmation received. Dr. Orin Collins to enter orders.     Electronically signed by Jovan Chapa on 6/1/21 at 3:39 PM EDT

## 2021-06-02 DIAGNOSIS — Z01.818 PRE-OP TESTING: ICD-10-CM

## 2021-06-02 LAB
BACTERIA: ABNORMAL /HPF
BILIRUBIN URINE: NEGATIVE
BLOOD, URINE: ABNORMAL
CLARITY: ABNORMAL
COLOR: YELLOW
GLUCOSE URINE: NEGATIVE MG/DL
KETONES, URINE: NEGATIVE MG/DL
LEUKOCYTE ESTERASE, URINE: ABNORMAL
NITRITE, URINE: POSITIVE
PH UA: 6 (ref 5–9)
PROTEIN UA: ABNORMAL MG/DL
RBC UA: ABNORMAL /HPF (ref 0–2)
SPECIFIC GRAVITY UA: 1.01 (ref 1–1.03)
UROBILINOGEN, URINE: 0.2 E.U./DL
WBC UA: >20 /HPF (ref 0–5)

## 2021-06-03 ENCOUNTER — TELEPHONE (OUTPATIENT)
Dept: SURGERY | Age: 66
End: 2021-06-03

## 2021-06-03 DIAGNOSIS — N30.00 ACUTE CYSTITIS WITHOUT HEMATURIA: Primary | ICD-10-CM

## 2021-06-03 RX ORDER — NITROFURANTOIN 25; 75 MG/1; MG/1
100 CAPSULE ORAL 2 TIMES DAILY
Qty: 10 CAPSULE | Refills: 0 | Status: SHIPPED | OUTPATIENT
Start: 2021-06-03 | End: 2021-06-08

## 2021-06-03 NOTE — TELEPHONE ENCOUNTER
Dr. Courtney Tellez did sent antibiotics to her pharmacy and they will be there to . Patients  verbalized understanding and they will  prescription.   Electronically signed by Reggie Fox on 6/3/21 at 3:55 PM EDT

## 2021-06-03 NOTE — TELEPHONE ENCOUNTER
Patient call and wanted to know about her results of her Urinalysis. She wants to know if doctor has reviewed and what are her next steps. I advised patient that we will reach out to Dr Siri Arnold and get back with her.

## 2021-06-03 NOTE — TELEPHONE ENCOUNTER
Message sent to Dr. Courtney Tellez to discuss patients next steps.     Electronically signed by Reggie Fox on 6/3/21 at 3:42 PM EDT

## 2021-06-07 ENCOUNTER — TELEPHONE (OUTPATIENT)
Dept: SURGERY | Age: 66
End: 2021-06-07

## 2021-06-28 ENCOUNTER — TELEPHONE (OUTPATIENT)
Dept: FAMILY MEDICINE CLINIC | Age: 66
End: 2021-06-28

## 2021-06-28 DIAGNOSIS — L65.9 HAIR LOSS: ICD-10-CM

## 2021-06-28 DIAGNOSIS — E11.9 TYPE 2 DIABETES MELLITUS WITHOUT COMPLICATION, WITHOUT LONG-TERM CURRENT USE OF INSULIN (HCC): ICD-10-CM

## 2021-06-28 DIAGNOSIS — I10 ESSENTIAL HYPERTENSION: ICD-10-CM

## 2021-06-28 DIAGNOSIS — R35.0 FREQUENCY OF URINATION: Primary | ICD-10-CM

## 2021-06-28 DIAGNOSIS — R35.0 FREQUENCY OF URINATION: ICD-10-CM

## 2021-06-28 LAB
BACTERIA: ABNORMAL /HPF
BILIRUBIN URINE: NEGATIVE
BLOOD, URINE: NEGATIVE
CHOLESTEROL, TOTAL: 162 MG/DL (ref 0–199)
CLARITY: CLEAR
COLOR: YELLOW
EPITHELIAL CELLS, UA: ABNORMAL /HPF
FOLATE: 13.6 NG/ML (ref 4.8–24.2)
GLUCOSE URINE: NEGATIVE MG/DL
HDLC SERPL-MCNC: 51 MG/DL
IRON SATURATION: 5 % (ref 15–50)
IRON: 23 MCG/DL (ref 37–145)
KETONES, URINE: NEGATIVE MG/DL
LDL CHOLESTEROL CALCULATED: 67 MG/DL (ref 0–99)
LEUKOCYTE ESTERASE, URINE: ABNORMAL
NITRITE, URINE: NEGATIVE
PH UA: 6 (ref 5–9)
PROTEIN UA: NEGATIVE MG/DL
RBC UA: ABNORMAL /HPF (ref 0–2)
SPECIFIC GRAVITY UA: 1.02 (ref 1–1.03)
TOTAL IRON BINDING CAPACITY: 483 MCG/DL (ref 250–450)
TRIGL SERPL-MCNC: 221 MG/DL (ref 0–149)
UROBILINOGEN, URINE: 0.2 E.U./DL
VITAMIN B-12: 225 PG/ML (ref 211–946)
VLDLC SERPL CALC-MCNC: 44 MG/DL
WBC UA: ABNORMAL /HPF (ref 0–5)

## 2021-06-28 NOTE — TELEPHONE ENCOUNTER
Patient called and stated for the past five day's she has had dark and cloudy urine, some lower back pain and frequency. She wanted to have a urine checked due to the sx. She was at the lab getting bw done. A urine test was ordered due to sx.

## 2021-06-29 DIAGNOSIS — N30.00 ACUTE CYSTITIS WITHOUT HEMATURIA: Primary | ICD-10-CM

## 2021-06-29 DIAGNOSIS — D50.9 IRON DEFICIENCY ANEMIA, UNSPECIFIED IRON DEFICIENCY ANEMIA TYPE: Primary | ICD-10-CM

## 2021-06-29 LAB
FERRITIN: 7 NG/ML
T4 FREE: 1.1 NG/DL (ref 0.93–1.7)
TSH SERPL DL<=0.05 MIU/L-ACNC: 4.22 UIU/ML (ref 0.27–4.2)

## 2021-06-29 RX ORDER — FERROUS SULFATE 325(65) MG
325 TABLET ORAL
Qty: 90 TABLET | Refills: 1 | Status: SHIPPED
Start: 2021-06-29 | End: 2021-11-09

## 2021-06-30 LAB — DIABETIC RETINOPATHY: NEGATIVE

## 2021-07-01 LAB — URINE CULTURE, ROUTINE: NORMAL

## 2021-07-02 ENCOUNTER — TELEPHONE (OUTPATIENT)
Dept: SURGERY | Age: 66
End: 2021-07-02

## 2021-07-02 RX ORDER — CIPROFLOXACIN 500 MG/1
500 TABLET, FILM COATED ORAL 2 TIMES DAILY
Qty: 20 TABLET | Refills: 0 | Status: SHIPPED | OUTPATIENT
Start: 2021-07-02 | End: 2021-07-12

## 2021-07-02 NOTE — TELEPHONE ENCOUNTER
Patient called and stated that she is still has UTI, she is hoping for have surgery scheduled for 7/22/21, please contact if that day is good for doctor.

## 2021-07-08 ENCOUNTER — TELEPHONE (OUTPATIENT)
Dept: SURGERY | Age: 66
End: 2021-07-08

## 2021-07-08 NOTE — TELEPHONE ENCOUNTER
Per Dr. Warden Dewitt, patient is scheduled for Exam under anesthesia, Hemorrhoidectomy at [address] [place] [address] on 8/12/21. Surgery scheduling form faxed with confirmation, surgeon's calendar updated. Dr. Warden Dewitt to enter orders. Follow up appointment scheduled. Hemorrhoidectomy surgery and sitz bath instructions mailed to patient.   Electronically signed by Miriam Pinzon RN on 7/8/2021 at 11:29 AM

## 2021-07-08 NOTE — TELEPHONE ENCOUNTER
Prior Authorization Form:      DEMOGRAPHICS:                     Patient Name:  Elsi Rodriguez  Patient :  1955            Insurance:  Payor: [address] / Plan: [address] [place] [address] Way / Product Type: *No Product type* /   Insurance ID Number:    Payor/Plan Subscr  Sex Relation Sub.  Ins. ID Effective Group Num   1. [facility] 1955 Female Self [id number]                                   [address]         DIAGNOSIS & PROCEDURE:                       Procedure/Operation: Exam under anesthesia, Hemorrhoidectomy           CPT Code: 50033 + 67534    Diagnosis:  Hemorrhoids    ICD10 Code: K64.9    Location:  [facility]    Surgeon:  Charles Brown INFORMATION:                          Date: 21    Time: TBD              Anesthesia:  General                                                       Status:  Outpatient        Special Comments:         Electronically signed by Darien Ramirez RN on 2021 at 11:29 AM

## 2021-08-04 ENCOUNTER — OFFICE VISIT (OUTPATIENT)
Dept: FAMILY MEDICINE CLINIC | Age: 66
End: 2021-08-04
Payer: MEDICARE

## 2021-08-04 ENCOUNTER — VIRTUAL VISIT (OUTPATIENT)
Dept: SURGERY | Age: 66
End: 2021-08-04
Payer: MEDICARE

## 2021-08-04 VITALS
TEMPERATURE: 97.8 F | BODY MASS INDEX: 40.92 KG/M2 | WEIGHT: 203 LBS | HEART RATE: 73 BPM | HEIGHT: 59 IN | SYSTOLIC BLOOD PRESSURE: 132 MMHG | RESPIRATION RATE: 16 BRPM | OXYGEN SATURATION: 97 % | DIASTOLIC BLOOD PRESSURE: 76 MMHG

## 2021-08-04 DIAGNOSIS — M17.12 PRIMARY OSTEOARTHRITIS OF LEFT KNEE: ICD-10-CM

## 2021-08-04 DIAGNOSIS — E78.2 MIXED HYPERLIPIDEMIA: ICD-10-CM

## 2021-08-04 DIAGNOSIS — E11.9 TYPE 2 DIABETES MELLITUS WITHOUT COMPLICATION, WITHOUT LONG-TERM CURRENT USE OF INSULIN (HCC): Primary | ICD-10-CM

## 2021-08-04 DIAGNOSIS — I10 ESSENTIAL HYPERTENSION: ICD-10-CM

## 2021-08-04 DIAGNOSIS — K64.1 PROLAPSED INTERNAL HEMORRHOIDS, GRADE 2: Primary | ICD-10-CM

## 2021-08-04 DIAGNOSIS — Z12.31 SCREENING MAMMOGRAM, ENCOUNTER FOR: ICD-10-CM

## 2021-08-04 DIAGNOSIS — N64.4 BREAST TENDERNESS IN FEMALE: ICD-10-CM

## 2021-08-04 DIAGNOSIS — M15.9 OSTEOARTHRITIS INVOLVING MULTIPLE JOINTS ON BOTH SIDES OF BODY: ICD-10-CM

## 2021-08-04 PROBLEM — L65.9 HAIR LOSS: Status: RESOLVED | Noted: 2021-05-03 | Resolved: 2021-08-04

## 2021-08-04 PROBLEM — R33.9 URINARY RETENTION: Status: RESOLVED | Noted: 2020-12-21 | Resolved: 2021-08-04

## 2021-08-04 PROBLEM — M25.511 ACUTE PAIN OF RIGHT SHOULDER: Status: RESOLVED | Noted: 2019-05-08 | Resolved: 2021-08-04

## 2021-08-04 PROBLEM — L25.9 CONTACT DERMATITIS: Status: RESOLVED | Noted: 2019-04-22 | Resolved: 2021-08-04

## 2021-08-04 PROBLEM — M54.16 ACUTE LEFT LUMBAR RADICULOPATHY: Status: RESOLVED | Noted: 2017-12-28 | Resolved: 2021-08-04

## 2021-08-04 PROBLEM — C18.7 MALIGNANT NEOPLASM OF SIGMOID COLON (HCC): Status: RESOLVED | Noted: 2020-06-17 | Resolved: 2021-08-04

## 2021-08-04 LAB
CHP ED QC CHECK: NORMAL
GLUCOSE BLD-MCNC: 112 MG/DL
HBA1C MFR BLD: 6.4 %

## 2021-08-04 PROCEDURE — G8417 CALC BMI ABV UP PARAM F/U: HCPCS | Performed by: NURSE PRACTITIONER

## 2021-08-04 PROCEDURE — G8427 DOCREV CUR MEDS BY ELIG CLIN: HCPCS | Performed by: NURSE PRACTITIONER

## 2021-08-04 PROCEDURE — 99214 OFFICE O/P EST MOD 30 MIN: CPT | Performed by: NURSE PRACTITIONER

## 2021-08-04 PROCEDURE — 3017F COLORECTAL CA SCREEN DOC REV: CPT | Performed by: NURSE PRACTITIONER

## 2021-08-04 PROCEDURE — 1036F TOBACCO NON-USER: CPT | Performed by: NURSE PRACTITIONER

## 2021-08-04 PROCEDURE — 83036 HEMOGLOBIN GLYCOSYLATED A1C: CPT | Performed by: NURSE PRACTITIONER

## 2021-08-04 PROCEDURE — 99441 PR PHYS/QHP TELEPHONE EVALUATION 5-10 MIN: CPT | Performed by: SURGERY

## 2021-08-04 PROCEDURE — 2022F DILAT RTA XM EVC RTNOPTHY: CPT | Performed by: NURSE PRACTITIONER

## 2021-08-04 PROCEDURE — 3044F HG A1C LEVEL LT 7.0%: CPT | Performed by: NURSE PRACTITIONER

## 2021-08-04 PROCEDURE — 82962 GLUCOSE BLOOD TEST: CPT | Performed by: NURSE PRACTITIONER

## 2021-08-04 RX ORDER — PRAMOXINE HYDROCHLORIDE 10 MG/G
AEROSOL, FOAM TOPICAL
Qty: 1 CAN | Refills: 3 | Status: SHIPPED
Start: 2021-08-04 | End: 2021-08-05 | Stop reason: CLARIF

## 2021-08-04 RX ORDER — TRAMADOL HYDROCHLORIDE 50 MG/1
50 TABLET ORAL EVERY 6 HOURS PRN
COMMUNITY
End: 2021-11-09

## 2021-08-04 RX ORDER — ATENOLOL 50 MG/1
50 TABLET ORAL DAILY
Qty: 90 TABLET | Refills: 3 | Status: SHIPPED
Start: 2021-08-04 | End: 2022-07-21 | Stop reason: SDUPTHER

## 2021-08-04 RX ORDER — ATORVASTATIN CALCIUM 10 MG/1
10 TABLET, FILM COATED ORAL NIGHTLY
Qty: 90 TABLET | Refills: 3 | Status: ON HOLD
Start: 2021-08-04 | End: 2022-05-09 | Stop reason: HOSPADM

## 2021-08-04 SDOH — ECONOMIC STABILITY: FOOD INSECURITY: WITHIN THE PAST 12 MONTHS, THE FOOD YOU BOUGHT JUST DIDN'T LAST AND YOU DIDN'T HAVE MONEY TO GET MORE.: NEVER TRUE

## 2021-08-04 SDOH — ECONOMIC STABILITY: TRANSPORTATION INSECURITY
IN THE PAST 12 MONTHS, HAS LACK OF TRANSPORTATION KEPT YOU FROM MEETINGS, WORK, OR FROM GETTING THINGS NEEDED FOR DAILY LIVING?: NO

## 2021-08-04 SDOH — ECONOMIC STABILITY: HOUSING INSECURITY
IN THE LAST 12 MONTHS, WAS THERE A TIME WHEN YOU DID NOT HAVE A STEADY PLACE TO SLEEP OR SLEPT IN A SHELTER (INCLUDING NOW)?: NO

## 2021-08-04 SDOH — ECONOMIC STABILITY: TRANSPORTATION INSECURITY
IN THE PAST 12 MONTHS, HAS THE LACK OF TRANSPORTATION KEPT YOU FROM MEDICAL APPOINTMENTS OR FROM GETTING MEDICATIONS?: NO

## 2021-08-04 SDOH — ECONOMIC STABILITY: INCOME INSECURITY: IN THE LAST 12 MONTHS, WAS THERE A TIME WHEN YOU WERE NOT ABLE TO PAY THE MORTGAGE OR RENT ON TIME?: NO

## 2021-08-04 SDOH — ECONOMIC STABILITY: HOUSING INSECURITY: IN THE LAST 12 MONTHS, HOW MANY PLACES HAVE YOU LIVED?: 1

## 2021-08-04 SDOH — ECONOMIC STABILITY: FOOD INSECURITY: WITHIN THE PAST 12 MONTHS, YOU WORRIED THAT YOUR FOOD WOULD RUN OUT BEFORE YOU GOT MONEY TO BUY MORE.: NEVER TRUE

## 2021-08-04 ASSESSMENT — ENCOUNTER SYMPTOMS
SORE THROAT: 0
RHINORRHEA: 0
WHEEZING: 0
FACIAL SWELLING: 0
ABDOMINAL PAIN: 0
BACK PAIN: 1
VOMITING: 0
NAUSEA: 0
SINUS PRESSURE: 0
SHORTNESS OF BREATH: 0
CHEST TIGHTNESS: 0
CONSTIPATION: 0
COLOR CHANGE: 0
TROUBLE SWALLOWING: 0
DIARRHEA: 1
SINUS PAIN: 0
VOICE CHANGE: 0
COUGH: 1

## 2021-08-04 ASSESSMENT — LIFESTYLE VARIABLES
HOW MANY STANDARD DRINKS CONTAINING ALCOHOL DO YOU HAVE ON A TYPICAL DAY: 1 OR 2
HOW OFTEN DO YOU HAVE A DRINK CONTAINING ALCOHOL: NEVER

## 2021-08-04 ASSESSMENT — SOCIAL DETERMINANTS OF HEALTH (SDOH): HOW HARD IS IT FOR YOU TO PAY FOR THE VERY BASICS LIKE FOOD, HOUSING, MEDICAL CARE, AND HEATING?: NOT HARD AT ALL

## 2021-08-04 NOTE — ASSESSMENT & PLAN NOTE
Well-controlled, changes made today: discontinue the metformin due to diarrhea, monitor BS and if going over 180 she will call and I will adjust the Januvia. At this time she will continue Januvia 25 mg daily.  , medication adherence emphasized and lifestyle modifications recommended

## 2021-08-04 NOTE — PROGRESS NOTES
TELEPHONE VISIT     Bhargav Shine is a 61 y.o. female evaluated via telephone on 8/4/2021. Consent:  She and/or health care decision maker is aware that that she may receive a bill for this telephone service, depending on her insurance coverage, and has provided verbal consent to proceed: Yes    Documentation:  Patient identification was verified at the start of the visit: Yes  I communicated with the patient and/or health care decision maker about hemorrhoidectomy. Details of this discussion including any medical advice provided: No significant rectal bleeding, no pain. Wants to hold off on surgery at this time. I affirm this is a Patient Initiated Episode with a Patient who has not had a related appointment within my department in the past 7 days or scheduled within the next 24 hours. Patient's location: home address in [facility]  Physician  location other address in [facility]   Other people involved in call, none          Total Time: minutes: 5-10 minutes        The visit was conducted pursuant to the emergency declaration under the [address] [place] [address], [facility] authority and the [employer] and [employer] Act. Patient identification was verified, and a caregiver was present when appropriate. The patient was located in a state where the provider was credentialed to provide care.     Note: not billable if this call serves to triage the patient into an appointment for the relevant concern          Juno Barber MD

## 2021-08-04 NOTE — PATIENT INSTRUCTIONS
The medication list included in this document is our record of what you are currently taking, including any changes that were made at today's visit.  If you find any differences when compared to your medications at home, or have any questions that were not answered at your visit, please contact the office. Patient Education        Breast Pain: Care Instructions  Your Care Instructions     Breast tenderness and pain may come and go with your monthly periods (cyclic), or it may not follow any pattern (noncyclic). Breast pain is rarely caused by a serious health problem. You may need tests to find the cause. Follow-up care is a key part of your treatment and safety. Be sure to make and go to all appointments, and call your doctor if you are having problems. It's also a good idea to know your test results and keep a list of the medicines you take. How can you care for yourself at home? · If your doctor gave you medicine, take it exactly as prescribed. Call your doctor if you think you are having a problem with your medicine. · Take an over-the-counter pain medicine, such as acetaminophen (Tylenol), ibuprofen (Advil, Motrin), or naproxen (Aleve), to relieve pain and swelling. Read and follow all instructions on the label. · Do not take two or more pain medicines at the same time unless the doctor told you to. Many pain medicines have acetaminophen, which is Tylenol. Too much acetaminophen (Tylenol) can be harmful. · Wear a supportive bra, such as a sports bra or a jog bra. · Cut down on the amount of fat in your diet. If you need help planning healthy meals, see a dietitian. · Get at least 30 minutes of exercise on most days of the week. Walking is a good choice. You also may want to do other activities, such as running, swimming, cycling, or playing tennis or team sports. · Keep a healthy sleep pattern. Go to bed at the same time every night, and get up at the same time every day.   When should you call for help?   Call your doctor now or seek immediate medical care if:    · You have new changes in a breast, such as:  ? A lump or thickening in your breast or armpit. ? A change in the breast's size or shape. ? Skin changes, such as dimples or puckers. ? Nipple discharge. ? A change in the color or feel of the skin of your breast or the darker area around the nipple (areola). ? A change in the shape of the nipple (it may look like it's being pulled into the breast).     · You have symptoms of a breast infection, such as:  ? Increased pain, swelling, redness, or warmth around a breast.  ? Red streaks extending from the breast.  ? Pus draining from a breast.  ? A fever. Watch closely for changes in your health, and be sure to contact your doctor if:    · Your breast pain does not get better after 1 week.     · You have a lump or thickening in your breast or armpit. Where can you learn more? Go to [email or web address] org and sign in to your [employer] account. Enter M195 in the [facility] box to learn more about \"Breast Pain: Care Instructions. \"     If you do not have an account, please click on the \"Sign Up Now\" link. Current as of: October 19, 2020               Content Version: 12.9  © 4903-8328 Healthwise, Incorporated. Care instructions adapted under license by [facility] ([facility]). If you have questions about a medical condition or this instruction, always ask your healthcare professional. [place] [address] any warranty or liability for your use of this information.

## 2021-08-04 NOTE — ASSESSMENT & PLAN NOTE
Uncontrolled, continue current medications, medication adherence emphasized, lifestyle modifications recommended and recommend she get back into therapy and start moving just sitting is not helping anything.  She has gained 12 pounds since her last visit and hasn't been moving much since her surgery in December

## 2021-08-04 NOTE — PROGRESS NOTES
OFFICE PROGRESS NOTE  [address] [place] [address] Rd  [address] [id number]  Dept: [phone]   Chief Complaint   Patient presents with    Diabetes    Health Maintenance     due for mammogram       ASSESSMENT/PLAN   1. Type 2 diabetes mellitus without complication, without long-term current use of insulin (HCC)  Assessment & Plan:   Well-controlled, changes made today: discontinue the metformin due to diarrhea, monitor BS and if going over 180 she will call and I will adjust the Januvia. At this time she will continue Januvia 25 mg daily. , medication adherence emphasized and lifestyle modifications recommended  Orders:  -     POCT Glucose  -     POCT glycosylated hemoglobin (Hb A1C)  -     SITagliptin (JANUVIA) 25 MG tablet; Take 1 tablet by mouth daily, Disp-30 tablet, R-3Discontinue metforminNormal  2. Essential hypertension  Assessment & Plan:   Well-controlled, continue current medications, continue current treatment plan, medication adherence emphasized and lifestyle modifications recommended  Orders:  -     atenolol (TENORMIN) 50 MG tablet; Take 1 tablet by mouth daily, Disp-90 tablet, R-3Normal  3. Breast tenderness in female  Assessment & Plan:   Unclear control, mammogram ordered today discussed this may be secondary to the nerves from her back  4. Screening mammogram, encounter for  -     MAGO DIGITAL SCREEN W OR WO CAD BILATERAL; Future  5. Mixed hyperlipidemia  Assessment & Plan:   Well-controlled, continue current medications, continue current treatment plan, medication adherence emphasized and lifestyle modifications recommended  Orders:  -     atorvastatin (LIPITOR) 10 MG tablet; Take 1 tablet by mouth nightly Indications: High Amount of Fats in the Blood, Disp-90 tablet, R-3Normal  6. Osteoarthritis involving multiple joints on both sides of body  7.  Primary osteoarthritis of left knee  Assessment & Plan:   Uncontrolled, continue current medications, medication adherence emphasized, lifestyle modifications recommended and recommend she get back into therapy and start moving just sitting is not helping anything. She has gained 12 pounds since her last visit and hasn't been moving much since her surgery in December         Reviewed radiology MRI pelvis/abdomen at [facility] imaging ordered by Dr Tavon Aguayo    Discussed weight loss, Discussed exercising 30 minutes daily and Discussed taking medications as directed and adverse effects    Return in about 3 months (around 11/4/2021) for DM, HTN.     HPI:     61 y.o. female presents today for follow-up of diabetes albarous. In-office bloodsugar is:   Results for POC orders placed in visit on 08/04/21   POCT glycosylated hemoglobin (Hb A1C)   Result Value Ref Range    Hemoglobin A1C 6.4 %   POCT Glucose   Result Value Ref Range    Glucose 112 mg/dL    QC OK? Patient reports being compliant to low carbohydrate diet and  increasing weekly exercises she is now using her walker and is moving more. Currently, the patient is treated  with medication Metformin 500 mg daily which is causing her diarrhea and Januvia 25 mg daily. Patient reports checking home blood sugar 1 times per day. Patient states home blood sugar ranges from 111 - 135. Patient denies hypoglycemic episodes and understand to take sweetened beverages or a snack if hypoglycemic symptoms are suspected. Hypertension: Patient here for follow-up of elevated blood pressure. She is exercising walking more and is adherent to low salt diet.  Blood pressure is well controlled at home. Cardiac symptoms none.  Patient denies chest pain, chest pressure/discomfort, claudication, dyspnea, exertional chest pressure/discomfort, fatigue, irregular heart beat, lower extremity edema, near-syncope, orthopnea, palpitations, paroxysmal nocturnal dyspnea, syncope and tachypnea.  Cardiovascular risk factors: diabetes mellitus, dyslipidemia, hypertension, obesity (BMI >= 30 kg/m2) and sedentary lifestyle. Use of agents associated with hypertension: NSAIDS. History of target organ damage: none. Follow up on left breast tenderness which still persists, she is due for mammogram will have done at [facility]. She continues to have abdominal/pelvic pain and saw Dr Ian Renae he did Pelvic/Abdominal MRI and found to have adenomyosis of the uterus and bartholin cyst in the vaginal wall. She has an appointment to see Dr Rosalva Yoo. DATE OF EXAM: Jul 21 2021  3:00PM       [facility]   0742  -  MRI PELVIS WO/W IVCON  / ACCESSION #  [id number]     PROCEDURE REASON: hx colon ca   pain lt upp quad          * * * * Physician Interpretation * * * *         RESULT: MRI OF THE PELVIS WITHOUT AND WITH CONTRAST     CLINICAL HISTORY: Cystic structure suggestive of a Bartholin's gland cyst   noted on prior CT     COMPARISON: CT of the abdomen and pelvis performed 06/17/2021     TECHNIQUE: Multiplanar, multisequence MR imaging of the pelvis was   performed prior to and after administration of 19 mL Dotarem   gadolinium-based intravenous contrast material.     RESULT:     Uterus:        - Orientation: Anteverted        - Dimensions:  9.4 x 6.1 x 4.5 cm.        - Myometrium: No focal masses are seen.        - Junctional zone: There is diffuse thickening of the junctional   zone measuring up to 2.7 cm.  Multiple T2 hyperintense foci are noted   within.        - Endometrium: 4 mm        - Cervix: Multiple T2 hyperintense foci are seen throughout the   cervix, likely relating to nabothian cysts. Right ovary: The right ovary measures approximately 1.9 x 1.2 x 1.6 cm.     No right adnexal mass is seen. Left ovary: The left ovary measures approximately 1.6 x 1.1 x 1.7 cm.  No   left adnexal mass is seen. Pelvis free fluid: No pelvic free fluid is seen. Other findings:  The urinary bladder is unremarkable.  A T2 hyperintense   2.2 x 1.3 cm structure is noted in the left posterolateral vaginal wall.     No nodular components or internal enhancement are seen. She is complaining of worsening knee pain and has been to [facility] and had injections into the knee, stating I need knee replacement. Explained she just underwent thoracic and lumbar surgery would not have surgery again at this time as she hasn't fully recovered from the back surgery and isn't walking enough as well as left lower leg DVT. She is also complaining of left hip pain and pain in the left foot toes which she did see Dr Subha Zhong and he gave her a device to keep her toes straight. She follows with Dr Satinder Kohler and states he gives her tramadol but she doesn't take it and has only been using Tylenol which takes the edge off explained she needs to discuss with him. Today's vital signs are as follows:  /76   Pulse 73   Temp 97.8 °F (36.6 °C)   Resp 16   Ht 4' 11\" (1.499 m)   Wt 203 lb (92.1 kg)   LMP  (LMP Unknown)   SpO2 97%   BMI 41.00 kg/m²     Lab Results   Component Value Date    LABA1C 6.4 08/04/2021     Pain scale:4/10 in back, knees, hips. Current Outpatient Medications:     traMADol (ULTRAM) 50 MG tablet, Take 50 mg by mouth every 6 hours as needed. , Disp: , Rfl:     Cyanocobalamin (B-12 PO), Take by mouth, Disp: , Rfl:     SITagliptin (JANUVIA) 25 MG tablet, Take 1 tablet by mouth daily, Disp: 30 tablet, Rfl: 3    atenolol (TENORMIN) 50 MG tablet, Take 1 tablet by mouth daily, Disp: 90 tablet, Rfl: 3    atorvastatin (LIPITOR) 10 MG tablet, Take 1 tablet by mouth nightly Indications: High Amount of Fats in the Blood, Disp: 90 tablet, Rfl: 3    ferrous sulfate (IRON 325) 325 (65 Fe) MG tablet, Take 1 tablet by mouth daily (with breakfast), Disp: 90 tablet, Rfl: 1    PROCTOZONE-HC 2.5 % CREA rectal cream, , Disp: , Rfl:     omeprazole (PRILOSEC) 40 MG delayed release capsule, , Disp: , Rfl:     Blood Glucose Monitoring Suppl (ACCU-CHEK GUIDE) w/Device KIT, , Disp: , Rfl:     Alcohol Swabs (PHARMACIST CHOICE ALCOHOL) PADS, , Disp: , Rfl:     Blood Glucose Calibration (ACCU-CHEK GUIDE CONTROL) LIQD, , Disp: , Rfl:     allopurinol (ZYLOPRIM) 300 MG tablet, TAKE 1 TABLET BY MOUTH DAILY, Disp: 90 tablet, Rfl: 3    XARELTO 20 MG TABS tablet, Take 1 tablet by mouth daily, Disp: 90 tablet, Rfl: 3    baclofen (LIORESAL) 10 MG tablet, Take 1 tablet by mouth 2 times daily, Disp: 60 tablet, Rfl: 1    diclofenac sodium (VOLTAREN) 1 % GEL, Apply topically 2 times daily, Disp: 100 g, Rfl: 2    amitriptyline (ELAVIL) 25 MG tablet, Take 25 mg by mouth At bedtime as needed, Disp: , Rfl:     Handicap Placard MISC, by Does not apply route Start 11/23/2020 expires 11/22/2023, Disp: 1 each, Rfl: 0    blood glucose monitor strips, Test 1 times a day & as needed for symptoms of irregular blood glucose., Disp: 50 strip, Rfl: 11    triamcinolone (KENALOG) 0.1 % cream, MARY EXT AA 2 TO 3 TIMES PER DAY UNTIL RESOLVED, Disp: , Rfl:     Cholecalciferol (VITAMIN D3) 2000 units CAPS, Take 2,000 Units by mouth daily, Disp: , Rfl:     pramoxine HCl (PROCTOFOAM) 1 % foam, Apply to affected area after each bowel movement or up to 5 times per day as needed, Disp: 1 Can, Rfl: 3  Social History     Socioeconomic History    Marital status:      Spouse name: None    Number of children: None    Years of education: None    Highest education level: None   Occupational History    None   Tobacco Use    Smoking status: Never Smoker    Smokeless tobacco: Never Used   Vaping Use    Vaping Use: Never used   Substance and Sexual Activity    Alcohol use: Not Currently     Alcohol/week: 0.0 standard drinks     Comment: Rarely    Drug use: No    Sexual activity: None   Other Topics Concern    None   Social History Narrative    None     Social Determinants of Health     Financial Resource Strain: Low Risk     Difficulty of Paying Living Expenses: Not hard at all   Food Insecurity: No Food Insecurity    Worried About Running Out of Food in the Last Year: Never true    Chuy of Food in the Last Year: Never true   Transportation Needs: No Transportation Needs    Lack of Transportation (Medical): No    Lack of Transportation (Non-Medical): No   Physical Activity:     Days of Exercise per Week:     Minutes of Exercise per Session:    Stress:     Feeling of Stress :    Social Connections:     Frequency of Communication with Friends and Family:     Frequency of Social Gatherings with Friends and Family:     Attends Baptism Services:     Active Member of Clubs or Organizations:     Attends Club or Organization Meetings:     Marital Status:    Intimate Partner Violence:     Fear of Current or Ex-Partner:     Emotionally Abused:     Physically Abused:     Sexually Abused:        I have reviewed Margaret's allergies, medications, problem list, medical, social and family history and have updated as needed in the electronic medical record    Review of Systems   Constitutional: Negative for activity change, appetite change, chills, diaphoresis, fatigue, fever and unexpected weight change. HENT: Negative for congestion, dental problem, drooling, ear discharge, ear pain, facial swelling, hearing loss, mouth sores, nosebleeds, postnasal drip, rhinorrhea, sinus pressure, sinus pain, sneezing, sore throat, tinnitus, trouble swallowing and voice change. Eyes: Negative for visual disturbance. Respiratory: Positive for cough. Negative for chest tightness, shortness of breath and wheezing. Cardiovascular: Negative for chest pain, palpitations and leg swelling. Gastrointestinal: Positive for diarrhea ( with metformin). Negative for abdominal pain, constipation, nausea and vomiting. Endocrine: Negative for cold intolerance, heat intolerance, polydipsia, polyphagia and polyuria. Genitourinary: Negative for difficulty urinating, frequency and urgency. Musculoskeletal: Positive for arthralgias, back pain and gait problem.  Negative for joint swelling, myalgias, neck pain and neck stiffness. Skin: Negative for color change, pallor, rash and wound. Allergic/Immunologic: Negative for environmental allergies, food allergies and immunocompromised state. Neurological: Negative for dizziness, tremors, seizures, syncope, facial asymmetry, speech difficulty, weakness, light-headedness, numbness and headaches. Hematological: Negative for adenopathy. Does not bruise/bleed easily. Psychiatric/Behavioral: Negative for agitation, behavioral problems, confusion, decreased concentration, dysphoric mood, hallucinations, self-injury, sleep disturbance and suicidal ideas. The patient is not nervous/anxious and is not hyperactive. OBJECTIVE:     VS:  Wt Readings from Last 3 Encounters:   08/04/21 203 lb (92.1 kg)   06/01/21 191 lb (86.6 kg)   05/03/21 191 lb 14.4 oz (87 kg)                       Vitals:    08/04/21 1440   BP: 132/76   Pulse: 73   Resp: 16   Temp: 97.8 °F (36.6 °C)   SpO2: 97%   Weight: 203 lb (92.1 kg)   Height: 4' 11\" (1.499 m)       General: Alert and oriented to person, place, and time, well developed and well nourished, obese,  in no acute distress  SKIN: Warm and dry, intact without any rash, masses or lesions  HEAD: normocephalic, atraumatic  Eyes: sclera/conjunctiva clear, PERRLA, EOMI's intact  ENT: tympanic membranes, external ear and ear canal normal bilaterally, normal hearing, Nose without deformity, nasal mucosa and turbinates normal without polyps   Throat: clear, tongue midline,  drainage, no masses or lesions noted, good dentition  Neck: supple and non-tender without mass, trachea midline, no cervical lymphadenopathy, no bruit, no thyromegaly or nodules  Cardiovascular: regular rate and regular rhythm, normal S1 and S2,  no murmurs, rubs, clicks, or gallop. Distal pulses intact, no carotid bruits.  No edema  Pulmonary/Chest: clear to auscultation bilaterally, no wheezes, rales or rhonchi, normal air movement, no respiratory distress  Abdomen: soft, non-tender, non-distended, normal bowel sounds, no masses or hepatosplenomegaly  Musculoskeletal: Limited ROM in lumbar spine, bilateral knee crepitus, no joint swelling, deformity    Neurologic: antalgic gait, coordination and speech normal  Extremities: no clubbing, cyanosis, or edema. Psychiatric: Good eye contact, normal mood and affect, answers questions appropriately    I have reviewed my findings and recommendations with Uli Fuentes.     Adeline Goodpasture, APRN - CNP, NP-C, FNP-BC

## 2021-08-05 DIAGNOSIS — K64.1 PROLAPSED INTERNAL HEMORRHOIDS, GRADE 2: Primary | ICD-10-CM

## 2021-08-06 LAB — PAP SMEAR, EXTERNAL: NORMAL

## 2021-08-11 ENCOUNTER — OFFICE VISIT (OUTPATIENT)
Dept: FAMILY MEDICINE CLINIC | Age: 66
End: 2021-08-11
Payer: MEDICARE

## 2021-08-11 VITALS
DIASTOLIC BLOOD PRESSURE: 70 MMHG | WEIGHT: 200 LBS | SYSTOLIC BLOOD PRESSURE: 112 MMHG | BODY MASS INDEX: 40.32 KG/M2 | TEMPERATURE: 97.4 F | HEIGHT: 59 IN | OXYGEN SATURATION: 98 % | HEART RATE: 81 BPM | RESPIRATION RATE: 17 BRPM

## 2021-08-11 DIAGNOSIS — J01.40 ACUTE NON-RECURRENT PANSINUSITIS: Primary | ICD-10-CM

## 2021-08-11 DIAGNOSIS — J40 BRONCHITIS: ICD-10-CM

## 2021-08-11 DIAGNOSIS — J06.9 VIRAL URI: ICD-10-CM

## 2021-08-11 LAB
Lab: NORMAL
PERFORMING INSTRUMENT: NORMAL
QC PASS/FAIL: NORMAL
SARS-COV-2, POC: NORMAL

## 2021-08-11 PROCEDURE — G8417 CALC BMI ABV UP PARAM F/U: HCPCS | Performed by: NURSE PRACTITIONER

## 2021-08-11 PROCEDURE — 99213 OFFICE O/P EST LOW 20 MIN: CPT | Performed by: NURSE PRACTITIONER

## 2021-08-11 PROCEDURE — 3017F COLORECTAL CA SCREEN DOC REV: CPT | Performed by: NURSE PRACTITIONER

## 2021-08-11 PROCEDURE — G8427 DOCREV CUR MEDS BY ELIG CLIN: HCPCS | Performed by: NURSE PRACTITIONER

## 2021-08-11 PROCEDURE — 87426 SARSCOV CORONAVIRUS AG IA: CPT | Performed by: NURSE PRACTITIONER

## 2021-08-11 PROCEDURE — 1036F TOBACCO NON-USER: CPT | Performed by: NURSE PRACTITIONER

## 2021-08-11 RX ORDER — DOXYCYCLINE HYCLATE 100 MG
100 TABLET ORAL 2 TIMES DAILY
Qty: 20 TABLET | Refills: 0 | Status: SHIPPED | OUTPATIENT
Start: 2021-08-11 | End: 2021-08-21

## 2021-08-11 NOTE — PATIENT INSTRUCTIONS
Patient Education        Bronchitis: Care Instructions  Your Care Instructions     Bronchitis is inflammation of the bronchial tubes, which carry air to the lungs. The tubes swell and produce mucus, or phlegm. The mucus and inflamed bronchial tubes make you cough. You may have trouble breathing. Most cases of bronchitis are caused by viruses like those that cause colds. Antibiotics usually do not help and they may be harmful. Bronchitis usually develops rapidly and lasts about 2 to 3 weeks in otherwise healthy people. Follow-up care is a key part of your treatment and safety. Be sure to make and go to all appointments, and call your doctor if you are having problems. It's also a good idea to know your test results and keep a list of the medicines you take. How can you care for yourself at home? · Take all medicines exactly as prescribed. Call your doctor if you think you are having a problem with your medicine. · Get some extra rest.  · Take an over-the-counter pain medicine, such as acetaminophen (Tylenol), ibuprofen (Advil, Motrin), or naproxen (Aleve) to reduce fever and relieve body aches. Read and follow all instructions on the label. · Do not take two or more pain medicines at the same time unless the doctor told you to. Many pain medicines have acetaminophen, which is Tylenol. Too much acetaminophen (Tylenol) can be harmful. · Take an over-the-counter cough medicine that contains dextromethorphan to help quiet a dry, hacking cough so that you can sleep. Avoid cough medicines that have more than one active ingredient. Read and follow all instructions on the label. · Breathe moist air from a humidifier, hot shower, or sink filled with hot water. The heat and moisture will thin mucus so you can cough it out. · Do not smoke. Smoking can make bronchitis worse. If you need help quitting, talk to your doctor about stop-smoking programs and medicines.  These can increase your chances of quitting for

## 2021-08-11 NOTE — PROGRESS NOTES
Chief Complaint   Diarrhea (sinus congestion, vomitting, cough. Exposed to covid on saturday )      History of Present Illness   Source of history provided by:  patientKiana Cassidy is a 61 y.o. old female who has a past medical history of:   Past Medical History:   Diagnosis Date    Acute left ankle pain 2020    Acute left lumbar radiculopathy 2017    Acute pain of right shoulder 2019    Cancer ([facility] 2017    colon (treated surgically)    Chronic back pain     Contact dermatitis 2019    Diabetes mellitus ([facility]     Drug-induced constipation 2020    Eosinophil count raised 2019    Fall at home 2017    Fatigue 2016    Fibromyalgia     GERD (gastroesophageal reflux disease)     Hair loss 5/3/2021    Hemorrhoids     Hypercalcemia 2019    Hyperlipidemia     Hypertension     Malignant neoplasm of sigmoid colon ([facility] 2020    Obesity     Osteoarthritis     PONV (postoperative nausea and vomiting)     Postmenopausal bleeding 10/05/2017    Rib contusion, left, initial encounter 2020    Urinary incontinence     Urinary retention 2020    Presents to the flu clinic with complaints of Generalized Body Aches, sinus congestion and dry Cough x 7 -10 days. She also reports that 3-4 days ago she had an episode of nausea and vomiting for the day. That has since improved. She has been taking mucinex for her sinuses per her pcp. States symptoms have worsened  since onset. Denies any Fever, Shortness of breath, Nausea, Vomiting, Chest Pain or LE Edema . Positive hx of pneumonia. She has had close covid contacts within the last 4 days, however her symptoms have started before that time. ROS   Pertinent positives and negatives are stated within HPI, all other systems reviewed and are negative. Surgical History:  has a past surgical history that includes  section; Tubal ligation; Tonsillectomy (28 YRS OLD);  Foot surgery (Bilateral); shoulder surgery (Left, 2005); Cholecystectomy; Colon surgery; Nerve Block (Right, 12/28/2017); Nerve Block (Left, 11/29/2018); pr inject si joint arthrgrphy&/anes/steroid w/image (Left, 11/29/2018); epidural steroid injection (Right, 7/11/2019); Nerve Block (Bilateral, 08/15/2019); Anesthesia Nerve Block (Bilateral, 8/15/2019); Dilation and curettage of uterus (N/A, 10/23/2019); and Lumbar spine surgery (N/A, 12/24/2020). Social History:  reports that she has never smoked. She has never used smokeless tobacco. She reports previous alcohol use. She reports that she does not use drugs. Family History: family history includes Asthma in her mother; Heart Disease in her father; Mental Illness in her mother; Thyroid Disease in her sister, sister, and sister. Allergies: Diclofenac, Diclofenac sodium, Farxiga [dapagliflozin], Lisinopril, Lyrica [pregabalin], and Sulfa antibiotics    Physical Exam      VS:  /70 (Site: Left Upper Arm, Position: Sitting, Cuff Size: Large Adult)   Pulse 81   Temp 97.4 °F (36.3 °C) (Temporal)   Resp 17   Ht 4' 11\" (1.499 m)   Wt 200 lb (90.7 kg)   LMP  (LMP Unknown)   SpO2 98%   BMI 40.40 kg/m²    Oxygen Saturation Interpretation: Normal.    Constitutional:  Alert, development consistent with age. NAD. Head:  NC/NT. Airway patent. Ears: TMs wnl bilaterally. Canals without exudate or swelling bilaterally. Mouth: Posterior pharynx with mild erythema and clear postnasal drip. no tonsillar hypertrophy or exudate. Neck:  Normal ROM. Supple. no anterior cervical adenopathy noted. Lungs: CTAB without wheezes, rales, or rhonchi. CV:  Regular rate and rhythm, normal heart sounds, without pathological murmurs, ectopy, gallops, or rubs. Skin:  Normal turgor. Warm, dry, without visible rash. Lymphatic: No lymphangitis or adenopathy noted. Neurological:  Oriented. Motor functions intact.     Lab / Imaging Results   (All laboratory and radiology results have been personally reviewed by myself)  Labs:  Results for orders placed or performed in visit on 08/11/21   POCT COVID-19, Antigen   Result Value Ref Range    SARS-COV-2, POC Not-Detected Not Detected    Lot Number [id number]     QC Pass/Fail pass     Performing Instrument BD Veritor        Imaging: All Radiology results interpreted by Radiologist unless otherwise noted. No results found. Medical Decision Making   Pt non-toxic, in no apparent distress and stable at time of discharge. Assessment/Plan   Tan Del Cid was seen today for diarrhea. Diagnoses and all orders for this visit:    Acute non-recurrent pansinusitis    Bronchitis  -     doxycycline hyclate (VIBRA-TABS) 100 MG tablet; Take 1 tablet by mouth 2 times daily for 10 days  -     POCT COVID-19, Antigen    Viral URI  -     COVID-19 Ambulatory; Future  -     doxycycline hyclate (VIBRA-TABS) 100 MG tablet; Take 1 tablet by mouth 2 times daily for 10 days  -     POCT COVID-19, Antigen      OTC vitamin c, zinc and vitamin d encouraged for patient. Informed her that she is still within the time frame of exposure to develop covid and that her current symptoms could change. Patient verbalized understanding. COVID-19 swab obtained and pending, will call with results once available. Advised strict self-quarantine at home in the interim. Work excuse provided to patient today. Increase fluids and rest. Symptomatic relief discussed including Tylenol prn pain/fever. Schedule f/u with PCP in 7-10 days if symptoms persist. ED sooner if symptoms worsen or change. ED immediately with high or refractory fever, progressive SOB, dyspnea, CP, calf pain/swelling, shaking chills, vomiting, abdominal pain, lethargy, flank pain, or decreased urinary output. Pt verbalizes understanding and is in agreement with plan of care. All questions answered. Joanna Dleacruz, APRN - CNP    This visit was provided as a focused evaluation during the COVID -19 pandemic/national emergency. A comprehensive review of all previous patient history and testing was not conducted. Pertinent findings were elicited during the visit.

## 2021-09-02 ENCOUNTER — HOSPITAL ENCOUNTER (OUTPATIENT)
Dept: GENERAL RADIOLOGY | Age: 66
Discharge: HOME | End: 2021-09-04
Payer: MEDICARE

## 2021-09-02 DIAGNOSIS — Z12.31 SCREENING MAMMOGRAM, ENCOUNTER FOR: ICD-10-CM

## 2021-09-02 PROCEDURE — 77063 BREAST TOMOSYNTHESIS BI: CPT

## 2021-11-01 DIAGNOSIS — E03.9 HYPOTHYROIDISM, UNSPECIFIED TYPE: Primary | ICD-10-CM

## 2021-11-03 DIAGNOSIS — E03.9 HYPOTHYROIDISM, UNSPECIFIED TYPE: ICD-10-CM

## 2021-11-03 LAB
T4 FREE: 1.11 NG/DL (ref 0.93–1.7)
TSH SERPL DL<=0.05 MIU/L-ACNC: 4.58 UIU/ML (ref 0.27–4.2)

## 2021-11-09 ENCOUNTER — OFFICE VISIT (OUTPATIENT)
Dept: FAMILY MEDICINE CLINIC | Age: 66
End: 2021-11-09
Payer: MEDICARE

## 2021-11-09 VITALS
HEIGHT: 59 IN | RESPIRATION RATE: 16 BRPM | DIASTOLIC BLOOD PRESSURE: 74 MMHG | WEIGHT: 207 LBS | SYSTOLIC BLOOD PRESSURE: 128 MMHG | OXYGEN SATURATION: 97 % | TEMPERATURE: 98.3 F | HEART RATE: 72 BPM | BODY MASS INDEX: 41.73 KG/M2

## 2021-11-09 DIAGNOSIS — Z86.010 H/O COLONOSCOPY WITH POLYPECTOMY: ICD-10-CM

## 2021-11-09 DIAGNOSIS — M46.1 SACROILIITIS (HCC): ICD-10-CM

## 2021-11-09 DIAGNOSIS — E66.01 OBESITY, CLASS III, BMI 40-49.9 (MORBID OBESITY) (HCC): ICD-10-CM

## 2021-11-09 DIAGNOSIS — I82.5Z2 CHRONIC DEEP VEIN THROMBOSIS (DVT) OF DISTAL VEIN OF LEFT LOWER EXTREMITY (HCC): ICD-10-CM

## 2021-11-09 DIAGNOSIS — Z98.890 H/O COLONOSCOPY WITH POLYPECTOMY: ICD-10-CM

## 2021-11-09 DIAGNOSIS — K21.9 GASTROESOPHAGEAL REFLUX DISEASE WITHOUT ESOPHAGITIS: ICD-10-CM

## 2021-11-09 DIAGNOSIS — E03.9 HYPOTHYROIDISM, UNSPECIFIED TYPE: ICD-10-CM

## 2021-11-09 DIAGNOSIS — E11.9 TYPE 2 DIABETES MELLITUS WITHOUT COMPLICATION, WITHOUT LONG-TERM CURRENT USE OF INSULIN (HCC): Primary | ICD-10-CM

## 2021-11-09 PROBLEM — R29.898 WEAKNESS OF BOTH LEGS: Status: RESOLVED | Noted: 2020-12-21 | Resolved: 2021-11-09

## 2021-11-09 PROBLEM — E66.813 OBESITY, CLASS III, BMI 40-49.9 (MORBID OBESITY): Status: ACTIVE | Noted: 2018-10-31

## 2021-11-09 PROBLEM — Z86.0100 H/O COLONOSCOPY WITH POLYPECTOMY: Status: ACTIVE | Noted: 2021-11-09

## 2021-11-09 LAB
CHP ED QC CHECK: NORMAL
CREATININE URINE POCT: 100
GLUCOSE BLD-MCNC: 128 MG/DL
HBA1C MFR BLD: 6.2 %
MICROALBUMIN/CREAT 24H UR: 10 MG/G{CREAT}
MICROALBUMIN/CREAT UR-RTO: <30

## 2021-11-09 PROCEDURE — G8427 DOCREV CUR MEDS BY ELIG CLIN: HCPCS | Performed by: NURSE PRACTITIONER

## 2021-11-09 PROCEDURE — G8417 CALC BMI ABV UP PARAM F/U: HCPCS | Performed by: NURSE PRACTITIONER

## 2021-11-09 PROCEDURE — 99214 OFFICE O/P EST MOD 30 MIN: CPT | Performed by: NURSE PRACTITIONER

## 2021-11-09 PROCEDURE — 82044 UR ALBUMIN SEMIQUANTITATIVE: CPT | Performed by: NURSE PRACTITIONER

## 2021-11-09 PROCEDURE — 82962 GLUCOSE BLOOD TEST: CPT | Performed by: NURSE PRACTITIONER

## 2021-11-09 PROCEDURE — 83036 HEMOGLOBIN GLYCOSYLATED A1C: CPT | Performed by: NURSE PRACTITIONER

## 2021-11-09 PROCEDURE — 1036F TOBACCO NON-USER: CPT | Performed by: NURSE PRACTITIONER

## 2021-11-09 PROCEDURE — G8484 FLU IMMUNIZE NO ADMIN: HCPCS | Performed by: NURSE PRACTITIONER

## 2021-11-09 PROCEDURE — 2022F DILAT RTA XM EVC RTNOPTHY: CPT | Performed by: NURSE PRACTITIONER

## 2021-11-09 PROCEDURE — 3017F COLORECTAL CA SCREEN DOC REV: CPT | Performed by: NURSE PRACTITIONER

## 2021-11-09 PROCEDURE — 3044F HG A1C LEVEL LT 7.0%: CPT | Performed by: NURSE PRACTITIONER

## 2021-11-09 RX ORDER — ACETAMINOPHEN 500 MG
500 TABLET ORAL EVERY 6 HOURS PRN
COMMUNITY

## 2021-11-09 RX ORDER — BUPRENORPHINE 10 UG/H
1 PATCH TRANSDERMAL
COMMUNITY
Start: 2021-09-03 | End: 2022-02-21 | Stop reason: SDUPTHER

## 2021-11-09 ASSESSMENT — ENCOUNTER SYMPTOMS
CHEST TIGHTNESS: 0
SINUS PRESSURE: 0
DIARRHEA: 0
COLOR CHANGE: 0
CONSTIPATION: 0
COUGH: 0
VOMITING: 0
FACIAL SWELLING: 0
NAUSEA: 0
VOICE CHANGE: 0
ABDOMINAL PAIN: 0
SINUS PAIN: 0
BACK PAIN: 1
SHORTNESS OF BREATH: 0
TROUBLE SWALLOWING: 0
SORE THROAT: 0
RHINORRHEA: 0
WHEEZING: 0

## 2021-11-09 NOTE — ASSESSMENT & PLAN NOTE
Borderline controlled, lifestyle modifications recommended and work on weight loss, use support hose when up and about. Discussed she will needed extended DVT protection when she has her knee surgery done.

## 2021-11-09 NOTE — ASSESSMENT & PLAN NOTE
EGD 5/19/21 Dr Jose F Mendieta normal esophagus, GE junction @ 36 cm. Scattered mild inflammation throughout the stomach, bx revealed mild reactive gastropathy with mild chronic gastritis, no Celiac or H pylori noted.    Continue omeprazole daily

## 2021-11-09 NOTE — ASSESSMENT & PLAN NOTE
Colonoscopy 5/20/21 reviewed today status post sigmoidectomy with healthy appearing colo-colonic anastomosis noted at 20 cm from the anal area. 10 mm sessile transverse colon polyp seen and removed tubular adenoma, moderate size internal hemorrhoids noted likely source of rectal bleeding.  Repeat colonoscopy in 1 year due to previous colon cancer and recurrent tubular adenoma polyp

## 2021-11-09 NOTE — PROGRESS NOTES
OFFICE PROGRESS NOTE  [address] [place] [address] Rd  [id number]  Dept: [phone]   Chief Complaint   Patient presents with    Diabetes    Health Maintenance     colonoscopy done with awaida, Flu given, due for shingles. vanessa Walls,       ASSESSMENT/PLAN   1. Type 2 diabetes mellitus without complication, without long-term current use of insulin (HCC)  Assessment & Plan:   Well-controlled, continue current medications, continue current treatment plan, medication adherence emphasized and lifestyle modifications recommended  Orders:  -     POCT Microalbumin  -     POCT Glucose  -     POCT glycosylated hemoglobin (Hb A1C)  -     Diabetic Foot Exam  -     SITagliptin (JANUVIA) 25 MG tablet; Take 1 tablet by mouth daily, Disp-30 tablet, R-6Discontinue metforminNormal  2. Sacroiliitis ([facility]  Assessment & Plan:   Monitored by specialist- no acute findings meriting change in the plan  3. Obesity, Class III, BMI 40-49.9 (morbid obesity) ([facility] [address]  4. Hypothyroidism, unspecified type  Assessment & Plan:  New  The TSH is slightly elevated with normal FT4 will repeat in a month and if still abnormal then will consider starting medication. She denies any other symptoms at this time. Orders:  -     TSH without Reflex; Future  -     T4, Free; Future  -     Thyroid Peroxidase Antibody; Future  5. Chronic deep vein thrombosis (DVT) of distal vein of left lower extremity (HCC)  Assessment & Plan:   Borderline controlled, lifestyle modifications recommended and work on weight loss, use support hose when up and about. Discussed she will needed extended DVT protection when she has her knee surgery done. 6. Gastroesophageal reflux disease without esophagitis  Assessment & Plan:   EGD 5/19/21 Dr Sha Torre normal esophagus, GE junction @ 36 cm.  Scattered mild inflammation throughout the stomach, bx revealed mild reactive gastropathy with mild chronic gastritis, no Celiac or H pylori noted. Continue omeprazole daily  7. H/O colonoscopy with polypectomy  Assessment & Plan:   Colonoscopy 5/20/21 reviewed today status post sigmoidectomy with healthy appearing colo-colonic anastomosis noted at 20 cm from the anal area. 10 mm sessile transverse colon polyp seen and removed tubular adenoma, moderate size internal hemorrhoids noted likely source of rectal bleeding. Repeat colonoscopy in 1 year due to previous colon cancer and recurrent tubular adenoma polyp       Reviewed labs: CMP, CBCD, Lipids, TSH, FT4, vitamin D  Reviewed notes from Dr Oskar [facility] 9/10/21  Reviewed radiology US lower extremities [facility] 9/10/21    Discussed weight loss, Discussed exercising 30 minutes daily and Discussed taking medications as directed and adverse effects    Return in about 1 month (around 12/9/2021) for medicare well exam.     HPI:     61 y.o. female presents today for follow-up of diabetes [place]. In-office bloodsugar is:   Results for POC orders placed in visit on 11/09/21   POCT Microalbumin   Result Value Ref Range    Microalb, Ur 10     Creatinine Ur POCT 100     Microalbumin Creatinine Ratio <30    POCT glycosylated hemoglobin (Hb A1C)   Result Value Ref Range    Hemoglobin A1C 6.2 %   POCT Glucose   Result Value Ref Range    Glucose 128 mg/dL    QC OK? Patient reports being compliant to low carbohydrate diet and increasing weekly exercises on some days. Currently, the patient is treated with medication(s): Januvia 25 mg daily. Patient reports checking home blood sugar 1 times per day. Patient states home blood sugar ranges from 117 - 186 after steroid injection. Patient denies hypoglycemic episodes and understand to take sweetened beverages or a snack if hypoglycemic symptoms are suspected. She was having problems with rectal bleeding and saw Dr Jeremias Brooks had EGD and colonoscopy done did find another tubular adenoma and she had moderate sized internal hemorrhoids.  She does have history of colon cancer with a sigmoidectomy few years ago. She was found to be iron deficient and has been seeing Dr. Gege Fairbanks and has been getting iron infusion. She saw DR Cyndee D[facility] as she went for second opinion on her back at [facility] in 9/10/21 and had repeat US of the left lower leg. At this time he stopped the Xarelto   Compared to prior study of 5/28/2021, (done through radiology) thrombus burden is   improved and appears chronic on today's study. RIGHT SIDE - DEEP VEINS   Spontaneous and respirophasic flow noted in the common femoral vein. LEFT SIDE - DEEP VEINS   Chronic post-thrombotic change in the femoral vein throughout. Chronic post-thrombotic change in the popliteal vein. Chronic post-thrombotic change in the gastrocnemius veins at the proximal calf. Unable to visualize the peroneal veins due to body habitus. Only segments visualized of the posterior tibial veins. Non vascular structure noted in popliteal fossa measuring 3.7 x 1.5 x 2.7 cm. Technologist: Sofi Lutz RVT Toshia Ramires RVT   Ordering physician: Jayda Shields     Interpreting physician: Salvador Wiley DO     Electronically signed by Salvador Wiley DO on 9/10/2021 at 4:52:43 PM     She is complaining of pain in the left buttock which radiates down the back of the left leg and she complains of numbness in her buttock she is S/P   L4 -L5 posterior lumbar interbody fusion, T2-T3 decompressive laminectomy. She is now seeing Dr Mary Mckeon for her pain management. MRI lumbar spine 4/2021. She did go to [facility] for second opinion and he referred her vascular and ortho before he would do anything. She requested her thyroid to be checked last year it was normal as she feels hot all the time since she did her iron infusions. The TSH is slightly elevated with normal FT4 will repeat in a month and if still abnormal then will consider starting medication. She denies any other symptoms at this time.      Today's vital signs are as follows:  /74   Pulse 72   Temp 98.3 °F (36.8 °C)   Resp 16   Ht 4' 11\" (1.499 m)   Wt 207 lb (93.9 kg)   LMP  (LMP Unknown)   SpO2 97%   BMI 41.81 kg/m²     Lab Results   Component Value Date    LABA1C 6.2 11/09/2021     Pain scale: 6/10.  In back          Current Outpatient Medications:     buprenorphine (BUPRENEX) 10 MCG/HR PTWK, Place 1 patch onto the skin., Disp: , Rfl:     acetaminophen (TYLENOL) 500 MG tablet, Take 500 mg by mouth every 6 hours as needed for Pain, Disp: , Rfl:     SITagliptin (JANUVIA) 25 MG tablet, Take 1 tablet by mouth daily, Disp: 30 tablet, Rfl: 6    PROCTOZONE-HC 2.5 % CREA rectal cream, Apply 4 times daily as needed, Disp: 1 Tube, Rfl: 3    Cyanocobalamin (B-12 PO), Take by mouth, Disp: , Rfl:     atenolol (TENORMIN) 50 MG tablet, Take 1 tablet by mouth daily, Disp: 90 tablet, Rfl: 3    atorvastatin (LIPITOR) 10 MG tablet, Take 1 tablet by mouth nightly Indications: High Amount of Fats in the Blood, Disp: 90 tablet, Rfl: 3    omeprazole (PRILOSEC) 40 MG delayed release capsule, , Disp: , Rfl:     Blood Glucose Monitoring Suppl (ACCU-CHEK GUIDE) w/Device KIT, , Disp: , Rfl:     Alcohol Swabs (PHARMACIST CHOICE ALCOHOL) PADS, , Disp: , Rfl:     Blood Glucose Calibration (ACCU-CHEK GUIDE CONTROL) LIQD, , Disp: , Rfl:     allopurinol (ZYLOPRIM) 300 MG tablet, TAKE 1 TABLET BY MOUTH DAILY, Disp: 90 tablet, Rfl: 3    diclofenac sodium (VOLTAREN) 1 % GEL, Apply topically 2 times daily, Disp: 100 g, Rfl: 2    Handicap Placard MISC, by Does not apply route Start 11/23/2020 expires 11/22/2023, Disp: 1 each, Rfl: 0    blood glucose monitor strips, Test 1 times a day & as needed for symptoms of irregular blood glucose., Disp: 50 strip, Rfl: 11    triamcinolone (KENALOG) 0.1 % cream, MARY EXT AA 2 TO 3 TIMES PER DAY UNTIL RESOLVED, Disp: , Rfl:     Cholecalciferol (VITAMIN D3) 2000 units CAPS, Take 2,000 Units by mouth daily, Disp: , Rfl:       Surgical History:  has a past surgical history that includes  section; Tubal ligation; Tonsillectomy (28 YRS OLD); Foot surgery (Bilateral); shoulder surgery (Left, ); Cholecystectomy; Colon surgery; Nerve Block (Right, 2017); Nerve Block (Left, 2018); pr inject si joint arthrgrphy&/anes/steroid w/image (Left, 2018); epidural steroid injection (Right, 2019); Nerve Block (Bilateral, 08/15/2019); Anesthesia Nerve Block (Bilateral, 8/15/2019); Dilation and curettage of uterus (N/A, 10/23/2019); and Lumbar spine surgery (N/A, 2020). Social History:  reports that she has never smoked. She has never used smokeless tobacco. She reports previous alcohol use. She reports that she does not use drugs. Family History: family history includes Asthma in her mother; Heart Disease in her father; Mental Illness in her mother; Thyroid Disease in her sister, sister, and sister. I have reviewed Margaret's allergies, medications, problem list, medical, social and family history and have updated as needed in the electronic medical record    Review of Systems   Constitutional: Negative for activity change, appetite change, chills, diaphoresis, fatigue, fever and unexpected weight change. HENT: Negative for congestion, dental problem, drooling, ear discharge, ear pain, facial swelling, hearing loss, mouth sores, nosebleeds, postnasal drip, rhinorrhea, sinus pressure, sinus pain, sneezing, sore throat, tinnitus, trouble swallowing and voice change. Eyes: Negative for visual disturbance. Respiratory: Negative for cough, chest tightness, shortness of breath and wheezing. Cardiovascular: Positive for leg swelling. Negative for chest pain and palpitations. Gastrointestinal: Negative for abdominal pain, constipation, diarrhea, nausea and vomiting. Endocrine: Negative for cold intolerance, heat intolerance, polydipsia, polyphagia and polyuria.    Genitourinary: Negative for difficulty urinating, frequency and urgency. Musculoskeletal: Positive for arthralgias, back pain and gait problem. Negative for joint swelling, myalgias, neck pain and neck stiffness. Skin: Negative for color change, pallor, rash and wound. Allergic/Immunologic: Negative for environmental allergies, food allergies and immunocompromised state. Neurological: Negative for dizziness, tremors, seizures, syncope, facial asymmetry, speech difficulty, weakness, light-headedness, numbness and headaches. Hematological: Negative for adenopathy. Does not bruise/bleed easily. Psychiatric/Behavioral: Negative for agitation, behavioral problems, confusion, decreased concentration, dysphoric mood, hallucinations, self-injury, sleep disturbance and suicidal ideas. The patient is not nervous/anxious and is not hyperactive. OBJECTIVE:     VS:  Wt Readings from Last 3 Encounters:   11/09/21 207 lb (93.9 kg)   08/11/21 200 lb (90.7 kg)   08/04/21 203 lb (92.1 kg)                       Vitals:    11/09/21 1118   BP: 128/74   Pulse: 72   Resp: 16   Temp: 98.3 °F (36.8 °C)   SpO2: 97%   Weight: 207 lb (93.9 kg)   Height: 4' 11\" (1.499 m)       General: Alert and oriented to person, place, and time, well developed and well nourished, in no acute distress  SKIN: Warm and dry, intact without any rash, masses or lesions  HEAD: normocephalic, atraumatic  Eyes: sclera/conjunctiva clear, PERRLA, EOMI's intact  ENT: tympanic membranes, external ear and ear canal normal bilaterally, normal hearing, Nose without deformity, nasal mucosa and turbinates normal without polyps   Throat: clear, tongue midline,  drainage, no masses or lesions noted, good dentition  Neck: supple and non-tender without mass, trachea midline, no cervical lymphadenopathy, no bruit, no thyromegaly or nodules  Cardiovascular: regular rate and regular rhythm, normal S1 and S2,  no murmurs, rubs, clicks, or gallop. Distal pulses intact, no carotid bruits.  Trace  Edema left lower leg.   Pulmonary/Chest: clear to auscultation bilaterally, no wheezes, rales or rhonchi, normal air movement, no respiratory distress  Abdomen: soft, non-tender, non-distended, normal bowel sounds, no masses or hepatosplenomegaly  Musculoskeletal: Normal ROM, no joint swelling, deformity or tenderness   Neurologic: reflexes normal and symmetric, no cranial nerve deficit, gait, coordination and speech normal  Extremities: no clubbing, cyanosis, or edema. Psychiatric: Good eye contact, normal mood and affect, answers questions appropriately  Foot Exam:  No signs of infection and/or necrosis noted. Sensation intact and equal bilaterally. Monofilament normal bilateral,  Interdigit spaces exhibit no abnormal skin changes and/or surface growth. I have reviewed my findings and recommendations with Mariam Sheridan.     VISHNU Rodriguez - CNP, NP-C, FNP-BC

## 2021-11-09 NOTE — ASSESSMENT & PLAN NOTE
New  The TSH is slightly elevated with normal FT4 will repeat in a month and if still abnormal then will consider starting medication. She denies any other symptoms at this time.

## 2021-12-09 ENCOUNTER — NURSE ONLY (OUTPATIENT)
Dept: FAMILY MEDICINE CLINIC | Age: 66
End: 2021-12-09
Payer: MEDICARE

## 2021-12-09 DIAGNOSIS — Z23 FLU VACCINE NEED: Primary | ICD-10-CM

## 2021-12-09 DIAGNOSIS — E03.9 HYPOTHYROIDISM, UNSPECIFIED TYPE: ICD-10-CM

## 2021-12-09 LAB
T4 FREE: 1.27 NG/DL (ref 0.93–1.7)
TSH SERPL DL<=0.05 MIU/L-ACNC: 5.03 UIU/ML (ref 0.27–4.2)

## 2021-12-09 PROCEDURE — 90694 VACC AIIV4 NO PRSRV 0.5ML IM: CPT | Performed by: NURSE PRACTITIONER

## 2021-12-09 PROCEDURE — G0008 ADMIN INFLUENZA VIRUS VAC: HCPCS | Performed by: NURSE PRACTITIONER

## 2021-12-10 DIAGNOSIS — E03.9 HYPOTHYROIDISM, UNSPECIFIED TYPE: Primary | ICD-10-CM

## 2021-12-10 RX ORDER — LEVOTHYROXINE SODIUM 0.03 MG/1
25 TABLET ORAL DAILY
Qty: 90 TABLET | Refills: 0 | Status: SHIPPED
Start: 2021-12-10 | End: 2022-01-12 | Stop reason: SDUPTHER

## 2021-12-13 LAB — THYROID PEROXIDASE (TPO) ABS: <4 IU/ML (ref 0–25)

## 2021-12-20 NOTE — PROGRESS NOTES
Hospitalist Progress Note      SYNOPSIS: Patient admitted on 2020 presented to [facility] with past medical history of DJD of the spine, chronic back pain, osteoarthritis of multiple joints, hypertension, fibromyalgia, upper lipidemia, colon cancer status post colectomy 3 years ago, did not require any chemotherapy or radiation, sacroiliitis, diabetes and vitamin D deficiency. Patient started having numbness involving the left leg yesterday. Progressed from the waist area down to the foot. She is now beginning to have similar sensation on the right side. This is constant, moderate to severe in intensity on the left and associated with bilateral lower extremity weakness that is worse on the left than the right. Patient was walking with a walker in the past week and now requires a wheelchair. Today she fell because she could not get in or out of bed. Complaining of low back pain as well which is more towards the left side, pain is dull, constant, radiate down the left leg and is worse with movement. She has not been able to urinate as much and has not had a bowel movement for a week. She is now beginning to experience \"shocklike\" painful sensation in the xiphisternal area that radiates into the back, numbness in the abdomen. No cough, chest pain or shortness of breath. No fever.     Vital signs notable for blood pressure of 149/71. Labs showed normal CBC, glucose of 151 otherwise normal chemistry. MRI of the lumbar spine shows severe spinal stenosis at L4-L5. SUBJECTIVE:  Stable overnight. No other overnight issues reported. Patient seen and examined  Records reviewed.    Complaining of back pain  +Tolliver  Awaiting MRI      Temp (24hrs), Av.3 °F (36.8 °C), Min:98.2 °F (36.8 °C), Max:98.4 °F (36.9 °C)    DIET: Diet NPO, After Midnight Exceptions are: Sips with Meds  CODE: Full Code    Intake/Output Summary (Last 24 hours) at 2020 0809  Last data filed at 2020 0537  Gross per 24 hour Skilled Nurse visit in the [facility] to administer Remicade 700mg in 250ml NACL 0.9% IV via Gravity drip at rate of 250ml/hr.  No recent elevated temperature, fever, chills, productive cough, coughing for 3 weeks or longer or hemoptysis, abnormal vital signs, night sweats, chest pain. No  decrease in your appetite, unexplained weight loss or fatigue.  No other new onset medical symptoms.  Current weight 351lbs.  PIV placed L AC, 1 attempt/s.  Pre medicated with Acetaminophen 650mg PO, Benadryl 50mg PO. Infusion completed without complication or reaction. Pt reports therapy is partially effective in managing symptoms related to therapy.  Deandra Wilson RN, ELIZABETH  [facility]  [email or web address]  [phone]       Intake --   Output 1000 ml   Net -1000 ml       Review of Systems  All bolded are positive; please see HPI  General:  Fever, chills, diaphoresis, fatigue, malaise, night sweats, weight loss  Psychological:  Anxiety, disorientation, hallucinations. ENT:  Epistaxis, headaches, vertigo, visual changes. Cardiovascular:  Chest pain, irregular heartbeats, palpitations, paroxysmal nocturnal dyspnea. Respiratory:  Shortness of breath, coughing, sputum production, hemoptysis, wheezing, orthopnea. Gastrointestinal:  Nausea, vomiting, diarrhea, heartburn, constipation, abdominal pain, hematemesis, hematochezia, melena, acholic stools  Genito-Urinary:  Dysuria, urgency, frequency, hematuria  Musculoskeletal:  Joint pain, joint stiffness, joint swelling, muscle pain back pain  Neurology:  Headache, focal neurological deficits, weakness, numbness, paresthesia  Derm:  Rashes, ulcers, excoriations, bruising  Extremities:  Decreased ROM, peripheral edema, mottling      OBJECTIVE:    BP (!) 140/72   Pulse 87   Temp 98.4 °F (36.9 °C) (Temporal)   Resp 17   Ht 4' 11\" (1.499 m)   Wt 200 lb (90.7 kg)   LMP  (LMP Unknown)   SpO2 94%   BMI 40.40 kg/m²     General appearance:  awake, alert, and oriented to person, place, time, and purpose; appears stated age and cooperative; no apparent distress no labored breathing +mcclain  HEENT:  Conjunctivae/corneas clear. Neck: Supple. No jugular venous distention. Respiratory: symmetrical; clear to auscultation bilaterally; no wheezes; no rhonchi; no rales  Cardiovascular: rhythm regular; rate controlled; no murmurs  Abdomen: Soft, nontender, nondistended  Extremities:  peripheral pulses present; no peripheral edema; no ulcers  Musculoskeletal: No clubbing, cyanosis, no bilateral lower extremity edema. Brisk capillary refill.    Skin:  No rashes  on visible skin  Neurologic: awake, alert and following commands     ASSESSMENT and PLAN:  · Acute exacerbation of chronic back pain with radicular symptoms secondary to severe spinal stenosis at L4/L5-  Consult neurosurgery. Pain control. Steroids and muscle relaxant. · Weakness of both legs secondary to above-  Management as above. · Paresthesia from upper abdomen down to the foot-  Obtain additional MRI of the thoracic spine. · Inability to void- had Tolliver catheterization in the ER, lots of urine drained. Send urinalysis. · Constipation- bowel regimen. · Ambulatory dysfunction- physical therapy as tolerated  · Type 2 diabetes- sliding scale coverage, monitor blood sugar. · Fibromyalgia with chronic pain- continue home medication  · Morbid obesity- dietary and lifestyle modification. DISPOSITION: Continue current plan of care. Awaiting neurosurgery plan    Medications:  REVIEWED DAILY    Infusion Medications    dextrose       Scheduled Medications    allopurinol  300 mg Oral Daily    atenolol  50 mg Oral Daily    atorvastatin  10 mg Oral Nightly    vitamin D  2,000 Units Oral Daily    pantoprazole  20 mg Oral QAM AC    alogliptin  12.5 mg Oral Daily    docusate sodium  100 mg Oral Daily    insulin lispro  0-10 Units Subcutaneous 4x Daily AC & HS    sodium chloride flush  10 mL Intravenous 2 times per day    dexamethasone  4 mg Intravenous Q6H     PRN Meds: LORazepam, amitriptyline, glucose, dextrose, glucagon (rDNA), dextrose, sodium chloride flush, promethazine **OR** ondansetron, acetaminophen **OR** acetaminophen, oxyCODONE-acetaminophen, morphine, cyclobenzaprine    Labs:     Recent Labs     12/20/20  1552 12/21/20  0647   WBC 6.6 10.9   HGB 14.2 14.4   HCT 42.5 43.5    235       Recent Labs     12/20/20  1552      K 3.9   CL 98   CO2 27   BUN 12   CREATININE 0.6   CALCIUM 9.4   PHOS 2.8       No results for input(s): PROT, ALB, ALKPHOS, ALT, AST, BILITOT, AMYLASE, LIPASE in the last 72 hours. No results for input(s): INR in the last 72 hours.     No results for input(s): Michael Murray in the last 72 hours. Chronic labs:    Lab Results   Component Value Date    CHOL 133 03/16/2020    TRIG 241 (H) 03/16/2020    HDL 41 03/16/2020    LDLCALC 44 03/16/2020    TSH 4.180 03/18/2020    LABA1C 6.9 (H) 12/21/2020       Radiology: REVIEWED DAILY    +++++++++++++++++++++++++++++++++++++++++++++++++  Lula Hunter Physician - 2020 [place], [place]  +++++++++++++++++++++++++++++++++++++++++++++++++  NOTE: This report was transcribed using voice recognition software. Every effort was made to ensure accuracy; however, inadvertent computerized transcription errors may be present.

## 2021-12-28 ASSESSMENT — LIFESTYLE VARIABLES
HOW OFTEN DO YOU HAVE A DRINK CONTAINING ALCOHOL: NEVER
HOW OFTEN DO YOU HAVE A DRINK CONTAINING ALCOHOL: 0
AUDIT-C TOTAL SCORE: INCOMPLETE
AUDIT TOTAL SCORE: INCOMPLETE

## 2021-12-28 ASSESSMENT — PATIENT HEALTH QUESTIONNAIRE - PHQ9
1. LITTLE INTEREST OR PLEASURE IN DOING THINGS: 0
2. FEELING DOWN, DEPRESSED OR HOPELESS: 0
SUM OF ALL RESPONSES TO PHQ QUESTIONS 1-9: 0
SUM OF ALL RESPONSES TO PHQ9 QUESTIONS 1 & 2: 0
SUM OF ALL RESPONSES TO PHQ QUESTIONS 1-9: 0
SUM OF ALL RESPONSES TO PHQ QUESTIONS 1-9: 0

## 2022-01-04 ENCOUNTER — OFFICE VISIT (OUTPATIENT)
Dept: FAMILY MEDICINE CLINIC | Age: 67
End: 2022-01-04
Payer: MEDICARE

## 2022-01-04 VITALS
BODY MASS INDEX: 43.18 KG/M2 | HEART RATE: 71 BPM | HEIGHT: 59 IN | WEIGHT: 214.2 LBS | OXYGEN SATURATION: 95 % | TEMPERATURE: 97.5 F | RESPIRATION RATE: 16 BRPM | DIASTOLIC BLOOD PRESSURE: 76 MMHG | SYSTOLIC BLOOD PRESSURE: 132 MMHG

## 2022-01-04 DIAGNOSIS — E66.01 OBESITY, CLASS III, BMI 40-49.9 (MORBID OBESITY) (HCC): ICD-10-CM

## 2022-01-04 DIAGNOSIS — Z00.00 ROUTINE GENERAL MEDICAL EXAMINATION AT A HEALTH CARE FACILITY: Primary | ICD-10-CM

## 2022-01-04 DIAGNOSIS — Z13.6 SCREENING FOR CARDIOVASCULAR CONDITION: ICD-10-CM

## 2022-01-04 DIAGNOSIS — I10 ESSENTIAL HYPERTENSION: ICD-10-CM

## 2022-01-04 PROCEDURE — 3017F COLORECTAL CA SCREEN DOC REV: CPT | Performed by: NURSE PRACTITIONER

## 2022-01-04 PROCEDURE — G8484 FLU IMMUNIZE NO ADMIN: HCPCS | Performed by: NURSE PRACTITIONER

## 2022-01-04 PROCEDURE — G0439 PPPS, SUBSEQ VISIT: HCPCS | Performed by: NURSE PRACTITIONER

## 2022-01-04 RX ORDER — MELOXICAM 15 MG/1
TABLET ORAL
Qty: 30 TABLET | Status: CANCELLED | OUTPATIENT
Start: 2022-01-04

## 2022-01-04 RX ORDER — MELOXICAM 15 MG/1
TABLET ORAL
COMMUNITY
Start: 2021-12-06

## 2022-01-04 RX ORDER — OMEPRAZOLE 40 MG/1
40 CAPSULE, DELAYED RELEASE ORAL 2 TIMES DAILY
Qty: 60 CAPSULE | Refills: 1 | Status: SHIPPED
Start: 2022-01-04 | End: 2022-01-04 | Stop reason: SDUPTHER

## 2022-01-04 RX ORDER — LEVOTHYROXINE SODIUM 0.03 MG/1
25 TABLET ORAL DAILY
Qty: 90 TABLET | Refills: 0 | Status: CANCELLED | OUTPATIENT
Start: 2022-01-04

## 2022-01-04 RX ORDER — OMEPRAZOLE 40 MG/1
40 CAPSULE, DELAYED RELEASE ORAL DAILY
Qty: 90 CAPSULE | Refills: 1 | Status: SHIPPED
Start: 2022-01-04 | End: 2022-07-12

## 2022-01-04 NOTE — PROGRESS NOTES
Medicare Annual Wellness Visit  Name: Akilah Browne Date: 2022   MRN: [id number] Sex: Female   Age: 61 y.o. Ethnicity: Non- / Non    : 1955 Race: White (non-)      Nelli Calzada is here for Medicare AWV, Health Maintenance (DUE FOR SHINGLES VACCINE), and Medication Check (REVIEW WHEN AND HOW TO TAKE MEDS)    Screenings for behavioral, psychosocial and functional/safety risks, and cognitive dysfunction are all negative except as indicated below. These results, as well as other patient data from the [address] form, are documented in Flowsheets linked to this Encounter. Allergies   Allergen Reactions    Diclofenac     Diclofenac Sodium      Nauseated, Diarrhea    Farxiga [Dapagliflozin] Itching     Vaginal itching with Farxiga, Jardiance     Lisinopril Other (See Comments)     Profound malaise    Lyrica [Pregabalin] Other (See Comments)     Confusion    Sulfa Antibiotics Rash         Prior to Visit Medications    Medication Sig Taking? Authorizing Provider   meloxicam (MOBIC) 15 MG tablet  Yes Historical Provider, MD   levothyroxine (SYNTHROID) 25 MCG tablet Take 1 tablet by mouth daily On empty stomach Yes VISHNU Drew CNP   buprenorphine (BUPRENEX) 10 MCG/HR PTWK Place 1 patch onto the skin.  Yes Historical Provider, MD   acetaminophen (TYLENOL) 500 MG tablet Take 500 mg by mouth every 6 hours as needed for Pain Yes Historical Provider, MD   SITagliptin (JANUVIA) 25 MG tablet Take 1 tablet by mouth daily Yes VISHNU Durant CNP   PROCTOZONE-HC 2.5 % CREA rectal cream Apply 4 times daily as needed Yes Ciara Thompson MD   atenolol (TENORMIN) 50 MG tablet Take 1 tablet by mouth daily Yes VISHNU Drew CNP   atorvastatin (LIPITOR) 10 MG tablet Take 1 tablet by mouth nightly Indications: High Amount of Fats in the Blood Yes VISHNU Drew CNP   omeprazole (PRILOSEC) 40 MG delayed release capsule  Yes Historical Provider, MD Blood Glucose Monitoring Suppl (ACCU-CHEK GUIDE) w/Device KIT  Yes Historical Provider, MD   Alcohol Swabs (PHARMACIST CHOICE ALCOHOL) PADS  Yes Historical Provider, MD   Blood Glucose Calibration (ACCU-CHEK GUIDE CONTROL) LIQD  Yes Historical Provider, MD   allopurinol (ZYLOPRIM) 300 MG tablet TAKE 1 TABLET BY MOUTH DAILY Yes VISHNU Kumar CNP   diclofenac sodium (VOLTAREN) 1 % GEL Apply topically 2 times daily Yes Dania Bond MD   Handicap Placard MISC by Does not apply route Start 11/23/2020 expires 11/22/2023 Yes VISHNU Kumar CNP   blood glucose monitor strips Test 1 times a day & as needed for symptoms of irregular blood glucose.  Yes VISHNU Kumar CNP   triamcinolone (KENALOG) 0.1 % cream MARY EXT AA 2 TO 3 TIMES PER DAY UNTIL RESOLVED Yes Historical Provider, MD   Cholecalciferol (VITAMIN D3) 2000 units CAPS Take 2,000 Units by mouth daily Yes Historical Provider, MD   Cyanocobalamin (B-12 PO) Take by mouth  Patient not taking: Reported on 1/4/2022  Historical Provider, MD         Past Medical History:   Diagnosis Date    Acute left ankle pain 06/01/2020    Acute left lumbar radiculopathy 12/28/2017    Acute pain of right shoulder 5/8/2019    Cancer ([address] 2017    colon (treated surgically)    Chronic back pain     Contact dermatitis 4/22/2019    Diabetes mellitus ([address]     Drug-induced constipation 02/18/2020    Eosinophil count raised 11/20/2019    Fall at home 12/11/2017    Fatigue 6/8/2016    Fibromyalgia     GERD (gastroesophageal reflux disease)     Hair loss 5/3/2021    Hemorrhoids     Hypercalcemia 11/20/2019    Hyperlipidemia     Hypertension     Malignant neoplasm of sigmoid colon ([address] 6/17/2020    Obesity     Osteoarthritis     PONV (postoperative nausea and vomiting)     Postmenopausal bleeding 10/05/2017    Rib contusion, left, initial encounter 02/18/2020    Urinary incontinence     Urinary retention 12/21/2020    Weakness of both legs 12/21/2020       Past Surgical History:   Procedure Laterality Date    ANESTHESIA NERVE BLOCK Bilateral 8/15/2019    BILATERAL INTRA-ARTICULAR FACET JOINT INJECTION WITH FLUOROSCOPIC GUIDANCE AT L4-5, L5-S1 WITH IV SEDATION performed by DO Tierney at [address] [facility] AND CURETTAGE OF UTERUS N/A 10/23/2019    DILATATION AND CURETTAGE HYSTEROSCOPY performed by Abby Schwarz MD at [address] Right 7/11/2019    C7-T1 EPIDURAL STEROID INJECTION #1 TOWARD THE RIGHT performed by Neeru Carbajal DO at [address] Bilateral    [address] N/A 12/24/2020    L4-L5  POSTERIOR LUMBAR INTERBODY FUSION, T2-T3 DECOMPRESSIVE LAMINECTOMY performed by Michael Diehl MD at [facility] Right 12/28/2017    right L4-5 transforaminal epidural #1    NERVE BLOCK Left 11/29/2018    si inj    NERVE BLOCK Bilateral 08/15/2019    bilateral intra-articular facet joint injection with flouroscopic guidance at L4-S1 with iv sedation    DC INJECT SI JOINT ARTHRGRPHY&/ANES/STEROID W/IMAGE Left 11/29/2018    LEFT SACROILIAC JOINT INJECTION WITH X-RAY performed by DO Tierney at [address] Left 2005    TONSILLECTOMY  28 YRS OLD    TUBAL LIGATION           Family History   Problem Relation Age of Onset    Asthma Mother     Mental Illness Mother     Heart Disease Father     Thyroid Disease Sister     Thyroid Disease Sister     Thyroid Disease Sister        CareTeam (Including outside providers/suppliers regularly involved in providing care):   Patient Care Team:  VISHNU Olivia CNP as PCP - General (Nurse Practitioner)  VISHNU Olivia CNP as PCP - Imani Khan Provider    Wt Readings from Last 3 Encounters:   01/04/22 214 lb 3.2 oz (97.2 kg)   11/09/21 207 lb (93.9 kg)   08/11/21 200 lb (90.7 kg)     Vitals:    01/04/22 1524 BP: 132/76   Pulse: 71   Resp: 16   Temp: 97.5 °F (36.4 °C)   TempSrc: Temporal   SpO2: 95%   Weight: 214 lb 3.2 oz (97.2 kg)   Height: 4' 11\" (1.499 m)     Body mass index is 43.26 kg/m². Based upon direct observation of the patient, evaluation of cognition reveals recent and remote memory intact. General Appearance: alert and oriented to person, place and time, well developed and well- nourished, obese has gained 14 pounds since August,  in no acute distress  Skin: warm and dry, no rash or erythema, small hematoma noted to the left outer lower legs, no redness or warmth. Head: normocephalic and atraumatic  Eyes: pupils equal, round, and reactive to light, extraocular eye movements intact, conjunctivae normal  ENT: tympanic membrane, external ear and ear canal normal bilaterally, nose without deformity, nasal mucosa and turbinates normal without polyps  Neck: supple and non-tender without mass, no thyromegaly or thyroid nodules, no cervical lymphadenopathy  Pulmonary/Chest: clear to auscultation bilaterally- no wheezes, rales or rhonchi, normal air movement, no respiratory distress  Cardiovascular: normal rate, regular rhythm, normal S1 and S2, no murmurs, rubs, clicks, or gallops, distal pulses intact, no carotid bruits  Abdomen: soft, non-tender, non-distended, normal bowel sounds, no masses or organomegaly  Extremities: no cyanosis, clubbing or edema  Musculoskeletal: decreased range of motion in back and tenderness, no joint swelling, deformity   Neurologic: no cranial nerve deficit, antalgic gait sitting in WC today but able to get up and walk to the exam table, coordination and speech normal  Breast and Pap deferred to gyn    Patient's complete Health Risk Assessment and screening values have been reviewed and are found in Flowsheets. The following problems were reviewed today and where indicated follow up appointments were made and/or referrals ordered.       Positive Risk Factor Screenings with physical activity:  patient is in aquatic therapy 3 days a week for back surgery  · she is working on trying to walk more and lose weight since her back surgery      ADL:  ADLs  In the past 7 days, did you need help from others to perform any of the following everyday activities? Eating, dressing, grooming, bathing, toileting, or walking/balance?: (!) Walking/Balance  In the past 7 days, did you need help from others to take care of any of the following?  Laundry, housekeeping, banking/finances, shopping, telephone use, food preparation, transportation, or taking medications?: (!) [place] Preparation,Transportation  ADL Interventions:  · patient is in aquatic therapy 3 days a week started last week, her significant other does the food preparation, cleaning, driving and laundry      Personalized Preventive Plan   Current Health Maintenance Status  Immunization History   Administered Date(s) Administered    COVID-19, J&J, PF, 0.5 mL 03/29/2021, 10/27/2021    Influenza 11/05/2015    Influenza Vaccine, unspecified formulation 12/19/2014, 10/17/2016    Influenza Virus Vaccine 11/04/2003, 01/07/2005, 10/21/2005, 11/08/2006, 10/07/2008, 09/13/2011, 12/01/2014, 11/05/2015, 10/10/2017, 09/17/2018    Influenza, MDCK Quadv, with preserv IM (Flucelvax 2 yrs and older) 10/05/2017    Influenza, Toan Reddish, IM, (6 mo and older Fluzone, Flulaval, Fluarix and 3 yrs and older Afluria) 09/13/2011, 11/05/2015    Influenza, Quadv, adjuvanted, 65 yrs +, IM, PF (Fluad) 10/15/2020, 12/09/2021    Influenza, Triv, inactivated, subunit, adjuvanted, IM (Fluad 65 yrs and older) 09/25/2019    Pneumococcal Polysaccharide ([id number]) 11/27/2019    Td Vaccine >6yo [facility] 07/08/1998    Tdap (Boostrix, Adacel) 12/07/2017    Zoster Recombinant (Shingrix) 07/17/2018        Health Maintenance   Topic Date Due    Shingles Vaccine (2 of 2) 09/11/2018    Annual Wellness Visit (AWV)  11/24/2021    Cervical cancer screen 01/28/2022    Potassium monitoring  03/04/2022    Creatinine monitoring  03/04/2022    Lipid screen  06/28/2022    Breast cancer screen  09/02/2022    Diabetic foot exam  11/09/2022    A1C test (Diabetic or Prediabetic)  11/09/2022    Diabetic microalbuminuria test  11/09/2022    TSH testing  12/09/2022    Depression Screen  12/28/2022    Diabetic retinal exam  06/30/2023    Colon cancer screen colonoscopy  11/11/2024    Pneumococcal 0-64 years Vaccine (2 of 2 - PPSV23) 11/27/2024    DTaP/Tdap/Td vaccine (2 - Td or Tdap) 12/07/2027    Flu vaccine  Completed    COVID-19 Vaccine  Completed    Hepatitis C screen  Completed    HIV screen  Completed    Hepatitis A vaccine  Aged Out    Hib vaccine  Aged Out    Meningococcal (ACWY) vaccine  Aged Out     Recommendations for [employer] Due: see orders and patient instructions/AVS.  . Recommended screening schedule for the next 5-10 years is provided to the patient in written form: see Patient Instructions/AVS.    Wylie Dakin was seen today for medicare awv, health maintenance and medication check. Diagnoses and all orders for this visit:    Routine general medical examination at a health care facility    Obesity, Class III, BMI 40-49.9 (morbid obesity) ([address]    Screening for cardiovascular condition  -     Lipid Panel; Future                 Advance Care Planning   Advanced Care Planning: Discussed the patients choices for care and treatment in case of a health event that adversely affects decision-making abilities. Also discussed the patients long-term treatment options. Reviewed with the patient the [address] [place] [address] of [address] [place] [address] Declaration forms  Reviewed the process of designating a competent adult as an Agent (or -in-fact) that could take make health care decisions for the patient if incompetent.  Patient was asked to complete the declaration forms, either acknowledge the forms by a public notary or an eligible witness and provide a signed copy to the practice office. Time spent (minutes): 15    Obesity Counseling: Assessed behavioral health risks and factors affecting choice of behavior. Suggested weight control approaches, including dietary changes behavioral modification and follow up plan. Provided educational and support documentation. Time spent (minutes): 15  Cardiovascular Disease Risk Counseling: Assessed the patient's risk to develop cardiovascular disease and reviewed main risk factors. Reviewed steps to reduce disease risk including:   · Quitting tobacco use, reducing amount smoked, or not starting the habit  · Making healthy food choices  · Being physically active and gradualy increasing activity levels   · Reduce weight and determine a healthy BMI goal  · Monitor blood pressure and treat if higher than 140/90 mmHg  · Maintain blood total cholesterol levels under 5 mmol/l or 190 mg/dl  · Maintain LDL cholesterol levels under 3.0 mmol/l or 115 mg/dl   · Control blood glucose levels  · Consider taking aspirin (75 mg daily), once blood pressure is controlled   Provided a follow up plan.   Time spent (minutes): 15

## 2022-01-04 NOTE — PATIENT INSTRUCTIONS
Learning About Healthy Weight  What is a healthy weight? A healthy weight is the weight at which you feel good about yourself and have energy for work and play. It's also one that lowers your risk for health problems. What can you do to stay at a healthy weight? It can be hard to stay at a healthy weight, especially when fast food, vending-machine snacks, and processed foods are so easy to find. And with your busy lifestyle, activity may be low on your list of things to do. But staying at a healthy weight may be easier than you think. Here are some dos and don'ts for staying at a healthy weight. Do eat healthy foods  The kinds of foods you eat have a big impact on both your weight and your health. Reaching and staying at a healthy weight is not about going on a diet. It's about making healthier food choices every day and changing your diet for good. Healthy eating means eating a variety of foods so that you get all the nutrients you need. Your body needs protein, carbohydrate, and fats for energy. They keep your heart beating, your brain active, and your muscles working. On most days, try to eat from each food group. This means eating a variety of:  · Whole grains, such as whole wheat breads and pastas. · Fruits and vegetables. · Dairy products, such as low-fat milk, yogurt, and cheese. · Lean proteins, such as all types of fish, chicken without the skin, and beans. Don't have too much or too little of one thing. All foods, if eaten in moderation, can be part of healthy eating. Even sweets can be okay. If your favorite foods are high in fat, salt, sugar, or calories, limit how often you eat them. Eat smaller servings, or look for healthy substitutes. Do watch what you eat  Many people eat more than their bodies need. Part of staying at a healthy weight means learning how much food you really need from day to day and not eating more than that.  Even with healthy foods, eating too much can make you stop dieting, you also may overeat to make up for what you missed. Most people who diet end up gaining back the pounds they lost--and more. Remember that healthy bodies come in lots of shapes and sizes. Everyone can get healthier by eating better and being more active. Where can you learn more? Go to [email or web address] and sign in to your [employer] account. Enter 001 [id number] in the [employer] box to learn more about \"Learning About Healthy Weight. \"     If you do not have an account, please click on the \"Sign Up Now\" link. Current as of: March 17, 2021               Content Version: 13.1  © 8506-2978 [employer]. Care instructions adapted under license by [facility] ([facility] [place]). If you have questions about a medical condition or this instruction, always ask your healthcare professional. [address] any warranty or liability for your use of this information. Learning About Low-Carbohydrate Diets  What is a low-carbohydrate diet? A low-carbohydrate (or \"low-carb\") diet limits foods and drinks that have carbohydrates. This includes grains, fruits, milk and yogurt, and starchy vegetables like potatoes, beans, and corn. It also avoids foods and drinks that have added sugar. Instead, low-carb diets include foods that are high in protein and fat. Why might you follow a low-carb diet? Low-carb diets may be used for a variety of reasons, such as for weight loss. People who have diabetes may use a low-carb diet to help manage their blood sugar levels. What should you do before you start the diet? Talk to your doctor before you try any diet. This is even more important if you have health problems like kidney disease, heart disease, or diabetes. Your doctor may suggest that you meet with a registered dietitian. A dietitian can help you make an eating plan that works for you. What foods do you eat on a low-carb diet?   On a low-carb diet, you choose foods that are high in protein and fat. Examples of these are:  · Meat, poultry, and fish. · Eggs. · Nuts, such as walnuts, pecans, almonds, and peanuts. · Peanut butter and other nut butters. · Tofu. · Avocado. · Rhoderick Tru. · Non-starchy vegetables like broccoli, cauliflower, green beans, mushrooms, peppers, lettuce, and spinach. · Unsweetened non-dairy milks like almond milk and coconut milk. · Cheese, cottage cheese, and cream cheese. Current as of: September 8, 2021               Content Version: 13.1  © 2006-2021 Healthwise, [employer]. Care instructions adapted under license by [facility] ([facility]). If you have questions about a medical condition or this instruction, always ask your healthcare professional. [place] [address] any warranty or liability for your use of this information. Personalized Preventive Plan for Jacinto Lucas - 1/4/2022  Medicare offers a range of preventive health benefits. Some of the tests and screenings are paid in full while other may be subject to a deductible, co-insurance, and/or copay. Some of these benefits include a comprehensive review of your medical history including lifestyle, illnesses that may run in your family, and various assessments and screenings as appropriate. After reviewing your medical record and screening and assessments performed today your provider may have ordered immunizations, labs, imaging, and/or referrals for you. A list of these orders (if applicable) as well as your Preventive Care list are included within your After Visit Summary for your review. Other Preventive Recommendations:    · A preventive eye exam performed by an eye specialist is recommended every 1-2 years to screen for glaucoma; cataracts, macular degeneration, and other eye disorders. · A preventive dental visit is recommended every 6 months. · Try to get at least 150 minutes of exercise per week or 10,000 steps per day on a pedometer .   · Order or download the FREE \"Exercise & Physical Activity: Your Everyday Guide\" from [employer]. Call 5-760.379.2013 or search [employer] online. · You need 7339-3074 mg of calcium and 6704-5932 IU of vitamin D per day. It is possible to meet your calcium requirement with diet alone, but a vitamin D supplement is usually necessary to meet this goal.  · When exposed to the sun, use a sunscreen that protects against both UVA and UVB radiation with an SPF of 30 or greater. Reapply every 2 to 3 hours or after sweating, drying off with a towel, or swimming. · Always wear a seat belt when traveling in a car. Always wear a helmet when riding a bicycle or motorcycle.

## 2022-01-11 DIAGNOSIS — E03.9 HYPOTHYROIDISM, UNSPECIFIED TYPE: ICD-10-CM

## 2022-01-11 DIAGNOSIS — I10 ESSENTIAL HYPERTENSION: ICD-10-CM

## 2022-01-11 DIAGNOSIS — Z13.6 SCREENING FOR CARDIOVASCULAR CONDITION: ICD-10-CM

## 2022-01-11 LAB
ALBUMIN SERPL-MCNC: 4.5 G/DL (ref 3.5–5.2)
ALP BLD-CCNC: 95 U/L (ref 35–104)
ALT SERPL-CCNC: 24 U/L (ref 0–32)
ANION GAP SERPL CALCULATED.3IONS-SCNC: 13 MMOL/L (ref 7–16)
AST SERPL-CCNC: 21 U/L (ref 0–31)
BASOPHILS ABSOLUTE: 0.04 E9/L (ref 0–0.2)
BASOPHILS RELATIVE PERCENT: 0.6 % (ref 0–2)
BILIRUB SERPL-MCNC: 0.4 MG/DL (ref 0–1.2)
BUN BLDV-MCNC: 13 MG/DL (ref 6–23)
CALCIUM SERPL-MCNC: 10.2 MG/DL (ref 8.6–10.2)
CHLORIDE BLD-SCNC: 101 MMOL/L (ref 98–107)
CHOLESTEROL, TOTAL: 165 MG/DL (ref 0–199)
CO2: 26 MMOL/L (ref 22–29)
CREAT SERPL-MCNC: 0.6 MG/DL (ref 0.5–1)
EOSINOPHILS ABSOLUTE: 0.38 E9/L (ref 0.05–0.5)
EOSINOPHILS RELATIVE PERCENT: 5.6 % (ref 0–6)
GFR AFRICAN AMERICAN: >60
GFR NON-AFRICAN AMERICAN: >60 ML/MIN/1.73
GLUCOSE BLD-MCNC: 141 MG/DL (ref 74–99)
HCT VFR BLD CALC: 40.5 % (ref 34–48)
HDLC SERPL-MCNC: 42 MG/DL
HEMOGLOBIN: 13 G/DL (ref 11.5–15.5)
IMMATURE GRANULOCYTES #: 0.06 E9/L
IMMATURE GRANULOCYTES %: 0.9 % (ref 0–5)
LDL CHOLESTEROL CALCULATED: 54 MG/DL (ref 0–99)
LYMPHOCYTES ABSOLUTE: 1.56 E9/L (ref 1.5–4)
LYMPHOCYTES RELATIVE PERCENT: 23 % (ref 20–42)
MCH RBC QN AUTO: 29.4 PG (ref 26–35)
MCHC RBC AUTO-ENTMCNC: 32.1 % (ref 32–34.5)
MCV RBC AUTO: 91.6 FL (ref 80–99.9)
MONOCYTES ABSOLUTE: 0.47 E9/L (ref 0.1–0.95)
MONOCYTES RELATIVE PERCENT: 6.9 % (ref 2–12)
NEUTROPHILS ABSOLUTE: 4.27 E9/L (ref 1.8–7.3)
NEUTROPHILS RELATIVE PERCENT: 63 % (ref 43–80)
PDW BLD-RTO: 16 FL (ref 11.5–15)
PLATELET # BLD: 178 E9/L (ref 130–450)
PMV BLD AUTO: 10.7 FL (ref 7–12)
POTASSIUM SERPL-SCNC: 4.3 MMOL/L (ref 3.5–5)
RBC # BLD: 4.42 E12/L (ref 3.5–5.5)
SODIUM BLD-SCNC: 140 MMOL/L (ref 132–146)
T4 FREE: 1.22 NG/DL (ref 0.93–1.7)
TOTAL PROTEIN: 7.2 G/DL (ref 6.4–8.3)
TRIGL SERPL-MCNC: 346 MG/DL (ref 0–149)
TSH SERPL DL<=0.05 MIU/L-ACNC: 2.49 UIU/ML (ref 0.27–4.2)
VLDLC SERPL CALC-MCNC: 69 MG/DL
WBC # BLD: 6.8 E9/L (ref 4.5–11.5)

## 2022-01-12 DIAGNOSIS — E78.2 MIXED HYPERLIPIDEMIA: ICD-10-CM

## 2022-01-12 DIAGNOSIS — E03.9 HYPOTHYROIDISM, UNSPECIFIED TYPE: ICD-10-CM

## 2022-01-12 DIAGNOSIS — E78.1 HYPERTRIGLYCERIDEMIA: Primary | ICD-10-CM

## 2022-01-12 RX ORDER — LEVOTHYROXINE SODIUM 0.03 MG/1
25 TABLET ORAL DAILY
Qty: 90 TABLET | Refills: 0 | Status: SHIPPED
Start: 2022-01-12 | End: 2022-03-14 | Stop reason: SDUPTHER

## 2022-01-12 RX ORDER — EZETIMIBE 10 MG/1
10 TABLET ORAL DAILY
Qty: 90 TABLET | Refills: 1 | Status: SHIPPED
Start: 2022-01-12 | End: 2022-04-04 | Stop reason: SDUPTHER

## 2022-01-13 LAB — THYROID PEROXIDASE (TPO) ABS: <4 IU/ML (ref 0–25)

## 2022-03-14 ENCOUNTER — TELEPHONE (OUTPATIENT)
Dept: FAMILY MEDICINE CLINIC | Age: 67
End: 2022-03-14

## 2022-03-14 DIAGNOSIS — E03.9 HYPOTHYROIDISM, UNSPECIFIED TYPE: ICD-10-CM

## 2022-03-14 RX ORDER — LEVOTHYROXINE SODIUM 0.03 MG/1
25 TABLET ORAL DAILY
Qty: 90 TABLET | Refills: 1 | Status: SHIPPED
Start: 2022-03-14 | End: 2022-07-21 | Stop reason: SDUPTHER

## 2022-03-14 NOTE — TELEPHONE ENCOUNTER
Patient asking if you want her to have her lipid panel done before her April 4th visit and she wanted to inform you that she just finished levothyroxine.    Last seen 1/4/2022  Next appt 4/4/2022

## 2022-03-14 NOTE — TELEPHONE ENCOUNTER
Called pt, states if you want her to continue the levothyroxine she will need a refill. While informing, she did state that she wanted to know if you wanted the blood work before she continued the levothyroxine.

## 2022-03-30 DIAGNOSIS — N30.00 ACUTE CYSTITIS WITHOUT HEMATURIA: ICD-10-CM

## 2022-03-30 DIAGNOSIS — E78.1 HYPERTRIGLYCERIDEMIA: ICD-10-CM

## 2022-03-30 LAB
CHOLESTEROL, TOTAL: 127 MG/DL (ref 0–199)
HDLC SERPL-MCNC: 43 MG/DL
LDL CHOLESTEROL CALCULATED: 40 MG/DL (ref 0–99)
TRIGL SERPL-MCNC: 218 MG/DL (ref 0–149)
VLDLC SERPL CALC-MCNC: 44 MG/DL

## 2022-04-04 ENCOUNTER — OFFICE VISIT (OUTPATIENT)
Dept: FAMILY MEDICINE CLINIC | Age: 67
End: 2022-04-04
Payer: MEDICARE

## 2022-04-04 VITALS
WEIGHT: 221 LBS | BODY MASS INDEX: 44.55 KG/M2 | RESPIRATION RATE: 16 BRPM | HEART RATE: 76 BPM | TEMPERATURE: 97.3 F | OXYGEN SATURATION: 96 % | HEIGHT: 59 IN | DIASTOLIC BLOOD PRESSURE: 78 MMHG | SYSTOLIC BLOOD PRESSURE: 128 MMHG

## 2022-04-04 DIAGNOSIS — E11.9 TYPE 2 DIABETES MELLITUS WITHOUT COMPLICATION, WITHOUT LONG-TERM CURRENT USE OF INSULIN (HCC): Primary | ICD-10-CM

## 2022-04-04 DIAGNOSIS — E78.1 HYPERTRIGLYCERIDEMIA: ICD-10-CM

## 2022-04-04 DIAGNOSIS — M46.1 SACROILIITIS (HCC): ICD-10-CM

## 2022-04-04 DIAGNOSIS — E03.9 ACQUIRED HYPOTHYROIDISM: ICD-10-CM

## 2022-04-04 PROBLEM — I82.5Z2 CHRONIC DEEP VEIN THROMBOSIS (DVT) OF DISTAL VEIN OF LEFT LOWER EXTREMITY (HCC): Status: RESOLVED | Noted: 2021-02-01 | Resolved: 2022-04-04

## 2022-04-04 LAB
CHP ED QC CHECK: ABNORMAL
GLUCOSE BLD-MCNC: 164 MG/DL
HBA1C MFR BLD: 7.1 %

## 2022-04-04 PROCEDURE — 1036F TOBACCO NON-USER: CPT | Performed by: NURSE PRACTITIONER

## 2022-04-04 PROCEDURE — G8417 CALC BMI ABV UP PARAM F/U: HCPCS | Performed by: NURSE PRACTITIONER

## 2022-04-04 PROCEDURE — 3051F HG A1C>EQUAL 7.0%<8.0%: CPT | Performed by: NURSE PRACTITIONER

## 2022-04-04 PROCEDURE — 3017F COLORECTAL CA SCREEN DOC REV: CPT | Performed by: NURSE PRACTITIONER

## 2022-04-04 PROCEDURE — 2022F DILAT RTA XM EVC RTNOPTHY: CPT | Performed by: NURSE PRACTITIONER

## 2022-04-04 PROCEDURE — 83036 HEMOGLOBIN GLYCOSYLATED A1C: CPT | Performed by: NURSE PRACTITIONER

## 2022-04-04 PROCEDURE — 82962 GLUCOSE BLOOD TEST: CPT | Performed by: NURSE PRACTITIONER

## 2022-04-04 PROCEDURE — 99214 OFFICE O/P EST MOD 30 MIN: CPT | Performed by: NURSE PRACTITIONER

## 2022-04-04 PROCEDURE — G8427 DOCREV CUR MEDS BY ELIG CLIN: HCPCS | Performed by: NURSE PRACTITIONER

## 2022-04-04 RX ORDER — CEFDINIR 300 MG/1
CAPSULE ORAL
Status: ON HOLD | COMMUNITY
Start: 2022-03-25 | End: 2022-05-09 | Stop reason: HOSPADM

## 2022-04-04 RX ORDER — EZETIMIBE 10 MG/1
10 TABLET ORAL DAILY
Qty: 90 TABLET | Refills: 1 | Status: SHIPPED
Start: 2022-04-04 | End: 2022-11-02 | Stop reason: SDUPTHER

## 2022-04-04 ASSESSMENT — ENCOUNTER SYMPTOMS
FACIAL SWELLING: 0
CONSTIPATION: 0
COUGH: 0
SORE THROAT: 0
VOMITING: 0
DIARRHEA: 0
SINUS PRESSURE: 0
VOICE CHANGE: 0
BACK PAIN: 0
NAUSEA: 0
ABDOMINAL PAIN: 0
RHINORRHEA: 0
COLOR CHANGE: 0
SINUS PAIN: 0
CHEST TIGHTNESS: 0
SHORTNESS OF BREATH: 0
TROUBLE SWALLOWING: 0
WHEEZING: 0

## 2022-04-04 NOTE — ASSESSMENT & PLAN NOTE
Well controlled continue levothyroxine 25 mcg daily  Discussed taking same time every day on empty stomach, no vitamins, minerals, calcium or iron for 4 hours of  thyroid medication. Wait 30 - 60 minutes to have breakfast or other medications.

## 2022-04-04 NOTE — ASSESSMENT & PLAN NOTE
poorly controlled, needs to follow diet more regularly and continue zetia 10 mg and atorvastatin 10 mg and repeat labs in 6 months  -Discussed low fat diet, limit fast food, goodies, breads and pastas if consuming several days a week,  limit any alcohol consumption.  -Discussed weight reduction and exercise 30 minutes 5 days a week for total of 150 minutes weekly.  -Discussed if any unusual muscle aching/pain to contact the office, discussed medication and risk of muscle pain/damage from Rhabdomyolysis.

## 2022-04-04 NOTE — ASSESSMENT & PLAN NOTE
reasonably well controlled A1c up from 6.2% to 7.1%  increase Januvia 50 mg daily  Monitor BS at different times: 1 day fasting, then next day 2 hours after lunch, next day 2 hours after dinner, next day at bedtime then start over and log all values.   Bring log to next appointment  Foot exam every day; wash and dry well between toes, look for any redness, cracks, wounds notify provider if any problems occur  Reminder for annual Eye exam  Reminder for Podiatry visits Q 2 Months for toenail care if needed  Reminder to keep vaccines up dated  Exercise 30 minutes daily  Recommend Diabetic Education Classes if you have not already attended

## 2022-04-04 NOTE — PATIENT INSTRUCTIONS
The medication list included in this document is our record of what you are currently taking, including any changes that were made at today's visit.  If you find any differences when compared to your medications at home, or have any questions that were not answered at your visit, please contact the office. Patient Education        Learning About Meal Planning for Diabetes  Why plan your meals? Meal planning can be a key part of managing diabetes. Planning meals and snacks with the right balance of carbohydrate, protein, and fat can help you keep yourblood sugar at the target level you set with your doctor. You don't have to eat special foods. You can eat what your family eats, including sweets once in a while. But you do have to pay attention to how oftenyou eat and how much you eat of certain foods. You may want to work with a dietitian or a diabetes educator. They can give you tips and meal ideas and can answer your questions about meal planning. This health professional can also help you reach a healthy weight if that is one ofyour goals. What plan is right for you? Your dietitian or diabetes educator may suggest that you start with the plateformat or carbohydrate counting. The plate format  The plate format is a simple way to help you manage how you eat. You plan meals by learning how much space each food should take on a plate. Using the plate format helps you manage the amount of carbohydrate you eat. It can make it easier to keep your blood sugar level within your target range. It also helpsyou see if you're eating healthy portion sizes. To use the plate format, you put non-starchy vegetables on half your plate. Add lean protein foods, such as fish, lean meats and poultry, or soy products, on one-quarter of the plate. Put a grain or starchy vegetable (such as brown rice or a potato) on the final quarter of the plate.  You can add a small piece of fruit and some low-fat or fat-free milk or yogurt, depending on yourcarbohydrate goal for each meal.  Here are some tips for using the plate format:   Make sure that you are not using an oversized plate. A 9-inch plate is best. Many restaurants use larger plates.  Get used to using the plate format at home. Then you can use it when you eat out.  Write down your questions about using the plate format. Talk to your doctor, a dietitian, or a diabetes educator about your concerns. Carbohydrate counting  With carbohydrate counting, you plan meals based on the amount of carbohydrate in each food. Carbohydrate raises blood sugar higher and more quickly than any other nutrient. It is found in desserts, breads and cereals, and fruit. It's also found in starchy vegetables such as potatoes and corn, grains such as rice and pasta, and milk and yogurt. You can help keep your blood sugar levels within your target range by planning how much carbohydrate to have at meals andsnacks. The amount you need depends on several things. These include your weight, how active you are, which diabetes medicines you take, and what your goals are for your blood sugar levels. A registered dietitian or diabetes educator can helpyou plan how much carbohydrate to include in each meal and snack. An example of a carbohydrate counting plan is:   45 to 60 grams at each meal. That's about the same as 3 to 4 carbohydrate servings.  15 to 20 grams at each snack. That's about the same as 1 carbohydrate serving. The Nutrition Facts label on packaged foods tells you how much carbohydrate is in a serving of the food. First, look at the serving size on the food label. Is that the amount you eat in a serving? All of the nutrition information on a food label is based on that serving size. So if you eat more or less than that, you'll need to adjust the other numbers. Total carbohydrate is the next thing you need to look for on the label.  If you count carbohydrate servings, oneserving of carbohydrate is 15 grams. For foods that don't come with labels, such as fresh fruits and vegetables, you'll need a guide that lists carbohydrate in these foods. Ask your doctor, dietitian, or diabetes educator about books or other nutrition guides you canuse. If you take insulin, you need to know how many grams of carbohydrate are in a meal. This lets you know how much rapid-acting insulin to take before you eat. If you use an insulin pump, you get a constant rate of insulin during the day. So the pump must be programmed at meals to give you extra insulin to cover therise in blood sugar after meals. When you know how much carbohydrate you will eat, you can take the right amount of insulin. Or, if you always use the same amount of insulin, you need to [facility] that you eat the same amount of carbohydrate at meals. If you need more help to understand carbohydrate counting and food labels, askyour doctor, dietitian, or diabetes educator. How can you plan healthy meals? Here are some tips to get started:  [facility] your meals a week at a time. Don't forget to include snacks too.  Use cookbooks or online recipes to plan several main meals. Plan some quick meals for busy nights. You also can double some recipes that freeze well. Then you can save half for other busy nights when you don't have time to cook.  Make sure you have the ingredients you need for your recipes. If you're running low on basic items, put these items on your shopping list too.  List foods that you use to make breakfasts, lunches, and snacks. List plenty of fruits and vegetables.  Post this list on the refrigerator. Add to it as you think of more things you need.  Take the list to the store to do your weekly shopping. Follow-up care is a key part of your treatment and safety. Be sure to make and go to all appointments, and call your doctor if you are having problems.  It's also a good idea to know your test results and keep alist of the medicines you take.  Where can you learn more? Go to [email or web address] and sign in to your [employer] account. Enter Q731 in the [employer] box to learn more about \"Learning About Meal Planning for Diabetes. \"     If you do not have an account, please click on the \"Sign Up Now\" link. Current as of: September 8, 2021               Content Version: 13.2  © 2006-2022 Healthwise[employer]. Care instructions adapted under license by [facility] ([facility]). If you have questions about a medical condition or this instruction, always ask your healthcare professional. [address] any warranty or liability for your use of this information.

## 2022-04-04 NOTE — PROGRESS NOTES
OFFICE PROGRESS NOTE  [address] [place] [address] Rd  [id number]  Dept: [phone]   Chief Complaint   Patient presents with    Diabetes    Hypothyroidism    Hyperlipidemia       ASSESSMENT/PLAN   1. Type 2 diabetes mellitus without complication, without long-term current use of insulin ([facility])  Assessment & Plan:   reasonably well controlled A1c up from 6.2% to 7.1%  increase Januvia 50 mg daily  Monitor BS at different times: 1 day fasting, then next day 2 hours after lunch, next day 2 hours after dinner, next day at bedtime then start over and log all values. Bring log to next appointment  Foot exam every day; wash and dry well between toes, look for any redness, cracks, wounds notify provider if any problems occur  Reminder for annual Eye exam  Reminder for Podiatry visits Q 2 Months for toenail care if needed  Reminder to keep vaccines up dated  Exercise 30 minutes daily  Recommend Diabetic Education Classes if you have not already attended  Orders:  -     POCT glycosylated hemoglobin (Hb A1C)  -     POCT Glucose  -     SITagliptin (JANUVIA) 50 MG tablet; Take 1 tablet by mouth daily, Disp-90 tablet, R-1Note increase in dose discontinue the 25 mgNormal  -     CBC with Auto Differential; Future  -     Comprehensive Metabolic Panel; Future  -     Lipid Panel; Future  2. Hypertriglyceridemia  Assessment & Plan:   poorly controlled, needs to follow diet more regularly and continue zetia 10 mg and atorvastatin 10 mg and repeat labs in 6 months  -Discussed low fat diet, limit fast food, goodies, breads and pastas if consuming several days a week,  limit any alcohol consumption.  -Discussed weight reduction and exercise 30 minutes 5 days a week for total of 150 minutes weekly.  -Discussed if any unusual muscle aching/pain to contact the office, discussed medication and risk of muscle pain/damage from Rhabdomyolysis.     Orders:  -     ezetimibe (ZETIA) 10 MG tablet; Take 1 tablet by mouth daily, Disp-90 tablet, R-1Normal  -     Lipid Panel; Future  3. Acquired hypothyroidism  Assessment & Plan:   Well controlled continue levothyroxine 25 mcg daily  Discussed taking same time every day on empty stomach, no vitamins, minerals, calcium or iron for 4 hours of  thyroid medication. Wait 30 - 60 minutes to have breakfast or other medications. 4. Sacroiliitis ([address]  Assessment & Plan:   Stable follows with DR Townsend. In Aquatic therapy at this time. Reviewed labs: CMP, CBCD, Lipids, TSH, FT4,       Discussed weight loss, Discussed exercising 30 minutes daily and Discussed taking medications as directed and adverse effects    Return in about 3 months (around 7/4/2022) for DM, HTN.     HPI:     61 y.o. female presents today for follow-up of diabetes [place]. In-office bloodsugar is:   Results for POC orders placed in visit on 04/04/22   POCT glycosylated hemoglobin (Hb A1C)   Result Value Ref Range    Hemoglobin A1C 7.1 %   POCT Glucose   Result Value Ref Range    Glucose 164 mg/dL    QC OK? Patient reports being compliant to low carbohydrate diet and increasing weekly exercises on some days. Currently, the patient is treated with medication(s): Januvia 25 mg daily. She did receive an injection of Duraline into her knee. Patient reports checking home blood sugar 1 times per day. Patient states home blood sugar ranges from 135 - 140 but after steroid injection she was up to 300. Patient denies hypoglycemic episodes and understand to take sweetened beverages or a snack if hypoglycemic symptoms are suspected. Hypothyroidism: Patient complains of hypothyroidism. Symptoms include change in energy level, heat / cold intolerance and weight changes. Patient denies diarrhea, nervousness and palpitations. Onset of symptoms was several months ago. Symptoms have stabilized. Hyperlipidemia: Patient presents with hyperlipidemia.   She was tested because HTN, DM, Hyperlipidemia added Zetia 10 mg to the atorvastatin 10 mg and she has had improvement in her triglycerides. Her last labs 3/30/22 showed Total cholesterol of 127, HDL 43, LDL 40,  Triglycerides 218. Denies  chest pain, dyspnea, exertional chest pressure/discomfort, fatigue, feeding intolerance, lower extremity edema, palpitations, poor exercise tolerance, syncope, tachypnea and skin xanthelasma. There is not a family history of hyperlipidemia. There is a family history of early ischemia heart disease. She had infected toenail and had partial nail removed. Floater in her right eye she did see her opthalmologist.    She had root canal done last week and was also infected.         Today's vital signs are as follows:  /78   Pulse 76   Temp 97.3 °F (36.3 °C)   Resp 16   Ht 4' 11\" (1.499 m)   Wt 221 lb (100.2 kg)   LMP  (LMP Unknown)   SpO2 96%   BMI 44.64 kg/m²     Lab Results   Component Value Date    LABA1C 7.1 04/04/2022         Current Outpatient Medications:     cefdinir (OMNICEF) 300 MG capsule, , Disp: , Rfl:     SITagliptin (JANUVIA) 50 MG tablet, Take 1 tablet by mouth daily, Disp: 90 tablet, Rfl: 1    ezetimibe (ZETIA) 10 MG tablet, Take 1 tablet by mouth daily, Disp: 90 tablet, Rfl: 1    levothyroxine (SYNTHROID) 25 MCG tablet, Take 1 tablet by mouth daily On empty stomach, Disp: 90 tablet, Rfl: 1    meloxicam (MOBIC) 15 MG tablet, , Disp: , Rfl:     omeprazole (PRILOSEC) 40 MG delayed release capsule, Take 1 capsule by mouth daily, Disp: 90 capsule, Rfl: 1    acetaminophen (TYLENOL) 500 MG tablet, Take 500 mg by mouth every 6 hours as needed for Pain, Disp: , Rfl:     PROCTOZONE-HC 2.5 % CREA rectal cream, Apply 4 times daily as needed, Disp: 1 Tube, Rfl: 3    atenolol (TENORMIN) 50 MG tablet, Take 1 tablet by mouth daily, Disp: 90 tablet, Rfl: 3    atorvastatin (LIPITOR) 10 MG tablet, Take 1 tablet by mouth nightly Indications: High Amount of Fats in the Blood, Disp: 90 tablet, Rfl: 3    Blood Glucose Monitoring Suppl (ACCU-CHEK GUIDE) w/Device KIT, , Disp: , Rfl:     Alcohol Swabs (PHARMACIST CHOICE ALCOHOL) PADS, , Disp: , Rfl:     Blood Glucose Calibration (ACCU-CHEK GUIDE CONTROL) LIQD, , Disp: , Rfl:     allopurinol (ZYLOPRIM) 300 MG tablet, TAKE 1 TABLET BY MOUTH DAILY, Disp: 90 tablet, Rfl: 3    diclofenac sodium (VOLTAREN) 1 % GEL, Apply topically 2 times daily, Disp: 100 g, Rfl: 2    Handicap Placard MISC, by Does not apply route Start 2020 expires 2023, Disp: 1 each, Rfl: 0    blood glucose monitor strips, Test 1 times a day & as needed for symptoms of irregular blood glucose., Disp: 50 strip, Rfl: 11    triamcinolone (KENALOG) 0.1 % cream, MARY EXT AA 2 TO 3 TIMES PER DAY UNTIL RESOLVED, Disp: , Rfl:     Cholecalciferol (VITAMIN D3) 2000 units CAPS, Take 2,000 Units by mouth daily, Disp: , Rfl:     Cyanocobalamin (B-12 PO), Take by mouth (Patient not taking: Reported on 2022), Disp: , Rfl:       Surgical History:  has a past surgical history that includes  section; Tubal ligation; Tonsillectomy (28 YRS OLD); Foot surgery (Bilateral); shoulder surgery (Left, ); Cholecystectomy; Colon surgery; Nerve Block (Right, 2017); Nerve Block (Left, 2018); pr inject si joint arthrgrphy&/anes/steroid w/image (Left, 2018); epidural steroid injection (Right, 2019); Nerve Block (Bilateral, 08/15/2019); Anesthesia Nerve Block (Bilateral, 8/15/2019); Dilation and curettage of uterus (N/A, 10/23/2019); and Lumbar spine surgery (N/A, 2020). Social History:  reports that she has never smoked. She has never used smokeless tobacco. She reports previous alcohol use. She reports that she does not use drugs. Family History: family history includes Asthma in her mother; Heart Disease in her father; Mental Illness in her mother; Thyroid Disease in her sister, sister, and sister.   I have reviewed Margaert's allergies, medications, problem list, medical, social and family history and have updated as needed in the electronic medical record    Review of Systems   Constitutional: Positive for unexpected weight change. Negative for activity change, appetite change, chills, diaphoresis, fatigue and fever. HENT: Negative for congestion, dental problem, drooling, ear discharge, ear pain, facial swelling, hearing loss, mouth sores, nosebleeds, postnasal drip, rhinorrhea, sinus pressure, sinus pain, sneezing, sore throat, tinnitus, trouble swallowing and voice change. Eyes: Negative for visual disturbance. Respiratory: Negative for cough, chest tightness, shortness of breath and wheezing. Cardiovascular: Negative for chest pain, palpitations and leg swelling. Gastrointestinal: Negative for abdominal pain, constipation, diarrhea, nausea and vomiting. Endocrine: Negative for cold intolerance, heat intolerance, polydipsia, polyphagia and polyuria. Genitourinary: Negative for difficulty urinating, frequency and urgency. Musculoskeletal: Positive for arthralgias and gait problem. Negative for back pain, joint swelling, myalgias, neck pain and neck stiffness. Skin: Negative for color change, pallor, rash and wound. Allergic/Immunologic: Negative for environmental allergies, food allergies and immunocompromised state. Neurological: Negative for dizziness, tremors, seizures, syncope, facial asymmetry, speech difficulty, weakness, light-headedness, numbness and headaches. Hematological: Negative for adenopathy. Does not bruise/bleed easily. Psychiatric/Behavioral: Negative for agitation, behavioral problems, confusion, decreased concentration, dysphoric mood, hallucinations, self-injury, sleep disturbance and suicidal ideas. The patient is not nervous/anxious and is not hyperactive.         OBJECTIVE:     VS:  Wt Readings from Last 3 Encounters:   04/04/22 221 lb (100.2 kg)   01/04/22 214 lb 3.2 oz (97.2 kg)   11/09/21 207 lb (93.9 kg)                       Vitals:    04/04/22 1046   BP: 128/78   Pulse: 76   Resp: 16   Temp: 97.3 °F (36.3 °C)   SpO2: 96%   Weight: 221 lb (100.2 kg)   Height: 4' 11\" (1.499 m)       General: Alert and oriented to person, place, and time, well developed and well nourished, in no acute distress  SKIN: Warm and dry, intact without any rash, masses or lesions  HEAD: normocephalic, atraumatic  Neck: supple and non-tender without mass, trachea midline, no cervical lymphadenopathy, no bruit, no thyromegaly or nodules  Cardiovascular: regular rate and regular rhythm, normal S1 and S2, no murmurs, rubs, clicks, or gallop. Distal pulses intact, no carotid bruits. No edema  Pulmonary/Chest: clear to auscultation bilaterally, no wheezes, rales or rhonchi, normal air movement, no respiratory distress  Abdomen: soft, non-tender, non-distended, normal bowel sounds, no masses or hepatosplenomegaly  Musculoskeletal: decreased ROM, no joint swelling, deformity   Neurologic: antalgic gait, coordination and speech normal  Extremities: no clubbing, cyanosis, or edema. Psychiatric: Good eye contact, normal mood and affect, answers questions appropriately    I have reviewed my findings and recommendations with Toribio Cassidy.     Margarette Malik, VISHNU - CNP, NP-C, FNP-BC

## 2022-05-01 NOTE — PROGRESS NOTES
Problem: At Risk for Falls  Goal: # Patient does not fall  Outcome: Outcome Met, Continue evaluating goal progress toward completion      tachypnea. Cardiovascular risk factors: dyslipidemia, hypertension, obesity (BMI >= 30 kg/m2) and sedentary lifestyle. Use of agents associated with hypertension: NSAIDS. History of target organ damage: none. Her prediabetes has stayed stable at 6.0% with . She is complaining of left knee pain which is chronic, she saw Dr. Carol Pratt and should have knee replacement but she declines at this time. Discussed possible stem cell therapy and she will call Dr. Coley Bronson [facility] office. She has been alternating the meloxicam and naproxen without much relief. Difficulty walking any distance due to left knee pain, last x ray done by Dr. Carol Pratt was bone on bone nothing in chart to review. She is having stiffness if sits too long or riding in the car will cause her to have increased pain and stiffness. She has one refill left on the [facility] and needs refills for her gout, has been stable since starting the [facility]. She has a lesion on her back under the bra which is getting irritated and itches, her significant other scratches it all the time and she would like to have it removed, has gotten a little bigger.        Current Outpatient Prescriptions:     meloxicam (MOBIC) 15 MG tablet, , Disp: , Rfl: 4    febuxostat (ULORIC) 40 MG TABS tablet, TAKE 1 TABLET BY MOUTH DAILY (STOP ALLOPURINOL), Disp: 30 tablet, Rfl: 5    omeprazole (PRILOSEC) 20 MG delayed release capsule, Take 1 capsule by mouth daily, Disp: 30 capsule, Rfl: 3    zoster recombinant adjuvanted vaccine (SHINGRIX) 50 MCG SUSR injection, Inject 0.5 mLs into the muscle once for 1 dose 50 MCG IM then repeat 2-6 months., Disp: 1 each, Rfl: 1    naproxen (NAPROSYN) 500 MG tablet, TAKE 1 TABLET TWICE A DAY WITH MEALS, Disp: 60 tablet, Rfl: 4    atenolol (TENORMIN) 50 MG tablet, Take 1 tablet by mouth daily, Disp: 90 tablet, Rfl: 3    atorvastatin (LIPITOR) 10 MG tablet, Take 10 mg by mouth nightly, Disp: , Rfl:     clotrimazole-betamethasone (LOTRISONE) 1-0.05 % cream, APPLY TOPICALLY TWICE A DAY, Disp: 45 g, Rfl: 0    Biotin 1000 MCG TABS, Take 1,000 mcg by mouth daily, Disp: , Rfl:   Social History     Social History    Marital status: Single     Spouse name: N/A    Number of children: N/A    Years of education: N/A     Social History Main Topics    Smoking status: Never Smoker    Smokeless tobacco: Never Used    Alcohol use 0.0 oz/week      Comment: Rarely    Drug use: No    Sexual activity: Not Asked     Other Topics Concern    None     Social History Narrative    None       I have reviewed Margaret's allergies, medications, problem list, medical, social and family history and have updated as needed in the electronic medical record    Review of Systems   Constitutional: Negative for activity change, appetite change, chills, diaphoresis, fatigue, fever and unexpected weight change. HENT: Negative for congestion, dental problem, drooling, ear discharge, ear pain, facial swelling, hearing loss, mouth sores, nosebleeds, postnasal drip, rhinorrhea, sinus pain, sinus pressure, sneezing, sore throat, tinnitus, trouble swallowing and voice change. Eyes: Negative for visual disturbance. Respiratory: Negative for cough, chest tightness, shortness of breath and wheezing. Cardiovascular: Negative for chest pain, palpitations and leg swelling. Gastrointestinal: Negative for abdominal pain, constipation, diarrhea, nausea and vomiting. Endocrine: Negative for cold intolerance, heat intolerance, polydipsia, polyphagia and polyuria. Genitourinary: Negative for difficulty urinating, frequency and urgency. Musculoskeletal: Positive for arthralgias and gait problem. Negative for back pain, joint swelling, myalgias, neck pain and neck stiffness. Skin: Negative for color change, pallor, rash and wound. Allergic/Immunologic: Negative for environmental allergies, food allergies and immunocompromised state.    Neurological: Negative for dizziness, tremors, seizures, syncope, facial asymmetry, speech difficulty, weakness, light-headedness, numbness and headaches. Hematological: Negative for adenopathy. Does not bruise/bleed easily. Psychiatric/Behavioral: Negative for agitation, behavioral problems, confusion, decreased concentration, dysphoric mood, hallucinations, self-injury, sleep disturbance and suicidal ideas. The patient is not nervous/anxious and is not hyperactive. OBJECTIVE:     VS:  Wt Readings from Last 3 Encounters:   07/10/18 198 lb (89.8 kg)   04/10/18 199 lb (90.3 kg)   03/09/18 196 lb (88.9 kg)                       Vitals:    07/10/18 1049   BP: 128/76   Site: Right Arm   Position: Sitting   Cuff Size: Large Adult   Pulse: 84   Resp: 19   Temp: 97.8 °F (36.6 °C)   TempSrc: Temporal   SpO2: 97%   Weight: 198 lb (89.8 kg)   Height: 5' (1.524 m)       General: Alert and oriented to person, place, and time, well developed and well nourished, in no acute distress  SKIN: Warm and dry, intact with 9 mm irregular slightly raised light brown lesion very rough on the surface. No bleeding or tenderness on palpation. HEAD: normocephalic, atraumatic  Eyes: sclera/conjunctiva clear, PERRLA, EOMI's intact  Neck: supple and non-tender without mass, trachea midline, no cervical lymphadenopathy, no bruit, no thyromegaly or nodules  Cardiovascular: regular rate and regular rhythm, normal S1 and S2,  no murmurs, rubs, clicks, or gallop. Distal pulses intact, no carotid bruits.  No edema  Pulmonary/Chest: clear to auscultation bilaterally, no wheezes, rales or rhonchi, normal air movement, no respiratory distress  Abdomen: soft, non-tender, non-distended, normal bowel sounds, no masses or hepatosplenomegaly  Musculoskeletal: some limitation in the left knee ROM with crepitus noted with flexion and extension can't straighten the knee completely, pain at the anterior aspect of the knee, no joint swelling, deformity   Neurologic: reflexes normal and symmetric, no cranial nerve deficit, gait, coordination and speech normal  Extremities: no clubbing, cyanosis, or edema. Psychiatric: Good eye contact, normal mood and affect, answers questions appropriately    ASSESSMENT/PLAN   Verdis Risk was seen today for 3 month follow-up, hyperlipidemia, hypertension and other. Diagnoses and all orders for this visit:    Mixed hyperlipidemia  -Discussed low fat diet, limit fast food, goodies, breads and pastas if consuming several days a week,  limit any alcohol consumption.  -Discussed weight reduction and exercise 30 minutes 5 days a week for total of 150 minutes weekly.  -Discussed if any unusual muscle aching/pain to contact the office, discussed medication and risk of muscle pain/damage from Rhabdomyolysis. Essential hypertension stable  -Discussed taking medication and directed every day. -Discussed exercising daily 30 minutes 5 times a week for 150 minutes weekly.  -Discussed weight reduction if needed.  -Discussed low sodium diet.  -Discussed limiting caffeine consumption and tobacco cessation and the effects they have on the heart and blood pressure. Prediabetes stable  -     POCT glycosylated hemoglobin (Hb A1C)  -     POCT Glucose    Depression screening    Chronic pain of left knee New  Discussed stem cell treatment and discussed insurance doesn't cover the procedure at this time so would be self pay. 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     Back skin lesion New  -     Amb External Referral To Dermatology    Other orders  -

## 2022-05-07 ENCOUNTER — APPOINTMENT (OUTPATIENT)
Dept: CT IMAGING | Age: 67
End: 2022-05-07
Payer: MEDICARE

## 2022-05-07 ENCOUNTER — APPOINTMENT (OUTPATIENT)
Dept: GENERAL RADIOLOGY | Age: 67
End: 2022-05-07
Payer: MEDICARE

## 2022-05-07 ENCOUNTER — HOSPITAL ENCOUNTER (EMERGENCY)
Age: 67
Discharge: ANOTHER HOSPITAL | End: 2022-05-07
Attending: EMERGENCY MEDICINE
Payer: MEDICARE

## 2022-05-07 ENCOUNTER — HOSPITAL ENCOUNTER (OUTPATIENT)
Age: 67
Setting detail: OBSERVATION
Discharge: HOME | End: 2022-05-09
Attending: FAMILY MEDICINE | Admitting: FAMILY MEDICINE
Payer: MEDICARE

## 2022-05-07 VITALS
DIASTOLIC BLOOD PRESSURE: 75 MMHG | WEIGHT: 210 LBS | TEMPERATURE: 98.1 F | HEART RATE: 72 BPM | OXYGEN SATURATION: 94 % | BODY MASS INDEX: 41.23 KG/M2 | HEIGHT: 60 IN | RESPIRATION RATE: 16 BRPM | SYSTOLIC BLOOD PRESSURE: 190 MMHG

## 2022-05-07 DIAGNOSIS — G45.9 TIA (TRANSIENT ISCHEMIC ATTACK): Primary | ICD-10-CM

## 2022-05-07 DIAGNOSIS — R51.9 ACUTE NONINTRACTABLE HEADACHE, UNSPECIFIED HEADACHE TYPE: ICD-10-CM

## 2022-05-07 LAB
ACETAMINOPHEN LEVEL: 8.3 MCG/ML (ref 10–30)
ALBUMIN SERPL-MCNC: 4.5 G/DL (ref 3.5–5.2)
ALBUMIN SERPL-MCNC: 4.6 G/DL (ref 3.5–5.2)
ALP BLD-CCNC: 99 U/L (ref 35–104)
ALP BLD-CCNC: 99 U/L (ref 35–104)
ALT SERPL-CCNC: 25 U/L (ref 0–32)
ALT SERPL-CCNC: 26 U/L (ref 0–32)
ANION GAP SERPL CALCULATED.3IONS-SCNC: 13 MMOL/L (ref 7–16)
APTT: 33.8 SEC (ref 24.5–35.1)
AST SERPL-CCNC: 28 U/L (ref 0–31)
AST SERPL-CCNC: 30 U/L (ref 0–31)
BASOPHILS ABSOLUTE: 0.04 E9/L (ref 0–0.2)
BASOPHILS RELATIVE PERCENT: 0.5 % (ref 0–2)
BILIRUB SERPL-MCNC: 0.6 MG/DL (ref 0–1.2)
BILIRUB SERPL-MCNC: 0.6 MG/DL (ref 0–1.2)
BILIRUBIN DIRECT: 0.2 MG/DL (ref 0–0.3)
BILIRUBIN, INDIRECT: 0.4 MG/DL (ref 0–1)
BUN BLDV-MCNC: 16 MG/DL (ref 6–23)
CALCIUM SERPL-MCNC: 10 MG/DL (ref 8.6–10.2)
CHLORIDE BLD-SCNC: 102 MMOL/L (ref 98–107)
CHP ED QC CHECK: NORMAL
CO2: 26 MMOL/L (ref 22–29)
CREAT SERPL-MCNC: 0.7 MG/DL (ref 0.5–1)
EOSINOPHILS ABSOLUTE: 0.43 E9/L (ref 0.05–0.5)
EOSINOPHILS RELATIVE PERCENT: 5.1 % (ref 0–6)
ETHANOL: <10 MG/DL (ref 0–0.08)
GFR AFRICAN AMERICAN: >60
GFR NON-AFRICAN AMERICAN: >60 ML/MIN/1.73
GLUCOSE BLD-MCNC: 100 MG/DL
GLUCOSE BLD-MCNC: 115 MG/DL (ref 74–99)
HCT VFR BLD CALC: 41.5 % (ref 34–48)
HEMOGLOBIN: 14 G/DL (ref 11.5–15.5)
IMMATURE GRANULOCYTES #: 0.05 E9/L
IMMATURE GRANULOCYTES %: 0.6 % (ref 0–5)
INR BLD: 1
LYMPHOCYTES ABSOLUTE: 2.59 E9/L (ref 1.5–4)
LYMPHOCYTES RELATIVE PERCENT: 30.8 % (ref 20–42)
MCH RBC QN AUTO: 30.8 PG (ref 26–35)
MCHC RBC AUTO-ENTMCNC: 33.7 % (ref 32–34.5)
MCV RBC AUTO: 91.2 FL (ref 80–99.9)
METER GLUCOSE: 100 MG/DL (ref 74–99)
MONOCYTES ABSOLUTE: 0.74 E9/L (ref 0.1–0.95)
MONOCYTES RELATIVE PERCENT: 8.8 % (ref 2–12)
NEUTROPHILS ABSOLUTE: 4.57 E9/L (ref 1.8–7.3)
NEUTROPHILS RELATIVE PERCENT: 54.2 % (ref 43–80)
PDW BLD-RTO: 12.8 FL (ref 11.5–15)
PLATELET # BLD: 201 E9/L (ref 130–450)
PMV BLD AUTO: 10.9 FL (ref 7–12)
POTASSIUM REFLEX MAGNESIUM: 4 MMOL/L (ref 3.5–5)
PROTHROMBIN TIME: 11.7 SEC (ref 9.3–12.4)
RBC # BLD: 4.55 E12/L (ref 3.5–5.5)
SALICYLATE, SERUM: <0.3 MG/DL (ref 0–30)
SODIUM BLD-SCNC: 141 MMOL/L (ref 132–146)
TOTAL PROTEIN: 7.2 G/DL (ref 6.4–8.3)
TOTAL PROTEIN: 7.3 G/DL (ref 6.4–8.3)
TRICYCLIC ANTIDEPRESSANTS SCREEN SERUM: NEGATIVE NG/ML
TROPONIN, HIGH SENSITIVITY: 11 NG/L (ref 0–9)
TROPONIN, HIGH SENSITIVITY: 11 NG/L (ref 0–9)
WBC # BLD: 8.4 E9/L (ref 4.5–11.5)

## 2022-05-07 PROCEDURE — 71045 X-RAY EXAM CHEST 1 VIEW: CPT

## 2022-05-07 PROCEDURE — 85730 THROMBOPLASTIN TIME PARTIAL: CPT

## 2022-05-07 PROCEDURE — 80076 HEPATIC FUNCTION PANEL: CPT

## 2022-05-07 PROCEDURE — 85610 PROTHROMBIN TIME: CPT

## 2022-05-07 PROCEDURE — 70496 CT ANGIOGRAPHY HEAD: CPT

## 2022-05-07 PROCEDURE — 80143 DRUG ASSAY ACETAMINOPHEN: CPT

## 2022-05-07 PROCEDURE — 6360000004 HC RX CONTRAST MEDICATION: Performed by: RADIOLOGY

## 2022-05-07 PROCEDURE — 70498 CT ANGIOGRAPHY NECK: CPT

## 2022-05-07 PROCEDURE — 36415 COLL VENOUS BLD VENIPUNCTURE: CPT

## 2022-05-07 PROCEDURE — 84484 ASSAY OF TROPONIN QUANT: CPT

## 2022-05-07 PROCEDURE — 82077 ASSAY SPEC XCP UR&BREATH IA: CPT

## 2022-05-07 PROCEDURE — 82962 GLUCOSE BLOOD TEST: CPT

## 2022-05-07 PROCEDURE — 99285 EMERGENCY DEPT VISIT HI MDM: CPT

## 2022-05-07 PROCEDURE — 6370000000 HC RX 637 (ALT 250 FOR IP): Performed by: EMERGENCY MEDICINE

## 2022-05-07 PROCEDURE — 70450 CT HEAD/BRAIN W/O DYE: CPT

## 2022-05-07 PROCEDURE — 80053 COMPREHEN METABOLIC PANEL: CPT

## 2022-05-07 PROCEDURE — 0042T CT BRAIN PERFUSION: CPT

## 2022-05-07 PROCEDURE — 85025 COMPLETE CBC W/AUTO DIFF WBC: CPT

## 2022-05-07 PROCEDURE — 80307 DRUG TEST PRSMV CHEM ANLYZR: CPT

## 2022-05-07 PROCEDURE — 80179 DRUG ASSAY SALICYLATE: CPT

## 2022-05-07 RX ORDER — ASPIRIN 325 MG
325 TABLET ORAL ONCE
Status: COMPLETED | OUTPATIENT
Start: 2022-05-07 | End: 2022-05-07

## 2022-05-07 RX ORDER — CLOPIDOGREL 300 MG/1
300 TABLET, FILM COATED ORAL ONCE
Status: COMPLETED | OUTPATIENT
Start: 2022-05-07 | End: 2022-05-07

## 2022-05-07 RX ADMIN — ASPIRIN 325 MG ORAL TABLET 325 MG: 325 PILL ORAL at 17:54

## 2022-05-07 RX ADMIN — IOPAMIDOL 150 ML: 755 INJECTION, SOLUTION INTRAVENOUS at 17:15

## 2022-05-07 RX ADMIN — CLOPIDOGREL BISULFATE 300 MG: 300 TABLET, FILM COATED ORAL at 17:52

## 2022-05-07 ASSESSMENT — ENCOUNTER SYMPTOMS
PHOTOPHOBIA: 0
VOMITING: 0
ABDOMINAL PAIN: 0
COUGH: 0
NAUSEA: 0
RHINORRHEA: 0
COLOR CHANGE: 0
SHORTNESS OF BREATH: 0
BACK PAIN: 0

## 2022-05-07 ASSESSMENT — PAIN - FUNCTIONAL ASSESSMENT: PAIN_FUNCTIONAL_ASSESSMENT: NONE - DENIES PAIN

## 2022-05-07 ASSESSMENT — PAIN DESCRIPTION - LOCATION: LOCATION: HEAD

## 2022-05-07 ASSESSMENT — PAIN SCALES - GENERAL: PAINLEVEL_OUTOF10: 6

## 2022-05-07 ASSESSMENT — LIFESTYLE VARIABLES: HOW OFTEN DO YOU HAVE A DRINK CONTAINING ALCOHOL: NEVER
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
## 2022-05-08 LAB
ANION GAP SERPL CALCULATED.3IONS-SCNC: 14 MMOL/L (ref 7–16)
BUN BLDV-MCNC: 17 MG/DL (ref 6–23)
CALCIUM SERPL-MCNC: 9.6 MG/DL (ref 8.6–10.2)
CHLORIDE BLD-SCNC: 105 MMOL/L (ref 98–107)
CHOLESTEROL, TOTAL: 124 MG/DL (ref 0–199)
CO2: 24 MMOL/L (ref 22–29)
CREAT SERPL-MCNC: 0.8 MG/DL (ref 0.5–1)
GFR AFRICAN AMERICAN: >60
GFR NON-AFRICAN AMERICAN: >60 ML/MIN/1.73
GLUCOSE BLD-MCNC: 129 MG/DL (ref 74–99)
HBA1C MFR BLD: 7.1 % (ref 4–5.6)
HCT VFR BLD CALC: 39.3 % (ref 34–48)
HDLC SERPL-MCNC: 39 MG/DL
HEMOGLOBIN: 13.1 G/DL (ref 11.5–15.5)
LDL CHOLESTEROL CALCULATED: 33 MG/DL (ref 0–99)
MCH RBC QN AUTO: 30.4 PG (ref 26–35)
MCHC RBC AUTO-ENTMCNC: 33.3 % (ref 32–34.5)
MCV RBC AUTO: 91.2 FL (ref 80–99.9)
METER GLUCOSE: 132 MG/DL (ref 74–99)
METER GLUCOSE: 152 MG/DL (ref 74–99)
PDW BLD-RTO: 12.8 FL (ref 11.5–15)
PLATELET # BLD: 180 E9/L (ref 130–450)
PMV BLD AUTO: 10.8 FL (ref 7–12)
POTASSIUM REFLEX MAGNESIUM: 3.9 MMOL/L (ref 3.5–5)
RBC # BLD: 4.31 E12/L (ref 3.5–5.5)
SODIUM BLD-SCNC: 143 MMOL/L (ref 132–146)
TRIGL SERPL-MCNC: 262 MG/DL (ref 0–149)
VLDLC SERPL CALC-MCNC: 52 MG/DL
WBC # BLD: 7.5 E9/L (ref 4.5–11.5)

## 2022-05-08 PROCEDURE — 80061 LIPID PANEL: CPT

## 2022-05-08 PROCEDURE — 85027 COMPLETE CBC AUTOMATED: CPT

## 2022-05-08 PROCEDURE — 82962 GLUCOSE BLOOD TEST: CPT

## 2022-05-08 PROCEDURE — 6370000000 HC RX 637 (ALT 250 FOR IP): Performed by: FAMILY MEDICINE

## 2022-05-08 PROCEDURE — 36415 COLL VENOUS BLD VENIPUNCTURE: CPT

## 2022-05-08 PROCEDURE — 6360000002 HC RX W HCPCS: Performed by: FAMILY MEDICINE

## 2022-05-08 PROCEDURE — G0378 HOSPITAL OBSERVATION PER HR: HCPCS

## 2022-05-08 PROCEDURE — 96372 THER/PROPH/DIAG INJ SC/IM: CPT

## 2022-05-08 PROCEDURE — 83036 HEMOGLOBIN GLYCOSYLATED A1C: CPT

## 2022-05-08 PROCEDURE — 80048 BASIC METABOLIC PNL TOTAL CA: CPT

## 2022-05-08 RX ORDER — DEXTROSE MONOHYDRATE 25 G/50ML
12.5 INJECTION, SOLUTION INTRAVENOUS PRN
Status: DISCONTINUED | OUTPATIENT
Start: 2022-05-08 | End: 2022-05-09 | Stop reason: HOSPADM

## 2022-05-08 RX ORDER — ONDANSETRON 2 MG/ML
4 INJECTION INTRAMUSCULAR; INTRAVENOUS EVERY 6 HOURS PRN
Status: DISCONTINUED | OUTPATIENT
Start: 2022-05-08 | End: 2022-05-09 | Stop reason: HOSPADM

## 2022-05-08 RX ORDER — SENNA PLUS 8.6 MG/1
1 TABLET ORAL DAILY PRN
Status: DISCONTINUED | OUTPATIENT
Start: 2022-05-08 | End: 2022-05-09 | Stop reason: HOSPADM

## 2022-05-08 RX ORDER — ACETAMINOPHEN 500 MG
500 TABLET ORAL EVERY 6 HOURS PRN
Status: DISCONTINUED | OUTPATIENT
Start: 2022-05-08 | End: 2022-05-09 | Stop reason: HOSPADM

## 2022-05-08 RX ORDER — ENOXAPARIN SODIUM 100 MG/ML
40 INJECTION SUBCUTANEOUS DAILY
Status: DISCONTINUED | OUTPATIENT
Start: 2022-05-08 | End: 2022-05-09 | Stop reason: HOSPADM

## 2022-05-08 RX ORDER — IBUPROFEN 400 MG/1
400 TABLET ORAL EVERY 6 HOURS PRN
Status: DISCONTINUED | OUTPATIENT
Start: 2022-05-08 | End: 2022-05-09 | Stop reason: HOSPADM

## 2022-05-08 RX ORDER — ALLOPURINOL 300 MG/1
300 TABLET ORAL DAILY
Status: DISCONTINUED | OUTPATIENT
Start: 2022-05-08 | End: 2022-05-09 | Stop reason: HOSPADM

## 2022-05-08 RX ORDER — EZETIMIBE 10 MG/1
10 TABLET ORAL DAILY
Status: DISCONTINUED | OUTPATIENT
Start: 2022-05-08 | End: 2022-05-09 | Stop reason: HOSPADM

## 2022-05-08 RX ORDER — PANTOPRAZOLE SODIUM 40 MG/1
40 TABLET, DELAYED RELEASE ORAL
Status: DISCONTINUED | OUTPATIENT
Start: 2022-05-08 | End: 2022-05-09 | Stop reason: HOSPADM

## 2022-05-08 RX ORDER — ROSUVASTATIN CALCIUM 20 MG/1
40 TABLET, COATED ORAL NIGHTLY
Status: DISCONTINUED | OUTPATIENT
Start: 2022-05-08 | End: 2022-05-09 | Stop reason: HOSPADM

## 2022-05-08 RX ORDER — INSULIN LISPRO 100 [IU]/ML
0-6 INJECTION, SOLUTION INTRAVENOUS; SUBCUTANEOUS
Status: DISCONTINUED | OUTPATIENT
Start: 2022-05-08 | End: 2022-05-09 | Stop reason: HOSPADM

## 2022-05-08 RX ORDER — ONDANSETRON 4 MG/1
4 TABLET, ORALLY DISINTEGRATING ORAL EVERY 8 HOURS PRN
Status: DISCONTINUED | OUTPATIENT
Start: 2022-05-08 | End: 2022-05-09 | Stop reason: HOSPADM

## 2022-05-08 RX ORDER — ASPIRIN 81 MG/1
81 TABLET ORAL DAILY
Status: DISCONTINUED | OUTPATIENT
Start: 2022-05-08 | End: 2022-05-09 | Stop reason: HOSPADM

## 2022-05-08 RX ORDER — LEVOTHYROXINE SODIUM 0.03 MG/1
25 TABLET ORAL
Status: DISCONTINUED | OUTPATIENT
Start: 2022-05-08 | End: 2022-05-09 | Stop reason: HOSPADM

## 2022-05-08 RX ORDER — ASPIRIN 300 MG/1
300 SUPPOSITORY RECTAL DAILY
Status: DISCONTINUED | OUTPATIENT
Start: 2022-05-08 | End: 2022-05-09 | Stop reason: HOSPADM

## 2022-05-08 RX ORDER — DEXTROSE MONOHYDRATE 50 MG/ML
100 INJECTION, SOLUTION INTRAVENOUS PRN
Status: DISCONTINUED | OUTPATIENT
Start: 2022-05-08 | End: 2022-05-09 | Stop reason: HOSPADM

## 2022-05-08 RX ORDER — ATENOLOL 50 MG/1
50 TABLET ORAL DAILY
Status: DISCONTINUED | OUTPATIENT
Start: 2022-05-08 | End: 2022-05-09 | Stop reason: HOSPADM

## 2022-05-08 RX ORDER — INSULIN LISPRO 100 [IU]/ML
0-3 INJECTION, SOLUTION INTRAVENOUS; SUBCUTANEOUS NIGHTLY
Status: DISCONTINUED | OUTPATIENT
Start: 2022-05-08 | End: 2022-05-09 | Stop reason: HOSPADM

## 2022-05-08 RX ORDER — ALOGLIPTIN 12.5 MG/1
12.5 TABLET, FILM COATED ORAL DAILY
Refills: 1 | Status: DISCONTINUED | OUTPATIENT
Start: 2022-05-08 | End: 2022-05-09 | Stop reason: HOSPADM

## 2022-05-08 RX ADMIN — EZETIMIBE 10 MG: 10 TABLET ORAL at 09:06

## 2022-05-08 RX ADMIN — LEVOTHYROXINE SODIUM 25 MCG: 0.03 TABLET ORAL at 06:18

## 2022-05-08 RX ADMIN — ENOXAPARIN SODIUM 40 MG: 100 INJECTION SUBCUTANEOUS at 09:06

## 2022-05-08 RX ADMIN — ATENOLOL 50 MG: 50 TABLET ORAL at 09:06

## 2022-05-08 RX ADMIN — ROSUVASTATIN CALCIUM 40 MG: 20 TABLET, FILM COATED ORAL at 22:00

## 2022-05-08 RX ADMIN — ALOGLIPTIN 12.5 MG: 12.5 TABLET, FILM COATED ORAL at 09:06

## 2022-05-08 RX ADMIN — ACETAMINOPHEN 500 MG: 500 TABLET ORAL at 06:19

## 2022-05-08 RX ADMIN — ALLOPURINOL 300 MG: 300 TABLET ORAL at 09:06

## 2022-05-08 RX ADMIN — ASPIRIN 81 MG: 81 TABLET, COATED ORAL at 09:06

## 2022-05-08 RX ADMIN — PANTOPRAZOLE SODIUM 40 MG: 40 TABLET, DELAYED RELEASE ORAL at 06:19

## 2022-05-08 ASSESSMENT — PAIN SCALES - GENERAL
PAINLEVEL_OUTOF10: 3
PAINLEVEL_OUTOF10: 0

## 2022-05-08 ASSESSMENT — PAIN DESCRIPTION - PAIN TYPE: TYPE: ACUTE PAIN

## 2022-05-08 ASSESSMENT — PAIN DESCRIPTION - ORIENTATION: ORIENTATION: RIGHT

## 2022-05-08 ASSESSMENT — PAIN DESCRIPTION - LOCATION: LOCATION: HEAD

## 2022-05-08 NOTE — PROGRESS NOTES
Occupational Therapy    OT order received. Chart reviewed. Pt completes ADLs/functional mobility/transfers Mod I, demo'ing good balance/safety awareness. Pt reports no new visual deficits or weakness. Pt demonstrates no OT needs at this time. OT order discontinued. Please re-consult if there is a change in status.     Braulio Pa, [address] [facility], OTR/L [id number]

## 2022-05-08 NOTE — H&P
Hospitalist History & Physical      PCP: Vince James, APRN - CNP    Date of Service: Pt seen/examined on 5/8/2022     Chief Complaint:  had no chief complaint listed for this encounter. History Of Present Illness:    Ms. Smith Castaneda, a 61y.o. year old female  who  has a past medical history of Acute left ankle pain, Acute left lumbar radiculopathy, Acute pain of right shoulder, Cancer (HCC), Chronic back pain, Chronic deep vein thrombosis (DVT) of distal vein of left lower extremity ([address] Contact dermatitis, Diabetes mellitus ([address] Drug-induced constipation, DVT (deep venous thrombosis) ([address] Eosinophil count raised, Fall at home, Fatigue, Fibromyalgia, GERD (gastroesophageal reflux disease), Hair loss, Hemorrhoids, Hypercalcemia, Hyperlipidemia, Hypertension, Malignant neoplasm of sigmoid colon ([address] Obesity, Osteoarthritis, PONV (postoperative nausea and vomiting), Postmenopausal bleeding, Rib contusion, left, initial encounter, Urinary incontinence, Urinary retention, and Weakness of both legs. Patient presented to [address] [place], [place] [address] emergency department with strokelike symptoms. 15 minutes prior to coming into the emergency department she had sudden onset of headache behind her right eye. At the same time she was having weakness of her left arm and was having some difficulty speaking. The symptoms have mostly resolved although she is currently reporting some difficulty with swallowing. She states she has a pressure in her neck. Patient denies fever, chills, nausea, vomiting, chest pain, shortness of breath, abdominal pain. Vital signs within normal limits and stable. Laboratory studies were unremarkable. CT head/CTA head/neck/brain perfusion were all unremarkable. Patient was transferred to [facility] for further neurological consultation and evaluation.       Past Medical History:   Diagnosis Date    Acute left ankle pain 06/01/2020    Acute left lumbar radiculopathy 12/28/2017    Acute pain of right shoulder 5/8/2019    Cancer ([facility] 2017    colon (treated surgically)    Chronic back pain     Chronic deep vein thrombosis (DVT) of distal vein of left lower extremity ([address] 2/1/2021    Contact dermatitis 4/22/2019    Diabetes mellitus ([facility]     Drug-induced constipation 02/18/2020    DVT (deep venous thrombosis) (HCC)     Eosinophil count raised 11/20/2019    Fall at home 12/11/2017    Fatigue 6/8/2016    Fibromyalgia     GERD (gastroesophageal reflux disease)     Hair loss 5/3/2021    Hemorrhoids     Hypercalcemia 11/20/2019    Hyperlipidemia     Hypertension     Malignant neoplasm of sigmoid colon ([facility] 6/17/2020    Obesity     Osteoarthritis     PONV (postoperative nausea and vomiting)     Postmenopausal bleeding 10/05/2017    Rib contusion, left, initial encounter 02/18/2020    Urinary incontinence     Urinary retention 12/21/2020    Weakness of both legs 12/21/2020       Past Surgical History:   Procedure Laterality Date    ANESTHESIA NERVE BLOCK Bilateral 8/15/2019    BILATERAL INTRA-ARTICULAR FACET JOINT INJECTION WITH FLUOROSCOPIC GUIDANCE AT L4-5, L5-S1 WITH IV SEDATION performed by Joseph Deleon DO at [address] OF UTERUS N/A 10/23/2019    DILATATION AND CURETTAGE HYSTEROSCOPY performed by Estee Strickland MD at [facility] Right 7/11/2019    C7-T1 EPIDURAL STEROID INJECTION #1 TOWARD THE RIGHT performed by Kayleigh Holcomb DO at [address] Bilateral     LUMBAR SPINE SURGERY N/A 12/24/2020    L4-L5  POSTERIOR LUMBAR INTERBODY FUSION, T2-T3 DECOMPRESSIVE LAMINECTOMY performed by Eriberto Nunez MD at [facility] Right 12/28/2017    right L4-5 transforaminal epidural #1    NERVE BLOCK Left 11/29/2018    si inj    NERVE BLOCK Bilateral 08/15/2019    bilateral intra-articular facet joint injection with flouroscopic guidance at L4-S1 with iv sedation    AK INJECT SI JOINT ARTHRGRPHY&/ANES/STEROID W/IMAGE Left 11/29/2018    LEFT SACROILIAC JOINT INJECTION WITH X-RAY performed by DO Tierney at [address] Left 2005    TONSILLECTOMY  28 YRS OLD    TUBAL LIGATION         Prior to Admission medications    Medication Sig Start Date End Date Taking?  Authorizing Provider   cefdinir (OMNICEF) 300 MG capsule  3/25/22   Historical Provider, MD   SITagliptin (JANUVIA) 50 MG tablet Take 1 tablet by mouth daily 4/4/22   Debborah Kocher, APRN - CNP   ezetimibe (ZETIA) 10 MG tablet Take 1 tablet by mouth daily 4/4/22   Debborah Kocher, APRN - CNP   levothyroxine (SYNTHROID) 25 MCG tablet Take 1 tablet by mouth daily On empty stomach 3/14/22   Debborah Kocher, APRN - CNP   meloxicam (MOBIC) 15 MG tablet  12/6/21   Historical Provider, MD   omeprazole (PRILOSEC) 40 MG delayed release capsule Take 1 capsule by mouth daily 1/4/22   Debborah Kocher, APRN - CNP   acetaminophen (TYLENOL) 500 MG tablet Take 500 mg by mouth every 6 hours as needed for Pain    Historical Provider, MD   PROCTOZONE-HC 2.5 % CREA rectal cream Apply 4 times daily as needed 8/5/21   Rufus Philip MD   Cyanocobalamin (B-12 PO) Take by mouth  Patient not taking: Reported on 1/4/2022    Historical Provider, MD   atenolol (TENORMIN) 50 MG tablet Take 1 tablet by mouth daily 8/4/21   Debborah Kocher, APRN - CNP   atorvastatin (LIPITOR) 10 MG tablet Take 1 tablet by mouth nightly Indications: High Amount of Fats in the Blood 8/4/21   Debborah Kocher, APRN - CNP   Blood Glucose Monitoring Suppl (ACCU-CHEK GUIDE) w/Device KIT  3/8/21   Historical Provider, MD   Alcohol Swabs (PHARMACIST CHOICE ALCOHOL) PADS  3/8/21   Historical Provider, MD   Blood Glucose Calibration (ACCU-CHEK GUIDE CONTROL) LIQD  3/8/21   Historical Provider, MD   allopurinol (ZYLOPRIM) 300 MG tablet TAKE 1 TABLET BY MOUTH DAILY 4/28/21   Kirby Harvey VISHNU Zaidi - CNP   diclofenac sodium (VOLTAREN) 1 % GEL Apply topically 2 times daily 4/21/21   Kyree Reeves MD   Handicap Placard MISC by Does not apply route Start 11/23/2020 expires 11/22/2023 11/23/20   VISHNU Naranjo CNP   blood glucose monitor strips Test 1 times a day & as needed for symptoms of irregular blood glucose. 6/17/20   VISHNU Naranjo CNP   triamcinolone (KENALOG) 0.1 % cream MARY EXT AA 2 TO 3 TIMES PER DAY UNTIL RESOLVED 2/24/20   Historical Provider, MD   Cholecalciferol (VITAMIN D3) 2000 units CAPS Take 2,000 Units by mouth daily    Historical Provider, MD         Allergies:  Diclofenac, Diclofenac sodium, Farxiga [dapagliflozin], Lisinopril, Lyrica [pregabalin], and Sulfa antibiotics    Social History:    TOBACCO:   reports that she has never smoked. She has never used smokeless tobacco.  ETOH:   reports previous alcohol use. Family History:    Reviewed in detail and negative for DM, CAD, Cancer, CVA. Positive as follows\"      Problem Relation Age of Onset    Asthma Mother     Mental Illness Mother     Heart Disease Father     Thyroid Disease Sister     Thyroid Disease Sister     Thyroid Disease Sister        REVIEW OF SYSTEMS:   Pertinent positives as noted in the HPI. All other systems reviewed and negative. PHYSICAL EXAM:  BP (!) 141/55   Pulse 76   Temp 97.2 °F (36.2 °C) (Temporal)   Resp 18   LMP  (LMP Unknown)   SpO2 94%   General appearance: No apparent distress, appears stated age and cooperative. HEENT: Normal cephalic, atraumatic without obvious deformity. Pupils equal, round, and reactive to light. Extra ocular muscles intact. Conjunctivae/corneas clear. Neck: Supple, with full range of motion. No jugular venous distention. Trachea midline. Respiratory: Clear to auscultation bilaterally  Cardiovascular: Regular rate and rhythm  Abdomen: Soft, nontender, nondistended  Musculoskeletal: No clubbing, cyanosis, edema of bilateral lower extremities. Brisk capillary refill. Skin: Normal skin color. No rashes or lesions. Neurologic:  Neurovascularly intact without any focal sensory/motor deficits. Cranial nerves: II-XII intact, grossly non-focal.  Psychiatric: Alert and oriented, thought content appropriate, normal insight    Reviewed EKG and CXR personally      CBC:   Recent Labs     05/07/22  1703   WBC 8.4   RBC 4.55   HGB 14.0   HCT 41.5   MCV 91.2   RDW 12.8        BMP:   Recent Labs     05/07/22  1703      K 4.0      CO2 26   BUN 16   CREATININE 0.7     LFT:  Recent Labs     05/07/22  1703 05/07/22  1801   PROT 7.3 7.2   ALKPHOS 99 99   ALT 25 26   AST 28 30   BILITOT 0.6 0.6     CE:  No results for input(s): Hayden Clap in the last 72 hours. PT/INR:   Recent Labs     05/07/22 1703   INR 1.0   APTT 33.8     BNP: No results for input(s): BNP in the last 72 hours.   ESR:   Lab Results   Component Value Date    SEDRATE 9 07/15/2020     CRP: No results found for: CRP  D Dimer: No results found for: DDIMER   Folate and B12:   Lab Results   Component Value Date    VUUASHYT30 225 06/28/2021   ,   Lab Results   Component Value Date    FOLATE 13.6 06/28/2021     Lactic Acid: No results found for: LACTA  Thyroid Studies:   Lab Results   Component Value Date    TSH 2.490 01/11/2022       Oupatient labs:  Lab Results   Component Value Date    CHOL 127 03/30/2022    TRIG 218 (H) 03/30/2022    HDL 43 03/30/2022    LDLCALC 40 03/30/2022    TSH 2.490 01/11/2022    INR 1.0 05/07/2022    LABA1C 7.1 04/04/2022       Urinalysis:    Lab Results   Component Value Date    NITRU Negative 06/28/2021    WBCUA 1-3 06/28/2021    BACTERIA MODERATE 06/28/2021    RBCUA NONE 06/28/2021    BLOODU Negative 06/28/2021    SPECGRAV 1.020 06/28/2021    GLUCOSEU Negative 06/28/2021       Imaging:  CT HEAD WO CONTRAST    Result Date: 5/7/2022  EXAMINATION: CT OF THE HEAD WITHOUT CONTRAST  5/7/2022 5:15 pm TECHNIQUE: CT of the head was performed without the administration of intravenous contrast. Dose modulation, iterative reconstruction, and/or weight based adjustment of the mA/kV was utilized to reduce the radiation dose to as low as reasonably achievable. COMPARISON: None. HISTORY: ORDERING SYSTEM PROVIDED HISTORY: Evaluate intracranial abnormality TECHNOLOGIST PROVIDED HISTORY: Has a \"code stroke\" or \"stroke alert\" been called? ->Yes Reason for exam:->Evaluate intracranial abnormality Decision Support Exception - unselect if not a suspected or confirmed emergency medical condition->Emergency Medical Condition (MA) FINDINGS: BRAIN/VENTRICLES: There is no acute intracranial hemorrhage, mass effect or midline shift. No abnormal extra-axial fluid collection. The gray-white differentiation is maintained without evidence of an acute infarct. There is no evidence of hydrocephalus. There is moderate chronic small vessel ischemic white matter disease. ORBITS: The visualized portion of the orbits demonstrate no acute abnormality. SINUSES: The visualized paranasal sinuses and mastoid air cells demonstrate no acute abnormality. SOFT TISSUES/SKULL:  No acute abnormality of the visualized skull or soft tissues. No acute intracranial abnormality. XR CHEST PORTABLE    Result Date: 5/7/2022  EXAMINATION: ONE XRAY VIEW OF THE CHEST 5/7/2022 4:52 pm COMPARISON: 12/27/2020 HISTORY: ORDERING SYSTEM PROVIDED HISTORY: Possible Stroke TECHNOLOGIST PROVIDED HISTORY: Reason for exam:->Possible Stroke FINDINGS: The lungs are without acute focal process. There is no effusion or pneumothorax. The cardiomediastinal silhouette is without acute process. The osseous structures are without acute process. No acute process.      CTA NECK W CONTRAST    Result Date: 5/7/2022  EXAMINATION: CT OF THE HEAD WITH CONTRAST; CTA OF THE HEAD WITH CONTRAST; CTA OF THE NECK 5/7/2022 5:15 pm: TECHNIQUE: CT of the head/brain was performed with the administration of intravenous contrast. Multiplanar reformatted images are provided for review. Dose modulation, iterative reconstruction, and/or weight based adjustment of the mA/kV was utilized to reduce the radiation dose to as low as reasonably achievable.; CTA of the head/brain was performed with the administration of intravenous contrast. Multiplanar reformatted images are provided for review. MIP images are provided for review. Dose modulation, iterative reconstruction, and/or weight based adjustment of the mA/kV was utilized to reduce the radiation dose to as low as reasonably achievable.; CTA of the neck was performed with the administration of intravenous contrast. Multiplanar reformatted images are provided for review. MIP images are provided for review. Stenosis of the internal carotid arteries measured using NASCET criteria. Dose modulation, iterative reconstruction, and/or weight based adjustment of the mA/kV was utilized to reduce the radiation dose to as low as reasonably achievable. This scan was analyzed using V[employer]. ai contact LVO. Identification of suspected findings is not for diagnostic use beyond notification. Viz LVO is limited to analysis of imaging data and should not be used in-lieu of full patient evaluation or relied upon to make or confirm diagnosis. COMPARISON: CT head same day. HISTORY: ORDERING SYSTEM PROVIDED HISTORY: Patient TECHNOLOGIST PROVIDED HISTORY: Reason for exam:->Patient Decision Support Exception - unselect if not a suspected or confirmed emergency medical condition->Emergency Medical Condition (MA); ORDERING SYSTEM PROVIDED HISTORY: Aphasia TECHNOLOGIST PROVIDED HISTORY: Reason for exam:->Aphasia Has a \"code stroke\" or \"stroke alert\" been called? ->Yes Decision Support Exception - unselect if not a suspected or confirmed emergency medical condition->Emergency Medical Condition (MA); ORDERING SYSTEM PROVIDED HISTORY: aphasia TECHNOLOGIST PROVIDED HISTORY: Reason for exam:->aphasia Has a \"code stroke\" or \"stroke alert\" been called? ->Yes Decision Support Exception - unselect if not a suspected or confirmed emergency medical condition->Emergency Medical Condition (MA) CTA NECK: AORTIC ARCH/ARCH VESSELS: Motion degraded evaluation of the imaged aorta. Origins of the innominate, brachiocephalic, and subclavian arteries appear patent. CAROTID ARTERIES: Right common carotid artery: Patent without focal stenosis. Retropharyngeal course. Right internal carotid artery: No significant stenosis by NASCET criteria. Retropharyngeal course. Right external carotid artery origin: Patent without focal stenosis. Left common carotid artery: Patent without focal stenosis. Left internal carotid artery: No significant stenosis by NASCET criteria. Retropharyngeal course. Left external carotid artery origin: Patent without focal stenosis. VERTEBRAL ARTERIES: Right vertebral artery: Few mild stenoses. Dominant. Left vertebral artery: Patent without focal stenosis. SOFT TISSUES: Visualized lung apices are clear. No adenopathy in the neck and visualized chest. Soft tissues of the neck are unremarkable given patient positioning. BONES: Scattered degenerative changes noted in the visualized spine with spondylolisthesis. Laminectomies in the upper thoracic spine. CTA HEAD: ANTERIOR CIRCULATION: Intracranial internal carotid arteries: Mild stenoses in the right cavernous/supraclinoid segments. Moderate stenosis in the left supraclinoid segment. Anterior cerebral arteries: No focal stenosis at the level of the [place]. Middle cerebral arteries: No focal stenosis at the level of the [place]. POSTERIOR CIRCULATION: Intracranial vertebral arteries: Moderate stenosis on the left. No stenosis on the right. Basilar artery: Patent without focal stenosis. Posterior cerebral arteries: No focal stenosis at the level of the [place]. Small right posterior communicating artery.  OTHER: Cavernous sinuses are not well evaluated on this phase of contrast. Otherwise, no dural venous sinus thrombosis on this non-dedicated study. BRAIN: No midline shift. No extra-axial fluid collection. CT PERFUSION: EXAM QUALITY: The examination is diagnostic with appropriate arterial inflow and venous outflow curves, and diagnostic perfusion maps. CORE INFARCT: The total area of ischemic core is 0 mL (CBF<30% volume). TOTAL HYPOPERFUSION: The total area of hypoperfusion is 0 mL (Tmax>6s volume). PENUMBRA: The penumbra (mismatch) volume is 0 mL and is located in the n/a. The mismatch ratio is n/a.     1. No perfusion mismatch. 2. No large vessel intracranial occlusion or high grade stenosis. 3. No significant stenoses of the internal carotid arteries. 4. Other findings as described. CT BRAIN PERFUSION    Result Date: 5/7/2022  EXAMINATION: CT OF THE HEAD WITH CONTRAST; CTA OF THE HEAD WITH CONTRAST; CTA OF THE NECK 5/7/2022 5:15 pm: TECHNIQUE: CT of the head/brain was performed with the administration of intravenous contrast. Multiplanar reformatted images are provided for review. Dose modulation, iterative reconstruction, and/or weight based adjustment of the mA/kV was utilized to reduce the radiation dose to as low as reasonably achievable.; CTA of the head/brain was performed with the administration of intravenous contrast. Multiplanar reformatted images are provided for review. MIP images are provided for review. Dose modulation, iterative reconstruction, and/or weight based adjustment of the mA/kV was utilized to reduce the radiation dose to as low as reasonably achievable.; CTA of the neck was performed with the administration of intravenous contrast. Multiplanar reformatted images are provided for review. MIP images are provided for review. Stenosis of the internal carotid arteries measured using NASCET criteria.  Dose modulation, iterative reconstruction, and/or weight based adjustment of the mA/kV was utilized to reduce the radiation dose to as low as reasonably achievable. This scan was analyzed using Viz. ai contact LVO. Identification of suspected findings is not for diagnostic use beyond notification. Viz LVO is limited to analysis of imaging data and should not be used in-lieu of full patient evaluation or relied upon to make or confirm diagnosis. COMPARISON: CT head same day. HISTORY: ORDERING SYSTEM PROVIDED HISTORY: Patient TECHNOLOGIST PROVIDED HISTORY: Reason for exam:->Patient Decision Support Exception - unselect if not a suspected or confirmed emergency medical condition->Emergency Medical Condition (MA); ORDERING SYSTEM PROVIDED HISTORY: Aphasia TECHNOLOGIST PROVIDED HISTORY: Reason for exam:->Aphasia Has a \"code stroke\" or \"stroke alert\" been called? ->Yes Decision Support Exception - unselect if not a suspected or confirmed emergency medical condition->Emergency Medical Condition (MA); ORDERING SYSTEM PROVIDED HISTORY: aphasia TECHNOLOGIST PROVIDED HISTORY: Reason for exam:->aphasia Has a \"code stroke\" or \"stroke alert\" been called? ->Yes Decision Support Exception - unselect if not a suspected or confirmed emergency medical condition->Emergency Medical Condition (MA) CTA NECK: AORTIC ARCH/ARCH VESSELS: Motion degraded evaluation of the imaged aorta. Origins of the innominate, brachiocephalic, and subclavian arteries appear patent. CAROTID ARTERIES: Right common carotid artery: Patent without focal stenosis. Retropharyngeal course. Right internal carotid artery: No significant stenosis by NASCET criteria. Retropharyngeal course. Right external carotid artery origin: Patent without focal stenosis. Left common carotid artery: Patent without focal stenosis. Left internal carotid artery: No significant stenosis by NASCET criteria. Retropharyngeal course. Left external carotid artery origin: Patent without focal stenosis. VERTEBRAL ARTERIES: Right vertebral artery: Few mild stenoses. Dominant. Left vertebral artery: Patent without focal stenosis.  SOFT TISSUES: Visualized lung apices are clear. No adenopathy in the neck and visualized chest. Soft tissues of the neck are unremarkable given patient positioning. BONES: Scattered degenerative changes noted in the visualized spine with spondylolisthesis. Laminectomies in the upper thoracic spine. CTA HEAD: ANTERIOR CIRCULATION: Intracranial internal carotid arteries: Mild stenoses in the right cavernous/supraclinoid segments. Moderate stenosis in the left supraclinoid segment. Anterior cerebral arteries: No focal stenosis at the level of the [place]. Middle cerebral arteries: No focal stenosis at the level of the [place]. POSTERIOR CIRCULATION: Intracranial vertebral arteries: Moderate stenosis on the left. No stenosis on the right. Basilar artery: Patent without focal stenosis. Posterior cerebral arteries: No focal stenosis at the level of the [place]. Small right posterior communicating artery. OTHER: Cavernous sinuses are not well evaluated on this phase of contrast. Otherwise, no dural venous sinus thrombosis on this non-dedicated study. BRAIN: No midline shift. No extra-axial fluid collection. CT PERFUSION: EXAM QUALITY: The examination is diagnostic with appropriate arterial inflow and venous outflow curves, and diagnostic perfusion maps. CORE INFARCT: The total area of ischemic core is 0 mL (CBF<30% volume). TOTAL HYPOPERFUSION: The total area of hypoperfusion is 0 mL (Tmax>6s volume). PENUMBRA: The penumbra (mismatch) volume is 0 mL and is located in the n/a. The mismatch ratio is n/a.     1. No perfusion mismatch. 2. No large vessel intracranial occlusion or high grade stenosis. 3. No significant stenoses of the internal carotid arteries. 4. Other findings as described.      CTA HEAD W CONTRAST    Result Date: 5/7/2022  EXAMINATION: CT OF THE HEAD WITH CONTRAST; CTA OF THE HEAD WITH CONTRAST; CTA OF THE NECK 5/7/2022 5:15 pm: TECHNIQUE: CT of the head/brain was performed with the administration of intravenous contrast. Multiplanar reformatted images are provided for review. Dose modulation, iterative reconstruction, and/or weight based adjustment of the mA/kV was utilized to reduce the radiation dose to as low as reasonably achievable.; CTA of the head/brain was performed with the administration of intravenous contrast. Multiplanar reformatted images are provided for review. MIP images are provided for review. Dose modulation, iterative reconstruction, and/or weight based adjustment of the mA/kV was utilized to reduce the radiation dose to as low as reasonably achievable.; CTA of the neck was performed with the administration of intravenous contrast. Multiplanar reformatted images are provided for review. MIP images are provided for review. Stenosis of the internal carotid arteries measured using NASCET criteria. Dose modulation, iterative reconstruction, and/or weight based adjustment of the mA/kV was utilized to reduce the radiation dose to as low as reasonably achievable. This scan was analyzed using [employer]. ai contact LVO. Identification of suspected findings is not for diagnostic use beyond notification. Viz LVO is limited to analysis of imaging data and should not be used in-lieu of full patient evaluation or relied upon to make or confirm diagnosis. COMPARISON: CT head same day. HISTORY: ORDERING SYSTEM PROVIDED HISTORY: Patient TECHNOLOGIST PROVIDED HISTORY: Reason for exam:->Patient Decision Support Exception - unselect if not a suspected or confirmed emergency medical condition->Emergency Medical Condition (MA); ORDERING SYSTEM PROVIDED HISTORY: Aphasia TECHNOLOGIST PROVIDED HISTORY: Reason for exam:->Aphasia Has a \"code stroke\" or \"stroke alert\" been called? ->Yes Decision Support Exception - unselect if not a suspected or confirmed emergency medical condition->Emergency Medical Condition (MA); ORDERING SYSTEM PROVIDED HISTORY: aphasia TECHNOLOGIST PROVIDED HISTORY: Reason for exam:->aphasia Has a \"code stroke\" or \"stroke alert\" been called? ->Yes Decision Support Exception - unselect if not a suspected or confirmed emergency medical condition->Emergency Medical Condition (MA) CTA NECK: AORTIC ARCH/ARCH VESSELS: Motion degraded evaluation of the imaged aorta. Origins of the innominate, brachiocephalic, and subclavian arteries appear patent. CAROTID ARTERIES: Right common carotid artery: Patent without focal stenosis. Retropharyngeal course. Right internal carotid artery: No significant stenosis by NASCET criteria. Retropharyngeal course. Right external carotid artery origin: Patent without focal stenosis. Left common carotid artery: Patent without focal stenosis. Left internal carotid artery: No significant stenosis by NASCET criteria. Retropharyngeal course. Left external carotid artery origin: Patent without focal stenosis. VERTEBRAL ARTERIES: Right vertebral artery: Few mild stenoses. Dominant. Left vertebral artery: Patent without focal stenosis. SOFT TISSUES: Visualized lung apices are clear. No adenopathy in the neck and visualized chest. Soft tissues of the neck are unremarkable given patient positioning. BONES: Scattered degenerative changes noted in the visualized spine with spondylolisthesis. Laminectomies in the upper thoracic spine. CTA HEAD: ANTERIOR CIRCULATION: Intracranial internal carotid arteries: Mild stenoses in the right cavernous/supraclinoid segments. Moderate stenosis in the left supraclinoid segment. Anterior cerebral arteries: No focal stenosis at the level of the [place]. Middle cerebral arteries: No focal stenosis at the level of the [place]. POSTERIOR CIRCULATION: Intracranial vertebral arteries: Moderate stenosis on the left. No stenosis on the right. Basilar artery: Patent without focal stenosis. Posterior cerebral arteries: No focal stenosis at the level of the [place]. Small right posterior communicating artery.  OTHER: Cavernous sinuses are not well evaluated on this phase of contrast. Otherwise, no dural venous sinus thrombosis on this non-dedicated study. BRAIN: No midline shift. No extra-axial fluid collection. CT PERFUSION: EXAM QUALITY: The examination is diagnostic with appropriate arterial inflow and venous outflow curves, and diagnostic perfusion maps. CORE INFARCT: The total area of ischemic core is 0 mL (CBF<30% volume). TOTAL HYPOPERFUSION: The total area of hypoperfusion is 0 mL (Tmax>6s volume). PENUMBRA: The penumbra (mismatch) volume is 0 mL and is located in the n/a. The mismatch ratio is n/a.     1. No perfusion mismatch. 2. No large vessel intracranial occlusion or high grade stenosis. 3. No significant stenoses of the internal carotid arteries. 4. Other findings as described. ASSESSMENT:  -Strokelike symptoms  -Hypertension  -Hyperlipidemia  -Type 2 diabetes      PLAN:  -Admit to medicine  -Consult neurology  -MRI of the brain without contrast  -PT/OT/SLP  -Check lipid panel and hemoglobin A1c  -Telemetry  -Continue home medications        Diet: Diet NPO  Code Status: Full Code  Surrogate decision maker confirmed with patient:   Extended Emergency Contact Information  Primary Emergency Contact: AndrewJose C  Address: [address] [place] [address] Phone: [phone]  Mobile Phone: [phone]  Relation: Other   needed? No  Secondary Emergency Contact: MEDINA PARK  [place] Phone: [phone]  Relation: None    DVT Prophylaxis: []Lovenox []Heparin []PCD [] [address] []Encouraged ambulation  Disposition: []Med/Surg [] Intermediate [] ICU/CCU  Admit status: [] Observation [] Inpatient     +++++++++++++++++++++++++++++++++++++++++++++++++  Sinda Mequon, DO  +++++++++++++++++++++++++++++++++++++++++++++++++  NOTE: This report was transcribed using voice recognition software.  Every effort was made to ensure accuracy; however, inadvertent computerized transcription errors may be present.

## 2022-05-08 NOTE — PROGRESS NOTES
Physical Therapy  Physical Therapy Attempt    Name: Ramila Licona  : 1955  MRN: [id number]      Date of Service: 2022  Chart reviewed. Pt was sitting EOB upon arrival.  Pt reported being back to baseline and independently ambulating in room to bathroom with standard walker. Pt politely declined initial evaluation stating no needs at this time. Will discontinue skilled PT order. Please re-consult if any changes occur.     Arash Vasquez, PT, DPT  [id number]

## 2022-05-08 NOTE — PROGRESS NOTES
Patient admitted with stroke-like symptoms vs possible migraine? which have since resolved. Neurology consulted, MRI brain ordered. Await recommendations of neurology, potential dc pending their clearance.

## 2022-05-09 ENCOUNTER — APPOINTMENT (OUTPATIENT)
Dept: MRI IMAGING | Age: 67
End: 2022-05-09
Attending: FAMILY MEDICINE
Payer: MEDICARE

## 2022-05-09 VITALS
OXYGEN SATURATION: 93 % | WEIGHT: 210 LBS | DIASTOLIC BLOOD PRESSURE: 75 MMHG | TEMPERATURE: 97.8 F | HEIGHT: 60 IN | HEART RATE: 67 BPM | SYSTOLIC BLOOD PRESSURE: 167 MMHG | BODY MASS INDEX: 41.23 KG/M2 | RESPIRATION RATE: 18 BRPM

## 2022-05-09 LAB
METER GLUCOSE: 138 MG/DL (ref 74–99)
METER GLUCOSE: 143 MG/DL (ref 74–99)
METER GLUCOSE: 155 MG/DL (ref 74–99)

## 2022-05-09 PROCEDURE — G0378 HOSPITAL OBSERVATION PER HR: HCPCS

## 2022-05-09 PROCEDURE — 6360000002 HC RX W HCPCS: Performed by: FAMILY MEDICINE

## 2022-05-09 PROCEDURE — 82962 GLUCOSE BLOOD TEST: CPT

## 2022-05-09 PROCEDURE — 92523 SPEECH SOUND LANG COMPREHEN: CPT

## 2022-05-09 PROCEDURE — 6370000000 HC RX 637 (ALT 250 FOR IP): Performed by: INTERNAL MEDICINE

## 2022-05-09 PROCEDURE — 96372 THER/PROPH/DIAG INJ SC/IM: CPT

## 2022-05-09 PROCEDURE — 6370000000 HC RX 637 (ALT 250 FOR IP): Performed by: FAMILY MEDICINE

## 2022-05-09 PROCEDURE — 70551 MRI BRAIN STEM W/O DYE: CPT

## 2022-05-09 RX ORDER — CLOPIDOGREL BISULFATE 75 MG/1
75 TABLET ORAL DAILY
Qty: 21 TABLET | Refills: 0 | Status: SHIPPED | OUTPATIENT
Start: 2022-05-09 | End: 2022-07-13

## 2022-05-09 RX ORDER — ASPIRIN 81 MG/1
81 TABLET ORAL DAILY
Qty: 30 TABLET | Refills: 3 | Status: SHIPPED | OUTPATIENT
Start: 2022-05-10

## 2022-05-09 RX ORDER — ROSUVASTATIN CALCIUM 40 MG/1
40 TABLET, COATED ORAL NIGHTLY
Qty: 30 TABLET | Refills: 3 | Status: SHIPPED | OUTPATIENT
Start: 2022-05-09 | End: 2022-07-12

## 2022-05-09 RX ADMIN — LEVOTHYROXINE SODIUM 25 MCG: 0.03 TABLET ORAL at 05:46

## 2022-05-09 RX ADMIN — ENOXAPARIN SODIUM 40 MG: 100 INJECTION SUBCUTANEOUS at 08:52

## 2022-05-09 RX ADMIN — ASPIRIN 81 MG: 81 TABLET, COATED ORAL at 08:52

## 2022-05-09 RX ADMIN — PANTOPRAZOLE SODIUM 40 MG: 40 TABLET, DELAYED RELEASE ORAL at 05:46

## 2022-05-09 RX ADMIN — IBUPROFEN 400 MG: 400 TABLET, FILM COATED ORAL at 15:59

## 2022-05-09 RX ADMIN — IBUPROFEN 400 MG: 400 TABLET, FILM COATED ORAL at 05:51

## 2022-05-09 RX ADMIN — ALOGLIPTIN 12.5 MG: 12.5 TABLET, FILM COATED ORAL at 08:52

## 2022-05-09 RX ADMIN — EZETIMIBE 10 MG: 10 TABLET ORAL at 08:53

## 2022-05-09 RX ADMIN — ALLOPURINOL 300 MG: 300 TABLET ORAL at 08:51

## 2022-05-09 RX ADMIN — ATENOLOL 50 MG: 50 TABLET ORAL at 08:52

## 2022-05-09 ASSESSMENT — PAIN DESCRIPTION - LOCATION: LOCATION: BACK

## 2022-05-09 ASSESSMENT — PAIN DESCRIPTION - DESCRIPTORS: DESCRIPTORS: ACHING;NAGGING

## 2022-05-09 ASSESSMENT — PAIN SCALES - GENERAL: PAINLEVEL_OUTOF10: 5

## 2022-05-09 ASSESSMENT — PAIN DESCRIPTION - ORIENTATION: ORIENTATION: LEFT;LOWER

## 2022-05-09 NOTE — PROGRESS NOTES
Hospitalist Progress Note      Synopsis: Patient admitted on 5/7/2022 for strokelike symptoms likely due to TIA. Neurology consulted and MRI brain pending; ECHO has been ordered as pt has hx of DVT in past     Subjective    Clinically improving. Feeling better. Stable overnight. No other overnight issues reported. No CP, SOB, palpitations, blurred vision, HA, lightheadedness, LOC or focal neurological deficits    Exam:  BP (!) 167/75   Pulse 67   Temp 97.8 °F (36.6 °C) (Temporal)   Resp 18   Ht 5' (1.524 m)   Wt 210 lb (95.3 kg)   LMP  (LMP Unknown)   SpO2 93%   BMI 41.01 kg/m²   General appearance: No apparent distress, appears stated age and cooperative. HEENT: Pupils equal, round, and reactive to light. Conjunctivae/corneas clear. Neck: Supple. No jugular venous distention. Trachea midline. Respiratory:  Normal respiratory effort. Clear to auscultation, bilaterally without Rales/Wheezes/Rhonchi. Cardiovascular: Regular rate and rhythm with normal S1/S2 without murmurs, rubs or gallops. Abdomen: Soft, non-tender, non-distended with normal bowel sounds. Musculoskeletal: No clubbing, cyanosis or edema bilaterally. Brisk capillary refill. 2+ lower extremity pulses (dorsalis pedis).    Skin:  No rashes    Neurologic: awake, alert and following commands     Medications:  Reviewed    Infusion Medications    dextrose       Scheduled Medications    allopurinol  300 mg Oral Daily    atenolol  50 mg Oral Daily    ezetimibe  10 mg Oral Daily    levothyroxine  25 mcg Oral QAM AC    pantoprazole  40 mg Oral QAM AC    alogliptin  12.5 mg Oral Daily    enoxaparin  40 mg SubCUTAneous Daily    aspirin  81 mg Oral Daily    Or    aspirin  300 mg Rectal Daily    rosuvastatin  40 mg Oral Nightly    insulin lispro  0-6 Units SubCUTAneous TID WC    insulin lispro  0-3 Units SubCUTAneous Nightly     PRN Meds: perflutren lipid microspheres, acetaminophen, ondansetron **OR** ondansetron, senna, glucose, dextrose, glucagon (rDNA), dextrose, ibuprofen, sodium chloride    I/O    Intake/Output Summary (Last 24 hours) at 5/9/2022 1641  Last data filed at 5/9/2022 0956  Gross per 24 hour   Intake 360 ml   Output --   Net 360 ml       Labs:   Recent Labs     05/07/22  1703 05/08/22  0644   WBC 8.4 7.5   HGB 14.0 13.1   HCT 41.5 39.3    180       Recent Labs     05/07/22  1703 05/08/22  0644    143   K 4.0 3.9    105   CO2 26 24   BUN 16 17   CREATININE 0.7 0.8   CALCIUM 10.0 9.6       Recent Labs     05/07/22  1703 05/07/22  1801   PROT 7.3 7.2   ALKPHOS 99 99   ALT 25 26   AST 28 30   BILITOT 0.6 0.6       Recent Labs     05/07/22  1703   INR 1.0       No results for input(s): Jennyfer Madden in the last 72 hours. Chronic labs:  Lab Results   Component Value Date    CHOL 124 05/08/2022    TRIG 262 (H) 05/08/2022    HDL 39 05/08/2022    LDLCALC 33 05/08/2022    TSH 2.490 01/11/2022    INR 1.0 05/07/2022    LABA1C 7.1 (H) 05/08/2022       Radiology:  Imaging studies reviewed today. ASSESSMENT:  TIA  HTN  HLD  DM2     PLAN:  Await MRI brain, if positive will keep pt for ECHO, otherwise can be done outpatient  Appreciate neurology support  PT/OT  Continue home meds as ordered        Diet: ADULT DIET; Regular; 3 carb choices (45 gm/meal)  Code Status: Full Code  PT/OT Eval Status:   ordered  DVT Prophylaxis:   lovenox  Recommended disposition at discharge:  home at [place]     +++++++++++++++++++++++++++++++++++++++++++++++++  Beba Dillard MD   [facility].  +++++++++++++++++++++++++++++++++++++++++++++++++  NOTE: This report was transcribed using voice recognition software. Every effort was made to ensure accuracy; however, inadvertent computerized transcription errors may be present.

## 2022-05-09 NOTE — PROGRESS NOTES
[facility] [address]  Neurology Consult    Date:  5/9/2022  Patient Name:  Pj Vazquez  YOB: 1955  MRN: [id number]     PCP:  VISHNU Malone CNP   Referring:  Geryl Cranker, DO      Chief Complaint: TIA    History obtained from: Patient and Chart Review    Assessment  Pj Vazquez is a 61 y.o. female who presented with stroke like symptoms likely due to TIA. Plan  · MRI to evaluate  · Echocardiogram to evaluate heart given history of DVT  · Start on aspirin and plavics, continue statin therapy        History of Present Illness:  Pj Vazquez is a 61 y.o. female presenting for evaluation of stroke like symptoms. [facility] due to weakness and numbness of her left arm. At the same time, she had a headache behind her right eye. She then noticed slurred speech. Symptoms resolved before arriving at the ED, but headache persisted. CT head and lab work at the ED were normal. She was then transferred her for further evaluation. This morning, she states all symptoms have resolved.            Review of Systems:  Constitutional  · Weight loss: No  · Fever: No    Eyes  · Double Vision: No  · Visions loss: No    Ears, Nose, Mouth, and Throat  · Difficulty swallowing: No    Cardiovascular  · Chest Pain: No    Respiratory  · Shortness of Breath: No    Gastrointestinal  · Abdominal Pain: No    Genitourinary  · Difficulty with Urination: No    Integumentary  · Rash: No    Musculoskeletal  · Back Pain: Yes    Neurological  · Headaches: No  · Weakness: No  · Numbness: No  · Seizures: No  · Difficulty with Memory: No  · Further symptoms noted in HPI    Psychiatric  · Anxiety: No  · Depression: No    Complete 10-point review of systems is negative except as noted above in my HPI      Medical History:   Past Medical History:   Diagnosis Date    Acute left ankle pain 06/01/2020    Acute left lumbar radiculopathy 12/28/2017    Acute pain of right shoulder 5/8/2019    Cancer ([facility] 2017    colon (treated surgically)    Chronic back pain     Chronic deep vein thrombosis (DVT) of distal vein of left lower extremity ([address] 2/1/2021    Contact dermatitis 4/22/2019    Diabetes mellitus ([facility]     Drug-induced constipation 02/18/2020    DVT (deep venous thrombosis) (HCC)     Eosinophil count raised 11/20/2019    Fall at home 12/11/2017    Fatigue 6/8/2016    Fibromyalgia     GERD (gastroesophageal reflux disease)     Hair loss 5/3/2021    Hemorrhoids     Hypercalcemia 11/20/2019    Hyperlipidemia     Hypertension     Malignant neoplasm of sigmoid colon ([facility] 6/17/2020    Obesity     Osteoarthritis     PONV (postoperative nausea and vomiting)     Postmenopausal bleeding 10/05/2017    Rib contusion, left, initial encounter 02/18/2020    Urinary incontinence     Urinary retention 12/21/2020    Weakness of both legs 12/21/2020        Surgical History:   Past Surgical History:   Procedure Laterality Date    ANESTHESIA NERVE BLOCK Bilateral 8/15/2019    BILATERAL INTRA-ARTICULAR FACET JOINT INJECTION WITH FLUOROSCOPIC GUIDANCE AT L4-5, L5-S1 WITH IV SEDATION performed by Berkley Villeda DO at [address] OF UTERUS N/A 10/23/2019    DILATATION AND CURETTAGE HYSTEROSCOPY performed by Deon Wilson MD at [address] Right 7/11/2019    C7-T1 EPIDURAL STEROID INJECTION #1 TOWARD THE RIGHT performed by Mary Levine DO at [address] Bilateral     LUMBAR SPINE SURGERY N/A 12/24/2020    L4-L5  POSTERIOR LUMBAR INTERBODY FUSION, T2-T3 DECOMPRESSIVE LAMINECTOMY performed by Chester Hart MD at [address] Right 12/28/2017    right L4-5 transforaminal epidural #1    NERVE BLOCK Left 11/29/2018    si inj    NERVE BLOCK Bilateral 08/15/2019    bilateral intra-articular facet joint injection with flouroscopic guidance at L4-S1 with iv sedation    FL INJECT SI JOINT ARTHRGRPHY&/ANES/STEROID W/IMAGE Left 11/29/2018    LEFT SACROILIAC JOINT INJECTION WITH X-RAY performed by Jolanta Valle DO at [address] [facility] Left 2005    TONSILLECTOMY  28 YRS OLD    TUBAL LIGATION          Family History:   Family History   Problem Relation Age of Onset    Asthma Mother     Mental Illness Mother     Heart Disease Father     Thyroid Disease Sister     Thyroid Disease Sister     Thyroid Disease Sister        Social History:  Social History     Tobacco Use    Smoking status: Never Smoker    Smokeless tobacco: Never Used   Vaping Use    Vaping Use: Never used   Substance Use Topics    Alcohol use: Not Currently     Alcohol/week: 0.0 standard drinks     Comment: Rarely    Drug use: No        Current Medications:      Current Facility-Administered Medications   Medication Dose Route Frequency Provider Last Rate Last Admin    acetaminophen (TYLENOL) tablet 500 mg  500 mg Oral Q6H PRN Hallman Mings, DO   500 mg at 05/08/22 5256    allopurinol (ZYLOPRIM) tablet 300 mg  300 mg Oral Daily Hallman Mings, DO   300 mg at 05/09/22 0851    atenolol (TENORMIN) tablet 50 mg  50 mg Oral Daily Hallman Mings, DO   50 mg at 05/09/22 6602    ezetimibe (ZETIA) tablet 10 mg  10 mg Oral Daily Hallman Mings, DO   10 mg at 05/09/22 0853    levothyroxine (SYNTHROID) tablet 25 mcg  25 mcg Oral QAM AC Hallman Mings, DO   25 mcg at 05/09/22 0546    pantoprazole (PROTONIX) tablet 40 mg  40 mg Oral QAM AC Hallman Mings, DO   40 mg at 05/09/22 0546    alogliptin (NESINA) tablet 12.5 mg  12.5 mg Oral Daily Hallman Mings, DO   12.5 mg at 05/09/22 8229    ondansetron (ZOFRAN-ODT) disintegrating tablet 4 mg  4 mg Oral Q8H PRN Hallman Mings, DO        Or    [facility]et[facility] injection 4 mg  4 mg IntraVENous Q6H PRN Hallman Mings, DO        enoxaparin (LOVENOX) injection 40 mg  40 mg SubCUTAneous Daily Hallman Mings, DO   40 mg at 05/09/22 0852    aspirin EC tablet 81 mg  81 mg Oral Daily Hernandze Margaret, DO   81 mg at 05/09/22 8918    Or    aspirin suppository 300 mg  300 mg Rectal Daily Hernandez Margaret, DO        [facility]) tablet 8.6 mg  1 tablet Oral Daily PRN Hernandez Margaret, DO        rosuvastatin (CRESTOR) tablet 40 mg  40 mg Oral Nightly Hernandez Margaret, DO   40 mg at 05/08/22 2200    insulin lispro (HUMALOG) injection vial 0-6 Units  0-6 Units SubCUTAneous TID WC Lupe Man MD        insulin lispro (HUMALOG) injection vial 0-3 Units  0-3 Units SubCUTAneous Nightly Lupe Man MD        glucose chewable tablet 16 g  16 g Oral PRN Lupe Man MD        dextrose 50 % IV solution  12.5 g IntraVENous PRN Lupe Man MD        glucagon (rDNA) injection 1 mg  1 mg IntraMUSCular PRN Lupe Man MD        dextrose 5 % solution  100 mL/hr IntraVENous PRN Lupe Man MD        ibuprofen (ADVIL;MOTRIN) tablet 400 mg  400 mg Oral Q6H PRN Lupe Man MD   400 mg at 05/09/22 0551    sodium chloride (OCEAN, BABY AYR) 0.65 % nasal spray 1 spray  1 spray Each Nostril PRN Yue Bueno MD            Allergies: Allergies   Allergen Reactions    Diclofenac     Diclofenac Sodium      Nauseated, Diarrhea    Farxiga [Dapagliflozin] Itching     Vaginal itching with Ashley Carolyn, Jardiance     Lisinopril Other (See Comments)     Profound malaise    Lyrica [Pregabalin] Other (See Comments)     Confusion    Sulfa Antibiotics Rash        Physical Examination  Vitals   Vitals:    05/08/22 0400 05/08/22 0748 05/08/22 1200 05/09/22 0845   BP:  (!) 143/76 (!) 144/74 (!) 146/73   Pulse:  71 74 70   Resp:  16 16 18   Temp:  97.3 °F (36.3 °C) 97.5 °F (36.4 °C) 97.8 °F (36.6 °C)   TempSrc:  Temporal Oral Temporal   SpO2:  95% 98% 93%   Weight: 210 lb (95.3 kg)      Height: 5' (1.524 m)           General: Patient appears in no acute distress. Awake and oriented x 3.   HEENT: Normocephalic, atraumatic  Chest: Clear to auscultation bilaterally  Heart: No murmurs appreciated  Extremities/Peripheral vascular: No edema/swelling noted. No cold limbs noted. Neurologic Examination    Mental Status  Alert, and oriented to person, place and time. Speech is fluent with intact comprehension. No evidence of memory impairment. Attention and concentration appeared normal.     Cranial Nerves  II. Visual fields full to confrontation bilaterally. Fundoscopic exam: Discs sharp bilaterally. III, IV, VI: Pupils equally round and reactive to light, 3 to 2 mm bilaterally. EOMs: full, no nystagmus. V. Facial sensation intact to light touch bilaterally  VII: Facial movements symmetric and strong  VIII: Hearing intact to voice  IX,X: Palate elevates symmetrically. No dysarthria  XI: Sternocleidomastoid and trapezius 5/5 bilaterally   XII: Tongue is midline    Motor     Right Left   Right Left   Deltoid 5 5  Hip Flexion 5 5   Biceps      5  5  Knee Extension 5 5   Triceps 5 5  Knee Flexion 5 5   Handgrip 5 5  Ankle Dorsiflexion 5 5       Ankle Plantarflexion 5 5     Tone: Normal in all four limbs    Bulk: Normal in all four limbs with no evidence of atrophy    Pronator drift: absent bilaterally    Sensation  · Light Touch: Intact distally in all four limbs  · Pinprick: Intact distally in all four limbs  · Vibration: Intact distally in all four limbs  · Proprioception: Intact distally in all four limbs    Reflexes     Right Left   Biceps 2 2   Brachioradialis 2 2   Triceps 2 2   Patellar 2 2   Achilles 2 2   ankle clonus none none   Babinski absent absent     Coordination  Rapid alternating movements normal in bilateral upper extremities  Finger to nose testing normal bilaterally  Heel to shin testing normal bilaterally    Gait  Normal base, stride, and arm swing with casual gait. 2-3 steps to turn. Normal heel, toe and tandem gait.    Romberg test negative  Deferred for safety/fall consideration      Labs  Recent Labs     05/07/22  1703 05/07/22  1801 05/08/22  0644   NA   < >  --  143   K   < >  --  3.9   CL   < >  --  105   CO2   < >  --  24   BUN   < >  --  17   CREATININE   < >  --  0.8   GLUCOSE   < >  --  129*   CALCIUM   < >  --  9.6   PROT  --  7.2  --    LABALBU  --  4.6  --    BILITOT  --  0.6  --    ALKPHOS  --  99  --    AST  --  30  --    ALT  --  26  --    WBC   < >  --  7.5   RBC   < >  --  4.31   HGB   < >  --  13.1   HCT   < >  --  39.3   MCV   < >  --  91.2   MCH   < >  --  30.4   MCHC   < >  --  33.3   RDW   < >  --  12.8   PLT   < >  --  180   MPV   < >  --  10.8   HDL  --   --  39   LDLCALC  --   --  33   LABA1C  --   --  7.1*    < > = values in this interval not displayed.          Imaging  MRI brain without contrast    (Results Pending)         Other Testing Results          Electronically signed by Fay Toledo on 5/9/2022 at 10:25 AM

## 2022-05-09 NOTE — PROGRESS NOTES
SPEECH/LANGUAGE PATHOLOGY  SPEECH/LANGUAGE/COGNITIVE EVALUATION   and PLAN OF CARE      PATIENT NAME:  Michelle Bull  (female)     MRN:  [id number]    :  1955  (61 y.o.)  STATUS:  Inpatient: Room 8210/8210-A    TODAY'S DATE:  2022  REFERRING PROVIDER:    Dr. Geovani Stevens: speech language pathology (SLP) eval and treat  Date of order:  22  REASON FOR REFERRAL: TIA  EVALUATING THERAPIST: ZAIDA Appiah    ADMITTING DIAGNOSIS: TIA (transient ischemic attack) [G45.9]    VISIT DIAGNOSIS:      SPEECH THERAPY  PLAN OF CARE   The speech therapy  POC is established based on physician order, speech pathology diagnosis and results of clinical assessment     SPEECH PATHOLOGY DIAGNOSIS:    Within function limits    Speech Pathology intervention is not warranted at this time. Conditions Requiring Skilled Therapeutic Intervention for speech, language and/or cognition  Not applicable     Specific Speech Therapy Interventions to Include:   Not applicable    Specific instructions for next treatment:    Not applicable    SHORT/LONG TERM GOALS  Not applicable      Patient goals: Patient/family involved in developing goals and treatment plan:   Treatment goals discussed with Patient                 CLINICAL ASSESSMENT:  MOTOR SPEECH       Oral Peripheral Examination   Adequate lingual/labial strength     Parameters of Speech Production  Respiration:  Adequate for speech production  Articulation:  Within functional limits  Resonance:  Within functional limits  Quality:   Within functional limits  Pitch: Within functional limits  Intensity: Within functional limits  Fluency:  Intact  Prosody Intact    RECEPTIVE LANGUAGE    Comprehension of Yes/No Questions:    Within functional limits    Process  Simple Verbal Commands:   Within functional limits  Process Intermediate Verbal Commands:   Within functional limits  Process Complex Verbal Commands:     Within functional limits    Comprehension of Conversation:      Within functional limits      EXPRESSIVE LANGUAGE     Serials: Functional    Imitation:  Words   Functional   Sentences Functional    Naming:  (Modality used:  Verbal)  Confrontation Naming  Functional  Functional Description  Functional  Response Naming: Functional    Conversation:      Conversation was within functional limits    COGNITION     Attention/Orientation  Attention: Sustained attention   Orientation:  Oriented to Person, Place, Date, Reason for hospitalization    Memory   Immediate Recall: Repeated 3/3    Delayed Recall:   Recalled  2/3  Long Term Recall:   Recalled Address, Birthdate, Age, and Family    Organization/Problem Solving/Reasoning   Verbal Sequencing:   Functional        Verbal Problem solving:   Functional          CLINICAL OBSERVATIONS NOTED DURING THE EVALUATION  Within functional limits                  EDUCATION:   The Speech Language Pathologist (SLP) completed education regarding results of evaluation and that intervention is not warranted at this time. Learner: Patient  Education: Reviewed results and recommendations of this evaluation  Evaluation of Education:  Verbalizes understanding    Evaluation Time includes thorough review of current medical information, gathering information on past medical history/social history and prior level of function, completion of standardized testing/informal observation of tasks, assessment of data and education on plan of care and goals. CPT code:    53675  eval speech sound lang comprehension      The admitting diagnosis and active problem list, as listed below have been reviewed prior to initiation of this evaluation.         ACTIVE PROBLEM LIST:   Patient Active Problem List   Diagnosis    Essential hypertension    Chronic back pain greater than 3 months duration    Mixed hyperlipidemia    Vitamin D insufficiency    Gastroesophageal reflux disease without esophagitis    Central stenosis of spinal canal    Spondylolisthesis at L4-L5 level    Chronic pain of left knee    Obesity, Class III, BMI 40-49.9 (morbid obesity) (HCC)    Personal history of colon cancer    Sacroiliitis (HCC)    Telogen effluvium    Chronic pain syndrome    Cervical radiculopathy    Cervical disc disorder    Type 2 diabetes mellitus without complication, without long-term current use of insulin (HCC)    Facet arthropathy    Fibromyalgia    Primary osteoarthritis of left knee    Constipation    Chronic pain of multiple joints    Osteoarthritis involving multiple joints on both sides of body    Cervical spondylosis without myelopathy    DDD (degenerative disc disease), cervical    DDD (degenerative disc disease), lumbar    Numbness in left leg    Spinal stenosis    Back pain    Ambulatory dysfunction    Grade I hemorrhoids    Breast tenderness in female    H/O colonoscopy with polypectomy    Acquired hypothyroidism    Hypertriglyceridemia    TIA (transient ischemic attack)

## 2022-05-09 NOTE — CARE COORDINATION
5/9 Care Coordination: Patient presents to the ED for evaluation of strokelike symptoms, she had sudden onset of a headache on the right side behind her eye. At the same time she was having weakness with her left arm and was unable to move it or undo her seatbelt. She states since then symptoms have almost completely resolved except for the headache. Met with the pt at the bedside. She lives with her significant other, Pat Hdz. Her home is handicapped accessible. She has a Wheelchair and Walker at home. Were her moms. She used them after her back surgery. [facility]hab after her back surgery. Discharge plan is return home. No needs at this time. CM/SW will continue to follow for discharge planning.    Layne SANTORO,RN-CV-BC  [phone]

## 2022-05-10 ENCOUNTER — TELEPHONE (OUTPATIENT)
Dept: FAMILY MEDICINE CLINIC | Age: 67
End: 2022-05-10

## 2022-05-10 NOTE — TELEPHONE ENCOUNTER
----- Message from Sneha Alcantara sent at 5/10/2022  2:24 PM EDT -----  Subject: Message to Provider    QUESTIONS  Information for Provider? pt is calling in because she was seen in the   hospital and wanted to know if she could wait till her july appt or if she   should be seen sooner. please advise  ---------------------------------------------------------------------------  --------------  CALL BACK INFO  What is the best way for the office to contact you? OK to leave message on   voicemail  Preferred Call Back Phone Number? [phone]  ---------------------------------------------------------------------------  --------------  SCRIPT ANSWERS  Relationship to Patient?  Self

## 2022-05-11 ENCOUNTER — OFFICE VISIT (OUTPATIENT)
Dept: FAMILY MEDICINE CLINIC | Age: 67
End: 2022-05-11
Payer: MEDICARE

## 2022-05-11 VITALS
BODY MASS INDEX: 40.25 KG/M2 | HEART RATE: 70 BPM | TEMPERATURE: 97.8 F | OXYGEN SATURATION: 95 % | DIASTOLIC BLOOD PRESSURE: 82 MMHG | WEIGHT: 205 LBS | RESPIRATION RATE: 16 BRPM | SYSTOLIC BLOOD PRESSURE: 138 MMHG | HEIGHT: 60 IN

## 2022-05-11 DIAGNOSIS — Z09 HOSPITAL DISCHARGE FOLLOW-UP: Primary | ICD-10-CM

## 2022-05-11 DIAGNOSIS — R19.8 DIFFICULTY SWALLOWING PILLS: ICD-10-CM

## 2022-05-11 DIAGNOSIS — H43.391 VISUAL FLOATERS, RIGHT: ICD-10-CM

## 2022-05-11 DIAGNOSIS — G45.9 TIA (TRANSIENT ISCHEMIC ATTACK): ICD-10-CM

## 2022-05-11 PROBLEM — R13.10 DIFFICULTY SWALLOWING PILLS: Status: ACTIVE | Noted: 2022-05-11

## 2022-05-11 PROCEDURE — G8417 CALC BMI ABV UP PARAM F/U: HCPCS | Performed by: NURSE PRACTITIONER

## 2022-05-11 PROCEDURE — G8427 DOCREV CUR MEDS BY ELIG CLIN: HCPCS | Performed by: NURSE PRACTITIONER

## 2022-05-11 PROCEDURE — 1036F TOBACCO NON-USER: CPT | Performed by: NURSE PRACTITIONER

## 2022-05-11 PROCEDURE — 3017F COLORECTAL CA SCREEN DOC REV: CPT | Performed by: NURSE PRACTITIONER

## 2022-05-11 PROCEDURE — 99214 OFFICE O/P EST MOD 30 MIN: CPT | Performed by: NURSE PRACTITIONER

## 2022-05-11 RX ORDER — BUPRENORPHINE 10 UG/H
1 PATCH TRANSDERMAL WEEKLY
COMMUNITY
End: 2022-05-18 | Stop reason: SDUPTHER

## 2022-05-11 ASSESSMENT — PATIENT HEALTH QUESTIONNAIRE - PHQ9
SUM OF ALL RESPONSES TO PHQ QUESTIONS 1-9: 0
2. FEELING DOWN, DEPRESSED OR HOPELESS: 0
SUM OF ALL RESPONSES TO PHQ QUESTIONS 1-9: 0
SUM OF ALL RESPONSES TO PHQ QUESTIONS 1-9: 0
SUM OF ALL RESPONSES TO PHQ9 QUESTIONS 1 & 2: 0
1. LITTLE INTEREST OR PLEASURE IN DOING THINGS: 0
SUM OF ALL RESPONSES TO PHQ QUESTIONS 1-9: 0

## 2022-05-11 NOTE — ASSESSMENT & PLAN NOTE
Chronic problem when she was getting MRI states she felt she had pressure in her throat. Would like to see ENT DR Glendy Rowland.  She did see speech and was evaluated felt swallowing normal so no need for further study

## 2022-05-11 NOTE — PROGRESS NOTES
OFFICE PROGRESS NOTE  [address]  [address] [id number]  Dept: [phone]   Chief Complaint   Patient presents with    Follow-Up from Hospital       ASSESSMENT/PLAN   1. Hospital discharge follow-up  2. TIA (transient ischemic attack)  Assessment & Plan:   Asymptomatic at this time. Discussed the next 30 days she is at risk for stroke. Continue the ASA 81 mg daily and Plavix 75 mg daily. If any symptoms recur to go to [facility]. MRI brain, CT head, CTA neck, CXR  unremarkable  Orders:  -     Amb External Referral To Ophthalmology  3. Difficulty swallowing pills  Assessment & Plan:   Chronic problem when she was getting MRI states she felt she had pressure in her throat. Would like to see ENT DR Jarod Gee. She did see speech and was evaluated felt swallowing normal so no need for further study  Orders:  -     Amb External Referral To ENT  4. Visual floaters, right  Assessment & Plan: This has been chronic and referral placed to Dr Charisma Connelly for further evaluation. Orders:  -     Amb External Referral To Ophthalmology       Reviewed labs: CMP, CBCD, Lipids, TSH, FT4, vitamin D  Reviewed notes from ER [facility] and [facility]  Reviewed radiology MRI brain, CT head, CTA neck, CXR  unremarkable    Discussed weight loss, Discussed exercising 30 minutes daily and Discussed taking medications as directed and adverse effects    Return for keep follow up appt. HPI:   Here today for follow up on TIA, she called me on Saturday afternoon with complaints of weakness in the left arm and she couldn't get her seatbelt unfastened and then she couldn't get her phone out of her pocket. Her daughter called me about an hour later and said they wanted to transfer her to [address] [place] [address]. She had some slurred speech. She was told to go to [facility] but went to [facility] as she said she couldn't remember where I told her to go. She was then transferred to [facility].  She was worked up and no CVA noted so felt she had TIA. Labs reviewed she was changed from Atorvastatin to crestor 40 mg she is on Zetia as well. She was placed on Plavix daily for the next 21 days and ASA 81 mg daily. She is to have echo to R/O PFO due to her having DVT from previous surgery. She says when she was laying down for the MRI she felt like she couldn't swallow. She would like to see ENT DR Anne Farias. She is also complaining of floaters in the right eye. EXAMINATION:   MRI OF THE BRAIN WITHOUT CONTRAST  5/9/2022 4:39 pm       TECHNIQUE:   Multiplanar multisequence MRI of the brain was performed without the   administration of intravenous contrast.       COMPARISON:   CT head without contrast, CT perfusion and CTA of the head and neck,   05/07/2022.       HISTORY:   ORDERING SYSTEM PROVIDED HISTORY: Strokelike symptoms   TECHNOLOGIST PROVIDED HISTORY:   Reason for exam:->Strokelike symptoms   What reading provider will be dictating this exam?->CRC       FINDINGS:   INTRACRANIAL STRUCTURES/VENTRICLES: There is no acute infarct. No mass   effect, edema or hemorrhage is seen.  No significant volume loss is seen in   the brain.  Mild-to-moderate chronic microvascular ischemic changes are   noted, predominantly in the periventricular white matter.  The skull base   arterial flow voids are grossly intact.  No hydrocephalus or extra-axial   fluid is seen.       ORBITS: The visualized portion of the orbits demonstrate no acute abnormality.       SINUSES: Mild mucosal thickening in the paranasal sinuses.  The mastoids are   clear.       BONES/SOFT TISSUES: The bone marrow signal intensity appears normal. The soft   tissues demonstrate no acute abnormality.           Impression   1. No acute intracranial abnormality.    2. No mass effect, edema or hemorrhage.         EXAMINATION:   CT OF THE HEAD WITH CONTRAST; CTA OF THE HEAD WITH CONTRAST; CTA OF THE NECK   5/7/2022 5:15 pm:       TECHNIQUE:   CT of the head/brain was performed with the administration of intravenous   contrast. Multiplanar reformatted images are provided for review. Dose   modulation, iterative reconstruction, and/or weight based adjustment of the   mA/kV was utilized to reduce the radiation dose to as low as reasonably   achievable.; CTA of the head/brain was performed with the administration of   intravenous contrast. Multiplanar reformatted images are provided for review. MIP images are provided for review. Dose modulation, iterative   reconstruction, and/or weight based adjustment of the mA/kV was utilized to   reduce the radiation dose to as low as reasonably achievable.; CTA of the   neck was performed with the administration of intravenous contrast.   Multiplanar reformatted images are provided for review.  MIP images are   provided for review. Stenosis of the internal carotid arteries measured using   NASCET criteria. Dose modulation, iterative reconstruction, and/or weight   based adjustment of the mA/kV was utilized to reduce the radiation dose to as   low as reasonably achievable.       This scan was analyzed using [employer]. ai contact LVO. Identification of suspected   findings is not for diagnostic use beyond notification. Viz LVO is limited to   analysis of imaging data and should not be used in-lieu of full patient   evaluation or relied upon to make or confirm diagnosis.       COMPARISON:   CT head same day.       HISTORY:   ORDERING SYSTEM PROVIDED HISTORY: Patient   TECHNOLOGIST PROVIDED HISTORY:   Reason for exam:->Patient   Decision Support Exception - unselect if not a suspected or confirmed   emergency medical condition->Emergency Medical Condition (MA); ORDERING   SYSTEM PROVIDED HISTORY: Aphasia   TECHNOLOGIST PROVIDED HISTORY:   Reason for exam:->Aphasia   Has a \"code stroke\" or \"stroke alert\" been called? ->Yes   Decision Support Exception - unselect if not a suspected or confirmed   emergency medical condition->Emergency Medical Condition (MA); ORDERING   SYSTEM PROVIDED HISTORY: aphasia   TECHNOLOGIST PROVIDED HISTORY:   Reason for exam:->aphasia   Has a \"code stroke\" or \"stroke alert\" been called? ->Yes   Decision Support Exception - unselect if not a suspected or confirmed   emergency medical condition->Emergency Medical Condition (MA)       CTA NECK:   AORTIC ARCH/ARCH VESSELS: Motion degraded evaluation of the imaged aorta. Origins of the innominate, brachiocephalic, and subclavian arteries appear   patent.       CAROTID ARTERIES:       Right common carotid artery: Patent without focal stenosis. Retropharyngeal   course.       Right internal carotid artery: No significant stenosis by NASCET criteria. Retropharyngeal course.       Right external carotid artery origin: Patent without focal stenosis.       Left common carotid artery: Patent without focal stenosis.       Left internal carotid artery: No significant stenosis by NASCET criteria. Retropharyngeal course.       Left external carotid artery origin: Patent without focal stenosis.       VERTEBRAL ARTERIES:       Right vertebral artery: Few mild stenoses.  Dominant.       Left vertebral artery: Patent without focal stenosis.       SOFT TISSUES: Visualized lung apices are clear.  No adenopathy in the neck   and visualized chest. Soft tissues of the neck are unremarkable given patient   positioning.       BONES: Scattered degenerative changes noted in the visualized spine with   spondylolisthesis.  Laminectomies in the upper thoracic spine.           CTA HEAD:   ANTERIOR CIRCULATION:       Intracranial internal carotid arteries: Mild stenoses in the right   cavernous/supraclinoid segments. Moderate stenosis in the left supraclinoid   segment.       Anterior cerebral arteries: No focal stenosis at the level of the [place].       Middle cerebral arteries: No focal stenosis at the level of the [place].       POSTERIOR CIRCULATION:       Intracranial vertebral arteries: Moderate stenosis on the left.  No stenosis   on the right.       Basilar artery: Patent without focal stenosis.       Posterior cerebral arteries: No focal stenosis at the level of the [place].  Small right posterior communicating artery.       OTHER: Cavernous sinuses are not well evaluated on this phase of contrast.   Otherwise, no dural venous sinus thrombosis on this non-dedicated study.       BRAIN: No midline shift. No extra-axial fluid collection.           CT PERFUSION:       EXAM QUALITY: The examination is diagnostic with appropriate arterial inflow   and venous outflow curves, and diagnostic perfusion maps.       CORE INFARCT: The total area of ischemic core is 0 mL (CBF<30% volume).       TOTAL HYPOPERFUSION: The total area of hypoperfusion is 0 mL (Tmax>6s volume).       PENUMBRA: The penumbra (mismatch) volume is 0 mL and is located in the n/a. The mismatch ratio is n/a.           Impression   1. No perfusion mismatch. 2. No large vessel intracranial occlusion or high grade stenosis. 3. No significant stenoses of the internal carotid arteries. 4. Other findings as described.             EXAMINATION:   CT OF THE HEAD WITHOUT CONTRAST  5/7/2022 5:15 pm       TECHNIQUE:   CT of the head was performed without the administration of intravenous   contrast. Dose modulation, iterative reconstruction, and/or weight based   adjustment of the mA/kV was utilized to reduce the radiation dose to as low   as reasonably achievable.       COMPARISON:   None.       HISTORY:   ORDERING SYSTEM PROVIDED HISTORY: Evaluate intracranial abnormality   TECHNOLOGIST PROVIDED HISTORY:   Has a \"code stroke\" or \"stroke alert\" been called? ->Yes   Reason for exam:->Evaluate intracranial abnormality   Decision Support Exception - unselect if not a suspected or confirmed   emergency medical condition->Emergency Medical Condition (MA)       FINDINGS:   BRAIN/VENTRICLES: There is no acute intracranial hemorrhage, mass effect or   midline shift.  No abnormal extra-axial fluid collection.  The gray-white   differentiation is maintained without evidence of an acute infarct.  There is   no evidence of hydrocephalus. There is moderate chronic small vessel ischemic   white matter disease.       ORBITS: The visualized portion of the orbits demonstrate no acute abnormality.       SINUSES: The visualized paranasal sinuses and mastoid air cells demonstrate   no acute abnormality.       SOFT TISSUES/SKULL:  No acute abnormality of the visualized skull or soft   tissues.           Impression   No acute intracranial abnormality. EXAMINATION:   CT OF THE HEAD WITH CONTRAST; CTA OF THE HEAD WITH CONTRAST; CTA OF THE NECK   5/7/2022 5:15 pm:       TECHNIQUE:   CT of the head/brain was performed with the administration of intravenous   contrast. Multiplanar reformatted images are provided for review. Dose   modulation, iterative reconstruction, and/or weight based adjustment of the   mA/kV was utilized to reduce the radiation dose to as low as reasonably   achievable.; CTA of the head/brain was performed with the administration of   intravenous contrast. Multiplanar reformatted images are provided for review. MIP images are provided for review. Dose modulation, iterative   reconstruction, and/or weight based adjustment of the mA/kV was utilized to   reduce the radiation dose to as low as reasonably achievable.; CTA of the   neck was performed with the administration of intravenous contrast.   Multiplanar reformatted images are provided for review.  MIP images are   provided for review. Stenosis of the internal carotid arteries measured using   NASCET criteria. Dose modulation, iterative reconstruction, and/or weight   based adjustment of the mA/kV was utilized to reduce the radiation dose to as   low as reasonably achievable.       This scan was analyzed using [employer]. ai contact LVO.  Identification of suspected findings is not for diagnostic use beyond notification. Viz LVO is limited to   analysis of imaging data and should not be used in-lieu of full patient   evaluation or relied upon to make or confirm diagnosis.       COMPARISON:   CT head same day.       HISTORY:   ORDERING SYSTEM PROVIDED HISTORY: Patient   TECHNOLOGIST PROVIDED HISTORY:   Reason for exam:->Patient   Decision Support Exception - unselect if not a suspected or confirmed   emergency medical condition->Emergency Medical Condition (MA); ORDERING   SYSTEM PROVIDED HISTORY: Aphasia   TECHNOLOGIST PROVIDED HISTORY:   Reason for exam:->Aphasia   Has a \"code stroke\" or \"stroke alert\" been called? ->Yes   Decision Support Exception - unselect if not a suspected or confirmed   emergency medical condition->Emergency Medical Condition (MA); ORDERING   SYSTEM PROVIDED HISTORY: aphasia   TECHNOLOGIST PROVIDED HISTORY:   Reason for exam:->aphasia   Has a \"code stroke\" or \"stroke alert\" been called? ->Yes   Decision Support Exception - unselect if not a suspected or confirmed   emergency medical condition->Emergency Medical Condition (MA)       CTA NECK:   AORTIC ARCH/ARCH VESSELS: Motion degraded evaluation of the imaged aorta. Origins of the innominate, brachiocephalic, and subclavian arteries appear   patent.       CAROTID ARTERIES:       Right common carotid artery: Patent without focal stenosis. Retropharyngeal   course.       Right internal carotid artery: No significant stenosis by NASCET criteria. Retropharyngeal course.       Right external carotid artery origin: Patent without focal stenosis.       Left common carotid artery: Patent without focal stenosis.       Left internal carotid artery: No significant stenosis by NASCET criteria.    Retropharyngeal course.       Left external carotid artery origin: Patent without focal stenosis.       VERTEBRAL ARTERIES:       Right vertebral artery: Few mild stenoses.  Dominant.       Left vertebral artery: Patent without focal stenosis.       SOFT TISSUES: Visualized lung apices are clear.  No adenopathy in the neck   and visualized chest. Soft tissues of the neck are unremarkable given patient   positioning.       BONES: Scattered degenerative changes noted in the visualized spine with   spondylolisthesis.  Laminectomies in the upper thoracic spine.           CTA HEAD:   ANTERIOR CIRCULATION:       Intracranial internal carotid arteries: Mild stenoses in the right   cavernous/supraclinoid segments. Moderate stenosis in the left supraclinoid   segment.       Anterior cerebral arteries: No focal stenosis at the level of the [place].       Middle cerebral arteries: No focal stenosis at the level of the [place].       POSTERIOR CIRCULATION:       Intracranial vertebral arteries: Moderate stenosis on the left.  No stenosis   on the right.       Basilar artery: Patent without focal stenosis.       Posterior cerebral arteries: No focal stenosis at the level of the [place].  Small right posterior communicating artery.       OTHER: Cavernous sinuses are not well evaluated on this phase of contrast.   Otherwise, no dural venous sinus thrombosis on this non-dedicated study.       BRAIN: No midline shift. No extra-axial fluid collection.           CT PERFUSION:       EXAM QUALITY: The examination is diagnostic with appropriate arterial inflow   and venous outflow curves, and diagnostic perfusion maps.       CORE INFARCT: The total area of ischemic core is 0 mL (CBF<30% volume).       TOTAL HYPOPERFUSION: The total area of hypoperfusion is 0 mL (Tmax>6s volume).       PENUMBRA: The penumbra (mismatch) volume is 0 mL and is located in the n/a. The mismatch ratio is n/a.           Impression   1. No perfusion mismatch. 2. No large vessel intracranial occlusion or high grade stenosis. 3. No significant stenoses of the internal carotid arteries.    4. Other findings as described.         EXAMINATION: CT OF THE HEAD WITH CONTRAST; CTA OF THE HEAD WITH CONTRAST; CTA OF THE NECK   5/7/2022 5:15 pm:       TECHNIQUE:   CT of the head/brain was performed with the administration of intravenous   contrast. Multiplanar reformatted images are provided for review. Dose   modulation, iterative reconstruction, and/or weight based adjustment of the   mA/kV was utilized to reduce the radiation dose to as low as reasonably   achievable.; CTA of the head/brain was performed with the administration of   intravenous contrast. Multiplanar reformatted images are provided for review. MIP images are provided for review. Dose modulation, iterative   reconstruction, and/or weight based adjustment of the mA/kV was utilized to   reduce the radiation dose to as low as reasonably achievable.; CTA of the   neck was performed with the administration of intravenous contrast.   Multiplanar reformatted images are provided for review.  MIP images are   provided for review. Stenosis of the internal carotid arteries measured using   NASCET criteria. Dose modulation, iterative reconstruction, and/or weight   based adjustment of the mA/kV was utilized to reduce the radiation dose to as   low as reasonably achievable.       This scan was analyzed using [employer]. ai contact LVO. Identification of suspected   findings is not for diagnostic use beyond notification.  Viz LVO is limited to   analysis of imaging data and should not be used in-lieu of full patient   evaluation or relied upon to make or confirm diagnosis.       COMPARISON:   CT head same day.       HISTORY:   ORDERING SYSTEM PROVIDED HISTORY: Patient   TECHNOLOGIST PROVIDED HISTORY:   Reason for exam:->Patient   Decision Support Exception - unselect if not a suspected or confirmed   emergency medical condition->Emergency Medical Condition (MA); ORDERING   SYSTEM PROVIDED HISTORY: Aphasia   TECHNOLOGIST PROVIDED HISTORY:   Reason for exam:->Aphasia   Has a \"code stroke\" or \"stroke alert\" been called? ->Yes   Decision Support Exception - unselect if not a suspected or confirmed   emergency medical condition->Emergency Medical Condition (MA); ORDERING   SYSTEM PROVIDED HISTORY: aphasia   TECHNOLOGIST PROVIDED HISTORY:   Reason for exam:->aphasia   Has a \"code stroke\" or \"stroke alert\" been called? ->Yes   Decision Support Exception - unselect if not a suspected or confirmed   emergency medical condition->Emergency Medical Condition (MA)       CTA NECK:   AORTIC ARCH/ARCH VESSELS: Motion degraded evaluation of the imaged aorta. Origins of the innominate, brachiocephalic, and subclavian arteries appear   patent.       CAROTID ARTERIES:       Right common carotid artery: Patent without focal stenosis. Retropharyngeal   course.       Right internal carotid artery: No significant stenosis by NASCET criteria. Retropharyngeal course.       Right external carotid artery origin: Patent without focal stenosis.       Left common carotid artery: Patent without focal stenosis.       Left internal carotid artery: No significant stenosis by NASCET criteria. Retropharyngeal course.       Left external carotid artery origin: Patent without focal stenosis.       VERTEBRAL ARTERIES:       Right vertebral artery: Few mild stenoses.  Dominant.       Left vertebral artery: Patent without focal stenosis.       SOFT TISSUES: Visualized lung apices are clear.  No adenopathy in the neck   and visualized chest. Soft tissues of the neck are unremarkable given patient   positioning.       BONES: Scattered degenerative changes noted in the visualized spine with   spondylolisthesis.  Laminectomies in the upper thoracic spine.           CTA HEAD:   ANTERIOR CIRCULATION:       Intracranial internal carotid arteries: Mild stenoses in the right   cavernous/supraclinoid segments. Moderate stenosis in the left supraclinoid   segment.       Anterior cerebral arteries: No focal stenosis at the level of the [place].       Middle cerebral arteries: No focal stenosis at the level of the [place].       POSTERIOR CIRCULATION:       Intracranial vertebral arteries: Moderate stenosis on the left.  No stenosis   on the right.       Basilar artery: Patent without focal stenosis.       Posterior cerebral arteries: No focal stenosis at the level of the [place].  Small right posterior communicating artery.       OTHER: Cavernous sinuses are not well evaluated on this phase of contrast.   Otherwise, no dural venous sinus thrombosis on this non-dedicated study.       BRAIN: No midline shift. No extra-axial fluid collection.           CT PERFUSION:       EXAM QUALITY: The examination is diagnostic with appropriate arterial inflow   and venous outflow curves, and diagnostic perfusion maps.       CORE INFARCT: The total area of ischemic core is 0 mL (CBF<30% volume).       TOTAL HYPOPERFUSION: The total area of hypoperfusion is 0 mL (Tmax>6s volume).       PENUMBRA: The penumbra (mismatch) volume is 0 mL and is located in the n/a. The mismatch ratio is n/a.           Impression   1. No perfusion mismatch. 2. No large vessel intracranial occlusion or high grade stenosis. 3. No significant stenoses of the internal carotid arteries. 4. Other findings as described.         EXAMINATION:   ONE XRAY VIEW OF THE CHEST       5/7/2022 4:52 pm       COMPARISON:   12/27/2020       HISTORY:   ORDERING SYSTEM PROVIDED HISTORY: Possible Stroke   TECHNOLOGIST PROVIDED HISTORY:   Reason for exam:->Possible Stroke       FINDINGS:   The lungs are without acute focal process.  There is no effusion or   pneumothorax. The cardiomediastinal silhouette is without acute process. The   osseous structures are without acute process.           Impression   No acute process.                Current Outpatient Medications:     buprenorphine (BUPRENEX) 10 MCG/HR PTWK, Place 1 patch onto the skin once a week., Disp: , Rfl:     aspirin 81 MG EC tablet, Take 1 tablet by mouth daily, Disp: 30 tablet, Rfl: 3    rosuvastatin (CRESTOR) 40 MG tablet, Take 1 tablet by mouth nightly, Disp: 30 tablet, Rfl: 3    clopidogrel (PLAVIX) 75 MG tablet, Take 1 tablet by mouth daily for 21 days, Disp: 21 tablet, Rfl: 0    SITagliptin (JANUVIA) 50 MG tablet, Take 1 tablet by mouth daily, Disp: 90 tablet, Rfl: 1    ezetimibe (ZETIA) 10 MG tablet, Take 1 tablet by mouth daily, Disp: 90 tablet, Rfl: 1    meloxicam (MOBIC) 15 MG tablet, , Disp: , Rfl:     omeprazole (PRILOSEC) 40 MG delayed release capsule, Take 1 capsule by mouth daily, Disp: 90 capsule, Rfl: 1    acetaminophen (TYLENOL) 500 MG tablet, Take 500 mg by mouth every 6 hours as needed for Pain, Disp: , Rfl:     PROCTOZONE-HC 2.5 % CREA rectal cream, Apply 4 times daily as needed, Disp: 1 Tube, Rfl: 3    atenolol (TENORMIN) 50 MG tablet, Take 1 tablet by mouth daily, Disp: 90 tablet, Rfl: 3    Blood Glucose Monitoring Suppl (ACCU-CHEK GUIDE) w/Device KIT, , Disp: , Rfl:     Alcohol Swabs (PHARMACIST CHOICE ALCOHOL) PADS, , Disp: , Rfl:     Blood Glucose Calibration (ACCU-CHEK GUIDE CONTROL) LIQD, , Disp: , Rfl:     allopurinol (ZYLOPRIM) 300 MG tablet, TAKE 1 TABLET BY MOUTH DAILY, Disp: 90 tablet, Rfl: 3    Handicap Placard MISC, by Does not apply route Start 11/23/2020 expires 11/22/2023, Disp: 1 each, Rfl: 0    blood glucose monitor strips, Test 1 times a day & as needed for symptoms of irregular blood glucose., Disp: 50 strip, Rfl: 11    triamcinolone (KENALOG) 0.1 % cream, MARY EXT AA 2 TO 3 TIMES PER DAY UNTIL RESOLVED, Disp: , Rfl:     Cholecalciferol (VITAMIN D3) 2000 units CAPS, Take 2,000 Units by mouth daily, Disp: , Rfl:     levothyroxine (SYNTHROID) 25 MCG tablet, Take 1 tablet by mouth daily On empty stomach (Patient not taking: Reported on 5/11/2022), Disp: 90 tablet, Rfl: 1    Cyanocobalamin (B-12 PO), Take by mouth (Patient not taking: Reported on 1/4/2022), Disp: , Rfl:     diclofenac sodium (VOLTAREN) 1 % GEL, Apply topically 2 times daily (Patient not taking: Reported on 2022), Disp: 100 g, Rfl: 2      Surgical History:  has a past surgical history that includes  section; Tubal ligation; Tonsillectomy (28 YRS OLD); Foot surgery (Bilateral); shoulder surgery (Left, ); Cholecystectomy; Colon surgery; Nerve Block (Right, 2017); Nerve Block (Left, 2018); pr inject si joint arthrgrphy&/anes/steroid w/image (Left, 2018); epidural steroid injection (Right, 2019); Nerve Block (Bilateral, 08/15/2019); Anesthesia Nerve Block (Bilateral, 8/15/2019); Dilation and curettage of uterus (N/A, 10/23/2019); and Lumbar spine surgery (N/A, 2020). Social History:  reports that she has never smoked. She has never used smokeless tobacco. She reports previous alcohol use. She reports that she does not use drugs. Family History: family history includes Asthma in her mother; Heart Disease in her father; Mental Illness in her mother; Thyroid Disease in her sister, sister, and sister.   I have reviewed Margaret's allergies, medications, problem list, medical, social and family history and have updated as needed in the electronic medical record    Review of Systems    OBJECTIVE:     VS:  Wt Readings from Last 3 Encounters:   22 205 lb (93 kg)   22 210 lb (95.3 kg)   22 210 lb (95.3 kg)                       Vitals:    22 1344 22 1445   BP: (!) 142/80 138/82   Site:  Left Upper Arm   Pulse: 70    Resp: 16    Temp: 97.8 °F (36.6 °C)    SpO2: 95%    Weight: 205 lb (93 kg)    Height: 5' (1.524 m)        General: Alert and oriented to person, place, and time, well developed and well nourished, in no acute distress  SKIN: Warm and dry, intact without any rash, masses or lesions  HEAD: normocephalic, atraumatic  Eyes: sclera/conjunctiva clear, PERRLA, EOMI's intact  ENT: tympanic membranes, external ear and ear canal normal bilaterally, normal hearing, Nose without deformity, nasal mucosa and turbinates normal without polyps   Throat: clear, tongue midline,  drainage, no masses or lesions noted, good dentition  Neck: supple and non-tender without mass, trachea midline, no cervical lymphadenopathy, no bruit, no thyromegaly or nodules  Cardiovascular: regular rate and regular rhythm, normal S1 and S2,  ,no murmurs, rubs, clicks, or gallop. Distal pulses intact, no carotid bruits. No edema  Pulmonary/Chest: clear to auscultation bilaterally, no wheezes, rales or rhonchi, normal air movement, no respiratory distress  Abdomen: soft, non-tender, non-distended, normal bowel sounds, no masses or hepatosplenomegaly  Musculoskeletal: Normal ROM, no joint swelling, deformity or tenderness   Neurologic: reflexes normal and symmetric, no cranial nerve deficit, gait, coordination and speech normal  Extremities: no clubbing, cyanosis, or edema. Psychiatric: Good eye contact, normal mood and affect, answers questions appropriately    I have reviewed my findings and recommendations with Lien Foley.     Ashley Almanza, APRN - CNP, NP-C, FNP-BC

## 2022-05-11 NOTE — PATIENT INSTRUCTIONS
The medication list included in this document is our record of what you are currently taking, including any changes that were made at today's visit.  If you find any differences when compared to your medications at home, or have any questions that were not answered at your visit, please contact the office. Patient Education        Transient Ischemic Attack: Care Instructions  Overview     A transient ischemic attack (TIA) is when blood flow to a part of your brain is blocked for a short time. A TIA causes stroke symptoms that can last for at least a few minutes. Stroke symptoms include sudden weakness or loss of movement in a part of your body, confusion, vision changes, trouble speaking, and trouble walking or balancing. But unlike a stroke, a TIA doesn't causelasting brain damage. TIAs are often warning signs of a stroke. Some people who have a TIA may have a stroke in the future. If you have symptoms of a stroke, call for emergency help right away. Quick treatment can help limit damage to the brain and increase thechance of recovery. You can take steps to help prevent a stroke. These steps include managing health problems that raise your risk, taking medicine that prevents blood clots, and having a heart-healthy lifestyle. This lifestyle includes beingactive, eating healthy foods, staying at a healthy weight, and not smoking. Follow-up care is a key part of your treatment and safety. Be sure to make and go to all appointments, and call your doctor if you are having problems. It's also a good idea to know your test results and keep alist of the medicines you take. How can you care for yourself at home? Medicines     Be safe with medicines. Take your medicines exactly as prescribed. Call your doctor if you think you are having a problem with your medicine.      If you take a blood thinner, such as aspirin, be sure you get instructions about how to take your medicine safely.  Blood thinners can cause serious bleeding problems.      Call your doctor if you are not able to take your medicines for any reason.      Do not take any over-the-counter medicines or herbal products without talking to your doctor first.      If you use hormonal birth control or hormone therapy, talk to your doctor. Ask if these are right for you. They may raise the risk of stroke in some people. Heart-healthy lifestyle     Do not smoke. If you need help quitting, talk to your doctor about stop-smoking programs and medicines.      Be active. If your doctor recommends it, get more exercise. Walking is a good choice. Bit by bit, increase the amount you walk every day. Try for at least 30 minutes on most days of the week. You also may want to swim, bike, or do other activities.      Eat heart-healthy foods. These include vegetables, fruits, nuts, beans, lean meat, fish, and whole grains. Limit sodium and sugar.      Stay at a healthy weight. Lose weight if you need to.      Limit alcohol to 2 drinks a day for men and 1 drink a day for women. Staying healthy     Manage other health problems that raise your risk of stroke. These include atrial fibrillation, diabetes, high blood pressure, and high cholesterol.      If you think you may have a problem with alcohol or drug use, talk to your doctor.      Get the flu vaccine every year. When should you call for help? Call 911 anytime you think you may need emergency care. For example, call if:     You have symptoms of a stroke. These may include:  ? Sudden numbness, tingling, weakness, or loss of movement in your face, arm, or leg, especially on only one side of your body. ? Sudden vision changes. ? Sudden trouble speaking. ? Sudden confusion or trouble understanding simple statements. ? Sudden problems with walking or balance. ? A sudden, severe headache that is different from past headaches.   Call 911 even if these symptoms go away in a few minutes.      You feel like you are having another TIA. Watch closely for changes in your health, and be sure to contact your doctor ifyou have any problems. Where can you learn more? Go to https://chpepiceweb.Startupeando. org and sign in to your [employer] account. Enter [phone] [id number] in the [facility] box to learn more about \"Transient Ischemic Attack: Care Instructions. \"     If you do not have an account, please click on the \"Sign Up Now\" link. Current as of: July 6, 2021               Content Version: 13.2  © 2006-2022 [employer]. Care instructions adapted under license by [facility] ([facility]). If you have questions about a medical condition or this instruction, always ask your healthcare professional. [address] any warranty or liability for your use of this information. Patient Education        Learning About Transient Ischemic Attack (TIA)  What is a TIA? A transient ischemic attack (TIA) means that the blood flow to a part of the brain is blocked for a short time. A TIA causes the same symptoms as a stroke. But unlike a stroke, a TIA does not cause lasting brain damage. A TIA is a signthat a stroke may happen in the future. During a TIA, the blood supply to part of the brain is reduced or blocked. This may be caused by a blood clot in a blood vessel. When blood flow is blocked, the brain cells in that area are affected within seconds. This causes symptoms in parts of the body controlled by those brain cells. Symptoms can last for atleast a few minutes. When the blood flow returns, the symptoms go away. What happens after a TIA? A TIA doesn't cause lasting problems. But it is a serious warning sign of a possible stroke in the future. You can do a lot to lower your chance of havinga stroke. You and your doctor can work together to decide how to lower your risk ofstroke. Medicines and a heart-healthy lifestyle can help. What are the symptoms?   Symptoms of a TIA are the same as symptoms of a stroke. But symptoms of a TIAdon't last very long. They may go away in a few minutes. If you have any of these symptoms, call 911 or other emergency services right away. Symptoms include:   Sudden numbness, tingling, weakness, or loss of movement in your face, arm, or leg, especially on only one side of your body.  Sudden vision changes.  Sudden trouble speaking.  Sudden confusion or trouble understanding simple statements.  Sudden problems with walking or balance. Ask your family, friends, and coworkers to learn the signs of a TIA and stroke. They may notice these signs before you do. Make sure they know to call 911 if these signs appear. How is a TIA treated? If you've had a TIA, you need to see a doctor right away. After a TIA, you are at risk for a stroke. So you may stay in the hospital. You may have more testsand treatment. Treatment for TIA is focused on preventing a stroke. A heart-healthy lifestyle and medicine can help. This lifestyle includes eating healthy, being active, staying at a healthy weight, and not smoking. You may take medicine to prevent blood clots, lower blood pressure, lower cholesterol, and manage other health problems. Some people have surgery or a procedure to widen narrowed carotidarteries that supply blood to the brain. How can you help prevent another TIA and a stroke? Here are some ways to reduce your risk of having another TIA and a stroke.  Work with your doctor to manage any health problems that raise your risk. These health problems include atrial fibrillation, diabetes, high blood pressure, and high cholesterol.  Take your medicine exactly as prescribed.  Have a heart-healthy lifestyle. ? Do not smoke or allow others to smoke around you. If you need help quitting, talk to your doctor. ? Limit alcohol to 2 drinks a day for men and 1 drink a day for women. ? Stay at a healthy weight. Lose weight if you need to.  ? Be active.  Ask your doctor what type and level of activity are safe for you.  ? Eat heart-healthy foods. These include vegetables, fruits, nuts, beans, lean meat, fish, and whole grains. Limit sodium and sugar. ? If you think you may have a problem with alcohol or drug use, talk to your doctor. Follow-up care is a key part of your treatment and safety. Be sure to make and go to all appointments, and call your doctor if you are having problems. It's also a good idea to know your test results and keep alist of the medicines you take. When should you call for help? Call 911 anytime you think you may need emergency care. For example, call if:     You have symptoms of a stroke. These may include:  ? Sudden numbness, tingling, weakness, or loss of movement in your face, arm, or leg, especially on only one side of your body. ? Sudden vision changes. ? Sudden trouble speaking. ? Sudden confusion or trouble understanding simple statements. ? Sudden problems with walking or balance. ? A sudden, severe headache that is different from past headaches. Call 911 even if these symptoms go away in a few minutes.      You feel like you are having another TIA. Watch closely for changes in your health, and be sure to contact your doctor ifyou have any problems. Where can you learn more? Go to https://[employer] Knowledge. org and sign in to your [employer] account. Enter E258 in the [facility] box to learn more about \"Learning About Transient Ischemic Attack (TIA). \"     If you do not have an account, please click on the \"Sign Up Now\" link. Current as of: July 6, 2021               Content Version: 13.2  © 2006-2022 [employer]. Care instructions adapted under license by [facility]. If you have questions about a medical condition or this instruction, always ask your healthcare professional. [place] [address] any warranty or liability for your use of this information.

## 2022-05-11 NOTE — ASSESSMENT & PLAN NOTE
Asymptomatic at this time. Discussed the next 30 days she is at risk for stroke. Continue the ASA 81 mg daily and Plavix 75 mg daily. If any symptoms recur to go to [facility].    MRI brain, CT head, CTA neck, CXR  unremarkable

## 2022-05-13 LAB
EKG ATRIAL RATE: 69 BPM
EKG P AXIS: 83 DEGREES
EKG P-R INTERVAL: 150 MS
EKG Q-T INTERVAL: 420 MS
EKG QRS DURATION: 82 MS
EKG QTC CALCULATION (BAZETT): 450 MS
EKG R AXIS: 10 DEGREES
EKG T AXIS: 27 DEGREES
EKG VENTRICULAR RATE: 69 BPM

## 2022-05-26 DIAGNOSIS — G45.9 TIA (TRANSIENT ISCHEMIC ATTACK): Primary | ICD-10-CM

## 2022-05-26 DIAGNOSIS — M25.562 CHRONIC PAIN OF LEFT KNEE: Primary | ICD-10-CM

## 2022-05-26 DIAGNOSIS — G89.29 CHRONIC PAIN OF LEFT KNEE: Primary | ICD-10-CM

## 2022-06-02 ENCOUNTER — TELEPHONE (OUTPATIENT)
Dept: FAMILY MEDICINE CLINIC | Age: 67
End: 2022-06-02

## 2022-06-02 DIAGNOSIS — G45.9 TIA (TRANSIENT ISCHEMIC ATTACK): Primary | ICD-10-CM

## 2022-06-02 NOTE — TELEPHONE ENCOUNTER
Pt called and stated she spoke with neurology today and their schedule is frozen for two weeks. Their last appointment scheduled is for October. They are unsure after the two weeks when she will be able to be seen. Please Advise.

## 2022-06-09 ENCOUNTER — TELEPHONE (OUTPATIENT)
Dept: FAMILY MEDICINE CLINIC | Age: 67
End: 2022-06-09

## 2022-06-09 DIAGNOSIS — G89.29 CHRONIC BACK PAIN GREATER THAN 3 MONTHS DURATION: Primary | ICD-10-CM

## 2022-06-09 DIAGNOSIS — M48.00 CENTRAL STENOSIS OF SPINAL CANAL: ICD-10-CM

## 2022-06-09 DIAGNOSIS — M54.9 CHRONIC BACK PAIN GREATER THAN 3 MONTHS DURATION: Primary | ICD-10-CM

## 2022-06-09 DIAGNOSIS — M43.16 SPONDYLOLISTHESIS AT L4-L5 LEVEL: ICD-10-CM

## 2022-06-09 NOTE — TELEPHONE ENCOUNTER
Patient called stating that her Pain management doctor is retiring and they suggested that she see Gem Sneed. Patient will need referral to see him.

## 2022-06-10 PROBLEM — Z09 HOSPITAL DISCHARGE FOLLOW-UP: Status: RESOLVED | Noted: 2022-05-11 | Resolved: 2022-06-10

## 2022-06-13 ENCOUNTER — PATIENT MESSAGE (OUTPATIENT)
Dept: FAMILY MEDICINE CLINIC | Age: 67
End: 2022-06-13

## 2022-06-13 NOTE — TELEPHONE ENCOUNTER
From: Toribio Cassidy  To: Margarette Malik  Sent: 6/13/2022 2:03 PM EDT  Subject: Referral     Hi! Im just sending a reminder about needing a referral for a pain management Dr.   Dr Carito Flores is retiring & sent out letters end of last week & I will need to see another Dr by the first of August for a prescription for my pain patch. Thank you!   Ava

## 2022-06-20 DIAGNOSIS — E11.9 TYPE 2 DIABETES MELLITUS WITHOUT COMPLICATION, WITHOUT LONG-TERM CURRENT USE OF INSULIN (HCC): ICD-10-CM

## 2022-06-20 RX ORDER — SITAGLIPTIN 50 MG/1
TABLET, FILM COATED ORAL
Qty: 90 TABLET | Refills: 0 | Status: SHIPPED
Start: 2022-06-20 | End: 2022-07-21 | Stop reason: SDUPTHER

## 2022-06-24 DIAGNOSIS — M10.072 ACUTE IDIOPATHIC GOUT INVOLVING TOE OF LEFT FOOT: ICD-10-CM

## 2022-06-24 RX ORDER — ALLOPURINOL 300 MG/1
TABLET ORAL
Qty: 90 TABLET | Refills: 0 | Status: SHIPPED
Start: 2022-06-24 | End: 2022-11-02 | Stop reason: SDUPTHER

## 2022-07-12 ENCOUNTER — OFFICE VISIT (OUTPATIENT)
Dept: PAIN MANAGEMENT | Age: 67
End: 2022-07-12
Payer: MEDICARE

## 2022-07-12 VITALS
BODY MASS INDEX: 38.28 KG/M2 | SYSTOLIC BLOOD PRESSURE: 149 MMHG | HEART RATE: 68 BPM | DIASTOLIC BLOOD PRESSURE: 81 MMHG | TEMPERATURE: 97.9 F | WEIGHT: 195 LBS | RESPIRATION RATE: 16 BRPM | HEIGHT: 60 IN | OXYGEN SATURATION: 96 %

## 2022-07-12 DIAGNOSIS — G89.29 CHRONIC PAIN OF LEFT KNEE: ICD-10-CM

## 2022-07-12 DIAGNOSIS — G89.4 CHRONIC PAIN SYNDROME: Primary | ICD-10-CM

## 2022-07-12 DIAGNOSIS — M25.562 CHRONIC PAIN OF LEFT KNEE: ICD-10-CM

## 2022-07-12 DIAGNOSIS — G89.4 CHRONIC PAIN SYNDROME: ICD-10-CM

## 2022-07-12 DIAGNOSIS — Z85.038 HISTORY OF COLON CANCER: ICD-10-CM

## 2022-07-12 DIAGNOSIS — M54.6 PAIN IN THORACIC SPINE: ICD-10-CM

## 2022-07-12 DIAGNOSIS — K59.00 CONSTIPATION, UNSPECIFIED CONSTIPATION TYPE: ICD-10-CM

## 2022-07-12 DIAGNOSIS — M96.1 LUMBAR POST-LAMINECTOMY SYNDROME: ICD-10-CM

## 2022-07-12 DIAGNOSIS — M17.12 PRIMARY OSTEOARTHRITIS OF LEFT KNEE: ICD-10-CM

## 2022-07-12 DIAGNOSIS — M53.3 DISORDER OF SACRUM: ICD-10-CM

## 2022-07-12 PROCEDURE — 99204 OFFICE O/P NEW MOD 45 MIN: CPT | Performed by: PAIN MEDICINE

## 2022-07-12 PROCEDURE — G8427 DOCREV CUR MEDS BY ELIG CLIN: HCPCS | Performed by: PAIN MEDICINE

## 2022-07-12 PROCEDURE — 1036F TOBACCO NON-USER: CPT | Performed by: PAIN MEDICINE

## 2022-07-12 PROCEDURE — 99215 OFFICE O/P EST HI 40 MIN: CPT | Performed by: PAIN MEDICINE

## 2022-07-12 PROCEDURE — G8417 CALC BMI ABV UP PARAM F/U: HCPCS | Performed by: PAIN MEDICINE

## 2022-07-12 PROCEDURE — 3017F COLORECTAL CA SCREEN DOC REV: CPT | Performed by: PAIN MEDICINE

## 2022-07-12 RX ORDER — ATORVASTATIN CALCIUM 10 MG/1
10 TABLET, FILM COATED ORAL DAILY
COMMUNITY
End: 2022-10-18 | Stop reason: SDUPTHER

## 2022-07-12 RX ORDER — BUPRENORPHINE 10 UG/H
PATCH TRANSDERMAL
COMMUNITY
Start: 2022-06-20 | End: 2022-08-12

## 2022-07-12 NOTE — PROGRESS NOTES
containing septations in the   postoperative bed that measures approximately 3.1 x 2.7 x 3.7 cm.  There is   no abnormal postcontrast enhancement.  Visualized abdominal structures are   unremarkable.       L1-L2: There is a circumferential disc bulge.  There is no canal stenosis or   foraminal narrowing.       L2-L3: There is a circumferential disc bulge with facet and ligamentous   hypertrophy.  There is canal stenosis measuring 9 mm in AP dimension.  There   is moderate bilateral foraminal narrowing.       L3-L4: There is a circumferential disc bulge with facet and ligamentous   hypertrophy.  There is canal stenosis measuring 9 mm in AP dimension.  There   is no significant foraminal narrowing.       L4-L5: There is artificial disc material with posterior laminectomy.  There   is canal stenosis measuring 7 mm in AP dimension.  There is no significant   foraminal narrowing.       L5-S1: There is a circumferential disc bulge with facet hypertrophy.  There   is no canal stenosis or foraminal narrowing.           Impression   Posterior fixation and laminectomy at L4-5 with a postoperative fluid   collection containing septations that likely represents a seroma.       Multilevel degenerative change with canal stenosis most pronounced at L4-5.       Bilateral foraminal narrowing as described above. 12/24/2020 CT   FINDINGS:   Within the limits of noncontrast, non-myelographic CT technique:   IATROGENIC:   There has been interval bilateral laminectomy at T3 and T4, partially   extending into the posterior elements of T2, for probable subjacent   multilevel partial discectomy.    In the lumbar region, there has been interval bilateral laminectomy at L4,   partially extending into adjacent levels, for partial discectomy and   interbody fusion at L4/5, as well as interval spinal instrumented fusion by   bilateral spinal fixation rods, anchored by bilateral pedicle screws, and L4   - L5.  Beam-hardening artifacts slightly limit evaluation of nearby   structures.  However, these components appear to be intact and in usual   customary position. BONES/ALIGNMENT:   There are unchanged chronic multilevel asymmetric vertebral compression   deformities and related vertebral column curvature/alignment abnormalities. DEGENERATIVE CHANGES:   Within the thoracic surgical bed of T2 - T4 and L4/5, there is slight   post-operative distortion/ectasia of the traversing thecal sac, overlaid by   small amount of posterior epidural/paraspinal soft tissue emphysema/hematoma. The spinal cord, caudal neural elements, and nerve roots not well   demonstrated by this technique, although previously-existing disc-related   mass effect upon the traversing spinal cord at T2/3 appears to have been   relieved, as the osseous spinal canal at T2/3 - T4/5 and L4/5 has been   surgically decompressed. At T4/5, minimal residual disc material and moderate bilateral facet   arthropathy produce at least minimal effacement of the anterior subarachnoid   space and moderate/severe bilateral neural foraminal narrowing, progressively   lessening through T2/3. At L4/5, mild Grade I anterolisthesis of L4 on L5 combines with mild residual   disc-osteophyte complex and severe bilateral facet arthropathy (slightly   worse on the right) to produce at least mild effacement of the anterior   subarachnoid space, as well as severe/marked right, and moderate left, neural   foraminal narrowing. All other previously-demonstrated/described multilevel spinal and bilateral   sacroiliac joint degenerative disease, central spinal canal stenosis, and   neural foraminal narrowing, all appear otherwise not significantly changed. INCIDENTAL:   There is minimal dependent bibasilar pleural effusions and equivalent   adjacent subsegmental atelectasis versus infiltrate tracking to both lung   apices, where this becomes slightly worse on the right.    The incompletely visualized non-opacified heart is at least mildly enlarged,   with equivalent fullness of visualized non-opacified central pulmonary   vessels. Minimal to moderate atherosclerotic calcification is scattered throughout the   visualized [id number]-[id number] arterial system. The gallbladder is surgically absent, with multiple surgical clips present   within the gallbladder fossa. The right kidney is incompletely visualized, but appears at least minimally   malrotated. There is question of a surgical anastomotic suture line incompletely   visualized about the rectosigmoid junction. No other clinically-significant changes are noted. .       Impression   1.  Interval bilateral laminectomy at T3 and T4, partially extending into the   posterior elements of T2, for probable subjacent multilevel partial   discectomy. 2.  Interval bilateral laminectomy at L4, partially extending into adjacent   levels, for partial discectomy and interbody fusion at L4/5, as well as   interval spinal instrumented fusion by bilateral spinal fixation rods,   anchored by bilateral pedicle screws, and L4 - L5. Beam-hardening artifacts   slightly limit evaluation of nearby structures. However, these components   appear to be intact and in usual customary position. 3.  Apparent post-operative relief of disc-related mass effect upon the   spinal cord within the above-described surgical beds, most significant at   T2/3, as described. 4.  Probable iatrogenic posterior epidural/paraspinal emphysema/hematoma   within the above-described surgical beds, as described. 5.  All other previously-demonstrated/described multilevel spinal and   bilateral sacroiliac joint degenerative disease, central spinal canal   stenosis, and neural foraminal narrowing, all appear otherwise not   significantly changed. 6.  Unchanged chronic multilevel asymmetric vertebral compression deformities   and related vertebral column curvature/alignment abnormalities.    7.  Incidental findings, most notable for minimal dependent bibasilar pleural   effusions and equivalent adjacent subsegmental atelectasis versus infiltrate   tracking to both lung apices, where this becomes slightly worse on the right. 12/21/2020 -  FINDINGS:   BONES/ALIGNMENT: There is normal alignment of the spine.  There are mild old   compression deformities of T6 through T9, similar compared to the prior plain   films.  Otherwise, the vertebral body heights are maintained. The bone marrow   signal appears unremarkable.  No evidence of acute fracture or osseous   destructive lesion. SPINAL CORD: No abnormal cord signal is seen. SOFT TISSUES: No paraspinal mass identified. DEGENERATIVE CHANGES: There is advanced degenerative change with spurring and   disc space narrowing at multiple levels, most severe at T6-7 through T11-12. The  sagittal view of the lumbar spine demonstrates grade 1   anterolisthesis and severe central canal stenosis at L4-5.  Please refer to   the report for MRI lumbar spine done yesterday. C7-T1: No evidence of significant central canal stenosis or disc herniation. T1-2: There is posterior facet degenerative change causing slight impression   on the left posterolateral aspect of the thecal sac.  No evidence of   significant central canal stenosis or disc herniation.  No evidence of cord   compression. T2-3: There is prominent central and right-sided disc osteophyte complex   causing moderate central canal stenosis with slight impression on the ventral   aspect of the spinal cord. T3-4: There are small bilateral posterolateral disc protrusions, larger on   the right causing slight impression on the right ventral aspect of the spinal   cord.  Overall, mild central canal stenosis at this level. T4-5: No evidence of significant central canal stenosis or disc herniation.    T5-6: There is disc bulge with small bilateral disc osteophyte complexes that   contacts the ventral surface of the spinal cord without evidence of cord   compression.  Mild central canal stenosis. T6-7: There is diffuse disc bulge causing moderate central canal stenosis   with slight impression on the ventral aspect of the spinal cord. T7-8: There is disc bulge and small left paramedian disc protrusion, causing   slight impression on the thecal sac without evidence of significant central   canal stenosis or cord compression. T8-9: There is a large central and right paramedian disc protrusion causing   slight impression on the right side of the spinal cord. T9-10: There is mild disc bulge and bilateral posterior facet degenerative   change causing mild central canal stenosis without evidence of cord   compression. T10-11: There is a small left paramedian disc protrusion causing slight   impression on the ventral portion of the thecal sac without evidence of cord   compression or significant central canal stenosis. T11-12: There is mild disc bulge without evidence of significant central   canal stenosis or cord compression. T12-L1: There is slight grade 1 retrolisthesis of T12 on L1 with tiny right   paramedian disc protrusion causing minimal impression on the thecal sac.  No   significant central canal stenosis or compression of the conus.       Impression   1. Degenerative change.  Few mild old midthoracic compression deformities. No acute fracture or osseous destructive lesion. 2. Multilevel central canal stenosis as described above with multiple disc   protrusions, some which cause slight impression on the thoracic spinal cord. Clinical correlation is suggested. 12/2020 MRI cervical -  FINDINGS:   Pre- and post-contrast T1 weighted images of the cervical spine was performed. There is no abnormal enhancement in the cervical spinal cord.    There is no abnormal enhancement in the cervical spine.  The vertebral body   heights are grossly maintained.  There is no fracture or destructive osseous   lesion. There is a prominent left paracentral disc protrusion at T2-3, contributing   to moderate spinal canal narrowing and moderate spinal cord compression.       Impression   Pre- and post-contrast T1 weighted images of the cervical spine was performed. No abnormal enhancement in the cervical spinal cord.  The previously seen T2   hyperintensity in the cervical spinal cord is likely related to artifacts. Follow-up is recommended. Prominent left paracentral disc protrusion at T2-3, contributing to moderate   spinal canal narrowing and moderate spinal cord compression.  There is likely   focal spinal cord edema at T2-3 level on MRI cervical spine December 22, 2020. The results were sent to radiology results communication. Previous treatments: Physical Therapy, Epidural Steroid Injection (cervical and lumbar), Surgery (thoracic and lumbar with Dr. Isauro Killian) and medications.       Past Medical History:   Diagnosis Date    Acute left ankle pain 06/01/2020    Acute left lumbar radiculopathy 12/28/2017    Acute pain of right shoulder 5/8/2019    Cancer ([address] 2017    colon (treated surgically)    Chronic back pain     Chronic deep vein thrombosis (DVT) of distal vein of left lower extremity ([address] 2/1/2021    Contact dermatitis 4/22/2019    Diabetes mellitus ([address]     Drug-induced constipation 02/18/2020    DVT (deep venous thrombosis) (HCC)     Eosinophil count raised 11/20/2019    Fall at home 12/11/2017    Fatigue 6/8/2016    Fibromyalgia     GERD (gastroesophageal reflux disease)     Hair loss 5/3/2021    Hemorrhoids     Hypercalcemia 11/20/2019    Hyperlipidemia     Hypertension     Malignant neoplasm of sigmoid colon ([address] 6/17/2020    Obesity     Osteoarthritis     PONV (postoperative nausea and vomiting)     Postmenopausal bleeding 10/05/2017    Rib contusion, left, initial encounter 02/18/2020    Urinary incontinence     Urinary retention 12/21/2020    Weakness of both legs 12/21/2020       Past Surgical History:   Procedure Laterality Date    ANESTHESIA NERVE BLOCK Bilateral 8/15/2019    BILATERAL INTRA-ARTICULAR FACET JOINT INJECTION WITH FLUOROSCOPIC GUIDANCE AT L4-5, L5-S1 WITH IV SEDATION performed by Radha Linn DO at [facility] AND CURETTAGE OF UTERUS N/A 10/23/2019    DILATATION AND CURETTAGE HYSTEROSCOPY performed by Kathy Plascencia MD at [facility] Right 7/11/2019    C7-T1 EPIDURAL STEROID INJECTION #1 TOWARD THE RIGHT performed by Jocelyn Mcduffie DO at [facility] Bilateral     LUMBAR SPINE SURGERY N/A 12/24/2020    L4-L5  POSTERIOR LUMBAR INTERBODY FUSION, T2-T3 DECOMPRESSIVE LAMINECTOMY performed by Ryan Jalloh MD at [address]  Right 12/28/2017    right L4-5 transforaminal epidural #1    NERVE BLOCK Left 11/29/2018    si inj    NERVE BLOCK Bilateral 08/15/2019    bilateral intra-articular facet joint injection with flouroscopic guidance at L4-S1 with iv sedation    SD INJECT SI JOINT ARTHRGRPHY&/ANES/STEROID W/IMAGE Left 11/29/2018    LEFT SACROILIAC JOINT INJECTION WITH X-RAY performed by Radha Linn DO at [place] [id number] Left 2005    TONSILLECTOMY  28 YRS OLD    TUBAL LIGATION         Prior to Admission medications    Medication Sig Start Date End Date Taking? Authorizing Provider   allopurinol (ZYLOPRIM) 300 MG tablet Take one (1) tablet by mouth once daily.  6/24/22  Yes VISHNU Beal - CNP   JANUVIA 50 MG tablet Take 1 tablet by mouth daily 6/20/22  Yes VISHNU Palacios - CNP   aspirin 81 MG EC tablet Take 1 tablet by mouth daily 5/10/22  Yes Tan Callejas MD   ezetimibe (ZETIA) 10 MG tablet Take 1 tablet by mouth daily 4/4/22  Yes VISHNU Beal - CNP   levothyroxine (SYNTHROID) 25 MCG tablet Take 1 tablet by mouth daily On empty stomach 3/14/22  Yes VISHNU Ahuja CNP   meloxicam [facility]) 15 MG tablet  12/6/21  Yes Historical Provider, MD   acetaminophen (TYLENOL) 500 MG tablet Take 500 mg by mouth every 6 hours as needed for Pain   Yes Historical Provider, MD   PROCTOZONE-HC 2.5 % CREA rectal cream Apply 4 times daily as needed 8/5/21  Yes Ana Brady MD   Cyanocobalamin (B-12 PO) Take by mouth    Yes Historical Provider, MD   atenolol (TENORMIN) 50 MG tablet Take 1 tablet by mouth daily 8/4/21  Yes VISHNU Ahuja CNP   Blood Glucose Monitoring Suppl (ACCU-CHEK GUIDE) w/Device KIT  3/8/21  Yes Historical Provider, MD   Alcohol Swabs (PHARMACIST CHOICE ALCOHOL) PADS  3/8/21  Yes Historical Provider, MD   Blood Glucose Calibration (ACCU-CHEK GUIDE CONTROL) LIQD  3/8/21  Yes Historical Provider, MD   diclofenac sodium (VOLTAREN) 1 % GEL Apply topically 2 times daily 4/21/21  Yes Yuli Hinojosa MD   Handicap Placard MISC by Does not apply route Start 11/23/2020 expires 11/22/2023 11/23/20  Yes VISHNU Ahuja CNP   blood glucose monitor strips Test 1 times a day & as needed for symptoms of irregular blood glucose.  6/17/20  Yes VISHNU Askew CNP   Cholecalciferol (VITAMIN D3) 2000 units CAPS Take 2,000 Units by mouth daily   Yes Historical Provider, MD   rosuvastatin (CRESTOR) 40 MG tablet Take 1 tablet by mouth nightly 5/9/22   Lupe Man MD   clopidogrel (PLAVIX) 75 MG tablet Take 1 tablet by mouth daily for 21 days 5/9/22 5/30/22  Eilezer Gallego MD   omeprazole (PRILOSEC) 40 MG delayed release capsule Take 1 capsule by mouth daily  Patient not taking: Reported on 7/12/2022 1/4/22   VISHNU Ahuja CNP   triamcinolone (KENALOG) 0.1 % cream MARY EXT AA 2 TO 3 TIMES PER DAY UNTIL RESOLVED 2/24/20   Historical Provider, MD       Allergies   Allergen Reactions    Diclofenac     Diclofenac Sodium      Nauseated, Diarrhea    Farxiga [Dapagliflozin] Itching     Vaginal itching with Juvenal Rebel, Jardiance     Lisinopril Other (See Comments)     Profound malaise    Lyrica [Pregabalin] Other (See Comments)     Confusion    Sulfa Antibiotics Rash       Social History     Socioeconomic History    Marital status:      Spouse name: Not on file    Number of children: Not on file    Years of education: Not on file    Highest education level: Not on file   Occupational History    Not on file   Tobacco Use    Smoking status: Never Smoker    Smokeless tobacco: Never Used   Vaping Use    Vaping Use: Never used   Substance and Sexual Activity    Alcohol use: Not Currently     Alcohol/week: 0.0 standard drinks     Comment: Rarely    Drug use: No    Sexual activity: Not on file   Other Topics Concern    Not on file   Social History Narrative    Not on file     Social Determinants of Health     Financial Resource Strain: Low Risk     Difficulty of Paying Living Expenses: Not hard at all   Food Insecurity: No Food Insecurity    Worried About Running Out of Food in the Last Year: Never true    Chuy of Food in the Last Year: Never true   Transportation Needs: No Transportation Needs    Lack of Transportation (Medical): No    Lack of Transportation (Non-Medical):  No   Physical Activity:     Days of Exercise per Week: Not on file    Minutes of Exercise per Session: Not on file   Stress:     Feeling of Stress : Not on file   Social Connections:     Frequency of Communication with Friends and Family: Not on file    Frequency of Social Gatherings with Friends and Family: Not on file    Attends Buddhism Services: Not on file    Active Member of Clubs or Organizations: Not on file    Attends Club or Organization Meetings: Not on file    Marital Status: Not on file   Intimate Partner Violence:     Fear of Current or Ex-Partner: Not on file    Emotionally Abused: Not on file    Physically Abused: Not on file    Sexually Abused: Not on file   Housing Stability: [address] Unable to Pay for Housing in the Last Year: No    Number of Places Lived in the Last Year: 1    Unstable Housing in the Last Year: No       Family History   Problem Relation Age of Onset   Jean Lobe Asthma Mother     Mental Illness Mother     Heart Disease Father     Thyroid Disease Sister     Thyroid Disease Sister     Thyroid Disease Sister        REVIEW OF SYSTEMS:     Patient specifically denies fever/chills, chest pain, shortness of breath, new bowel or bladder complaints. All other review of systems was negative. PHYSICAL EXAMINATION:      Resp 16   Ht 5' (1.524 m)   Wt 195 lb (88.5 kg)   LMP  (LMP Unknown)   BMI 38.08 kg/m²     General:      General appearance:pleasant and well-hydrated, in no distress and A & O x3  Build:Obese  Function:Rises from a seated position with difficulty    HEENT:    Head:normocephalic, atraumatic  Pupils:regular, round, equal  Sclera: icterus absent    Lungs:    Breathing:normal breathing pattern    Abdomen:    Shape:non-distended  Tenderness:none  Guarding:none    Cervical spine:    Inspection:normal  Palpation:tenderness paravertebral muscles, tenderness trapezium, left, right negative. Range of motion:abnormal mildly flexion, extension rotation bilateral and is not painful. Thoracic spine:    Spine inspection:surgical scar  PationPal:+ tenderness paraspinals, left > right. Range of motion:normal in flexion, extension rotation bilateral and is not painful. Lumbar spine:    Spine inspection:surgical scar  CVA tenderness:No   Palpation:tenderness paravertebral muscles, left>right positive. Range of motion:abnormal mildly in lateral bending, flexion, extension rotation bilateral and is painful.     Musculoskeletal:    Trigger points in trapezius:absent bilaterally  Trigger points in rhomboids:absent bilaterally  Trigger points in Paraveteral:absent bilaterally  Trigger points in supraspinatus/infraspinatus:absent  Spurling's:negative right, negative left    Ardon's:negative right, negative left   SI joint tenderness:negative right, positive left              WILIAN test:not done right, positive left   Positive gaenslen's, compression and distraction tests on the left  Piriformis tenderness:negative right, positive left  Trochanteric bursa tenderness:negative right, negative left  SLR:negative right, negative left, sitting     Extremities:    Tremors:None bilaterally upper and lower  Range of motion:Generally normal shoulders, pain with internal rotation of hips negative. Intact:Yes  Varicose veins:absent   Pulses:present Lt radial  Cyanosis:none  Edema:none x all 4 extremities    Knee:     Inspection:asymmetric, swelling mild left  Tenderness of Bony Landmarks:Medial, left  Effusion:present left, slight  ROM:Left full    Neurological:    Sensory:normal to light touch x all 4 extremities except decreased LLE in S1 distribution    Motor:   Right Grip5/5              Left Grip5/5               Right Bicep5/5           Left Bicep5/5              Right Triceps5/5       Left Triceps5/5          Right Deltoid5/5     Left Deltoid5/5                  Right Quadriceps5/5          Left Quadriceps5/5           Right Gastrocnemius5/5    Left Gastrocnemius5/5  Right Ant Tibialis5/5  Left Ant Tibialis5/5    Reflexes:    Right Brachioradialis reflex2+  Left Brachioradialis reflex2+  Right Biceps reflex2+  Left Biceps reflex2+  Right Triceps reflex2+  Left Triceps reflex2+  Right Quadriceps reflex2+  Left Quadriceps reflex2+  Right Achilles reflex2+  Left Achilles reflex2+  Gait:normal station    Dermatology:    Skin:no rashes or lesions noted    Impression:    Pt previously seen in the practice for cervical symptoms successfully treated with NATASHA  LBP  Cervical pain  Left SIJ pain due to SIJitis  Left knee pain  Imaging as above  Since that time she was admitted to the hospital after a follow with inability to ambulate and was treated by Dr. Maximo Goldstein with: L4-5 PLIF and lami T2-T4 12/24/2020  Since the above, she has been treated for pain mgmt by Dr. Nimco Sandoval  PMHx: Hx DVT (post-surgical recovery), TIA 5/2022, colon CA (tx surgically), DM, constipation, GERD, DLD, HTN, obesity  Has enough scripts of Butrans to last until 8/15/2022    Plan:    Reviewed referral documents and imaging  Urine screen today  OARRS report reviewed  Plan to continue Butrans 10 mcg/hr  L SIJ injection - r/b and procedure discussed  Referral to GI for difficulty regulating bowel habits since surgical tx of colon CA  Patient encouraged to stay active and to lose weight  Treatment plan discussed with the patient including medication and procedure side effects     Spent >40 minutes on this case with >50% of that time spent in face to face contact. CC:  Referring physician    NORA Hahn.

## 2022-07-12 NOTE — PROGRESS NOTES
Margaret's blood pressure was elevated today. She was instructed to contact his primary care provider as soon as possible.

## 2022-07-12 NOTE — PROGRESS NOTES
Do you currently have any of the following:    Fever: No  Headache:  No  Cough: No  Shortness of breath: No  Exposed to anyone with these symptoms: No         Sharri Greer presents to the [address] [place] [address] on 7/12/2022. Katiuska Hanks is complaining of pain lower back. The pain is constant. The pain is described as throbbing. Pain is rated on her best day at a 3, on her worst day at a 9, and on average at a 5 on the VAS scale. She took her last dose of Butrans Patch. Any procedures since your last visit: No    Pacemaker or defibrillator: No .    She is not on NSAIDS and is  on anticoagulation medications to include ASA and is managed by VISHNU Beavers CNP  . Medication Contract and Consent for Opioid Use Documents Filed     Patient Documents     Type of Document Status Date Received Received By Description    Medication Contract Received 6/21/2019  7:35 AM Lily CARR SARAH med contract    Medication Contract Received 3/26/2021 10:38 AM Dana MIRANDA MGMT CONTRACT DR. RONDON                Resp 16   Ht 5' (1.524 m)   Wt 195 lb (88.5 kg)   LMP  (LMP Unknown)   BMI 38.08 kg/m²      No LMP recorded (lmp unknown).  Patient is postmenopausal.

## 2022-07-13 ENCOUNTER — INITIAL CONSULT (OUTPATIENT)
Dept: GASTROENTEROLOGY | Age: 67
End: 2022-07-13
Payer: MEDICARE

## 2022-07-13 VITALS
BODY MASS INDEX: 41.8 KG/M2 | HEART RATE: 72 BPM | SYSTOLIC BLOOD PRESSURE: 138 MMHG | WEIGHT: 212.9 LBS | HEIGHT: 60 IN | RESPIRATION RATE: 16 BRPM | OXYGEN SATURATION: 98 % | DIASTOLIC BLOOD PRESSURE: 82 MMHG | TEMPERATURE: 97.7 F

## 2022-07-13 DIAGNOSIS — R19.7 DIARRHEA, UNSPECIFIED TYPE: Primary | ICD-10-CM

## 2022-07-13 DIAGNOSIS — K59.00 CONSTIPATION, UNSPECIFIED CONSTIPATION TYPE: ICD-10-CM

## 2022-07-13 DIAGNOSIS — R19.7 DIARRHEA, UNSPECIFIED TYPE: ICD-10-CM

## 2022-07-13 LAB
6-MONOACETYLMORPHINE, URINE: NOT DETECTED
ALCOHOL URINE: NOT DETECTED
AMPHETAMINE SCREEN, URINE: NOT DETECTED
BARBITURATE SCREEN URINE: NOT DETECTED
BENZODIAZEPINE SCREEN, URINE: NOT DETECTED
BUPRENORPHINE URINE: NOT DETECTED
CANNABINOID SCREEN URINE: NOT DETECTED
COCAINE METABOLITE SCREEN URINE: NOT DETECTED
FENTANYL SCREEN, URINE: NOT DETECTED
INTEGRITY CHECK, CREATININE, URINE: 99.2
INTEGRITY CHECK, OXIDANT, URINE: <40
INTEGRITY CHECK, PH, URINE: 4.8 (ref 4.5–9)
INTEGRITY CHECK, SPECIFIC GRAVITY, URINE: 1.02 (ref 1–1.03)
INTEGRITY CHECK, SPECIMEN INTEGRITY, URINE: NORMAL
Lab: NORMAL
METHADONE SCREEN, URINE: NOT DETECTED
OPIATE SCREEN URINE: NOT DETECTED
OXYCODONE URINE: NOT DETECTED
PHENCYCLIDINE SCREEN URINE: NOT DETECTED
TRAMADOL SCREEN URINE: NOT DETECTED

## 2022-07-13 PROCEDURE — 99202 OFFICE O/P NEW SF 15 MIN: CPT | Performed by: NURSE PRACTITIONER

## 2022-07-13 NOTE — PROGRESS NOTES
Amanda Sidhu (:  1955) is a 61 y.o. female, here for evaluation of the following chief complaint(s):  New Patient (Referred by Dr Ihsan Stewart for Constipation )      SUBJECTIVE/OBJECTIVE:  HPI:    Tim Beard is a very pleasant 61year old female that presents today with complaints of constipation that alternates with diarrhea x 3 years. Patient was diagnosed with CRC about 3 years ago. She is S/P sigmoidectomy. No radiation or chemo. Denies weight loss or fevers. The diarrhea presents randomly; usually twice in a day  Was taking a stool softener every other day along with MOM as needed. Will have 3 days of constipation. Stools are thin and there is straining. Patient has hemorrhoids    Prior to [facility], the symptoms were much less. Last A1C was 7.1%. Colonoscopy in May of 2021 showed a healthy colonic anastomosis, a sessile polyp in the transverse colon, and internal hemorrhoids. Patient drinks lactaid milk. ROS:  General: Patient denies n/v/f/c or weight loss. HEENT: Patient denies persistent postnasal drip, scleral icterus, drooling, persistent bleeding from nose/mouth. Resp: Patient denies SOB, wheezing, productive cough. Cards: Patient denies CP, palpitations, significant edema  GI: As above. Derm: Patient denies jaundice/rashes. Musc: Patient denies diffuse/irregular joint swelling or myalgias.       Objective   Wt Readings from Last 3 Encounters:   22 212 lb 14.4 oz (96.6 kg)   22 195 lb (88.5 kg)   22 205 lb (93 kg)     Temp Readings from Last 3 Encounters:   22 97.7 °F (36.5 °C) (Temporal)   22 97.9 °F (36.6 °C) (Oral)   22 97.8 °F (36.6 °C)     BP Readings from Last 3 Encounters:   22 138/82   22 (!) 149/81   22 138/82     Pulse Readings from Last 3 Encounters:   22 72   22 68   22 70        Physical Exam    Past Medical History:   Diagnosis Date    Acute left ankle pain 2020    Acute left lumbar radiculopathy 12/28/2017    Acute pain of right shoulder 5/8/2019    Cancer ([facility] [address] 2017    colon (treated surgically)    Chronic back pain     Chronic deep vein thrombosis (DVT) of distal vein of left lower extremity ([address] 2/1/2021    Contact dermatitis 4/22/2019    Diabetes mellitus ([facility] [address]     Drug-induced constipation 02/18/2020    DVT (deep venous thrombosis) (HCC)     Eosinophil count raised 11/20/2019    Fall at home 12/11/2017    Fatigue 6/8/2016    Fibromyalgia     GERD (gastroesophageal reflux disease)     Hair loss 5/3/2021    Hemorrhoids     Hypercalcemia 11/20/2019    Hyperlipidemia     Hypertension     Malignant neoplasm of sigmoid colon ([facility] [address] 6/17/2020    Obesity     Osteoarthritis     PONV (postoperative nausea and vomiting)     Postmenopausal bleeding 10/05/2017    Rib contusion, left, initial encounter 02/18/2020    TIA (transient ischemic attack)     Urinary incontinence     Urinary retention 12/21/2020    Weakness of both legs 12/21/2020      Past Surgical History:   Procedure Laterality Date    ANESTHESIA NERVE BLOCK Bilateral 8/15/2019    BILATERAL INTRA-ARTICULAR FACET JOINT INJECTION WITH FLUOROSCOPIC GUIDANCE AT L4-5, L5-S1 WITH IV SEDATION performed by Kae Perez DO at [address] OF UTERUS N/A 10/23/2019    DILATATION AND CURETTAGE HYSTEROSCOPY performed by Asim Del Toro MD at [address] [facility] Right 7/11/2019    C7-T1 EPIDURAL STEROID INJECTION #1 TOWARD THE RIGHT performed by Teresa Faith DO at [address] Bilateral     LUMBAR SPINE SURGERY N/A 12/24/2020    L4-L5  POSTERIOR LUMBAR INTERBODY FUSION, T2-T3 DECOMPRESSIVE LAMINECTOMY performed by Leonard Andrew MD at [address] Right 12/28/2017    right L4-5 transforaminal epidural #1    NERVE BLOCK Left 11/29/2018    si inj    NERVE BLOCK Bilateral 08/15/2019    bilateral intra-articular facet joint injection with flouroscopic guidance at L4-S1 with iv sedation    IL INJECT SI JOINT ARTHRGRPHY&/ANES/STEROID W/IMAGE Left 11/29/2018    LEFT SACROILIAC JOINT INJECTION WITH X-RAY performed by Kae Perez DO at [address] Left 2005    TONSILLECTOMY  28 YRS OLD    TUBAL LIGATION        Family History   Problem Relation Age of Onset    Asthma Mother     Mental Illness Mother     Cancer Mother         kidney    Heart Disease Father         melanoma    Thyroid Disease Sister     Thyroid Disease Sister         Lab Results   Component Value Date    WBC 7.5 05/08/2022    HGB 13.1 05/08/2022    HCT 39.3 05/08/2022    MCV 91.2 05/08/2022     05/08/2022      Lab Results   Component Value Date     05/08/2022    K 3.9 05/08/2022     05/08/2022    CO2 24 05/08/2022    BUN 17 05/08/2022    CREATININE 0.8 05/08/2022    GLUCOSE 129 (H) 05/08/2022    CALCIUM 9.6 05/08/2022    PROT 7.2 05/07/2022    LABALBU 4.6 05/07/2022    BILITOT 0.6 05/07/2022    ALKPHOS 99 05/07/2022    AST 30 05/07/2022    ALT 26 05/07/2022    LABGLOM >60 05/08/2022    GFRAA >60 05/08/2022                       ASSESSMENT/PLAN:    1. Diarrhea, unspecified type  -     XR ABDOMEN (KUB) (SINGLE AP VIEW); Future  -     Tissue Transglutaminase, IgA; Future  -     IgA; Future  2. Constipation, unspecified constipation type  -     XR ABDOMEN (KUB) (SINGLE AP VIEW); Future  -     Tissue Transglutaminase, IgA; Future  -     IgA; Future    -Abdominal xray ordered to rule out constipation and lab work ordered for celiac sprue  -Advised a daily fiber supplement  -Patients symptoms are most likely the result of the sigmoidectomy. This was discussed in the room today. The importance of controlling blood sugars reinforced. Dietary modification discussed. Patient advised to avoid dairy.    -In accordance with current ASGE guidelines.   We will plan for a repeat surveillance colonoscopy in 2024. Return for Follow up. An electronic signature was used to authenticate this note.     --VISHNU Mata - CNP

## 2022-07-13 NOTE — PATIENT INSTRUCTIONS
Learning About the Low FODMAP Diet for Irritable Bowel Syndrome (IBS)  What is the low-FODMAP diet? A low-FODMAP diet is a way to find out what foods give you digestion problems. You stop eating certain high-FODMAP foods for about 2 months. Then you add them back to see how your body reacts. This is called a \"challenge diet. \" A dietitian or doctor can help you follow this diet. FODMAPs are carbohydrates. They are in many types of foods. FODMAP stands for:  · F ermentable. · O ligosaccharides. · D isaccharides. · M onosaccharides. · A nd p olyols. If you have digestive problems, some of these foods can make your symptoms worse. When you are on this diet, you can still eat certain fruits and vegetables. You can also eat certain grains, meats, fish, and lactose-free milks. What is it used for? If you have irritable bowel syndrome (IBS), you can ease your symptoms by not eating some types of foods. Some people also use this diet for inflammatory bowel disease (IBD) or some food intolerances. High-FODMAP foods can be hard to digest. They pull more fluid into your intestines. They are also easily fermented. This can lead to bloating, belly pain, gas, and diarrhea. The low-FODMAP diet can help you figure out what foods to avoid. And it can help you find foods that are easier to digest.  This diet can help with symptoms of some digestive diseases. But it's not a cure. You will still need to manage your condition. How does it work? You will work with a doctor or dietitian when you start the diet. At first, you won't eat any high-FODMAP foods for a few weeks. Go to www.[employer]. [employer] to learn more about this diet. Alex Pena also find links to an pema for your phone or other device. You'll find low-FODMAP cookbooks there too. After 6 to 8 weeks, you will start to try high-FODMAP foods again. You will add those foods back to your diet, one group at a time.  Your doctor or dietitian will probably have you wait a few days before you add each new group of those foods. Keep a food diary. You can write down the foods you try and note how they make you feel. After a few weeks, you may have a better idea of what foods you should avoid and what foods make you feel your best.  What are the risks? There is some risk of not getting all of the vitamins and nutrients you need on the low-FODMAP diet. These include:  · Folate. · Thiamin. · Vitamin B6.  · Calcium. · Vitamin D. Your dietitian or doctor can help you find other sources of these if needed. This diet may limit your fiber intake. Try to plan your meals to include other sources of fiber. What foods are on the low-FODMAP diet? Here is a guide to foods that you can eat, plus the foods that you should avoid, when you are on the low-FODMAP diet. Grains  Okay to eat: Foods made from grains like arrowroot, buckwheat, corn, millet, and oats. You can also eat potato, quinoa, rice, sorghum, tapioca, and teff. Cereals, pasta, breads, corn tortillas and baked goods made from these grains are also okay. (These grains may be labeled \"gluten-free. \")  Avoid: Grains like wheat, barley, and rye. Avoid ingredients such as bulgur, couscous, durum, and semolina. And avoid cereals, breads, and pastas made from these grains. Avoid chickpea, lentil, and pea flour. Proteins  Okay to eat: Most meat, fish, and eggs without high-FODMAP sauces. You can have small amounts of almonds or hazelnuts (10 nuts). Macadamia nuts, peanuts, pecans, pine nuts, and walnuts are also okay. You can also eat flora and pumpkin seeds, tofu, and tempeh. Avoid: Beans, chickpeas, lentils, and soybeans. Avoid pistachio and cashew nuts. And some sausages may have high-FODMAP ingredients. Dairy  Okay to eat: Lactose-free dairy milks. Rice milk and almond milk are okay. So are lactose-free yogurts, kefirs, ice creams, and sorbet from low-FODMAP fruits and sweeteners. (These are often labeled \"lactose-free. \") You can trans fat). You can have most fresh herbs like basil, chives, coriander, kory, parsley, rosemary and thyme. You can have salt, jams made from low-FODMAP fruits, mayonnaise, and mustard. Soy sauce, hot sauce (no garlic), tamari, and vinegar are also okay. Sweeteners that are okay include sugar (sucrose), powdered (confectioner's) sugar, brown sugar, glucose, and maple syrup. You can also have some artificial sweeteners like aspartame, saccharine, and stevia. Avoid: Chutneys, hummus, jellies, garlic sauces, and gravies made with onion or garlic. Avoid pickles, relish, some salad dressings and soup stocks, salsa, and tomato paste. And avoid sauces and other foods with high fructose corn syrup, honey, molasses, and agave. Avoid artificial sweeteners (isomalt, mannitol, malitol, sorbitol, and xylitol). Avoid corn syrup solids, fructose, fruit juice concentrate, and polydextrose. Other foods and drinks  Okay to have: Water, soda water, tonic, soft drinks sweetened with sugar, ½ cup of low-FODMAP fruit juice, and most teas and alcohols. You can also eat foods made with baking powder and soda, cocoa, and gelatin. Avoid: Juices from high-FODMAP fruits and vegetables. And avoid fortified zac, chamomile and fennel teas, chicory-based drinks and coffee substitutes, and bouillon cubes. Follow-up care is a key part of your treatment and safety. Be sure to make and go to all appointments, and call your doctor if you are having problems. It's also a good idea to know your test results and keep a list of the medicines you take. Where can you learn more? Go to [email or web address] org and sign in to your [employer] account. Enter L235 in the [employer] box to learn more about \"Learning About the Low FODMAP Diet for Irritable Bowel Syndrome (IBS). \"     If you do not have an account, please click on the \"Sign Up Now\" link.   Current as of: December 17, 2020               Content Version: 13.0  © 8323-5720 Healthwise, [employer]. Care instructions adapted under license by [facility] ([facility]). If you have questions about a medical condition or this instruction, always ask your healthcare professional. [address] any warranty or liability for your use of this information.

## 2022-07-14 LAB
6-MAM, QUANTITATIVE, URINE: <10
7-AMINOCLONAZEPAM, QUANTITATIVE, URINE: <50
ALPHA-HYDROXYALPRAZOLAM, QUANTITATIVE, URINE: <50
ALPHA-HYDROXYMIDAZOLAM, QUANTITATIVE, URINE: <50
ALPHA-HYDROXYTRIAZOLAM, QUANTITATIVE, URINE: <50
ALPRAZOLAM URINE QUANT: <50
BUPRENORPHINE, QUANTITATIVE, URINE: 13.2
CHLORDIAZEPOXIDE, QUANTITATIVE, URINE: <50
CLONAZEPAM, QUANTITATIVE, URINE: <50
CODEINE, QUANTITATIVE, URINE: <50
COMMENT: NORMAL
DIAZEPAM URINE QUANT: <50
FLUNITRAZEPAM, QUANTITATIVE, URINE: <50
FLURAZEPAM, QUANTITATIVE, URINE: <50
HYDROCODONE, QUANTITATIVE, URINE: <50
HYDROMORPHONE, QUANTITATIVE, URINE: <50
IGA: 173 MG/DL (ref 70–400)
LORAZEPAM URINE QUANT: <50
MIDAZOLAM URINE QUANT: <50
MORPHINE, QUANTITATIVE, URINE: <50
NORBUPRENORPHINE, QUANTITATIVE, URINE: 14
NORDIAZEPAM URINE QUANT: <50
NORHYDROCODONE, QUANTITATIVE, URINE: <50
NOROXYCODONE, QUANTITATIVE, URINE: <50
OXAZEPAM URINE QUANT: <50
OXYCODONE URINE, QUANTITATIVE: <50
OXYMORPHONE, QUANTITATIVE, URINE: <50
TEMAZEPAM, QUANTITATIVE, URINE: <50

## 2022-07-18 ENCOUNTER — TELEPHONE (OUTPATIENT)
Dept: SLEEP CENTER | Age: 67
End: 2022-07-18

## 2022-07-19 LAB — TISSUE TRANSGLUTAMINASE IGA: 0.6 U/ML

## 2022-07-21 ENCOUNTER — PREP FOR PROCEDURE (OUTPATIENT)
Dept: PAIN MANAGEMENT | Age: 67
End: 2022-07-21

## 2022-07-21 ENCOUNTER — OFFICE VISIT (OUTPATIENT)
Dept: FAMILY MEDICINE CLINIC | Age: 67
End: 2022-07-21
Payer: MEDICARE

## 2022-07-21 VITALS
TEMPERATURE: 98.2 F | HEIGHT: 60 IN | RESPIRATION RATE: 16 BRPM | WEIGHT: 211 LBS | BODY MASS INDEX: 41.43 KG/M2 | OXYGEN SATURATION: 95 % | HEART RATE: 73 BPM | SYSTOLIC BLOOD PRESSURE: 124 MMHG | DIASTOLIC BLOOD PRESSURE: 68 MMHG

## 2022-07-21 DIAGNOSIS — Z91.81 AT HIGH RISK FOR FALLS: ICD-10-CM

## 2022-07-21 DIAGNOSIS — Z13.820 ENCOUNTER FOR OSTEOPOROSIS SCREENING IN ASYMPTOMATIC POSTMENOPAUSAL PATIENT: ICD-10-CM

## 2022-07-21 DIAGNOSIS — Z23 NEED FOR SHINGLES VACCINE: ICD-10-CM

## 2022-07-21 DIAGNOSIS — E11.9 TYPE 2 DIABETES MELLITUS WITHOUT COMPLICATION, WITHOUT LONG-TERM CURRENT USE OF INSULIN (HCC): Primary | ICD-10-CM

## 2022-07-21 DIAGNOSIS — E03.9 ACQUIRED HYPOTHYROIDISM: ICD-10-CM

## 2022-07-21 DIAGNOSIS — E78.1 HYPERTRIGLYCERIDEMIA: ICD-10-CM

## 2022-07-21 DIAGNOSIS — Z78.0 ENCOUNTER FOR OSTEOPOROSIS SCREENING IN ASYMPTOMATIC POSTMENOPAUSAL PATIENT: ICD-10-CM

## 2022-07-21 DIAGNOSIS — I10 ESSENTIAL HYPERTENSION: ICD-10-CM

## 2022-07-21 DIAGNOSIS — Z23 NEED FOR VACCINATION AGAINST STREPTOCOCCUS PNEUMONIAE: ICD-10-CM

## 2022-07-21 PROCEDURE — 2022F DILAT RTA XM EVC RTNOPTHY: CPT | Performed by: NURSE PRACTITIONER

## 2022-07-21 PROCEDURE — 3051F HG A1C>EQUAL 7.0%<8.0%: CPT | Performed by: NURSE PRACTITIONER

## 2022-07-21 PROCEDURE — 90677 PCV20 VACCINE IM: CPT | Performed by: NURSE PRACTITIONER

## 2022-07-21 PROCEDURE — 3017F COLORECTAL CA SCREEN DOC REV: CPT | Performed by: NURSE PRACTITIONER

## 2022-07-21 PROCEDURE — G8400 PT W/DXA NO RESULTS DOC: HCPCS | Performed by: NURSE PRACTITIONER

## 2022-07-21 PROCEDURE — 1036F TOBACCO NON-USER: CPT | Performed by: NURSE PRACTITIONER

## 2022-07-21 PROCEDURE — G8417 CALC BMI ABV UP PARAM F/U: HCPCS | Performed by: NURSE PRACTITIONER

## 2022-07-21 PROCEDURE — 1090F PRES/ABSN URINE INCON ASSESS: CPT | Performed by: NURSE PRACTITIONER

## 2022-07-21 PROCEDURE — G8427 DOCREV CUR MEDS BY ELIG CLIN: HCPCS | Performed by: NURSE PRACTITIONER

## 2022-07-21 PROCEDURE — 99214 OFFICE O/P EST MOD 30 MIN: CPT | Performed by: NURSE PRACTITIONER

## 2022-07-21 PROCEDURE — 1123F ACP DISCUSS/DSCN MKR DOCD: CPT | Performed by: NURSE PRACTITIONER

## 2022-07-21 RX ORDER — LEVOTHYROXINE SODIUM 0.03 MG/1
25 TABLET ORAL DAILY
Qty: 90 TABLET | Refills: 1 | Status: SHIPPED
Start: 2022-07-21 | End: 2022-11-02 | Stop reason: SDUPTHER

## 2022-07-21 RX ORDER — EZETIMIBE 10 MG/1
10 TABLET ORAL DAILY
Qty: 90 TABLET | Refills: 1 | Status: CANCELLED | OUTPATIENT
Start: 2022-07-21

## 2022-07-21 RX ORDER — ATENOLOL 50 MG/1
50 TABLET ORAL DAILY
Qty: 90 TABLET | Refills: 3 | Status: SHIPPED | OUTPATIENT
Start: 2022-07-21

## 2022-07-21 RX ORDER — ZOSTER VACCINE RECOMBINANT, ADJUVANTED 50 MCG/0.5
0.5 KIT INTRAMUSCULAR SEE ADMIN INSTRUCTIONS
Qty: 0.5 ML | Refills: 1 | Status: SHIPPED | OUTPATIENT
Start: 2022-07-21 | End: 2023-01-17

## 2022-07-21 ASSESSMENT — PATIENT HEALTH QUESTIONNAIRE - PHQ9
SUM OF ALL RESPONSES TO PHQ9 QUESTIONS 1 & 2: 0
2. FEELING DOWN, DEPRESSED OR HOPELESS: 0
SUM OF ALL RESPONSES TO PHQ QUESTIONS 1-9: 0
SUM OF ALL RESPONSES TO PHQ QUESTIONS 1-9: 0
1. LITTLE INTEREST OR PLEASURE IN DOING THINGS: 0
SUM OF ALL RESPONSES TO PHQ QUESTIONS 1-9: 0
SUM OF ALL RESPONSES TO PHQ QUESTIONS 1-9: 0

## 2022-07-21 ASSESSMENT — ENCOUNTER SYMPTOMS
ABDOMINAL PAIN: 0
RHINORRHEA: 0
CHEST TIGHTNESS: 0
SINUS PRESSURE: 0
DIARRHEA: 0
BACK PAIN: 1
VOMITING: 0
WHEEZING: 0
FACIAL SWELLING: 0
NAUSEA: 0
SINUS PAIN: 0
SORE THROAT: 0
CONSTIPATION: 0
VOICE CHANGE: 0
SHORTNESS OF BREATH: 0
COLOR CHANGE: 0
COUGH: 0
TROUBLE SWALLOWING: 0

## 2022-07-21 ASSESSMENT — LIFESTYLE VARIABLES
HOW MANY STANDARD DRINKS CONTAINING ALCOHOL DO YOU HAVE ON A TYPICAL DAY: PATIENT DOES NOT DRINK
HOW OFTEN DO YOU HAVE A DRINK CONTAINING ALCOHOL: NEVER

## 2022-07-21 NOTE — PROGRESS NOTES
OFFICE PROGRESS NOTE  [address]  [address] [facility] [address]  Dept: [phone]   Chief Complaint   Patient presents with    Diabetes     Last a1c done on may 8th    Hypertension    Health Maintenance     Due for shingles, pneu, dexa       ASSESSMENT/PLAN   1. Type 2 diabetes mellitus without complication, without long-term current use of insulin (Formerly [facility])  Assessment & Plan:   borderline controlled and A1c 7.1% increase januvia 100 mg daily. Monitor BS at different times: 1 day fasting, then next day 2 hours after lunch, next day 2 hours after dinner, next day at bedtime then start over and log all values. Bring log to next appointment  Foot exam every day; wash and dry well between toes, look for any redness, cracks, wounds notify provider if any problems occur  Reminder for annual Eye exam  Reminder for Podiatry visits Q 2 Months for toenail care if needed  Reminder to keep vaccines up dated  Exercise 30 minutes daily  Recommend Diabetic Education Classes if you have not already attended  Orders:  -     SITagliptin (JANUVIA) 100 MG tablet; Take 1 tablet by mouth in the morning., Disp-90 tablet, R-1Note increase in doseNormal  2. Hypertriglyceridemia  Assessment & Plan:   poorly controlled and Neurology changed from Crestor to Atorvastatin not sure why patient sent my charge message she is to continue Zetia 10 mg nightly and continue atorvastatin 10 mg and recheck labs in 8 weeks. Triglyerides well over 200, with LDL 33   -Discussed low fat diet, limit fast food, goodies, breads and pastas if consuming several days a week,  limit any alcohol consumption.  -Discussed weight reduction and exercise 30 minutes 5 days a week for total of 150 minutes weekly.  -Discussed if any unusual muscle aching/pain to contact the office, discussed medication and risk of muscle pain/damage from Rhabdomyolysis. -Discussed repeat labs in 8 weeks.   Orders:  -     Lipid Panel; Future  3. Encounter for osteoporosis screening in asymptomatic postmenopausal patient  Assessment & Plan:   Screening for osteoporosis, no calcium day of test and wear elastic pants, call [facility] [phone] to schedule. Orders:  -     DEXA BONE DENSITY AXIAL SKELETON; Future  4. Need for vaccination against Streptococcus pneumoniae  Assessment & Plan:   About half of people who get PPSV have mild side effects, such as redness or pain where the shot is given, which go away within about two days. Less than 1 out of 100 people develop a fever, muscle aches, or more severe local reactions. Orders:  -     Pneumococcal, PCV20, PREVNAR 20, (age 25 yrs+), IM, PF  5. Need for shingles vaccine  Assessment & Plan:   Due for second shingrex vaccine,  .shinglesvacc  A sore arm with mild or moderate pain is very common after recombinant shingles vaccine, affecting about 80% of vaccinated people. Redness and swelling can also happen at the site of the injection. Tiredness, muscle pain, headache, shivering, fever, stomach pain, and nausea happen after vaccination in more than half of people who receive recombinant shingles vaccine. Orders:  -     zoster recombinant adjuvanted vaccine [facility]) 50 MCG/0.5ML SUSR injection; Inject 0.5 mLs into the muscle See Admin Instructions 1 dose now and repeat in 2-6 months, Disp-0.5 mL, R-1Print  6. At high risk for falls  Assessment & Plan: On the basis of positive falls risk screening, assessment and plan is as follows: in-office gait and balance testing performed using The Timed Up and Go Test was negative for increased falls risk- no further intervention is currently indicated. 7. Essential hypertension  -     atenolol (TENORMIN) 50 MG tablet; Take 1 tablet by mouth in the morning., Disp-90 tablet, R-3Normal  8. Acquired hypothyroidism  -     levothyroxine (SYNTHROID) 25 MCG tablet; Take 1 tablet by mouth in the morning.  On empty stomach., Disp-90 tablet, R-1Normal       Reviewed labs: BMP, CBC, Lipids, A1c 5/8/22  Reviewed notes from [address] [place] [address] Neurology  Reviewed radiology Echo normal    Discussed weight loss, Discussed exercising 30 minutes daily, and Discussed taking medications as directed and adverse effects    Return in about 4 months (around 11/21/2022) for DM, HTN, hyperlipidemia, hypothyroid. HPI:   74 y.o. female presents today for follow-up of diabetes [place]. Last A1c 5/8/22 7.1 I increase the Januvia to 50 mg daily but in review of her BS log not much has changed and no recent steroid injections. She did see pain management DR Beth Hernandez and is planned to get a injection into her left hip. Patient reports being compliant to low carbohydrate diet and increasing weekly exercises on some days. Currently, the patient is treated with medication(s): Januvia 50 mg daily. Patient reports checking home blood sugar 1 times per day. Patient states home blood sugar ranges from 122 - 171. Patient denies hypoglycemic episodes and understand to take sweetened beverages or a snack if hypoglycemic symptoms are suspected. She  seeing Dr Susan Murphy at [facility] neurology and had EEG, Zio monitor, She changed the crestor to atorvastatin     Hyperlipidemia: Patient presents with hyperlipidemia. She was tested because HTN, DM, Hyperlipidemia added Zetia 10 mg to the atorvastatin 10 mg and she has had improvement in her triglycerides. Her last labs 5/8/22 showed Total cholesterol of 127 HDL 39 LDL 33,  Triglycerides 262 up form 218 in March. Denies  chest pain, dyspnea, exertional chest pressure/discomfort, fatigue, feeding intolerance, lower extremity edema, palpitations, poor exercise tolerance, syncope, tachypnea and skin xanthelasma. There is not a family history of hyperlipidemia. There is a family history of early ischemia heart disease.      [facility] - 07/20/2022 2:42 PM EDT    CONCLUSIONS:  - Exam indication: TIA  - The left ventricle is normal in size. Left ventricular systolic function is   normal. EF = 69 ± 5% (2D biplane) Normal left ventricular diastolic function. Normal GLS: -16.3%. - The right ventricle is normal in size. Right ventricular systolic function is   normal.  - Estimated right ventricular systolic pressure is likely underestimated due to a   weak or incomplete tricuspid regurgitation signal and is, at least, 23 mmHg   consistent with normal pulmonary artery pressures. Estimated right atrial pressure  is 3 mmHg based on IVC assessment. - There is no patent foramen ovale as detected by Doppler and saline contrast with  valsalva. - There are no significant valvular abnormalities. - The patient has not had a prior CC echocardiographic exam for comparison. Electronically signed by Glenn Ratliff MD on 7/20/2022 at 2:42:25 PM     Today's vital signs are as follows:  /68   Pulse 73   Temp 98.2 °F (36.8 °C)   Resp 16   Ht 5' (1.524 m)   Wt 211 lb (95.7 kg)   LMP  (LMP Unknown)   SpO2 95%   BMI 41.21 kg/m²     Lab Results   Component Value Date    LABA1C 7.1 (H) 05/08/2022             Current Outpatient Medications:     zoster recombinant adjuvanted vaccine (SHINGRIX) 50 MCG/0.5ML SUSR injection, Inject 0.5 mLs into the muscle See Admin Instructions 1 dose now and repeat in 2-6 months, Disp: 0.5 mL, Rfl: 1    SITagliptin (JANUVIA) 100 MG tablet, Take 1 tablet by mouth in the morning., Disp: 90 tablet, Rfl: 1    levothyroxine (SYNTHROID) 25 MCG tablet, Take 1 tablet by mouth in the morning. On empty stomach., Disp: 90 tablet, Rfl: 1    atenolol (TENORMIN) 50 MG tablet, Take 1 tablet by mouth in the morning., Disp: 90 tablet, Rfl: 3    buprenorphine (BUPRENEX) 10 MCG/HR PTWK, , Disp: , Rfl:     atorvastatin (LIPITOR) 10 MG tablet, Take 10 mg by mouth daily, Disp: , Rfl:     allopurinol (ZYLOPRIM) 300 MG tablet, Take one (1) tablet by mouth once daily. , Disp: 90 tablet, Rfl: 0    aspirin 81 MG EC tablet, Take 1 tablet by mouth daily, Disp: 30 tablet, Rfl: 3    ezetimibe (ZETIA) 10 MG tablet, Take 1 tablet by mouth daily, Disp: 90 tablet, Rfl: 1    meloxicam (MOBIC) 15 MG tablet, , Disp: , Rfl:     acetaminophen (TYLENOL) 500 MG tablet, Take 500 mg by mouth every 6 hours as needed for Pain, Disp: , Rfl:     PROCTOZONE-HC 2.5 % CREA rectal cream, Apply 4 times daily as needed, Disp: 1 Tube, Rfl: 3    Cyanocobalamin (B-12 PO), Take by mouth , Disp: , Rfl:     Blood Glucose Monitoring Suppl (ACCU-CHEK GUIDE) w/Device KIT, , Disp: , Rfl:     Alcohol Swabs (PHARMACIST CHOICE ALCOHOL) PADS, , Disp: , Rfl:     Blood Glucose Calibration (ACCU-CHEK GUIDE CONTROL) LIQD, , Disp: , Rfl:     diclofenac sodium (VOLTAREN) 1 % GEL, Apply topically 2 times daily, Disp: 100 g, Rfl: 2    Handicap Placard MISC, by Does not apply route Start 2020 expires 2023, Disp: 1 each, Rfl: 0    blood glucose monitor strips, Test 1 times a day & as needed for symptoms of irregular blood glucose., Disp: 50 strip, Rfl: 11    triamcinolone (KENALOG) 0.1 % cream, MARY EXT AA 2 TO 3 TIMES PER DAY UNTIL RESOLVED, Disp: , Rfl:     Cholecalciferol (VITAMIN D3) 2000 units CAPS, Take 2,000 Units by mouth daily, Disp: , Rfl:       Surgical History:  has a past surgical history that includes  section; Tubal ligation; Tonsillectomy (28 YRS OLD); Foot surgery (Bilateral); shoulder surgery (Left, ); Cholecystectomy; Colon surgery; Nerve Block (Right, 2017); Nerve Block (Left, 2018); pr inject si joint arthrgrphy&/anes/steroid w/image (Left, 2018); epidural steroid injection (Right, 2019); Nerve Block (Bilateral, 08/15/2019); Anesthesia Nerve Block (Bilateral, 8/15/2019); Dilation and curettage of uterus (N/A, 10/23/2019); and Lumbar spine surgery (N/A, 2020). Social History:  reports that she has never smoked. She has never used smokeless tobacco. She reports that she does not currently use alcohol.  She reports that she does not use drugs. Family History: family history includes Asthma in her mother; Cancer in her mother; Heart Disease in her father; Mental Illness in her mother; Thyroid Disease in her sister and sister. I have reviewed Margaret's allergies, medications, problem list, medical, social and family history and have updated as needed in the electronic medical record    Review of Systems   Constitutional:  Negative for activity change, appetite change, chills, diaphoresis, fatigue, fever and unexpected weight change. HENT:  Negative for congestion, dental problem, drooling, ear discharge, ear pain, facial swelling, hearing loss, mouth sores, nosebleeds, postnasal drip, rhinorrhea, sinus pressure, sinus pain, sneezing, sore throat, tinnitus, trouble swallowing and voice change. Eyes:  Negative for visual disturbance. Respiratory:  Negative for cough, chest tightness, shortness of breath and wheezing. Cardiovascular:  Negative for chest pain, palpitations and leg swelling. Gastrointestinal:  Negative for abdominal pain, constipation, diarrhea, nausea and vomiting. Endocrine: Negative for cold intolerance, heat intolerance, polydipsia, polyphagia and polyuria. Genitourinary:  Negative for difficulty urinating, frequency and urgency. Musculoskeletal:  Positive for arthralgias and back pain. Negative for gait problem, joint swelling, myalgias, neck pain and neck stiffness. Skin:  Negative for color change, pallor, rash and wound. Allergic/Immunologic: Negative for environmental allergies, food allergies and immunocompromised state. Neurological:  Negative for dizziness, tremors, seizures, syncope, facial asymmetry, speech difficulty, weakness, light-headedness, numbness and headaches. Hematological:  Negative for adenopathy. Does not bruise/bleed easily.    Psychiatric/Behavioral:  Negative for agitation, behavioral problems, confusion, decreased concentration, dysphoric mood, hallucinations, self-injury, sleep disturbance and suicidal ideas. The patient is not nervous/anxious and is not hyperactive. OBJECTIVE:     VS:  Wt Readings from Last 3 Encounters:   07/21/22 211 lb (95.7 kg)   07/13/22 212 lb 14.4 oz (96.6 kg)   07/12/22 195 lb (88.5 kg)                       Vitals:    07/21/22 1356   BP: 124/68   Pulse: 73   Resp: 16   Temp: 98.2 °F (36.8 °C)   SpO2: 95%   Weight: 211 lb (95.7 kg)   Height: 5' (1.524 m)       General: Alert and oriented to person, place, and time, well developed and well nourished, in no acute distress  SKIN: Warm and dry, intact without any rash, masses or lesions  HEAD: normocephalic, atraumatic  Neck: supple and non-tender without mass, trachea midline, no cervical lymphadenopathy, no bruit, no thyromegaly or nodules  Cardiovascular: regular rate and regular rhythm, normal S1 and S2,  no murmurs, rubs, clicks, or gallop. Distal pulses intact, no carotid bruits. No edema  Pulmonary/Chest: clear to auscultation bilaterally, no wheezes, rales or rhonchi, normal air movement, no respiratory distress  Abdomen: soft, non-tender, non-distended, normal bowel sounds, no masses or hepatosplenomegaly  Musculoskeletal: Normal ROM, no joint swelling, deformity or tenderness   Neurologic:  gait, coordination and speech normal  Extremities: no clubbing, cyanosis, or edema. Psychiatric: Good eye contact, normal mood and affect, answers questions appropriately    I have reviewed my findings and recommendations with Zo Long.     Della Miranda, APRN - CNP, NP-C, FNP-BC

## 2022-07-22 PROBLEM — Z78.0 ENCOUNTER FOR OSTEOPOROSIS SCREENING IN ASYMPTOMATIC POSTMENOPAUSAL PATIENT: Status: ACTIVE | Noted: 2022-07-22

## 2022-07-22 PROBLEM — Z91.81 AT HIGH RISK FOR FALLS: Status: ACTIVE | Noted: 2022-07-22

## 2022-07-22 PROBLEM — Z23 NEED FOR SHINGLES VACCINE: Status: ACTIVE | Noted: 2022-07-22

## 2022-07-22 PROBLEM — Z13.820 ENCOUNTER FOR OSTEOPOROSIS SCREENING IN ASYMPTOMATIC POSTMENOPAUSAL PATIENT: Status: ACTIVE | Noted: 2022-07-22

## 2022-07-22 PROBLEM — Z23 NEED FOR VACCINATION AGAINST STREPTOCOCCUS PNEUMONIAE: Status: ACTIVE | Noted: 2022-07-22

## 2022-07-22 NOTE — ASSESSMENT & PLAN NOTE
About half of people who get PPSV have mild side effects, such as redness or pain where the shot is given, which go away within about two days. Less than 1 out of 100 people develop a fever, muscle aches, or more severe local reactions.

## 2022-07-22 NOTE — ASSESSMENT & PLAN NOTE
Screening for osteoporosis, no calcium day of test and wear elastic pants, call [facility] [phone] to schedule.

## 2022-07-22 NOTE — ASSESSMENT & PLAN NOTE
Due for second shingrex vaccine,  .shinglesvacc  · A sore arm with mild or moderate pain is very common after recombinant shingles vaccine, affecting about 80% of vaccinated people. Redness and swelling can also happen at the site of the injection. · Tiredness, muscle pain, headache, shivering, fever, stomach pain, and nausea happen after vaccination in more than half of people who receive recombinant shingles vaccine.

## 2022-07-22 NOTE — ASSESSMENT & PLAN NOTE
poorly controlled and Neurology changed from Crestor to Atorvastatin not sure why patient sent my charge message she is to continue Zetia 10 mg nightly and continue atorvastatin 10 mg and recheck labs in 8 weeks. Triglyerides well over 200, with LDL 33   -Discussed low fat diet, limit fast food, goodies, breads and pastas if consuming several days a week,  limit any alcohol consumption.  -Discussed weight reduction and exercise 30 minutes 5 days a week for total of 150 minutes weekly.  -Discussed if any unusual muscle aching/pain to contact the office, discussed medication and risk of muscle pain/damage from Rhabdomyolysis. -Discussed repeat labs in 8 weeks.

## 2022-07-22 NOTE — ASSESSMENT & PLAN NOTE
borderline controlled and A1c 7.1% increase januvia 100 mg daily. Monitor BS at different times: 1 day fasting, then next day 2 hours after lunch, next day 2 hours after dinner, next day at bedtime then start over and log all values.   Bring log to next appointment  Foot exam every day; wash and dry well between toes, look for any redness, cracks, wounds notify provider if any problems occur  Reminder for annual Eye exam  Reminder for Podiatry visits Q 2 Months for toenail care if needed  Reminder to keep vaccines up dated  Exercise 30 minutes daily  Recommend Diabetic Education Classes if you have not already attended

## 2022-07-23 ENCOUNTER — HOSPITAL ENCOUNTER (OUTPATIENT)
Dept: SLEEP CENTER | Age: 67
Discharge: HOME | End: 2022-07-23

## 2022-07-23 DIAGNOSIS — G47.33 OSA (OBSTRUCTIVE SLEEP APNEA): Primary | ICD-10-CM

## 2022-07-25 ENCOUNTER — TELEPHONE (OUTPATIENT)
Dept: PAIN MANAGEMENT | Age: 67
End: 2022-07-25

## 2022-07-25 NOTE — TELEPHONE ENCOUNTER
Call to Leonard  that procedure was approved for 8/2/2022 and that the surgery center should call her a few days before for the pre op call and after 3:00 PM the business day before with the arrival time. Instructed Heriberto Silva to hold ibuprofen for 24 hours, naprosyn, mobic for 4 days (last dose 7/28/2022) and any aspirin containing products or fish oil for 7 days, last dose 7/25/2022. Instructed to call office back if any questions. Heriberto Silva verbalized understanding.      Ralph Rush RN  Pain Management

## 2022-08-01 NOTE — PROGRESS NOTES
[facility]  INSTRUCTIONS  . .......................................................................................................................................... [x] Parking the day of Surgery is located in the [facility].   Upon entering the door, make immediate right into the surgery reception room    [x]  Bring photo ID and insurance card     [x] You may have a light breakfast day of procedure    [x]  Wear loose comfortable clothing    [x]  Please follow instructions for medications as given per Dr's office    [x] You can expect a call the business day prior to procedure to notify you of your arrival time     [x] Please arrange for     []  Other instructions

## 2022-08-02 ENCOUNTER — HOSPITAL ENCOUNTER (OUTPATIENT)
Dept: GENERAL RADIOLOGY | Age: 67
Discharge: HOME | End: 2022-08-04
Attending: PAIN MEDICINE
Payer: MEDICARE

## 2022-08-02 ENCOUNTER — HOSPITAL ENCOUNTER (OUTPATIENT)
Age: 67
Setting detail: OUTPATIENT SURGERY
Discharge: HOME | End: 2022-08-02
Attending: PAIN MEDICINE | Admitting: PAIN MEDICINE
Payer: MEDICARE

## 2022-08-02 VITALS
OXYGEN SATURATION: 98 % | TEMPERATURE: 97.2 F | BODY MASS INDEX: 39.27 KG/M2 | HEIGHT: 60 IN | HEART RATE: 72 BPM | WEIGHT: 200 LBS | RESPIRATION RATE: 16 BRPM | DIASTOLIC BLOOD PRESSURE: 75 MMHG | SYSTOLIC BLOOD PRESSURE: 144 MMHG

## 2022-08-02 DIAGNOSIS — R52 PAIN MANAGEMENT: ICD-10-CM

## 2022-08-02 LAB — METER GLUCOSE: 166 MG/DL (ref 74–99)

## 2022-08-02 PROCEDURE — 7100000011 HC PHASE II RECOVERY - ADDTL 15 MIN: Performed by: PAIN MEDICINE

## 2022-08-02 PROCEDURE — 6360000004 HC RX CONTRAST MEDICATION: Performed by: PAIN MEDICINE

## 2022-08-02 PROCEDURE — 7100000010 HC PHASE II RECOVERY - FIRST 15 MIN: Performed by: PAIN MEDICINE

## 2022-08-02 PROCEDURE — 3209999900 FLUORO FOR SURGICAL PROCEDURES

## 2022-08-02 PROCEDURE — 2709999900 HC NON-CHARGEABLE SUPPLY: Performed by: PAIN MEDICINE

## 2022-08-02 PROCEDURE — 27096 INJECT SACROILIAC JOINT: CPT | Performed by: PAIN MEDICINE

## 2022-08-02 PROCEDURE — 3600000002 HC SURGERY LEVEL 2 BASE: Performed by: PAIN MEDICINE

## 2022-08-02 PROCEDURE — 6360000002 HC RX W HCPCS: Performed by: PAIN MEDICINE

## 2022-08-02 PROCEDURE — 2500000003 HC RX 250 WO HCPCS: Performed by: PAIN MEDICINE

## 2022-08-02 PROCEDURE — 82962 GLUCOSE BLOOD TEST: CPT

## 2022-08-02 RX ORDER — METHYLPREDNISOLONE ACETATE 40 MG/ML
INJECTION, SUSPENSION INTRA-ARTICULAR; INTRALESIONAL; INTRAMUSCULAR; SOFT TISSUE PRN
Status: DISCONTINUED | OUTPATIENT
Start: 2022-08-02 | End: 2022-08-02 | Stop reason: ALTCHOICE

## 2022-08-02 RX ORDER — BUPIVACAINE HYDROCHLORIDE 2.5 MG/ML
INJECTION, SOLUTION EPIDURAL; INFILTRATION; INTRACAUDAL PRN
Status: DISCONTINUED | OUTPATIENT
Start: 2022-08-02 | End: 2022-08-02 | Stop reason: ALTCHOICE

## 2022-08-02 RX ORDER — LIDOCAINE HYDROCHLORIDE 5 MG/ML
INJECTION, SOLUTION INFILTRATION; INTRAVENOUS PRN
Status: DISCONTINUED | OUTPATIENT
Start: 2022-08-02 | End: 2022-08-02 | Stop reason: ALTCHOICE

## 2022-08-02 ASSESSMENT — PAIN SCALES - GENERAL
PAINLEVEL_OUTOF10: 0
PAINLEVEL_OUTOF10: 0

## 2022-08-02 ASSESSMENT — PAIN - FUNCTIONAL ASSESSMENT: PAIN_FUNCTIONAL_ASSESSMENT: 0-10

## 2022-08-02 ASSESSMENT — PAIN DESCRIPTION - DESCRIPTORS: DESCRIPTORS: ACHING;NUMBNESS;TIGHTNESS

## 2022-08-02 NOTE — H&P
[facility]        [address] [facility], [address] [facility]  Dept: [phone]    Procedure History & Physical      Sara Malin     HPI:    Patient  is here for left LBP for left SIJ injection  Labs/imaging studies reviewed   All question and concerns addressed including R/B/A associated with the procedure    Past Medical History:   Diagnosis Date    Acute left ankle pain 06/01/2020    Acute left lumbar radiculopathy 12/28/2017    Cancer ([address] 2017    colon (treated surgically)    Chronic back pain     Chronic deep vein thrombosis (DVT) of distal vein of left lower extremity ([address] 02/01/2021    Diabetes mellitus ([address]     Drug-induced constipation 02/18/2020    Eosinophil count raised 11/20/2019    Fall at home 12/11/2017    Fatigue 06/08/2016    Fibromyalgia     GERD (gastroesophageal reflux disease)     Hair loss 05/03/2021    Hemorrhoids     Hypercalcemia 11/20/2019    Hyperlipidemia     Hypertension     Malignant neoplasm of sigmoid colon ([facility] 06/17/2020    Obesity     Osteoarthritis     PONV (postoperative nausea and vomiting)     Postmenopausal bleeding 10/05/2017    Rib contusion, left, initial encounter 02/18/2020    TIA (transient ischemic attack) 05/2022    Urinary incontinence     Urinary retention 12/21/2020    Weakness of both legs 12/21/2020       Past Surgical History:   Procedure Laterality Date    ANESTHESIA NERVE BLOCK Bilateral 08/15/2019    BILATERAL INTRA-ARTICULAR FACET JOINT INJECTION WITH FLUOROSCOPIC GUIDANCE AT L4-5, L5-S1 WITH IV SEDATION performed by Angelica Cadena DO at [address] [facility] N/A 10/23/2019    DILATATION AND CURETTAGE HYSTEROSCOPY performed by Antoni Ruth MD at [address] Right 07/11/2019    C7-T1 EPIDURAL STEROID INJECTION #1 TOWARD THE RIGHT performed by Roc Kenney DO at [place] [id number] Bilateral LUMBAR SPINE SURGERY N/A 12/24/2020    L4-L5  POSTERIOR LUMBAR INTERBODY FUSION, T2-T3 DECOMPRESSIVE LAMINECTOMY performed by Yenni Collier MD at [address] [facility] [address] Right 12/28/2017    right L4-5 transforaminal epidural #1    NERVE BLOCK Left 11/29/2018    si inj    NERVE BLOCK Bilateral 08/15/2019    bilateral intra-articular facet joint injection with flouroscopic guidance at L4-S1 with iv sedation    VT INJECT SI JOINT ARTHRGRPHY&/ANES/STEROID W/IMAGE Left 11/29/2018    LEFT SACROILIAC JOINT INJECTION WITH X-RAY performed by Alok Hill DO at [facility] [address] Left 2005    STEROID INJECTION KNEE Left 02/2022    TONSILLECTOMY  28 YRS OLD    TUBAL LIGATION         Prior to Admission medications    Medication Sig Start Date End Date Taking? Authorizing Provider   zoster recombinant adjuvanted vaccine [facility]) 50 MCG/0.5ML SUSR injection Inject 0.5 mLs into the muscle See Admin Instructions 1 dose now and repeat in 2-6 months 7/21/22 1/17/23  VISHNU Horvath CNP   SITagliptin (JANUVIA) 100 MG tablet Take 1 tablet by mouth in the morning. 7/21/22   VISHNU Horvath CNP   levothyroxine (SYNTHROID) 25 MCG tablet Take 1 tablet by mouth in the morning. On empty stomach. 7/21/22   VISHNU Horvath CNP   atenolol (TENORMIN) 50 MG tablet Take 1 tablet by mouth in the morning. 7/21/22   VISHNU Horvath CNP   buprenorphine [facility]) 10 MCG/HR [address] [facility]  6/20/22   Historical Provider, MD   atorvastatin (LIPITOR) 10 MG tablet Take 10 mg by mouth daily    Historical Provider, MD   allopurinol (ZYLOPRIM) 300 MG tablet Take one (1) tablet by mouth once daily.  6/24/22   VISHNU Horvath CNP   aspirin 81 MG EC tablet Take 1 tablet by mouth daily 5/10/22   Lupe Man MD   ezetimibe (ZETIA) 10 MG tablet Take 1 tablet by mouth daily 4/4/22   VISHNU Horvath CNP   meloxicam (MOBIC) 15 MG tablet  12/6/21   Historical Provider, MD   acetaminophen (TYLENOL) 500 MG tablet Take 500 mg by mouth every 6 hours as needed for Pain    Historical Provider, MD   PROCTOZONE-HC 2.5 % CREA rectal cream Apply 4 times daily as needed 8/5/21   Joelle Turcios MD   Cyanocobalamin (B-12 PO) Take by mouth     Historical Provider, MD   Blood Glucose Monitoring Suppl (ACCU-CHEK GUIDE) w/Device KIT  3/8/21   Historical Provider, MD   Alcohol Swabs (PHARMACIST CHOICE ALCOHOL) PADS  3/8/21   Historical Provider, MD   Blood Glucose Calibration (ACCU-CHEK GUIDE CONTROL) LIQD  3/8/21   Historical Provider, MD   diclofenac sodium (VOLTAREN) 1 % GEL Apply topically 2 times daily 4/21/21   Scott Randolph MD   Handicap Placard MISC by Does not apply route Start 11/23/2020 expires 11/22/2023 11/23/20   VISHNU Sagastume CNP   blood glucose monitor strips Test 1 times a day & as needed for symptoms of irregular blood glucose.  6/17/20   VISHNU Sagastume CNP   Cholecalciferol (VITAMIN D3) 2000 units CAPS Take 2,000 Units by mouth daily    Historical Provider, MD       Allergies   Allergen Reactions    Diclofenac     Lisinopril Other (See Comments)     Profound malaise    Diclofenac Sodium Nausea And Vomiting    Farxiga [Dapagliflozin] Itching     Vaginal itching with Farxiga, Jardiance     Lyrica [Pregabalin] Other (See Comments)     Confusion    Sulfa Antibiotics Rash       Social History     Socioeconomic History    Marital status:      Spouse name: Not on file    Number of children: Not on file    Years of education: Not on file    Highest education level: Not on file   Occupational History    Not on file   Tobacco Use    Smoking status: Never    Smokeless tobacco: Never   Vaping Use    Vaping Use: Never used   Substance and Sexual Activity    Alcohol use: Yes     Comment: Rarely    Drug use: No    Sexual activity: Not on file   Other Topics Concern    Not on file   Social History Narrative    Not on file     Social Determinants of Health     Financial Resource Strain: Low Risk     Difficulty of Paying Living Expenses: Not hard at all   Food Insecurity: No Food Insecurity    Worried About [address] in the Last Year: Never true    [address] in the Last Year: Never true   Transportation Needs: No Transportation Needs    Lack of Transportation (Medical): No    Lack of Transportation (Non-Medical): No   Physical Activity: Not on file   Stress: Not on file   Social Connections: Not on file   Intimate Partner Violence: Not on file   Housing Stability: Low Risk     Unable to Pay for Housing in the Last Year: No    Number of Places Lived in the Last Year: 1    Unstable Housing in the Last Year: No       Family History   Problem Relation Age of Onset    Asthma Mother     Mental Illness Mother     Cancer Mother         kidney    Heart Disease Father         melanoma    Thyroid Disease Sister     Thyroid Disease Sister          REVIEW OF SYSTEMS:    CONSTITUTIONAL:  negative for  fevers, chills, sweats and fatigue    RESPIRATORY:  negative for  dry cough, cough with sputum, dyspnea, wheezing and chest pain    CARDIOVASCULAR:  negative for chest pain, dyspnea, palpitations, syncope    GASTROINTESTINAL:  negative for nausea, vomiting, change in bowel habits, diarrhea, constipation and abdominal pain    MUSCULOSKELETAL: negative for muscle weakness    SKIN: negative for itching or rashes.     BEHAVIOR/PSYCH:  negative for poor appetite, increased appetite, decreased sleep and poor concentration    All other systems negative      PHYSICAL EXAM:    VITALS:  BP (!) 168/80   Pulse 69   Temp 97.2 °F (36.2 °C)   Resp 18   Ht 5' (1.524 m)   Wt 200 lb (90.7 kg)   LMP  (LMP Unknown)   SpO2 98%   BMI 39.06 kg/m²     CONSTITUTIONAL:  awake, alert, cooperative, no apparent distress, and appears stated age    EYES: PERRLA, EOMI    LUNGS:  No increased work of breathing, no audible wheezing    CARDIOVASCULAR:  regular rate and rhythm    ABDOMEN:  Soft non tender non distended     EXTREMITIES: no signs of clubbing or cyanosis. MUSCULOSKELETAL: negative for flaccid muscle tone or spastic movements. SKIN: gross examination reveals no signs of rashes, or diaphoresis. NEURO: Cranial nerves II-XII grossly intact. No signs of agitated mood.        Assessment/Plan:    Left LB pain for left SIJ injection

## 2022-08-02 NOTE — OP NOTE
2022    Patient: Naomie Kelly  :  1955  Age:  74 y.o. Sex:  female     PRE-OPERATIVE DIAGNOSIS: Left   Sacroiliitis, somatic dysfunction of the lumbosacral spine. POST-OPERATIVE DIAGNOSIS: Same. PROCEDURE:  Fluoroscopic guided Left   sacroiliac joint injection with steroid (#1). SURGEON:  NORA Alcala. ANESTHESIA: local    ESTIMATED BLOOD LOSS: None.  __________________________________________________  BRIEF HISTORY:  Naomie Kelly comes in today for first Left sacroiliac joint injection under fluoroscopic guidance. The potential complications as well as the procedure in detail were explained to her today. She has elected to undergo the aforementioned procedure. Margaret's complete History & Physical examination were reviewed in depth, a copy of which is in the chart. DESCRIPTION OF PROCEDURE:    After confirming written and informed consent, a time-out was performed and Amado name and date of birth, the procedure to be performed as well as the plan for the location of the needle insertion were confirmed. The patient was brought into the procedure room and placed in the prone position on the fluoroscopy table. Standard monitors were placed and vital signs were observed throughout the procedure. The low back and upper buttocks area was prepped with chloraprep and draped in a sterile manner. AP fluoroscopy was used to visualize the sacroiliac joint. The fluoroscopic beam was then obliqued until the anterior and posterior margins of the inferior aspect of the joint were aligned. The inferior margin of the joint was identified and marked. The skin and subcutaneous tissue about this identified point were anesthestized with 0.5% lidocaine. A 22 gauge 3 1/2 spinal needle was advanced toward the the identified point, under fluoroscopic guidance.  Once the targeted point was reached and the joint space was entered, negative aspiration was confirmed, and 0.5 cc of Isovue-M 200 was injected. The joint space was appropriately outlined. Then, after negative aspiration, a solution consisiting of 0.25% marcaine 2 cc and 40 mg DepoMedrol was easily injected. The needle was then removed and the needle insertion site was covered with Band-Aid. Disposition the patient tolerated the procedure well and there were no complications . Vital signs remained stable throughout the procedure. The patient was escorted to the recovery area where they remained until discharge and written discharge instructions for the procedure were given. Plan: Darylene Paddy will return to our pain management center as scheduled.      Jeff Jones, DO

## 2022-08-02 NOTE — DISCHARGE INSTRUCTIONS
Belkis Bravo Block/Radiofrequency  Home Going Instructions    1-Go home, rest for the remainder of the day  2-Please do not lift over 20 pounds the day of the injection  3-If you received sedation No: alcohol, driving, operating lawn mowers, plows, tractors or other dangerous equipment until next morning. Do not make important decisions or sign legal documents for 24 hours. You may experience light headedness, dizziness, nausea or sleepiness after sedation. Do not stay alone. A responsible adult must be with you for 24 hours. You could be nauseated from the medications you have received. Your IV site may be sore and bruised. 4-No dietary restrictions     5-Resume all medications the same day, blood thinners to be resumed 24 hours after injection if you were instructed to stop any. 6-Keep the surgical site clean and dry, you may shower the next morning and remove the      dressing. 7- No sitz baths, tub baths or hot tubs/swimming for 24 hours. 8- If you have any pain at the injection site(s), application of an ice pack to the area should be       helpful, 20 minutes on/20 minutes off for next 48 hours. 9- Call [facility] immediately at if you develop.   Fever greater than 100.4 F  Have bleeding or drainage from the puncture site  Have progressive Leg/arm numbness and or weakness  Loss of control of bowel and or bladder (wet/soil yourself)  Severe headache with inability to lift head  10-You may return to work the next day

## 2022-08-12 ENCOUNTER — OFFICE VISIT (OUTPATIENT)
Dept: PAIN MANAGEMENT | Age: 67
End: 2022-08-12
Payer: MEDICARE

## 2022-08-12 VITALS
TEMPERATURE: 97.9 F | SYSTOLIC BLOOD PRESSURE: 143 MMHG | OXYGEN SATURATION: 96 % | HEIGHT: 60 IN | WEIGHT: 200 LBS | HEART RATE: 71 BPM | DIASTOLIC BLOOD PRESSURE: 79 MMHG | BODY MASS INDEX: 39.27 KG/M2 | RESPIRATION RATE: 16 BRPM

## 2022-08-12 DIAGNOSIS — M96.1 LUMBAR POST-LAMINECTOMY SYNDROME: ICD-10-CM

## 2022-08-12 DIAGNOSIS — G89.4 CHRONIC PAIN SYNDROME: Primary | ICD-10-CM

## 2022-08-12 DIAGNOSIS — M53.3 DISORDER OF SACRUM: ICD-10-CM

## 2022-08-12 DIAGNOSIS — M25.562 CHRONIC PAIN OF LEFT KNEE: ICD-10-CM

## 2022-08-12 DIAGNOSIS — M17.12 PRIMARY OSTEOARTHRITIS OF LEFT KNEE: ICD-10-CM

## 2022-08-12 DIAGNOSIS — G89.29 CHRONIC PAIN OF LEFT KNEE: ICD-10-CM

## 2022-08-12 PROCEDURE — 99213 OFFICE O/P EST LOW 20 MIN: CPT | Performed by: PAIN MEDICINE

## 2022-08-12 PROCEDURE — 3017F COLORECTAL CA SCREEN DOC REV: CPT | Performed by: PAIN MEDICINE

## 2022-08-12 PROCEDURE — 1090F PRES/ABSN URINE INCON ASSESS: CPT | Performed by: PAIN MEDICINE

## 2022-08-12 PROCEDURE — G8427 DOCREV CUR MEDS BY ELIG CLIN: HCPCS | Performed by: PAIN MEDICINE

## 2022-08-12 PROCEDURE — G8417 CALC BMI ABV UP PARAM F/U: HCPCS | Performed by: PAIN MEDICINE

## 2022-08-12 PROCEDURE — 1123F ACP DISCUSS/DSCN MKR DOCD: CPT | Performed by: PAIN MEDICINE

## 2022-08-12 PROCEDURE — 99214 OFFICE O/P EST MOD 30 MIN: CPT | Performed by: PAIN MEDICINE

## 2022-08-12 PROCEDURE — 1036F TOBACCO NON-USER: CPT | Performed by: PAIN MEDICINE

## 2022-08-12 PROCEDURE — G8400 PT W/DXA NO RESULTS DOC: HCPCS | Performed by: PAIN MEDICINE

## 2022-08-12 RX ORDER — BUPRENORPHINE 10 UG/H
1 PATCH TRANSDERMAL WEEKLY
Qty: 4 PATCH | Refills: 1 | Status: SHIPPED
Start: 2022-08-18 | End: 2022-10-12 | Stop reason: SDUPTHER

## 2022-08-12 NOTE — PROGRESS NOTES
Do you currently have any of the following:    Fever: No  Headache:  No  Cough: No  Shortness of breath: No  Exposed to anyone with these symptoms: No         Eleazar May presents to the [address] [place] [address] on 8/12/2022. Ranjith Ramos is complaining of pain in her buttock. The pain is constant. The pain is described as aching. Pain is rated on her best day at a 3, on her worst day at a 6, and on average at a 5 on the VAS scale. She took her last dose of Butrans Patch and Tylenol today. Any procedures since your last visit: Yes, with 60 % relief. Pacemaker or defibrillator: No    She is  on NSAIDS and is  on anticoagulation medications to include ASA and is managed by VISHNU Latham CNP  . Medication Contract and Consent for Opioid Use Documents Filed       Patient Documents       Type of Document Status Date Received Received By Description    Medication Contract Received 6/21/2019  7:35 AM Candelaria CARR SARAH med contract    Medication Contract Received 3/26/2021 10:38 AM Yari Jarvis PAIN MGMT CONTRACT DR. RONDON                    BP (!) 143/79   Pulse 71   Temp 97.9 °F (36.6 °C) (Infrared)   Resp 16   Ht 5' (1.524 m)   Wt 200 lb (90.7 kg)   LMP  (LMP Unknown)   SpO2 96%   BMI 39.06 kg/m²      No LMP recorded (lmp unknown).  Patient is postmenopausal.

## 2022-08-12 NOTE — PROGRESS NOTES
[id number] [facility]        [address]  TRACY TODD [facility], [address] [place] [address]  Dept: [phone]        Follow up Note      Amy Harris     Date of Visit:  22     CC:  Patient presents for follow up   Chief Complaint   Patient presents with    Follow-up     Fluoroscopic guided Left   sacroiliac joint injection with steroid (#1). HPI:    Pain is better. Change in quality of symptoms:no. Medication side effects:none. Recent diagnostic testing:none. Recent interventional procedures:L SIJ injection with 65% relief that contniues. She has been on anticoagulation medications to include ASA and has not been on herbal supplements. She is diabetic. Imagin/2022 L knee xray -  RESULT:     Severe narrowing of the left knee medial joint compartment, similar to   prior examination with tricompartmental osteophytosis. Small to moderate   knee joint effusion. Ossific joint bodies posteriorly. No acute   fracture or dislocation. No other significant abnormality. IMPRESSION   IMPRESSION:     Stable moderately advanced left knee osteoarthritis with joint bodies. 2021 MRI lumbar w/ and w/o -  FINDINGS:   BONES/ALIGNMENT: The vertebral body heights are maintained. There is   age-appropriate bone marrow signal.  There is posterior fixation at L4-5 with   artificial disc material.  There is multilevel degenerative disc disease in   the remaining disc spaces with loss of disc signal.  There is no disc space   narrowing. There is no significant spondylolisthesis. SPINAL CORD:  The conus medullaris is normal in caliber and signal and   terminates at the T12-L1 level. The cauda equina is unremarkable. SOFT TISSUES: There is a fluid collection containing septations in the   postoperative bed that measures approximately 3.1 x 2.7 x 3.7 cm. There is   no abnormal postcontrast enhancement. Visualized abdominal structures are   unremarkable. L1-L2: There is a circumferential disc bulge. There is no canal stenosis or   foraminal narrowing. L2-L3: There is a circumferential disc bulge with facet and ligamentous   hypertrophy. There is canal stenosis measuring 9 mm in AP dimension. There   is moderate bilateral foraminal narrowing. L3-L4: There is a circumferential disc bulge with facet and ligamentous   hypertrophy. There is canal stenosis measuring 9 mm in AP dimension. There   is no significant foraminal narrowing. L4-L5: There is artificial disc material with posterior laminectomy. There   is canal stenosis measuring 7 mm in AP dimension. There is no significant   foraminal narrowing. L5-S1: There is a circumferential disc bulge with facet hypertrophy. There   is no canal stenosis or foraminal narrowing. Impression   Posterior fixation and laminectomy at L4-5 with a postoperative fluid   collection containing septations that likely represents a seroma. Multilevel degenerative change with canal stenosis most pronounced at L4-5. Bilateral foraminal narrowing as described above. 12/24/2020 CT   FINDINGS:   Within the limits of noncontrast, non-myelographic CT technique:   IATROGENIC:   There has been interval bilateral laminectomy at T3 and T4, partially   extending into the posterior elements of T2, for probable subjacent   multilevel partial discectomy. In the lumbar region, there has been interval bilateral laminectomy at L4,   partially extending into adjacent levels, for partial discectomy and   interbody fusion at L4/5, as well as interval spinal instrumented fusion by   bilateral spinal fixation rods, anchored by bilateral pedicle screws, and L4   - L5. Beam-hardening artifacts slightly limit evaluation of nearby   structures. However, these components appear to be intact and in usual   customary position.    BONES/ALIGNMENT:   There are unchanged chronic multilevel asymmetric vertebral compression   deformities and related vertebral column curvature/alignment abnormalities. DEGENERATIVE CHANGES:   Within the thoracic surgical bed of T2 - T4 and L4/5, there is slight   post-operative distortion/ectasia of the traversing thecal sac, overlaid by   small amount of posterior epidural/paraspinal soft tissue emphysema/hematoma. The spinal cord, caudal neural elements, and nerve roots not well   demonstrated by this technique, although previously-existing disc-related   mass effect upon the traversing spinal cord at T2/3 appears to have been   relieved, as the osseous spinal canal at T2/3 - T4/5 and L4/5 has been   surgically decompressed. At T4/5, minimal residual disc material and moderate bilateral facet   arthropathy produce at least minimal effacement of the anterior subarachnoid   space and moderate/severe bilateral neural foraminal narrowing, progressively   lessening through T2/3. At L4/5, mild Grade I anterolisthesis of L4 on L5 combines with mild residual   disc-osteophyte complex and severe bilateral facet arthropathy (slightly   worse on the right) to produce at least mild effacement of the anterior   subarachnoid space, as well as severe/marked right, and moderate left, neural   foraminal narrowing. All other previously-demonstrated/described multilevel spinal and bilateral   sacroiliac joint degenerative disease, central spinal canal stenosis, and   neural foraminal narrowing, all appear otherwise not significantly changed. INCIDENTAL:   There is minimal dependent bibasilar pleural effusions and equivalent   adjacent subsegmental atelectasis versus infiltrate tracking to both lung   apices, where this becomes slightly worse on the right. The incompletely visualized non-opacified heart is at least mildly enlarged,   with equivalent fullness of visualized non-opacified central pulmonary   vessels.    Minimal to moderate atherosclerotic calcification is scattered throughout the   visualized [id number] arterial system. The gallbladder is surgically absent, with multiple surgical clips present   within the gallbladder fossa. The right kidney is incompletely visualized, but appears at least minimally   malrotated. There is question of a surgical anastomotic suture line incompletely   visualized about the rectosigmoid junction. No other clinically-significant changes are noted. .       Impression   1. Interval bilateral laminectomy at T3 and T4, partially extending into the   posterior elements of T2, for probable subjacent multilevel partial   discectomy. 2.  Interval bilateral laminectomy at L4, partially extending into adjacent   levels, for partial discectomy and interbody fusion at L4/5, as well as   interval spinal instrumented fusion by bilateral spinal fixation rods,   anchored by bilateral pedicle screws, and L4 - L5. Beam-hardening artifacts   slightly limit evaluation of nearby structures. However, these components   appear to be intact and in usual customary position. 3.  Apparent post-operative relief of disc-related mass effect upon the   spinal cord within the above-described surgical beds, most significant at   T2/3, as described. 4.  Probable iatrogenic posterior epidural/paraspinal emphysema/hematoma   within the above-described surgical beds, as described. 5.  All other previously-demonstrated/described multilevel spinal and   bilateral sacroiliac joint degenerative disease, central spinal canal   stenosis, and neural foraminal narrowing, all appear otherwise not   significantly changed. 6.  Unchanged chronic multilevel asymmetric vertebral compression deformities   and related vertebral column curvature/alignment abnormalities.    7.  Incidental findings, most notable for minimal dependent bibasilar pleural   effusions and equivalent adjacent subsegmental atelectasis versus infiltrate   tracking to both lung apices, where this becomes slightly worse on the right. 12/21/2020 -  FINDINGS:   BONES/ALIGNMENT: There is normal alignment of the spine. There are mild old   compression deformities of T6 through T9, similar compared to the prior plain   films. Otherwise, the vertebral body heights are maintained. The bone marrow   signal appears unremarkable. No evidence of acute fracture or osseous   destructive lesion. SPINAL CORD: No abnormal cord signal is seen. SOFT TISSUES: No paraspinal mass identified. DEGENERATIVE CHANGES: There is advanced degenerative change with spurring and   disc space narrowing at multiple levels, most severe at T6-7 through T11-12. The  sagittal view of the lumbar spine demonstrates grade 1   anterolisthesis and severe central canal stenosis at L4-5. Please refer to   the report for MRI lumbar spine done yesterday. C7-T1: No evidence of significant central canal stenosis or disc herniation. T1-2: There is posterior facet degenerative change causing slight impression   on the left posterolateral aspect of the thecal sac. No evidence of   significant central canal stenosis or disc herniation. No evidence of cord   compression. T2-3: There is prominent central and right-sided disc osteophyte complex   causing moderate central canal stenosis with slight impression on the ventral   aspect of the spinal cord. T3-4: There are small bilateral posterolateral disc protrusions, larger on   the right causing slight impression on the right ventral aspect of the spinal   cord. Overall, mild central canal stenosis at this level. T4-5: No evidence of significant central canal stenosis or disc herniation. T5-6: There is disc bulge with small bilateral disc osteophyte complexes that   contacts the ventral surface of the spinal cord without evidence of cord   compression. Mild central canal stenosis.    T6-7: There is diffuse disc bulge causing moderate central canal stenosis   with slight impression on the ventral aspect of the spinal cord. T7-8: There is disc bulge and small left paramedian disc protrusion, causing   slight impression on the thecal sac without evidence of significant central   canal stenosis or cord compression. T8-9: There is a large central and right paramedian disc protrusion causing   slight impression on the right side of the spinal cord. T9-10: There is mild disc bulge and bilateral posterior facet degenerative   change causing mild central canal stenosis without evidence of cord   compression. T10-11: There is a small left paramedian disc protrusion causing slight   impression on the ventral portion of the thecal sac without evidence of cord   compression or significant central canal stenosis. T11-12: There is mild disc bulge without evidence of significant central   canal stenosis or cord compression. T12-L1: There is slight grade 1 retrolisthesis of T12 on L1 with tiny right   paramedian disc protrusion causing minimal impression on the thecal sac. No   significant central canal stenosis or compression of the conus. Impression   1. Degenerative change. Few mild old midthoracic compression deformities. No acute fracture or osseous destructive lesion. 2. Multilevel central canal stenosis as described above with multiple disc   protrusions, some which cause slight impression on the thoracic spinal cord. Clinical correlation is suggested. 12/2020 MRI cervical -  FINDINGS:   Pre- and post-contrast T1 weighted images of the cervical spine was performed. There is no abnormal enhancement in the cervical spinal cord. There is no abnormal enhancement in the cervical spine. The vertebral body   heights are grossly maintained. There is no fracture or destructive osseous   lesion. There is a prominent left paracentral disc protrusion at T2-3, contributing   to moderate spinal canal narrowing and moderate spinal cord compression.        Impression   Pre- and post-contrast T1 weighted images of the cervical spine was performed. No abnormal enhancement in the cervical spinal cord. The previously seen T2   hyperintensity in the cervical spinal cord is likely related to artifacts. Follow-up is recommended. Prominent left paracentral disc protrusion at T2-3, contributing to moderate   spinal canal narrowing and moderate spinal cord compression. There is likely   focal spinal cord edema at T2-3 level on MRI cervical spine 2020. The results were sent to radiology results communication.        Potential Aberrant Drug-Related Behavior:  no    Urine Drug Screenin2022 consistent    OARRS report:  2022 consistent    Past Medical History:   Diagnosis Date    Acute left ankle pain 2020    Acute left lumbar radiculopathy 2017    Cancer ([address] 2017    colon (treated surgically)    Chronic back pain     Chronic deep vein thrombosis (DVT) of distal vein of left lower extremity ([address] 2021    Diabetes mellitus ([address]     Drug-induced constipation 2020    Eosinophil count raised 2019    Fall at home 2017    Fatigue 2016    Fibromyalgia     GERD (gastroesophageal reflux disease)     Hair loss 2021    Hemorrhoids     Hypercalcemia 2019    Hyperlipidemia     Hypertension     Malignant neoplasm of sigmoid colon ([address] 2020    Obesity     Osteoarthritis     PONV (postoperative nausea and vomiting)     Postmenopausal bleeding 10/05/2017    Rib contusion, left, initial encounter 2020    TIA (transient ischemic attack) 2022    Urinary incontinence     Urinary retention 2020    Weakness of both legs 2020       Past Surgical History:   Procedure Laterality Date    ANESTHESIA NERVE BLOCK Bilateral 08/15/2019    BILATERAL INTRA-ARTICULAR FACET JOINT INJECTION WITH FLUOROSCOPIC GUIDANCE AT L4-5, L5-S1 WITH IV SEDATION performed by Wendy Aparicio DO at [address] CHOLECYSTECTOMY      COLON SURGERY      DILATION AND CURETTAGE OF UTERUS N/A 10/23/2019    DILATATION AND CURETTAGE HYSTEROSCOPY performed by Murali Fonseca MD at [address] Right 07/11/2019    C7-T1 EPIDURAL STEROID INJECTION #1 TOWARD THE RIGHT performed by Anushka Bruce DO at [facility] Bilateral     LUMBAR SPINE SURGERY N/A 12/24/2020    L4-L5  POSTERIOR LUMBAR INTERBODY FUSION, T2-T3 DECOMPRESSIVE LAMINECTOMY performed by Peg Rodriguez MD at . [address] Right 12/28/2017    right L4-5 transforaminal epidural #1    NERVE BLOCK Left 11/29/2018    si inj    NERVE BLOCK Bilateral 08/15/2019    bilateral intra-articular facet joint injection with flouroscopic guidance at L4-S1 with iv sedation    NERVE BLOCK Left 8/2/2022    LEFT SACROILIAC JOINT INJECTION performed by Anushka Bruce DO at [facility] SI JOINT ARTHRGRPHY&/ANES/STEROID W/IMAGE Left 11/29/2018    LEFT SACROILIAC JOINT INJECTION WITH X-RAY performed by ScheDO Ryland at [facility] Left 2005    STEROID INJECTION KNEE Left 02/2022    TONSILLECTOMY  28 YRS OLD    TUBAL LIGATION         Prior to Admission medications    Medication Sig Start Date End Date Taking? Authorizing Provider   zoster recombinant adjuvanted vaccine [facility]) 50 MCG/0.5ML SUSR injection Inject 0.5 mLs into the muscle See Admin Instructions 1 dose now and repeat in 2-6 months 7/21/22 1/17/23 Yes VISHNU Norris CNP   SITagliptin (JANUVIA) 100 MG tablet Take 1 tablet by mouth in the morning. 7/21/22  Yes VISHNU Sagastume CNP   levothyroxine (SYNTHROID) 25 MCG tablet Take 1 tablet by mouth in the morning.  On empty stomach. 7/21/22  Yes VISHNU Sagastume CNP   atenolol (TENORMIN) 50 MG tablet Take 1 tablet by mouth in the morning. 7/21/22  Yes VISHNU Sagastume CNP   buprenorphine ([address]) 10 MCG/HR [facility]  6/20/22  Yes Historical Provider, MD   atorvastatin (LIPITOR) 10 MG tablet Take 10 mg by mouth daily   Yes Historical Provider, MD   allopurinol (ZYLOPRIM) 300 MG tablet Take one (1) tablet by mouth once daily. 6/24/22  Yes VISHNU Sharp CNP   aspirin 81 MG EC tablet Take 1 tablet by mouth daily 5/10/22  Yes Ketan Barlow MD   ezetimibe (ZETIA) 10 MG tablet Take 1 tablet by mouth daily 4/4/22  Yes VISHNU Sharp CNP   meloxicam (MOBIC) 15 MG tablet  12/6/21  Yes Historical Provider, MD   acetaminophen (TYLENOL) 500 MG tablet Take 500 mg by mouth every 6 hours as needed for Pain   Yes Historical Provider, MD   PROCTOZONE-HC 2.5 % CREA rectal cream Apply 4 times daily as needed 8/5/21  Yes Mariza Trujillo MD   Cyanocobalamin (B-12 PO) Take by mouth    Yes Historical Provider, MD   Blood Glucose Monitoring Suppl (ACCU-CHEK GUIDE) w/Device KIT  3/8/21  Yes Historical Provider, MD   Alcohol Swabs (PHARMACIST CHOICE ALCOHOL) PADS  3/8/21  Yes Historical Provider, MD   Blood Glucose Calibration (ACCU-CHEK GUIDE CONTROL) LIQD  3/8/21  Yes Historical Provider, MD   diclofenac sodium (VOLTAREN) 1 % GEL Apply topically 2 times daily 4/21/21  Yes Ruth Bacon MD   Handicap Placard MISC by Does not apply route Start 11/23/2020 expires 11/22/2023 11/23/20  Yes VISHNU Sharp CNP   blood glucose monitor strips Test 1 times a day & as needed for symptoms of irregular blood glucose.  6/17/20  Yes VISHNU Newsome CNP   Cholecalciferol (VITAMIN D3) 2000 units CAPS Take 2,000 Units by mouth daily   Yes Historical Provider, MD       Allergies   Allergen Reactions    Diclofenac     Lisinopril Other (See Comments)     Profound malaise    Diclofenac Sodium Nausea And Vomiting    Farxiga [Dapagliflozin] Itching     Vaginal itching with Augusto Woodall [Pregabalin] Other (See Comments)     Confusion    Sulfa Antibiotics Rash       Social History     Socioeconomic History    Marital status:      Spouse name: Not on file    Number of children: Not on file    Years of education: Not on file    Highest education level: Not on file   Occupational History    Not on file   Tobacco Use    Smoking status: Never    Smokeless tobacco: Never   Vaping Use    Vaping Use: Never used   Substance and Sexual Activity    Alcohol use: Yes     Comment: Rarely    Drug use: No    Sexual activity: Not on file   Other Topics Concern    Not on file   Social History Narrative    Not on file     Social Determinants of Health     Financial Resource Strain: Not on file   Food Insecurity: Not on file   Transportation Needs: Not on file   Physical Activity: Not on file   Stress: Not on file   Social Connections: Not on file   Intimate Partner Violence: Not on file   Housing Stability: Not on file       Family History   Problem Relation Age of Onset    Asthma Mother     Mental Illness Mother     Cancer Mother         kidney    Heart Disease Father         melanoma    Thyroid Disease Sister     Thyroid Disease Sister        REVIEW OF SYSTEMS:     Katiuska Hanks denies fever/chills, chest pain, shortness of breath, new bowel or bladder complaints. All other review of systems was negative. PHYSICAL EXAMINATION:      BP (!) 143/79   Pulse 71   Temp 97.9 °F (36.6 °C) (Infrared)   Resp 16   Ht 5' (1.524 m)   Wt 200 lb (90.7 kg)   LMP  (LMP Unknown)   SpO2 96%   BMI 39.06 kg/m²     General:       General appearance:pleasant and well-hydrated, in no distress and A & O x3  Build:Obese  Function:Rises from a seated position with difficulty     HEENT:     Head:normocephalic, atraumatic  Pupils:regular, round, equal  Sclera: icterus absent     Lungs:     Breathing:normal breathing pattern     Abdomen:     Shape:non-distended  Tenderness:none  Guarding:none     Cervical spine:     Inspection:normal  Palpation:tenderness paravertebral muscles, tenderness trapezium, left, right negative. Range of motion:abnormal mildly flexion, extension rotation bilateral and is not painful. Thoracic spine:     Spine inspection:surgical scar  PationPal:+ tenderness paraspinals, left > right. Range of motion:normal in flexion, extension rotation bilateral and is not painful. Lumbar spine:     Spine inspection:surgical scar  CVA tenderness:No  Palpation:tenderness paravertebral muscles, left>right positive. Range of motion:abnormal mildly in lateral bending, flexion, extension rotation bilateral and is painful. Musculoskeletal:     Trigger points in trapezius:absent bilaterally  Trigger points in rhomboids:absent bilaterally  Trigger points in Paraveteral:absent bilaterally  Trigger points in supraspinatus/infraspinatus:absent  Spurling's:negative right, negative left    Ardon's:negative right, negative left  SI joint tenderness:negative right, positive left              WILIAN test:not done right, positive left              Positive gaenslen's, compression and distraction tests on the left  Piriformis tenderness:negative right, positive left  Trochanteric bursa tenderness:negative right, negative left  SLR:negative right, negative left, sitting     Extremities:     Tremors:None bilaterally upper and lower  Range of motion:Generally normal shoulders, pain with internal rotation of hips negative. Intact:Yes  Varicose veins:absent  Pulses:present Lt radial  Cyanosis:none  Edema:none x all 4 extremities     Knee:      Inspection:asymmetric, swelling mild left  Tenderness of Bony Landmarks:Medial, left  Effusion:present left, slight  ROM:Left full     Neurological:     Sensory:normal to light touch x all 4 extremities except decreased LLE in S1 distribution     Motor:  Right Grip5/5              Left Grip5/5               Right Bicep5/5           Left Bicep5/5              Right Triceps5/5       Left Triceps5/5          Right Deltoid5/5     Left Deltoid5/5                  Right Quadriceps5/5          Left Quadriceps5/5           Right Gastrocnemius5/5    Left Gastrocnemius5/5  Right Ant Tibialis5/5  Left Ant Tibialis5/5     Reflexes:  (not assessed today)  Right Brachioradialis reflex2+  Left Brachioradialis reflex2+  Right Biceps reflex2+  Left Biceps reflex2+  Right Triceps reflex2+  Left Triceps reflex2+  Right Quadriceps reflex2+  Left Quadriceps reflex2+  Right Achilles reflex2+  Left Achilles reflex2+  Gait:normal station     Dermatology:     Skin:no rashes or lesions noted     Impression:     Pt previously seen in the practice for cervical symptoms successfully treated with NATASHA  LBP  Thoracic pain  Cervical pain - improved with NATASHA  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 Isela Boyd with: L4-5 PLIF and lami T2-T4 12/24/2020  Since the above, she has been treated for pain mgmt by Dr. Isra Melendez  PMHx: Hx DVT (post-surgical recovery), TIA 5/2022, colon CA (tx surgically), DM, constipation, GERD, DLD, HTN, obesity  Has enough scripts of Butrans to last until 8/15/2022    She was doing so well since the SIJ injection, she abruptly discontinued her Butrans patch yesterday (10 mcg) to see what it was doing for her  Her pain significantly increased and she restarted the patch  Discussed in the future it would be better to wean off the patches by using the last one x 10 days instead of abruptly stopping it     Plan:      L knee viscosupp discussed, she will let us know  Urine screen and OARRS report reviewed  RF Butrans 10 mcg/hr, 1 RF  L SIJ injection with 65% relief - r/b and procedure discussed  Referral to GI for difficulty regulating bowel habits since surgical tx of colon CA - she saw the NP and started Metamucil, scheduled to see Dr. Brigido Lewis next week  Patient encouraged to stay active and to lose weight  Treatment plan discussed with the patient including medication and procedure side effects      Cc:  Referring physician    NORA Christianson.

## 2022-08-18 ENCOUNTER — TELEPHONE (OUTPATIENT)
Dept: FAMILY MEDICINE CLINIC | Age: 67
End: 2022-08-18

## 2022-08-19 ENCOUNTER — OFFICE VISIT (OUTPATIENT)
Dept: FAMILY MEDICINE CLINIC | Age: 67
End: 2022-08-19
Payer: MEDICARE

## 2022-08-19 VITALS
OXYGEN SATURATION: 97 % | RESPIRATION RATE: 17 BRPM | TEMPERATURE: 97.4 F | SYSTOLIC BLOOD PRESSURE: 128 MMHG | WEIGHT: 200 LBS | HEIGHT: 60 IN | DIASTOLIC BLOOD PRESSURE: 75 MMHG | BODY MASS INDEX: 39.27 KG/M2 | HEART RATE: 84 BPM

## 2022-08-19 DIAGNOSIS — J40 SINOBRONCHITIS: ICD-10-CM

## 2022-08-19 DIAGNOSIS — R05.9 COUGH: Primary | ICD-10-CM

## 2022-08-19 DIAGNOSIS — J32.9 SINOBRONCHITIS: ICD-10-CM

## 2022-08-19 LAB
Lab: NORMAL
PERFORMING INSTRUMENT: NORMAL
QC PASS/FAIL: NORMAL
SARS-COV-2, POC: NORMAL

## 2022-08-19 PROCEDURE — G8427 DOCREV CUR MEDS BY ELIG CLIN: HCPCS | Performed by: NURSE PRACTITIONER

## 2022-08-19 PROCEDURE — 1090F PRES/ABSN URINE INCON ASSESS: CPT | Performed by: NURSE PRACTITIONER

## 2022-08-19 PROCEDURE — 1036F TOBACCO NON-USER: CPT | Performed by: NURSE PRACTITIONER

## 2022-08-19 PROCEDURE — 1123F ACP DISCUSS/DSCN MKR DOCD: CPT | Performed by: NURSE PRACTITIONER

## 2022-08-19 PROCEDURE — 87426 SARSCOV CORONAVIRUS AG IA: CPT | Performed by: NURSE PRACTITIONER

## 2022-08-19 PROCEDURE — G8400 PT W/DXA NO RESULTS DOC: HCPCS | Performed by: NURSE PRACTITIONER

## 2022-08-19 PROCEDURE — 99213 OFFICE O/P EST LOW 20 MIN: CPT | Performed by: NURSE PRACTITIONER

## 2022-08-19 PROCEDURE — 3017F COLORECTAL CA SCREEN DOC REV: CPT | Performed by: NURSE PRACTITIONER

## 2022-08-19 PROCEDURE — G8417 CALC BMI ABV UP PARAM F/U: HCPCS | Performed by: NURSE PRACTITIONER

## 2022-08-19 RX ORDER — PREDNISONE 10 MG/1
10 TABLET ORAL 2 TIMES DAILY
Qty: 10 TABLET | Refills: 0 | Status: SHIPPED | OUTPATIENT
Start: 2022-08-19 | End: 2022-08-24

## 2022-08-19 RX ORDER — DOXYCYCLINE HYCLATE 100 MG
100 TABLET ORAL 2 TIMES DAILY
Qty: 14 TABLET | Refills: 0 | Status: SHIPPED | OUTPATIENT
Start: 2022-08-19 | End: 2022-08-26

## 2022-08-19 SDOH — ECONOMIC STABILITY: FOOD INSECURITY: WITHIN THE PAST 12 MONTHS, THE FOOD YOU BOUGHT JUST DIDN'T LAST AND YOU DIDN'T HAVE MONEY TO GET MORE.: NEVER TRUE

## 2022-08-19 SDOH — ECONOMIC STABILITY: FOOD INSECURITY: WITHIN THE PAST 12 MONTHS, YOU WORRIED THAT YOUR FOOD WOULD RUN OUT BEFORE YOU GOT MONEY TO BUY MORE.: NEVER TRUE

## 2022-08-19 ASSESSMENT — SOCIAL DETERMINANTS OF HEALTH (SDOH): HOW HARD IS IT FOR YOU TO PAY FOR THE VERY BASICS LIKE FOOD, HOUSING, MEDICAL CARE, AND HEATING?: NOT HARD AT ALL

## 2022-08-19 NOTE — PROGRESS NOTES
Chief Complaint       Cough (Has the cough for awhile but got worse in last week)    History of Present Illness   Source of history provided by:  patient. Antelmo Bailon is a 74 y.o. old female presenting to the walk in clinic for evaluation of above symptoms, non-productive cough for over 7 days & subjective fever/chills last night)  Denies any diarrhea, nausea CP, dyspnea, LE edema, abdominal pain, vomiting, rash, or lethargy. Denies hx of asthma or COPD; deniess tobacco use. Patient denies recent sick exposures. Patient has been vaccinated for COVID-19. Patient has been taking OTC for symptomatic relief. Able to eat & drink. BS run 151, monitors daily. ROS    Unless otherwise stated in this report or unable to obtain because of the patient's clinical or mental status as evidenced by the medical record, this patients's positive and negative responses for Review of Systems, constitutional, psych, eyes, ENT, cardiovascular, respiratory, gastrointestinal, neurological, genitourinary, musculoskeletal, integument systems and systems related to the presenting problem are either stated in the preceding or were not pertinent or were negative for the symptoms and/or complaints related to the medical problem.     Past Medical History:  has a past medical history of Acute left ankle pain, Acute left lumbar radiculopathy, Cancer (HCC), Chronic back pain, Chronic deep vein thrombosis (DVT) of distal vein of left lower extremity ([address] Diabetes mellitus ([address] Drug-induced constipation, Eosinophil count raised, Fall at home, Fatigue, Fibromyalgia, GERD (gastroesophageal reflux disease), Hair loss, Hemorrhoids, Hypercalcemia, Hyperlipidemia, Hypertension, Malignant neoplasm of sigmoid colon ([address] Obesity, Osteoarthritis, PONV (postoperative nausea and vomiting), Postmenopausal bleeding, Rib contusion, left, initial encounter, TIA (transient ischemic attack), Urinary incontinence, Urinary retention, and Weakness of both legs. Past Surgical History:  has a past surgical history that includes  section; Tubal ligation; Tonsillectomy (28 YRS OLD); Foot surgery (Bilateral); shoulder surgery (Left, ); Cholecystectomy; Colon surgery; Nerve Block (Right, 2017); Nerve Block (Left, 2018); pr inject si joint arthrgrphy&/anes/steroid w/image (Left, 2018); epidural steroid injection (Right, 2019); Nerve Block (Bilateral, 08/15/2019); Anesthesia Nerve Block (Bilateral, 08/15/2019); Dilation and curettage of uterus (N/A, 10/23/2019); Lumbar spine surgery (N/A, 2020); Steroid injection knee (Left, 2022); and Nerve Block (Left, 2022). Social History:  reports that she has never smoked. She has never used smokeless tobacco. She reports current alcohol use. She reports that she does not use drugs. Family History: family history includes Asthma in her mother; Cancer in her mother; Heart Disease in her father; Mental Illness in her mother; Thyroid Disease in her sister and sister. Allergies: Diclofenac, Lisinopril, Diclofenac sodium, Farxiga [dapagliflozin], Lyrica [pregabalin], and Sulfa antibiotics    Physical Exam         VS:  /75   Pulse 84   Temp 97.4 °F (36.3 °C) (Temporal)   Resp 17   Ht 5' (1.524 m)   Wt 200 lb (90.7 kg)   LMP  (LMP Unknown)   SpO2 97%   BMI 39.06 kg/m²    Oxygen Saturation Interpretation: Normal.    Constitutional:  Alert, development consistent with age. NAD. Head:  NC/NT. Airway patent. Cerumen noted. Mouth: Posterior pharynx with mild erythema and clear postnasal drip. No tonsillar hypertrophy or exudate. Neck:  Normal ROM. Supple. No anterior cervical adenopathy noted. Lungs: CTAB without wheezes, rales, or rhonchi. CV:  Regular rate and rhythm, normal heart sounds, without pathological murmurs, ectopy, gallops, or rubs. Skin:  Normal turgor. Warm, dry, without visible rash. Lymphatic: No lymphangitis or adenopathy noted.   Neurological:

## 2022-09-02 ENCOUNTER — OFFICE VISIT (OUTPATIENT)
Dept: GASTROENTEROLOGY | Age: 67
End: 2022-09-02
Payer: MEDICARE

## 2022-09-02 VITALS
DIASTOLIC BLOOD PRESSURE: 65 MMHG | BODY MASS INDEX: 41.21 KG/M2 | SYSTOLIC BLOOD PRESSURE: 119 MMHG | HEART RATE: 74 BPM | OXYGEN SATURATION: 96 % | WEIGHT: 211 LBS

## 2022-09-02 DIAGNOSIS — K59.00 CONSTIPATION, UNSPECIFIED CONSTIPATION TYPE: Primary | ICD-10-CM

## 2022-09-02 DIAGNOSIS — R19.7 DIARRHEA, UNSPECIFIED TYPE: ICD-10-CM

## 2022-09-02 PROCEDURE — 99212 OFFICE O/P EST SF 10 MIN: CPT | Performed by: NURSE PRACTITIONER

## 2022-09-02 PROCEDURE — 1123F ACP DISCUSS/DSCN MKR DOCD: CPT | Performed by: NURSE PRACTITIONER

## 2022-09-02 NOTE — PATIENT INSTRUCTIONS
Low-Fiber Diet: Care Instructions  Overview     When your bowels are irritated, you may need to limit fiber in your diet until the problem clears up. Your doctor and dietitian can help you design a low-fiber diet based on your health and what you prefer to eat. Ask your doctor how long you should stay on a low-fiber diet. Your doctor probably will have you start adding more fiber to your diet as you get better. Always talk with your doctor or dietitian before you make changes in your diet. Follow-up care is a key part of your treatment and safety. Be sure to make and go to all appointments, and call your doctor if you are having problems. It's also a good idea to know your test results and keep a list of the medicines you take. How can you care for yourself at home? Choose white or refined grains, and avoid whole grains. That means eating white or refined cereals, breads, crackers, rice, or pasta. Choose fruits and vegetables that have been peeled and cooked. Avoid fruits and vegetables that are raw or that have skins, seeds, or hulls. Eat cooked or canned fruit. Low-fiber fruits include applesauce, canned peaches, canned pears, and fruit juice without pulp. Eat low-fiber vegetables, which include well-cooked vegetables and vegetable juice. Drink or eat milk, yogurt, or other milk products if you can digest dairy without too many problems. Your doctor may limit milk and milk products for a while. If so, they may recommend a calcium and vitamin D supplement. Eat well-cooked meat, such as chicken, turkey, fish, or lean meat. You also can eat eggs and tofu.   Avoid these foods:  Bran, brown or wild rice, oatmeal, granola, corn, roger crackers, barley, and whole wheat and other whole-grain breads, such as rye bread  Cereals with more than 2 grams of fiber a serving  Berries, rhubarb, prunes, prune juice, and all dried fruits  Raw vegetables and fruits  Foods that may cause gas, such as raw or cooked cabbage, broccoli, brussels sprouts, and cauliflower  Cooked dried beans, lentils, and split peas  Crunchy peanut butter  Ice cream with fruit pieces in it  Coconut, nuts, popcorn, raisins, and seeds, or any ice cream, yogurt, or cheese with these in them  Where can you learn more? Go to [email or web address] and sign in to your [employer] account. Enter Q421 in the [facility] box to learn more about \"Low-Fiber Diet: Care Instructions. \"     If you do not have an account, please click on the \"Sign Up Now\" link. Current as of: December 17, 2020               Content Version: 13.0  © 2006-2021 [employer]wise[employer] Inc[employer]. Care instructions adapted under license by [facility] [place] ([facility]). If you have questions about a medical condition or this instruction, always ask your healthcare professional. [place] [address] any warranty or liability for your use of this information.

## 2022-09-02 NOTE — PROGRESS NOTES
Jani Shin (:  1955) is a 74 y.o. female, here for evaluation of the following chief complaint(s):  No chief complaint on file. SUBJECTIVE/OBJECTIVE:  HPI:    Bladimir Hogue is a very pleasant 74year old female with a  history of CRC that presents today for follow up on constipation that alternates with diarrhea. After last office visit, lab work was negative for celiac sprue and abdominal xray did not show stool retention. Patient was advised to start on a daily fiber supplement. Patient started Metamucil powder but it worsened diarrhea  Started on fiber gummies that do not seem to help  Patient has a history of a colon resection secondary to CRC  Symptoms have been persistent since 2017 when she had colon surgery    Patient complains of having multiple explosive stools in a day to be followed by a few days of constipation. Interferes with daily activities       ROS:  General: Patient denies n/v/f/c or weight loss. HEENT: Patient denies persistent postnasal drip, scleral icterus, drooling, persistent bleeding from nose/mouth. Resp: Patient denies SOB, wheezing, productive cough. Cards: Patient denies CP, palpitations, significant edema  GI: As above. Derm: Patient denies jaundice/rashes. Musc: Patient denies diffuse/irregular joint swelling or myalgias.       Objective   Wt Readings from Last 3 Encounters:   22 211 lb (95.7 kg)   22 200 lb (90.7 kg)   22 200 lb (90.7 kg)     Temp Readings from Last 3 Encounters:   22 97.4 °F (36.3 °C) (Temporal)   22 97.9 °F (36.6 °C) (Infrared)   22 97.2 °F (36.2 °C)     BP Readings from Last 3 Encounters:   22 119/65   22 128/75   22 (!) 143/79     Pulse Readings from Last 3 Encounters:   22 74   22 84   22 71        Physical Exam  Musculoskeletal:      Comments: Utilizes a walker for ambulation       Past Medical History:   Diagnosis Date    Acute left ankle pain 2020    Acute left lumbar radiculopathy 12/28/2017    Cancer ([facility] 2017    colon (treated surgically)    Chronic back pain     Chronic deep vein thrombosis (DVT) of distal vein of left lower extremity ([facility] 02/01/2021    Diabetes mellitus ([facility]     Drug-induced constipation 02/18/2020    Eosinophil count raised 11/20/2019    Fall at home 12/11/2017    Fatigue 06/08/2016    Fibromyalgia     GERD (gastroesophageal reflux disease)     Hair loss 05/03/2021    Hemorrhoids     Hypercalcemia 11/20/2019    Hyperlipidemia     Hypertension     Malignant neoplasm of sigmoid colon ([facility] 06/17/2020    Obesity     Osteoarthritis     PONV (postoperative nausea and vomiting)     Postmenopausal bleeding 10/05/2017    Rib contusion, left, initial encounter 02/18/2020    TIA (transient ischemic attack) 05/2022    Urinary incontinence     Urinary retention 12/21/2020    Weakness of both legs 12/21/2020      Past Surgical History:   Procedure Laterality Date    ANESTHESIA NERVE BLOCK Bilateral 08/15/2019    BILATERAL INTRA-ARTICULAR FACET JOINT INJECTION WITH FLUOROSCOPIC GUIDANCE AT L4-5, L5-S1 WITH IV SEDATION performed by Amberly Valdez DO at [address] [place] [address] N/A 10/23/2019    DILATATION AND CURETTAGE HYSTEROSCOPY performed by Maxx Garrison MD at [address] [facility] Right 07/11/2019    C7-T1 EPIDURAL STEROID INJECTION #1 TOWARD THE RIGHT performed by Zulema Chavez DO at [place] [address] Bilateral     LUMBAR SPINE SURGERY N/A 12/24/2020    L4-L5  POSTERIOR LUMBAR INTERBODY FUSION, T2-T3 DECOMPRESSIVE LAMINECTOMY performed by Bryanna Claire MD at [address] Right 12/28/2017    right L4-5 transforaminal epidural #1    NERVE BLOCK Left 11/29/2018    si inj    NERVE BLOCK Bilateral 08/15/2019    bilateral intra-articular facet joint injection with flouroscopic guidance at L4-S1 with iv sedation    NERVE BLOCK Left 8/2/2022    LEFT SACROILIAC JOINT INJECTION performed by Gerardo Chaiedz DO at [facility] SI JOINT ARTHRGRPHY&/ANES/STEROID W/IMAGE Left 11/29/2018    LEFT SACROILIAC JOINT INJECTION WITH X-RAY performed by Devi Swan DO at [facility] [address] Left 2005    STEROID INJECTION KNEE Left 02/2022    TONSILLECTOMY  28 YRS OLD    TUBAL LIGATION        Family History   Problem Relation Age of Onset    Asthma Mother     Mental Illness Mother     Cancer Mother         kidney    Heart Disease Father         melanoma    Thyroid Disease Sister     Thyroid Disease Sister         Lab Results   Component Value Date    WBC 7.5 05/08/2022    HGB 13.1 05/08/2022    HCT 39.3 05/08/2022    MCV 91.2 05/08/2022     05/08/2022      Lab Results   Component Value Date     05/08/2022    K 3.9 05/08/2022     05/08/2022    CO2 24 05/08/2022    BUN 17 05/08/2022    CREATININE 0.8 05/08/2022    GLUCOSE 129 (H) 05/08/2022    CALCIUM 9.6 05/08/2022    PROT 7.2 05/07/2022    LABALBU 4.6 05/07/2022    BILITOT 0.6 05/07/2022    ALKPHOS 99 05/07/2022    AST 30 05/07/2022    ALT 26 05/07/2022    LABGLOM >60 05/08/2022    GFRAA >60 05/08/2022                       ASSESSMENT/PLAN:    1. Constipation, unspecified constipation type  -     XR ABDOMEN (KUB) (SINGLE AP VIEW); Future  2. Diarrhea, unspecified type  -     XR ABDOMEN (KUB) (SINGLE AP VIEW); Future    -Patient states she has not had a BM for about 4 days. Will obtain an abdominal xray to evaluate stool content  -We had a long discussion on patients alteration in bowel habits since having colon surgery. Patient has tried fiber supplements but the powder seems to worsen the diarrhea and she cannot seem to find a happy medium with the fiber gummies. Advised patient to tailor the fiber supplements to her needs. May use imodium when she had a day of diarrhea.   Cautioned patient on overuse of imodium.  -Low fodmap diet printed and given to patient  -RTV in 3 months     Return for Follow up in 3 months. An electronic signature was used to authenticate this note.     --Herb Cogan, APRN - CNP

## 2022-09-06 ENCOUNTER — TELEPHONE (OUTPATIENT)
Dept: SLEEP CENTER | Age: 67
End: 2022-09-06

## 2022-09-12 RX ORDER — ATORVASTATIN CALCIUM 10 MG/1
TABLET, FILM COATED ORAL
Qty: 90 TABLET | Refills: 3 | OUTPATIENT
Start: 2022-09-12

## 2022-09-21 ENCOUNTER — HOSPITAL ENCOUNTER (OUTPATIENT)
Dept: SLEEP CENTER | Age: 67
Discharge: HOME | End: 2022-09-21
Payer: MEDICARE

## 2022-09-21 DIAGNOSIS — G47.33 OSA (OBSTRUCTIVE SLEEP APNEA): ICD-10-CM

## 2022-09-21 PROCEDURE — 95800 SLP STDY UNATTENDED: CPT

## 2022-09-23 DIAGNOSIS — R19.7 DIARRHEA, UNSPECIFIED TYPE: Primary | ICD-10-CM

## 2022-09-29 DIAGNOSIS — R19.7 DIARRHEA, UNSPECIFIED TYPE: ICD-10-CM

## 2022-09-29 PROCEDURE — 95806 SLEEP STUDY UNATT&RESP EFFT: CPT | Performed by: INTERNAL MEDICINE

## 2022-09-29 NOTE — PROCEDURES
[address] [facility]       [address]              [address] STUDY REPORT       PATIENT NAME: Madalyn Zelaya               : 1955        MED REC NO:  [id number]     PROVIDER:  Eladia Aguilar DO  DATE OF STUDY: 22     SERVICE PROVIDER:  [facility]     REFERRING PROVIDER:   Dr Elliott Ornelas      AGE: 74 yrs       SEX: F          HEIGHT: 5 ft   0 in         WEIGHT: 196 lbs          BMI: 39 kg/m2    NECK CIRCUMFERENCE: 15 in    Symptoms: Snoring, daytime sleepiness, nocturia, restless legs, difficulty falling asleep and staying asleep. The patient recently had a stroke and feels that her throat is collapsing when she lays down. The [place] Sleepiness Scale was 4 out of 24 (scores above or equal to 10 are suggestive of hypersomnolence). Indication: Evaluate for obstructive sleep apnea. Medical History:  Stroke, hypertension, hyperlipidemia, diabetes, acid reflux, obesity, colon cancer. Medications: Sitagliptin, levothyroxine, atenolol, atorvastatin, allopurinol, aspirin, ezetimibe, vitamins. DESCRIPTION:   Unattended, full night Home Sleep Apnea testing (HSAT) was performed using an FDA approved WatchPAT device. Peripheral Arterial Tonometry, pulse rate, oxygen saturation, total sleep time, sleep staging, body  position, and snoring were recorded. Reported pRDI/pAHI calculations in this report are scored based on 4%  desaturations according to AASM scoring criteria. Recording started at 11:10:29 PM and ended at 7:45:52 AM. Of  the 8 hrs, 35 min of recording time, the patient was asleep for 7 hrs, 9 min. Respiratory indices were calculated using the technically valid sleep time of f 7 hrs, 9 min. REM sleep comprised 16.1% of the total sleep time. Study was technically adequate. FINDINGS:  RESPIRATORY MONITORING:    The apnea/hypopnea index (pAHI) was 2.1.  The respiratory disturbance index (pRDI) was 5.0. The REM pRDI was 9.6 and the non-REM pRDI was 4.2. The patient slept in the supine position for 60.5 minutes (14.1% of the total sleep time), and the supine pRDI was 1.0. The patient slept in the non-supine position for 368.9 minutes (85.9% of the total sleep time), and the non-supine pRDI was 5.7. The 4% oxygen desaturation index ([place]) was 2.1. The average oxygen saturation was 95%. The lowest oxygen saturation was 85%. Oxygen saturations were less than or equal to 88% for 0.4 minutes or 0.1% of the total oxygen saturation evaluation period. Snoring occurred for 162.8 minutes, or 37.9% of the total valid sleep time. PULSE: The average heart rate during recording was 75 beats per minute. IMPRESSION:   1. This study does not show evidence of obstructive sleep apnea. Of note, the severity of this patient's sleep disordered breathing was likely underestimated as home sleep studies are inherently less sensitive. 2. Snoring    RECOMMENDATIONS:    1. There is no evidence of sleep disordered breathing. Due to night-to-night breathing variability and false negative rates of up to 30% on home sleep tests, if the clinical suspicion of [place] is high a repeat sleep study could be considered. 2. Could consider treatment of the snoring if this is bothersome to the patient by ENT evaluation or oral appliance fit by dentist.      EQUIPMENT ORDERING INFORMATION:  None.

## 2022-10-10 ENCOUNTER — TELEPHONE (OUTPATIENT)
Dept: FAMILY MEDICINE CLINIC | Age: 67
End: 2022-10-10

## 2022-10-10 DIAGNOSIS — Z12.31 SCREENING MAMMOGRAM FOR BREAST CANCER: Primary | ICD-10-CM

## 2022-10-12 ENCOUNTER — PREP FOR PROCEDURE (OUTPATIENT)
Dept: PAIN MANAGEMENT | Age: 67
End: 2022-10-12

## 2022-10-12 ENCOUNTER — OFFICE VISIT (OUTPATIENT)
Dept: PAIN MANAGEMENT | Age: 67
End: 2022-10-12
Payer: MEDICARE

## 2022-10-12 VITALS
WEIGHT: 200 LBS | SYSTOLIC BLOOD PRESSURE: 148 MMHG | TEMPERATURE: 97.6 F | HEIGHT: 60 IN | HEART RATE: 68 BPM | OXYGEN SATURATION: 95 % | BODY MASS INDEX: 39.27 KG/M2 | RESPIRATION RATE: 16 BRPM | DIASTOLIC BLOOD PRESSURE: 80 MMHG

## 2022-10-12 DIAGNOSIS — G89.4 CHRONIC PAIN SYNDROME: Primary | ICD-10-CM

## 2022-10-12 DIAGNOSIS — E11.9 TYPE 2 DIABETES MELLITUS WITHOUT COMPLICATION, WITHOUT LONG-TERM CURRENT USE OF INSULIN (HCC): ICD-10-CM

## 2022-10-12 DIAGNOSIS — M53.3 DISORDER OF SACRUM: ICD-10-CM

## 2022-10-12 DIAGNOSIS — M17.12 PRIMARY OSTEOARTHRITIS OF LEFT KNEE: ICD-10-CM

## 2022-10-12 DIAGNOSIS — M25.562 CHRONIC PAIN OF LEFT KNEE: ICD-10-CM

## 2022-10-12 DIAGNOSIS — E78.1 HYPERTRIGLYCERIDEMIA: ICD-10-CM

## 2022-10-12 DIAGNOSIS — M96.1 LUMBAR POST-LAMINECTOMY SYNDROME: ICD-10-CM

## 2022-10-12 DIAGNOSIS — G89.29 CHRONIC PAIN OF LEFT KNEE: ICD-10-CM

## 2022-10-12 PROCEDURE — G8400 PT W/DXA NO RESULTS DOC: HCPCS | Performed by: PHYSICIAN ASSISTANT

## 2022-10-12 PROCEDURE — 1123F ACP DISCUSS/DSCN MKR DOCD: CPT | Performed by: PHYSICIAN ASSISTANT

## 2022-10-12 PROCEDURE — G8427 DOCREV CUR MEDS BY ELIG CLIN: HCPCS | Performed by: PHYSICIAN ASSISTANT

## 2022-10-12 PROCEDURE — G8484 FLU IMMUNIZE NO ADMIN: HCPCS | Performed by: PHYSICIAN ASSISTANT

## 2022-10-12 PROCEDURE — 99213 OFFICE O/P EST LOW 20 MIN: CPT | Performed by: PHYSICIAN ASSISTANT

## 2022-10-12 PROCEDURE — 3017F COLORECTAL CA SCREEN DOC REV: CPT | Performed by: PHYSICIAN ASSISTANT

## 2022-10-12 PROCEDURE — 1090F PRES/ABSN URINE INCON ASSESS: CPT | Performed by: PHYSICIAN ASSISTANT

## 2022-10-12 PROCEDURE — 1036F TOBACCO NON-USER: CPT | Performed by: PHYSICIAN ASSISTANT

## 2022-10-12 PROCEDURE — G8417 CALC BMI ABV UP PARAM F/U: HCPCS | Performed by: PHYSICIAN ASSISTANT

## 2022-10-12 RX ORDER — SITAGLIPTIN 100 MG/1
TABLET, FILM COATED ORAL
Qty: 90 TABLET | Refills: 1 | OUTPATIENT
Start: 2022-10-12

## 2022-10-12 RX ORDER — BUPRENORPHINE 10 UG/H
1 PATCH TRANSDERMAL WEEKLY
Qty: 4 PATCH | Refills: 1 | Status: SHIPPED | OUTPATIENT
Start: 2022-10-12 | End: 2022-11-09

## 2022-10-12 RX ORDER — ATORVASTATIN CALCIUM 10 MG/1
TABLET, FILM COATED ORAL
Qty: 90 TABLET | Refills: 3 | OUTPATIENT
Start: 2022-10-12

## 2022-10-12 RX ORDER — BUPRENORPHINE 10 UG/H
PATCH TRANSDERMAL
COMMUNITY
Start: 2022-09-23 | End: 2022-10-12

## 2022-10-12 RX ORDER — EZETIMIBE 10 MG/1
10 TABLET ORAL DAILY
Qty: 90 TABLET | Refills: 1 | OUTPATIENT
Start: 2022-10-12

## 2022-10-12 NOTE — PROGRESS NOTES
[facility] Pain Management  [address]  [facility]    Follow up Note      Layla Escobedo     Date of Visit:  10/12/2022    CC:  Patient presents for follow up   Chief Complaint   Patient presents with    Follow-up    Lower Back Pain    Hip Pain     Left hip       HPI:    Pain is worse to left SI joint. Injection helped for 2 months and is now diminishing. Appropriate analgesia with current medications regimen: yes. Change in quality of symptoms:no. Medication side effects:none. Recent diagnostic testing:none. Recent interventional procedures:none. She has been on anticoagulation medications to include ASA and has not been on herbal supplements. She is diabetic. Imagin/2022 L knee xray -  RESULT:     Severe narrowing of the left knee medial joint compartment, similar to   prior examination with tricompartmental osteophytosis. Small to moderate   knee joint effusion. Ossific joint bodies posteriorly. No acute   fracture or dislocation. No other significant abnormality. IMPRESSION   IMPRESSION:     Stable moderately advanced left knee osteoarthritis with joint bodies. 2021 MRI lumbar w/ and w/o -  FINDINGS:   BONES/ALIGNMENT: The vertebral body heights are maintained. There is   age-appropriate bone marrow signal.  There is posterior fixation at L4-5 with   artificial disc material.  There is multilevel degenerative disc disease in   the remaining disc spaces with loss of disc signal.  There is no disc space   narrowing. There is no significant spondylolisthesis. SPINAL CORD:  The conus medullaris is normal in caliber and signal and   terminates at the T12-L1 level. The cauda equina is unremarkable. SOFT TISSUES: There is a fluid collection containing septations in the   postoperative bed that measures approximately 3.1 x 2.7 x 3.7 cm. There is   no abnormal postcontrast enhancement. Visualized abdominal structures are   unremarkable. L1-L2: There is a circumferential disc bulge. There is no canal stenosis or   foraminal narrowing. L2-L3: There is a circumferential disc bulge with facet and ligamentous   hypertrophy. There is canal stenosis measuring 9 mm in AP dimension. There   is moderate bilateral foraminal narrowing. L3-L4: There is a circumferential disc bulge with facet and ligamentous   hypertrophy. There is canal stenosis measuring 9 mm in AP dimension. There   is no significant foraminal narrowing. L4-L5: There is artificial disc material with posterior laminectomy. There   is canal stenosis measuring 7 mm in AP dimension. There is no significant   foraminal narrowing. L5-S1: There is a circumferential disc bulge with facet hypertrophy. There   is no canal stenosis or foraminal narrowing. Impression   Posterior fixation and laminectomy at L4-5 with a postoperative fluid   collection containing septations that likely represents a seroma. Multilevel degenerative change with canal stenosis most pronounced at L4-5. Bilateral foraminal narrowing as described above. 12/24/2020 CT   FINDINGS:   Within the limits of noncontrast, non-myelographic CT technique:   IATROGENIC:   There has been interval bilateral laminectomy at T3 and T4, partially   extending into the posterior elements of T2, for probable subjacent   multilevel partial discectomy. In the lumbar region, there has been interval bilateral laminectomy at L4,   partially extending into adjacent levels, for partial discectomy and   interbody fusion at L4/5, as well as interval spinal instrumented fusion by   bilateral spinal fixation rods, anchored by bilateral pedicle screws, and L4   - L5. Beam-hardening artifacts slightly limit evaluation of nearby   structures. However, these components appear to be intact and in usual   customary position.    BONES/ALIGNMENT:   There are unchanged chronic multilevel asymmetric vertebral compression   deformities and related vertebral column curvature/alignment abnormalities. DEGENERATIVE CHANGES:   Within the thoracic surgical bed of T2 - T4 and L4/5, there is slight   post-operative distortion/ectasia of the traversing thecal sac, overlaid by   small amount of posterior epidural/paraspinal soft tissue emphysema/hematoma. The spinal cord, caudal neural elements, and nerve roots not well   demonstrated by this technique, although previously-existing disc-related   mass effect upon the traversing spinal cord at T2/3 appears to have been   relieved, as the osseous spinal canal at T2/3 - T4/5 and L4/5 has been   surgically decompressed. At T4/5, minimal residual disc material and moderate bilateral facet   arthropathy produce at least minimal effacement of the anterior subarachnoid   space and moderate/severe bilateral neural foraminal narrowing, progressively   lessening through T2/3. At L4/5, mild Grade I anterolisthesis of L4 on L5 combines with mild residual   disc-osteophyte complex and severe bilateral facet arthropathy (slightly   worse on the right) to produce at least mild effacement of the anterior   subarachnoid space, as well as severe/marked right, and moderate left, neural   foraminal narrowing. All other previously-demonstrated/described multilevel spinal and bilateral   sacroiliac joint degenerative disease, central spinal canal stenosis, and   neural foraminal narrowing, all appear otherwise not significantly changed. INCIDENTAL:   There is minimal dependent bibasilar pleural effusions and equivalent   adjacent subsegmental atelectasis versus infiltrate tracking to both lung   apices, where this becomes slightly worse on the right. The incompletely visualized non-opacified heart is at least mildly enlarged,   with equivalent fullness of visualized non-opacified central pulmonary   vessels.    Minimal to moderate atherosclerotic calcification is scattered throughout the   visualized [id number]-[id number] arterial system. The gallbladder is surgically absent, with multiple surgical clips present   within the gallbladder fossa. The right kidney is incompletely visualized, but appears at least minimally   malrotated. There is question of a surgical anastomotic suture line incompletely   visualized about the rectosigmoid junction. No other clinically-significant changes are noted. .       Impression   1. Interval bilateral laminectomy at T3 and T4, partially extending into the   posterior elements of T2, for probable subjacent multilevel partial   discectomy. 2.  Interval bilateral laminectomy at L4, partially extending into adjacent   levels, for partial discectomy and interbody fusion at L4/5, as well as   interval spinal instrumented fusion by bilateral spinal fixation rods,   anchored by bilateral pedicle screws, and L4 - L5. Beam-hardening artifacts   slightly limit evaluation of nearby structures. However, these components   appear to be intact and in usual customary position. 3.  Apparent post-operative relief of disc-related mass effect upon the   spinal cord within the above-described surgical beds, most significant at   T2/3, as described. 4.  Probable iatrogenic posterior epidural/paraspinal emphysema/hematoma   within the above-described surgical beds, as described. 5.  All other previously-demonstrated/described multilevel spinal and   bilateral sacroiliac joint degenerative disease, central spinal canal   stenosis, and neural foraminal narrowing, all appear otherwise not   significantly changed. 6.  Unchanged chronic multilevel asymmetric vertebral compression deformities   and related vertebral column curvature/alignment abnormalities.    7.  Incidental findings, most notable for minimal dependent bibasilar pleural   effusions and equivalent adjacent subsegmental atelectasis versus infiltrate   tracking to both lung apices, where this becomes slightly worse on the right. 12/21/2020 -  FINDINGS:   BONES/ALIGNMENT: There is normal alignment of the spine. There are mild old   compression deformities of T6 through T9, similar compared to the prior plain   films. Otherwise, the vertebral body heights are maintained. The bone marrow   signal appears unremarkable. No evidence of acute fracture or osseous   destructive lesion. SPINAL CORD: No abnormal cord signal is seen. SOFT TISSUES: No paraspinal mass identified. DEGENERATIVE CHANGES: There is advanced degenerative change with spurring and   disc space narrowing at multiple levels, most severe at T6-7 through T11-12. The  sagittal view of the lumbar spine demonstrates grade 1   anterolisthesis and severe central canal stenosis at L4-5. Please refer to   the report for MRI lumbar spine done yesterday. C7-T1: No evidence of significant central canal stenosis or disc herniation. T1-2: There is posterior facet degenerative change causing slight impression   on the left posterolateral aspect of the thecal sac. No evidence of   significant central canal stenosis or disc herniation. No evidence of cord   compression. T2-3: There is prominent central and right-sided disc osteophyte complex   causing moderate central canal stenosis with slight impression on the ventral   aspect of the spinal cord. T3-4: There are small bilateral posterolateral disc protrusions, larger on   the right causing slight impression on the right ventral aspect of the spinal   cord. Overall, mild central canal stenosis at this level. T4-5: No evidence of significant central canal stenosis or disc herniation. T5-6: There is disc bulge with small bilateral disc osteophyte complexes that   contacts the ventral surface of the spinal cord without evidence of cord   compression. Mild central canal stenosis.    T6-7: There is diffuse disc bulge causing moderate central canal stenosis   with slight impression on the ventral aspect of the spinal cord. T7-8: There is disc bulge and small left paramedian disc protrusion, causing   slight impression on the thecal sac without evidence of significant central   canal stenosis or cord compression. T8-9: There is a large central and right paramedian disc protrusion causing   slight impression on the right side of the spinal cord. T9-10: There is mild disc bulge and bilateral posterior facet degenerative   change causing mild central canal stenosis without evidence of cord   compression. T10-11: There is a small left paramedian disc protrusion causing slight   impression on the ventral portion of the thecal sac without evidence of cord   compression or significant central canal stenosis. T11-12: There is mild disc bulge without evidence of significant central   canal stenosis or cord compression. T12-L1: There is slight grade 1 retrolisthesis of T12 on L1 with tiny right   paramedian disc protrusion causing minimal impression on the thecal sac. No   significant central canal stenosis or compression of the conus. Impression   1. Degenerative change. Few mild old midthoracic compression deformities. No acute fracture or osseous destructive lesion. 2. Multilevel central canal stenosis as described above with multiple disc   protrusions, some which cause slight impression on the thoracic spinal cord. Clinical correlation is suggested. 12/2020 MRI cervical -  FINDINGS:   Pre- and post-contrast T1 weighted images of the cervical spine was performed. There is no abnormal enhancement in the cervical spinal cord. There is no abnormal enhancement in the cervical spine. The vertebral body   heights are grossly maintained. There is no fracture or destructive osseous   lesion. There is a prominent left paracentral disc protrusion at T2-3, contributing   to moderate spinal canal narrowing and moderate spinal cord compression.        Impression   Pre- and post-contrast T1 weighted images of the cervical spine was performed. No abnormal enhancement in the cervical spinal cord. The previously seen T2   hyperintensity in the cervical spinal cord is likely related to artifacts. Follow-up is recommended. Prominent left paracentral disc protrusion at T2-3, contributing to moderate   spinal canal narrowing and moderate spinal cord compression. There is likely   focal spinal cord edema at T2-3 level on MRI cervical spine 2020. The results were sent to radiology results communication.                              Potential Aberrant Drug-Related Behavior:  no     Urine Drug Screenin2022 consistent     OARRS report:  2022 consistent to 10/2022      Opioid Agreement:  Date enacted: 2022  Renewal date:    Past Medical History:   Diagnosis Date    Acute left ankle pain 2020    Acute left lumbar radiculopathy 2017    Cancer ([address] 2017    colon (treated surgically)    Chronic back pain     Chronic deep vein thrombosis (DVT) of distal vein of left lower extremity ([address] 2021    Diabetes mellitus ([address]     Drug-induced constipation 2020    Eosinophil count raised 2019    Fall at home 2017    Fatigue 2016    Fibromyalgia     GERD (gastroesophageal reflux disease)     Hair loss 2021    Hemorrhoids     Hypercalcemia 2019    Hyperlipidemia     Hypertension     Malignant neoplasm of sigmoid colon ([address] 2020    Obesity     Osteoarthritis     PONV (postoperative nausea and vomiting)     Postmenopausal bleeding 10/05/2017    Rib contusion, left, initial encounter 2020    TIA (transient ischemic attack) 2022    Urinary incontinence     Urinary retention 2020    Weakness of both legs 2020       Past Surgical History:   Procedure Laterality Date    ANESTHESIA NERVE BLOCK Bilateral 08/15/2019    BILATERAL INTRA-ARTICULAR FACET JOINT INJECTION WITH FLUOROSCOPIC GUIDANCE AT L4-5, L5-S1 WITH IV SEDATION performed by [facility], DO at [facility] [address] OF UTERUS N/A 10/23/2019    DILATATION AND CURETTAGE HYSTEROSCOPY performed by Bebeto Quick MD at [address] Right 07/11/2019    C7-T1 EPIDURAL STEROID INJECTION #1 TOWARD THE RIGHT performed by Angelina Lopez DO at [facility] [address] Bilateral     LUMBAR SPINE SURGERY N/A 12/24/2020    L4-L5  POSTERIOR LUMBAR INTERBODY FUSION, T2-T3 DECOMPRESSIVE LAMINECTOMY performed by Angela De Oliveira MD at . [address] Right 12/28/2017    right L4-5 transforaminal epidural #1    NERVE BLOCK Left 11/29/2018    si inj    NERVE BLOCK Bilateral 08/15/2019    bilateral intra-articular facet joint injection with flouroscopic guidance at L4-S1 with iv sedation    NERVE BLOCK Left 8/2/2022    LEFT SACROILIAC JOINT INJECTION performed by Angelina Lopez DO at [address] SI JOINT ARTHRGRPHY&/ANES/STEROID W/IMAGE Left 11/29/2018    LEFT SACROILIAC JOINT INJECTION WITH X-RAY performed by [facility], DO at [facility] [address] Left 2005    STEROID INJECTION KNEE Left 02/2022    TONSILLECTOMY  28 YRS OLD    TUBAL LIGATION         Prior to Admission medications    Medication Sig Start Date End Date Taking? Authorizing Provider   buprenorphine (BUPRENEX) 10 MCG/HR [facility]  9/23/22  Yes Historical Provider, MD   SITagliptin (JANUVIA) 100 MG tablet Take 1 tablet by mouth in the morning. 7/21/22  Yes VISHNU Savage CNP   levothyroxine (SYNTHROID) 25 MCG tablet Take 1 tablet by mouth in the morning.  On empty stomach. 7/21/22  Yes VISHNU Rose CNP   atenolol (TENORMIN) 50 MG tablet Take 1 tablet by mouth in the morning. 7/21/22  Yes VISHNU Rose CNP   atorvastatin (LIPITOR) 10 MG tablet Take 10 mg by mouth daily   Yes Historical Provider, MD   allopurinol (ZYLOPRIM) 300 MG tablet Take one (1) tablet by mouth once daily. 6/24/22  Yes VISHNU Rose CNP   aspirin 81 MG EC tablet Take 1 tablet by mouth daily 5/10/22  Yes Kingston Will MD   ezetimibe (ZETIA) 10 MG tablet Take 1 tablet by mouth daily 4/4/22  Yes VISHNU Rose CNP   meloxicam (MOBIC) 15 MG tablet  12/6/21  Yes Historical Provider, MD   acetaminophen (TYLENOL) 500 MG tablet Take 500 mg by mouth every 6 hours as needed for Pain   Yes Historical Provider, MD   PROCTOZONE-HC 2.5 % CREA rectal cream Apply 4 times daily as needed 8/5/21  Yes Louis Estrada MD   Cyanocobalamin (B-12 PO) Take by mouth   Yes Historical Provider, MD   Blood Glucose Monitoring Suppl (ACCU-CHEK GUIDE) w/Device KIT  3/8/21  Yes Historical Provider, MD   Alcohol Swabs (PHARMACIST CHOICE ALCOHOL) PADS  3/8/21  Yes Historical Provider, MD   Blood Glucose Calibration (ACCU-CHEK GUIDE CONTROL) LIQD  3/8/21  Yes Historical Provider, MD   [address] [facility] by Does not apply route Start 11/23/2020 expires 11/22/2023 11/23/20  Yes VISHNU Savage CNP   blood glucose monitor strips Test 1 times a day & as needed for symptoms of irregular blood glucose.  6/17/20  Yes VISHNU Savage CNP   Cholecalciferol (VITAMIN D3) 2000 units CAPS Take 2,000 Units by mouth daily   Yes Historical Provider, MD   zoster recombinant adjuvanted vaccine [facility]) 50 MCG/0.5ML SUSR injection Inject 0.5 mLs into the muscle See Admin Instructions 1 dose now and repeat in 2-6 months  Patient not taking: Reported on 10/12/2022 7/21/22 1/17/23  VISHNU Rose CNP   diclofenac sodium (VOLTAREN) 1 % GEL Apply topically 2 times daily  Patient not taking: Reported on 10/12/2022 4/21/21   Daljit Echavarria MD       Allergies   Allergen Reactions    Diclofenac     Lisinopril Other (See Comments)     Profound malaise    Diclofenac Sodium Nausea And Vomiting    Farxiga [Dapagliflozin] Itching     Vaginal itching with Bill Marley [Pregabalin] Other (See Comments)     Confusion    Sulfa Antibiotics Rash       Social History     Socioeconomic History    Marital status:      Spouse name: Not on file    Number of children: Not on file    Years of education: Not on file    Highest education level: Not on file   Occupational History    Not on file   Tobacco Use    Smoking status: Never    Smokeless tobacco: Never   Vaping Use    Vaping Use: Never used   Substance and Sexual Activity    Alcohol use: Yes     Comment: Rarely    Drug use: No    Sexual activity: Not on file   Other Topics Concern    Not on file   Social History Narrative    Not on file     Social Determinants of Health     Financial Resource Strain: Low Risk     Difficulty of Paying Living Expenses: Not hard at all   Food Insecurity: No Food Insecurity    Worried About Running Out of Food in the Last Year: Never true    Ran Out of Food in the Last Year: Never true   Transportation Needs: Not on file   Physical Activity: Not on file   Stress: Not on file   Social Connections: Not on file   Intimate Partner Violence: Not on file   Housing Stability: Not on file       Family History   Problem Relation Age of Onset    Asthma Mother     Mental Illness Mother     Cancer Mother         kidney    Heart Disease Father         melanoma    Thyroid Disease Sister     Thyroid Disease Sister        REVIEW OF SYSTEMS:     Bradford Dandy denies fever/chills, chest pain, shortness of breath, new bowel or bladder complaints. All other review of systems was negative. PHYSICAL EXAMINATION:      BP (!) 148/80   Pulse 68   Temp 97.6 °F (36.4 °C) (Infrared)   Resp 16   Ht 5' (1.524 m)   Wt 200 lb (90.7 kg)   LMP  (LMP Unknown)   SpO2 95%   BMI 39.06 kg/m²     General:      General appearance:   pleasant and well-hydrated. , in mild discomfort and A & O x3  Build:Overweight    HEENT:    Head:normocephalic and atraumatic  Sclera: icterus absent,    Lungs:    Breathing:Normal expansion.  No wheezing. Abdomen:    Shape:non-distended and normal  Lumbar spine:    Range of motion:abnormal moderately Lateral bending, flexion, extension rotation bilateral and is painful. Extremities:    Tremors:None bilaterally upper and lower  Range of motion:Generally normal shoulders, pain with internal rotation of hips not done. Intact:Yes  Edema:Normal    Neurological:    Gait:antalgic with walker. Dermatology:    Skin:Irritation to left hip from patch. No fluctuant pocket. No open wounds. 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 Elle Mcfadden with: L4-5 PLIF and lami T2-T4 12/24/2020  Since the above, she has been treated for pain mgmt by Dr. Julisa Brothers  PMHx: Hx DVT (post-surgical recovery), TIA 5/2022, colon CA (tx surgically), DM, constipation, GERD, DLD, HTN, obesity  Has enough scripts of Butrans to last until 8/15/2022     From prior visit: She was doing so well since the SIJ injection, she abruptly discontinued her Butrans patch yesterday (10 mcg) to see what it was doing for her  Her pain significantly increased and she restarted the patch  Discussed in the future it would be better to wean off the patches by using the last one x 10 days instead of abruptly stopping it     Plan:                 L knee viscosupp discussed, she will let us know - had done at [facility] (Durolane). 9/30. Patient states that provider said okay to have done here in the future. OARRS report reviewed  RF Butrans 10 mcg/hr (right abdomen), 1 RF - had some irritation to her left hip area (does not appear infected). Discussed the option of Belbuca.   She would like to continue the patch. She is changing locations. L SIJ injection with 65-75% relief x 2 months. Repeat ordered today. Referral to GI for difficulty regulating bowel habits since surgical tx of colon CA - she saw the NP and started Metamucil. She has an appt next week again. Patient encouraged to stay active and to lose weight  Treatment plan discussed with the patient including medication and procedure side effects     Controlled Substance Monitoring:    Acute and Chronic Pain Monitoring:   RX Monitoring 10/12/2022   Periodic Controlled Substance Monitoring Possible medication side effects, risk of tolerance/dependence & alternative treatments discussed. ;No signs of potential drug abuse or diversion identified. ;Assessed functional status. ;Obtaining appropriate analgesic effect of treatment. We discussed with the patient that combining opioids, benzodiazepines, alcohol, illicit drugs or sleep aids increases the risk of respiratory depression including death. We discussed that these medications may cause drowsiness, sedation or dizziness and have counseled the patient not to drive or operate machinery. We have discussed that these medications will be prescribed only by one provider. We have discussed with the patient about age related risk factors and have thoroughly discussed the importance of taking these medications as prescribed. The patient verbalizes understanding.     ccreferring physic

## 2022-10-12 NOTE — PROGRESS NOTES
Do you currently have any of the following:    Fever: No  Headache:  No  Cough: No  Shortness of breath: No  Exposed to anyone with these symptoms: No         Donal Wright presents to the [address] [place] [address] on 10/12/2022. Sonia Rivera is complaining of pain lower back and left hip. The pain is constant. The pain is described as pressure. Pain is rated on her best day at a 3, on her worst day at a 10, and on average at a 6 on the VAS scale. She took her last dose of Butrans Patch today. Any procedures since your last visit: No    Pacemaker or defibrillator: No.    She is  on NSAIDS and is  on anticoagulation medications to include ASA and is managed by VISHNU Abbott CNP  . Medication Contract and Consent for Opioid Use Documents Filed       Patient Documents       Type of Document Status Date Received Received By Description    Medication Contract Received 6/21/2019  7:35 AM STRIDE, Lorna Kehr SARAH med contract    Medication Contract Received 3/26/2021 10:38 AM Christa Few PAIN MGMT CONTRACT DR. RONDON    Medication Contract Signed 8/12/2022 11:52 AM RAMY GONSALES Pain Agreement 8/12/22                    Resp 16   Ht 5' (1.524 m)   Wt 200 lb (90.7 kg)   LMP  (LMP Unknown)   BMI 39.06 kg/m²      No LMP recorded (lmp unknown).  Patient is postmenopausal.

## 2022-10-13 ENCOUNTER — HOSPITAL ENCOUNTER (OUTPATIENT)
Dept: INTERVENTIONAL RADIOLOGY/VASCULAR | Age: 67
Discharge: HOME | End: 2022-10-15
Payer: MEDICARE

## 2022-10-13 DIAGNOSIS — I73.9 PVD (PERIPHERAL VASCULAR DISEASE) (HCC): ICD-10-CM

## 2022-10-13 PROCEDURE — 93923 UPR/LXTR ART STDY 3+ LVLS: CPT

## 2022-10-18 RX ORDER — ATORVASTATIN CALCIUM 10 MG/1
10 TABLET, FILM COATED ORAL DAILY
Qty: 30 TABLET | Refills: 0 | Status: SHIPPED
Start: 2022-10-18 | End: 2022-11-02 | Stop reason: SDUPTHER

## 2022-10-18 NOTE — TELEPHONE ENCOUNTER
----- Message from Yolanida Bethany sent at 10/18/2022 11:08 AM EDT -----  Subject: Refill Request    QUESTIONS  Name of Medication? Other - ATORVASTATIN   Patient-reported dosage and instructions? n/a  How many days do you have left? 0  Preferred [facility]? [facility]  Pharmacy phone number (if available)? [phone]  Additional Information for Provider? Pt is out of mediation and needs this   refilled. Pt would like a call regarding this.   ---------------------------------------------------------------------------  --------------  CALL BACK INFO  What is the best way for the office to contact you? OK to leave message on   voicemail  Preferred Call Back Phone Number? [phone]  ---------------------------------------------------------------------------  --------------  SCRIPT ANSWERS  Relationship to Patient?  Self

## 2022-10-26 ENCOUNTER — HOSPITAL ENCOUNTER (OUTPATIENT)
Dept: GENERAL RADIOLOGY | Age: 67
Discharge: HOME | End: 2022-10-28
Payer: MEDICARE

## 2022-10-26 DIAGNOSIS — Z12.31 SCREENING MAMMOGRAM FOR BREAST CANCER: ICD-10-CM

## 2022-10-26 PROCEDURE — 77063 BREAST TOMOSYNTHESIS BI: CPT

## 2022-10-28 ENCOUNTER — HOSPITAL ENCOUNTER (OUTPATIENT)
Dept: GENERAL RADIOLOGY | Age: 67
Discharge: HOME | End: 2022-10-30
Payer: MEDICARE

## 2022-10-28 DIAGNOSIS — R13.10 DYSPHAGIA, UNSPECIFIED TYPE: ICD-10-CM

## 2022-10-28 PROCEDURE — 92611 MOTION FLUOROSCOPY/SWALLOW: CPT | Performed by: SPEECH-LANGUAGE PATHOLOGIST

## 2022-10-28 PROCEDURE — 74230 X-RAY XM SWLNG FUNCJ C+: CPT

## 2022-10-28 PROCEDURE — 2500000003 HC RX 250 WO HCPCS: Performed by: OTOLARYNGOLOGY

## 2022-10-28 PROCEDURE — 92526 ORAL FUNCTION THERAPY: CPT | Performed by: SPEECH-LANGUAGE PATHOLOGIST

## 2022-10-28 RX ADMIN — BARIUM SULFATE 45 G: 0.81 POWDER, FOR SUSPENSION ORAL at 11:07

## 2022-10-28 RX ADMIN — BARIUM SULFATE 45 ML: 400 SUSPENSION ORAL at 11:07

## 2022-10-28 RX ADMIN — BARIUM SULFATE 45 ML: 400 PASTE ORAL at 11:06

## 2022-10-28 NOTE — PROGRESS NOTES
SPEECH/LANGUAGE PATHOLOGY  VIDEOFLUOROSCOPIC STUDY OF SWALLOWING (MBS)   and PLAN OF CARE    PATIENT NAME:  Glenroy Valdez  (female)     MRN:  [id number]    :  1955  (74 y.o.)  STATUS:  Outpatient    TODAY'S DATE:  10/28/2022  REFERRING PROVIDER:   Dr. Rama Paulino: [place] modified barium swallow with video  Date of order:  10/13/2022   REASON FOR REFERRAL: dysphagia    EVALUATING THERAPIST: Fuad Nash SLP      RESULTS:      DYSPHAGIA DIAGNOSIS:  normal swallow function     DIET RECOMMENDATIONS:  Regular consistency solids (IDDSI level 7) with  thin liquids (IDDSI level 0)    FEEDING RECOMMENDATIONS:    Assistance level:  No assistance needed     Compensatory strategies recommended: No strategies are recommended at this time     Discussed recommendations with nursing and/or faxed report to referring provider: Yes    SPEECH THERAPY  PLAN OF CARE   The dysphagia POC is established based on physician order and dysphagia diagnosis    Dysphagia therapy is not recommended       Conditions Requiring Skilled Therapeutic Intervention for dysphagia:    not applicable    SPECIFIC DYSPHAGIA INTERVENTIONS TO INCLUDE:     Not applicable    Specific instructions for next treatment:  not applicable   Treatment Goals:    Short Term Goals:  Not applicable no therapy warranted     Long Term Goals:   Not applicable no therapy warranted      Patient/family Goal:    not applicable                    ADMITTING DIAGNOSIS: Dysphagia, unspecified type [R13.10]     VISIT DIAGNOSIS:   Visit Diagnoses         Codes    Dysphagia, unspecified type     R13.10                PATIENT REPORT/COMPLAINT: difficulty swallowing since TIA in May 2022    PRIOR LEVEL OF SWALLOW FUNCTION:    Past History of Dysphagia?:  yes    Home diet: Regular consistency solids (IDDSI level 7) with  thin liquids (IDDSI level 0)      Current Diet Order:  No diet orders on file    PROCEDURE:  Consistencies Administered During the Evaluation Liquids: thin liquid and nectar thick liquid   Solids:  pureed foods, solid foods, and OTHER:  Barium tablet      Method of Intake:   cup, straw, spoon  Self fed      Position:   Seated, upright, Lateral plane    INSTRUMENTAL ASSESSMENT:      [facility]:   Lip closure for intraoral bolus containment resulted in no labial escape. Tongue control during bolus hold maintained a cohesive bolus held between tongue to palate seal. Bolus preparation and mastication resulted in timely and efficient chewing and mashing. Bolus transport/lingual motion was with brisk tongue motion. Oral residue was not observed. Initiation of the pharyngeal swallow occurred as the bolus head reached the posterior angle of the mandibular ramus. Soft palate elevation resulted in no bolus between the soft palate and the pharyngeal wall. Laryngeal elevation demonstrated complete superior movement of the thyroid cartilage with complete approximation of the arytenoids to the epiglottic petiole. Anterior hyoid excursion demonstrated complete anterior movement. Epiglottic movement resulted in complete inversion. Laryngeal vestibule closure was complete, as indicated by no air or contrast within the laryngeal vestibule at the height of the swallow. Pharyngeal stripping wave was present and complete. Pharyngeal contraction could not be determined due to logistical reasons not related to physiologic impairment. Pharyngoesophageal segment opening was completely distended for complete duration with no obstruction of bolus flow. Tongue base retraction allowed no contrast between the retracted tongue base and the posterior pharyngeal wall. Pharyngeal residue was not present. Esophageal clearance in the upright position could not be assessed due to logistical reasons not related to physiologic impairment.      PENETRATION-ASPIRATION SCALE (PAS):  THIN 1 = Material does not enter the airway  MILDLY THICK 1 = Material does not enter the airway  MODERATELY THICK item not administered  PUREE 1 = Material does not enter the airway  HARD SOLID 1 = Material does not enter the airway       COMPENSATORY STRATEGIES    Compensatory strategies were not attempted      STRUCTURAL/FUNCTIONAL ANOMALIES   No structural/functional anomalies were noted    CERVICAL ESOPHAGEAL STAGE :     The cervical esophagus appeared adequate          ___________    Cognition:   Alert & Oriented x 3    Oral Peripheral Examination   Adequate lingual/labial strength     Current Respiratory Status   room air     Parameters of Speech Production  Respiration:  Adequate for speech production  Quality:   Within functional limits  Intensity: Within functional limits    Pain: No pain reported. EDUCATION:   The Speech Language Pathologist (SLP) completed education regarding results of evaluation and that intervention is not warranted at this time. Learner: Patient  Education: Reviewed results and recommendations of this evaluation  Evaluation of Education:  Quinn Pérez understanding    This plan may be re-evaluated and revised as warranted. Evaluation Time includes thorough review of current medical information, gathering information on past medical history/social history and prior level of function, completion of standardized testing/informal observation of tasks, assessment of data and education on plan of care and goals. [x]The admitting diagnosis and active problem list, have been reviewed prior to initiation of this evaluation.     CPT Code: 75824  dysphagia study    ACTIVE PROBLEM LIST:   Patient Active Problem List   Diagnosis    Essential hypertension    Chronic back pain greater than 3 months duration    Mixed hyperlipidemia    Vitamin D insufficiency    Gastroesophageal reflux disease without esophagitis    Central stenosis of spinal canal    Spondylolisthesis at L4-L5 level    Chronic pain of left knee    Obesity, Class III, BMI 40-49.9 (morbid obesity) ([facility] [address] History of colon cancer    Sacroiliitis (HCC)    Telogen effluvium    Chronic pain syndrome    Cervical radiculopathy    Cervical disc disorder    Type 2 diabetes mellitus without complication, without long-term current use of insulin (HCC)    Facet arthropathy    Fibromyalgia    Primary osteoarthritis of left knee    Constipation    Chronic pain of multiple joints    Osteoarthritis involving multiple joints on both sides of body    Cervical spondylosis without myelopathy    DDD (degenerative disc disease), cervical    DDD (degenerative disc disease), lumbar    Numbness in left leg    Spinal stenosis    Back pain    Ambulatory dysfunction    Grade I hemorrhoids    Breast tenderness in female    H/O colonoscopy with polypectomy    Acquired hypothyroidism    Hypertriglyceridemia    TIA (transient ischemic attack)    Difficulty swallowing pills    Visual floaters, right    Disorder of sacrum    Pain in thoracic spine    Lumbar post-laminectomy syndrome    Encounter for osteoporosis screening in asymptomatic postmenopausal patient    Need for vaccination against Streptococcus pneumoniae    Need for shingles vaccine    At high risk for falls       Tru Servin MSCCC/SLP  Speech Language Pathologist  [phone]

## 2022-11-02 DIAGNOSIS — E78.1 HYPERTRIGLYCERIDEMIA: ICD-10-CM

## 2022-11-02 DIAGNOSIS — M10.072 ACUTE IDIOPATHIC GOUT INVOLVING TOE OF LEFT FOOT: ICD-10-CM

## 2022-11-02 DIAGNOSIS — E11.9 TYPE 2 DIABETES MELLITUS WITHOUT COMPLICATION, WITHOUT LONG-TERM CURRENT USE OF INSULIN (HCC): ICD-10-CM

## 2022-11-02 DIAGNOSIS — E03.9 ACQUIRED HYPOTHYROIDISM: ICD-10-CM

## 2022-11-02 LAB
ALBUMIN SERPL-MCNC: 4.4 G/DL (ref 3.5–5.2)
ALP BLD-CCNC: 84 U/L (ref 35–104)
ALT SERPL-CCNC: 24 U/L (ref 0–32)
ANION GAP SERPL CALCULATED.3IONS-SCNC: 14 MMOL/L (ref 7–16)
AST SERPL-CCNC: 30 U/L (ref 0–31)
BASOPHILS ABSOLUTE: 0.05 E9/L (ref 0–0.2)
BASOPHILS RELATIVE PERCENT: 0.9 % (ref 0–2)
BILIRUB SERPL-MCNC: 0.7 MG/DL (ref 0–1.2)
BUN BLDV-MCNC: 12 MG/DL (ref 6–23)
CALCIUM SERPL-MCNC: 9.8 MG/DL (ref 8.6–10.2)
CHLORIDE BLD-SCNC: 103 MMOL/L (ref 98–107)
CHOLESTEROL, TOTAL: 134 MG/DL (ref 0–199)
CO2: 22 MMOL/L (ref 22–29)
CREAT SERPL-MCNC: 0.6 MG/DL (ref 0.5–1)
EOSINOPHILS ABSOLUTE: 0.31 E9/L (ref 0.05–0.5)
EOSINOPHILS RELATIVE PERCENT: 5.4 % (ref 0–6)
GFR SERPL CREATININE-BSD FRML MDRD: >60 ML/MIN/1.73
GLUCOSE BLD-MCNC: 144 MG/DL (ref 74–99)
HCT VFR BLD CALC: 38.5 % (ref 34–48)
HDLC SERPL-MCNC: 40 MG/DL
HEMOGLOBIN: 13.6 G/DL (ref 11.5–15.5)
IMMATURE GRANULOCYTES #: 0.03 E9/L
IMMATURE GRANULOCYTES %: 0.5 % (ref 0–5)
LDL CHOLESTEROL CALCULATED: 33 MG/DL (ref 0–99)
LYMPHOCYTES ABSOLUTE: 1.68 E9/L (ref 1.5–4)
LYMPHOCYTES RELATIVE PERCENT: 29.2 % (ref 20–42)
MCH RBC QN AUTO: 33.1 PG (ref 26–35)
MCHC RBC AUTO-ENTMCNC: 35.3 % (ref 32–34.5)
MCV RBC AUTO: 93.7 FL (ref 80–99.9)
MONOCYTES ABSOLUTE: 0.45 E9/L (ref 0.1–0.95)
MONOCYTES RELATIVE PERCENT: 7.8 % (ref 2–12)
NEUTROPHILS ABSOLUTE: 3.24 E9/L (ref 1.8–7.3)
NEUTROPHILS RELATIVE PERCENT: 56.2 % (ref 43–80)
PDW BLD-RTO: 13.6 FL (ref 11.5–15)
PLATELET # BLD: 183 E9/L (ref 130–450)
PMV BLD AUTO: 11.9 FL (ref 7–12)
POTASSIUM SERPL-SCNC: 4 MMOL/L (ref 3.5–5)
RBC # BLD: 4.11 E12/L (ref 3.5–5.5)
SODIUM BLD-SCNC: 139 MMOL/L (ref 132–146)
TOTAL PROTEIN: 6.8 G/DL (ref 6.4–8.3)
TRIGL SERPL-MCNC: 306 MG/DL (ref 0–149)
VLDLC SERPL CALC-MCNC: 61 MG/DL
WBC # BLD: 5.8 E9/L (ref 4.5–11.5)

## 2022-11-02 RX ORDER — ALLOPURINOL 300 MG/1
TABLET ORAL
Qty: 90 TABLET | Refills: 3 | Status: SHIPPED | OUTPATIENT
Start: 2022-11-02

## 2022-11-02 RX ORDER — EZETIMIBE 10 MG/1
10 TABLET ORAL DAILY
Qty: 90 TABLET | Refills: 1 | Status: SHIPPED | OUTPATIENT
Start: 2022-11-02

## 2022-11-02 RX ORDER — LEVOTHYROXINE SODIUM 0.03 MG/1
25 TABLET ORAL DAILY
Qty: 90 TABLET | Refills: 1 | Status: SHIPPED | OUTPATIENT
Start: 2022-11-02

## 2022-11-02 RX ORDER — ATORVASTATIN CALCIUM 20 MG/1
20 TABLET, FILM COATED ORAL DAILY
Qty: 90 TABLET | Refills: 1 | Status: SHIPPED | OUTPATIENT
Start: 2022-11-02

## 2022-11-14 ENCOUNTER — HOSPITAL ENCOUNTER (OUTPATIENT)
Dept: MAMMOGRAPHY | Age: 67
Discharge: HOME | End: 2022-11-16
Payer: MEDICARE

## 2022-11-14 DIAGNOSIS — Z78.0 ENCOUNTER FOR OSTEOPOROSIS SCREENING IN ASYMPTOMATIC POSTMENOPAUSAL PATIENT: ICD-10-CM

## 2022-11-14 DIAGNOSIS — Z13.820 ENCOUNTER FOR OSTEOPOROSIS SCREENING IN ASYMPTOMATIC POSTMENOPAUSAL PATIENT: ICD-10-CM

## 2022-11-14 PROCEDURE — 77080 DXA BONE DENSITY AXIAL: CPT

## 2022-11-21 NOTE — PROGRESS NOTES
Pt reports recent covid exposure, not tested. +cough/sore throat. Pt states she notified Dr Joss Schumacher office. Instructed to go to urgent care for covid testing. Upon further consideration pt states she is cancelling procedure for 11/22/22. Pt instructed to notify Dr Joss Schumacher office of cancellation and if test covid +. Pt verbalized understanding.

## 2022-11-23 RX ORDER — OMEPRAZOLE 40 MG/1
40 CAPSULE, DELAYED RELEASE ORAL DAILY
Qty: 90 CAPSULE | Refills: 1 | OUTPATIENT
Start: 2022-11-23

## 2022-12-05 ENCOUNTER — OFFICE VISIT (OUTPATIENT)
Dept: GASTROENTEROLOGY | Age: 67
End: 2022-12-05
Payer: MEDICARE

## 2022-12-05 VITALS
DIASTOLIC BLOOD PRESSURE: 85 MMHG | OXYGEN SATURATION: 95 % | WEIGHT: 209 LBS | SYSTOLIC BLOOD PRESSURE: 155 MMHG | HEART RATE: 75 BPM | BODY MASS INDEX: 40.82 KG/M2

## 2022-12-05 DIAGNOSIS — R19.8 IRREGULAR BOWEL HABITS: Primary | ICD-10-CM

## 2022-12-05 DIAGNOSIS — Z85.048 HISTORY OF COLORECTAL CANCER: ICD-10-CM

## 2022-12-05 DIAGNOSIS — K21.9 GASTROESOPHAGEAL REFLUX DISEASE, UNSPECIFIED WHETHER ESOPHAGITIS PRESENT: ICD-10-CM

## 2022-12-05 PROCEDURE — 99213 OFFICE O/P EST LOW 20 MIN: CPT | Performed by: STUDENT IN AN ORGANIZED HEALTH CARE EDUCATION/TRAINING PROGRAM

## 2022-12-05 PROCEDURE — G8417 CALC BMI ABV UP PARAM F/U: HCPCS | Performed by: STUDENT IN AN ORGANIZED HEALTH CARE EDUCATION/TRAINING PROGRAM

## 2022-12-05 PROCEDURE — G8427 DOCREV CUR MEDS BY ELIG CLIN: HCPCS | Performed by: STUDENT IN AN ORGANIZED HEALTH CARE EDUCATION/TRAINING PROGRAM

## 2022-12-05 PROCEDURE — 3078F DIAST BP <80 MM HG: CPT | Performed by: STUDENT IN AN ORGANIZED HEALTH CARE EDUCATION/TRAINING PROGRAM

## 2022-12-05 PROCEDURE — G8399 PT W/DXA RESULTS DOCUMENT: HCPCS | Performed by: STUDENT IN AN ORGANIZED HEALTH CARE EDUCATION/TRAINING PROGRAM

## 2022-12-05 PROCEDURE — 1123F ACP DISCUSS/DSCN MKR DOCD: CPT | Performed by: STUDENT IN AN ORGANIZED HEALTH CARE EDUCATION/TRAINING PROGRAM

## 2022-12-05 PROCEDURE — G8484 FLU IMMUNIZE NO ADMIN: HCPCS | Performed by: STUDENT IN AN ORGANIZED HEALTH CARE EDUCATION/TRAINING PROGRAM

## 2022-12-05 PROCEDURE — 3074F SYST BP LT 130 MM HG: CPT | Performed by: STUDENT IN AN ORGANIZED HEALTH CARE EDUCATION/TRAINING PROGRAM

## 2022-12-05 PROCEDURE — 1036F TOBACCO NON-USER: CPT | Performed by: STUDENT IN AN ORGANIZED HEALTH CARE EDUCATION/TRAINING PROGRAM

## 2022-12-05 PROCEDURE — 1090F PRES/ABSN URINE INCON ASSESS: CPT | Performed by: STUDENT IN AN ORGANIZED HEALTH CARE EDUCATION/TRAINING PROGRAM

## 2022-12-05 PROCEDURE — 3017F COLORECTAL CA SCREEN DOC REV: CPT | Performed by: STUDENT IN AN ORGANIZED HEALTH CARE EDUCATION/TRAINING PROGRAM

## 2022-12-05 RX ORDER — SENNA PLUS 8.6 MG/1
1 TABLET ORAL 2 TIMES DAILY
Qty: 60 TABLET | Refills: 11 | Status: SHIPPED | OUTPATIENT
Start: 2022-12-05 | End: 2023-12-05

## 2022-12-05 RX ORDER — POLYETHYLENE GLYCOL 3350 17 G/17G
238 POWDER, FOR SOLUTION ORAL ONCE
Qty: 238 G | Refills: 0 | Status: SHIPPED | OUTPATIENT
Start: 2022-12-05 | End: 2022-12-05

## 2022-12-05 RX ORDER — POLYETHYLENE GLYCOL 3350 17 G/17G
17 POWDER, FOR SOLUTION ORAL 2 TIMES DAILY
Qty: 1530 G | Refills: 11 | Status: SHIPPED | OUTPATIENT
Start: 2022-12-05 | End: 2023-01-04

## 2022-12-05 RX ORDER — OMEPRAZOLE 40 MG/1
40 CAPSULE, DELAYED RELEASE ORAL
Qty: 30 CAPSULE | Refills: 11 | Status: SHIPPED | OUTPATIENT
Start: 2022-12-05

## 2022-12-05 NOTE — PROGRESS NOTES
[facility] [place] ([facility] [place])  Gastroenterology, Hepatology &  Advanced Endoscopy     Progress Note    SUBJECTIVE:      Ms. Dmitri Payne is a 67y/F who presents to clinic in follow-up for irregular bowel habits. She has a personal history of CRC s/p resection resulting in alternating bowel habits between constipation and diarrhea since. She reports that these symptoms have worsened over the last few months. Workup thus far has included an EGD which was negative and a KUB which showed a large R-sided stool burden. Her last colonoscopy was in 2021 which showed a healthy anastomosis with one small polyp resection. It was suggested that she start a low FODMAP diet which she has not been doing. The patient reports that her symptoms started after a back surgery in 2020 and were not in relation to her colon resection. OBJECTIVE      Physical    VITALS:  BP (!) 155/85   Pulse 75   Wt 209 lb (94.8 kg)   LMP  (LMP Unknown)   SpO2 95%   BMI 40.82 kg/m²   Physical Exam:  General: Overall well-appearing, NAD  HEENT: PERRLA, EOMI, Anicteric sclera, MMM, no rhinorrhea  Cards: RRR, no LE edema  Resp: Breathing comfortably on room air, good air movement, no use of accessory muscles, no audible wheezing  Abdomen: soft, NT, ND. Extremities: Moves all extremities, no effusions or bruising. Skin: No rashes or jaundice  Neuro: A&O x 3, CN grossly intact, non-focal exam       ASSESSMENT AND PLAN      67y/F w/ history of CRC s/p colon resection in 2019 who presents to clinic with abdominal discomfort and irregular bowel habits. PLAN:  - Stool evacuation with entire medium sized bottle of Miralax over 24 hours w/ clear liquids  - She will start a daily bowel regimen with Senna 2 tabs qPM and Miralax BID titrated to a formed, relieving bowel movement daily.  - Next surveillance colonoscopy due in 2024  - She will continue PPI therapy    Patient will be seen in follow-up in 3 months.     Thank you for including us in the care of this patient. Please do not hesitate to contact us with any additional questions or concerns.     Jennifer Brothers MD  Gastroenterology/Hepatology  Advanced Endoscopy

## 2022-12-05 NOTE — PATIENT INSTRUCTIONS
Instructions for stool evacuation:    Choose a day that works best for you to complete this prep    Mix THE WHOLE BOTTLE of Miralax with a clear liquid of your choice. Drink the mixture over the course of 24 hours. Try your best to stick to a clear liquid diet the day of the clean out for best results.

## 2022-12-06 RX ORDER — BUPRENORPHINE 10 UG/H
1 PATCH TRANSDERMAL WEEKLY
COMMUNITY

## 2022-12-06 NOTE — PROGRESS NOTES
[facility]  INSTRUCTIONS  . .......................................................................................................................................... [] Parking the day of Surgery is located in the [facility].   Upon entering the door, make immediate right into the surgery reception room    [x]  Bring photo ID and insurance card     [x] You may have a light breakfast day of procedure    [x]  Wear loose comfortable clothing    []  Please follow instructions for medications as given per Dr's office    [x] You can expect a call the business day prior to procedure to notify you of your arrival time     [x] Please arrange for     []  Other instructions

## 2022-12-08 ENCOUNTER — HOSPITAL ENCOUNTER (OUTPATIENT)
Age: 67
Setting detail: OUTPATIENT SURGERY
Discharge: HOME | End: 2022-12-08
Attending: PAIN MEDICINE | Admitting: PAIN MEDICINE
Payer: MEDICARE

## 2022-12-08 ENCOUNTER — HOSPITAL ENCOUNTER (OUTPATIENT)
Dept: GENERAL RADIOLOGY | Age: 67
Setting detail: OUTPATIENT SURGERY
Discharge: HOME | End: 2022-12-10
Attending: PAIN MEDICINE
Payer: MEDICARE

## 2022-12-08 VITALS
TEMPERATURE: 97.6 F | SYSTOLIC BLOOD PRESSURE: 142 MMHG | HEART RATE: 69 BPM | WEIGHT: 209 LBS | HEIGHT: 60 IN | DIASTOLIC BLOOD PRESSURE: 78 MMHG | OXYGEN SATURATION: 97 % | BODY MASS INDEX: 41.03 KG/M2 | RESPIRATION RATE: 18 BRPM

## 2022-12-08 DIAGNOSIS — R52 PAIN MANAGEMENT: ICD-10-CM

## 2022-12-08 PROCEDURE — 6360000004 HC RX CONTRAST MEDICATION: Performed by: PAIN MEDICINE

## 2022-12-08 PROCEDURE — 7100000011 HC PHASE II RECOVERY - ADDTL 15 MIN: Performed by: PAIN MEDICINE

## 2022-12-08 PROCEDURE — 3600000002 HC SURGERY LEVEL 2 BASE: Performed by: PAIN MEDICINE

## 2022-12-08 PROCEDURE — 2500000003 HC RX 250 WO HCPCS: Performed by: PAIN MEDICINE

## 2022-12-08 PROCEDURE — 27096 INJECT SACROILIAC JOINT: CPT | Performed by: PAIN MEDICINE

## 2022-12-08 PROCEDURE — 6360000002 HC RX W HCPCS: Performed by: PAIN MEDICINE

## 2022-12-08 PROCEDURE — 7100000010 HC PHASE II RECOVERY - FIRST 15 MIN: Performed by: PAIN MEDICINE

## 2022-12-08 PROCEDURE — 3209999900 FLUORO FOR SURGICAL PROCEDURES

## 2022-12-08 PROCEDURE — 2709999900 HC NON-CHARGEABLE SUPPLY: Performed by: PAIN MEDICINE

## 2022-12-08 RX ORDER — LIDOCAINE HYDROCHLORIDE 5 MG/ML
INJECTION, SOLUTION INFILTRATION; INTRAVENOUS PRN
Status: DISCONTINUED | OUTPATIENT
Start: 2022-12-08 | End: 2022-12-08 | Stop reason: ALTCHOICE

## 2022-12-08 RX ORDER — METHYLPREDNISOLONE ACETATE 40 MG/ML
INJECTION, SUSPENSION INTRA-ARTICULAR; INTRALESIONAL; INTRAMUSCULAR; SOFT TISSUE PRN
Status: DISCONTINUED | OUTPATIENT
Start: 2022-12-08 | End: 2022-12-08 | Stop reason: ALTCHOICE

## 2022-12-08 RX ORDER — BUPIVACAINE HYDROCHLORIDE 2.5 MG/ML
INJECTION, SOLUTION EPIDURAL; INFILTRATION; INTRACAUDAL PRN
Status: DISCONTINUED | OUTPATIENT
Start: 2022-12-08 | End: 2022-12-08 | Stop reason: ALTCHOICE

## 2022-12-08 ASSESSMENT — PAIN SCALES - GENERAL: PAINLEVEL_OUTOF10: 9

## 2022-12-08 ASSESSMENT — PAIN DESCRIPTION - PAIN TYPE: TYPE: CHRONIC PAIN

## 2022-12-08 ASSESSMENT — PAIN DESCRIPTION - DESCRIPTORS
DESCRIPTORS: ACHING;DISCOMFORT;SORE
DESCRIPTORS: ACHING

## 2022-12-08 ASSESSMENT — PAIN DESCRIPTION - ORIENTATION: ORIENTATION: LEFT

## 2022-12-08 ASSESSMENT — PAIN - FUNCTIONAL ASSESSMENT: PAIN_FUNCTIONAL_ASSESSMENT: 0-10

## 2022-12-08 ASSESSMENT — PAIN DESCRIPTION - LOCATION: LOCATION: BACK

## 2022-12-08 NOTE — DISCHARGE INSTRUCTIONS
[facility]  Dr. Ceci May Block/Radiofrequency  Home Going Instructions    1-Go home, rest for the remainder of the day  2-Please do not lift over 20 pounds the day of the injection  3-If you received sedation No: alcohol, driving, operating lawn mowers, plows, tractors or other dangerous equipment until next morning. Do not make important decisions or sign legal documents for 24 hours. You may experience light headedness, dizziness, nausea or sleepiness after sedation. Do not stay alone. A responsible adult must be with you for 24 hours. You could be nauseated from the medications you have received. Your IV site may be sore and bruised. 4-No dietary restrictions     5-Resume all medications the same day, blood thinners to be resumed 24 hours after injection if you were instructed to stop any. 6-Keep the surgical site clean and dry, you may shower the next morning and remove the      dressing. 7- No sitz baths, tub baths or hot tubs/swimming for 24 hours. 8- If you have any pain at the injection site(s), application of an ice pack to the area should be       helpful, 20 minutes on/20 minutes off for next 48 hours. 9- Call [facility] immediately at if you develop.   Fever greater than 100.4 F  Have bleeding or drainage from the puncture site  Have progressive Leg/arm numbness and or weakness  Loss of control of bowel and or bladder (wet/soil yourself)  Severe headache with inability to lift head  10-You may return to work the next day

## 2022-12-08 NOTE — OP NOTE
2022    Patient: Mark Garcia  :  1955  Age:  74 y.o. Sex:  female     PRE-OPERATIVE DIAGNOSIS: Left   Sacroiliitis, somatic dysfunction of the lumbosacral spine. POST-OPERATIVE DIAGNOSIS: Same. PROCEDURE:  Fluoroscopic guided Left   sacroiliac joint injection with steroid (#2). SURGEON:  NORA Preciado. ANESTHESIA: local    ESTIMATED BLOOD LOSS: None.  __________________________________________________  BRIEF HISTORY:  Mark Garcia comes in today for second Left sacroiliac joint injection under fluoroscopic guidance. The potential complications as well as the procedure in detail were explained to her today. She has elected to undergo the aforementioned procedure. Margaret's complete History & Physical examination were reviewed in depth, a copy of which is in the chart. DESCRIPTION OF PROCEDURE:    After confirming written and informed consent, a time-out was performed and Amado name and date of birth, the procedure to be performed as well as the plan for the location of the needle insertion were confirmed. The patient was brought into the procedure room and placed in the prone position on the fluoroscopy table. Standard monitors were placed and vital signs were observed throughout the procedure. The low back and upper buttocks area was prepped with chloraprep and draped in a sterile manner. AP fluoroscopy was used to visualize the sacroiliac joint. The fluoroscopic beam was then obliqued until the anterior and posterior margins of the inferior aspect of the joint were aligned. The inferior margin of the joint was identified and marked. The skin and subcutaneous tissue about this identified point were anesthestized with 0.5% lidocaine. A 22 gauge 3 1/2 spinal needle was advanced toward the the identified point, under fluoroscopic guidance.  Once the targeted point was reached and the joint space was entered, negative aspiration was confirmed, and 0.5 cc of Isovue-M 200 was injected. The joint space was appropriately outlined. Then, after negative aspiration, a solution consisiting of 0.25% marcaine 2 cc and 40 mg DepoMedrol was easily injected. The needle was then removed and the needle insertion site was covered with Band-Aid. Disposition the patient tolerated the procedure well and there were no complications . Vital signs remained stable throughout the procedure. The patient was escorted to the recovery area where they remained until discharge and written discharge instructions for the procedure were given. Plan: Dilan Serna will return to our pain management center as scheduled.      Anu Greer, DO

## 2022-12-08 NOTE — H&P
[facility] Pain Management        [address] [facility] [address]  Dept: [phone]    Procedure History & Physical      Abigail Alberto     HPI:    Patient  is here for left LBP for left SIJ injection  Labs/imaging studies reviewed   All question and concerns addressed including R/B/A associated with the procedure    Past Medical History:   Diagnosis Date    Acute left ankle pain 06/01/2020    Acute left lumbar radiculopathy 12/28/2017    Cancer ([address] 2017    colon    Chronic back pain     Chronic deep vein thrombosis (DVT) of distal vein of left lower extremity ([address] 02/01/2021    Diabetes mellitus ([address]     Drug-induced constipation 02/18/2020    Eosinophil count raised 11/20/2019    Fatigue 06/08/2016    Fibromyalgia     GERD (gastroesophageal reflux disease)     Hair loss 05/03/2021    Hemorrhoids     Hyperlipidemia     Hypertension     Malignant neoplasm of sigmoid colon ([facility] 06/17/2020    Obesity     Osteoarthritis     PONV (postoperative nausea and vomiting)     Rib contusion, left, initial encounter 02/18/2020    TIA (transient ischemic attack) 05/2022    Urinary incontinence     Weakness of both legs 12/21/2020       Past Surgical History:   Procedure Laterality Date    ANESTHESIA NERVE BLOCK Bilateral 08/15/2019    BILATERAL INTRA-ARTICULAR FACET JOINT INJECTION WITH FLUOROSCOPIC GUIDANCE AT L4-5, L5-S1 WITH IV SEDATION performed by Kaylee Jacobsen DO at [address] [place] [address] N/A 10/23/2019    DILATATION AND CURETTAGE HYSTEROSCOPY performed by Xander Bonilla MD at [address] Right 07/11/2019    C7-T1 EPIDURAL STEROID INJECTION #1 TOWARD THE RIGHT performed by Lucy Kirk DO at [place] [address] Bilateral     LUMBAR SPINE SURGERY N/A 12/24/2020    L4-L5  POSTERIOR LUMBAR INTERBODY FUSION, T2-T3 DECOMPRESSIVE LAMINECTOMY performed by Madhu Baker MD at [facility]    NERVE BLOCK Right 12/28/2017    right L4-5 transforaminal epidural #1    NERVE BLOCK Left 11/29/2018    si inj    NERVE BLOCK Bilateral 08/15/2019    bilateral intra-articular facet joint injection with flouroscopic guidance at L4-S1 with iv sedation    NERVE BLOCK Left 8/2/2022    LEFT SACROILIAC JOINT INJECTION performed by Paulette Cabrera DO at [facility] SI JOINT ARTHRGRPHY&/ANES/STEROID W/IMAGE Left 11/29/2018    LEFT SACROILIAC JOINT INJECTION WITH X-RAY performed by Golden Bartholomew DO at [facility] [address] Left 2005    STEROID INJECTION KNEE Left 02/2022    TONSILLECTOMY  28 YRS OLD    TUBAL LIGATION         Prior to Admission medications    Medication Sig Start Date End Date Taking? Authorizing Provider   buprenorphine (BUPRENEX) 10 MCG/HR PTWK Place 1 patch onto the skin once a week. Yes Historical Provider, MD   polyethylene glycol (GLYCOLAX) 17 GM/SCOOP powder Take 17 g by mouth 2 times daily Take as much as needed to have a formed, relieving bowel movement daily. 12/5/22 1/4/23  Rae Russo MD   senna (SENOKOT) 8.6 MG tablet Take 1 tablet by mouth 2 times daily 12/5/22 12/5/23  Rae Russo MD   omeprazole (PRILOSEC) 40 MG delayed release capsule Take 1 capsule by mouth every morning (before breakfast) 12/5/22   Rae Russo MD   atorvastatin (LIPITOR) 20 MG tablet Take 1 tablet by mouth daily Note increase in dose 11/2/22   Margurite Abts, APRN - CNP   SITagliptin (JANUVIA) 100 MG tablet Take 1 tablet by mouth daily 11/2/22   Margurite Abts, APRN - CNP   levothyroxine (SYNTHROID) 25 MCG tablet Take 1 tablet by mouth daily On empty stomach  Patient not taking: Reported on 12/8/2022 11/2/22   Margurite Abts, APRN - CNP   allopurinol (ZYLOPRIM) 300 MG tablet Take one (1) tablet by mouth once daily.  11/2/22   Margurite Abts, APRN - CNP   ezetimibe (ZETIA) 10 MG tablet Take 1 tablet by mouth daily 11/2/22   Margurite Abts, APRN - CNP   zoster recombinant adjuvanted vaccine [facility]) 50 MCG/0.5ML SUSR injection Inject 0.5 mLs into the muscle See Admin Instructions 1 dose now and repeat in 2-6 months  Patient not taking: No sig reported 7/21/22 1/17/23  VISHNU Cruz CNP   atenolol (TENORMIN) 50 MG tablet Take 1 tablet by mouth in the morning. 7/21/22   VISHNU Cruz CNP   aspirin 81 MG EC tablet Take 1 tablet by mouth daily 5/10/22   Lupe Man MD   meloxicam (MOBIC) 15 MG tablet  12/6/21   Historical Provider, MD   acetaminophen (TYLENOL) 500 MG tablet Take 500 mg by mouth every 6 hours as needed for Pain    Historical Provider, MD   PROCTOZONE-HC 2.5 % CREA rectal cream Apply 4 times daily as needed 8/5/21   Jaelyn Richardson MD   Cyanocobalamin (B-12 PO) Take by mouth    Historical Provider, MD   Blood Glucose Monitoring Suppl (ACCU-CHEK GUIDE) w/Device KIT  3/8/21   Historical Provider, MD   Alcohol Swabs (PHARMACIST CHOICE ALCOHOL) PADS  3/8/21   Historical Provider, MD   Blood Glucose Calibration (ACCU-CHEK GUIDE CONTROL) LIQD  3/8/21   Historical Provider, MD   diclofenac sodium (VOLTAREN) 1 % GEL Apply topically 2 times daily 4/21/21   Baron Malou MD   Handicap Placard MISC by Does not apply route Start 11/23/2020 expires 11/22/2023 11/23/20   VISHNU Cruz CNP   blood glucose monitor strips Test 1 times a day & as needed for symptoms of irregular blood glucose.  6/17/20   VISHNU Cruz CNP   Cholecalciferol (VITAMIN D3) 2000 units CAPS Take 2,000 Units by mouth daily    Historical Provider, MD       Allergies   Allergen Reactions    Diclofenac     Lisinopril Other (See Comments)     Profound malaise    Diclofenac Sodium Nausea And Vomiting    Farxiga [Dapagliflozin] Itching     Vaginal itching with Haritha Conquest     Lyrica [Pregabalin] Other (See Comments)     Confusion    Sulfa Antibiotics Rash       Social History     Socioeconomic History    Marital status:      Spouse name: Not on file    Number of children: Not on file    Years of education: Not on file    Highest education level: Not on file   Occupational History    Not on file   Tobacco Use    Smoking status: Never    Smokeless tobacco: Never   Vaping Use    Vaping Use: Never used   Substance and Sexual Activity    Alcohol use: Yes     Comment: Rarely    Drug use: No    Sexual activity: Not on file   Other Topics Concern    Not on file   Social History Narrative    Not on file     Social Determinants of Health     Financial Resource Strain: Low Risk     Difficulty of Paying Living Expenses: Not hard at all   Food Insecurity: No Food Insecurity    Worried About Running Out of Food in the Last Year: Never true    Ran Out of Food in the Last Year: Never true   Transportation Needs: Not on file   Physical Activity: Not on file   Stress: Not on file   Social Connections: Not on file   Intimate Partner Violence: Not on file   Housing Stability: Not on file       Family History   Problem Relation Age of Onset    Asthma Mother     Mental Illness Mother     Cancer Mother         kidney    Heart Disease Father         melanoma    Thyroid Disease Sister     Thyroid Disease Sister          REVIEW OF SYSTEMS:    CONSTITUTIONAL:  negative for  fevers, chills, sweats and fatigue    RESPIRATORY:  negative for  dry cough, cough with sputum, dyspnea, wheezing and chest pain    CARDIOVASCULAR:  negative for chest pain, dyspnea, palpitations, syncope    GASTROINTESTINAL:  negative for nausea, vomiting, change in bowel habits, diarrhea, constipation and abdominal pain    MUSCULOSKELETAL: negative for muscle weakness    SKIN: negative for itching or rashes.     BEHAVIOR/PSYCH:  negative for poor appetite, increased appetite, decreased sleep and poor concentration    All other systems negative      PHYSICAL EXAM:    VITALS:  BP (!) 152/71   Pulse 70   Temp 97.9 °F (36.6 °C) (Temporal)   Resp 18   Ht 5' (1.524 m)   Wt 209 lb (94.8 kg)   LMP  (LMP Unknown)   SpO2 95%   BMI 40.82 kg/m²     CONSTITUTIONAL:  awake, alert, cooperative, no apparent distress, and appears stated age    EYES: PERRLA, EOMI    LUNGS:  No increased work of breathing, no audible wheezing    CARDIOVASCULAR:  regular rate and rhythm    ABDOMEN:  Soft non tender non distended     EXTREMITIES: no signs of clubbing or cyanosis. MUSCULOSKELETAL: negative for flaccid muscle tone or spastic movements. SKIN: gross examination reveals no signs of rashes, or diaphoresis. NEURO: Cranial nerves II-XII grossly intact. No signs of agitated mood.        Assessment/Plan:    Left LB pain for left SIJ injection

## 2022-12-20 NOTE — PROGRESS NOTES
[facility]  [address]  [facility]    Follow up Note      Cali Copping     Date of Visit:  2022    CC:  Patient presents for follow up   Chief Complaint   Patient presents with    Follow Up After Procedure    Lower Back Pain       HPI:    Pain is better    Appropriate analgesia with current medications regimen: yes. Change in quality of symptoms:no. Medication side effects:none. Recent diagnostic testing:none. Recent interventional procedures:left SIJ injection with 65%. She has been on anticoagulation medications to include ASA and has not been on herbal supplements. She is diabetic. Imagin/2022 L knee xray -  RESULT:     Severe narrowing of the left knee medial joint compartment, similar to   prior examination with tricompartmental osteophytosis. Small to moderate   knee joint effusion. Ossific joint bodies posteriorly. No acute   fracture or dislocation. No other significant abnormality. IMPRESSION   IMPRESSION:     Stable moderately advanced left knee osteoarthritis with joint bodies. 2021 MRI lumbar w/ and w/o -  FINDINGS:   BONES/ALIGNMENT: The vertebral body heights are maintained. There is   age-appropriate bone marrow signal.  There is posterior fixation at L4-5 with   artificial disc material.  There is multilevel degenerative disc disease in   the remaining disc spaces with loss of disc signal.  There is no disc space   narrowing. There is no significant spondylolisthesis. SPINAL CORD:  The conus medullaris is normal in caliber and signal and   terminates at the T12-L1 level. The cauda equina is unremarkable. SOFT TISSUES: There is a fluid collection containing septations in the   postoperative bed that measures approximately 3.1 x 2.7 x 3.7 cm. There is   no abnormal postcontrast enhancement. Visualized abdominal structures are   unremarkable. L1-L2: There is a circumferential disc bulge.   There is no canal stenosis or   foraminal narrowing. L2-L3: There is a circumferential disc bulge with facet and ligamentous   hypertrophy. There is canal stenosis measuring 9 mm in AP dimension. There   is moderate bilateral foraminal narrowing. L3-L4: There is a circumferential disc bulge with facet and ligamentous   hypertrophy. There is canal stenosis measuring 9 mm in AP dimension. There   is no significant foraminal narrowing. L4-L5: There is artificial disc material with posterior laminectomy. There   is canal stenosis measuring 7 mm in AP dimension. There is no significant   foraminal narrowing. L5-S1: There is a circumferential disc bulge with facet hypertrophy. There   is no canal stenosis or foraminal narrowing. Impression   Posterior fixation and laminectomy at L4-5 with a postoperative fluid   collection containing septations that likely represents a seroma. Multilevel degenerative change with canal stenosis most pronounced at L4-5. Bilateral foraminal narrowing as described above. 12/24/2020 CT   FINDINGS:   Within the limits of noncontrast, non-myelographic CT technique:   IATROGENIC:   There has been interval bilateral laminectomy at T3 and T4, partially   extending into the posterior elements of T2, for probable subjacent   multilevel partial discectomy. In the lumbar region, there has been interval bilateral laminectomy at L4,   partially extending into adjacent levels, for partial discectomy and   interbody fusion at L4/5, as well as interval spinal instrumented fusion by   bilateral spinal fixation rods, anchored by bilateral pedicle screws, and L4   - L5. Beam-hardening artifacts slightly limit evaluation of nearby   structures. However, these components appear to be intact and in usual   customary position.    BONES/ALIGNMENT:   There are unchanged chronic multilevel asymmetric vertebral compression   deformities and related vertebral column curvature/alignment abnormalities. DEGENERATIVE CHANGES:   Within the thoracic surgical bed of T2 - T4 and L4/5, there is slight   post-operative distortion/ectasia of the traversing thecal sac, overlaid by   small amount of posterior epidural/paraspinal soft tissue emphysema/hematoma. The spinal cord, caudal neural elements, and nerve roots not well   demonstrated by this technique, although previously-existing disc-related   mass effect upon the traversing spinal cord at T2/3 appears to have been   relieved, as the osseous spinal canal at T2/3 - T4/5 and L4/5 has been   surgically decompressed. At T4/5, minimal residual disc material and moderate bilateral facet   arthropathy produce at least minimal effacement of the anterior subarachnoid   space and moderate/severe bilateral neural foraminal narrowing, progressively   lessening through T2/3. At L4/5, mild Grade I anterolisthesis of L4 on L5 combines with mild residual   disc-osteophyte complex and severe bilateral facet arthropathy (slightly   worse on the right) to produce at least mild effacement of the anterior   subarachnoid space, as well as severe/marked right, and moderate left, neural   foraminal narrowing. All other previously-demonstrated/described multilevel spinal and bilateral   sacroiliac joint degenerative disease, central spinal canal stenosis, and   neural foraminal narrowing, all appear otherwise not significantly changed. INCIDENTAL:   There is minimal dependent bibasilar pleural effusions and equivalent   adjacent subsegmental atelectasis versus infiltrate tracking to both lung   apices, where this becomes slightly worse on the right. The incompletely visualized non-opacified heart is at least mildly enlarged,   with equivalent fullness of visualized non-opacified central pulmonary   vessels. Minimal to moderate atherosclerotic calcification is scattered throughout the   visualized [id number] arterial system.    The gallbladder is surgically absent, with multiple surgical clips present   within the gallbladder fossa. The right kidney is incompletely visualized, but appears at least minimally   malrotated. There is question of a surgical anastomotic suture line incompletely   visualized about the rectosigmoid junction. No other clinically-significant changes are noted. .       Impression   1. Interval bilateral laminectomy at T3 and T4, partially extending into the   posterior elements of T2, for probable subjacent multilevel partial   discectomy. 2.  Interval bilateral laminectomy at L4, partially extending into adjacent   levels, for partial discectomy and interbody fusion at L4/5, as well as   interval spinal instrumented fusion by bilateral spinal fixation rods,   anchored by bilateral pedicle screws, and L4 - L5. Beam-hardening artifacts   slightly limit evaluation of nearby structures. However, these components   appear to be intact and in usual customary position. 3.  Apparent post-operative relief of disc-related mass effect upon the   spinal cord within the above-described surgical beds, most significant at   T2/3, as described. 4.  Probable iatrogenic posterior epidural/paraspinal emphysema/hematoma   within the above-described surgical beds, as described. 5.  All other previously-demonstrated/described multilevel spinal and   bilateral sacroiliac joint degenerative disease, central spinal canal   stenosis, and neural foraminal narrowing, all appear otherwise not   significantly changed. 6.  Unchanged chronic multilevel asymmetric vertebral compression deformities   and related vertebral column curvature/alignment abnormalities. 7.  Incidental findings, most notable for minimal dependent bibasilar pleural   effusions and equivalent adjacent subsegmental atelectasis versus infiltrate   tracking to both lung apices, where this becomes slightly worse on the right.       12/21/2020 -  FINDINGS: BONES/ALIGNMENT: There is normal alignment of the spine. There are mild old   compression deformities of T6 through T9, similar compared to the prior plain   films. Otherwise, the vertebral body heights are maintained. The bone marrow   signal appears unremarkable. No evidence of acute fracture or osseous   destructive lesion. SPINAL CORD: No abnormal cord signal is seen. SOFT TISSUES: No paraspinal mass identified. DEGENERATIVE CHANGES: There is advanced degenerative change with spurring and   disc space narrowing at multiple levels, most severe at T6-7 through T11-12. The  sagittal view of the lumbar spine demonstrates grade 1   anterolisthesis and severe central canal stenosis at L4-5. Please refer to   the report for MRI lumbar spine done yesterday. C7-T1: No evidence of significant central canal stenosis or disc herniation. T1-2: There is posterior facet degenerative change causing slight impression   on the left posterolateral aspect of the thecal sac. No evidence of   significant central canal stenosis or disc herniation. No evidence of cord   compression. T2-3: There is prominent central and right-sided disc osteophyte complex   causing moderate central canal stenosis with slight impression on the ventral   aspect of the spinal cord. T3-4: There are small bilateral posterolateral disc protrusions, larger on   the right causing slight impression on the right ventral aspect of the spinal   cord. Overall, mild central canal stenosis at this level. T4-5: No evidence of significant central canal stenosis or disc herniation. T5-6: There is disc bulge with small bilateral disc osteophyte complexes that   contacts the ventral surface of the spinal cord without evidence of cord   compression. Mild central canal stenosis. T6-7: There is diffuse disc bulge causing moderate central canal stenosis   with slight impression on the ventral aspect of the spinal cord.    T7-8: There is disc bulge and small left paramedian disc protrusion, causing   slight impression on the thecal sac without evidence of significant central   canal stenosis or cord compression. T8-9: There is a large central and right paramedian disc protrusion causing   slight impression on the right side of the spinal cord. T9-10: There is mild disc bulge and bilateral posterior facet degenerative   change causing mild central canal stenosis without evidence of cord   compression. T10-11: There is a small left paramedian disc protrusion causing slight   impression on the ventral portion of the thecal sac without evidence of cord   compression or significant central canal stenosis. T11-12: There is mild disc bulge without evidence of significant central   canal stenosis or cord compression. T12-L1: There is slight grade 1 retrolisthesis of T12 on L1 with tiny right   paramedian disc protrusion causing minimal impression on the thecal sac. No   significant central canal stenosis or compression of the conus. Impression   1. Degenerative change. Few mild old midthoracic compression deformities. No acute fracture or osseous destructive lesion. 2. Multilevel central canal stenosis as described above with multiple disc   protrusions, some which cause slight impression on the thoracic spinal cord. Clinical correlation is suggested. 12/2020 MRI cervical -  FINDINGS:   Pre- and post-contrast T1 weighted images of the cervical spine was performed. There is no abnormal enhancement in the cervical spinal cord. There is no abnormal enhancement in the cervical spine. The vertebral body   heights are grossly maintained. There is no fracture or destructive osseous   lesion. There is a prominent left paracentral disc protrusion at T2-3, contributing   to moderate spinal canal narrowing and moderate spinal cord compression.        Impression   Pre- and post-contrast T1 weighted images of the cervical spine was performed. No abnormal enhancement in the cervical spinal cord. The previously seen T2   hyperintensity in the cervical spinal cord is likely related to artifacts. Follow-up is recommended. Prominent left paracentral disc protrusion at T2-3, contributing to moderate   spinal canal narrowing and moderate spinal cord compression. There is likely   focal spinal cord edema at T2-3 level on MRI cervical spine 2020. The results were sent to radiology results communication.                              Potential Aberrant Drug-Related Behavior:  no     Urine Drug Screenin2022 consistent     OARRS report:  2022 consistent to 2022      Opioid Agreement:  Date enacted: 2022  Renewal date:    Past Medical History:   Diagnosis Date    Acute left ankle pain 2020    Acute left lumbar radiculopathy 2017    Cancer ([facility] [address] 2017    colon    Chronic back pain     Chronic deep vein thrombosis (DVT) of distal vein of left lower extremity ([address] 2021    Diabetes mellitus ([address]     Drug-induced constipation 2020    Eosinophil count raised 2019    Fatigue 2016    Fibromyalgia     GERD (gastroesophageal reflux disease)     Hair loss 2021    Hemorrhoids     Hyperlipidemia     Hypertension     Malignant neoplasm of sigmoid colon ([address] 2020    Obesity     Osteoarthritis     PONV (postoperative nausea and vomiting)     Rib contusion, left, initial encounter 2020    TIA (transient ischemic attack) 2022    Urinary incontinence     Weakness of both legs 2020       Past Surgical History:   Procedure Laterality Date    ANESTHESIA NERVE BLOCK Bilateral 08/15/2019    BILATERAL INTRA-ARTICULAR FACET JOINT INJECTION WITH FLUOROSCOPIC GUIDANCE AT L4-5, L5-S1 WITH IV SEDATION performed by Dara Ibarra DO at [address] [place] [address] N/A 10/23/2019    DILATATION AND CURETTAGE HYSTEROSCOPY performed by Ailyn Quevedo MD at [address] Right 07/11/2019    C7-T1 EPIDURAL STEROID INJECTION #1 TOWARD THE RIGHT performed by Carlos Soto DO at [facility] [address] Bilateral     LUMBAR SPINE SURGERY N/A 12/24/2020    L4-L5  POSTERIOR LUMBAR INTERBODY FUSION, T2-T3 DECOMPRESSIVE LAMINECTOMY performed by Kori Camarena MD at [address] [facility] [address] Right 12/28/2017    right L4-5 transforaminal epidural #1    NERVE BLOCK Left 11/29/2018    si inj    NERVE BLOCK Bilateral 08/15/2019    bilateral intra-articular facet joint injection with flouroscopic guidance at L4-S1 with iv sedation    NERVE BLOCK Left 8/2/2022    LEFT SACROILIAC JOINT INJECTION performed by Carlos Soto DO at [address] [facility] Left 12/8/2022    LEFT SACROILIAC JOINT INJECTION performed by Carlos Soto DO at [facility] SI JOINT ARTHRGRPHY&/ANES/STEROID W/IMAGE Left 11/29/2018    LEFT SACROILIAC JOINT INJECTION WITH X-RAY performed by Sushil Barron DO at [facility] [address] Left 2005    STEROID INJECTION KNEE Left 02/2022    TONSILLECTOMY  28 YRS OLD    TUBAL LIGATION         Prior to Admission medications    Medication Sig Start Date End Date Taking? Authorizing Provider   buprenorphine (BUPRENEX) 10 MCG/HR PTWK Place 1 patch onto the skin once a week. Yes Historical Provider, MD   polyethylene glycol (GLYCOLAX) 17 GM/SCOOP powder Take 17 g by mouth 2 times daily Take as much as needed to have a formed, relieving bowel movement daily.  12/5/22 1/4/23 Yes Andi Ureña MD   senna (SENOKOT) 8.6 MG tablet Take 1 tablet by mouth 2 times daily 12/5/22 12/5/23 Yes Andi Ureña MD   omeprazole (PRILOSEC) 40 MG delayed release capsule Take 1 capsule by mouth every morning (before breakfast) 12/5/22  Yes Andi Ureña MD   atorvastatin (LIPITOR) 20 MG tablet Take 1 tablet by mouth daily Note increase in dose 11/2/22  Yes Dannie Lopez, APRN - CRESENCIO   SITagliptin (JANUVIA) 100 MG tablet Take 1 tablet by mouth daily 11/2/22  Yes Chichi Toth Her, VISHNU - CNP   levothyroxine (SYNTHROID) 25 MCG tablet Take 1 tablet by mouth daily On empty stomach 11/2/22  Yes Chichi Toth Her, APRPEYTON - CNP   allopurinol (ZYLOPRIM) 300 MG tablet Take one (1) tablet by mouth once daily. 11/2/22  Yes VISHNU Jacobs CNP   ezetimibe (ZETIA) 10 MG tablet Take 1 tablet by mouth daily 11/2/22  Yes VISHNU Jacobs CNP   zoster recombinant adjuvanted vaccine [facility]) 50 MCG/0.5ML SUSR injection Inject 0.5 mLs into the muscle See Admin Instructions 1 dose now and repeat in 2-6 months 7/21/22 1/17/23 Yes Chichi Toth Her, VISHNU - CNP   atenolol (TENORMIN) 50 MG tablet Take 1 tablet by mouth in the morning. 7/21/22  Yes VISHNU Jacobs CNP   aspirin 81 MG EC tablet Take 1 tablet by mouth daily 5/10/22  Yes Lupe Man MD   meloxicam (MOBIC) 15 MG tablet  12/6/21  Yes Historical Provider, MD   acetaminophen (TYLENOL) 500 MG tablet Take 500 mg by mouth every 6 hours as needed for Pain   Yes Historical Provider, MD   PROCTOZONE-HC 2.5 % CREA rectal cream Apply 4 times daily as needed 8/5/21  Yes Justina Flower MD   Cyanocobalamin (B-12 PO) Take by mouth   Yes Historical Provider, MD   Blood Glucose Monitoring Suppl (ACCU-CHEK GUIDE) w/Device KIT  3/8/21  Yes Historical Provider, MD   Alcohol Swabs (PHARMACIST CHOICE ALCOHOL) PADS  3/8/21  Yes Historical Provider, MD   Blood Glucose Calibration (ACCU-CHEK GUIDE CONTROL) LIQD  3/8/21  Yes Historical Provider, MD   diclofenac sodium (VOLTAREN) 1 % GEL Apply topically 2 times daily 4/21/21  Yes Kings Reeves MD   Handicap Placard MISC by Does not apply route Start 11/23/2020 expires 11/22/2023 11/23/20  Yes VISHNU Jacobs CNP   blood glucose monitor strips Test 1 times a day & as needed for symptoms of irregular blood glucose.  6/17/20  Yes Mary Calzada, APRN - CNP   Cholecalciferol (VITAMIN D3) 2000 units CAPS Take 2,000 Units by mouth daily   Yes Historical Provider, MD       Allergies   Allergen Reactions    Diclofenac     Lisinopril Other (See Comments)     Profound malaise    Diclofenac Sodium Nausea And Vomiting    Farxiga [Dapagliflozin] Itching     Vaginal itching with Farxiga, Jardiance     Lyrica [Pregabalin] Other (See Comments)     Confusion    Sulfa Antibiotics Rash       Social History     Socioeconomic History    Marital status:      Spouse name: Not on file    Number of children: Not on file    Years of education: Not on file    Highest education level: Not on file   Occupational History    Not on file   Tobacco Use    Smoking status: Never    Smokeless tobacco: Never   Vaping Use    Vaping Use: Never used   Substance and Sexual Activity    Alcohol use: Yes     Comment: Rarely    Drug use: No    Sexual activity: Not on file   Other Topics Concern    Not on file   Social History Narrative    Not on file     Social Determinants of Health     Financial Resource Strain: Low Risk     Difficulty of Paying Living Expenses: Not hard at all   Food Insecurity: No Food Insecurity    Worried About Running Out of Food in the Last Year: Never true    Ran Out of Food in the Last Year: Never true   Transportation Needs: Not on file   Physical Activity: Not on file   Stress: Not on file   Social Connections: Not on file   Intimate Partner Violence: Not on file   Housing Stability: Not on file       Family History   Problem Relation Age of Onset    Asthma Mother     Mental Illness Mother     Cancer Mother         kidney    Heart Disease Father         melanoma    Thyroid Disease Sister     Thyroid Disease Sister        REVIEW OF SYSTEMS:     Domingo Ruiz denies fever/chills, chest pain, shortness of breath, new bowel or bladder complaints. All other review of systems was negative.     PHYSICAL EXAMINATION:      BP (!) 153/75   Pulse 68   Temp 97.8 °F (36.6 °C) (Infrared)   Resp 16   Ht 5' (1.524 m)   Wt 209 lb (94.8 kg)   LMP (LMP Unknown)   SpO2 96%   BMI 40.82 kg/m²     General:      General appearance:   pleasant and well-hydrated. , in mild discomfort and A & O x3  Build:Overweight    HEENT:    Head:normocephalic and atraumatic  Sclera: icterus absent,    Lungs:    Breathing:Normal expansion. No wheezing. Abdomen:    Shape:non-distended and normal  Thoracic/Lumbar spine:     Tenderness below prior thoracic surgery  Range of motion:abnormal moderately Lateral bending, flexion, extension rotation bilateral and is painful. Extremities:    Tremors:None bilaterally upper and lower  Range of motion:pain with internal rotation of hips not done. Intact:Yes  Edema:Normal    Neurological:     Strength BLE diffusely 5/5  Reflexes BLE diffusely 2+  Gait:antalgic with walker.     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 Mike Mcadams with: L4-5 PLIF and lami T2-T4 12/24/2020  Since the above, she has been treated for pain mgmt by Dr. Shobha Lisa  PMHx: Hx DVT (post-surgical recovery), TIA 5/2022, colon CA (tx surgically), DM, constipation, GERD, DLD, HTN, obesity  Has enough scripts of Butrans to last until 8/15/2022    She reports some pain below her thoracic surgery  Ortho has been prescribing Meloxicam, she will no longer be following with ortho      Plan:    Discussed recommend a decreased from 15 mg to 7.5 mg of Meloxicam  One time script of meloxicam 7.5 mg, 1 RF until PCP can take over  Message sent to PCP in regards to the above  CMP reviewed  Thoracic MRI w/ and w/o for further eval of new pain               L knee viscosupp discussed, she will let us know - had done at [facility] (Durolane). 9/30. Patient states that provider said okay to have done here in the future. OARRS report reviewed  RF Butrans 10 mcg/hr (right abdomen), 1 RF - had some irritation which is resolved with moving the patch  L SIJ injection with 65-75% relief. Caudal MAKAYLA - r/b and procedure discussed  Referral to GI for difficulty regulating bowel habits since surgical tx of colon CA - she saw the NP and started Metamucil. She has an appt next week again. Patient encouraged to stay active and to lose weight  Treatment plan discussed with the patient including medication and procedure side effects     Spent >40 minutes on this case with >50% of that time in face to face contact          We discussed with the patient that combining opioids, benzodiazepines, alcohol, illicit drugs or sleep aids increases the risk of respiratory depression including death. We discussed that these medications may cause drowsiness, sedation or dizziness and have counseled the patient not to drive or operate machinery. We have discussed that these medications will be prescribed only by one provider. We have discussed with the patient about age related risk factors and have thoroughly discussed the importance of taking these medications as prescribed. The patient verbalizes understanding.

## 2022-12-21 ENCOUNTER — OFFICE VISIT (OUTPATIENT)
Dept: PAIN MANAGEMENT | Age: 67
End: 2022-12-21
Payer: MEDICARE

## 2022-12-21 VITALS
HEART RATE: 68 BPM | SYSTOLIC BLOOD PRESSURE: 153 MMHG | DIASTOLIC BLOOD PRESSURE: 75 MMHG | OXYGEN SATURATION: 96 % | HEIGHT: 60 IN | WEIGHT: 209 LBS | BODY MASS INDEX: 41.03 KG/M2 | TEMPERATURE: 97.8 F | RESPIRATION RATE: 16 BRPM

## 2022-12-21 DIAGNOSIS — M17.12 PRIMARY OSTEOARTHRITIS OF LEFT KNEE: ICD-10-CM

## 2022-12-21 DIAGNOSIS — M54.6 PAIN IN THORACIC SPINE: ICD-10-CM

## 2022-12-21 DIAGNOSIS — M25.562 CHRONIC PAIN OF LEFT KNEE: ICD-10-CM

## 2022-12-21 DIAGNOSIS — G89.4 CHRONIC PAIN SYNDROME: Primary | ICD-10-CM

## 2022-12-21 DIAGNOSIS — M96.1 LUMBAR POST-LAMINECTOMY SYNDROME: ICD-10-CM

## 2022-12-21 DIAGNOSIS — G89.29 CHRONIC PAIN OF LEFT KNEE: ICD-10-CM

## 2022-12-21 DIAGNOSIS — M53.3 DISORDER OF SACRUM: ICD-10-CM

## 2022-12-21 PROCEDURE — 1090F PRES/ABSN URINE INCON ASSESS: CPT | Performed by: PAIN MEDICINE

## 2022-12-21 PROCEDURE — 99213 OFFICE O/P EST LOW 20 MIN: CPT | Performed by: PAIN MEDICINE

## 2022-12-21 PROCEDURE — 3074F SYST BP LT 130 MM HG: CPT | Performed by: PAIN MEDICINE

## 2022-12-21 PROCEDURE — G8417 CALC BMI ABV UP PARAM F/U: HCPCS | Performed by: PAIN MEDICINE

## 2022-12-21 PROCEDURE — 1123F ACP DISCUSS/DSCN MKR DOCD: CPT | Performed by: PAIN MEDICINE

## 2022-12-21 PROCEDURE — G8427 DOCREV CUR MEDS BY ELIG CLIN: HCPCS | Performed by: PAIN MEDICINE

## 2022-12-21 PROCEDURE — 3078F DIAST BP <80 MM HG: CPT | Performed by: PAIN MEDICINE

## 2022-12-21 PROCEDURE — 3017F COLORECTAL CA SCREEN DOC REV: CPT | Performed by: PAIN MEDICINE

## 2022-12-21 PROCEDURE — G8484 FLU IMMUNIZE NO ADMIN: HCPCS | Performed by: PAIN MEDICINE

## 2022-12-21 PROCEDURE — G8399 PT W/DXA RESULTS DOCUMENT: HCPCS | Performed by: PAIN MEDICINE

## 2022-12-21 PROCEDURE — 99215 OFFICE O/P EST HI 40 MIN: CPT | Performed by: PAIN MEDICINE

## 2022-12-21 PROCEDURE — 1036F TOBACCO NON-USER: CPT | Performed by: PAIN MEDICINE

## 2022-12-21 RX ORDER — BUPRENORPHINE 10 UG/H
1 PATCH TRANSDERMAL WEEKLY
Qty: 4 PATCH | Refills: 1 | Status: SHIPPED | OUTPATIENT
Start: 2022-12-21 | End: 2023-01-18

## 2022-12-21 RX ORDER — MELOXICAM 7.5 MG/1
7.5 TABLET ORAL DAILY
Qty: 30 TABLET | Refills: 1 | Status: SHIPPED | OUTPATIENT
Start: 2022-12-21 | End: 2023-01-20

## 2022-12-21 NOTE — PROGRESS NOTES
Do you currently have any of the following:    Fever: No  Headache:  No  Cough: No  Shortness of breath: No  Exposed to anyone with these symptoms: No         Dinorah All presents to the [address] [place] [address] on 12/21/2022. Dilan Serna is complaining of pain lower back. The pain is constant. The pain is described as aching. Pain is rated on her best day at a 6, on her worst day at a 10, and on average at a 6 on the VAS scale. She took her last dose of Butrans Patch . Any procedures since your last visit: Yes, with 65 % relief. Pacemaker or defibrillator: No    She is not on NSAIDS and is  on anticoagulation medications to include ASA and is managed by Leopold Bride, APRN - CNP  . Medication Contract and Consent for Opioid Use Documents Filed       Patient Documents       Type of Document Status Date Received Received By Description    Medication Contract Received 6/21/2019  7:35 AM Maria Del Rosario CARR [facility] med contract    Medication Contract Received 3/26/2021 10:38 AM [facility] PAIN MGMT CONTRACT DR. RONDON    Medication Contract Signed 8/12/2022 11:52 AM RAMY FAIR Pain Agreement 8/12/22                    BP (!) 153/75   Pulse 68   Temp 97.8 °F (36.6 °C) (Infrared)   Resp 16   Ht 5' (1.524 m)   Wt 209 lb (94.8 kg)   LMP  (LMP Unknown)   SpO2 96%   BMI 40.82 kg/m²      No LMP recorded (lmp unknown).  Patient is postmenopausal.

## 2022-12-30 ENCOUNTER — OFFICE VISIT (OUTPATIENT)
Dept: FAMILY MEDICINE CLINIC | Age: 67
End: 2022-12-30
Payer: MEDICARE

## 2022-12-30 VITALS
BODY MASS INDEX: 41.03 KG/M2 | RESPIRATION RATE: 17 BRPM | SYSTOLIC BLOOD PRESSURE: 155 MMHG | HEART RATE: 67 BPM | WEIGHT: 209 LBS | TEMPERATURE: 97.6 F | OXYGEN SATURATION: 99 % | HEIGHT: 60 IN | DIASTOLIC BLOOD PRESSURE: 85 MMHG

## 2022-12-30 DIAGNOSIS — R68.89 FLU-LIKE SYMPTOMS: Primary | ICD-10-CM

## 2022-12-30 DIAGNOSIS — M54.6 PAIN IN THORACIC SPINE: ICD-10-CM

## 2022-12-30 LAB
BUN BLDV-MCNC: 15 MG/DL (ref 6–23)
CREAT SERPL-MCNC: 0.6 MG/DL (ref 0.5–1)
GFR SERPL CREATININE-BSD FRML MDRD: >60 ML/MIN/1.73
Lab: NORMAL
PERFORMING INSTRUMENT: NORMAL
QC PASS/FAIL: NORMAL
SARS-COV-2, POC: NORMAL

## 2022-12-30 PROCEDURE — 87426 SARSCOV CORONAVIRUS AG IA: CPT

## 2022-12-30 PROCEDURE — 3077F SYST BP >= 140 MM HG: CPT

## 2022-12-30 PROCEDURE — 1123F ACP DISCUSS/DSCN MKR DOCD: CPT

## 2022-12-30 PROCEDURE — 3079F DIAST BP 80-89 MM HG: CPT

## 2022-12-30 PROCEDURE — 99213 OFFICE O/P EST LOW 20 MIN: CPT

## 2022-12-30 RX ORDER — CETIRIZINE HYDROCHLORIDE 10 MG/1
10 TABLET ORAL DAILY
Qty: 30 TABLET | Refills: 0 | Status: SHIPPED | OUTPATIENT
Start: 2022-12-30

## 2022-12-30 RX ORDER — FLUTICASONE PROPIONATE 50 MCG
1 SPRAY, SUSPENSION (ML) NASAL DAILY
Qty: 16 G | Refills: 0 | Status: SHIPPED | OUTPATIENT
Start: 2022-12-30

## 2022-12-30 RX ORDER — BENZONATATE 200 MG/1
200 CAPSULE ORAL 3 TIMES DAILY PRN
Qty: 30 CAPSULE | Refills: 0 | Status: SHIPPED | OUTPATIENT
Start: 2022-12-30

## 2022-12-30 NOTE — PROGRESS NOTES
22  Eros Bauman : 1955 Sex: female  Age 74 y.o. Subjective:  Chief Complaint   Patient presents with    Cough     Headache started two days ago       HPI:   Eros Bauman , 74 y.o. female presents to the clinic for evaluation of dry cough x 3 days. The patient also reports headache. The patient has taken nothing for symptoms. The patient reports worsening symptoms over time. The patient has had ill exposure. The patient denies acute loss of taste and smell, sinus congestion, sore throat, rash, and fever. The patient also denies chest pain, abdominal pain, shortness of breath, wheezing, and nausea / vomiting / diarrhea. ROS:   Unless otherwise stated in this report the patient's positive and negative responses for review of systems for constitutional, eyes, ENT, cardiovascular, respiratory, gastrointestinal, neurological, , musculoskeletal, and integument systems and related systems to the presenting problem are either stated in the history of present illness or were not pertinent or were negative for the symptoms and/or complaints related to the presenting medical problem. Positives and pertinent negatives as per HPI. All others reviewed and are negative.       PMH:     Past Medical History:   Diagnosis Date    Acute left ankle pain 2020    Acute left lumbar radiculopathy 2017    Cancer ([address] 2017    colon    Chronic back pain     Chronic deep vein thrombosis (DVT) of distal vein of left lower extremity ([address] 2021    Diabetes mellitus ([address]     Drug-induced constipation 2020    Eosinophil count raised 2019    Fatigue 2016    Fibromyalgia     GERD (gastroesophageal reflux disease)     Hair loss 2021    Hemorrhoids     Hyperlipidemia     Hypertension     Malignant neoplasm of sigmoid colon ([address] 2020    Obesity     Osteoarthritis     PONV (postoperative nausea and vomiting)     Rib contusion, left, initial encounter 2020    TIA (transient ischemic attack) 05/2022    Urinary incontinence     Weakness of both legs 12/21/2020       Past Surgical History:   Procedure Laterality Date    ANESTHESIA NERVE BLOCK Bilateral 08/15/2019    BILATERAL INTRA-ARTICULAR FACET JOINT INJECTION WITH FLUOROSCOPIC GUIDANCE AT L4-5, L5-S1 WITH IV SEDATION performed by Yi Alston DO at [address] [facility] N/A 10/23/2019    DILATATION AND CURETTAGE HYSTEROSCOPY performed by Myranda Nowak MD at [address] Right 07/11/2019    C7-T1 EPIDURAL STEROID INJECTION #1 TOWARD THE RIGHT performed by Sai Simpson DO at [facility] Bilateral     LUMBAR SPINE SURGERY N/A 12/24/2020    L4-L5  POSTERIOR LUMBAR INTERBODY FUSION, T2-T3 DECOMPRESSIVE LAMINECTOMY performed by Jhoana Garcia MD at [address] [facility] Right 12/28/2017    right L4-5 transforaminal epidural #1    NERVE BLOCK Left 11/29/2018    si inj    NERVE BLOCK Bilateral 08/15/2019    bilateral intra-articular facet joint injection with flouroscopic guidance at L4-S1 with iv sedation    NERVE BLOCK Left 8/2/2022    LEFT SACROILIAC JOINT INJECTION performed by Sai Simpson DO at [facility] Left 12/8/2022    LEFT SACROILIAC JOINT INJECTION performed by Sai Simpson DO at [facility] SI JOINT ARTHRGRPHY&/ANES/STEROID W/IMAGE Left 11/29/2018    LEFT SACROILIAC JOINT INJECTION WITH X-RAY performed by Yi Alston DO at [facility] Left 2005    STEROID INJECTION KNEE Left 02/2022    TONSILLECTOMY  34 YRS OLD    TUBAL LIGATION         Family History   Problem Relation Age of Onset    Asthma Mother     Mental Illness Mother     Cancer Mother         kidney    Heart Disease Father         melanoma    Thyroid Disease Sister     Thyroid Disease Sister        Medications:     Current Outpatient Medications: benzonatate (TESSALON) 200 MG capsule, Take 1 capsule by mouth 3 times daily as needed for Cough, Disp: 30 capsule, Rfl: 0    cetirizine (ZYRTEC ALLERGY) 10 MG tablet, Take 1 tablet by mouth daily, Disp: 30 tablet, Rfl: 0    fluticasone (FLONASE) 50 MCG/ACT nasal spray, 1 spray by Each Nostril route daily, Disp: 16 g, Rfl: 0    buprenorphine (BUTRANS) 10 MCG/HR PTWK, Place 1 patch onto the skin once a week for 28 days. , Disp: 4 patch, Rfl: 1    meloxicam (MOBIC) 7.5 MG tablet, Take 1 tablet by mouth daily, Disp: 30 tablet, Rfl: 1    buprenorphine (BUPRENEX) 10 MCG/HR PTWK, Place 1 patch onto the skin once a week., Disp: , Rfl:     polyethylene glycol (GLYCOLAX) 17 GM/SCOOP powder, Take 17 g by mouth 2 times daily Take as much as needed to have a formed, relieving bowel movement daily. , Disp: 1530 g, Rfl: 11    senna (SENOKOT) 8.6 MG tablet, Take 1 tablet by mouth 2 times daily, Disp: 60 tablet, Rfl: 11    omeprazole (PRILOSEC) 40 MG delayed release capsule, Take 1 capsule by mouth every morning (before breakfast), Disp: 30 capsule, Rfl: 11    atorvastatin (LIPITOR) 20 MG tablet, Take 1 tablet by mouth daily Note increase in dose, Disp: 90 tablet, Rfl: 1    SITagliptin (JANUVIA) 100 MG tablet, Take 1 tablet by mouth daily, Disp: 90 tablet, Rfl: 1    levothyroxine (SYNTHROID) 25 MCG tablet, Take 1 tablet by mouth daily On empty stomach, Disp: 90 tablet, Rfl: 1    allopurinol (ZYLOPRIM) 300 MG tablet, Take one (1) tablet by mouth once daily. , Disp: 90 tablet, Rfl: 3    ezetimibe (ZETIA) 10 MG tablet, Take 1 tablet by mouth daily, Disp: 90 tablet, Rfl: 1    zoster recombinant adjuvanted vaccine (SHINGRIX) 50 MCG/0.5ML SUSR injection, Inject 0.5 mLs into the muscle See Admin Instructions 1 dose now and repeat in 2-6 months, Disp: 0.5 mL, Rfl: 1    atenolol (TENORMIN) 50 MG tablet, Take 1 tablet by mouth in the morning., Disp: 90 tablet, Rfl: 3    aspirin 81 MG EC tablet, Take 1 tablet by mouth daily, Disp: 30 tablet, Rfl: 3    meloxicam (MOBIC) 15 MG tablet, , Disp: , Rfl:     acetaminophen (TYLENOL) 500 MG tablet, Take 500 mg by mouth every 6 hours as needed for Pain, Disp: , Rfl:     PROCTOZONE-HC 2.5 % CREA rectal cream, Apply 4 times daily as needed, Disp: 1 Tube, Rfl: 3    Cyanocobalamin (B-12 PO), Take by mouth, Disp: , Rfl:     Blood Glucose Monitoring Suppl (ACCU-CHEK GUIDE) w/Device KIT, , Disp: , Rfl:     Alcohol Swabs (PHARMACIST CHOICE ALCOHOL) PADS, , Disp: , Rfl:     Blood Glucose Calibration (ACCU-CHEK GUIDE CONTROL) LIQD, , Disp: , Rfl:     diclofenac sodium (VOLTAREN) 1 % GEL, Apply topically 2 times daily, Disp: 100 g, Rfl: 2    Handicap Placard MISC, by Does not apply route Start 11/23/2020 expires 11/22/2023, Disp: 1 each, Rfl: 0    blood glucose monitor strips, Test 1 times a day & as needed for symptoms of irregular blood glucose., Disp: 50 strip, Rfl: 11    Cholecalciferol (VITAMIN D3) 2000 units CAPS, Take 2,000 Units by mouth daily, Disp: , Rfl:     Allergies: Allergies   Allergen Reactions    Diclofenac     Lisinopril Other (See Comments)     Profound malaise    Diclofenac Sodium Nausea And Vomiting    Farxiga [Dapagliflozin] Itching     Vaginal itching with Farxiga, Jardiance     Lyrica [Pregabalin] Other (See Comments)     Confusion    Sulfa Antibiotics Rash       Social History:     Social History     Tobacco Use    Smoking status: Never    Smokeless tobacco: Never   Vaping Use    Vaping Use: Never used   Substance Use Topics    Alcohol use: Yes     Comment: Rarely    Drug use: No       Physical Exam:     Vitals:    12/30/22 1332   BP: (!) 155/85   Site: Left Upper Arm   Position: Sitting   Pulse: 67   Resp: 17   Temp: 97.6 °F (36.4 °C)   TempSrc: Temporal   SpO2: 99%   Weight: 209 lb (94.8 kg)   Height: 5' (1.524 m)       Physical Exam (PE)    Physical Exam  Vitals and nursing note reviewed. Constitutional:       Appearance: She is well-developed.    HENT:      Head: Normocephalic and atraumatic. Right Ear: Hearing and external ear normal.      Left Ear: Hearing and external ear normal.      Nose: Congestion and rhinorrhea present. Mouth/Throat:      Pharynx: Uvula midline. Eyes:      General: Lids are normal.      Conjunctiva/sclera: Conjunctivae normal.      Pupils: Pupils are equal, round, and reactive to light. Cardiovascular:      Rate and Rhythm: Normal rate and regular rhythm. Heart sounds: Normal heart sounds. No murmur heard. Pulmonary:      Effort: Pulmonary effort is normal.      Breath sounds: Normal breath sounds. Abdominal:      General: Bowel sounds are normal.      Palpations: Abdomen is soft. Abdomen is not rigid. Tenderness: There is no abdominal tenderness. There is no guarding or rebound. Musculoskeletal:      Cervical back: Normal range of motion and neck supple. Skin:     General: Skin is warm and dry. Findings: No abrasion or rash. Neurological:      Mental Status: She is alert and oriented to person, place, and time. GCS: GCS eye subscore is 4. GCS verbal subscore is 5. GCS motor subscore is 6. Cranial Nerves: No cranial nerve deficit. Sensory: No sensory deficit. Coordination: Coordination normal.      Gait: Gait normal.        Testing:   (All laboratory and radiology results have been personally reviewed by myself)  Labs:  No results found for this visit on 12/30/22. Imaging: All Radiology results interpreted by Radiologist unless otherwise noted. No orders to display       Assessment / Plan:   The patient's vitals, allergies, medications, and past medical history have been reviewed. Keshav Luu was seen today for cough. Diagnoses and all orders for this visit:    Flu-like symptoms  -     POCT COVID-19, Antigen    Other orders  -     benzonatate (TESSALON) 200 MG capsule; Take 1 capsule by mouth 3 times daily as needed for Cough  -     cetirizine (ZYRTEC ALLERGY) 10 MG tablet;  Take 1 tablet by mouth daily  - fluticasone (FLONASE) 50 MCG/ACT nasal spray; 1 spray by Each Nostril route daily      - Disposition: home    - Educational material printed for patient's review and were included in patient instructions. After Visit Summary was given to patient at the end of visit. - COVID-19 swab obtained and negative. Encouraged oral fluids and rest. Discussed symptomatic treatments with patient today. The patient is to schedule a follow-up with PCP in the next 2-3 days for reevaluation. Red flag symptoms were also discussed with the patient today. If symptoms worsen the patient is to go directly to the emergency department for reevaluation and treatment. Pt verbalizes understanding and is in agreement with plan of care. All questions answered. SIGNATURE: VISHNU Wright CNP      *NOTE: This report was transcribed using voice recognition software. Every effort was made to ensure accuracy; however, inadvertent computerized transcription errors may be present.

## 2023-01-06 ENCOUNTER — TELEPHONE (OUTPATIENT)
Dept: PAIN MANAGEMENT | Age: 68
End: 2023-01-06

## 2023-01-06 DIAGNOSIS — M54.16 LUMBAR RADICULOPATHY: Primary | ICD-10-CM

## 2023-01-06 DIAGNOSIS — M96.1 LUMBAR POST-LAMINECTOMY SYNDROME: ICD-10-CM

## 2023-01-06 NOTE — TELEPHONE ENCOUNTER
The last MRI of her low back was in 2021. Have the symptoms in her lumbar spine changed?     If not there is no reason to get an updated MRI at this time and I would continue with just the thoracic MRI as ordered

## 2023-01-06 NOTE — TELEPHONE ENCOUNTER
Cali Vargas was scheduled to get an MRI done, when she arrived she asked if she could get her lumbar and thoracic done. They advised that if she wanted both done she would need to call us. When I called her she stated that she wanted an MRI of her mid to lower back examined even after I advised her that she had an Mri of the lumbar spine in the past. She is requesting we place an order for both the lumbar and thoracic spine. Please advise.

## 2023-01-10 DIAGNOSIS — E78.1 HYPERTRIGLYCERIDEMIA: ICD-10-CM

## 2023-01-10 DIAGNOSIS — E03.9 ACQUIRED HYPOTHYROIDISM: ICD-10-CM

## 2023-01-10 LAB
ALBUMIN SERPL-MCNC: 4.7 G/DL (ref 3.5–5.2)
ALP BLD-CCNC: 100 U/L (ref 35–104)
ALT SERPL-CCNC: 23 U/L (ref 0–32)
ANION GAP SERPL CALCULATED.3IONS-SCNC: 17 MMOL/L (ref 7–16)
AST SERPL-CCNC: 27 U/L (ref 0–31)
BILIRUB SERPL-MCNC: 0.6 MG/DL (ref 0–1.2)
BUN BLDV-MCNC: 16 MG/DL (ref 6–23)
CALCIUM SERPL-MCNC: 10.5 MG/DL (ref 8.6–10.2)
CHLORIDE BLD-SCNC: 97 MMOL/L (ref 98–107)
CHOLESTEROL, TOTAL: 135 MG/DL (ref 0–199)
CO2: 26 MMOL/L (ref 22–29)
CREAT SERPL-MCNC: 0.7 MG/DL (ref 0.5–1)
GFR SERPL CREATININE-BSD FRML MDRD: >60 ML/MIN/1.73
GLUCOSE BLD-MCNC: 159 MG/DL (ref 74–99)
HDLC SERPL-MCNC: 45 MG/DL
LDL CHOLESTEROL CALCULATED: 46 MG/DL (ref 0–99)
POTASSIUM SERPL-SCNC: 4.7 MMOL/L (ref 3.5–5)
SODIUM BLD-SCNC: 140 MMOL/L (ref 132–146)
T4 FREE: 1.04 NG/DL (ref 0.93–1.7)
TOTAL PROTEIN: 7.3 G/DL (ref 6.4–8.3)
TRIGL SERPL-MCNC: 218 MG/DL (ref 0–149)
TSH SERPL DL<=0.05 MIU/L-ACNC: 2.26 UIU/ML (ref 0.27–4.2)
VLDLC SERPL CALC-MCNC: 44 MG/DL

## 2023-01-10 NOTE — TELEPHONE ENCOUNTER
She stated that  the only change she noticed is  her lower back is hurting more and she is having numbness in her left foot.

## 2023-01-11 ENCOUNTER — OFFICE VISIT (OUTPATIENT)
Dept: FAMILY MEDICINE CLINIC | Age: 68
End: 2023-01-11
Payer: MEDICARE

## 2023-01-11 VITALS
HEART RATE: 76 BPM | HEIGHT: 60 IN | RESPIRATION RATE: 16 BRPM | SYSTOLIC BLOOD PRESSURE: 126 MMHG | WEIGHT: 209 LBS | BODY MASS INDEX: 41.03 KG/M2 | OXYGEN SATURATION: 97 % | DIASTOLIC BLOOD PRESSURE: 74 MMHG | TEMPERATURE: 97.6 F

## 2023-01-11 DIAGNOSIS — M17.12 PRIMARY OSTEOARTHRITIS OF LEFT KNEE: ICD-10-CM

## 2023-01-11 DIAGNOSIS — M25.50 CHRONIC PAIN OF MULTIPLE JOINTS: ICD-10-CM

## 2023-01-11 DIAGNOSIS — E11.9 TYPE 2 DIABETES MELLITUS WITHOUT COMPLICATION, WITHOUT LONG-TERM CURRENT USE OF INSULIN (HCC): Primary | ICD-10-CM

## 2023-01-11 DIAGNOSIS — E03.9 ACQUIRED HYPOTHYROIDISM: ICD-10-CM

## 2023-01-11 DIAGNOSIS — E66.01 OBESITY, CLASS III, BMI 40-49.9 (MORBID OBESITY) (HCC): ICD-10-CM

## 2023-01-11 DIAGNOSIS — E78.1 HYPERTRIGLYCERIDEMIA: ICD-10-CM

## 2023-01-11 DIAGNOSIS — G89.29 CHRONIC PAIN OF MULTIPLE JOINTS: ICD-10-CM

## 2023-01-11 DIAGNOSIS — M46.1 SACROILIITIS, NOT ELSEWHERE CLASSIFIED (HCC): ICD-10-CM

## 2023-01-11 DIAGNOSIS — I10 ESSENTIAL HYPERTENSION: ICD-10-CM

## 2023-01-11 DIAGNOSIS — G89.29 CHRONIC BACK PAIN GREATER THAN 3 MONTHS DURATION: ICD-10-CM

## 2023-01-11 DIAGNOSIS — Z23 FLU VACCINE NEED: ICD-10-CM

## 2023-01-11 DIAGNOSIS — M54.9 CHRONIC BACK PAIN GREATER THAN 3 MONTHS DURATION: ICD-10-CM

## 2023-01-11 LAB
CHP ED QC CHECK: ABNORMAL
GLUCOSE BLD-MCNC: 154 MG/DL
HBA1C MFR BLD: 7.3 %

## 2023-01-11 PROCEDURE — 2022F DILAT RTA XM EVC RTNOPTHY: CPT | Performed by: NURSE PRACTITIONER

## 2023-01-11 PROCEDURE — G0008 ADMIN INFLUENZA VIRUS VAC: HCPCS | Performed by: NURSE PRACTITIONER

## 2023-01-11 PROCEDURE — G8399 PT W/DXA RESULTS DOCUMENT: HCPCS | Performed by: NURSE PRACTITIONER

## 2023-01-11 PROCEDURE — 82044 UR ALBUMIN SEMIQUANTITATIVE: CPT | Performed by: NURSE PRACTITIONER

## 2023-01-11 PROCEDURE — 3074F SYST BP LT 130 MM HG: CPT | Performed by: NURSE PRACTITIONER

## 2023-01-11 PROCEDURE — 83036 HEMOGLOBIN GLYCOSYLATED A1C: CPT | Performed by: NURSE PRACTITIONER

## 2023-01-11 PROCEDURE — 3017F COLORECTAL CA SCREEN DOC REV: CPT | Performed by: NURSE PRACTITIONER

## 2023-01-11 PROCEDURE — 3078F DIAST BP <80 MM HG: CPT | Performed by: NURSE PRACTITIONER

## 2023-01-11 PROCEDURE — 99214 OFFICE O/P EST MOD 30 MIN: CPT | Performed by: NURSE PRACTITIONER

## 2023-01-11 PROCEDURE — 1123F ACP DISCUSS/DSCN MKR DOCD: CPT | Performed by: NURSE PRACTITIONER

## 2023-01-11 PROCEDURE — G8417 CALC BMI ABV UP PARAM F/U: HCPCS | Performed by: NURSE PRACTITIONER

## 2023-01-11 PROCEDURE — G8484 FLU IMMUNIZE NO ADMIN: HCPCS | Performed by: NURSE PRACTITIONER

## 2023-01-11 PROCEDURE — G8427 DOCREV CUR MEDS BY ELIG CLIN: HCPCS | Performed by: NURSE PRACTITIONER

## 2023-01-11 PROCEDURE — 90694 VACC AIIV4 NO PRSRV 0.5ML IM: CPT | Performed by: NURSE PRACTITIONER

## 2023-01-11 PROCEDURE — 1036F TOBACCO NON-USER: CPT | Performed by: NURSE PRACTITIONER

## 2023-01-11 PROCEDURE — 3051F HG A1C>EQUAL 7.0%<8.0%: CPT | Performed by: NURSE PRACTITIONER

## 2023-01-11 PROCEDURE — 1090F PRES/ABSN URINE INCON ASSESS: CPT | Performed by: NURSE PRACTITIONER

## 2023-01-11 PROCEDURE — 82962 GLUCOSE BLOOD TEST: CPT | Performed by: NURSE PRACTITIONER

## 2023-01-11 RX ORDER — ATORVASTATIN CALCIUM 40 MG/1
40 TABLET, FILM COATED ORAL DAILY
Qty: 90 TABLET | Refills: 0 | Status: SHIPPED | OUTPATIENT
Start: 2023-01-11

## 2023-01-11 RX ORDER — ATORVASTATIN CALCIUM 20 MG/1
20 TABLET, FILM COATED ORAL DAILY
Qty: 90 TABLET | Refills: 1 | Status: CANCELLED | OUTPATIENT
Start: 2023-01-11

## 2023-01-11 RX ORDER — LEVOTHYROXINE SODIUM 0.03 MG/1
25 TABLET ORAL DAILY
Qty: 90 TABLET | Refills: 1 | Status: CANCELLED | OUTPATIENT
Start: 2023-01-11

## 2023-01-11 RX ORDER — EZETIMIBE 10 MG/1
10 TABLET ORAL DAILY
Qty: 90 TABLET | Refills: 1 | Status: CANCELLED | OUTPATIENT
Start: 2023-01-11

## 2023-01-11 RX ORDER — ALLOPURINOL 300 MG/1
TABLET ORAL
Qty: 90 TABLET | Refills: 3 | Status: CANCELLED | OUTPATIENT
Start: 2023-01-11

## 2023-01-11 RX ORDER — ATENOLOL 50 MG/1
50 TABLET ORAL DAILY
Qty: 90 TABLET | Refills: 3 | Status: CANCELLED | OUTPATIENT
Start: 2023-01-11

## 2023-01-11 ASSESSMENT — PATIENT HEALTH QUESTIONNAIRE - PHQ9
SUM OF ALL RESPONSES TO PHQ QUESTIONS 1-9: 0
SUM OF ALL RESPONSES TO PHQ QUESTIONS 1-9: 0
1. LITTLE INTEREST OR PLEASURE IN DOING THINGS: 0
SUM OF ALL RESPONSES TO PHQ QUESTIONS 1-9: 0
SUM OF ALL RESPONSES TO PHQ9 QUESTIONS 1 & 2: 0
2. FEELING DOWN, DEPRESSED OR HOPELESS: 0
SUM OF ALL RESPONSES TO PHQ QUESTIONS 1-9: 0

## 2023-01-11 ASSESSMENT — ENCOUNTER SYMPTOMS
SORE THROAT: 0
SINUS PRESSURE: 0
SINUS PAIN: 0
COLOR CHANGE: 0
RHINORRHEA: 0
FACIAL SWELLING: 0
ABDOMINAL PAIN: 0
BACK PAIN: 1
DIARRHEA: 1
VOICE CHANGE: 0
COUGH: 0
CHEST TIGHTNESS: 0
SHORTNESS OF BREATH: 0
WHEEZING: 0
CONSTIPATION: 1
VOMITING: 0
NAUSEA: 0
TROUBLE SWALLOWING: 0

## 2023-01-11 NOTE — ASSESSMENT & PLAN NOTE
well controlled, no significant medication side effects noted and reviewed CMP, lipids, TSH, FT4 continue atenolol 50 mg daily  -Discussed taking medication and directed every day. -Discussed exercising daily 30 minutes 5 times a week for 150 minutes weekly.  -Discussed weight reduction if needed.  -Discussed low sodium diet.  -Discussed limiting caffeine consumption and tobacco cessation and the effects they have on the heart and blood pressure.

## 2023-01-11 NOTE — ASSESSMENT & PLAN NOTE
borderline controlled and A1c up to 7.3% now. Declines to add medication. Continue Januvia 100 mg daily. Monitor BS at different times: 1 day fasting, then next day 2 hours after lunch, next day 2 hours after dinner, next day at bedtime then start over and log all values.   Bring log to next appointment  Foot exam every day; wash and dry well between toes, look for any redness, cracks, wounds notify provider if any problems occur  Reminder for annual Eye exam  Reminder for Podiatry visits Q 2 Months for toenail care if needed  Reminder to keep vaccines up dated  Exercise 30 minutes daily  Recommend Diabetic Education Classes if you have not already attended

## 2023-01-11 NOTE — ASSESSMENT & PLAN NOTE
poorly controlled and labs reviewed lipid, CMP, TSH, FT4. Increase the atorvastatin 40 mg nightly with the Zetia 10 mg nightly.   -Discussed low fat diet, limit fast food, goodies, breads and pastas if consuming several days a week,  limit any alcohol consumption.  -Discussed weight reduction and exercise 30 minutes 5 days a week for total of 150 minutes weekly.  -Discussed if any unusual muscle aching/pain to contact the office, discussed medication and risk of muscle pain/damage from Rhabdomyolysis. -Discussed repeat labs in 8 weeks.

## 2023-01-11 NOTE — PROGRESS NOTES
OFFICE PROGRESS NOTE  [address] [place] [address] Rd  [address] [id number]  Dept: [phone]   Chief Complaint   Patient presents with    Diabetes    Hyperlipidemia    Hypertension       ASSESSMENT/PLAN   1. Type 2 diabetes mellitus without complication, without long-term current use of insulin ([facility])  Assessment & Plan:   borderline controlled and A1c up to 7.3% now. Declines to add medication. Continue Januvia 100 mg daily. Monitor BS at different times: 1 day fasting, then next day 2 hours after lunch, next day 2 hours after dinner, next day at bedtime then start over and log all values. Bring log to next appointment  Foot exam every day; wash and dry well between toes, look for any redness, cracks, wounds notify provider if any problems occur  Reminder for annual Eye exam  Reminder for Podiatry visits Q 2 Months for toenail care if needed  Reminder to keep vaccines up dated  Exercise 30 minutes daily  Recommend Diabetic Education Classes if you have not already attended  Orders:  -     POCT glycosylated hemoglobin (Hb A1C)  -     POCT Glucose  -     POCT Microalbumin  -     Hemoglobin A1C; Future  2. Essential hypertension  Assessment & Plan:   well controlled, no significant medication side effects noted and reviewed CMP, lipids, TSH, FT4 continue atenolol 50 mg daily  -Discussed taking medication and directed every day. -Discussed exercising daily 30 minutes 5 times a week for 150 minutes weekly.  -Discussed weight reduction if needed.  -Discussed low sodium diet.  -Discussed limiting caffeine consumption and tobacco cessation and the effects they have on the heart and blood pressure. Orders:  -     CBC with Auto Differential; Future  -     Lipid Panel; Future  -     Comprehensive Metabolic Panel; Future  3. Hypertriglyceridemia  Assessment & Plan:   poorly controlled and labs reviewed lipid, CMP, TSH, FT4.  Increase the atorvastatin 40 mg nightly with the Zetia 10 mg nightly.   -Discussed low fat diet, limit fast food, goodies, breads and pastas if consuming several days a week,  limit any alcohol consumption.  -Discussed weight reduction and exercise 30 minutes 5 days a week for total of 150 minutes weekly.  -Discussed if any unusual muscle aching/pain to contact the office, discussed medication and risk of muscle pain/damage from Rhabdomyolysis. -Discussed repeat labs in 8 weeks. Orders:  -     atorvastatin (LIPITOR) 40 MG tablet; Take 1 tablet by mouth daily Note increase in dose, Disp-90 tablet, R-0Needs lipid panel drawn ordered in JulyNormal  -     CBC with Auto Differential; Future  -     Lipid Panel; Future  -     Comprehensive Metabolic Panel; Future  4. Acquired hypothyroidism  Assessment & Plan:   Stable labs reviewed TSH, FT4 continue levothyroxine 25 mcg daily. Discussed taking same time every day on empty stomach, no vitamins, minerals, calcium or iron for 4 hours of  thyroid medication. Wait 30 - 60 minutes to have breakfast or other medications. 5. Flu vaccine need  Assessment & Plan:   Wash your hands regularly, and keep your hands away from your face. Cover your mouth when you cough or sneeze. Rest, plenty of fluids, Tylenol for body aches, fever. Stay home from school, work, and other public places until you are feeling better and your fever has been gone for at least 24 hours. The fever needs to have gone away on its own without the help of medicine. Ask people living with you to talk to their doctors about preventing the flu. They may get antiviral medicine to keep from getting the flu from you. To prevent the flu in the future, get a flu vaccine every fall. Encourage people living with you to get the vaccine. Flu vaccine given today  Orders:  -     Influenza, FLUAD, (age 72 y+), IM, PF, 0.5 mL  6.  Obesity, Class III, BMI 40-49.9 (morbid obesity) ([facility] [address]  Assessment & Plan:   Stable referral to aquatic therapy, try to get more exercise in and watch diet. 7. Sacroiliitis, not elsewhere classified ([facility] [address]  -     Amb External Referral To Physical Therapy  8. Chronic back pain greater than 3 months duration  -     Amb External Referral To Physical Therapy  9. Chronic pain of multiple joints  -     Amb External Referral To Physical Therapy  10. Primary osteoarthritis of left knee  -     Amb External Referral To Physical Therapy       Reviewed labs: CMP,  Lipids, TSH, FT4    Reviewed radiology mammogram and DEXA normal     weight loss,  exercising 30 minutes daily, and taking medications as directed and adverse effects    Return in about 1 month (around 2/11/2023) for 1 month Medicare well visit and 3 months for DM, HTN, HLD. HPI:   SUBJECTIVE:     74 y.o. female presents today for follow-up of diabetes [place]. In-office bloodsugar is:   Results for POC orders placed in visit on 01/11/23   POCT glycosylated hemoglobin (Hb A1C)   Result Value Ref Range    Hemoglobin A1C 7.3 %   POCT Glucose   Result Value Ref Range    Glucose 154 mg/dL    QC OK? Patient reports being compliant to low carbohydrate diet and but not  weekly exercises. Currently, the patient is treated with medication(s): Januvia 100 mg daily. Patient reports checking home blood sugar 1 times per day. Patient states home blood sugar ranges from 134 - 175 after steroid injection. Patient denies hypoglycemic episodes and understand to take sweetened beverages or a snack if hypoglycemic symptoms are suspected. Hypertension: Patient here for follow-up of elevated blood pressure. She is not exercising and is adherent to low salt diet. Blood pressure is well controlled at home. Cardiac symptoms none. Patient denies chest pain, chest pressure/discomfort, claudication, dyspnea, exertional chest pressure/discomfort, fatigue, irregular heart beat, lower extremity edema, near-syncope, orthopnea, palpitations, paroxysmal nocturnal dyspnea, syncope, and tachypnea. Cardiovascular risk factors: advanced age (older than 54 for men, 72 for women), diabetes mellitus, dyslipidemia, hypertension, obesity (BMI >= 30 kg/m2), and sedentary lifestyle. Use of agents associated with hypertension: NSAIDS and thyroid hormones. History of target organ damage: none. Hyperlipidemia: Patient presents with hyperlipidemia. She was tested because DM, HTN, HLD. Her last labs 1/10/23 showed Total cholesterol of 135, HDL 45, LDL 46,  Triglycerides 218 has improved on the Zetia 10 mg daily, Atorvastatin 20 mg . She denies chest pain, dyspnea, exertional chest pressure/discomfort, fatigue, feeding intolerance, lower extremity edema, palpitations, poor exercise tolerance, syncope, tachypnea, and skin xanthelasma. There is not a family history of hyperlipidemia. There is not a family history of early ischemia heart disease. Hypothyroidism: Patient complains of hypothyroidism. Symptoms include none. Patient denies change in energy level, diarrhea, heat / cold intolerance, nervousness, palpitations, and weight changes. Onset of symptoms was several months ago. Symptoms have stabilized. She is requesting to go back to Aquatic therapy for her knees. She has chronic knee pain. Surgical History:  has a past surgical history that includes  section; Tubal ligation; Tonsillectomy (28 YRS OLD); Foot surgery (Bilateral); shoulder surgery (Left, ); Cholecystectomy; Colon surgery; Nerve Block (Right, 2017); Nerve Block (Left, 2018); pr inject si joint arthrgrphy&/anes/steroid w/loren (Left, 2018); epidural steroid injection (Right, 2019); Nerve Block (Bilateral, 08/15/2019); Anesthesia Nerve Block (Bilateral, 08/15/2019); Dilation and curettage of uterus (N/A, 10/23/2019); Lumbar spine surgery (N/A, 2020); Steroid injection knee (Left, 2022); Nerve Block (Left, 2022); and Nerve Block (Left, 2022).   Social History:  reports that she has never smoked. She has never used smokeless tobacco. She reports current alcohol use. She reports that she does not use drugs.  Family History: family history includes Asthma in her mother; Cancer in her mother; Heart Disease in her father; Mental Illness in her mother; Thyroid Disease in her sister and sister.  I have reviewed Margaret's allergies, medications, problem list, medical, social and family history and have updated as needed in the electronic medical record”    Review of Systems   Constitutional:  Negative for activity change, appetite change, chills, diaphoresis, fatigue, fever and unexpected weight change.   HENT:  Negative for congestion, dental problem, drooling, ear discharge, ear pain, facial swelling, hearing loss, mouth sores, nosebleeds, postnasal drip, rhinorrhea, sinus pressure, sinus pain, sneezing, sore throat, tinnitus, trouble swallowing and voice change.    Eyes:  Negative for visual disturbance.   Respiratory:  Negative for cough, chest tightness, shortness of breath and wheezing.    Cardiovascular:  Negative for chest pain, palpitations and leg swelling.   Gastrointestinal:  Positive for constipation (following with Dr Ayala) and diarrhea. Negative for abdominal pain, nausea and vomiting.   Endocrine: Positive for cold intolerance. Negative for heat intolerance, polydipsia, polyphagia and polyuria.   Genitourinary:  Negative for difficulty urinating, frequency and urgency.   Musculoskeletal:  Positive for arthralgias, back pain and gait problem. Negative for joint swelling, myalgias, neck pain and neck stiffness.   Skin:  Negative for color change, pallor, rash and wound.   Allergic/Immunologic: Negative for environmental allergies, food allergies and immunocompromised state.   Neurological:  Positive for weakness (left leg, foot) and numbness (left side from her back). Negative for dizziness, tremors, seizures, syncope, facial asymmetry, speech difficulty, light-headedness and headaches.  Hematological:  Negative for adenopathy. Does not bruise/bleed easily. Psychiatric/Behavioral:  Negative for agitation, behavioral problems, confusion, decreased concentration, dysphoric mood, hallucinations, self-injury, sleep disturbance and suicidal ideas. The patient is not nervous/anxious and is not hyperactive. OBJECTIVE:     VS:  Wt Readings from Last 3 Encounters:   01/11/23 209 lb (94.8 kg)   12/30/22 209 lb (94.8 kg)   01/04/23 209 lb (94.8 kg)                       Vitals:    01/11/23 1306   BP: 126/74   Pulse: 76   Resp: 16   Temp: 97.6 °F (36.4 °C)   SpO2: 97%   Weight: 209 lb (94.8 kg)   Height: 5' (1.524 m)       General: Alert and oriented to person, place, and time, well developed and well nourished, morbidly obese, in no acute distress  SKIN: Warm and dry, intact without any rash, masses or lesions  HEAD: normocephalic, atraumatic  Eyes: sclera/conjunctiva clear, PERRLA, EOMI's intact  ENT: tympanic membranes, external ear and ear canal normal bilaterally, normal hearing, Nose without deformity, nasal mucosa and turbinates normal without polyps   Throat: clear, tongue midline,  drainage, no masses or lesions noted, good dentition  Neck: supple and non-tender without mass, trachea midline, no cervical lymphadenopathy, no bruit, no thyromegaly or nodules  Cardiovascular: regular rate and regular rhythm, normal S1 and S2,  no murmurs, rubs, clicks, or gallop. Distal pulses intact, no carotid bruits. No edema  Pulmonary/Chest: clear to auscultation bilaterally, no wheezes, rales or rhonchi, normal air movement, no respiratory distress  Abdomen: soft, non-tender, non-distended, normal bowel sounds, no masses or hepatosplenomegaly  Musculoskeletal: decreased ROM, no joint swelling, deformity +  tenderness   Neurologic: reflexes normal and symmetric, no cranial nerve deficit, antalgic gait, coordination and speech normal  Extremities: no clubbing, cyanosis, or edema.    Psychiatric: Good eye contact, normal mood and affect, answers questions appropriately  Foot Exam:  No signs of infection and/or necrosis noted. Sensation intact and equal bilaterally. Monofilament normal bilateral,  Interdigit spaces exhibit no abnormal skin changes and/or surface growth. I have reviewed my findings and recommendations with Je Nipper.     Willi Tim, APRN - CNP, NP-C, FNP-BC

## 2023-01-11 NOTE — ASSESSMENT & PLAN NOTE
Stable labs reviewed TSH, FT4 continue levothyroxine 25 mcg daily. Discussed taking same time every day on empty stomach, no vitamins, minerals, calcium or iron for 4 hours of  thyroid medication. Wait 30 - 60 minutes to have breakfast or other medications.

## 2023-01-12 NOTE — TELEPHONE ENCOUNTER
Nevaeh Arce called back again. She is wanting to know if she can get an MRI of both the ,lumbar and thoracic spine. Please advise.

## 2023-01-17 DIAGNOSIS — E78.1 HYPERTRIGLYCERIDEMIA: ICD-10-CM

## 2023-01-17 RX ORDER — ATORVASTATIN CALCIUM 20 MG/1
20 TABLET, FILM COATED ORAL DAILY
Qty: 90 TABLET | Refills: 1 | OUTPATIENT
Start: 2023-01-17

## 2023-02-17 DIAGNOSIS — M53.3 DISORDER OF SACRUM: ICD-10-CM

## 2023-02-17 DIAGNOSIS — G89.29 CHRONIC PAIN OF LEFT KNEE: ICD-10-CM

## 2023-02-17 DIAGNOSIS — G89.4 CHRONIC PAIN SYNDROME: ICD-10-CM

## 2023-02-17 DIAGNOSIS — M25.562 CHRONIC PAIN OF LEFT KNEE: ICD-10-CM

## 2023-02-17 DIAGNOSIS — M96.1 LUMBAR POST-LAMINECTOMY SYNDROME: ICD-10-CM

## 2023-02-17 DIAGNOSIS — M17.12 PRIMARY OSTEOARTHRITIS OF LEFT KNEE: ICD-10-CM

## 2023-02-17 DIAGNOSIS — E03.9 ACQUIRED HYPOTHYROIDISM: ICD-10-CM

## 2023-02-20 RX ORDER — LEVOTHYROXINE SODIUM 0.03 MG/1
25 TABLET ORAL DAILY
Qty: 90 TABLET | Refills: 1 | OUTPATIENT
Start: 2023-02-20

## 2023-02-21 RX ORDER — MELOXICAM 7.5 MG/1
7.5 TABLET ORAL DAILY
Qty: 30 TABLET | Refills: 1 | OUTPATIENT
Start: 2023-02-21 | End: 2023-03-23

## 2023-02-22 SDOH — HEALTH STABILITY: PHYSICAL HEALTH: ON AVERAGE, HOW MANY MINUTES DO YOU ENGAGE IN EXERCISE AT THIS LEVEL?: 30 MIN

## 2023-02-22 SDOH — HEALTH STABILITY: PHYSICAL HEALTH: ON AVERAGE, HOW MANY DAYS PER WEEK DO YOU ENGAGE IN MODERATE TO STRENUOUS EXERCISE (LIKE A BRISK WALK)?: 3 DAYS

## 2023-02-22 ASSESSMENT — PATIENT HEALTH QUESTIONNAIRE - PHQ9
SUM OF ALL RESPONSES TO PHQ QUESTIONS 1-9: 0
SUM OF ALL RESPONSES TO PHQ QUESTIONS 1-9: 0
2. FEELING DOWN, DEPRESSED OR HOPELESS: 0
SUM OF ALL RESPONSES TO PHQ QUESTIONS 1-9: 0
SUM OF ALL RESPONSES TO PHQ9 QUESTIONS 1 & 2: 0
SUM OF ALL RESPONSES TO PHQ QUESTIONS 1-9: 0
1. LITTLE INTEREST OR PLEASURE IN DOING THINGS: 0

## 2023-02-22 ASSESSMENT — LIFESTYLE VARIABLES
HOW MANY STANDARD DRINKS CONTAINING ALCOHOL DO YOU HAVE ON A TYPICAL DAY: PATIENT DOES NOT DRINK
HOW OFTEN DO YOU HAVE A DRINK CONTAINING ALCOHOL: 1
HOW OFTEN DO YOU HAVE A DRINK CONTAINING ALCOHOL: NEVER
HOW MANY STANDARD DRINKS CONTAINING ALCOHOL DO YOU HAVE ON A TYPICAL DAY: 0
HOW OFTEN DO YOU HAVE SIX OR MORE DRINKS ON ONE OCCASION: 1

## 2023-02-24 ENCOUNTER — PREP FOR PROCEDURE (OUTPATIENT)
Dept: PAIN MANAGEMENT | Age: 68
End: 2023-02-24

## 2023-02-24 ENCOUNTER — OFFICE VISIT (OUTPATIENT)
Dept: PAIN MANAGEMENT | Age: 68
End: 2023-02-24
Payer: MEDICARE

## 2023-02-24 VITALS
TEMPERATURE: 97.2 F | HEART RATE: 80 BPM | RESPIRATION RATE: 16 BRPM | OXYGEN SATURATION: 95 % | SYSTOLIC BLOOD PRESSURE: 132 MMHG | DIASTOLIC BLOOD PRESSURE: 78 MMHG

## 2023-02-24 DIAGNOSIS — M54.16 LUMBAR RADICULOPATHY: Primary | ICD-10-CM

## 2023-02-24 DIAGNOSIS — G89.4 CHRONIC PAIN SYNDROME: Primary | ICD-10-CM

## 2023-02-24 DIAGNOSIS — M17.12 PRIMARY OSTEOARTHRITIS OF LEFT KNEE: ICD-10-CM

## 2023-02-24 DIAGNOSIS — M54.6 PAIN IN THORACIC SPINE: ICD-10-CM

## 2023-02-24 DIAGNOSIS — M25.562 CHRONIC PAIN OF LEFT KNEE: ICD-10-CM

## 2023-02-24 DIAGNOSIS — M96.1 LUMBAR POST-LAMINECTOMY SYNDROME: ICD-10-CM

## 2023-02-24 DIAGNOSIS — G89.29 CHRONIC PAIN OF LEFT KNEE: ICD-10-CM

## 2023-02-24 DIAGNOSIS — M53.3 DISORDER OF SACRUM: ICD-10-CM

## 2023-02-24 DIAGNOSIS — M54.16 LUMBAR RADICULOPATHY: ICD-10-CM

## 2023-02-24 PROCEDURE — G8427 DOCREV CUR MEDS BY ELIG CLIN: HCPCS | Performed by: PAIN MEDICINE

## 2023-02-24 PROCEDURE — 1036F TOBACCO NON-USER: CPT | Performed by: PAIN MEDICINE

## 2023-02-24 PROCEDURE — 3017F COLORECTAL CA SCREEN DOC REV: CPT | Performed by: PAIN MEDICINE

## 2023-02-24 PROCEDURE — 1090F PRES/ABSN URINE INCON ASSESS: CPT | Performed by: PAIN MEDICINE

## 2023-02-24 PROCEDURE — 3078F DIAST BP <80 MM HG: CPT | Performed by: PAIN MEDICINE

## 2023-02-24 PROCEDURE — G8484 FLU IMMUNIZE NO ADMIN: HCPCS | Performed by: PAIN MEDICINE

## 2023-02-24 PROCEDURE — 99213 OFFICE O/P EST LOW 20 MIN: CPT

## 2023-02-24 PROCEDURE — 99215 OFFICE O/P EST HI 40 MIN: CPT | Performed by: PAIN MEDICINE

## 2023-02-24 PROCEDURE — 1123F ACP DISCUSS/DSCN MKR DOCD: CPT | Performed by: PAIN MEDICINE

## 2023-02-24 PROCEDURE — 3075F SYST BP GE 130 - 139MM HG: CPT | Performed by: PAIN MEDICINE

## 2023-02-24 PROCEDURE — G8417 CALC BMI ABV UP PARAM F/U: HCPCS | Performed by: PAIN MEDICINE

## 2023-02-24 PROCEDURE — G8399 PT W/DXA RESULTS DOCUMENT: HCPCS | Performed by: PAIN MEDICINE

## 2023-02-24 RX ORDER — SEMAGLUTIDE 1.34 MG/ML
INJECTION, SOLUTION SUBCUTANEOUS
COMMUNITY
Start: 2023-02-17

## 2023-02-24 RX ORDER — BUPRENORPHINE 10 UG/H
1 PATCH TRANSDERMAL WEEKLY
Qty: 4 PATCH | Refills: 1 | Status: SHIPPED | OUTPATIENT
Start: 2023-02-24 | End: 2023-03-24

## 2023-02-24 RX ORDER — MELOXICAM 7.5 MG/1
7.5 TABLET ORAL DAILY
Qty: 30 TABLET | Refills: 0 | Status: SHIPPED | OUTPATIENT
Start: 2023-02-24 | End: 2023-03-26

## 2023-02-24 NOTE — PROGRESS NOTES
[facility]  [facility] [address]  [facility]    Follow up Note      Mark Garcia     Date of Visit:  2023    CC:  Patient presents for follow up   Chief Complaint   Patient presents with    Follow-up    Neck Pain    Back Pain       HPI:    Pain is unchanged    Appropriate analgesia with current medications regimen: yes. Change in quality of symptoms:no. Medication side effects:none. Recent diagnostic testing:none. Recent interventional procedures:none. She has been on anticoagulation medications to include ASA and has not been on herbal supplements. She is diabetic. Imagin/2023 MRI lumbar w/ and w/o  -    FINDINGS:   BONES/ALIGNMENT:  No fracture or joint dislocation. Status post   decompressive laminectomies with posterior interbody fusion at L4-5. Disc   spacer is noted at L4-5. Grossly stable postoperative seroma in the   posterior paraspinal space at L4-5. It measures approximately 2.7 x 1.9 cm. SPINAL CORD:  The conus terminates normally. SOFT TISSUES: No paraspinal mass lesions seen. L1-L2: Disc desiccation with a small disc bulge. Mild facet and ligamentous   hypertrophy. Mild central canal and lateral recess stenoses. Moderate   neural foraminal stenoses. L2-L3: Disc desiccation with a disc bulge. Moderate facet hypertrophy. Moderate to severe central canal, lateral recess and neural foraminal   stenoses. L3-L4: Disc desiccation with a small disc bulge. Moderate facet hypertrophy. Moderate central canal and lateral recess stenoses. Moderate right and   mild-to-moderate left neural foraminal stenoses. L4-L5: Stable postoperative changes, as described above. Mild peridural   fibrosis, most notably along the left lateral and posterior aspect of the   thecal sac. Mild facet hypertrophy. No significant central canal or lateral   recess stenosis. Mild neural foraminal stenoses.        L5-S1: Disc desiccation without herniation.  Mild facet hypertrophy.  No   significant central canal, lateral recess or neural foraminal stenoses.           Impression   1.  Status post decompressive laminectomies with posterior interbody fusion   at L4-5.   2. No evidence of acute fracture, marrow edema or infiltrative process.   3. Moderate to severe central canal stenosis at L2-3.  Moderate stenosis at   L3-4.   4.  Multilevel neural foraminal stenoses, worst (moderate to severe) at L2-3.     2/2023 MRI thoracic w/ and w/o -    FINDINGS:   BONES/ALIGNMENT: There is normal alignment of the spine. The vertebral body   heights are maintained.  No evidence of a marrow infiltrative process.  Mild   marrow edema and enhancement along the endplates at T6-7 likely represent   degenerative changes (Modic type 1).       SPINAL CORD: No abnormal cord signal is seen.       SOFT TISSUES:  No abnormal enhancement of the thoracic spine. No paraspinal   mass identified.       DEGENERATIVE CHANGES: Disc desiccation with loss of disc heights are seen   throughout the thoracic spine.  Disc bulges and protrusions are noted at   multiple levels.  Moderate central canal stenoses noted at T6-7 and T8-9.   Mild stenoses at T5-6, T9-10 and T10-11.  Facet hypertrophic changes result   in variable degrees of neural foraminal stenoses, worst (moderate to severe)   at T1-2. moderate neural foraminal stenoses at bilateral T9-10 and the right   T10-11 level.           Impression   1.  No fracture or bony destructive lesion.   2. Degenerative changes resulting in moderate central canal stenoses at T6-7   and T8-9.  Mild stenoses at T5-6, T9-10 and T10-11.   3.  Multilevel neural foraminal stenoses, worst (moderate to severe) at T1-2.     2/2022 L knee xray -  RESULT:     Severe narrowing of the left knee medial joint compartment, similar to   prior examination with tricompartmental osteophytosis.  Small to moderate   knee joint effusion.  Ossific joint bodies  posteriorly. No acute   fracture or dislocation. No other significant abnormality. IMPRESSION   IMPRESSION:     Stable moderately advanced left knee osteoarthritis with joint bodies. 4/2021 MRI lumbar w/ and w/o -  FINDINGS:   BONES/ALIGNMENT: The vertebral body heights are maintained. There is   age-appropriate bone marrow signal.  There is posterior fixation at L4-5 with   artificial disc material.  There is multilevel degenerative disc disease in   the remaining disc spaces with loss of disc signal.  There is no disc space   narrowing. There is no significant spondylolisthesis. SPINAL CORD:  The conus medullaris is normal in caliber and signal and   terminates at the T12-L1 level. The cauda equina is unremarkable. SOFT TISSUES: There is a fluid collection containing septations in the   postoperative bed that measures approximately 3.1 x 2.7 x 3.7 cm. There is   no abnormal postcontrast enhancement. Visualized abdominal structures are   unremarkable. L1-L2: There is a circumferential disc bulge. There is no canal stenosis or   foraminal narrowing. L2-L3: There is a circumferential disc bulge with facet and ligamentous   hypertrophy. There is canal stenosis measuring 9 mm in AP dimension. There   is moderate bilateral foraminal narrowing. L3-L4: There is a circumferential disc bulge with facet and ligamentous   hypertrophy. There is canal stenosis measuring 9 mm in AP dimension. There   is no significant foraminal narrowing. L4-L5: There is artificial disc material with posterior laminectomy. There   is canal stenosis measuring 7 mm in AP dimension. There is no significant   foraminal narrowing. L5-S1: There is a circumferential disc bulge with facet hypertrophy. There   is no canal stenosis or foraminal narrowing.            Impression   Posterior fixation and laminectomy at L4-5 with a postoperative fluid   collection containing septations that likely represents a seroma. Multilevel degenerative change with canal stenosis most pronounced at L4-5. Bilateral foraminal narrowing as described above. 12/24/2020 CT   FINDINGS:   Within the limits of noncontrast, non-myelographic CT technique:   IATROGENIC:   There has been interval bilateral laminectomy at T3 and T4, partially   extending into the posterior elements of T2, for probable subjacent   multilevel partial discectomy. In the lumbar region, there has been interval bilateral laminectomy at L4,   partially extending into adjacent levels, for partial discectomy and   interbody fusion at L4/5, as well as interval spinal instrumented fusion by   bilateral spinal fixation rods, anchored by bilateral pedicle screws, and L4   - L5. Beam-hardening artifacts slightly limit evaluation of nearby   structures. However, these components appear to be intact and in usual   customary position. BONES/ALIGNMENT:   There are unchanged chronic multilevel asymmetric vertebral compression   deformities and related vertebral column curvature/alignment abnormalities. DEGENERATIVE CHANGES:   Within the thoracic surgical bed of T2 - T4 and L4/5, there is slight   post-operative distortion/ectasia of the traversing thecal sac, overlaid by   small amount of posterior epidural/paraspinal soft tissue emphysema/hematoma. The spinal cord, caudal neural elements, and nerve roots not well   demonstrated by this technique, although previously-existing disc-related   mass effect upon the traversing spinal cord at T2/3 appears to have been   relieved, as the osseous spinal canal at T2/3 - T4/5 and L4/5 has been   surgically decompressed. At T4/5, minimal residual disc material and moderate bilateral facet   arthropathy produce at least minimal effacement of the anterior subarachnoid   space and moderate/severe bilateral neural foraminal narrowing, progressively   lessening through T2/3.    At L4/5, mild Grade I anterolisthesis of L4 on L5 combines with mild residual   disc-osteophyte complex and severe bilateral facet arthropathy (slightly   worse on the right) to produce at least mild effacement of the anterior   subarachnoid space, as well as severe/marked right, and moderate left, neural   foraminal narrowing. All other previously-demonstrated/described multilevel spinal and bilateral   sacroiliac joint degenerative disease, central spinal canal stenosis, and   neural foraminal narrowing, all appear otherwise not significantly changed. INCIDENTAL:   There is minimal dependent bibasilar pleural effusions and equivalent   adjacent subsegmental atelectasis versus infiltrate tracking to both lung   apices, where this becomes slightly worse on the right. The incompletely visualized non-opacified heart is at least mildly enlarged,   with equivalent fullness of visualized non-opacified central pulmonary   vessels. Minimal to moderate atherosclerotic calcification is scattered throughout the   visualized [id number] arterial system. The gallbladder is surgically absent, with multiple surgical clips present   within the gallbladder fossa. The right kidney is incompletely visualized, but appears at least minimally   malrotated. There is question of a surgical anastomotic suture line incompletely   visualized about the rectosigmoid junction. No other clinically-significant changes are noted. .       Impression   1. Interval bilateral laminectomy at T3 and T4, partially extending into the   posterior elements of T2, for probable subjacent multilevel partial   discectomy. 2.  Interval bilateral laminectomy at L4, partially extending into adjacent   levels, for partial discectomy and interbody fusion at L4/5, as well as   interval spinal instrumented fusion by bilateral spinal fixation rods,   anchored by bilateral pedicle screws, and L4 - L5.  Beam-hardening artifacts   slightly limit evaluation of nearby structures. However, these components   appear to be intact and in usual customary position. 3.  Apparent post-operative relief of disc-related mass effect upon the   spinal cord within the above-described surgical beds, most significant at   T2/3, as described. 4.  Probable iatrogenic posterior epidural/paraspinal emphysema/hematoma   within the above-described surgical beds, as described. 5.  All other previously-demonstrated/described multilevel spinal and   bilateral sacroiliac joint degenerative disease, central spinal canal   stenosis, and neural foraminal narrowing, all appear otherwise not   significantly changed. 6.  Unchanged chronic multilevel asymmetric vertebral compression deformities   and related vertebral column curvature/alignment abnormalities. 7.  Incidental findings, most notable for minimal dependent bibasilar pleural   effusions and equivalent adjacent subsegmental atelectasis versus infiltrate   tracking to both lung apices, where this becomes slightly worse on the right. 12/21/2020 -  FINDINGS:   BONES/ALIGNMENT: There is normal alignment of the spine. There are mild old   compression deformities of T6 through T9, similar compared to the prior plain   films. Otherwise, the vertebral body heights are maintained. The bone marrow   signal appears unremarkable. No evidence of acute fracture or osseous   destructive lesion. SPINAL CORD: No abnormal cord signal is seen. SOFT TISSUES: No paraspinal mass identified. DEGENERATIVE CHANGES: There is advanced degenerative change with spurring and   disc space narrowing at multiple levels, most severe at T6-7 through T11-12. The  sagittal view of the lumbar spine demonstrates grade 1   anterolisthesis and severe central canal stenosis at L4-5. Please refer to   the report for MRI lumbar spine done yesterday. C7-T1: No evidence of significant central canal stenosis or disc herniation.    T1-2: There is posterior facet degenerative change causing slight impression   on the left posterolateral aspect of the thecal sac. No evidence of   significant central canal stenosis or disc herniation. No evidence of cord   compression. T2-3: There is prominent central and right-sided disc osteophyte complex   causing moderate central canal stenosis with slight impression on the ventral   aspect of the spinal cord. T3-4: There are small bilateral posterolateral disc protrusions, larger on   the right causing slight impression on the right ventral aspect of the spinal   cord. Overall, mild central canal stenosis at this level. T4-5: No evidence of significant central canal stenosis or disc herniation. T5-6: There is disc bulge with small bilateral disc osteophyte complexes that   contacts the ventral surface of the spinal cord without evidence of cord   compression. Mild central canal stenosis. T6-7: There is diffuse disc bulge causing moderate central canal stenosis   with slight impression on the ventral aspect of the spinal cord. T7-8: There is disc bulge and small left paramedian disc protrusion, causing   slight impression on the thecal sac without evidence of significant central   canal stenosis or cord compression. T8-9: There is a large central and right paramedian disc protrusion causing   slight impression on the right side of the spinal cord. T9-10: There is mild disc bulge and bilateral posterior facet degenerative   change causing mild central canal stenosis without evidence of cord   compression. T10-11: There is a small left paramedian disc protrusion causing slight   impression on the ventral portion of the thecal sac without evidence of cord   compression or significant central canal stenosis. T11-12: There is mild disc bulge without evidence of significant central   canal stenosis or cord compression.    T12-L1: There is slight grade 1 retrolisthesis of T12 on L1 with tiny right   paramedian disc protrusion causing minimal impression on the thecal sac. No   significant central canal stenosis or compression of the conus. Impression   1. Degenerative change. Few mild old midthoracic compression deformities. No acute fracture or osseous destructive lesion. 2. Multilevel central canal stenosis as described above with multiple disc   protrusions, some which cause slight impression on the thoracic spinal cord. Clinical correlation is suggested. 2020 MRI cervical -  FINDINGS:   Pre- and post-contrast T1 weighted images of the cervical spine was performed. There is no abnormal enhancement in the cervical spinal cord. There is no abnormal enhancement in the cervical spine. The vertebral body   heights are grossly maintained. There is no fracture or destructive osseous   lesion. There is a prominent left paracentral disc protrusion at T2-3, contributing   to moderate spinal canal narrowing and moderate spinal cord compression. Impression   Pre- and post-contrast T1 weighted images of the cervical spine was performed. No abnormal enhancement in the cervical spinal cord. The previously seen T2   hyperintensity in the cervical spinal cord is likely related to artifacts. Follow-up is recommended. Prominent left paracentral disc protrusion at T2-3, contributing to moderate   spinal canal narrowing and moderate spinal cord compression. There is likely   focal spinal cord edema at T2-3 level on MRI cervical spine 2020. The results were sent to radiology results communication.                              Potential Aberrant Drug-Related Behavior:  no     Urine Drug Screenin2022 consistent     OARRS report:  2022 consistent to 2022      Opioid Agreement:  Date enacted: 2022  Renewal date:    Past Medical History:   Diagnosis Date    Acute left ankle pain 2020    Acute left lumbar radiculopathy 2017    Cancer ([facility] 2017    colon    Chronic back pain     Chronic deep vein thrombosis (DVT) of distal vein of left lower extremity ([address] 02/01/2021    Diabetes mellitus ([facility]     Drug-induced constipation 02/18/2020    Eosinophil count raised 11/20/2019    Fatigue 06/08/2016    Fibromyalgia     GERD (gastroesophageal reflux disease)     Hair loss 05/03/2021    Hemorrhoids     Hyperlipidemia     Hypertension     Malignant neoplasm of sigmoid colon ([facility] 06/17/2020    Obesity     Osteoarthritis     PONV (postoperative nausea and vomiting)     Rib contusion, left, initial encounter 02/18/2020    TIA (transient ischemic attack) 05/2022    Urinary incontinence     Weakness of both legs 12/21/2020       Past Surgical History:   Procedure Laterality Date    ANESTHESIA NERVE BLOCK Bilateral 08/15/2019    BILATERAL INTRA-ARTICULAR FACET JOINT INJECTION WITH FLUOROSCOPIC GUIDANCE AT L4-5, L5-S1 WITH IV SEDATION performed by Kendall Montaño DO at [address] [place] [address] N/A 10/23/2019    DILATATION AND CURETTAGE HYSTEROSCOPY performed by Raul Delaney MD at [address] Right 07/11/2019    C7-T1 EPIDURAL STEROID INJECTION #1 TOWARD THE RIGHT performed by Mary Shay DO at [address] [place] [address] Bilateral     LUMBAR SPINE SURGERY N/A 12/24/2020    L4-L5  POSTERIOR LUMBAR INTERBODY FUSION, T2-T3 DECOMPRESSIVE LAMINECTOMY performed by Manuel Salmeron MD at . [address] Right 12/28/2017    right L4-5 transforaminal epidural #1    NERVE BLOCK Left 11/29/2018    si inj    NERVE BLOCK Bilateral 08/15/2019    bilateral intra-articular facet joint injection with flouroscopic guidance at L4-S1 with iv sedation    NERVE BLOCK Left 8/2/2022    LEFT SACROILIAC JOINT INJECTION performed by Mary Shay DO at . [address] Left 12/8/2022    LEFT SACROILIAC JOINT INJECTION performed by Mary Shay DO at [place] [address] JOINT ARTHRGRPHY&/ANES/STEROID W/MARIA ELENA Left 11/29/2018    LEFT SACROILIAC JOINT INJECTION WITH X-RAY performed by Devaughn Bahena DO at [facility] [address] Left 2005    STEROID INJECTION KNEE Left 02/2022    TONSILLECTOMY  28 YRS OLD    TUBAL LIGATION         Prior to Admission medications    Medication Sig Start Date End Date Taking? Authorizing Provider   OZEMPIC, 0.25 OR 0.5 MG/DOSE, 2 MG/1.5ML SOPN INJECT 0.25 MG SUBCUTANEOUSLY ONE TIME A WEEK. 2/17/23  Yes Historical Provider, MD   atorvastatin (LIPITOR) 40 MG tablet Take 1 tablet by mouth daily Note increase in dose 1/11/23  Yes VISHNU Hearn CNP   fluticasone (FLONASE) 50 MCG/ACT nasal spray 1 spray by Each Nostril route daily 12/30/22  Yes Jeancarlos SerVISHNU paredes CNP   buprenorphine (BUPRENEX) 10 MCG/HR [address] 1 patch onto the skin once a week. Yes Historical Provider, MD   senna (SENOKOT) 8.6 MG tablet Take 1 tablet by mouth 2 times daily 12/5/22 12/5/23 Yes Mac Solorzano MD   omeprazole (PRILOSEC) 40 MG delayed release capsule Take 1 capsule by mouth every morning (before breakfast) 12/5/22  Yes Mac Solorzano MD   allopurinol (ZYLOPRIM) 300 MG tablet Take one (1) tablet by mouth once daily.  11/2/22  Yes VISHNU Sylvester CNP   ezetimibe (ZETIA) 10 MG tablet Take 1 tablet by mouth daily 11/2/22  Yes VISHNU Sylvester CNP   atenolol (TENORMIN) 50 MG tablet Take 1 tablet by mouth in the morning. 7/21/22  Yes VISHNU Sylvester CNP   aspirin 81 MG EC tablet Take 1 tablet by mouth daily 5/10/22  Yes Rose Hanna MD   acetaminophen (TYLENOL) 500 MG tablet Take 500 mg by mouth every 6 hours as needed for Pain   Yes Historical Provider, MD   PROCTOZONE-HC 2.5 % CREA rectal cream Apply 4 times daily as needed 8/5/21  Yes Guanakito Roberts MD   Blood Glucose Monitoring Suppl (ACCU-CHEK GUIDE) w/Device KIT  3/8/21  Yes Historical Provider, MD   Alcohol Swabs (PHARMACIST CHOICE ALCOHOL) PADS  3/8/21  Yes Historical Provider, MD   Blood Glucose Calibration (ACCU-CHEK GUIDE CONTROL) LIQD  3/8/21  Yes Historical Provider, MD   diclofenac sodium (VOLTAREN) 1 % GEL Apply topically 2 times daily 4/21/21  Yes Tri Figueroa MD   Hand[place]acio Mahendra MISC by Does not apply route Start 11/23/2020 expires 11/22/2023 11/23/20  Yes VISHNU Herrera CNP   blood glucose monitor strips Test 1 times a day & as needed for symptoms of irregular blood glucose. 6/17/20  Yes VISHNU Noriega CNP   Cholecalciferol (VITAMIN D3) 2000 units CAPS Take 2,000 Units by mouth daily   Yes Historical Provider, MD   cetirizine (ZYRTEC ALLERGY) 10 MG tablet Take 1 tablet by mouth daily  Patient not taking: Reported on 2/24/2023 12/30/22   VISHNU Swanson CNP   meloxicam (MOBIC) 7.5 MG tablet Take 1 tablet by mouth daily  Patient not taking: No sig reported 12/21/22 1/20/23  Cleopatra Garcia DO   SITagliptin (JANUVIA) 100 MG tablet Take 1 tablet by mouth daily  Patient not taking: Reported on 2/24/2023 11/2/22   VISHNU Herrera CNP   levothyroxine (SYNTHROID) 25 MCG tablet Take 1 tablet by mouth daily On empty stomach  Patient not taking: Reported on 2/24/2023 11/2/22   Casandra Velázquez APRPEYTON Sanders CNP       Allergies   Allergen Reactions    Diclofenac     Lisinopril Other (See Comments)     Profound malaise    Diclofenac Sodium Nausea And Vomiting    Farxiga [Dapagliflozin] Itching     Vaginal itching with [address], [place]     Lyrica [Pregabalin] Other (See Comments)     Confusion    Sulfa Antibiotics Rash       Social History     Socioeconomic History    Marital status:      Spouse name: Not on file    Number of children: Not on file    Years of education: Not on file    Highest education level: Not on file   Occupational History    Not on file   Tobacco Use    Smoking status: Never    Smokeless tobacco: Never   Vaping Use    Vaping Use: Never used   Substance and Sexual Activity    Alcohol use: Yes     Comment: Rarely    Drug use:  No Sexual activity: Not on file   Other Topics Concern    Not on file   Social History Narrative    Not on file     Social Determinants of Health     Financial Resource Strain: Low Risk     Difficulty of Paying Living Expenses: Not hard at all   Food Insecurity: No Food Insecurity    Worried About Running Out of Food in the Last Year: Never true    Ran Out of Food in the Last Year: Never true   Transportation Needs: Not on file   Physical Activity: Insufficiently Active    Days of Exercise per Week: 3 days    Minutes of Exercise per Session: 30 min   Stress: Not on file   Social Connections: Not on file   Intimate Partner Violence: Not on file   Housing Stability: Not on file       Family History   Problem Relation Age of Onset    Asthma Mother     Mental Illness Mother     Cancer Mother         kidney    Heart Disease Father         melanoma    Thyroid Disease Sister     Thyroid Disease Sister        REVIEW OF SYSTEMS:     Nickolas Muñoz denies fever/chills, chest pain, shortness of breath, new bowel or bladder complaints. All other review of systems was negative. PHYSICAL EXAMINATION:      /78   Pulse 80   Temp 97.2 °F (36.2 °C)   Resp 16   LMP  (LMP Unknown)   SpO2 95%     General:      General appearance:   pleasant and well-hydrated. , in mild discomfort and A & O x3  Build:Overweight    HEENT:    Head:normocephalic and atraumatic  Sclera: icterus absent,    Lungs:    Breathing:Normal expansion. No wheezing. Abdomen:    Shape:non-distended and normal  Thoracic/Lumbar spine:     Tenderness below prior thoracic surgery  Range of motion:abnormal moderately Lateral bending, flexion, extension rotation bilateral and is painful. Extremities:    Tremors:None bilaterally upper and lower  Range of motion:pain with internal rotation of hips not done. Intact:Yes  Edema:Normal    Neurological:     Strength BLE diffusely 5/5  Reflexes BLE diffusely 2+  Gait:antalgic with walker.     Dermatology:    Skin:no rashes or lesions noted    Impression:    LBP radiating in to the left buttock with diffuse 4/5 weakness of the LLE  Thoracic pain  Cervical pain - improved with NATASHA  Left SIJ pain due to SIJitis  Left knee pain - following with [facility]o at [facility], failed NATASHA, viscosupp only mildly helpful  Imaging as above  Since that time she was admitted to the hospital after a follow with inability to ambulate and was treated by Dr. Adam Maria with: L4-5 PLIF and lami T2-T4 12/24/2020  Since the above, she has been treated for pain mgmt by Dr. Gilles Kendall  PMHx: Hx DVT (post-surgical recovery), TIA 5/2022, colon CA (tx surgically), DM, constipation, GERD, DLD, HTN, obesity    She reports some pain below her thoracic surgery  Ortho had been prescribing Meloxicam, she will no longer be following with ortho      Plan:    One time script of meloxicam 7.5 mg, until PCP can take over - she has appt to discuss with Chichi 3/1/2023 as I do not prescribe long-term NSAIDS given the PCP monitors renal function  Message sent to PCP in regards to the above previously  Thoracic MRI w/ and w/o for further eval of new pain reviewed as above              L knee viscosupp discussed, she will let us know - had done at [facility] ([place]). 9/30. Patient states that provider said okay to have done here in the future. [place]AR[place] report reviewed  RF Butrans 10 mcg/hr (right abdomen), 1 RF - had some irritation which is resolved with moving the patch  L SIJ injection with 65-75% relief.   L1-2 MAKAYLA - r/b and procedure discussed  Referral to GI for difficulty regulating bowel habits since surgical tx of colon CA - she saw the NP and started Metamucil, had f/u with Dr. Tahir Wallace who rec Miralax then Senna and is now having diarrhea  Patient encouraged to stay active and to lose weight  Treatment plan discussed with the patient including medication and procedure side effects     Spent >50 minutes on this case with >50% of that time in face to face contact          We discussed with the patient that combining opioids, benzodiazepines, alcohol, illicit drugs or sleep aids increases the risk of respiratory depression including death. We discussed that these medications may cause drowsiness, sedation or dizziness and have counseled the patient not to drive or operate machinery. We have discussed that these medications will be prescribed only by one provider. We have discussed with the patient about age related risk factors and have thoroughly discussed the importance of taking these medications as prescribed. The patient verbalizes understanding.

## 2023-02-28 DIAGNOSIS — E78.1 HYPERTRIGLYCERIDEMIA: ICD-10-CM

## 2023-02-28 DIAGNOSIS — I10 ESSENTIAL HYPERTENSION: ICD-10-CM

## 2023-02-28 DIAGNOSIS — E11.9 TYPE 2 DIABETES MELLITUS WITHOUT COMPLICATION, WITHOUT LONG-TERM CURRENT USE OF INSULIN (HCC): ICD-10-CM

## 2023-02-28 LAB
ALBUMIN SERPL-MCNC: 4.6 G/DL (ref 3.5–5.2)
ALP BLD-CCNC: 106 U/L (ref 35–104)
ALT SERPL-CCNC: 20 U/L (ref 0–32)
ANION GAP SERPL CALCULATED.3IONS-SCNC: 13 MMOL/L (ref 7–16)
AST SERPL-CCNC: 26 U/L (ref 0–31)
BASOPHILS ABSOLUTE: 0.03 E9/L (ref 0–0.2)
BASOPHILS RELATIVE PERCENT: 0.5 % (ref 0–2)
BILIRUB SERPL-MCNC: 0.7 MG/DL (ref 0–1.2)
BUN BLDV-MCNC: 15 MG/DL (ref 6–23)
CALCIUM SERPL-MCNC: 9.9 MG/DL (ref 8.6–10.2)
CHLORIDE BLD-SCNC: 100 MMOL/L (ref 98–107)
CHOLESTEROL, TOTAL: 113 MG/DL (ref 0–199)
CO2: 27 MMOL/L (ref 22–29)
CREAT SERPL-MCNC: 0.6 MG/DL (ref 0.5–1)
EOSINOPHILS ABSOLUTE: 0.25 E9/L (ref 0.05–0.5)
EOSINOPHILS RELATIVE PERCENT: 4 % (ref 0–6)
GFR SERPL CREATININE-BSD FRML MDRD: >60 ML/MIN/1.73
GLUCOSE BLD-MCNC: 139 MG/DL (ref 74–99)
HBA1C MFR BLD: 7.2 % (ref 4–5.6)
HCT VFR BLD CALC: 39.9 % (ref 34–48)
HDLC SERPL-MCNC: 41 MG/DL
HEMOGLOBIN: 13.3 G/DL (ref 11.5–15.5)
IMMATURE GRANULOCYTES #: 0.04 E9/L
IMMATURE GRANULOCYTES %: 0.6 % (ref 0–5)
LDL CHOLESTEROL CALCULATED: 34 MG/DL (ref 0–99)
LYMPHOCYTES ABSOLUTE: 1.7 E9/L (ref 1.5–4)
LYMPHOCYTES RELATIVE PERCENT: 27.2 % (ref 20–42)
MCH RBC QN AUTO: 31.3 PG (ref 26–35)
MCHC RBC AUTO-ENTMCNC: 33.3 % (ref 32–34.5)
MCV RBC AUTO: 93.9 FL (ref 80–99.9)
MONOCYTES ABSOLUTE: 0.4 E9/L (ref 0.1–0.95)
MONOCYTES RELATIVE PERCENT: 6.4 % (ref 2–12)
NEUTROPHILS ABSOLUTE: 3.84 E9/L (ref 1.8–7.3)
NEUTROPHILS RELATIVE PERCENT: 61.3 % (ref 43–80)
PDW BLD-RTO: 13.5 FL (ref 11.5–15)
PLATELET # BLD: 179 E9/L (ref 130–450)
PMV BLD AUTO: 10.7 FL (ref 7–12)
POTASSIUM SERPL-SCNC: 4.1 MMOL/L (ref 3.5–5)
RBC # BLD: 4.25 E12/L (ref 3.5–5.5)
SODIUM BLD-SCNC: 140 MMOL/L (ref 132–146)
TOTAL PROTEIN: 7 G/DL (ref 6.4–8.3)
TRIGL SERPL-MCNC: 188 MG/DL (ref 0–149)
VLDLC SERPL CALC-MCNC: 38 MG/DL
WBC # BLD: 6.3 E9/L (ref 4.5–11.5)

## 2023-03-01 ENCOUNTER — OFFICE VISIT (OUTPATIENT)
Dept: FAMILY MEDICINE CLINIC | Age: 68
End: 2023-03-01
Payer: MEDICARE

## 2023-03-01 ENCOUNTER — TELEPHONE (OUTPATIENT)
Dept: GASTROENTEROLOGY | Age: 68
End: 2023-03-01

## 2023-03-01 VITALS
BODY MASS INDEX: 35 KG/M2 | HEIGHT: 64 IN | DIASTOLIC BLOOD PRESSURE: 78 MMHG | HEART RATE: 78 BPM | SYSTOLIC BLOOD PRESSURE: 128 MMHG | TEMPERATURE: 97.2 F | RESPIRATION RATE: 16 BRPM | OXYGEN SATURATION: 94 % | WEIGHT: 205 LBS

## 2023-03-01 DIAGNOSIS — Z00.00 MEDICARE ANNUAL WELLNESS VISIT, SUBSEQUENT: Primary | ICD-10-CM

## 2023-03-01 DIAGNOSIS — Z23 NEED FOR PROPHYLACTIC VACCINATION AND INOCULATION AGAINST VARICELLA: ICD-10-CM

## 2023-03-01 PROCEDURE — 3017F COLORECTAL CA SCREEN DOC REV: CPT | Performed by: NURSE PRACTITIONER

## 2023-03-01 PROCEDURE — 3078F DIAST BP <80 MM HG: CPT | Performed by: NURSE PRACTITIONER

## 2023-03-01 PROCEDURE — 1123F ACP DISCUSS/DSCN MKR DOCD: CPT | Performed by: NURSE PRACTITIONER

## 2023-03-01 PROCEDURE — 3074F SYST BP LT 130 MM HG: CPT | Performed by: NURSE PRACTITIONER

## 2023-03-01 PROCEDURE — G8484 FLU IMMUNIZE NO ADMIN: HCPCS | Performed by: NURSE PRACTITIONER

## 2023-03-01 PROCEDURE — G0439 PPPS, SUBSEQ VISIT: HCPCS | Performed by: NURSE PRACTITIONER

## 2023-03-01 SDOH — ECONOMIC STABILITY: FOOD INSECURITY: WITHIN THE PAST 12 MONTHS, YOU WORRIED THAT YOUR FOOD WOULD RUN OUT BEFORE YOU GOT MONEY TO BUY MORE.: NEVER TRUE

## 2023-03-01 SDOH — ECONOMIC STABILITY: FOOD INSECURITY: WITHIN THE PAST 12 MONTHS, THE FOOD YOU BOUGHT JUST DIDN'T LAST AND YOU DIDN'T HAVE MONEY TO GET MORE.: NEVER TRUE

## 2023-03-01 SDOH — ECONOMIC STABILITY: INCOME INSECURITY: HOW HARD IS IT FOR YOU TO PAY FOR THE VERY BASICS LIKE FOOD, HOUSING, MEDICAL CARE, AND HEATING?: NOT HARD AT ALL

## 2023-03-01 NOTE — TELEPHONE ENCOUNTER
Lvm for patient to reschedule appt for 03/06 due to Dr no longer seeing patients in [place].    Electronically signed by Issa Ho MA on 3/1/2023 at 1:25 PM

## 2023-03-01 NOTE — PROGRESS NOTES
Medicare Annual Wellness Visit    Radha Hill is here for Medicare AWV and Health Maintenance (Shingles, )    Assessment & Plan   Medicare annual wellness visit, subsequent  Need for prophylactic vaccination and inoculation against varicella  -     zoster recombinant adjuvanted vaccine [facility]) 50 MCG/0.5ML SUSR injection; Inject 0.5 mLs into the muscle once for 1 dose, Disp-0.5 mL, R-0Print    Recommendations for Preventive Services Due: see orders and patient instructions/AVS.  Recommended screening schedule for the next 5-10 years is provided to the patient in written form: see Patient Instructions/AVS.     Return for Medicare Annual Wellness Visit in 1 year keep april 11 appt. Subjective       Patient's complete Health Risk Assessment and screening values have been reviewed and are found in Flowsheets. The following problems were reviewed today and where indicated follow up appointments were made and/or referrals ordered. Positive Risk Factor Screenings with Interventions:                Opioid Risk: (Low risk score <55) Opioid risk score: 29    Patient is low risk for opioid use disorder or overdose. Last PDMP Todd García as Reviewed:  Review User Review Instant Review Result   Shena Humphrey 2/24/2023 12:12 PM     Reviewed PDMP [1]     Last Controlled Substance Monitoring Documentation      [address] Office Visit from 10/12/2022 in [facility]   Periodic Controlled Substance Monitoring Possible medication side effects, risk of tolerance/dependence & alternative treatments discussed., No signs of potential drug abuse or diversion identified. , Assessed functional status., Obtaining appropriate analgesic effect of treatment.  filed at 10/12/2022 1206              Weight and Activity:  Physical Activity: Insufficiently Active    Days of Exercise per Week: 3 days    Minutes of Exercise per Session: 30 min     On average, how many days per week do you engage in moderate to strenuous exercise (like a brisk walk)?: 3 days  Have you lost any weight without trying in the past 3 months?: No  Body mass index: (!) 35.18  Obesity Interventions:  Patient comments: patient is trying to exercise as much as she can with her back problems. She does follow with pain management  low carbohydrate diet    Obesity Counseling: Patient was asked about her current diet and exercise habits, and personalized advice was provided regarding recommended lifestyle changes. Patient's comorbid health conditions associated with elevated BMI were discussed, as well as the likely benefits of weight loss. Based upon patient's motivation to change her behavior, the following plan was agreed upon to work toward a weight loss goal of 5 pounds: lower carbohydrate diet and increase physical activity, as tolerated. Educational materials for weight loss were provided. Patient will follow-up in 3 month(s) with PCP. Provider spent 8 minutes counseling patient. ADL's:   Patient reports needing help with:  Select all that apply: (!) Laundry, Housekeeping  Interventions:  Patient comments: family helps with housekeeping and laundry    Advanced Directives:  Do you have a Living Will?: (!) No    Intervention:  has NO advanced directive - information provided    Advance Care Planning   Advanced Care Planning: Discussed the patients choices for care and treatment in case of a health event that adversely affects decision-making abilities. Also discussed the patients long-term treatment options. Reviewed with the patient the appropriate state-specific advance directive documents. Reviewed the process of designating a competent adult as an Agent (or -in-fact) that could take make health care decisions for the patient if incompetent.  Patient was asked to complete the declaration forms, if they have not already, either acknowledge the forms by a public notary or an eligible witness and provide a signed copy to the practice office. Time spent (minutes): 10        CV Risk Counseling:  Patient was asked about her current diet and exercise habits, and personalized advice was provided regarding recommended lifestyle changes. Patient's individual cardiovascular disease risk factors, including advanced age (> 54 for men, > 72 for women), diabetes mellitus, dyslipidemia, hypertension, obesity (BMI >= 30), and sedentary lifestyle, were discussed, as well as the likely benefits of lifestyle changes. Based upon patient's motivation to change her behavior, the following plan was agreed upon to work toward lowering cardiovascular disease risk: low saturated fat, low cholesterol diet and increase physical activity, as tolerated. Aspirin use for primary prevention of cardiovascular disease for men 45-79 and women 55-79: Indicated- continue daily aspirin. Educational materials for lifestyle changes were provided. Patient will follow-up in 3 month(s) with PCP. Provider spent 5 minutes counseling patient. Objective   Vitals:    03/01/23 1400   BP: 128/78   Pulse: 78   Resp: 16   Temp: 97.2 °F (36.2 °C)   SpO2: 94%   Weight: 205 lb (93 kg)   Height: 5' 4\" (1.626 m)      Body mass index is 35.19 kg/m².       General Appearance: alert and oriented to person, place and time, well developed and well- nourished, obese, in no acute distress  Skin: warm and dry, no rash or erythema  Head: normocephalic and atraumatic  Eyes: pupils equal, round, and reactive to light, extraocular eye movements intact, conjunctivae normal  ENT: tympanic membrane, external ear and ear canal normal bilaterally, nose without deformity, nasal mucosa and turbinates normal without polyps  Neck: supple and non-tender without mass, no thyromegaly or thyroid nodules, no cervical lymphadenopathy  Pulmonary/Chest: clear to auscultation bilaterally- no wheezes, rales or rhonchi, normal air movement, no respiratory distress  Cardiovascular: normal rate, regular rhythm, normal S1 and S2, no murmurs, rubs, clicks, or gallops, distal pulses intact, no carotid bruits  Abdomen: soft, non-tender, non-distended, normal bowel sounds, no masses or organomegaly  Extremities: no cyanosis, clubbing or edema  Musculoskeletal: decreased ROM in back, no joint swelling, deformity or tenderness  Neurologic: reflexes normal and symmetric, no cranial nerve deficit, gait, coordination and speech normal       Allergies   Allergen Reactions    Diclofenac     Lisinopril Other (See Comments)     Profound malaise    Diclofenac Sodium Nausea And Vomiting    Farxiga [Dapagliflozin] Itching     Vaginal itching with Farxiga, Jardiance     Lyrica [Pregabalin] Other (See Comments)     Confusion    Sulfa Antibiotics Rash     Prior to Visit Medications    Medication Sig Taking? Authorizing Provider   zoster recombinant adjuvanted vaccine (SHINGRIX) 50 MCG/0.5ML SUSR injection Inject 0.5 mLs into the muscle once for 1 dose Yes VISHNU Song CNP   OZEMPIC, 0.25 OR 0.5 MG/DOSE, 2 MG/1.5ML SOPN INJECT 0.25 MG SUBCUTANEOUSLY ONE TIME A WEEK. Yes Historical Provider, MD   meloxicam (MOBIC) 7.5 MG tablet Take 1 tablet by mouth daily Yes Amy Mcneill DO   buprenorphine (BUTRANS) 10 MCG/HR PTWK Place 1 patch onto the skin once a week for 28 days. Yes Amy Mcneill DO   atorvastatin (LIPITOR) 40 MG tablet Take 1 tablet by mouth daily Note increase in dose Yes VISHNU Armstrong CNP   cetirizine (ZYRTEC ALLERGY) 10 MG tablet Take 1 tablet by mouth daily Yes Mortimer EyeVISHNU CNP   fluticasone (FLONASE) 50 MCG/ACT nasal spray 1 spray by Each Nostril route daily Yes [facility] Eye, APRN - CNP   senna (SENOKOT) 8.6 MG tablet Take 1 tablet by mouth 2 times daily Yes Iris López MD   omeprazole (PRILOSEC) 40 MG delayed release capsule Take 1 capsule by mouth every morning (before breakfast) Yes Iris López MD   allopurinol (ZYLOPRIM) 300 MG tablet Take one (1) tablet by mouth once daily.  Yes VISHNU Armstrong - CNP   ezetimibe (ZETIA) 10 MG tablet Take 1 tablet by mouth daily Yes Jaspal CancerVISHNU CNP   atenolol (TENORMIN) 50 MG tablet Take 1 tablet by mouth in the morning. Yes Jaspal CancerVISHNU CNP   aspirin 81 MG EC tablet Take 1 tablet by mouth daily Yes Lupe Man MD   acetaminophen (TYLENOL) 500 MG tablet Take 500 mg by mouth every 6 hours as needed for Pain Yes Historical Provider, MD   PROCTOZONE-HC 2.5 % CREA rectal cream Apply 4 times daily as needed Yes Eleanor Escobar MD   Blood Glucose Monitoring Suppl (ACCU-CHEK GUIDE) w/Device KIT  Yes Historical Provider, MD   Alcohol Swabs (PHARMACIST CHOICE ALCOHOL) PADS  Yes Historical Provider, MD   Blood Glucose Calibration (ACCU-CHEK GUIDE CONTROL) LIQD  Yes Historical Provider, MD   diclofenac sodium (VOLTAREN) 1 % GEL Apply topically 2 times daily Yes Carolyn Cruz MD   Handicap Placard MISC by Does not apply route Start 11/23/2020 expires 11/22/2023 Yes VISHNU Cade CNP   blood glucose monitor strips Test 1 times a day & as needed for symptoms of irregular blood glucose.  Yes Jaspal VISHNU King CNP   Cholecalciferol (VITAMIN D3) 2000 units CAPS Take 2,000 Units by mouth daily Yes Historical Provider, MD   levothyroxine (SYNTHROID) 25 MCG tablet Take 1 tablet by mouth daily On empty stomach  Patient not taking: No sig reported  VISHNU Cade CNP       CareTeam (Including outside providers/suppliers regularly involved in providing care):   Patient Care Team:  VISHNU Cade CNP as PCP - General (Nurse Practitioner)  Jaspal CancerVISHNU CNP as PCP - Empaneled Provider     Reviewed and updated this visit:  Tobacco  Allergies  Meds  Problems  Med Hx  Surg Hx  Soc Hx  Fam Hx               Chichi VISHNU Miller CNP

## 2023-03-01 NOTE — PATIENT INSTRUCTIONS
Learning About Activities of Daily Living  What are activities of daily living? Activities of daily living (ADLs) are the basic self-care tasks you do every day. As you age, and if you have health problems, you may find that it's harder to do these things for yourself. That's when you may need some help. Your doctor uses ADLs to measure how much help you need. Knowing what you can and can't do for yourself is an important first step to getting help. And when you have the help you need, you can stay as independent as possible. Your doctor will want to know if you are able to do tasks such as: Take a bath or shower without help. Go to the bathroom by yourself. Dress and undress without help. Shave, comb your hair, and brush teeth on your own. Get in and out of bed or a chair without help. Feed yourself without help. If you are having trouble doing basic self-care tasks, talk with your doctor. You may want to bring a caregiver or family member who can help the doctor understand your needs and abilities. How will a doctor assess your ADLs? Asking about ADLs is part of a routine health checkup your doctor will likely do as you age. Your health check might be done in a doctor's office, in your home, or at a hospital. The goal is to find out if you are having any problems that could make your health problems worse or that make it unsafe for you to be on your own. To measure your ADLs, your doctor will ask how hard it is for you to do routine tasks. He or she may also want to know if you have changed the way you do a task because of a health problem. He or she may watch how you:  Walk back and forth. Keep your balance while you stand or walk. Move from sitting to standing or from a bed to a chair. Button or unbutton a shirt or sweater. Remove and put on your shoes. It's normal to feel a little worried or anxious if you find you can't do all the things you used to be able to do.  Talking with your doctor about ADLs isn't a test that you either pass or fail. It's just a way to get more information about your health and safety. Follow-up care is a key part of your treatment and safety. Be sure to make and go to all appointments, and call your doctor if you are having problems. It's also a good idea to know your test results and keep a list of the medicines you take. Current as of: October 6, 2021               Content Version: 13.5  © 2006-2022 Healthwise, [employer]. Care instructions adapted under license by [facility] ([facility]). If you have questions about a medical condition or this instruction, always ask your healthcare professional. [address] any warranty or liability for your use of this information. Advance Directives: Care Instructions  Overview  An advance directive is a legal way to state your wishes at the end of your life. It tells your family and your doctor what to do if you can't say what you want. There are two main types of advance directives. You can change them any time your wishes change. Living will. This form tells your family and your doctor your wishes about life support and other treatment. The form is also called a declaration. Medical power of . This form lets you name a person to make treatment decisions for you when you can't speak for yourself. This person is called a health care agent (health care proxy, health care surrogate). The form is also called a durable power of  for health care. If you do not have an advance directive, decisions about your medical care may be made by a family member, or by a doctor or a  who doesn't know you. It may help to think of an advance directive as a gift to the people who care for you. If you have one, they won't have to make tough decisions by themselves.   For more information, including forms for your state, see the [address] official[employer]fm website (www.caringinfo.org/planning/advance-directives/). Follow-up care is a key part of your treatment and safety. Be sure to make and go to all appointments, and call your doctor if you are having problems. It's also a good idea to know your test results and keep a list of the medicines you take. What should you include in an advance directive? Many states have a unique advance directive form. (It may ask you to address specific issues.) Or you might use a universal form that's approved by many states. If your form doesn't tell you what to address, it may be hard to know what to include in your advance directive. Use the questions below to help you get started. Who do you want to make decisions about your medical care if you are not able to? What life-support measures do you want if you have a serious illness that gets worse over time or can't be cured? What are you most afraid of that might happen? (Maybe you're afraid of having pain, losing your independence, or being kept alive by machines.)  Where would you prefer to die? (Your home? A hospital? A nursing home?)  Do you want to donate your organs when you die? Do you want certain Zoroastrianism practices performed before you die? When should you call for help? Be sure to contact your doctor if you have any questions. Where can you learn more? Go to http://www.bermeo.com/ and enter R264 to learn more about \"Advance Directives: Care Instructions. \"  Current as of: June 16, 2022               Content Version: 13.5  © 2006-2022 Healthwise, [employer]. Care instructions adapted under license by [facility] ([facility]). If you have questions about a medical condition or this instruction, always ask your healthcare professional. [place] [address] any warranty or liability for your use of this information.            Starting a Weight Loss Plan: Care Instructions  Overview     If you're thinking about losing weight, it can be hard to know where to start. Your doctor can help you set up a weight loss plan that best meets your needs. You may want to take a class on nutrition or exercise, or you could join a weight loss support group. If you have questions about how to make changes to your eating or exercise habits, ask your doctor about seeing a registered dietitian or an exercise specialist.  It can be a big challenge to lose weight. But you don't have to make huge changes at once. Make small changes, and stick with them. When those changes become habit, add a few more changes. If you don't think you're ready to make changes right now, try to pick a date in the future. Make an appointment to see your doctor to discuss whether the time is right for you to start a plan. Follow-up care is a key part of your treatment and safety. Be sure to make and go to all appointments, and call your doctor if you are having problems. It's also a good idea to know your test results and keep a list of the medicines you take. How can you care for yourself at home? Set realistic goals. Many people expect to lose much more weight than is likely. A weight loss of 5% to 10% of your body weight may be enough to improve your health. Get family and friends involved to provide support. Talk to them about why you are trying to lose weight, and ask them to help. They can help by participating in exercise and having meals with you, even if they may be eating something different. Find what works best for you. If you do not have time or do not like to cook, a program that offers meal replacement bars or shakes may be better for you. Or if you like to prepare meals, finding a plan that includes daily menus and recipes may be best.  Ask your doctor about other health professionals who can help you achieve your weight loss goals. A dietitian can help you make healthy changes in your diet.   An exercise specialist or  can help you develop a safe and effective exercise program.  A counselor or psychiatrist can help you cope with issues such as depression, anxiety, or family problems that can make it hard to focus on weight loss. Consider joining a support group for people who are trying to lose weight. Your doctor can suggest groups in your area. Where can you learn more? Go to http://www.woods.com/ and enter U357 to learn more about \"Starting a Weight Loss Plan: Care Instructions. \"  Current as of: August 25, 2022               Content Version: 13.5  © 2006-2022 [employer]. Care instructions adapted under license by [facility] ([facility]). If you have questions about a medical condition or this instruction, always ask your healthcare professional. [address] any warranty or liability for your use of this information. A Healthy Heart: Care Instructions  Your Care Instructions     Coronary artery disease, also called heart disease, occurs when a substance called plaque builds up in the vessels that supply oxygen-rich blood to your heart muscle. This can narrow the blood vessels and reduce blood flow. A heart attack happens when blood flow is completely blocked. A high-fat diet, smoking, and other factors increase the risk of heart disease. Your doctor has found that you have a chance of having heart disease. You can do lots of things to keep your heart healthy. It may not be easy, but you can change your diet, exercise more, and quit smoking. These steps really work to lower your chance of heart disease. Follow-up care is a key part of your treatment and safety. Be sure to make and go to all appointments, and call your doctor if you are having problems. It's also a good idea to know your test results and keep a list of the medicines you take. How can you care for yourself at home? Diet    Use less salt when you cook and eat. This helps lower your blood pressure. Taste food before salting.  Add only a little salt when you think you need it. With time, your taste buds will adjust to less salt.     Eat fewer snack items, fast foods, canned soups, and other high-salt, high-fat, processed foods.     Read food labels and try to avoid saturated and trans fats. They increase your risk of heart disease by raising cholesterol levels.     Limit the amount of solid fat-butter, margarine, and shortening-you eat. Use olive, peanut, or canola oil when you cook. Bake, broil, and steam foods instead of frying them.     Eat a variety of fruit and vegetables every day. Dark green, deep orange, red, or yellow fruits and vegetables are especially good for you. Examples include spinach, carrots, peaches, and berries.     Foods high in fiber can reduce your cholesterol and provide important vitamins and minerals. High-fiber foods include whole-grain cereals and breads, oatmeal, beans, brown rice, citrus fruits, and apples.     Eat lean proteins. Heart-healthy proteins include seafood, lean meats and poultry, eggs, beans, peas, nuts, seeds, and soy products.     Limit drinks and foods with added sugar. These include candy, desserts, and soda pop. Lifestyle changes    If your doctor recommends it, get more exercise. Walking is a good choice. Bit by bit, increase the amount you walk every day. Try for at least 30 minutes on most days of the week. You also may want to swim, bike, or do other activities.     Do not smoke. If you need help quitting, talk to your doctor about stop-smoking programs and medicines. These can increase your chances of quitting for good. Quitting smoking may be the most important step you can take to protect your heart. It is never too late to quit.     Limit alcohol to 2 drinks a day for men and 1 drink a day for women. Too much alcohol can cause health problems.     Manage other health problems such as diabetes, high blood pressure, and high cholesterol.  If you think you may have a problem with alcohol or drug use, talk to your doctor. Medicines    Take your medicines exactly as prescribed. Call your doctor if you think you are having a problem with your medicine.     If your doctor recommends aspirin, take the amount directed each day. Make sure you take aspirin and not another kind of pain reliever, such as acetaminophen (Tylenol). When should you call for help? Call 911 if you have symptoms of a heart attack. These may include:    Chest pain or pressure, or a strange feeling in the chest.     Sweating.     Shortness of breath.     Pain, pressure, or a strange feeling in the back, neck, jaw, or upper belly or in one or both shoulders or arms.     Lightheadedness or sudden weakness.     A fast or irregular heartbeat. After you call 911, the  may tell you to chew 1 adult-strength or 2 to 4 low-dose aspirin. Wait for an ambulance. Do not try to drive yourself. Watch closely for changes in your health, and be sure to contact your doctor if you have any problems. Where can you learn more? Go to http://www.bermeo.com/ and enter F075 to learn more about \"A Healthy Heart: Care Instructions. \"  Current as of: September 7, 2022               Content Version: 13.5  © 2006-2022 Healthwise, [employer]. Care instructions adapted under license by [facility] ([facility]). If you have questions about a medical condition or this instruction, always ask your healthcare professional. [place] [address] any warranty or liability for your use of this information. Personalized Preventive Plan for Brandon Webster - 3/1/2023  Medicare offers a range of preventive health benefits. Some of the tests and screenings are paid in full while other may be subject to a deductible, co-insurance, and/or copay. Some of these benefits include a comprehensive review of your medical history including lifestyle, illnesses that may run in your family, and various assessments and screenings as appropriate.     After reviewing your medical record and screening and assessments performed today your provider may have ordered immunizations, labs, imaging, and/or referrals for you. A list of these orders (if applicable) as well as your Preventive Care list are included within your After Visit Summary for your review. Other Preventive Recommendations:    A preventive eye exam performed by an eye specialist is recommended every 1-2 years to screen for glaucoma; cataracts, macular degeneration, and other eye disorders. A preventive dental visit is recommended every 6 months. Try to get at least 150 minutes of exercise per week or 10,000 steps per day on a pedometer . Order or download the FREE \"Exercise & Physical Activity: Your Everyday Guide\" from [employer]. Call 9-444.553.2351 or search [employer] online. You need 6754-2934 mg of calcium and 2528-2711 IU of vitamin D per day. It is possible to meet your calcium requirement with diet alone, but a vitamin D supplement is usually necessary to meet this goal.  When exposed to the sun, use a sunscreen that protects against both UVA and UVB radiation with an SPF of 30 or greater. Reapply every 2 to 3 hours or after sweating, drying off with a towel, or swimming. Always wear a seat belt when traveling in a car. Always wear a helmet when riding a bicycle or motorcycle.

## 2023-03-07 LAB
ALBUMIN SERPL-MCNC: NORMAL G/DL
ALP BLD-CCNC: NORMAL U/L
ALT SERPL-CCNC: NORMAL U/L
ANION GAP SERPL CALCULATED.3IONS-SCNC: NORMAL MMOL/L
AST SERPL-CCNC: NORMAL U/L
BASOPHILS ABSOLUTE: NORMAL
BASOPHILS RELATIVE PERCENT: NORMAL
BILIRUB SERPL-MCNC: NORMAL MG/DL
BUN BLDV-MCNC: NORMAL MG/DL
CALCIUM SERPL-MCNC: NORMAL MG/DL
CHLORIDE BLD-SCNC: NORMAL MMOL/L
CO2: NORMAL
CREAT SERPL-MCNC: NORMAL MG/DL
EGFR: NORMAL
EOSINOPHILS ABSOLUTE: NORMAL
EOSINOPHILS RELATIVE PERCENT: NORMAL
GLUCOSE BLD-MCNC: NORMAL MG/DL
HCT VFR BLD CALC: NORMAL %
HEMOGLOBIN: NORMAL
LYMPHOCYTES ABSOLUTE: NORMAL
LYMPHOCYTES RELATIVE PERCENT: NORMAL
MCH RBC QN AUTO: NORMAL PG
MCHC RBC AUTO-ENTMCNC: NORMAL G/DL
MCV RBC AUTO: NORMAL FL
MONOCYTES ABSOLUTE: NORMAL
MONOCYTES RELATIVE PERCENT: NORMAL
NEUTROPHILS ABSOLUTE: NORMAL
NEUTROPHILS RELATIVE PERCENT: NORMAL
PLATELET # BLD: NORMAL 10*3/UL
PMV BLD AUTO: NORMAL FL
POTASSIUM SERPL-SCNC: NORMAL MMOL/L
RBC # BLD: NORMAL 10*6/UL
SODIUM BLD-SCNC: NORMAL MMOL/L
TOTAL PROTEIN: NORMAL
WBC # BLD: NORMAL 10*3/UL

## 2023-03-08 ENCOUNTER — OFFICE VISIT (OUTPATIENT)
Dept: GASTROENTEROLOGY | Age: 68
End: 2023-03-08
Payer: MEDICARE

## 2023-03-08 VITALS
HEART RATE: 86 BPM | HEIGHT: 64 IN | SYSTOLIC BLOOD PRESSURE: 134 MMHG | TEMPERATURE: 97 F | DIASTOLIC BLOOD PRESSURE: 69 MMHG | WEIGHT: 206 LBS | RESPIRATION RATE: 16 BRPM | BODY MASS INDEX: 35.17 KG/M2 | OXYGEN SATURATION: 97 %

## 2023-03-08 DIAGNOSIS — R19.8 IRREGULAR BOWEL HABITS: Primary | ICD-10-CM

## 2023-03-08 DIAGNOSIS — R19.7 DIARRHEA, UNSPECIFIED TYPE: ICD-10-CM

## 2023-03-08 DIAGNOSIS — K59.00 CONSTIPATION, UNSPECIFIED CONSTIPATION TYPE: ICD-10-CM

## 2023-03-08 PROCEDURE — 99212 OFFICE O/P EST SF 10 MIN: CPT | Performed by: NURSE PRACTITIONER

## 2023-03-08 PROCEDURE — G8427 DOCREV CUR MEDS BY ELIG CLIN: HCPCS | Performed by: NURSE PRACTITIONER

## 2023-03-08 PROCEDURE — 1123F ACP DISCUSS/DSCN MKR DOCD: CPT | Performed by: NURSE PRACTITIONER

## 2023-03-08 PROCEDURE — 3078F DIAST BP <80 MM HG: CPT | Performed by: NURSE PRACTITIONER

## 2023-03-08 PROCEDURE — G8484 FLU IMMUNIZE NO ADMIN: HCPCS | Performed by: NURSE PRACTITIONER

## 2023-03-08 PROCEDURE — G8399 PT W/DXA RESULTS DOCUMENT: HCPCS | Performed by: NURSE PRACTITIONER

## 2023-03-08 PROCEDURE — 3075F SYST BP GE 130 - 139MM HG: CPT | Performed by: NURSE PRACTITIONER

## 2023-03-08 PROCEDURE — 1090F PRES/ABSN URINE INCON ASSESS: CPT | Performed by: NURSE PRACTITIONER

## 2023-03-08 PROCEDURE — G8417 CALC BMI ABV UP PARAM F/U: HCPCS | Performed by: NURSE PRACTITIONER

## 2023-03-08 PROCEDURE — 3017F COLORECTAL CA SCREEN DOC REV: CPT | Performed by: NURSE PRACTITIONER

## 2023-03-08 PROCEDURE — 1036F TOBACCO NON-USER: CPT | Performed by: NURSE PRACTITIONER

## 2023-03-08 NOTE — PROGRESS NOTES
Jack Hoyt (:  1955) is a 74 y.o. female, here for evaluation of the following chief complaint(s):  Follow-up (3 month follow up )      SUBJECTIVE/OBJECTIVE:  HPI:    Magen Minor is a very pleasant 74year old female that presents today for follow up on irregular bowel habits. Patient is s/p resection in 2019 for personal history of CRC. Patient states the diarrhea was \"bad\" last week  Atates \"it was running out of me\"  Started Ozempic 3 weeks ago and initially felt it was due to the medication  Took a dose of Ozempic last  and did not have a problem    Patient has been taking  Senokot  Stopped Senokot last week because of the diarrhea  Has not had a BM in 2 days since stopping Senokot  Has rectal pressure but unable to have a BM  Last A1C ws 7.2%    Last colonoscopy in  showed a healthy anastomosis with one small polyps     ROS:  General: Patient denies n/v/f/c or weight loss. HEENT: Patient denies persistent postnasal drip, scleral icterus, drooling, persistent bleeding from nose/mouth. Resp: Patient denies SOB, wheezing, productive cough. Cards: Patient denies CP, palpitations, significant edema  GI: As above. Derm: Patient denies jaundice/rashes. Musc: Patient denies diffuse/irregular joint swelling or myalgias. Objective   Wt Readings from Last 3 Encounters:   23 206 lb (93.4 kg)   23 205 lb (93 kg)   23 209 lb (94.8 kg)     Temp Readings from Last 3 Encounters:   23 97 °F (36.1 °C) (Temporal)   23 97.2 °F (36.2 °C)   23 97.2 °F (36.2 °C)     BP Readings from Last 3 Encounters:   23 134/69   23 128/78   23 132/78     Pulse Readings from Last 3 Encounters:   23 86   23 78   23 80        Physical Exam  Constitutional:       Appearance: Normal appearance. Neurological:      Mental Status: She is alert.        Past Medical History:   Diagnosis Date    Acute left ankle pain 2020    Acute left lumbar radiculopathy 12/28/2017    Cancer ([facility] [address] 2017    colon    Chronic back pain     Chronic deep vein thrombosis (DVT) of distal vein of left lower extremity ([address] 02/01/2021    Diabetes mellitus ([facility] [address]     Drug-induced constipation 02/18/2020    Eosinophil count raised 11/20/2019    Fatigue 06/08/2016    Fibromyalgia     GERD (gastroesophageal reflux disease)     Hair loss 05/03/2021    Hemorrhoids     Hyperlipidemia     Hypertension     Malignant neoplasm of sigmoid colon ([facility] [address] 06/17/2020    Obesity     Osteoarthritis     PONV (postoperative nausea and vomiting)     Rib contusion, left, initial encounter 02/18/2020    TIA (transient ischemic attack) 05/2022    Urinary incontinence     Weakness of both legs 12/21/2020      Past Surgical History:   Procedure Laterality Date    ANESTHESIA NERVE BLOCK Bilateral 08/15/2019    BILATERAL INTRA-ARTICULAR FACET JOINT INJECTION WITH FLUOROSCOPIC GUIDANCE AT L4-5, L5-S1 WITH IV SEDATION performed by Viri Aparicio DO at [address] [place] [address] N/A 10/23/2019    DILATATION AND CURETTAGE HYSTEROSCOPY performed by Kayla Hahn MD at [facility] Right 07/11/2019    C7-T1 EPIDURAL STEROID INJECTION #1 TOWARD THE RIGHT performed by Charly Beltre DO at [place] [address] Bilateral     LUMBAR SPINE SURGERY N/A 12/24/2020    L4-L5  POSTERIOR LUMBAR INTERBODY FUSION, T2-T3 DECOMPRESSIVE LAMINECTOMY performed by Davis Cardenas MD at . [address] Right 12/28/2017    right L4-5 transforaminal epidural #1    NERVE BLOCK Left 11/29/2018    si inj    NERVE BLOCK Bilateral 08/15/2019    bilateral intra-articular facet joint injection with flouroscopic guidance at L4-S1 with iv sedation    NERVE BLOCK Left 8/2/2022    LEFT SACROILIAC JOINT INJECTION performed by Charly Beltre DO at . [address] Left 12/8/2022    LEFT SACROILIAC JOINT INJECTION performed by Elle Cabrera DO at [facility] SI JOINT ARTHRGRPHY&/ANES/STEROID W/MARIA ELENA Left 11/29/2018    LEFT SACROILIAC JOINT INJECTION WITH X-RAY performed by Porfirio Barragan DO at [facility] [address] Left 2005    STEROID INJECTION KNEE Left 02/2022    TONSILLECTOMY  28 YRS OLD    TUBAL LIGATION        Family History   Problem Relation Age of Onset    Asthma Mother     Mental Illness Mother     Cancer Mother         kidney    Heart Disease Father         melanoma    Thyroid Disease Sister     Thyroid Disease Sister         Lab Results   Component Value Date    WBC 6.3 02/28/2023    HGB 13.3 02/28/2023    HCT 39.9 02/28/2023    MCV 93.9 02/28/2023     02/28/2023      Lab Results   Component Value Date     02/28/2023    K 4.1 02/28/2023     02/28/2023    CO2 27 02/28/2023    BUN 15 02/28/2023    CREATININE 0.6 02/28/2023    GLUCOSE 139 (H) 02/28/2023    CALCIUM 9.9 02/28/2023    PROT 7.0 02/28/2023    LABALBU 4.6 02/28/2023    BILITOT 0.7 02/28/2023    ALKPHOS 106 (H) 02/28/2023    AST 26 02/28/2023    ALT 20 02/28/2023    LABGLOM >60 02/28/2023    GFRAA >60 05/08/2022                       ASSESSMENT/PLAN:    1. Irregular bowel habits  2. Diarrhea, unspecified type  -     Clostridium difficile EIA; Future  -     Culture, Stool 2; Future  3. Constipation, unspecified constipation type    -will obtain stool cultures to rule out infection  -advised patient to restart Senokot as directed. May add Miralax if needed  - Patient requests to proceed with a colonoscopy. -it is possible that this patients diarrhea may have been exacerbated by Ozempic.    -further management pending study results    Return for Follow up after procedure. An electronic signature was used to authenticate this note.     --VISHNU Lira - CNP

## 2023-03-10 ENCOUNTER — OFFICE VISIT (OUTPATIENT)
Dept: PAIN MANAGEMENT | Age: 68
End: 2023-03-10
Payer: MEDICARE

## 2023-03-10 VITALS
OXYGEN SATURATION: 96 % | WEIGHT: 200 LBS | BODY MASS INDEX: 39.27 KG/M2 | HEART RATE: 86 BPM | RESPIRATION RATE: 16 BRPM | DIASTOLIC BLOOD PRESSURE: 75 MMHG | HEIGHT: 60 IN | TEMPERATURE: 97.6 F | SYSTOLIC BLOOD PRESSURE: 149 MMHG

## 2023-03-10 DIAGNOSIS — M54.6 PAIN IN THORACIC SPINE: ICD-10-CM

## 2023-03-10 DIAGNOSIS — G89.4 CHRONIC PAIN SYNDROME: Primary | ICD-10-CM

## 2023-03-10 DIAGNOSIS — M53.3 DISORDER OF SACRUM: ICD-10-CM

## 2023-03-10 DIAGNOSIS — M96.1 LUMBAR POST-LAMINECTOMY SYNDROME: ICD-10-CM

## 2023-03-10 DIAGNOSIS — M54.16 LUMBAR RADICULOPATHY: ICD-10-CM

## 2023-03-10 DIAGNOSIS — M17.12 PRIMARY OSTEOARTHRITIS OF LEFT KNEE: ICD-10-CM

## 2023-03-10 PROCEDURE — G8399 PT W/DXA RESULTS DOCUMENT: HCPCS | Performed by: PHYSICIAN ASSISTANT

## 2023-03-10 PROCEDURE — G8427 DOCREV CUR MEDS BY ELIG CLIN: HCPCS | Performed by: PHYSICIAN ASSISTANT

## 2023-03-10 PROCEDURE — G8417 CALC BMI ABV UP PARAM F/U: HCPCS | Performed by: PHYSICIAN ASSISTANT

## 2023-03-10 PROCEDURE — 1123F ACP DISCUSS/DSCN MKR DOCD: CPT | Performed by: PHYSICIAN ASSISTANT

## 2023-03-10 PROCEDURE — G8484 FLU IMMUNIZE NO ADMIN: HCPCS | Performed by: PHYSICIAN ASSISTANT

## 2023-03-10 PROCEDURE — 99213 OFFICE O/P EST LOW 20 MIN: CPT | Performed by: PHYSICIAN ASSISTANT

## 2023-03-10 PROCEDURE — 3017F COLORECTAL CA SCREEN DOC REV: CPT | Performed by: PHYSICIAN ASSISTANT

## 2023-03-10 PROCEDURE — 1036F TOBACCO NON-USER: CPT | Performed by: PHYSICIAN ASSISTANT

## 2023-03-10 PROCEDURE — 3077F SYST BP >= 140 MM HG: CPT | Performed by: PHYSICIAN ASSISTANT

## 2023-03-10 PROCEDURE — 1090F PRES/ABSN URINE INCON ASSESS: CPT | Performed by: PHYSICIAN ASSISTANT

## 2023-03-10 PROCEDURE — 3078F DIAST BP <80 MM HG: CPT | Performed by: PHYSICIAN ASSISTANT

## 2023-03-10 NOTE — PROGRESS NOTES
[facility]  [address]  [facility]    Follow up Note      Sharri Greer     Date of Visit:  3/10/2023    CC:  Patient presents for follow up   Chief Complaint   Patient presents with    Follow-up     Neck pain        HPI:    Pain is worse to neck. Some numbness to right forearm and hand. Appropriate analgesia with current medications regimen: yes. Change in quality of symptoms:no. Medication side effects:none. Recent diagnostic testing:none. Recent interventional procedures:none. She has been on anticoagulation medications to include ASA and has not been on herbal supplements. She is diabetic. Imagin/2023 MRI lumbar w/ and w/o  -     FINDINGS:   BONES/ALIGNMENT:  No fracture or joint dislocation. Status post   decompressive laminectomies with posterior interbody fusion at L4-5. Disc   spacer is noted at L4-5. Grossly stable postoperative seroma in the   posterior paraspinal space at L4-5. It measures approximately 2.7 x 1.9 cm. SPINAL CORD:  The conus terminates normally. SOFT TISSUES: No paraspinal mass lesions seen. L1-L2: Disc desiccation with a small disc bulge. Mild facet and ligamentous   hypertrophy. Mild central canal and lateral recess stenoses. Moderate   neural foraminal stenoses. L2-L3: Disc desiccation with a disc bulge. Moderate facet hypertrophy. Moderate to severe central canal, lateral recess and neural foraminal   stenoses. L3-L4: Disc desiccation with a small disc bulge. Moderate facet hypertrophy. Moderate central canal and lateral recess stenoses. Moderate right and   mild-to-moderate left neural foraminal stenoses. L4-L5: Stable postoperative changes, as described above. Mild peridural   fibrosis, most notably along the left lateral and posterior aspect of the   thecal sac. Mild facet hypertrophy. No significant central canal or lateral   recess stenosis.   Mild neural foraminal stenoses. L5-S1: Disc desiccation without herniation. Mild facet hypertrophy. No   significant central canal, lateral recess or neural foraminal stenoses. Impression   1. Status post decompressive laminectomies with posterior interbody fusion   at L4-5.   2. No evidence of acute fracture, marrow edema or infiltrative process. 3. Moderate to severe central canal stenosis at L2-3. Moderate stenosis at   L3-4.   4.  Multilevel neural foraminal stenoses, worst (moderate to severe) at L2-3.      2/2023 MRI thoracic w/ and w/o -     FINDINGS:   BONES/ALIGNMENT: There is normal alignment of the spine. The vertebral body   heights are maintained. No evidence of a marrow infiltrative process. Mild   marrow edema and enhancement along the endplates at F1-8 likely represent   degenerative changes (Modic type 1). SPINAL CORD: No abnormal cord signal is seen. SOFT TISSUES:  No abnormal enhancement of the thoracic spine. No paraspinal   mass identified. DEGENERATIVE CHANGES: Disc desiccation with loss of disc heights are seen   throughout the thoracic spine. Disc bulges and protrusions are noted at   multiple levels. Moderate central canal stenoses noted at T6-7 and T8-9. Mild stenoses at T5-6, T9-10 and T10-11. Facet hypertrophic changes result   in variable degrees of neural foraminal stenoses, worst (moderate to severe)   at T1-2. moderate neural foraminal stenoses at bilateral T9-10 and the right   T10-11 level. Impression   1. No fracture or bony destructive lesion. 2. Degenerative changes resulting in moderate central canal stenoses at T6-7   and T8-9. Mild stenoses at T5-6, T9-10 and T10-11. 3.  Multilevel neural foraminal stenoses, worst (moderate to severe) at T1-2.      2/2022 L knee xray -  RESULT:     Severe narrowing of the left knee medial joint compartment, similar to   prior examination with tricompartmental osteophytosis.   Small to moderate   knee joint effusion. Ossific joint bodies posteriorly. No acute   fracture or dislocation. No other significant abnormality. IMPRESSION   IMPRESSION:     Stable moderately advanced left knee osteoarthritis with joint bodies. 4/2021 MRI lumbar w/ and w/o -  FINDINGS:   BONES/ALIGNMENT: The vertebral body heights are maintained. There is   age-appropriate bone marrow signal.  There is posterior fixation at L4-5 with   artificial disc material.  There is multilevel degenerative disc disease in   the remaining disc spaces with loss of disc signal.  There is no disc space   narrowing. There is no significant spondylolisthesis. SPINAL CORD:  The conus medullaris is normal in caliber and signal and   terminates at the T12-L1 level. The cauda equina is unremarkable. SOFT TISSUES: There is a fluid collection containing septations in the   postoperative bed that measures approximately 3.1 x 2.7 x 3.7 cm. There is   no abnormal postcontrast enhancement. Visualized abdominal structures are   unremarkable. L1-L2: There is a circumferential disc bulge. There is no canal stenosis or   foraminal narrowing. L2-L3: There is a circumferential disc bulge with facet and ligamentous   hypertrophy. There is canal stenosis measuring 9 mm in AP dimension. There   is moderate bilateral foraminal narrowing. L3-L4: There is a circumferential disc bulge with facet and ligamentous   hypertrophy. There is canal stenosis measuring 9 mm in AP dimension. There   is no significant foraminal narrowing. L4-L5: There is artificial disc material with posterior laminectomy. There   is canal stenosis measuring 7 mm in AP dimension. There is no significant   foraminal narrowing. L5-S1: There is a circumferential disc bulge with facet hypertrophy. There   is no canal stenosis or foraminal narrowing.            Impression   Posterior fixation and laminectomy at L4-5 with a postoperative fluid collection containing septations that likely represents a seroma. Multilevel degenerative change with canal stenosis most pronounced at L4-5. Bilateral foraminal narrowing as described above. 12/24/2020 CT   FINDINGS:   Within the limits of noncontrast, non-myelographic CT technique:   IATROGENIC:   There has been interval bilateral laminectomy at T3 and T4, partially   extending into the posterior elements of T2, for probable subjacent   multilevel partial discectomy. In the lumbar region, there has been interval bilateral laminectomy at L4,   partially extending into adjacent levels, for partial discectomy and   interbody fusion at L4/5, as well as interval spinal instrumented fusion by   bilateral spinal fixation rods, anchored by bilateral pedicle screws, and L4   - L5. Beam-hardening artifacts slightly limit evaluation of nearby   structures. However, these components appear to be intact and in usual   customary position. BONES/ALIGNMENT:   There are unchanged chronic multilevel asymmetric vertebral compression   deformities and related vertebral column curvature/alignment abnormalities. DEGENERATIVE CHANGES:   Within the thoracic surgical bed of T2 - T4 and L4/5, there is slight   post-operative distortion/ectasia of the traversing thecal sac, overlaid by   small amount of posterior epidural/paraspinal soft tissue emphysema/hematoma. The spinal cord, caudal neural elements, and nerve roots not well   demonstrated by this technique, although previously-existing disc-related   mass effect upon the traversing spinal cord at T2/3 appears to have been   relieved, as the osseous spinal canal at T2/3 - T4/5 and L4/5 has been   surgically decompressed.    At T4/5, minimal residual disc material and moderate bilateral facet   arthropathy produce at least minimal effacement of the anterior subarachnoid   space and moderate/severe bilateral neural foraminal narrowing, progressively   lessening through T2/3. At L4/5, mild Grade I anterolisthesis of L4 on L5 combines with mild residual   disc-osteophyte complex and severe bilateral facet arthropathy (slightly   worse on the right) to produce at least mild effacement of the anterior   subarachnoid space, as well as severe/marked right, and moderate left, neural   foraminal narrowing. All other previously-demonstrated/described multilevel spinal and bilateral   sacroiliac joint degenerative disease, central spinal canal stenosis, and   neural foraminal narrowing, all appear otherwise not significantly changed. INCIDENTAL:   There is minimal dependent bibasilar pleural effusions and equivalent   adjacent subsegmental atelectasis versus infiltrate tracking to both lung   apices, where this becomes slightly worse on the right. The incompletely visualized non-opacified heart is at least mildly enlarged,   with equivalent fullness of visualized non-opacified central pulmonary   vessels. Minimal to moderate atherosclerotic calcification is scattered throughout the   visualized [id number] arterial system. The gallbladder is surgically absent, with multiple surgical clips present   within the gallbladder fossa. The right kidney is incompletely visualized, but appears at least minimally   malrotated. There is question of a surgical anastomotic suture line incompletely   visualized about the rectosigmoid junction. No other clinically-significant changes are noted. .       Impression   1. Interval bilateral laminectomy at T3 and T4, partially extending into the   posterior elements of T2, for probable subjacent multilevel partial   discectomy. 2.  Interval bilateral laminectomy at L4, partially extending into adjacent   levels, for partial discectomy and interbody fusion at L4/5, as well as   interval spinal instrumented fusion by bilateral spinal fixation rods,   anchored by bilateral pedicle screws, and L4 - L5.  Beam-hardening artifacts slightly limit evaluation of nearby structures. However, these components   appear to be intact and in usual customary position. 3.  Apparent post-operative relief of disc-related mass effect upon the   spinal cord within the above-described surgical beds, most significant at   T2/3, as described. 4.  Probable iatrogenic posterior epidural/paraspinal emphysema/hematoma   within the above-described surgical beds, as described. 5.  All other previously-demonstrated/described multilevel spinal and   bilateral sacroiliac joint degenerative disease, central spinal canal   stenosis, and neural foraminal narrowing, all appear otherwise not   significantly changed. 6.  Unchanged chronic multilevel asymmetric vertebral compression deformities   and related vertebral column curvature/alignment abnormalities. 7.  Incidental findings, most notable for minimal dependent bibasilar pleural   effusions and equivalent adjacent subsegmental atelectasis versus infiltrate   tracking to both lung apices, where this becomes slightly worse on the right. 12/21/2020 -  FINDINGS:   BONES/ALIGNMENT: There is normal alignment of the spine. There are mild old   compression deformities of T6 through T9, similar compared to the prior plain   films. Otherwise, the vertebral body heights are maintained. The bone marrow   signal appears unremarkable. No evidence of acute fracture or osseous   destructive lesion. SPINAL CORD: No abnormal cord signal is seen. SOFT TISSUES: No paraspinal mass identified. DEGENERATIVE CHANGES: There is advanced degenerative change with spurring and   disc space narrowing at multiple levels, most severe at T6-7 through T11-12. The  sagittal view of the lumbar spine demonstrates grade 1   anterolisthesis and severe central canal stenosis at L4-5. Please refer to   the report for MRI lumbar spine done yesterday. C7-T1: No evidence of significant central canal stenosis or disc herniation. T1-2: There is posterior facet degenerative change causing slight impression   on the left posterolateral aspect of the thecal sac. No evidence of   significant central canal stenosis or disc herniation. No evidence of cord   compression. T2-3: There is prominent central and right-sided disc osteophyte complex   causing moderate central canal stenosis with slight impression on the ventral   aspect of the spinal cord. T3-4: There are small bilateral posterolateral disc protrusions, larger on   the right causing slight impression on the right ventral aspect of the spinal   cord. Overall, mild central canal stenosis at this level. T4-5: No evidence of significant central canal stenosis or disc herniation. T5-6: There is disc bulge with small bilateral disc osteophyte complexes that   contacts the ventral surface of the spinal cord without evidence of cord   compression. Mild central canal stenosis. T6-7: There is diffuse disc bulge causing moderate central canal stenosis   with slight impression on the ventral aspect of the spinal cord. T7-8: There is disc bulge and small left paramedian disc protrusion, causing   slight impression on the thecal sac without evidence of significant central   canal stenosis or cord compression. T8-9: There is a large central and right paramedian disc protrusion causing   slight impression on the right side of the spinal cord. T9-10: There is mild disc bulge and bilateral posterior facet degenerative   change causing mild central canal stenosis without evidence of cord   compression. T10-11: There is a small left paramedian disc protrusion causing slight   impression on the ventral portion of the thecal sac without evidence of cord   compression or significant central canal stenosis. T11-12: There is mild disc bulge without evidence of significant central   canal stenosis or cord compression.    T12-L1: There is slight grade 1 retrolisthesis of T12 on L1 with tiny right   paramedian disc protrusion causing minimal impression on the thecal sac. No   significant central canal stenosis or compression of the conus. Impression   1. Degenerative change. Few mild old midthoracic compression deformities. No acute fracture or osseous destructive lesion. 2. Multilevel central canal stenosis as described above with multiple disc   protrusions, some which cause slight impression on the thoracic spinal cord. Clinical correlation is suggested. 2020 MRI cervical -  FINDINGS:   Pre- and post-contrast T1 weighted images of the cervical spine was performed. There is no abnormal enhancement in the cervical spinal cord. There is no abnormal enhancement in the cervical spine. The vertebral body   heights are grossly maintained. There is no fracture or destructive osseous   lesion. There is a prominent left paracentral disc protrusion at T2-3, contributing   to moderate spinal canal narrowing and moderate spinal cord compression. Impression   Pre- and post-contrast T1 weighted images of the cervical spine was performed. No abnormal enhancement in the cervical spinal cord. The previously seen T2   hyperintensity in the cervical spinal cord is likely related to artifacts. Follow-up is recommended. Prominent left paracentral disc protrusion at T2-3, contributing to moderate   spinal canal narrowing and moderate spinal cord compression. There is likely   focal spinal cord edema at T2-3 level on MRI cervical spine 2020. The results were sent to radiology results communication.                              Potential Aberrant Drug-Related Behavior:  no     Urine Drug Screenin2022 consistent     OARRS report:  2022 consistent to 2023 consistent     Opioid Agreement:  Date enacted: 2022  Renewal date:       Past Medical History:   Diagnosis Date    Acute left ankle pain 2020    Acute left lumbar radiculopathy 2017 Cancer ([facility] [address] 2017    colon    Chronic back pain     Chronic deep vein thrombosis (DVT) of distal vein of left lower extremity ([address] 02/01/2021    Diabetes mellitus ([address]     Drug-induced constipation 02/18/2020    Eosinophil count raised 11/20/2019    Fatigue 06/08/2016    Fibromyalgia     GERD (gastroesophageal reflux disease)     Hair loss 05/03/2021    Hemorrhoids     Hyperlipidemia     Hypertension     Malignant neoplasm of sigmoid colon ([facility] [address] 06/17/2020    Obesity     Osteoarthritis     PONV (postoperative nausea and vomiting)     Rib contusion, left, initial encounter 02/18/2020    TIA (transient ischemic attack) 05/2022    Urinary incontinence     Weakness of both legs 12/21/2020       Past Surgical History:   Procedure Laterality Date    ANESTHESIA NERVE BLOCK Bilateral 08/15/2019    BILATERAL INTRA-ARTICULAR FACET JOINT INJECTION WITH FLUOROSCOPIC GUIDANCE AT L4-5, L5-S1 WITH IV SEDATION performed by Abimael Gonsalez DO at [address] [facility] N/A 10/23/2019    DILATATION AND CURETTAGE HYSTEROSCOPY performed by Wally Xie MD at [address] Right 07/11/2019    C7-T1 EPIDURAL STEROID INJECTION #1 TOWARD THE RIGHT performed by Ari Wright DO at [place] [address] Bilateral     LUMBAR SPINE SURGERY N/A 12/24/2020    L4-L5  POSTERIOR LUMBAR INTERBODY FUSION, T2-T3 DECOMPRESSIVE LAMINECTOMY performed by Lucille Shore MD at . [address] Right 12/28/2017    right L4-5 transforaminal epidural #1    NERVE BLOCK Left 11/29/2018    si inj    NERVE BLOCK Bilateral 08/15/2019    bilateral intra-articular facet joint injection with flouroscopic guidance at L4-S1 with iv sedation    NERVE BLOCK Left 8/2/2022    LEFT SACROILIAC JOINT INJECTION performed by Ari Wright DO at . [address] Left 12/8/2022    LEFT SACROILIAC JOINT INJECTION performed by Jose Torres Colin Mccray DO at [facility] SI JOINT ARTHRGRPHY&/ANES/STEROID W/MARIA ELENA Left 11/29/2018    LEFT SACROILIAC JOINT INJECTION WITH X-RAY performed by Joaquin Manzanares DO at [facility] [address] Left 2005    STEROID INJECTION KNEE Left 02/2022    TONSILLECTOMY  28 YRS OLD    TUBAL LIGATION         Prior to Admission medications    Medication Sig Start Date End Date Taking? Authorizing Provider   OZEMPIC, 0.25 OR 0.5 MG/DOSE, 2 MG/1.5ML SOPN INJECT 0.25 MG SUBCUTANEOUSLY ONE TIME A WEEK. 2/17/23  Yes Historical Provider, MD   meloxicam (MOBIC) 7.5 MG tablet Take 1 tablet by mouth daily 2/24/23 3/26/23 Yes Jeff Jones,    buprenorphine (BUTRANS) 10 MCG/HR PTWK Place 1 patch onto the skin once a week for 28 days. 2/24/23 3/24/23 Yes Jeff Jones,    atorvastatin (LIPITOR) 40 MG tablet Take 1 tablet by mouth daily Note increase in dose 1/11/23  Yes VISHNU Viveros - CNP   cetirizine (ZYRTEC ALLERGY) 10 MG tablet Take 1 tablet by mouth daily 12/30/22  Yes VISHNU Purcell CNP   fluticasone [facility]) 50 MCG/ACT nasal spray 1 spray by Each Nostril route daily 12/30/22  Yes VISHNU Purcell CNP   senna (SENOKOT) 8.6 MG tablet Take 1 tablet by mouth 2 times daily 12/5/22 12/5/23 Yes Deniz Beauchamp MD   omeprazole (PRILOSEC) 40 MG delayed release capsule Take 1 capsule by mouth every morning (before breakfast) 12/5/22  Yes Deniz Beauchamp MD   allopurinol (ZYLOPRIM) 300 MG tablet Take one (1) tablet by mouth once daily.  11/2/22  Yes VISHNU Mckeon CNP   ezetimibe (ZETIA) 10 MG tablet Take 1 tablet by mouth daily 11/2/22  Yes VISHNU Mckeon CNP   atenolol (TENORMIN) 50 MG tablet Take 1 tablet by mouth in the morning. 7/21/22  Yes Marcelo Patee, APRN - CNP   aspirin 81 MG EC tablet Take 1 tablet by mouth daily 5/10/22  Yes Brigid Samuels MD   acetaminophen (TYLENOL) 500 MG tablet Take 500 mg by mouth every 6 hours as needed for Pain   Yes Historical Provider, MD PROCTOZONE-HC 2.5 % CREA rectal cream Apply 4 times daily as needed 8/5/21  Yes Lenny Chi MD   Blood Glucose Monitoring Suppl (ACCU-CHEK GUIDE) w/Device KIT  3/8/21  Yes Historical Provider, MD   Alcohol Swabs (PHARMACIST CHOICE ALCOHOL) PADS  3/8/21  Yes Historical Provider, MD   Blood Glucose Calibration (ACCU-CHEK GUIDE CONTROL) LIQD  3/8/21  Yes Historical Provider, MD   diclofenac sodium (VOLTAREN) 1 % GEL Apply topically 2 times daily 4/21/21  Yes Lina Ruggiero MD   Handicap Placard MISC by Does not apply route Start 11/23/2020 expires 11/22/2023 11/23/20  Yes VISHNU Verdugo CNP   blood glucose monitor strips Test 1 times a day & as needed for symptoms of irregular blood glucose.  6/17/20  Yes VISHNU Foote - CNP   Cholecalciferol (VITAMIN D3) 2000 units CAPS Take 2,000 Units by mouth daily   Yes Historical Provider, MD       Allergies   Allergen Reactions    Diclofenac     Lisinopril Other (See Comments)     Profound malaise    Diclofenac Sodium Nausea And Vomiting    Farxiga [Dapagliflozin] Itching     Vaginal itching with Ryan Tenorio     Lyrica [Pregabalin] Other (See Comments)     Confusion    Sulfa Antibiotics Rash       Social History     Socioeconomic History    Marital status:      Spouse name: Not on file    Number of children: Not on file    Years of education: Not on file    Highest education level: Not on file   Occupational History    Not on file   Tobacco Use    Smoking status: Never    Smokeless tobacco: Never   Vaping Use    Vaping Use: Never used   Substance and Sexual Activity    Alcohol use: Yes     Comment: Rarely    Drug use: No    Sexual activity: Defer   Other Topics Concern    Not on file   Social History Narrative    Not on file     Social Determinants of Health     Financial Resource Strain: Low Risk     Difficulty of Paying Living Expenses: Not hard at all   Food Insecurity: No Food Insecurity    Worried About Running Out of Food in the Last Year: Never true    Ran Out of Food in the Last Year: Never true   Transportation Needs: Unknown    Lack of Transportation (Medical): Not on file    Lack of Transportation (Non-Medical): No   Physical Activity: Insufficiently Active    Days of Exercise per Week: 3 days    Minutes of Exercise per Session: 30 min   Stress: Not on file   Social Connections: Not on file   Intimate Partner Violence: Not on file   Housing Stability: Unknown    Unable to Pay for Housing in the Last Year: Not on file    Number of Places Lived in the Last Year: Not on file    Unstable Housing in the Last Year: No       Family History   Problem Relation Age of Onset    Asthma Mother     Mental Illness Mother     Cancer Mother         kidney    Heart Disease Father         melanoma    Thyroid Disease Sister     Thyroid Disease Sister        REVIEW OF SYSTEMS:     Margaret denies fever/chills, chest pain, shortness of breath, new bowel or bladder complaints. All other review of systems was negative.    PHYSICAL EXAMINATION:      BP (!) 149/75   Pulse 86   Temp 97.6 °F (36.4 °C) (Infrared)   Resp 16   Ht 5' (1.524 m)   Wt 200 lb (90.7 kg)   LMP  (LMP Unknown)   SpO2 96%   BMI 39.06 kg/m²     General:      General appearance:   pleasant and well-hydrated.   , in mild discomfort and A & O x3  Build:Overweight    HEENT:    Head:normocephalic and atraumatic  Sclera: icterus absent,    Lungs:    Breathing:Normal expansion.  No wheezing.    Abdomen:    Shape:non-distended and normal    Cervical spine:    Inspection:normal  Palpation:tenderness paravertebral muscles and midline tenderness.  Range of motion:abnormal moderately flexion, extension rotation bilateral and is  painful.    Thoracic spine:    Spine inspection:normal   Palpation:tenderness paravertebral muscles and midline tenderness.  Range of motion:abnormal moderately flexion, extension rotation bilateral and is  painful.    Lumbar spine:    Spine inspection:normal   CVA tenderness:No  Palpation:tenderness paravertebral muscles and midline tenderness. Range of motion:abnormal moderately Lateral bending, flexion, extension rotation bilateral and is  painful. Extremities:    Tremors:None bilaterally upper and lower  Range of motion:Generally normal shoulders, pain with internal rotation of hips not done. 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 Maricarmen Persaud with: L4-5 PLIF and lami T2-T4 12/24/2020  Since the above, she has been treated for pain mgmt by Dr. Austin Worley  PMHx: Hx DVT (post-surgical recovery), TIA 5/2022, colon CA (tx surgically), DM, constipation, GERD, DLD, HTN, obesity        Plan:     NSAIDs per PCP if approprate. L knee viscosupp discussed, she will let us know - had done at [facility] (Durolane). 9/30. Patient states that provider said okay to have done here in the future. OARRS report reviewed  No RF Butrans 10 mcg/hr (right abdomen), 1 RF - had some irritation which is resolved with moving the patch  L SIJ injection with diminishing relief. L1-2 MAKAYLA - scheduled.     Referral to GI last visit for difficulty regulating bowel habits since surgical tx of colon CA - she saw the NP and started Metamucil, had f/u with Dr. Stacie Toney who rec Miralax then Senna and is now having diarrhea  Patient encouraged to stay active and to lose weight  Treatment plan discussed with the patient including medication and procedure side effects                  We discussed with the patient that combining opioids, benzodiazepines, alcohol, illicit drugs or sleep aids increases the risk of respiratory depression including death. We discussed that these medications may cause drowsiness, sedation or dizziness and have counseled the patient not to drive or operate machinery. We have discussed that these medications will be prescribed only by one provider. We have discussed with the patient about age related risk factors and have thoroughly discussed the importance of taking these medications as prescribed. The patient verbalizes understanding.     ccreferring physic

## 2023-03-10 NOTE — PROGRESS NOTES
Do you currently have any of the following:    Fever: No  Headache:  No  Cough: No  Shortness of breath: No  Exposed to anyone with these symptoms: No         Magdalena Grandchild presents to the [address] [place] [address] on 3/10/2023. Jcalyn Becker is complaining of pain in her low back. The pain is constant. The pain is described as aching, dull, sharp, and miserable. Pain is rated on her best day at a 5, on her worst day at a 10, and on average at a 7 on the VAS scale. She took her last dose of Butrans Patch today. Any procedures since your last visit: No    Pacemaker or defibrillator: No    She is  on NSAIDS and is not on anticoagulation medications. Medication Contract and Consent for Opioid Use Documents Filed       Patient Documents       Type of Document Status Date Received Received By Description    Medication Contract Received 6/21/2019  7:35 AM Robles CARR SARAH med contract    Medication Contract Received 3/26/2021 10:38 AM Pierre Parker PAIN MGMT CONTRACT DR. RONDON    Medication Contract Signed 8/12/2022 11:52 AM RAMY FAIR Pain Agreement 8/12/22                    BP (!) 149/75   Pulse 86   Temp 97.6 °F (36.4 °C) (Infrared)   Resp 16   Ht 5' (1.524 m)   Wt 200 lb (90.7 kg)   LMP  (LMP Unknown)   SpO2 96%   BMI 39.06 kg/m²      No LMP recorded (lmp unknown).  Patient is postmenopausal.

## 2023-03-28 ENCOUNTER — INITIAL CONSULT (OUTPATIENT)
Dept: NEUROSURGERY | Age: 68
End: 2023-03-28
Payer: MEDICARE

## 2023-03-28 ENCOUNTER — TELEPHONE (OUTPATIENT)
Dept: PAIN MANAGEMENT | Age: 68
End: 2023-03-28

## 2023-03-28 VITALS
HEIGHT: 60 IN | DIASTOLIC BLOOD PRESSURE: 73 MMHG | OXYGEN SATURATION: 97 % | BODY MASS INDEX: 38.87 KG/M2 | SYSTOLIC BLOOD PRESSURE: 174 MMHG | HEART RATE: 75 BPM | WEIGHT: 198 LBS

## 2023-03-28 DIAGNOSIS — M25.552 LEFT HIP PAIN: Primary | ICD-10-CM

## 2023-03-28 DIAGNOSIS — M54.40 ACUTE MIDLINE LOW BACK PAIN WITH SCIATICA, SCIATICA LATERALITY UNSPECIFIED: ICD-10-CM

## 2023-03-28 PROCEDURE — 99215 OFFICE O/P EST HI 40 MIN: CPT | Performed by: NEUROLOGICAL SURGERY

## 2023-03-28 PROCEDURE — 3017F COLORECTAL CA SCREEN DOC REV: CPT | Performed by: NEUROLOGICAL SURGERY

## 2023-03-28 PROCEDURE — 1123F ACP DISCUSS/DSCN MKR DOCD: CPT | Performed by: NEUROLOGICAL SURGERY

## 2023-03-28 PROCEDURE — G8417 CALC BMI ABV UP PARAM F/U: HCPCS | Performed by: NEUROLOGICAL SURGERY

## 2023-03-28 PROCEDURE — 3077F SYST BP >= 140 MM HG: CPT | Performed by: NEUROLOGICAL SURGERY

## 2023-03-28 PROCEDURE — 3078F DIAST BP <80 MM HG: CPT | Performed by: NEUROLOGICAL SURGERY

## 2023-03-28 PROCEDURE — G8399 PT W/DXA RESULTS DOCUMENT: HCPCS | Performed by: NEUROLOGICAL SURGERY

## 2023-03-28 PROCEDURE — 99202 OFFICE O/P NEW SF 15 MIN: CPT

## 2023-03-28 PROCEDURE — 1090F PRES/ABSN URINE INCON ASSESS: CPT | Performed by: NEUROLOGICAL SURGERY

## 2023-03-28 PROCEDURE — 1036F TOBACCO NON-USER: CPT | Performed by: NEUROLOGICAL SURGERY

## 2023-03-28 PROCEDURE — G8427 DOCREV CUR MEDS BY ELIG CLIN: HCPCS | Performed by: NEUROLOGICAL SURGERY

## 2023-03-28 PROCEDURE — G8484 FLU IMMUNIZE NO ADMIN: HCPCS | Performed by: NEUROLOGICAL SURGERY

## 2023-03-28 RX ORDER — BUPRENORPHINE 10 UG/H
PATCH TRANSDERMAL
COMMUNITY
Start: 2023-03-27

## 2023-03-28 ASSESSMENT — ENCOUNTER SYMPTOMS
CONSTIPATION: 1
ALLERGIC/IMMUNOLOGIC NEGATIVE: 1
DIARRHEA: 1
BACK PAIN: 1
EYES NEGATIVE: 1

## 2023-03-28 NOTE — PROGRESS NOTES
normal weight. She is not ill-appearing, toxic-appearing or diaphoretic. HENT:      Head: Normocephalic and atraumatic. Nose: Nose normal. No congestion or rhinorrhea. Mouth/Throat:      Mouth: Mucous membranes are dry. Pharynx: Oropharynx is clear. No oropharyngeal exudate or posterior oropharyngeal erythema. Eyes:      General: No visual field deficit. Right eye: No discharge. Left eye: No discharge. Extraocular Movements: Extraocular movements intact. Conjunctiva/sclera: Conjunctivae normal.      Pupils: Pupils are equal, round, and reactive to light. Pulmonary:      Effort: Pulmonary effort is normal. No respiratory distress. Abdominal:      General: Abdomen is flat. There is no distension. Musculoskeletal:         General: No swelling, tenderness, deformity or signs of injury. Right lower leg: No edema. Left lower leg: No edema. Skin:     General: Skin is warm and dry. Capillary Refill: Capillary refill takes less than 2 seconds. Coloration: Skin is not jaundiced or pale. Findings: No bruising, erythema, lesion or rash. Neurological:      General: No focal deficit present. Mental Status: She is alert and oriented to person, place, and time. Mental status is at baseline. GCS: GCS eye subscore is 4. GCS verbal subscore is 5. GCS motor subscore is 6. Cranial Nerves: No cranial nerve deficit, dysarthria or facial asymmetry. Sensory: Sensation is intact. No sensory deficit. Motor: Motor function is intact. No weakness, tremor, atrophy, abnormal muscle tone, seizure activity or pronator drift. Coordination: Romberg sign negative. Coordination normal. Heel to Shin Test normal.      Gait: Gait normal.      Deep Tendon Reflexes: Reflexes normal. Babinski sign absent on the right side. Babinski sign absent on the left side.       Reflex Scores:       Tricep reflexes are 2+ on the right side and 2+ on the left

## 2023-03-29 ENCOUNTER — TELEPHONE (OUTPATIENT)
Dept: FAMILY MEDICINE CLINIC | Age: 68
End: 2023-03-29

## 2023-03-29 DIAGNOSIS — M25.562 CHRONIC PAIN OF LEFT KNEE: ICD-10-CM

## 2023-03-29 DIAGNOSIS — M96.1 LUMBAR POST-LAMINECTOMY SYNDROME: ICD-10-CM

## 2023-03-29 DIAGNOSIS — G89.29 CHRONIC PAIN OF LEFT KNEE: ICD-10-CM

## 2023-03-29 DIAGNOSIS — G89.4 CHRONIC PAIN SYNDROME: ICD-10-CM

## 2023-03-29 DIAGNOSIS — M17.12 PRIMARY OSTEOARTHRITIS OF LEFT KNEE: ICD-10-CM

## 2023-03-29 DIAGNOSIS — M53.3 DISORDER OF SACRUM: ICD-10-CM

## 2023-03-29 RX ORDER — MELOXICAM 7.5 MG/1
7.5 TABLET ORAL DAILY
Qty: 30 TABLET | Refills: 3 | Status: SHIPPED | OUTPATIENT
Start: 2023-03-29 | End: 2023-04-28

## 2023-03-29 NOTE — TELEPHONE ENCOUNTER
Patient notified [facility]- I discussed with the patient the risks of Erivedge including but not limited to nausea, vomiting, diarrhea, constipation, weight loss, changes in the sense of taste, decreased appetite, muscle spasms, and hair loss.  The patient verbalized understanding of the proper use and possible adverse effects of Erivedge.  All of the patient's questions and concerns were addressed.

## 2023-03-29 NOTE — TELEPHONE ENCOUNTER
Patient states she discussed with you taking over her meloxicam since Dr. Wilman Smith wasn't going to anymore and is requesting you to refill it?

## 2023-03-31 ENCOUNTER — TELEPHONE (OUTPATIENT)
Dept: PAIN MANAGEMENT | Age: 68
End: 2023-03-31

## 2023-03-31 DIAGNOSIS — E11.9 TYPE 2 DIABETES MELLITUS WITHOUT COMPLICATION, WITHOUT LONG-TERM CURRENT USE OF INSULIN (HCC): ICD-10-CM

## 2023-03-31 RX ORDER — SITAGLIPTIN 100 MG/1
TABLET, FILM COATED ORAL
Qty: 90 TABLET | Refills: 1 | OUTPATIENT
Start: 2023-03-31

## 2023-03-31 NOTE — TELEPHONE ENCOUNTER
Call to Niles Yu that procedure was approved for 4/13/2023 and that Nilesh should call her a few days before for the pre op call and between 2:00 PM and 4:00 PM  the business day before with the arrival time. Instructed Ezra Chaves to hold ibuprofen for 24 hours, naprosyn for 4 days and any aspirin containing products or fish oil for 7 days. Instructed to call office back if any questions. Ezra Chaves verbalized understanding.     Electronically signed by Sergio San RN on 3/31/2023 at 10:45 AM

## 2023-04-06 ENCOUNTER — TELEPHONE (OUTPATIENT)
Dept: NEUROSURGERY | Age: 68
End: 2023-04-06

## 2023-04-06 DIAGNOSIS — M25.552 LEFT HIP PAIN: Primary | ICD-10-CM

## 2023-04-06 DIAGNOSIS — E78.1 HYPERTRIGLYCERIDEMIA: ICD-10-CM

## 2023-04-06 RX ORDER — EZETIMIBE 10 MG/1
10 TABLET ORAL DAILY
Qty: 90 TABLET | Refills: 1 | OUTPATIENT
Start: 2023-04-06

## 2023-04-06 NOTE — TELEPHONE ENCOUNTER
Patient called earlier, Lynsey Sousa spoke to her. I read her note from Lynsey Sousa but she is asking for call back on her xray that she had on her left hip. She wants to know the results on that.

## 2023-04-06 NOTE — TELEPHONE ENCOUNTER
Mild arthritis of her left hip. Per Dr. Shankar Talbot last note: \"She has a bad left knee and I have advised her that this should be fixed prior to any back surgery. \" If patient needs a referral to an orthopedic surgeon we can place the order.

## 2023-04-06 NOTE — TELEPHONE ENCOUNTER
Patient called asking about a follow up appointment after having hip imaging, I did call her back after reading doctors last  note, his advise to her was to have her hip fixed before doing anything with her back, I did leave that on her voicemail and also if she needs an orthopaedic referral to let us know

## 2023-04-06 NOTE — TELEPHONE ENCOUNTER
Patient says she would like referral to [address]o on [address]  Dr Anh Sauceda and not sure of other  names

## 2023-04-10 NOTE — PROGRESS NOTES
09-May-2017 11:08 Jadyn PAIN MANAGEMENT  INSTRUCTIONS  . .......................................................................................................................................... [x] Parking the day of Surgery is located in the [facility].   Upon entering the door, make immediate right into the surgery reception room    [x]  Bring photo ID and insurance card     [x] You may have a light breakfast day of procedure    [x]  Wear loose comfortable clothing    [x]  Please follow instructions for medications as given per Dr's office    [x] You can expect a call the business day prior to procedure to notify you of your arrival time     [x] Please arrange for     []  Other instructions

## 2023-04-11 PROBLEM — E66.812 OBESITY, CLASS II, BMI 35-39.9: Status: ACTIVE | Noted: 2023-04-11

## 2023-04-11 PROBLEM — E66.9 OBESITY, CLASS II, BMI 35-39.9: Status: ACTIVE | Noted: 2023-04-11

## 2023-04-13 ENCOUNTER — HOSPITAL ENCOUNTER (OUTPATIENT)
Age: 68
Setting detail: OUTPATIENT SURGERY
Discharge: HOME | End: 2023-04-13
Attending: PAIN MEDICINE | Admitting: PAIN MEDICINE
Payer: MEDICARE

## 2023-04-13 VITALS
HEART RATE: 78 BPM | RESPIRATION RATE: 14 BRPM | SYSTOLIC BLOOD PRESSURE: 141 MMHG | BODY MASS INDEX: 38.87 KG/M2 | WEIGHT: 198 LBS | OXYGEN SATURATION: 98 % | DIASTOLIC BLOOD PRESSURE: 68 MMHG | HEIGHT: 60 IN

## 2023-04-13 LAB — METER GLUCOSE: 157 MG/DL (ref 74–99)

## 2023-04-13 PROCEDURE — 7100000010 HC PHASE II RECOVERY - FIRST 15 MIN: Performed by: PAIN MEDICINE

## 2023-04-13 PROCEDURE — 6360000002 HC RX W HCPCS: Performed by: PAIN MEDICINE

## 2023-04-13 PROCEDURE — 82962 GLUCOSE BLOOD TEST: CPT

## 2023-04-13 PROCEDURE — 2709999900 HC NON-CHARGEABLE SUPPLY: Performed by: PAIN MEDICINE

## 2023-04-13 PROCEDURE — 62323 NJX INTERLAMINAR LMBR/SAC: CPT | Performed by: PAIN MEDICINE

## 2023-04-13 PROCEDURE — 7100000011 HC PHASE II RECOVERY - ADDTL 15 MIN: Performed by: PAIN MEDICINE

## 2023-04-13 PROCEDURE — 2500000003 HC RX 250 WO HCPCS: Performed by: PAIN MEDICINE

## 2023-04-13 PROCEDURE — 3600000002 HC SURGERY LEVEL 2 BASE: Performed by: PAIN MEDICINE

## 2023-04-13 PROCEDURE — 6360000004 HC RX CONTRAST MEDICATION: Performed by: PAIN MEDICINE

## 2023-04-13 RX ORDER — METHYLPREDNISOLONE ACETATE 40 MG/ML
INJECTION, SUSPENSION INTRA-ARTICULAR; INTRALESIONAL; INTRAMUSCULAR; SOFT TISSUE PRN
Status: DISCONTINUED | OUTPATIENT
Start: 2023-04-13 | End: 2023-04-13 | Stop reason: ALTCHOICE

## 2023-04-13 RX ORDER — LIDOCAINE HYDROCHLORIDE 5 MG/ML
INJECTION, SOLUTION INFILTRATION; INTRAVENOUS PRN
Status: DISCONTINUED | OUTPATIENT
Start: 2023-04-13 | End: 2023-04-13 | Stop reason: ALTCHOICE

## 2023-04-13 ASSESSMENT — PAIN - FUNCTIONAL ASSESSMENT: PAIN_FUNCTIONAL_ASSESSMENT: 0-10

## 2023-04-13 ASSESSMENT — PAIN DESCRIPTION - DESCRIPTORS: DESCRIPTORS: DISCOMFORT

## 2023-04-13 NOTE — OP NOTE
2023    Patient: Ever Bryant  :  1955  Age:  74 y.o. Sex:  female     PRE-OPERATIVE DIAGNOSIS: Lumbar disc displacement, lumbar radiculopathy. POST-OPERATIVE DIAGNOSIS: Same. PROCEDURE: Fluoroscopic guided therapeutic lumbar epidural steroid injection at the L1-2 level. SURGEON: NORA Anguiano. ANESTHESIA: local    ESTIMATED BLOOD LOSS: None.  __________________________________________________    BRIEF HISTORY:  Ever Bryant comes in today for the lumbar epidural injection at L1-2 level. The potential complications of this procedure were discussed with her again today. She has elected to undergo the aforementioned procedure. Amado complete History & Physical examination were reviewed in depth, a copy of which is in the chart. DESCRIPTION OF PROCEDURE:    After confirming written and informed consent, a time-out was performed and Amado name and date of birth, the procedure to be performed as well as the plan for the location of the needle insertion were confirmed. The patient was brought into the procedure room and placed in the prone position on the fluoroscopy table. A pillow was placed under the patient's lower abdomen/upper pelvis to increase lumbar interlaminar space. Standard monitors were placed, and vital signs were observed throughout the procedure. The area of the lumbar spine was prepped with chloraprep and draped in a sterile manner. The L1-2 interspace was identified and marked under AP fluoroscopy. The skin and subcutaneous tissues at the above level were anesthestized with 0.5% lidocaine. With intermittent fluoroscopy, an # 18 gauge 3 1/2 tuohy epidural needle was inserted and directed toward the interlaminar space. The needle was slowly advanced using loss of resistance technique and 5 cc glass syringe until the tip of the epidural needle has passed through the ligamentum flavum and entered the epidural space.  AP and lateral fluoroscopic imaging was

## 2023-04-13 NOTE — DISCHARGE INSTRUCTIONS
Chester Medrano Block/Radiofrequency  Home Going Instructions    1-Go home, rest for the remainder of the day  2-Please do not lift over 20 pounds the day of the injection  3-If you received sedation No: alcohol, driving, operating lawn mowers, plows, tractors or other dangerous equipment until next morning. Do not make important decisions or sign legal documents for 24 hours. You may experience light headedness, dizziness, nausea or sleepiness after sedation. Do not stay alone. A responsible adult must be with you for 24 hours. You could be nauseated from the medications you have received. Your IV site may be sore and bruised. 4-No dietary restrictions     5-Resume all medications the same day, blood thinners to be resumed 24 hours after injection if you were instructed to stop any. 6-Keep the surgical site clean and dry, you may shower the next morning and remove the      dressing. 7- No sitz baths, tub baths or hot tubs/swimming for 24 hours. 8- If you have any pain at the injection site(s), application of an ice pack to the area should be       helpful, 20 minutes on/20 minutes off for next 48 hours. 9- Call [facility] immediately at if you develop.   Fever greater than 100.4 F  Have bleeding or drainage from the puncture site  Have progressive Leg/arm numbness and or weakness  Loss of control of bowel and or bladder (wet/soil yourself)  Severe headache with inability to lift head  10-You may return to work the next day

## 2023-04-13 NOTE — H&P
198 lb (89.8 kg)   LMP  (LMP Unknown)   SpO2 94%   BMI 38.67 kg/m²     CONSTITUTIONAL:  awake, alert, cooperative, no apparent distress, and appears stated age    EYES: PERRLA, EOMI    LUNGS:  No increased work of breathing, no audible wheezing    CARDIOVASCULAR:  regular rate and rhythm    ABDOMEN:  Soft non tender non distended     EXTREMITIES: no signs of clubbing or cyanosis. MUSCULOSKELETAL: negative for flaccid muscle tone or spastic movements. SKIN: gross examination reveals no signs of rashes, or diaphoresis. NEURO: Cranial nerves II-XII grossly intact. No signs of agitated mood.        Assessment/Plan:    Low back pain left lower extremity pain for L1-2 epidural steroid injection

## 2023-04-17 ENCOUNTER — OFFICE VISIT (OUTPATIENT)
Dept: GASTROENTEROLOGY | Age: 68
End: 2023-04-17

## 2023-04-17 VITALS
HEIGHT: 64 IN | BODY MASS INDEX: 34.15 KG/M2 | SYSTOLIC BLOOD PRESSURE: 116 MMHG | RESPIRATION RATE: 18 BRPM | WEIGHT: 200 LBS | DIASTOLIC BLOOD PRESSURE: 68 MMHG

## 2023-04-17 DIAGNOSIS — K58.9 IRRITABLE BOWEL SYNDROME, UNSPECIFIED TYPE: Primary | ICD-10-CM

## 2023-04-18 ENCOUNTER — TELEPHONE (OUTPATIENT)
Dept: FAMILY MEDICINE CLINIC | Age: 68
End: 2023-04-18

## 2023-04-18 NOTE — TELEPHONE ENCOUNTER
----- Message from Lexie Henao sent at 4/18/2023  1:46 PM EDT -----  Subject: Message to Provider    QUESTIONS  Information for Provider? Janine from [employer]: Would   like to know if a fax was received around 4/4 for her insulin pump(reader   and sensors) and clinical notes release was received. Please advise   ---------------------------------------------------------------------------  --------------  [employer]  [phone]; Do not leave any message, patient will call back for answer  ---------------------------------------------------------------------------  --------------  SCRIPT ANSWERS  Relationship to Patient? Covered Entity  Covered Entity Type? Durable Medical Equipment? Representative Name?  Jose Marley

## 2023-04-19 ENCOUNTER — OFFICE VISIT (OUTPATIENT)
Dept: PAIN MANAGEMENT | Age: 68
End: 2023-04-19
Payer: MEDICARE

## 2023-04-19 VITALS
RESPIRATION RATE: 16 BRPM | HEART RATE: 75 BPM | BODY MASS INDEX: 39.27 KG/M2 | SYSTOLIC BLOOD PRESSURE: 175 MMHG | TEMPERATURE: 97.4 F | HEIGHT: 60 IN | DIASTOLIC BLOOD PRESSURE: 74 MMHG | WEIGHT: 200 LBS

## 2023-04-19 DIAGNOSIS — M17.12 PRIMARY OSTEOARTHRITIS OF LEFT KNEE: ICD-10-CM

## 2023-04-19 DIAGNOSIS — G89.29 CHRONIC PAIN OF LEFT KNEE: ICD-10-CM

## 2023-04-19 DIAGNOSIS — M25.562 CHRONIC PAIN OF LEFT KNEE: ICD-10-CM

## 2023-04-19 DIAGNOSIS — G89.4 CHRONIC PAIN SYNDROME: Primary | ICD-10-CM

## 2023-04-19 DIAGNOSIS — M96.1 LUMBAR POST-LAMINECTOMY SYNDROME: ICD-10-CM

## 2023-04-19 DIAGNOSIS — M53.3 DISORDER OF SACRUM: ICD-10-CM

## 2023-04-19 PROCEDURE — 99213 OFFICE O/P EST LOW 20 MIN: CPT | Performed by: PAIN MEDICINE

## 2023-04-19 RX ORDER — BUPRENORPHINE 10 UG/H
1 PATCH TRANSDERMAL WEEKLY
Qty: 4 PATCH | Refills: 0 | Status: SHIPPED | OUTPATIENT
Start: 2023-04-24 | End: 2023-05-22

## 2023-04-19 NOTE — TELEPHONE ENCOUNTER
Contacted Patient she states that they shouldn't even be contacting us that they need to contact Dr. Amber Nur who is her Endocrinologist.  She is asking for the  freestyle sofia 2 not a insulin pump. Will contact and advise them    Dr. Amber Nur. [phone]    Tried to contact diabetic supplies received message that they are not in the office.

## 2023-04-19 NOTE — PROGRESS NOTES
Jesus Alberto Dent presents to the [facility] on 4/19/2023. Siva Aleman is complaining of pain in her low back. The pain is constant. The pain is described as aching, dull, and sharp. Pain is rated on her best day at a 3, on her worst day at a 10, and on average at a 5 on the VAS scale. She took her last dose of Tylenol today. Any procedures since your last visit: Yes, with 60 % relief. Pacemaker or defibrillator: No     She is  on NSAIDS and is  on anticoagulation medications to include ASA and is managed by VISHNU Vasquez CNP  . Medication Contract and Consent for Opioid Use Documents Filed       Patient Documents       Type of Document Status Date Received Received By Description    Medication Contract Received 6/21/2019  7:35 AM Oriana CARR SARAH med contract    Medication Contract Received 3/26/2021 10:38 AM Blanco Rutherford PAIN MGMT CONTRACT DR. RONDON    Medication Contract Signed 8/12/2022 11:52 AM RAMY FAIR Pain Agreement 8/12/22                    BP (!) 175/74   Pulse 75   Temp 97.4 °F (36.3 °C) (Infrared)   Resp 16   Ht 5' (1.524 m)   Wt 200 lb (90.7 kg)   LMP  (LMP Unknown)   BMI 39.06 kg/m²      No LMP recorded (lmp unknown).  Patient is postmenopausal.
5/10/22  Yes Lupe Man MD   acetaminophen (TYLENOL) 500 MG tablet Take 1 tablet by mouth every 6 hours as needed for Pain   Yes Historical Provider, MD   PROCTOZONE-HC 2.5 % CREA rectal cream Apply 4 times daily as needed 8/5/21  Yes Christianne Gregory MD   Blood Glucose Monitoring Suppl (ACCU-CHEK GUIDE) w/Device KIT  3/8/21  Yes Historical Provider, MD   Alcohol Swabs (PHARMACIST CHOICE ALCOHOL) PADS  3/8/21  Yes Historical Provider, MD   Blood Glucose Calibration (ACCU-CHEK GUIDE CONTROL) LIQD  3/8/21  Yes Historical Provider, MD   diclofenac sodium (VOLTAREN) 1 % GEL Apply topically 2 times daily 4/21/21  Yes Tomás Gao MD   Handicap Placard MISC by Does not apply route Start 11/23/2020 expires 11/22/2023 11/23/20  Yes VISHNU Caal CNP   blood glucose monitor strips Test 1 times a day & as needed for symptoms of irregular blood glucose.  6/17/20  Yes VISHNU Flowers - CNP   Cholecalciferol (VITAMIN D3) 2000 units CAPS Take 1 capsule by mouth daily   Yes Historical Provider, MD       Allergies   Allergen Reactions    Diclofenac     Lisinopril Other (See Comments)     Profound malaise    Diclofenac Sodium Nausea And Vomiting    Farxiga [Dapagliflozin] Itching     Vaginal itching with Sedrick Bis     Lyrica [Pregabalin] Other (See Comments)     Confusion    Sulfa Antibiotics Rash       Social History     Socioeconomic History    Marital status:      Spouse name: Not on file    Number of children: Not on file    Years of education: Not on file    Highest education level: Not on file   Occupational History    Not on file   Tobacco Use    Smoking status: Never     Passive exposure: Never    Smokeless tobacco: Never   Vaping Use    Vaping Use: Never used   Substance and Sexual Activity    Alcohol use: Yes     Comment: Rarely    Drug use: No    Sexual activity: Defer   Other Topics Concern    Not on file   Social History Narrative    Not on file     Social Determinants of Health

## 2023-05-01 ENCOUNTER — OFFICE VISIT (OUTPATIENT)
Dept: GASTROENTEROLOGY | Age: 68
End: 2023-05-01
Payer: MEDICARE

## 2023-05-01 VITALS — WEIGHT: 200 LBS | SYSTOLIC BLOOD PRESSURE: 130 MMHG | BODY MASS INDEX: 39.06 KG/M2 | DIASTOLIC BLOOD PRESSURE: 74 MMHG

## 2023-05-01 DIAGNOSIS — K59.00 CONSTIPATION, UNSPECIFIED CONSTIPATION TYPE: Primary | ICD-10-CM

## 2023-05-01 PROCEDURE — 3075F SYST BP GE 130 - 139MM HG: CPT | Performed by: NURSE PRACTITIONER

## 2023-05-01 PROCEDURE — G8399 PT W/DXA RESULTS DOCUMENT: HCPCS | Performed by: NURSE PRACTITIONER

## 2023-05-01 PROCEDURE — 3017F COLORECTAL CA SCREEN DOC REV: CPT | Performed by: NURSE PRACTITIONER

## 2023-05-01 PROCEDURE — G8428 CUR MEDS NOT DOCUMENT: HCPCS | Performed by: NURSE PRACTITIONER

## 2023-05-01 PROCEDURE — 1123F ACP DISCUSS/DSCN MKR DOCD: CPT | Performed by: NURSE PRACTITIONER

## 2023-05-01 PROCEDURE — 1090F PRES/ABSN URINE INCON ASSESS: CPT | Performed by: NURSE PRACTITIONER

## 2023-05-01 PROCEDURE — G8417 CALC BMI ABV UP PARAM F/U: HCPCS | Performed by: NURSE PRACTITIONER

## 2023-05-01 PROCEDURE — 3078F DIAST BP <80 MM HG: CPT | Performed by: NURSE PRACTITIONER

## 2023-05-01 PROCEDURE — 1036F TOBACCO NON-USER: CPT | Performed by: NURSE PRACTITIONER

## 2023-05-01 PROCEDURE — 99212 OFFICE O/P EST SF 10 MIN: CPT | Performed by: NURSE PRACTITIONER

## 2023-05-01 RX ORDER — POLYETHYLENE GLYCOL 3350, SODIUM CHLORIDE, POTASSIUM CHLORIDE, SODIUM BICARBONATE, AND SODIUM SULFATE 240; 5.84; 2.98; 6.72; 22.72 G/4L; G/4L; G/4L; G/4L; G/4L
4000 POWDER, FOR SOLUTION ORAL ONCE
Qty: 1 EACH | Refills: 0 | Status: SHIPPED | OUTPATIENT
Start: 2023-05-01 | End: 2023-05-01

## 2023-05-01 NOTE — PROGRESS NOTES
Ana Paula Tracy (:  1955) is a 74 y.o. female, here for evaluation of the following chief complaint(s):  Follow-up (Change in bowel habits)      SUBJECTIVE/OBJECTIVE:  HPI:    Evonne Acevedo is a very pleasant 74year old female that presents today for follow up on irregular bowel habits    Patient was started on Ozempic which caused diarrhea  Now she is alternating between diarrhea and constipation  Having frequent urges to have a BM but no results  Taking Senokot daily that does not satisfy    Abdominal xray showed diffuse stool retention  Patient was advised a Miralax prep. Has not done this as of yet     ROS:  General: Patient denies n/v/f/c or weight loss. HEENT: Patient denies persistent postnasal drip, scleral icterus, drooling, persistent bleeding from nose/mouth. Resp: Patient denies SOB, wheezing, productive cough. Cards: Patient denies CP, palpitations, significant edema  GI: As above. Derm: Patient denies jaundice/rashes. Musc: Patient denies diffuse/irregular joint swelling or myalgias. Objective   Wt Readings from Last 3 Encounters:   23 200 lb (90.7 kg)   23 200 lb (90.7 kg)   23 200 lb (90.7 kg)     Temp Readings from Last 3 Encounters:   23 97.4 °F (36.3 °C) (Infrared)   23 98.2 °F (36.8 °C)   03/10/23 97.6 °F (36.4 °C) (Infrared)     BP Readings from Last 3 Encounters:   23 130/74   23 (!) 175/74   23 116/68     Pulse Readings from Last 3 Encounters:   23 75   23 78   23 91        Physical Exam  Constitutional:       Appearance: Normal appearance. Musculoskeletal:      Comments: Utilizes a walker for ambulation   Neurological:      Mental Status: She is alert.        Past Medical History:   Diagnosis Date    Acute left lumbar radiculopathy 2017    Cancer ([facility] [address] 2017    colon    Cerebral artery occlusion with cerebral infarction (HCC)     tia    Chronic back pain     Chronic deep vein thrombosis (DVT) of distal vein

## 2023-06-01 DIAGNOSIS — M25.562 CHRONIC PAIN OF LEFT KNEE: ICD-10-CM

## 2023-06-01 DIAGNOSIS — M96.1 LUMBAR POST-LAMINECTOMY SYNDROME: ICD-10-CM

## 2023-06-01 DIAGNOSIS — M17.12 PRIMARY OSTEOARTHRITIS OF LEFT KNEE: ICD-10-CM

## 2023-06-01 DIAGNOSIS — M53.3 DISORDER OF SACRUM: ICD-10-CM

## 2023-06-01 DIAGNOSIS — G89.29 CHRONIC PAIN OF LEFT KNEE: ICD-10-CM

## 2023-06-01 DIAGNOSIS — G89.4 CHRONIC PAIN SYNDROME: ICD-10-CM

## 2023-06-01 RX ORDER — BUPRENORPHINE 10 UG/H
1 PATCH TRANSDERMAL WEEKLY
Qty: 4 PATCH | Refills: 0 | Status: SHIPPED
Start: 2023-06-01 | End: 2023-06-21 | Stop reason: SDUPTHER

## 2023-06-20 LAB — DIABETIC RETINOPATHY: NEGATIVE

## 2023-06-21 ENCOUNTER — OFFICE VISIT (OUTPATIENT)
Dept: PAIN MANAGEMENT | Age: 68
End: 2023-06-21
Payer: MEDICARE

## 2023-06-21 VITALS
SYSTOLIC BLOOD PRESSURE: 138 MMHG | DIASTOLIC BLOOD PRESSURE: 84 MMHG | HEART RATE: 86 BPM | TEMPERATURE: 97.2 F | BODY MASS INDEX: 39.27 KG/M2 | OXYGEN SATURATION: 98 % | WEIGHT: 200 LBS | HEIGHT: 60 IN

## 2023-06-21 DIAGNOSIS — G89.29 CHRONIC PAIN OF LEFT KNEE: ICD-10-CM

## 2023-06-21 DIAGNOSIS — M25.562 CHRONIC PAIN OF LEFT KNEE: ICD-10-CM

## 2023-06-21 DIAGNOSIS — M96.1 LUMBAR POST-LAMINECTOMY SYNDROME: ICD-10-CM

## 2023-06-21 DIAGNOSIS — M54.6 PAIN IN THORACIC SPINE: ICD-10-CM

## 2023-06-21 DIAGNOSIS — M17.12 PRIMARY OSTEOARTHRITIS OF LEFT KNEE: ICD-10-CM

## 2023-06-21 DIAGNOSIS — M53.3 DISORDER OF SACRUM: Primary | ICD-10-CM

## 2023-06-21 DIAGNOSIS — M54.16 LUMBAR RADICULOPATHY: ICD-10-CM

## 2023-06-21 DIAGNOSIS — G89.4 CHRONIC PAIN SYNDROME: ICD-10-CM

## 2023-06-21 DIAGNOSIS — M53.3 DISORDER OF SACRUM: ICD-10-CM

## 2023-06-21 PROCEDURE — 99213 OFFICE O/P EST LOW 20 MIN: CPT

## 2023-06-21 PROCEDURE — 3079F DIAST BP 80-89 MM HG: CPT | Performed by: PHYSICIAN ASSISTANT

## 2023-06-21 PROCEDURE — G8427 DOCREV CUR MEDS BY ELIG CLIN: HCPCS | Performed by: PHYSICIAN ASSISTANT

## 2023-06-21 PROCEDURE — G8399 PT W/DXA RESULTS DOCUMENT: HCPCS | Performed by: PHYSICIAN ASSISTANT

## 2023-06-21 PROCEDURE — 1090F PRES/ABSN URINE INCON ASSESS: CPT | Performed by: PHYSICIAN ASSISTANT

## 2023-06-21 PROCEDURE — 1036F TOBACCO NON-USER: CPT | Performed by: PHYSICIAN ASSISTANT

## 2023-06-21 PROCEDURE — 3075F SYST BP GE 130 - 139MM HG: CPT | Performed by: PHYSICIAN ASSISTANT

## 2023-06-21 PROCEDURE — 3017F COLORECTAL CA SCREEN DOC REV: CPT | Performed by: PHYSICIAN ASSISTANT

## 2023-06-21 PROCEDURE — G8417 CALC BMI ABV UP PARAM F/U: HCPCS | Performed by: PHYSICIAN ASSISTANT

## 2023-06-21 PROCEDURE — 1123F ACP DISCUSS/DSCN MKR DOCD: CPT | Performed by: PHYSICIAN ASSISTANT

## 2023-06-21 PROCEDURE — 99213 OFFICE O/P EST LOW 20 MIN: CPT | Performed by: PHYSICIAN ASSISTANT

## 2023-06-21 RX ORDER — BUPRENORPHINE 10 UG/H
1 PATCH TRANSDERMAL WEEKLY
Qty: 4 PATCH | Refills: 1 | Status: SHIPPED | OUTPATIENT
Start: 2023-06-21 | End: 2023-07-19

## 2023-06-21 RX ORDER — IPRATROPIUM BROMIDE 42 UG/1
SPRAY, METERED NASAL
COMMUNITY
Start: 2023-05-01

## 2023-06-21 NOTE — PROGRESS NOTES
Hi Gao presents to the [facility] on 6/21/2023. Yeny Whalen is complaining of pain low back. The pain is constant. The pain is described as aching. Pain is rated on her best day at a 3, on her worst day at a 10, and on average at a 5 on the VAS scale. She took her last dose of Butrans Patch 1 wk ago, will be changed today . What number best describes how, during the past week, pain has interfered with your enjoyment of life 3    What number best describes how, during the past week, pain has interfered with your general activity 5    Any procedures since your last visit: No.    Pacemaker or defibrillator: No.     She is not on NSAIDS and is  on anticoagulation medications to include ASA and is managed by Peter MARES. Medication Contract and Consent for Opioid Use Documents Filed       Patient Documents       Type of Document Status Date Received Received By Description    Medication Contract Received 6/21/2019  7:35 AM Franchesca CARR [facility] med contract    Medication Contract Received 3/26/2021 10:38 AM Narinder Cotton PAIN MGMT CONTRACT DR. RONDON    Medication Contract Signed 8/12/2022 11:52 AM RAMY FAIR Pain Agreement 8/12/22                    /84 (Site: Left Upper Arm, Position: Sitting)   Pulse 86   Temp 97.2 °F (36.2 °C)   Ht 5' (1.524 m)   Wt 200 lb (90.7 kg)   LMP  (LMP Unknown)   SpO2 98%   BMI 39.06 kg/m²      No LMP recorded (lmp unknown).  Patient is postmenopausal.
nearby structures. However, these components   appear to be intact and in usual customary position. 3.  Apparent post-operative relief of disc-related mass effect upon the   spinal cord within the above-described surgical beds, most significant at   T2/3, as described. 4.  Probable iatrogenic posterior epidural/paraspinal emphysema/hematoma   within the above-described surgical beds, as described. 5.  All other previously-demonstrated/described multilevel spinal and   bilateral sacroiliac joint degenerative disease, central spinal canal   stenosis, and neural foraminal narrowing, all appear otherwise not   significantly changed. 6.  Unchanged chronic multilevel asymmetric vertebral compression deformities   and related vertebral column curvature/alignment abnormalities. 7.  Incidental findings, most notable for minimal dependent bibasilar pleural   effusions and equivalent adjacent subsegmental atelectasis versus infiltrate   tracking to both lung apices, where this becomes slightly worse on the right. 12/21/2020 -  FINDINGS:   BONES/ALIGNMENT: There is normal alignment of the spine. There are mild old   compression deformities of T6 through T9, similar compared to the prior plain   films. Otherwise, the vertebral body heights are maintained. The bone marrow   signal appears unremarkable. No evidence of acute fracture or osseous   destructive lesion. SPINAL CORD: No abnormal cord signal is seen. SOFT TISSUES: No paraspinal mass identified. DEGENERATIVE CHANGES: There is advanced degenerative change with spurring and   disc space narrowing at multiple levels, most severe at T6-7 through T11-12. The  sagittal view of the lumbar spine demonstrates grade 1   anterolisthesis and severe central canal stenosis at L4-5. Please refer to   the report for MRI lumbar spine done yesterday. C7-T1: No evidence of significant central canal stenosis or disc herniation.    T1-2: There is posterior

## 2023-06-22 LAB
6-MONOACETYLMORPHINE, URINE: NOT DETECTED
ALCOHOL URINE: NOT DETECTED
AMPHETAMINE SCREEN, URINE: NOT DETECTED
BARBITURATE SCREEN URINE: NOT DETECTED
BENZODIAZEPINE SCREEN, URINE: NOT DETECTED
BUPRENORPHINE URINE: POSITIVE
CANNABINOID SCREEN URINE: NOT DETECTED
COCAINE METABOLITE SCREEN URINE: NOT DETECTED
DRUG SCREEN COMMENT UR-IMP: ABNORMAL
FENTANYL SCREEN, URINE: NOT DETECTED
INTEGRITY CHECK, CREATININE, URINE: 76.4
INTEGRITY CHECK, OXIDANT, URINE: <40
INTEGRITY CHECK, PH, URINE: 6.2 (ref 4.5–9)
INTEGRITY CHECK, SPECIFIC GRAVITY, URINE: 1.01 (ref 1–1.03)
INTEGRITY CHECK, SPECIMEN INTEGRITY, URINE: ABNORMAL
METHADONE SCREEN, URINE: NOT DETECTED
OPIATE SCREEN URINE: NOT DETECTED
OXYCODONE URINE: NOT DETECTED
PHENCYCLIDINE SCREEN URINE: NOT DETECTED
TRAMADOL SCREEN URINE: NOT DETECTED

## 2023-06-23 LAB
6-MAM, QUANTITATIVE, URINE: <10
7-AMINOCLONAZEPAM, QUANTITATIVE, URINE: <50
ALPHA-HYDROXYALPRAZOLAM, QUANTITATIVE, URINE: <50
ALPHA-HYDROXYMIDAZOLAM, QUANTITATIVE, URINE: <50
ALPHA-HYDROXYTRIAZOLAM, QUANTITATIVE, URINE: <50
ALPRAZOLAM URINE QUANT: <50
BUPRENORPHINE, QUANTITATIVE, URINE: 14.7
CHLORDIAZEPOXIDE, QUANTITATIVE, URINE: <50
CLONAZEPAM, QUANTITATIVE, URINE: <50
CODEINE, QUANTITATIVE, URINE: <50
COMMENT: NORMAL
DIAZEPAM URINE QUANT: <50
FLUNITRAZEPAM, QUANTITATIVE, URINE: <50
FLURAZEPAM, QUANTITATIVE, URINE: <50
HYDROCODONE, QUANTITATIVE, URINE: <50
HYDROMORPHONE, QUANTITATIVE, URINE: <50
LORAZEPAM URINE QUANT: <50
MIDAZOLAM URINE QUANT: <50
MORPHINE, QUANTITATIVE, URINE: <50
NORBUPRENORPHINE, QUANTITATIVE, URINE: 38.1
NORDIAZEPAM URINE QUANT: <50
NORHYDROCODONE, QUANTITATIVE, URINE: <50
NOROXYCODONE, QUANTITATIVE, URINE: <50
OXAZEPAM URINE QUANT: <50
OXYCODONE URINE, QUANTITATIVE: <50
OXYMORPHONE, QUANTITATIVE, URINE: <50
TEMAZEPAM, QUANTITATIVE, URINE: <50

## 2023-06-27 DIAGNOSIS — M53.3 DISORDER OF SACRUM: ICD-10-CM

## 2023-06-27 DIAGNOSIS — M25.562 CHRONIC PAIN OF LEFT KNEE: ICD-10-CM

## 2023-06-27 DIAGNOSIS — G89.29 CHRONIC PAIN OF LEFT KNEE: ICD-10-CM

## 2023-06-27 DIAGNOSIS — M96.1 LUMBAR POST-LAMINECTOMY SYNDROME: ICD-10-CM

## 2023-06-27 DIAGNOSIS — E11.9 TYPE 2 DIABETES MELLITUS WITHOUT COMPLICATION, WITHOUT LONG-TERM CURRENT USE OF INSULIN (HCC): ICD-10-CM

## 2023-06-27 DIAGNOSIS — M17.12 PRIMARY OSTEOARTHRITIS OF LEFT KNEE: ICD-10-CM

## 2023-06-27 DIAGNOSIS — G89.4 CHRONIC PAIN SYNDROME: ICD-10-CM

## 2023-06-27 RX ORDER — MELOXICAM 7.5 MG/1
7.5 TABLET ORAL DAILY
Qty: 30 TABLET | Refills: 3 | OUTPATIENT
Start: 2023-06-27 | End: 2023-07-27

## 2023-06-27 RX ORDER — SITAGLIPTIN 100 MG/1
TABLET, FILM COATED ORAL
Qty: 90 TABLET | Refills: 1 | OUTPATIENT
Start: 2023-06-27

## 2023-06-28 DIAGNOSIS — K59.00 CONSTIPATION, UNSPECIFIED CONSTIPATION TYPE: Primary | ICD-10-CM

## 2023-06-30 ENCOUNTER — OFFICE VISIT (OUTPATIENT)
Dept: FAMILY MEDICINE CLINIC | Age: 68
End: 2023-06-30
Payer: MEDICARE

## 2023-06-30 VITALS
SYSTOLIC BLOOD PRESSURE: 145 MMHG | WEIGHT: 200 LBS | BODY MASS INDEX: 34.15 KG/M2 | HEIGHT: 64 IN | TEMPERATURE: 97.4 F | RESPIRATION RATE: 17 BRPM | DIASTOLIC BLOOD PRESSURE: 89 MMHG | HEART RATE: 80 BPM | OXYGEN SATURATION: 98 %

## 2023-06-30 DIAGNOSIS — N30.00 ACUTE CYSTITIS WITHOUT HEMATURIA: Primary | ICD-10-CM

## 2023-06-30 DIAGNOSIS — R39.9 UTI SYMPTOMS: ICD-10-CM

## 2023-06-30 LAB
APPEARANCE FLUID: CLEAR
BILIRUBIN, POC: NORMAL
BLOOD URINE, POC: NORMAL
CLARITY, POC: CLEAR
COLOR, POC: YELLOW
GLUCOSE URINE, POC: NORMAL
KETONES, POC: NORMAL
LEUKOCYTE EST, POC: NORMAL
NITRITE, POC: NORMAL
PH, POC: 7
PROTEIN, POC: NORMAL
SPECIFIC GRAVITY, POC: 1.01
UROBILINOGEN, POC: 1

## 2023-06-30 PROCEDURE — 1036F TOBACCO NON-USER: CPT

## 2023-06-30 PROCEDURE — 3078F DIAST BP <80 MM HG: CPT

## 2023-06-30 PROCEDURE — 3017F COLORECTAL CA SCREEN DOC REV: CPT

## 2023-06-30 PROCEDURE — 1090F PRES/ABSN URINE INCON ASSESS: CPT

## 2023-06-30 PROCEDURE — 1123F ACP DISCUSS/DSCN MKR DOCD: CPT

## 2023-06-30 PROCEDURE — G8417 CALC BMI ABV UP PARAM F/U: HCPCS

## 2023-06-30 PROCEDURE — 3077F SYST BP >= 140 MM HG: CPT

## 2023-06-30 PROCEDURE — G8399 PT W/DXA RESULTS DOCUMENT: HCPCS

## 2023-06-30 PROCEDURE — 99213 OFFICE O/P EST LOW 20 MIN: CPT

## 2023-06-30 PROCEDURE — 81002 URINALYSIS NONAUTO W/O SCOPE: CPT

## 2023-06-30 PROCEDURE — G8427 DOCREV CUR MEDS BY ELIG CLIN: HCPCS

## 2023-06-30 RX ORDER — NITROFURANTOIN 25; 75 MG/1; MG/1
100 CAPSULE ORAL 2 TIMES DAILY
Qty: 10 CAPSULE | Refills: 0 | Status: SHIPPED | OUTPATIENT
Start: 2023-06-30 | End: 2023-07-05

## 2023-07-02 LAB — BACTERIA UR CULT: NORMAL

## 2023-07-03 RX ORDER — DICYCLOMINE HCL 20 MG
20 TABLET ORAL 4 TIMES DAILY PRN
Qty: 120 TABLET | Refills: 5 | Status: SHIPPED | OUTPATIENT
Start: 2023-07-03

## 2023-07-13 DIAGNOSIS — G89.29 CHRONIC PAIN OF LEFT KNEE: ICD-10-CM

## 2023-07-13 DIAGNOSIS — G89.4 CHRONIC PAIN SYNDROME: ICD-10-CM

## 2023-07-13 DIAGNOSIS — M17.12 PRIMARY OSTEOARTHRITIS OF LEFT KNEE: ICD-10-CM

## 2023-07-13 DIAGNOSIS — M96.1 LUMBAR POST-LAMINECTOMY SYNDROME: ICD-10-CM

## 2023-07-13 DIAGNOSIS — M53.3 DISORDER OF SACRUM: ICD-10-CM

## 2023-07-13 DIAGNOSIS — M25.562 CHRONIC PAIN OF LEFT KNEE: ICD-10-CM

## 2023-07-13 RX ORDER — MELOXICAM 7.5 MG/1
7.5 TABLET ORAL DAILY
Qty: 30 TABLET | Refills: 3 | Status: SHIPPED | OUTPATIENT
Start: 2023-07-13 | End: 2023-08-12

## 2023-08-07 ENCOUNTER — OFFICE VISIT (OUTPATIENT)
Dept: GASTROENTEROLOGY | Age: 68
End: 2023-08-07
Payer: MEDICARE

## 2023-08-07 VITALS
HEART RATE: 73 BPM | SYSTOLIC BLOOD PRESSURE: 169 MMHG | WEIGHT: 188 LBS | OXYGEN SATURATION: 97 % | DIASTOLIC BLOOD PRESSURE: 83 MMHG | TEMPERATURE: 96.8 F | HEIGHT: 60 IN | BODY MASS INDEX: 36.91 KG/M2 | RESPIRATION RATE: 18 BRPM

## 2023-08-07 DIAGNOSIS — K58.9 IRRITABLE BOWEL SYNDROME, UNSPECIFIED TYPE: ICD-10-CM

## 2023-08-07 DIAGNOSIS — K59.00 CONSTIPATION, UNSPECIFIED CONSTIPATION TYPE: Primary | ICD-10-CM

## 2023-08-07 PROCEDURE — 3079F DIAST BP 80-89 MM HG: CPT | Performed by: NURSE PRACTITIONER

## 2023-08-07 PROCEDURE — 1090F PRES/ABSN URINE INCON ASSESS: CPT | Performed by: NURSE PRACTITIONER

## 2023-08-07 PROCEDURE — 3077F SYST BP >= 140 MM HG: CPT | Performed by: NURSE PRACTITIONER

## 2023-08-07 PROCEDURE — 1036F TOBACCO NON-USER: CPT | Performed by: NURSE PRACTITIONER

## 2023-08-07 PROCEDURE — 99212 OFFICE O/P EST SF 10 MIN: CPT | Performed by: NURSE PRACTITIONER

## 2023-08-07 PROCEDURE — G8399 PT W/DXA RESULTS DOCUMENT: HCPCS | Performed by: NURSE PRACTITIONER

## 2023-08-07 PROCEDURE — 1123F ACP DISCUSS/DSCN MKR DOCD: CPT | Performed by: NURSE PRACTITIONER

## 2023-08-07 PROCEDURE — G8417 CALC BMI ABV UP PARAM F/U: HCPCS | Performed by: NURSE PRACTITIONER

## 2023-08-07 PROCEDURE — G8427 DOCREV CUR MEDS BY ELIG CLIN: HCPCS | Performed by: NURSE PRACTITIONER

## 2023-08-07 PROCEDURE — 3017F COLORECTAL CA SCREEN DOC REV: CPT | Performed by: NURSE PRACTITIONER

## 2023-08-07 RX ORDER — POLYETHYLENE GLYCOL 3350, SODIUM CHLORIDE, POTASSIUM CHLORIDE, SODIUM BICARBONATE, AND SODIUM SULFATE 240; 5.84; 2.98; 6.72; 22.72 G/4L; G/4L; G/4L; G/4L; G/4L
4000 POWDER, FOR SOLUTION ORAL ONCE
Qty: 1 EACH | Refills: 0 | Status: SHIPPED | OUTPATIENT
Start: 2023-08-07 | End: 2023-08-07

## 2023-08-07 NOTE — PROGRESS NOTES
lumbar radiculopathy 12/28/2017    Cancer ([address]) 2017    colon    Cerebral artery occlusion with cerebral infarction (HCC)     tia    Chronic back pain     Chronic deep vein thrombosis (DVT) of distal vein of left lower extremity ([address]) 02/01/2021    Diabetes mellitus ([address])     Drug-induced constipation 02/18/2020    Fatigue 06/08/2016    Fibromyalgia     GERD (gastroesophageal reflux disease)     Hair loss 05/03/2021    Hemorrhoids     Hx of blood clots     Hyperlipidemia     Hypertension     Malignant neoplasm of sigmoid colon ([address]) 06/17/2020    Obesity     Osteoarthritis     PONV (postoperative nausea and vomiting)     TIA (transient ischemic attack) 05/2022    Urinary incontinence     Weakness of both legs 12/21/2020      Past Surgical History:   Procedure Laterality Date    ANESTHESIA NERVE BLOCK Bilateral 08/15/2019    BILATERAL INTRA-ARTICULAR FACET JOINT INJECTION WITH FLUOROSCOPIC GUIDANCE AT L4-5, L5-S1 WITH IV SEDATION performed by Jean-Pierre Sousa DO at [address] [facility] N/A 10/23/2019    DILATATION AND CURETTAGE HYSTEROSCOPY performed by Ron Hadley MD at [address] [facility] Right 07/11/2019    C7-T1 EPIDURAL STEROID INJECTION #1 TOWARD THE RIGHT performed by Marleen Ramirez DO at [address] [facility] Bilateral     LUMBAR SPINE SURGERY N/A 12/24/2020    L4-L5  POSTERIOR LUMBAR INTERBODY FUSION, T2-T3 DECOMPRESSIVE LAMINECTOMY performed by Mirella Bennett MD at [address] [facility] [address] Right 12/28/2017    right L4-5 transforaminal epidural #1    NERVE BLOCK Left 11/29/2018    si inj    NERVE BLOCK Bilateral 08/15/2019    bilateral intra-articular facet joint injection with flouroscopic guidance at L4-S1 with iv sedation    NERVE BLOCK Left 08/02/2022    LEFT SACROILIAC JOINT INJECTION performed by Marleen Ramirez DO at [address] [place] [address]

## 2023-08-18 ENCOUNTER — PREP FOR PROCEDURE (OUTPATIENT)
Dept: PAIN MANAGEMENT | Age: 68
End: 2023-08-18

## 2023-08-18 ENCOUNTER — OFFICE VISIT (OUTPATIENT)
Dept: PAIN MANAGEMENT | Age: 68
End: 2023-08-18
Payer: MEDICARE

## 2023-08-18 VITALS
BODY MASS INDEX: 31.32 KG/M2 | OXYGEN SATURATION: 94 % | SYSTOLIC BLOOD PRESSURE: 123 MMHG | HEIGHT: 65 IN | WEIGHT: 188 LBS | RESPIRATION RATE: 16 BRPM | DIASTOLIC BLOOD PRESSURE: 73 MMHG | TEMPERATURE: 97.6 F | HEART RATE: 80 BPM

## 2023-08-18 DIAGNOSIS — M17.12 PRIMARY OSTEOARTHRITIS OF LEFT KNEE: ICD-10-CM

## 2023-08-18 DIAGNOSIS — M53.3 DISORDER OF SACRUM: ICD-10-CM

## 2023-08-18 DIAGNOSIS — G89.29 CHRONIC PAIN OF LEFT KNEE: ICD-10-CM

## 2023-08-18 DIAGNOSIS — G89.4 CHRONIC PAIN SYNDROME: ICD-10-CM

## 2023-08-18 DIAGNOSIS — M54.16 LUMBAR RADICULOPATHY: Primary | ICD-10-CM

## 2023-08-18 DIAGNOSIS — M25.562 CHRONIC PAIN OF LEFT KNEE: ICD-10-CM

## 2023-08-18 DIAGNOSIS — M96.1 LUMBAR POST-LAMINECTOMY SYNDROME: ICD-10-CM

## 2023-08-18 PROCEDURE — 99213 OFFICE O/P EST LOW 20 MIN: CPT | Performed by: PAIN MEDICINE

## 2023-08-18 RX ORDER — BUPRENORPHINE 10 UG/H
1 PATCH TRANSDERMAL WEEKLY
Qty: 4 PATCH | Refills: 1 | Status: SHIPPED | OUTPATIENT
Start: 2023-09-13 | End: 2023-11-08

## 2023-08-18 NOTE — PROGRESS NOTES
[facility] - BEHAVIORAL HEALTH SERVICES Pain Management  [address] - BEHAVIORAL HEALTH SERVICES, [address] [facility]    Follow up Note      Boogie Cosby     Date of Visit:  2023    CC:  Patient presents for follow up   Chief Complaint   Patient presents with    Follow-up    Lower Back Pain     HPI:    Pain is controlled. Appropriate analgesia with current medications regimen: yes. Change in quality of symptoms:no. Medication side effects:none. Recent diagnostic testing:none. Recent interventional procedures:none. She has been on anticoagulation medications to include ASA and has not been on herbal supplements. She is diabetic. Imagin/2023 MRI lumbar w/ and w/o  -     FINDINGS:   BONES/ALIGNMENT:  No fracture or joint dislocation. Status post   decompressive laminectomies with posterior interbody fusion at L4-5. Disc   spacer is noted at L4-5. Grossly stable postoperative seroma in the   posterior paraspinal space at L4-5. It measures approximately 2.7 x 1.9 cm. SPINAL CORD:  The conus terminates normally. SOFT TISSUES: No paraspinal mass lesions seen. L1-L2: Disc desiccation with a small disc bulge. Mild facet and ligamentous   hypertrophy. Mild central canal and lateral recess stenoses. Moderate   neural foraminal stenoses. L2-L3: Disc desiccation with a disc bulge. Moderate facet hypertrophy. Moderate to severe central canal, lateral recess and neural foraminal   stenoses. L3-L4: Disc desiccation with a small disc bulge. Moderate facet hypertrophy. Moderate central canal and lateral recess stenoses. Moderate right and   mild-to-moderate left neural foraminal stenoses. L4-L5: Stable postoperative changes, as described above. Mild peridural   fibrosis, most notably along the left lateral and posterior aspect of the   thecal sac. Mild facet hypertrophy. No significant central canal or lateral   recess stenosis. Mild neural foraminal stenoses.        L5-S1: Disc desiccation without

## 2023-08-22 ENCOUNTER — TELEPHONE (OUTPATIENT)
Dept: PAIN MANAGEMENT | Age: 68
End: 2023-08-22

## 2023-08-22 NOTE — PROGRESS NOTES
[address] [facility] PAIN MANAGEMENT  INSTRUCTIONS  . .......................................................................................................................................... [x] Parking the day of Surgery is located in the [facility].   Upon entering the door, make immediate right into the surgery reception room    [x]  Bring photo ID and insurance card     [x] You may have a light breakfast day of procedure    [x]  Wear loose comfortable clothing    [x]  Please follow instructions for medications as given per Dr's office    [x] You can expect a call the business day prior to procedure to notify you of your arrival time     [x] Please arrange for     []  Other instructions

## 2023-08-22 NOTE — TELEPHONE ENCOUNTER
Call to Ezra Contreras that procedure was approved for 8/24/2023 and that Madhu Tucker should call her a few days before for the pre op call and between 2:00 PM and 4:00 PM  the business day before with the arrival time. Instructed Soledad Alvarado to hold ibuprofen for 24 hours, naprosyn for 4 days and any aspirin containing products or fish oil for 5 days, Dr. Sophie Townsend aware, ok to proceed. Instructed to call office back if any questions. Soledad Alvarado verbalized understanding.     Electronically signed by Bonny Carrera RN on 8/22/2023 at 10:54 AM

## 2023-08-23 DIAGNOSIS — M10.072 ACUTE IDIOPATHIC GOUT INVOLVING TOE OF LEFT FOOT: ICD-10-CM

## 2023-08-23 RX ORDER — ALLOPURINOL 300 MG/1
TABLET ORAL
Qty: 90 TABLET | Refills: 3 | OUTPATIENT
Start: 2023-08-23

## 2023-08-24 ENCOUNTER — HOSPITAL ENCOUNTER (OUTPATIENT)
Age: 68
Setting detail: OUTPATIENT SURGERY
Discharge: HOME | End: 2023-08-24
Attending: PAIN MEDICINE | Admitting: PAIN MEDICINE
Payer: MEDICARE

## 2023-08-24 ENCOUNTER — HOSPITAL ENCOUNTER (OUTPATIENT)
Dept: GENERAL RADIOLOGY | Age: 68
Setting detail: OUTPATIENT SURGERY
Discharge: HOME | End: 2023-08-26
Attending: PAIN MEDICINE
Payer: MEDICARE

## 2023-08-24 VITALS
BODY MASS INDEX: 36.91 KG/M2 | SYSTOLIC BLOOD PRESSURE: 158 MMHG | HEIGHT: 60 IN | OXYGEN SATURATION: 96 % | DIASTOLIC BLOOD PRESSURE: 78 MMHG | HEART RATE: 75 BPM | WEIGHT: 188 LBS | RESPIRATION RATE: 18 BRPM

## 2023-08-24 DIAGNOSIS — R52 PAIN MANAGEMENT: ICD-10-CM

## 2023-08-24 PROCEDURE — 3600000002 HC SURGERY LEVEL 2 BASE: Performed by: PAIN MEDICINE

## 2023-08-24 PROCEDURE — 2709999900 HC NON-CHARGEABLE SUPPLY: Performed by: PAIN MEDICINE

## 2023-08-24 PROCEDURE — 6360000002 HC RX W HCPCS: Performed by: PAIN MEDICINE

## 2023-08-24 PROCEDURE — 62323 NJX INTERLAMINAR LMBR/SAC: CPT | Performed by: PAIN MEDICINE

## 2023-08-24 PROCEDURE — 2500000003 HC RX 250 WO HCPCS: Performed by: PAIN MEDICINE

## 2023-08-24 PROCEDURE — 7100000010 HC PHASE II RECOVERY - FIRST 15 MIN: Performed by: PAIN MEDICINE

## 2023-08-24 PROCEDURE — 6360000004 HC RX CONTRAST MEDICATION: Performed by: PAIN MEDICINE

## 2023-08-24 PROCEDURE — 7100000011 HC PHASE II RECOVERY - ADDTL 15 MIN: Performed by: PAIN MEDICINE

## 2023-08-24 RX ORDER — METHYLPREDNISOLONE ACETATE 40 MG/ML
INJECTION, SUSPENSION INTRA-ARTICULAR; INTRALESIONAL; INTRAMUSCULAR; SOFT TISSUE PRN
Status: DISCONTINUED | OUTPATIENT
Start: 2023-08-24 | End: 2023-08-24 | Stop reason: HOSPADM

## 2023-08-24 RX ORDER — LIDOCAINE HYDROCHLORIDE 5 MG/ML
INJECTION, SOLUTION INFILTRATION; INTRAVENOUS PRN
Status: DISCONTINUED | OUTPATIENT
Start: 2023-08-24 | End: 2023-08-24 | Stop reason: HOSPADM

## 2023-08-24 ASSESSMENT — PAIN DESCRIPTION - PAIN TYPE
TYPE: CHRONIC PAIN
TYPE: CHRONIC PAIN

## 2023-08-24 ASSESSMENT — PAIN DESCRIPTION - DESCRIPTORS
DESCRIPTORS: ACHING;DISCOMFORT;DULL
DESCRIPTORS: ACHING;DISCOMFORT;DULL

## 2023-08-24 ASSESSMENT — PAIN DESCRIPTION - LOCATION
LOCATION: BACK
LOCATION: BACK

## 2023-08-24 ASSESSMENT — PAIN DESCRIPTION - ORIENTATION
ORIENTATION: LOWER
ORIENTATION: LOWER

## 2023-08-24 ASSESSMENT — PAIN SCALES - GENERAL
PAINLEVEL_OUTOF10: 7
PAINLEVEL_OUTOF10: 7

## 2023-08-24 NOTE — OP NOTE
2023    Patient: Julito Sanchez  :  1955  Age:  79 y.o. Sex:  female     PRE-OPERATIVE DIAGNOSIS: Lumbar disc displacement, lumbar radiculopathy. POST-OPERATIVE DIAGNOSIS: Same. PROCEDURE: Fluoroscopic guided therapeutic lumbar epidural steroid injection at the L1-2 level. SURGEON: NORA Lindsey. ANESTHESIA: local    ESTIMATED BLOOD LOSS: None.  __________________________________________________    BRIEF HISTORY:  Julito Sanchez comes in today for the lumbar epidural injection at L1-2 level. The potential complications of this procedure were discussed with her again today. She has elected to undergo the aforementioned procedure. Viv complete History & Physical examination were reviewed in depth, a copy of which is in the chart. DESCRIPTION OF PROCEDURE:    After confirming written and informed consent, a time-out was performed and Viv name and date of birth, the procedure to be performed as well as the plan for the location of the needle insertion were confirmed. The patient was brought into the procedure room and placed in the prone position on the fluoroscopy table. A pillow was placed under the patient's lower abdomen/upper pelvis to increase lumbar interlaminar space. Standard monitors were placed, and vital signs were observed throughout the procedure. The area of the lumbar spine was prepped with chloraprep and draped in a sterile manner. The L1-2 interspace was identified and marked under AP fluoroscopy. The skin and subcutaneous tissues at the above level were anesthestized with 0.5% lidocaine. With intermittent fluoroscopy, an # 18 gauge 3 1/2 tuohy epidural needle was inserted and directed toward the interlaminar space. The needle was slowly advanced using loss of resistance technique and 5 cc glass syringe until the tip of the epidural needle has passed through the ligamentum flavum and entered the epidural space.  AP and lateral fluoroscopic imaging was performed to verify that the epidural needle was properly placed. Negative aspiration of blood and CSF was confirmed. Two ml of Isovue-M 300 was used for confirmation of even epidural spread under both live lateral and live AP fluoroscopy. After negative aspiration, a solution of 0.5 % Lidocaine 3 ml and 40 mg DepoMedrol was easily injected. The needle was gently removed intact . The patient's back was cleaned and a Band-Aid was placed over the needle insertion point. Disposition the patient tolerated the procedure well and there were no complications . Vital signs remained stable throughout the procedure. The patient was escorted to the recovery area where they remained until discharge and written discharge instructions for the procedure were given. Plan: Yu Gamez will return to our pain management center as scheduled.      Rl Chandler DO

## 2023-08-24 NOTE — DISCHARGE INSTRUCTIONS
Milderd Comment  Dr. Genie Sakai Dr. Casey Schirmer Block/Radiofrequency  Home Going Instructions    1-Go home, rest for the remainder of the day  2-Please do not lift over 20 pounds the day of the injection  3-If you received sedation No: alcohol, driving, operating lawn mowers, plows, tractors or other dangerous equipment until next morning. Do not make important decisions or sign legal documents for 24 hours. You may experience light headedness, dizziness, nausea or sleepiness after sedation. Do not stay alone. A responsible adult must be with you for 24 hours. You could be nauseated from the medications you have received. Your IV site may be sore and bruised. 4-No dietary restrictions     5-Resume all medications the same day, blood thinners to be resumed 24 hours after injection if you were instructed to stop any. 6-Keep the surgical site clean and dry, you may shower the next morning and remove the      dressing. 7- No sitz baths, tub baths or hot tubs/swimming for 24 hours. 8- If you have any pain at the injection site(s), application of an ice pack to the area should be       helpful, 20 minutes on/20 minutes off for next 48 hours. 9- Call [facility] immediately at if you develop.   Fever greater than 100.4 F  Have bleeding or drainage from the puncture site  Have progressive Leg/arm numbness and or weakness  Loss of control of bowel and or bladder (wet/soil yourself)  Severe headache with inability to lift head  10-You may return to work the next day

## 2023-09-06 ENCOUNTER — OFFICE VISIT (OUTPATIENT)
Dept: FAMILY MEDICINE CLINIC | Age: 68
End: 2023-09-06
Payer: MEDICARE

## 2023-09-06 VITALS
SYSTOLIC BLOOD PRESSURE: 133 MMHG | BODY MASS INDEX: 36.91 KG/M2 | OXYGEN SATURATION: 98 % | RESPIRATION RATE: 17 BRPM | HEART RATE: 87 BPM | DIASTOLIC BLOOD PRESSURE: 71 MMHG | HEIGHT: 60 IN | TEMPERATURE: 97.8 F | WEIGHT: 188 LBS

## 2023-09-06 DIAGNOSIS — U07.1 COVID: Primary | ICD-10-CM

## 2023-09-06 LAB
INFLUENZA A ANTIGEN, POC: NEGATIVE
INFLUENZA B ANTIGEN, POC: NEGATIVE
LOT EXPIRE DATE: ABNORMAL
LOT KIT NUMBER: ABNORMAL
S PYO AG THROAT QL: NORMAL
SARS-COV-2, POC: DETECTED
VALID INTERNAL CONTROL: ABNORMAL
VENDOR AND KIT NAME POC: ABNORMAL

## 2023-09-06 PROCEDURE — 87428 SARSCOV & INF VIR A&B AG IA: CPT

## 2023-09-06 PROCEDURE — G8427 DOCREV CUR MEDS BY ELIG CLIN: HCPCS

## 2023-09-06 PROCEDURE — G8399 PT W/DXA RESULTS DOCUMENT: HCPCS

## 2023-09-06 PROCEDURE — 1036F TOBACCO NON-USER: CPT

## 2023-09-06 PROCEDURE — 3078F DIAST BP <80 MM HG: CPT

## 2023-09-06 PROCEDURE — 3075F SYST BP GE 130 - 139MM HG: CPT

## 2023-09-06 PROCEDURE — G8417 CALC BMI ABV UP PARAM F/U: HCPCS

## 2023-09-06 PROCEDURE — 1090F PRES/ABSN URINE INCON ASSESS: CPT

## 2023-09-06 PROCEDURE — 3017F COLORECTAL CA SCREEN DOC REV: CPT

## 2023-09-06 PROCEDURE — 99213 OFFICE O/P EST LOW 20 MIN: CPT

## 2023-09-06 PROCEDURE — 1123F ACP DISCUSS/DSCN MKR DOCD: CPT

## 2023-09-06 PROCEDURE — 87880 STREP A ASSAY W/OPTIC: CPT

## 2023-09-06 RX ORDER — DOXYCYCLINE HYCLATE 100 MG/1
100 CAPSULE ORAL 2 TIMES DAILY
Qty: 20 CAPSULE | Refills: 0 | Status: SHIPPED | OUTPATIENT
Start: 2023-09-06 | End: 2023-09-16

## 2023-10-09 ENCOUNTER — OFFICE VISIT (OUTPATIENT)
Age: 68
End: 2023-10-09
Payer: MEDICARE

## 2023-10-09 VITALS
OXYGEN SATURATION: 97 % | HEART RATE: 86 BPM | RESPIRATION RATE: 16 BRPM | SYSTOLIC BLOOD PRESSURE: 124 MMHG | HEIGHT: 60 IN | WEIGHT: 188.1 LBS | BODY MASS INDEX: 36.93 KG/M2 | TEMPERATURE: 97.7 F | DIASTOLIC BLOOD PRESSURE: 84 MMHG

## 2023-10-09 DIAGNOSIS — K59.00 CONSTIPATION, UNSPECIFIED CONSTIPATION TYPE: Primary | ICD-10-CM

## 2023-10-09 DIAGNOSIS — K58.9 IRRITABLE BOWEL SYNDROME, UNSPECIFIED TYPE: ICD-10-CM

## 2023-10-09 DIAGNOSIS — R19.8 IRREGULAR BOWEL HABITS: ICD-10-CM

## 2023-10-09 PROCEDURE — 3079F DIAST BP 80-89 MM HG: CPT | Performed by: NURSE PRACTITIONER

## 2023-10-09 PROCEDURE — G8427 DOCREV CUR MEDS BY ELIG CLIN: HCPCS | Performed by: NURSE PRACTITIONER

## 2023-10-09 PROCEDURE — 3017F COLORECTAL CA SCREEN DOC REV: CPT | Performed by: NURSE PRACTITIONER

## 2023-10-09 PROCEDURE — 3074F SYST BP LT 130 MM HG: CPT | Performed by: NURSE PRACTITIONER

## 2023-10-09 PROCEDURE — G8417 CALC BMI ABV UP PARAM F/U: HCPCS | Performed by: NURSE PRACTITIONER

## 2023-10-09 PROCEDURE — 1123F ACP DISCUSS/DSCN MKR DOCD: CPT | Performed by: NURSE PRACTITIONER

## 2023-10-09 PROCEDURE — G8399 PT W/DXA RESULTS DOCUMENT: HCPCS | Performed by: NURSE PRACTITIONER

## 2023-10-09 PROCEDURE — G8484 FLU IMMUNIZE NO ADMIN: HCPCS | Performed by: NURSE PRACTITIONER

## 2023-10-09 PROCEDURE — 1090F PRES/ABSN URINE INCON ASSESS: CPT | Performed by: NURSE PRACTITIONER

## 2023-10-09 PROCEDURE — 99212 OFFICE O/P EST SF 10 MIN: CPT | Performed by: NURSE PRACTITIONER

## 2023-10-09 PROCEDURE — 1036F TOBACCO NON-USER: CPT | Performed by: NURSE PRACTITIONER

## 2023-10-09 RX ORDER — POLYETHYLENE GLYCOL 3350, SODIUM CHLORIDE, POTASSIUM CHLORIDE, SODIUM BICARBONATE, AND SODIUM SULFATE 240; 5.84; 2.98; 6.72; 22.72 G/4L; G/4L; G/4L; G/4L; G/4L
4000 POWDER, FOR SOLUTION ORAL ONCE
Qty: 1 EACH | Refills: 0 | Status: SHIPPED | OUTPATIENT
Start: 2023-10-09 | End: 2023-10-09

## 2023-10-09 RX ORDER — METFORMIN HYDROCHLORIDE 500 MG/1
TABLET, EXTENDED RELEASE ORAL
COMMUNITY
Start: 2023-09-15

## 2023-10-09 NOTE — PROGRESS NOTES
Follow up 1 month. An electronic signature was used to authenticate this note.     --Chau Steele, VISHNU - CNP

## 2023-10-13 DIAGNOSIS — M25.562 CHRONIC PAIN OF LEFT KNEE: ICD-10-CM

## 2023-10-13 DIAGNOSIS — M96.1 LUMBAR POST-LAMINECTOMY SYNDROME: ICD-10-CM

## 2023-10-13 DIAGNOSIS — G89.29 CHRONIC PAIN OF LEFT KNEE: ICD-10-CM

## 2023-10-13 DIAGNOSIS — G89.4 CHRONIC PAIN SYNDROME: ICD-10-CM

## 2023-10-13 DIAGNOSIS — M17.12 PRIMARY OSTEOARTHRITIS OF LEFT KNEE: ICD-10-CM

## 2023-10-13 DIAGNOSIS — M53.3 DISORDER OF SACRUM: ICD-10-CM

## 2023-10-13 RX ORDER — MELOXICAM 7.5 MG/1
7.5 TABLET ORAL DAILY
Qty: 30 TABLET | Refills: 3 | OUTPATIENT
Start: 2023-10-13 | End: 2023-11-12

## 2023-10-16 ENCOUNTER — OFFICE VISIT (OUTPATIENT)
Dept: PAIN MANAGEMENT | Age: 68
End: 2023-10-16
Payer: MEDICARE

## 2023-10-16 VITALS
BODY MASS INDEX: 36.91 KG/M2 | TEMPERATURE: 96.9 F | SYSTOLIC BLOOD PRESSURE: 138 MMHG | RESPIRATION RATE: 18 BRPM | DIASTOLIC BLOOD PRESSURE: 80 MMHG | OXYGEN SATURATION: 98 % | HEIGHT: 60 IN | HEART RATE: 82 BPM | WEIGHT: 188 LBS

## 2023-10-16 DIAGNOSIS — M25.562 CHRONIC PAIN OF LEFT KNEE: ICD-10-CM

## 2023-10-16 DIAGNOSIS — G89.29 CHRONIC PAIN OF LEFT KNEE: ICD-10-CM

## 2023-10-16 DIAGNOSIS — M96.1 LUMBAR POST-LAMINECTOMY SYNDROME: ICD-10-CM

## 2023-10-16 DIAGNOSIS — G89.4 CHRONIC PAIN SYNDROME: ICD-10-CM

## 2023-10-16 DIAGNOSIS — M54.16 LUMBAR RADICULOPATHY: ICD-10-CM

## 2023-10-16 DIAGNOSIS — M17.12 PRIMARY OSTEOARTHRITIS OF LEFT KNEE: ICD-10-CM

## 2023-10-16 DIAGNOSIS — M53.3 DISORDER OF SACRUM: Primary | ICD-10-CM

## 2023-10-16 DIAGNOSIS — M54.6 PAIN IN THORACIC SPINE: ICD-10-CM

## 2023-10-16 PROCEDURE — 1090F PRES/ABSN URINE INCON ASSESS: CPT | Performed by: PHYSICIAN ASSISTANT

## 2023-10-16 PROCEDURE — 1036F TOBACCO NON-USER: CPT | Performed by: PHYSICIAN ASSISTANT

## 2023-10-16 PROCEDURE — 3017F COLORECTAL CA SCREEN DOC REV: CPT | Performed by: PHYSICIAN ASSISTANT

## 2023-10-16 PROCEDURE — G8417 CALC BMI ABV UP PARAM F/U: HCPCS | Performed by: PHYSICIAN ASSISTANT

## 2023-10-16 PROCEDURE — 1123F ACP DISCUSS/DSCN MKR DOCD: CPT | Performed by: PHYSICIAN ASSISTANT

## 2023-10-16 PROCEDURE — G8427 DOCREV CUR MEDS BY ELIG CLIN: HCPCS | Performed by: PHYSICIAN ASSISTANT

## 2023-10-16 PROCEDURE — 3079F DIAST BP 80-89 MM HG: CPT | Performed by: PHYSICIAN ASSISTANT

## 2023-10-16 PROCEDURE — 99213 OFFICE O/P EST LOW 20 MIN: CPT | Performed by: PHYSICIAN ASSISTANT

## 2023-10-16 PROCEDURE — G8399 PT W/DXA RESULTS DOCUMENT: HCPCS | Performed by: PHYSICIAN ASSISTANT

## 2023-10-16 PROCEDURE — 3075F SYST BP GE 130 - 139MM HG: CPT | Performed by: PHYSICIAN ASSISTANT

## 2023-10-16 PROCEDURE — G8484 FLU IMMUNIZE NO ADMIN: HCPCS | Performed by: PHYSICIAN ASSISTANT

## 2023-10-16 RX ORDER — BUPRENORPHINE 10 UG/H
1 PATCH TRANSDERMAL WEEKLY
Qty: 4 PATCH | Refills: 1 | Status: SHIPPED | OUTPATIENT
Start: 2023-10-16 | End: 2023-11-13

## 2023-10-16 RX ORDER — POLYETHYLENE GLYCOL-3350 AND ELECTROLYTES WITH FLAVOR PACK 240; 5.84; 2.98; 6.72; 22.72 G/278.26G; G/278.26G; G/278.26G; G/278.26G; G/278.26G
POWDER, FOR SOLUTION ORAL
COMMUNITY
Start: 2023-10-10 | End: 2023-10-18

## 2023-10-18 ENCOUNTER — OFFICE VISIT (OUTPATIENT)
Dept: FAMILY MEDICINE CLINIC | Age: 68
End: 2023-10-18
Payer: MEDICARE

## 2023-10-18 VITALS
HEART RATE: 80 BPM | OXYGEN SATURATION: 97 % | TEMPERATURE: 97.6 F | WEIGHT: 188.1 LBS | DIASTOLIC BLOOD PRESSURE: 70 MMHG | HEIGHT: 60 IN | SYSTOLIC BLOOD PRESSURE: 118 MMHG | BODY MASS INDEX: 36.93 KG/M2 | RESPIRATION RATE: 18 BRPM

## 2023-10-18 DIAGNOSIS — M25.562 CHRONIC PAIN OF LEFT KNEE: ICD-10-CM

## 2023-10-18 DIAGNOSIS — M17.12 PRIMARY OSTEOARTHRITIS OF LEFT KNEE: ICD-10-CM

## 2023-10-18 DIAGNOSIS — G89.29 CHRONIC PAIN OF LEFT KNEE: ICD-10-CM

## 2023-10-18 DIAGNOSIS — E11.9 TYPE 2 DIABETES MELLITUS WITHOUT COMPLICATION, WITHOUT LONG-TERM CURRENT USE OF INSULIN (HCC): ICD-10-CM

## 2023-10-18 DIAGNOSIS — E78.1 HYPERTRIGLYCERIDEMIA: Primary | ICD-10-CM

## 2023-10-18 DIAGNOSIS — M96.1 LUMBAR POST-LAMINECTOMY SYNDROME: ICD-10-CM

## 2023-10-18 DIAGNOSIS — Z12.31 ENCOUNTER FOR SCREENING MAMMOGRAM FOR MALIGNANT NEOPLASM OF BREAST: ICD-10-CM

## 2023-10-18 DIAGNOSIS — G89.4 CHRONIC PAIN SYNDROME: ICD-10-CM

## 2023-10-18 DIAGNOSIS — M53.3 DISORDER OF SACRUM: ICD-10-CM

## 2023-10-18 DIAGNOSIS — M43.16 SPONDYLOLISTHESIS AT L4-L5 LEVEL: ICD-10-CM

## 2023-10-18 DIAGNOSIS — M10.072 ACUTE IDIOPATHIC GOUT INVOLVING TOE OF LEFT FOOT: ICD-10-CM

## 2023-10-18 PROCEDURE — 3078F DIAST BP <80 MM HG: CPT | Performed by: FAMILY MEDICINE

## 2023-10-18 PROCEDURE — 3051F HG A1C>EQUAL 7.0%<8.0%: CPT | Performed by: FAMILY MEDICINE

## 2023-10-18 PROCEDURE — 90694 VACC AIIV4 NO PRSRV 0.5ML IM: CPT | Performed by: FAMILY MEDICINE

## 2023-10-18 PROCEDURE — 1123F ACP DISCUSS/DSCN MKR DOCD: CPT | Performed by: FAMILY MEDICINE

## 2023-10-18 PROCEDURE — G0008 ADMIN INFLUENZA VIRUS VAC: HCPCS | Performed by: FAMILY MEDICINE

## 2023-10-18 PROCEDURE — 99214 OFFICE O/P EST MOD 30 MIN: CPT | Performed by: FAMILY MEDICINE

## 2023-10-18 PROCEDURE — 3074F SYST BP LT 130 MM HG: CPT | Performed by: FAMILY MEDICINE

## 2023-10-18 RX ORDER — SEMAGLUTIDE 0.68 MG/ML
INJECTION, SOLUTION SUBCUTANEOUS
COMMUNITY
Start: 2023-10-14

## 2023-10-18 RX ORDER — ALLOPURINOL 300 MG/1
TABLET ORAL
Qty: 90 TABLET | Refills: 3 | Status: SHIPPED | OUTPATIENT
Start: 2023-10-18

## 2023-10-18 RX ORDER — EZETIMIBE 10 MG/1
10 TABLET ORAL DAILY
Qty: 90 TABLET | Refills: 1 | Status: SHIPPED | OUTPATIENT
Start: 2023-10-18

## 2023-10-18 RX ORDER — ATORVASTATIN CALCIUM 40 MG/1
40 TABLET, FILM COATED ORAL DAILY
Qty: 90 TABLET | Refills: 2 | Status: SHIPPED | OUTPATIENT
Start: 2023-10-18

## 2023-10-18 RX ORDER — MELOXICAM 7.5 MG/1
7.5 TABLET ORAL DAILY
Qty: 30 TABLET | Refills: 3 | Status: SHIPPED | OUTPATIENT
Start: 2023-10-18 | End: 2024-02-15

## 2023-10-18 NOTE — PROGRESS NOTES
[address] , [address] [facility], [address] [place] [address]  Phone: [phone]        Patient:  Christal Leos 79 y.o. female          Chief complaint:   Chief Complaint   Patient presents with    6 Month Follow-Up    Hypertension    Diabetes       Assessment and Plan     Norma Egan was seen today for 6 month follow-up, hypertension and diabetes. Diagnoses and all orders for this visit:    Hypertriglyceridemia  Not at goal  Continue current medications and increase diet and exercise  -     ezetimibe (ZETIA) 10 MG tablet; Take 1 tablet by mouth daily  -     atorvastatin (LIPITOR) 40 MG tablet;  Lab Results   Component Value Date    CHOL 113 02/28/2023    TRIG 188 (H) 02/28/2023    HDL 41 02/28/2023    LDLCALC 34 02/28/2023    LABVLDL 38 02/28/2023     Acute idiopathic gout involving toe of left foot  Stable  Continue current medication  -     allopurinol (ZYLOPRIM) 300 MG tablet; Take one (1) tablet by mouth once daily. Chronic pain syndrome [G89.4 (ICD-10-CM)]  Disorder of sacrum  Lumbar post-laminectomy syndrome  Spondylolisthesis at L4-L5 level  Stable  -     Handicap Placard MISC; by Does not apply route Start 10/18/23, expires 5 years. Following with pain management for injections  -     meloxicam (MOBIC) 7.5 MG tablet; Take 1 tablet by mouth daily    Primary osteoarthritis of left knee  Stable  -     meloxicam (MOBIC) 7.5 MG tablet; Take 1 tablet by mouth daily  -     Handicap Placard MISC; by Does not apply route Start 10/18/23, expires 5 years. Diabetes  Following with endocrinology  Stable  Recent A1c from [facility] [place] 5.8  Continue metformin and Ozempic per endocrinology    Encounter for screening mammogram for malignant neoplasm of breast  -     [facility] LETICIA DIGITAL SCREEN BILATERAL; Future    Other orders  -     Influenza, FLUAD, (age 72 y+), IM, PF, 0.5 mL      Please see Patient Instructions for further counseling and information given.         Advised to please be adherent to

## 2023-10-30 ENCOUNTER — HOSPITAL ENCOUNTER (OUTPATIENT)
Dept: GENERAL RADIOLOGY | Age: 68
Discharge: HOME | End: 2023-11-01
Attending: FAMILY MEDICINE
Payer: MEDICARE

## 2023-10-30 VITALS — BODY MASS INDEX: 36.72 KG/M2 | WEIGHT: 188 LBS

## 2023-10-30 DIAGNOSIS — Z12.31 ENCOUNTER FOR SCREENING MAMMOGRAM FOR MALIGNANT NEOPLASM OF BREAST: ICD-10-CM

## 2023-10-30 PROCEDURE — 77067 SCR MAMMO BI INCL CAD: CPT

## 2023-11-10 RX ORDER — OMEPRAZOLE 40 MG/1
40 CAPSULE, DELAYED RELEASE ORAL
Qty: 30 CAPSULE | Refills: 11 | Status: SHIPPED | OUTPATIENT
Start: 2023-11-10

## 2023-11-15 ENCOUNTER — TELEPHONE (OUTPATIENT)
Dept: PAIN MANAGEMENT | Age: 68
End: 2023-11-15

## 2023-11-15 DIAGNOSIS — M54.16 LUMBAR RADICULOPATHY: Primary | ICD-10-CM

## 2023-11-15 NOTE — TELEPHONE ENCOUNTER
Call to Arrock Jay that procedure was approved for 11/22/2023 and that Madhu Tucker should call her a few days before for the pre op call and between 2:00 PM and 4:00 PM  the business day before with the arrival time. Instructed Thaddeus May to hold ibuprofen for 24 hours, naprosyn & Mobic for 4 days and any aspirin containing products or fish oil for 7 days. Instructed to call office back if any questions. Thaddeus May verbalized understanding.     Electronically signed by Aly Silverman RN on 11/15/2023 at 4:30 PM

## 2023-11-20 NOTE — PROGRESS NOTES
[address] [facility] PAIN MANAGEMENT  INSTRUCTIONS  . .......................................................................................................................................... [x] Parking the day of Surgery is located in the [facility].   Upon entering the door, make immediate right into the surgery reception room    [x]  Bring photo ID and insurance card     [x] You may have a light breakfast day of procedure    [x]  Wear loose comfortable clothing    [x]  Please follow instructions for medications as given per Dr's office    [x] You can expect a call the business day prior to procedure to notify you of your arrival time     [x] Please arrange for     []  Other instructions

## 2023-11-22 ENCOUNTER — HOSPITAL ENCOUNTER (OUTPATIENT)
Age: 68
Setting detail: OUTPATIENT SURGERY
Discharge: HOME | End: 2023-11-22
Attending: PAIN MEDICINE | Admitting: PAIN MEDICINE
Payer: MEDICARE

## 2023-11-22 ENCOUNTER — TELEPHONE (OUTPATIENT)
Dept: GASTROENTEROLOGY | Age: 68
End: 2023-11-22

## 2023-11-22 ENCOUNTER — HOSPITAL ENCOUNTER (OUTPATIENT)
Dept: GENERAL RADIOLOGY | Age: 68
Discharge: HOME | End: 2023-11-24
Attending: PAIN MEDICINE
Payer: MEDICARE

## 2023-11-22 ENCOUNTER — PREP FOR PROCEDURE (OUTPATIENT)
Dept: GASTROENTEROLOGY | Age: 68
End: 2023-11-22

## 2023-11-22 VITALS
OXYGEN SATURATION: 95 % | HEART RATE: 78 BPM | BODY MASS INDEX: 36.91 KG/M2 | RESPIRATION RATE: 16 BRPM | TEMPERATURE: 97.6 F | HEIGHT: 60 IN | DIASTOLIC BLOOD PRESSURE: 78 MMHG | SYSTOLIC BLOOD PRESSURE: 156 MMHG | WEIGHT: 188 LBS

## 2023-11-22 DIAGNOSIS — R52 PAIN MANAGEMENT: ICD-10-CM

## 2023-11-22 DIAGNOSIS — Z12.11 SPECIAL SCREENING FOR MALIGNANT NEOPLASMS, COLON: ICD-10-CM

## 2023-11-22 PROCEDURE — 3600000002 HC SURGERY LEVEL 2 BASE: Performed by: PAIN MEDICINE

## 2023-11-22 PROCEDURE — 6360000004 HC RX CONTRAST MEDICATION: Performed by: PAIN MEDICINE

## 2023-11-22 PROCEDURE — 7100000011 HC PHASE II RECOVERY - ADDTL 15 MIN: Performed by: PAIN MEDICINE

## 2023-11-22 PROCEDURE — 6360000002 HC RX W HCPCS: Performed by: PAIN MEDICINE

## 2023-11-22 PROCEDURE — 2709999900 HC NON-CHARGEABLE SUPPLY: Performed by: PAIN MEDICINE

## 2023-11-22 PROCEDURE — 2500000003 HC RX 250 WO HCPCS: Performed by: PAIN MEDICINE

## 2023-11-22 PROCEDURE — 7100000010 HC PHASE II RECOVERY - FIRST 15 MIN: Performed by: PAIN MEDICINE

## 2023-11-22 RX ORDER — IOPAMIDOL 612 MG/ML
INJECTION, SOLUTION INTRATHECAL PRN
Status: DISCONTINUED | OUTPATIENT
Start: 2023-11-22 | End: 2023-11-22 | Stop reason: ALTCHOICE

## 2023-11-22 RX ORDER — LIDOCAINE HYDROCHLORIDE 5 MG/ML
INJECTION, SOLUTION INFILTRATION; INTRAVENOUS PRN
Status: DISCONTINUED | OUTPATIENT
Start: 2023-11-22 | End: 2023-11-22 | Stop reason: ALTCHOICE

## 2023-11-22 RX ORDER — METHYLPREDNISOLONE ACETATE 40 MG/ML
INJECTION, SUSPENSION INTRA-ARTICULAR; INTRALESIONAL; INTRAMUSCULAR; SOFT TISSUE PRN
Status: DISCONTINUED | OUTPATIENT
Start: 2023-11-22 | End: 2023-11-22 | Stop reason: ALTCHOICE

## 2023-11-22 ASSESSMENT — PAIN DESCRIPTION - ORIENTATION
ORIENTATION: MID
ORIENTATION: MID

## 2023-11-22 ASSESSMENT — PAIN DESCRIPTION - FREQUENCY
FREQUENCY: CONTINUOUS
FREQUENCY: CONTINUOUS

## 2023-11-22 ASSESSMENT — PAIN DESCRIPTION - PAIN TYPE
TYPE: CHRONIC PAIN
TYPE: CHRONIC PAIN

## 2023-11-22 ASSESSMENT — PAIN SCALES - GENERAL
PAINLEVEL_OUTOF10: 8
PAINLEVEL_OUTOF10: 6
PAINLEVEL_OUTOF10: 3

## 2023-11-22 ASSESSMENT — PAIN DESCRIPTION - DESCRIPTORS
DESCRIPTORS: DISCOMFORT
DESCRIPTORS: ACHING

## 2023-11-22 ASSESSMENT — PAIN DESCRIPTION - LOCATION
LOCATION: BACK
LOCATION: BACK

## 2023-11-22 ASSESSMENT — PAIN DESCRIPTION - ONSET: ONSET: ON-GOING

## 2023-11-22 NOTE — DISCHARGE INSTRUCTIONS
Mickey Fuentes Block/Radiofrequency  Home Going Instructions    1-Go home, rest for the remainder of the day  2-Please do not lift over 20 pounds the day of the injection  3-If you received sedation No: alcohol, driving, operating lawn mowers, plows, tractors or other dangerous equipment until next morning. Do not make important decisions or sign legal documents for 24 hours. You may experience light headedness, dizziness, nausea or sleepiness after sedation. Do not stay alone. A responsible adult must be with you for 24 hours. You could be nauseated from the medications you have received. Your IV site may be sore and bruised. 4-No dietary restrictions     5-Resume all medications the same day, blood thinners to be resumed 24 hours after injection if you were instructed to stop any. 6-Keep the surgical site clean and dry, you may shower the next morning and remove the      dressing. 7- No sitz baths, tub baths or hot tubs/swimming for 24 hours. 8- If you have any pain at the injection site(s), application of an ice pack to the area should be       helpful, 20 minutes on/20 minutes off for next 48 hours. 9- Call [facility] immediately at if you develop.   Fever greater than 100.4 F  Have bleeding or drainage from the puncture site  Have progressive Leg/arm numbness and or weakness  Loss of control of bowel and or bladder (wet/soil yourself)  Severe headache with inability to lift head  10-You may return to work the next day

## 2023-11-22 NOTE — TELEPHONE ENCOUNTER
Prior Authorization Form:      DEMOGRAPHICS:                     Patient Name:  Erinn Gupta  Patient :  1955            Insurance:  Payor: [employer] / Plan: Luke Allen COMPLETE / Product Type: *No Product type* /   Insurance ID Number:    Payer/Plan Subscr  Sex Relation Sub. Ins. ID Effective Group Num   1.  [facility] * Erinn Gupta 1955 Female Self [id number]                                    [id number]         DIAGNOSIS & PROCEDURE:                       Procedure/Operation: colonoscopy           CPT Code: 20337    Diagnosis:  screening    ICD10 Code: z12.11    Location:  [facility]    Surgeon:  Carolin Craig INFORMATION:                          Date: 24    Time: tbd              Anesthesia:  MAC/TIVA                                                       Status:  Outpatient        Special Comments:  na       Electronically signed by Berry Avalos LPN on  at 1:16 PM

## 2023-11-22 NOTE — OP NOTE
2023    Patient: Conrado Ng  :  1955  Age:  79 y.o. Sex:  female     PRE-OPERATIVE DIAGNOSIS: Lumbar disc displacement, lumbar radiculopathy. POST-OPERATIVE DIAGNOSIS: Same. PROCEDURE: Fluoroscopic guided therapeutic lumbar epidural steroid injection at the L1-2 level. SURGEON: NORA Bill. ANESTHESIA: local    ESTIMATED BLOOD LOSS: None.  __________________________________________________    BRIEF HISTORY:  Conrado Ng comes in today for the lumbar epidural injection at L1-2 level. The potential complications of this procedure were discussed with her again today. She has elected to undergo the aforementioned procedure. Viv complete History & Physical examination were reviewed in depth, a copy of which is in the chart. DESCRIPTION OF PROCEDURE:    After confirming written and informed consent, a time-out was performed and Viv name and date of birth, the procedure to be performed as well as the plan for the location of the needle insertion were confirmed. The patient was brought into the procedure room and placed in the prone position on the fluoroscopy table. A pillow was placed under the patient's lower abdomen/upper pelvis to increase lumbar interlaminar space. Standard monitors were placed, and vital signs were observed throughout the procedure. The area of the lumbar spine was prepped with chloraprep and draped in a sterile manner. The L1-2 interspace was identified and marked under AP fluoroscopy. The skin and subcutaneous tissues at the above level were anesthestized with 0.5% lidocaine. With intermittent fluoroscopy, an # 18 gauge 3 1/2 tuohy epidural needle was inserted and directed toward the interlaminar space. The needle was slowly advanced using loss of resistance technique and 5 cc glass syringe until the tip of the epidural needle has passed through the ligamentum flavum and entered the epidural space.  AP and lateral fluoroscopic imaging was

## 2023-11-22 NOTE — H&P
[facility]        [address] [facility], [address] [place] [address]  Dept: [phone]    Procedure History & Physical      Deborah Johns     HPI:    Patient  is here for LBP LLE pain for L1-2 MAKAYLA  Labs/imaging studies reviewed   All question and concerns addressed including R/B/A associated with the procedure    Past Medical History:   Diagnosis Date    Acute left lumbar radiculopathy 12/28/2017    Cancer ([address]) 2017    colon    Cerebral artery occlusion with cerebral infarction ([address])     tia    Chronic back pain     Chronic deep vein thrombosis (DVT) of distal vein of left lower extremity ([address]) 02/01/2021    Diabetes mellitus (HCC)     Fatigue 06/08/2016    Fibromyalgia     GERD (gastroesophageal reflux disease)     Hair loss 05/03/2021    Hemorrhoids     Hx of blood clots     Hyperlipidemia     Hypertension     Malignant neoplasm of sigmoid colon ([address]) 06/17/2020    Obesity     Osteoarthritis     PONV (postoperative nausea and vomiting)     TIA (transient ischemic attack) 05/2022    Urinary incontinence     Weakness of both legs 12/21/2020       Past Surgical History:   Procedure Laterality Date    ANESTHESIA NERVE BLOCK Bilateral 08/15/2019    BILATERAL INTRA-ARTICULAR FACET JOINT INJECTION WITH FLUOROSCOPIC GUIDANCE AT L4-5, L5-S1 WITH IV SEDATION performed by Jamie Valerio DO at [address] [facility] N/A 10/23/2019    DILATATION AND CURETTAGE HYSTEROSCOPY performed by Anel Kendall MD at [facility] Right 07/11/2019    C7-T1 EPIDURAL STEROID INJECTION #1 TOWARD THE RIGHT performed by Ashley Henderson DO at [facility] OR    ESOPHAGOGASTRODUODENOSCOPY      FOOT SURGERY Bilateral     LUMBAR SPINE SURGERY N/A 12/24/2020    L4-L5  POSTERIOR LUMBAR INTERBODY FUSION, T2-T3 DECOMPRESSIVE LAMINECTOMY performed by Shirley Rowley MD at [address] [facility] Right

## 2023-12-13 ENCOUNTER — OFFICE VISIT (OUTPATIENT)
Dept: PAIN MANAGEMENT | Age: 68
End: 2023-12-13
Payer: MEDICARE

## 2023-12-13 VITALS
WEIGHT: 188 LBS | DIASTOLIC BLOOD PRESSURE: 80 MMHG | BODY MASS INDEX: 36.91 KG/M2 | HEIGHT: 60 IN | SYSTOLIC BLOOD PRESSURE: 142 MMHG | OXYGEN SATURATION: 96 % | HEART RATE: 86 BPM | RESPIRATION RATE: 18 BRPM | TEMPERATURE: 96.9 F

## 2023-12-13 DIAGNOSIS — M54.16 LUMBAR RADICULOPATHY: ICD-10-CM

## 2023-12-13 DIAGNOSIS — G89.29 CHRONIC PAIN OF LEFT KNEE: ICD-10-CM

## 2023-12-13 DIAGNOSIS — G89.4 CHRONIC PAIN SYNDROME: Primary | ICD-10-CM

## 2023-12-13 DIAGNOSIS — M54.6 PAIN IN THORACIC SPINE: ICD-10-CM

## 2023-12-13 DIAGNOSIS — M53.3 DISORDER OF SACRUM: ICD-10-CM

## 2023-12-13 DIAGNOSIS — M25.562 CHRONIC PAIN OF LEFT KNEE: ICD-10-CM

## 2023-12-13 DIAGNOSIS — M17.12 PRIMARY OSTEOARTHRITIS OF LEFT KNEE: ICD-10-CM

## 2023-12-13 DIAGNOSIS — G89.4 CHRONIC PAIN SYNDROME: ICD-10-CM

## 2023-12-13 DIAGNOSIS — M96.1 LUMBAR POST-LAMINECTOMY SYNDROME: ICD-10-CM

## 2023-12-13 PROCEDURE — 99213 OFFICE O/P EST LOW 20 MIN: CPT | Performed by: PHYSICIAN ASSISTANT

## 2023-12-13 PROCEDURE — 1036F TOBACCO NON-USER: CPT | Performed by: PHYSICIAN ASSISTANT

## 2023-12-13 PROCEDURE — 3079F DIAST BP 80-89 MM HG: CPT | Performed by: PHYSICIAN ASSISTANT

## 2023-12-13 PROCEDURE — G8427 DOCREV CUR MEDS BY ELIG CLIN: HCPCS | Performed by: PHYSICIAN ASSISTANT

## 2023-12-13 PROCEDURE — G8417 CALC BMI ABV UP PARAM F/U: HCPCS | Performed by: PHYSICIAN ASSISTANT

## 2023-12-13 PROCEDURE — 1090F PRES/ABSN URINE INCON ASSESS: CPT | Performed by: PHYSICIAN ASSISTANT

## 2023-12-13 PROCEDURE — 3017F COLORECTAL CA SCREEN DOC REV: CPT | Performed by: PHYSICIAN ASSISTANT

## 2023-12-13 PROCEDURE — 1123F ACP DISCUSS/DSCN MKR DOCD: CPT | Performed by: PHYSICIAN ASSISTANT

## 2023-12-13 PROCEDURE — G8399 PT W/DXA RESULTS DOCUMENT: HCPCS | Performed by: PHYSICIAN ASSISTANT

## 2023-12-13 PROCEDURE — G8484 FLU IMMUNIZE NO ADMIN: HCPCS | Performed by: PHYSICIAN ASSISTANT

## 2023-12-13 PROCEDURE — 3077F SYST BP >= 140 MM HG: CPT | Performed by: PHYSICIAN ASSISTANT

## 2023-12-13 RX ORDER — BUPRENORPHINE 10 UG/H
1 PATCH TRANSDERMAL WEEKLY
Qty: 4 PATCH | Refills: 1 | Status: SHIPPED | OUTPATIENT
Start: 2023-12-13 | End: 2024-01-10

## 2023-12-22 PROBLEM — Z12.11 SPECIAL SCREENING FOR MALIGNANT NEOPLASMS, COLON: Status: RESOLVED | Noted: 2023-11-22 | Resolved: 2023-12-22

## 2023-12-27 ENCOUNTER — TELEPHONE (OUTPATIENT)
Dept: SURGERY | Age: 68
End: 2023-12-27

## 2023-12-27 ENCOUNTER — INITIAL CONSULT (OUTPATIENT)
Dept: SURGERY | Age: 68
End: 2023-12-27
Payer: MEDICARE

## 2023-12-27 VITALS
BODY MASS INDEX: 36.72 KG/M2 | TEMPERATURE: 98 F | SYSTOLIC BLOOD PRESSURE: 130 MMHG | HEART RATE: 77 BPM | DIASTOLIC BLOOD PRESSURE: 79 MMHG | HEIGHT: 60 IN

## 2023-12-27 DIAGNOSIS — K64.1 PROLAPSED INTERNAL HEMORRHOIDS, GRADE 2: ICD-10-CM

## 2023-12-27 DIAGNOSIS — Z12.11 COLON CANCER SCREENING: ICD-10-CM

## 2023-12-27 PROCEDURE — 99213 OFFICE O/P EST LOW 20 MIN: CPT | Performed by: SURGERY

## 2023-12-27 PROCEDURE — G8417 CALC BMI ABV UP PARAM F/U: HCPCS | Performed by: SURGERY

## 2023-12-27 PROCEDURE — G8399 PT W/DXA RESULTS DOCUMENT: HCPCS | Performed by: SURGERY

## 2023-12-27 PROCEDURE — G8427 DOCREV CUR MEDS BY ELIG CLIN: HCPCS | Performed by: SURGERY

## 2023-12-27 PROCEDURE — 3075F SYST BP GE 130 - 139MM HG: CPT | Performed by: SURGERY

## 2023-12-27 PROCEDURE — G8484 FLU IMMUNIZE NO ADMIN: HCPCS | Performed by: SURGERY

## 2023-12-27 PROCEDURE — 3078F DIAST BP <80 MM HG: CPT | Performed by: SURGERY

## 2023-12-27 PROCEDURE — 1123F ACP DISCUSS/DSCN MKR DOCD: CPT | Performed by: SURGERY

## 2023-12-27 PROCEDURE — 1090F PRES/ABSN URINE INCON ASSESS: CPT | Performed by: SURGERY

## 2023-12-27 PROCEDURE — 1036F TOBACCO NON-USER: CPT | Performed by: SURGERY

## 2023-12-27 PROCEDURE — 3017F COLORECTAL CA SCREEN DOC REV: CPT | Performed by: SURGERY

## 2023-12-27 NOTE — PROGRESS NOTES
General Surgery History and Physical  Darshan Roman MD    Patient's Name/Date of Birth: Israel Beltran / [birth year]    Date: December 27, 2023     Consulting Surgeon: Richa Devries MD    PCP: VISHNU Weaver - CRESENCIO     Chief Complaint: Screening colonoscopy    HPI:   Israel Beltran is a 79 y.o. female who presents for evaluation of screening age appropriate colonoscopy. Denies abdominal pain, nausea, vomiting, previous history of polyps, bloody or dark stools. She has had colon cancer and had partial colectomy 6 years ago. Has not had colonoscopy since.  Patient has been seen by me for hemorrhoids which have improved greatly with the use of proctofoam.     Patient Active Problem List   Diagnosis    Essential hypertension    Chronic back pain greater than 3 months duration    Mixed hyperlipidemia    Vitamin D insufficiency    Gastroesophageal reflux disease    Central stenosis of spinal canal    Spondylolisthesis at L4-L5 level    Lumbar radiculopathy    Chronic pain of left knee    Obesity, Class III, BMI 40-49.9 (morbid obesity) ([address])    History of colon cancer    Sacroiliitis (HCC)    Telogen effluvium    Chronic pain syndrome    Cervical radiculopathy    Cervical disc disorder    Type 2 diabetes mellitus without complication, without long-term current use of insulin (HCC)    Facet arthropathy    Fibromyalgia    Primary osteoarthritis of left knee    Constipation    Chronic pain of multiple joints    Osteoarthritis involving multiple joints on both sides of body    Cervical spondylosis without myelopathy    DDD (degenerative disc disease), cervical    DDD (degenerative disc disease), lumbar    Numbness in left leg    Spinal stenosis    Back pain    Ambulatory dysfunction    Grade I hemorrhoids    Breast tenderness in female    H/O colonoscopy with polypectomy    Acquired hypothyroidism    Hypertriglyceridemia    TIA (transient ischemic attack)    Irregular bowel habits    Visual

## 2023-12-27 NOTE — TELEPHONE ENCOUNTER
Per the order of Dr. Mark Anthony Navarro, patient has been scheduled for Colonoscopy on 2024. patient provided with bowel prep instructions during office visit and scheduled for follow up appointment to review results. .  Patient instructed to please contact our office with any questions. Golytely and proctofoam to be sent to pharmacy. Pended for Dr. Mark Anthony Navarro to sign. Procedure scheduled through [facility]. Dr. Mark Anthony Navarro to enter orders. Prior Authorization Form:      DEMOGRAPHICS:                     Patient Name:  Rainer Solis  Patient :  1955            Insurance:  Payor: Thomas Eisenmenger / Plan: Julia Smith COMPLETE / Product Type: *No Product type* /   Insurance ID Number:    Payer/Plan Subscr  Sex Relation Sub. Ins. ID Effective Group Num   1.  [facility] 1955 Female Self [id number]                                    [id number]         DIAGNOSIS & PROCEDURE:                       Procedure/Operation: Colonoscopy Screening           CPT Code: 13974    Diagnosis:  Screening colonoscopy     ICD10 Code: Z12.11    Location:  [address] [place] [address]    Surgeon:  Miles Nascimento INFORMATION:                          Date: 2024    Time: TBD              Anesthesia:  MAC/TIVA                                                       Status:  Outpatient        Special Comments:         Electronically signed by Bruno Andrade on 2023 at 2:06 PM

## 2023-12-27 NOTE — H&P (VIEW-ONLY)
General Surgery History and Physical  [facility]  Tutu Monge MD    Patient's Name/Date of Birth: Margaret Almonte / 1955    Date: December 27, 2023     Consulting Surgeon: Tutu Monge MD    PCP: Chichi Marrero, APRN - CNP     Chief Complaint: Screening colonoscopy    HPI:   Margaret Almonte is a 68 y.o. female who presents for evaluation of screening age appropriate colonoscopy. Denies abdominal pain, nausea, vomiting, previous history of polyps, bloody or dark stools. She has had colon cancer and had partial colectomy 6 years ago. Has not had colonoscopy since. Patient has been seen by me for hemorrhoids which have improved greatly with the use of proctofoam.     Patient Active Problem List   Diagnosis    Essential hypertension    Chronic back pain greater than 3 months duration    Mixed hyperlipidemia    Vitamin D insufficiency    Gastroesophageal reflux disease    Central stenosis of spinal canal    Spondylolisthesis at L4-L5 level    Lumbar radiculopathy    Chronic pain of left knee    Obesity, Class III, BMI 40-49.9 (morbid obesity) (HCC)    History of colon cancer    Sacroiliitis (HCC)    Telogen effluvium    Chronic pain syndrome    Cervical radiculopathy    Cervical disc disorder    Type 2 diabetes mellitus without complication, without long-term current use of insulin (HCC)    Facet arthropathy    Fibromyalgia    Primary osteoarthritis of left knee    Constipation    Chronic pain of multiple joints    Osteoarthritis involving multiple joints on both sides of body    Cervical spondylosis without myelopathy    DDD (degenerative disc disease), cervical    DDD (degenerative disc disease), lumbar    Numbness in left leg    Spinal stenosis    Back pain    Ambulatory dysfunction    Grade I hemorrhoids    Breast tenderness in female    H/O colonoscopy with polypectomy    Acquired hypothyroidism    Hypertriglyceridemia    TIA (transient ischemic attack)    Irregular bowel habits    Visual

## 2023-12-28 LAB
DIABETIC RETINOPATHY: NEGATIVE
DIABETIC RETINOPATHY: NEGATIVE

## 2023-12-28 RX ORDER — PRAMOXINE HYDROCHLORIDE 10 MG/G
AEROSOL, FOAM TOPICAL
Qty: 1 EACH | Refills: 3 | Status: SHIPPED | OUTPATIENT
Start: 2023-12-28

## 2023-12-28 RX ORDER — POLYETHYLENE GLYCOL 3350, SODIUM SULFATE ANHYDROUS, SODIUM BICARBONATE, SODIUM CHLORIDE, POTASSIUM CHLORIDE 236; 22.74; 6.74; 5.86; 2.97 G/4L; G/4L; G/4L; G/4L; G/4L
4 POWDER, FOR SOLUTION ORAL ONCE
Qty: 4000 ML | Refills: 0 | Status: SHIPPED | OUTPATIENT
Start: 2023-12-28 | End: 2023-12-28

## 2023-12-28 NOTE — PROGRESS NOTES
OFFICE PROGRESS NOTE  [address] [facility] [address]  Dept: [phone]   Chief Complaint   Patient presents with    2 Week Follow-Up    Hypertension     BP taken at home has been running high but wasn't high at other recent dr ese.  Diarrhea     patient is worried celebrex is making her have diarrhea, she has been getting up in the middle of the night to go. HPI:     Here today for follow up on hypertension and changing the Atenolol 50 mg daily to in the morning. She really hasn't been exercising due to her back and neck pain is scheduled for epidural injection July 11 with DR Vivian Lee. Hypertension: Patient here for follow-up of elevated blood pressure. She is exercising and is not adherent to low salt diet. Blood pressure is not well controlled at home Home BP readings have been running before medication 150's - 160's/ 80 - 90's. . Cardiac symptoms none. Patient denies chest pain, chest pressure/discomfort, claudication, dyspnea, exertional chest pressure/discomfort, fatigue, irregular heart beat, lower extremity edema, near-syncope, orthopnea, palpitations, paroxysmal nocturnal dyspnea, syncope and tachypnea. Cardiovascular risk factors: dyslipidemia, hypertension, obesity (BMI >= 30 kg/m2) and sedentary lifestyle. Use of agents associated with hypertension: NSAIDS. History of target organ damage: none    She has been taking Celebrex 200 mg twice a day for her back and neck and thinks it may be causing her diarrhea, however she does have hx of bowel surgery for colon cancer. She has also had a lot of gas. We will decrease the Celebrex to once a day and if diarrhea persists then will change to different medication. Started with diarrhea 3 days ago and would have it have she had breakfast last night was the worst and she was had 3 bouts of diarrhea in the night. Denies any fever, chills, nausea or vomiting.      Current Outpatient Received pt, alert and OX4. Still c/o abd cramping.  Awaiting for CT abd. Medications:     celecoxib (CELEBREX) 200 MG capsule, Take 1 capsule by mouth daily, Disp: 60 capsule, Rfl: 2    cyclobenzaprine (FLEXERIL) 10 MG tablet, Take 10 mg by mouth 3 times daily as needed for Muscle spasms, Disp: , Rfl:     atorvastatin (LIPITOR) 10 MG tablet, Take 1 tablet by mouth nightly Indications: High Amount of Fats in the Blood, Disp: 90 tablet, Rfl: 1    allopurinol (ZYLOPRIM) 300 MG tablet, Take 1 tablet by mouth daily, Disp: 90 tablet, Rfl: 3    clobetasol (TEMOVATE) 0.05 % cream, , Disp: , Rfl: 0    oxybutynin (DITROPAN) 5 MG tablet, TK 1 T PO UP TO TID, Disp: , Rfl: 4    atenolol (TENORMIN) 50 MG tablet, Take 1 tablet by mouth daily, Disp: 90 tablet, Rfl: 3    omeprazole (PRILOSEC) 20 MG delayed release capsule, Take 1 capsule by mouth daily, Disp: 90 capsule, Rfl: 3    Cholecalciferol (VITAMIN D3) 2000 units CAPS, Take 2,000 Units by mouth daily, Disp: , Rfl:     Biotin 1000 MCG TABS, Take 1,000 mcg by mouth daily, Disp: , Rfl:   Social History     Socioeconomic History    Marital status: Single     Spouse name: None    Number of children: None    Years of education: None    Highest education level: None   Occupational History    None   Social Needs    Financial resource strain: None    Food insecurity:     Worry: None     Inability: None    Transportation needs:     Medical: None     Non-medical: None   Tobacco Use    Smoking status: Never Smoker    Smokeless tobacco: Never Used   Substance and Sexual Activity    Alcohol use:  Yes     Alcohol/week: 0.0 oz     Comment: Rarely    Drug use: No    Sexual activity: None   Lifestyle    Physical activity:     Days per week: None     Minutes per session: None    Stress: None   Relationships    Social connections:     Talks on phone: None     Gets together: None     Attends [employer] service: None     Active member of club or organization: None     Attends meetings of clubs or organizations: None     Relationship status: None  Intimate partner violence:     Fear of current or ex partner: None     Emotionally abused: None     Physically abused: None     Forced sexual activity: None   Other Topics Concern    None   Social History Narrative    None       I have reviewed Margaret's allergies, medications, problem list, medical, social and family history and have updated as needed in the electronic medical record    Review of Systems   Constitutional: Negative for activity change, appetite change, chills, diaphoresis, fatigue, fever and unexpected weight change. HENT: Negative for congestion, dental problem, drooling, ear discharge, ear pain, facial swelling, hearing loss, mouth sores, nosebleeds, postnasal drip, rhinorrhea, sinus pressure, sinus pain, sneezing, sore throat, tinnitus, trouble swallowing and voice change. Eyes: Negative for visual disturbance. Respiratory: Negative for cough, chest tightness, shortness of breath and wheezing. Cardiovascular: Negative for chest pain, palpitations and leg swelling. Gastrointestinal: Positive for diarrhea. Negative for abdominal pain, constipation, nausea and vomiting. Endocrine: Negative for cold intolerance, heat intolerance, polydipsia, polyphagia and polyuria. Genitourinary: Negative for difficulty urinating, frequency and urgency. Musculoskeletal: Positive for arthralgias, back pain and neck pain. Negative for gait problem, joint swelling, myalgias and neck stiffness. Skin: Negative for color change, pallor, rash and wound. Allergic/Immunologic: Negative for environmental allergies, food allergies and immunocompromised state. Neurological: Negative for dizziness, tremors, seizures, syncope, facial asymmetry, speech difficulty, weakness, light-headedness, numbness and headaches. Hematological: Negative for adenopathy. Does not bruise/bleed easily.    Psychiatric/Behavioral: Negative for agitation, behavioral problems, confusion, decreased concentration, dysphoric findings and recommendations with Emelia Clarity.     Marlo Shin, NP-C, FNP-BC

## 2023-12-29 DIAGNOSIS — K64.1 PROLAPSED INTERNAL HEMORRHOIDS, GRADE 2: ICD-10-CM

## 2024-01-03 ENCOUNTER — PREP FOR PROCEDURE (OUTPATIENT)
Dept: OPHTHALMOLOGY | Age: 69
End: 2024-01-03

## 2024-01-03 ENCOUNTER — ANESTHESIA EVENT (OUTPATIENT)
Dept: OPERATING ROOM | Age: 69
End: 2024-01-03
Payer: MEDICARE

## 2024-01-03 RX ORDER — OFLOXACIN 3 MG/ML
1 SOLUTION/ DROPS OPHTHALMIC ONCE
Status: CANCELLED | OUTPATIENT
Start: 2024-01-04

## 2024-01-03 RX ORDER — SODIUM CHLORIDE 0.9 % (FLUSH) 0.9 %
5-40 SYRINGE (ML) INJECTION PRN
Status: CANCELLED | OUTPATIENT
Start: 2024-01-03

## 2024-01-03 RX ORDER — PHENYLEPHRINE HYDROCHLORIDE 25 MG/ML
1 SOLUTION/ DROPS OPHTHALMIC SEE ADMIN INSTRUCTIONS
Status: CANCELLED | OUTPATIENT
Start: 2024-01-04

## 2024-01-03 RX ORDER — SODIUM CHLORIDE 9 MG/ML
INJECTION, SOLUTION INTRAVENOUS CONTINUOUS
Status: CANCELLED | OUTPATIENT
Start: 2024-01-03

## 2024-01-03 RX ORDER — PROPARACAINE HYDROCHLORIDE 5 MG/ML
1 SOLUTION/ DROPS OPHTHALMIC SEE ADMIN INSTRUCTIONS
Status: CANCELLED | OUTPATIENT
Start: 2024-01-04

## 2024-01-03 RX ORDER — SODIUM CHLORIDE 0.9 % (FLUSH) 0.9 %
5-40 SYRINGE (ML) INJECTION EVERY 12 HOURS SCHEDULED
Status: CANCELLED | OUTPATIENT
Start: 2024-01-03

## 2024-01-03 RX ORDER — TROPICAMIDE 10 MG/ML
1 SOLUTION/ DROPS OPHTHALMIC SEE ADMIN INSTRUCTIONS
Status: CANCELLED | OUTPATIENT
Start: 2024-01-04

## 2024-01-03 RX ORDER — SODIUM CHLORIDE 9 MG/ML
INJECTION, SOLUTION INTRAVENOUS PRN
Status: CANCELLED | OUTPATIENT
Start: 2024-01-03

## 2024-01-03 NOTE — ANESTHESIA PRE PROCEDURE
Department of Anesthesiology  Preprocedure Note       Name:  Margaret Almonte   Age:  68 y.o.  :  1955                                          MRN:  [id number]         Date:  2024      Surgeon: Surgeon(s):  Naren Vang MD    Procedure: Procedure(s):  LEFT EYE PARS PLANA VITRECTOMY INTERNAL LIMITING MEMBRANE PEEL INDOCYINIE GREEN AIR BUBBLE 25G    Medications prior to admission:   Prior to Admission medications    Medication Sig Start Date End Date Taking? Authorizing Provider   PROCTOZONE-HC 2.5 % CREA rectal cream Apply 4 times daily as needed 24   Tutu Monge MD   pramoxine HCl (PROCTOFOAM) 1 % foam Use three times daily or as needed 23   Tutu Monge MD   buprenorphine (BUTRANS) 10 MCG/HR PTWK Place 1 patch onto the skin once a week for 28 days. 12/13/23 1/10/24  Yoselyn Chilel PA   omeprazole (PRILOSEC) 40 MG delayed release capsule Take 1 capsule by mouth every morning (before breakfast) 11/10/23   Jamee Chapa, APRN - CNP   OZEMPIC, 0.25 OR 0.5 MG/DOSE, 2 MG/3ML SOPN Last dose 2023 10/14/23   Elton Bernal MD   ezetimibe (ZETIA) 10 MG tablet Take 1 tablet by mouth daily 10/18/23   Fadia Bray MD   allopurinol (ZYLOPRIM) 300 MG tablet Take one (1) tablet by mouth once daily. 10/18/23   Fadia Bray MD   atorvastatin (LIPITOR) 40 MG tablet Take 1 tablet by mouth daily Note increase in dose 10/18/23   Fadia Bray MD   meloxicam (MOBIC) 7.5 MG tablet Take 1 tablet by mouth daily  Patient taking differently: Take 1 tablet by mouth daily Last dose 11/17/23 10/18/23 2/15/24  Fadia Bray MD   Handicap Placard MISC by Does not apply route Start 10/18/23, expires 5 years. 10/18/23   Fadia Bray MD   metFORMIN (GLUCOPHAGE-XR) 500 MG extended release tablet  9/15/23   Elton Bernal MD   dicyclomine (BENTYL) 20 MG tablet Take 1 tablet by mouth 4 times daily as needed (abdominal pain, bloating)  Patient not taking: Reported on

## 2024-01-04 ENCOUNTER — ANESTHESIA (OUTPATIENT)
Dept: OPERATING ROOM | Age: 69
End: 2024-01-04
Payer: MEDICARE

## 2024-01-04 ENCOUNTER — HOSPITAL ENCOUNTER (OUTPATIENT)
Age: 69
Setting detail: OUTPATIENT SURGERY
Discharge: HOME | End: 2024-01-04
Attending: OPHTHALMOLOGY | Admitting: OPHTHALMOLOGY
Payer: MEDICARE

## 2024-01-04 VITALS
RESPIRATION RATE: 16 BRPM | DIASTOLIC BLOOD PRESSURE: 74 MMHG | WEIGHT: 184.4 LBS | TEMPERATURE: 96.5 F | SYSTOLIC BLOOD PRESSURE: 157 MMHG | HEART RATE: 88 BPM | HEIGHT: 60 IN | BODY MASS INDEX: 36.2 KG/M2 | OXYGEN SATURATION: 95 %

## 2024-01-04 LAB — GLUCOSE BLD-MCNC: 91 MG/DL (ref 74–99)

## 2024-01-04 PROCEDURE — 3700000000 HC ANESTHESIA ATTENDED CARE: Performed by: OPHTHALMOLOGY

## 2024-01-04 PROCEDURE — 82962 GLUCOSE BLOOD TEST: CPT | Performed by: OPHTHALMOLOGY

## 2024-01-04 PROCEDURE — 6370000000 HC RX 637 (ALT 250 FOR IP): Performed by: OPHTHALMOLOGY

## 2024-01-04 PROCEDURE — 3600000013 HC SURGERY LEVEL 3 ADDTL 15MIN: Performed by: OPHTHALMOLOGY

## 2024-01-04 PROCEDURE — 3600000003 HC SURGERY LEVEL 3 BASE: Performed by: OPHTHALMOLOGY

## 2024-01-04 PROCEDURE — 3700000001 HC ADD 15 MINUTES (ANESTHESIA): Performed by: OPHTHALMOLOGY

## 2024-01-04 PROCEDURE — 6360000002 HC RX W HCPCS: Performed by: OPHTHALMOLOGY

## 2024-01-04 PROCEDURE — 7100000011 HC PHASE II RECOVERY - ADDTL 15 MIN: Performed by: OPHTHALMOLOGY

## 2024-01-04 PROCEDURE — 2500000003 HC RX 250 WO HCPCS: Performed by: OPHTHALMOLOGY

## 2024-01-04 PROCEDURE — 2580000003 HC RX 258: Performed by: ANESTHESIOLOGY

## 2024-01-04 PROCEDURE — 7100000010 HC PHASE II RECOVERY - FIRST 15 MIN: Performed by: OPHTHALMOLOGY

## 2024-01-04 PROCEDURE — 2720000010 HC SURG SUPPLY STERILE: Performed by: OPHTHALMOLOGY

## 2024-01-04 PROCEDURE — 82962 GLUCOSE BLOOD TEST: CPT

## 2024-01-04 PROCEDURE — 6360000002 HC RX W HCPCS: Performed by: NURSE ANESTHETIST, CERTIFIED REGISTERED

## 2024-01-04 PROCEDURE — 6370000000 HC RX 637 (ALT 250 FOR IP): Performed by: PHYSICIAN ASSISTANT

## 2024-01-04 PROCEDURE — 2709999900 HC NON-CHARGEABLE SUPPLY: Performed by: OPHTHALMOLOGY

## 2024-01-04 PROCEDURE — 2500000003 HC RX 250 WO HCPCS: Performed by: PHYSICIAN ASSISTANT

## 2024-01-04 RX ORDER — PHENYLEPHRINE HYDROCHLORIDE 25 MG/ML
1 SOLUTION/ DROPS OPHTHALMIC
Status: COMPLETED | OUTPATIENT
Start: 2024-01-04 | End: 2024-01-04

## 2024-01-04 RX ORDER — PROPARACAINE HYDROCHLORIDE 5 MG/ML
1 SOLUTION/ DROPS OPHTHALMIC SEE ADMIN INSTRUCTIONS
Status: DISCONTINUED | OUTPATIENT
Start: 2024-01-04 | End: 2024-01-04 | Stop reason: HOSPADM

## 2024-01-04 RX ORDER — TETRACAINE HYDROCHLORIDE 5 MG/ML
SOLUTION OPHTHALMIC PRN
Status: DISCONTINUED | OUTPATIENT
Start: 2024-01-04 | End: 2024-01-04 | Stop reason: ALTCHOICE

## 2024-01-04 RX ORDER — SODIUM CHLORIDE, SODIUM LACTATE, POTASSIUM CHLORIDE, CALCIUM CHLORIDE 600; 310; 30; 20 MG/100ML; MG/100ML; MG/100ML; MG/100ML
INJECTION, SOLUTION INTRAVENOUS CONTINUOUS
Status: DISCONTINUED | OUTPATIENT
Start: 2024-01-04 | End: 2024-01-04 | Stop reason: HOSPADM

## 2024-01-04 RX ORDER — NEOMYCIN SULFATE, POLYMYXIN B SULFATE, AND DEXAMETHASONE 3.5; 10000; 1 MG/G; [USP'U]/G; MG/G
OINTMENT OPHTHALMIC PRN
Status: DISCONTINUED | OUTPATIENT
Start: 2024-01-04 | End: 2024-01-04 | Stop reason: ALTCHOICE

## 2024-01-04 RX ORDER — SODIUM CHLORIDE 9 MG/ML
INJECTION, SOLUTION INTRAVENOUS CONTINUOUS
Status: DISCONTINUED | OUTPATIENT
Start: 2024-01-04 | End: 2024-01-04 | Stop reason: HOSPADM

## 2024-01-04 RX ORDER — TROPICAMIDE 10 MG/ML
1 SOLUTION/ DROPS OPHTHALMIC
Status: COMPLETED | OUTPATIENT
Start: 2024-01-04 | End: 2024-01-04

## 2024-01-04 RX ORDER — MIDAZOLAM HYDROCHLORIDE 1 MG/ML
INJECTION INTRAMUSCULAR; INTRAVENOUS PRN
Status: DISCONTINUED | OUTPATIENT
Start: 2024-01-04 | End: 2024-01-04 | Stop reason: SDUPTHER

## 2024-01-04 RX ORDER — SODIUM CHLORIDE 0.9 % (FLUSH) 0.9 %
5-40 SYRINGE (ML) INJECTION EVERY 12 HOURS SCHEDULED
Status: DISCONTINUED | OUTPATIENT
Start: 2024-01-04 | End: 2024-01-04 | Stop reason: HOSPADM

## 2024-01-04 RX ORDER — OFLOXACIN 3 MG/ML
1 SOLUTION/ DROPS OPHTHALMIC ONCE
Status: COMPLETED | OUTPATIENT
Start: 2024-01-04 | End: 2024-01-04

## 2024-01-04 RX ORDER — ONDANSETRON 2 MG/ML
INJECTION INTRAMUSCULAR; INTRAVENOUS PRN
Status: DISCONTINUED | OUTPATIENT
Start: 2024-01-04 | End: 2024-01-04 | Stop reason: SDUPTHER

## 2024-01-04 RX ORDER — DIPHENHYDRAMINE HYDROCHLORIDE 50 MG/ML
INJECTION INTRAMUSCULAR; INTRAVENOUS PRN
Status: DISCONTINUED | OUTPATIENT
Start: 2024-01-04 | End: 2024-01-04 | Stop reason: SDUPTHER

## 2024-01-04 RX ORDER — SODIUM CHLORIDE 9 MG/ML
INJECTION, SOLUTION INTRAVENOUS PRN
Status: DISCONTINUED | OUTPATIENT
Start: 2024-01-04 | End: 2024-01-04 | Stop reason: HOSPADM

## 2024-01-04 RX ORDER — BALANCED SALT SOLUTION 6.4; .75; .48; .3; 3.9; 1.7 MG/ML; MG/ML; MG/ML; MG/ML; MG/ML; MG/ML
SOLUTION OPHTHALMIC PRN
Status: DISCONTINUED | OUTPATIENT
Start: 2024-01-04 | End: 2024-01-04 | Stop reason: ALTCHOICE

## 2024-01-04 RX ORDER — SODIUM CHLORIDE 0.9 % (FLUSH) 0.9 %
5-40 SYRINGE (ML) INJECTION PRN
Status: DISCONTINUED | OUTPATIENT
Start: 2024-01-04 | End: 2024-01-04 | Stop reason: HOSPADM

## 2024-01-04 RX ADMIN — PROPARACAINE HYDROCHLORIDE 1 DROP: 5 SOLUTION/ DROPS OPHTHALMIC at 11:59

## 2024-01-04 RX ADMIN — OFLOXACIN 1 DROP: 3 SOLUTION OPHTHALMIC at 11:59

## 2024-01-04 RX ADMIN — PHENYLEPHRINE HYDROCHLORIDE 1 DROP: 25 SOLUTION/ DROPS OPHTHALMIC at 12:00

## 2024-01-04 RX ADMIN — TROPICAMIDE 1 DROP: 10 SOLUTION/ DROPS OPHTHALMIC at 12:00

## 2024-01-04 RX ADMIN — TROPICAMIDE 1 DROP: 10 SOLUTION/ DROPS OPHTHALMIC at 11:50

## 2024-01-04 RX ADMIN — PHENYLEPHRINE HYDROCHLORIDE 1 DROP: 25 SOLUTION/ DROPS OPHTHALMIC at 11:50

## 2024-01-04 RX ADMIN — PHENYLEPHRINE HYDROCHLORIDE 1 DROP: 25 SOLUTION/ DROPS OPHTHALMIC at 11:55

## 2024-01-04 RX ADMIN — MIDAZOLAM 2 MG: 1 INJECTION INTRAMUSCULAR; INTRAVENOUS at 12:53

## 2024-01-04 RX ADMIN — ONDANSETRON 4 MG: 2 INJECTION INTRAMUSCULAR; INTRAVENOUS at 12:56

## 2024-01-04 RX ADMIN — SODIUM CHLORIDE, POTASSIUM CHLORIDE, SODIUM LACTATE AND CALCIUM CHLORIDE: 600; 310; 30; 20 INJECTION, SOLUTION INTRAVENOUS at 12:00

## 2024-01-04 RX ADMIN — DIPHENHYDRAMINE HYDROCHLORIDE 12.5 MG: 50 INJECTION INTRAMUSCULAR; INTRAVENOUS at 12:54

## 2024-01-04 RX ADMIN — TROPICAMIDE 1 DROP: 10 SOLUTION/ DROPS OPHTHALMIC at 11:55

## 2024-01-04 ASSESSMENT — PAIN - FUNCTIONAL ASSESSMENT
PAIN_FUNCTIONAL_ASSESSMENT: NONE - DENIES PAIN

## 2024-01-04 NOTE — OP NOTE
Operative Note      Patient: Margaret Almonte  YOB: 1955  MRN: [id number]    Date of Procedure: 1/4/2024      Surgeon(s):  Naren Vang MD    Assistant:  Jenni Hubbard PA-C    Preoperative Diagnosis: 1. VMT with macular pucker, left eye    Postoperative Diagnosis: 1. VMT with macular pucker, left eye    Procedure Performed:   1. Pars plana vitrectomy with peeling of preretinal membrane, left eye  2. Fluid air exchange, left eye    Complications:   None    Anesthesia Used:  Monitored Anesthesia Care    Operative Course:  After history, physical, and consent was obtained, the patient was taken to the operating room and given a bolus of IV sedating medication. The patient then received a retro bulbar block of 1:1 Lidocaine and Marcaine to the left eye.  The operative eye was then prepped and draped in the usual sterile fashion.  Lid speculum was placed between the eyelids.  The transscleral cannulas were inserted in the usual location, two temporally and one superonasally at 3 to 3.5 mm. posterior to the limbus.  An infusion cannula was inserted inferotemporally.  The light pipe and vitrector handpiece were inserted in the eye and a core vitrectomy was performed.  A posterior hyaloid separation was confirmed by using aspiration and the posterior hyaloid was confirmed to be peeled into the periphery and excised releasing the vitreomacular traction. A flat lens and retinal pick were used to confirm no ILM was apparent to be peeled.  The periphery was inspected and there were no retinal tears or detachments.  The fluid was exchanged for air using the vitrector. The transscleral cannulas were removed and the wounds were self-sealing.  The eye was dressed with Maxitrol ointment, a pressure patch and Johnson shield.  The patient was taken from the operating room to recovery awake and in good condition.    The patient will follow up for a postoperative check in the office.

## 2024-01-04 NOTE — PROGRESS NOTES
Patient came to recovery with a blood sugar of 64 and felt shakey and slightly nauseated. Took apple juice and peanut butter crackers   states she is feeling better and will check another sugar when she is in a chair

## 2024-01-04 NOTE — ANESTHESIA POSTPROCEDURE EVALUATION
Department of Anesthesiology  Postprocedure Note    Patient: Margaret Almonte  MRN: [id number]  YOB: 1955  Date of evaluation: 1/4/2024    Procedure Summary     Date: 01/04/24 Room / Location: [address]    Anesthesia Start: 1252 Anesthesia Stop: 1320    Procedure: LEFT EYE PARS PLANA VITRECTOMY AIR BUBBLE 25G (Left: Eye) Diagnosis:       Vitreomacular adhesion of left eye      (Vitreomacular adhesion of left eye [H43.822])    Surgeons: Naren Vang MD Responsible Provider: Mireille Ness DO    Anesthesia Type: MAC ASA Status: 3          Anesthesia Type: MAC    Isabella Phase I: Isabella Score: 10    Isabella Phase II: Isabella Score: 10    Anesthesia Post Evaluation    Patient location during evaluation: PACU  Patient participation: complete - patient participated  Level of consciousness: awake and alert  Airway patency: patent  Nausea & Vomiting: no nausea and no vomiting  Cardiovascular status: hemodynamically stable  Respiratory status: acceptable  Hydration status: euvolemic  Pain management: adequate        No notable events documented.

## 2024-01-04 NOTE — OP NOTE
Operative Note      Patient: Margaret Almonte  YOB: 1955  MRN: [id number]    Date of Procedure: 1/4/2024    Surgeon(s):  Naren Vang MD

## 2024-01-04 NOTE — DISCHARGE INSTRUCTIONS
[facility].  Instructions after Vitreoretinal Surgery  [phone]  Your post op appointment is 1/5/24 with Dr. Vang at 1:00 pm in [address].     It is very important to follow these instructions after your eye surgery to ensure its success.  Please bring this sheet with you to your first follow up appointment.    1.   A responsible adult must drive you home after surgery. You might feel well, but the medications given during the surgery might affect you for several hours afterward. Do not drive, operate machinery or participate in any activity that requires coordination or judgment for at least 24 hours after your surgery.   2. DO NOT take the patch and shield off, unless otherwise instructed by your physician. Your doctor will remove it at your follow-up appointment.  The metal/plastic shield will be worn at bedtime for 1 week following your surgery.   3. Your head positioning is chin to chest for 2 days and either side at night until air bubble is gone       4. Activity should be limited to: NOTHING MORE STRENUOUS THAN WALKING FOR 3 WEEKS. Avoid any straining, lifting or bending for the first week.  5. An ice pack may be used if the eye is uncomfortable. Remove only the metal/plastic shield, leaving the patch in place. (The patch may be discolored, bloody or wet THIS IS NORMAL).  Ice may be used for 20 minutes at a time, every 2 to 4 hours for the first 24-36 hours after surgery.  6. If you have a GAS BUBBLE in your eye: DO NOT REMOVE THE ALERT BRACELET until you are instructed to do so by your physician. In most cases this will remain on for 6-8 weeks. Air travel or traveling in high altitudes is prohibited until approved by your physician.   7. After your surgery you can resume all of your regular medications. This includes Blood thinners such as Aspirin,  Plavix, Coumadin, or Warfarin.   8. Pain-Relieving Medications:  Extra Strength Tylenol: 2 Tablets every four

## 2024-01-04 NOTE — H&P
I have examined the patient and reviewed the history and physical from 12/28/2023 and find no relevant changes.    I have reviewed with the patient and/or family the risks, benefits, and alternatives to the procedure and they have agreed to proceed.    Guerline Hubbard PA-C

## 2024-01-05 LAB — DIABETIC RETINOPATHY: NEGATIVE

## 2024-01-15 ENCOUNTER — TELEPHONE (OUTPATIENT)
Dept: FAMILY MEDICINE CLINIC | Age: 69
End: 2024-01-15

## 2024-01-15 NOTE — TELEPHONE ENCOUNTER
----- Message from Vero Watson sent at 1/15/2024  1:42 PM EST -----  Subject: Appointment Request    Reason for Call: Established Patient Appointment needed: Routine Medicare   AWV    QUESTIONS    Reason for appointment request? No appointments available during search     Additional Information for Provider? PT needs to reschedule appt 3/4.   Please call to reschedule  ---------------------------------------------------------------------------  --------------  CALL BACK INFO  [phone]; OK to leave message on voicemail  ---------------------------------------------------------------------------  --------------  SCRIPT ANSWERS

## 2024-01-16 RX ORDER — POLYETHYLENE GLYCOL-3350 AND ELECTROLYTES 236; 6.74; 5.86; 2.97; 22.74 G/274.31G; G/274.31G; G/274.31G; G/274.31G; G/274.31G
POWDER, FOR SOLUTION ORAL ONCE
COMMUNITY
Start: 2023-12-29

## 2024-01-16 RX ORDER — BUPRENORPHINE 10 UG/H
1 PATCH TRANSDERMAL WEEKLY
COMMUNITY
Start: 2024-01-10

## 2024-01-16 RX ORDER — PREDNISOLONE ACETATE 10 MG/ML
1 SUSPENSION/ DROPS OPHTHALMIC 2 TIMES DAILY
COMMUNITY
Start: 2023-12-28

## 2024-01-16 NOTE — PROGRESS NOTES
[facility]                                                                                                                    PRE OP INSTRUCTIONS FOR  Margaret Almonte        Date: 1/16/2024    Date of surgery: 1/17/24   Arrival Time: Hospital will call you between 5pm and 7pm with your final arrival time for surgery    Do not eat or drink anything after midnight prior to surgery. This includes no water, chewing gum, mints or ice chips.    Take the following medications with a small sip of water on the morning of Surgery: Atenolol     Diabetics may take evening dose of insulin but none after midnight.  If you feel symptomatic or low blood sugar morning of surgery drink 1-2 ounces of apple juice only.    Aspirin, Ibuprofen, Advil, Naproxen, Vitamin E and other Anti-inflammatory products should be stopped  before surgery  as directed by your physician.  Take Tylenol only unless instructed otherwise by your surgeon.    Check with your Doctor regarding stopping Plavix, Coumadin, Lovenox, Eliquis, Effient, or other blood thinners.    Do not smoke,use illicit drugs and do not drink any alcoholic beverages 24 hours prior to surgery.    You may brush your teeth the morning of surgery.  DO NOT SWALLOW WATER    You MUST make arrangements for a responsible adult to take you home after your surgery. You will not be allowed to leave alone or drive yourself home.  It is strongly suggested someone stay with you the first 24 hrs. Your surgery will be cancelled if you do not have a ride home.    PEDIATRIC PATIENTS ONLY:  A parent/legal guardian must accompany a child scheduled for surgery and plan to stay at the hospital until the child is discharged.  Please do not bring other children with you.    Please wear simple, loose fitting clothing to the hospital.  Do not bring valuables (money, credit cards, checkbooks, etc.) Do not wear any makeup (including no eye makeup) or nail polish on your fingers or

## 2024-01-16 NOTE — H&P
General Surgery History and Physical  [facility]  Tutu Monge MD    Patient's Name/Date of Birth: Margaret Almonte / 1955    Date: January 17, 2024     Consulting Surgeon: Tutu Monge MD    PCP: Chichi Marrero, APRN - CNP     Chief Complaint: Screening colonoscopy    HPI:   Margaret Almonte is a 68 y.o. female who presents for evaluation of screening age appropriate colonoscopy. Denies abdominal pain, nausea, vomiting, previous history of polyps, bloody or dark stools. She has had colon cancer and had partial colectomy 6 years ago. Has not had colonoscopy since. Patient has been seen by me for hemorrhoids which have improved greatly with the use of proctofoam.     Patient Active Problem List   Diagnosis    Essential hypertension    Chronic back pain greater than 3 months duration    Mixed hyperlipidemia    Vitamin D insufficiency    Gastroesophageal reflux disease    Central stenosis of spinal canal    Spondylolisthesis at L4-L5 level    Lumbar radiculopathy    Chronic pain of left knee    Obesity, Class III, BMI 40-49.9 (morbid obesity) (HCC)    History of colon cancer    Sacroiliitis (HCC)    Telogen effluvium    Chronic pain syndrome    Cervical radiculopathy    Cervical disc disorder    Type 2 diabetes mellitus without complication, without long-term current use of insulin (HCC)    Facet arthropathy    Fibromyalgia    Primary osteoarthritis of left knee    Constipation    Chronic pain of multiple joints    Osteoarthritis involving multiple joints on both sides of body    Cervical spondylosis without myelopathy    DDD (degenerative disc disease), cervical    DDD (degenerative disc disease), lumbar    Numbness in left leg    Spinal stenosis    Back pain    Ambulatory dysfunction    Grade I hemorrhoids    Breast tenderness in female    H/O colonoscopy with polypectomy    Acquired hypothyroidism    Hypertriglyceridemia    TIA (transient ischemic attack)    Irregular bowel habits    Visual

## 2024-01-17 ENCOUNTER — ANESTHESIA EVENT (OUTPATIENT)
Dept: ENDOSCOPY | Age: 69
End: 2024-01-17
Payer: MEDICARE

## 2024-01-17 ENCOUNTER — ANESTHESIA (OUTPATIENT)
Dept: ENDOSCOPY | Age: 69
End: 2024-01-17
Payer: MEDICARE

## 2024-01-17 ENCOUNTER — HOSPITAL ENCOUNTER (OUTPATIENT)
Age: 69
Setting detail: OUTPATIENT SURGERY
Discharge: HOME | End: 2024-01-17
Attending: SURGERY | Admitting: SURGERY
Payer: MEDICARE

## 2024-01-17 VITALS
OXYGEN SATURATION: 98 % | SYSTOLIC BLOOD PRESSURE: 174 MMHG | HEART RATE: 72 BPM | BODY MASS INDEX: 35.34 KG/M2 | HEIGHT: 60 IN | RESPIRATION RATE: 18 BRPM | DIASTOLIC BLOOD PRESSURE: 76 MMHG | WEIGHT: 180 LBS | TEMPERATURE: 96.9 F

## 2024-01-17 LAB — GLUCOSE BLD-MCNC: 96 MG/DL (ref 74–99)

## 2024-01-17 PROCEDURE — 2580000003 HC RX 258: Performed by: NURSE ANESTHETIST, CERTIFIED REGISTERED

## 2024-01-17 PROCEDURE — 3609027000 HC COLONOSCOPY: Performed by: SURGERY

## 2024-01-17 PROCEDURE — 7100000010 HC PHASE II RECOVERY - FIRST 15 MIN: Performed by: SURGERY

## 2024-01-17 PROCEDURE — 2709999900 HC NON-CHARGEABLE SUPPLY: Performed by: SURGERY

## 2024-01-17 PROCEDURE — 3700000001 HC ADD 15 MINUTES (ANESTHESIA): Performed by: SURGERY

## 2024-01-17 PROCEDURE — 3700000000 HC ANESTHESIA ATTENDED CARE: Performed by: SURGERY

## 2024-01-17 PROCEDURE — 2500000003 HC RX 250 WO HCPCS: Performed by: NURSE ANESTHETIST, CERTIFIED REGISTERED

## 2024-01-17 PROCEDURE — 7100000011 HC PHASE II RECOVERY - ADDTL 15 MIN: Performed by: SURGERY

## 2024-01-17 PROCEDURE — 6360000002 HC RX W HCPCS: Performed by: NURSE ANESTHETIST, CERTIFIED REGISTERED

## 2024-01-17 PROCEDURE — 2580000003 HC RX 258: Performed by: ANESTHESIOLOGY

## 2024-01-17 PROCEDURE — 82962 GLUCOSE BLOOD TEST: CPT

## 2024-01-17 RX ORDER — SODIUM CHLORIDE 9 MG/ML
INJECTION, SOLUTION INTRAVENOUS CONTINUOUS
Status: DISCONTINUED | OUTPATIENT
Start: 2024-01-17 | End: 2024-01-17 | Stop reason: HOSPADM

## 2024-01-17 RX ORDER — SODIUM CHLORIDE 9 MG/ML
25 INJECTION, SOLUTION INTRAVENOUS PRN
Status: DISCONTINUED | OUTPATIENT
Start: 2024-01-17 | End: 2024-01-17 | Stop reason: HOSPADM

## 2024-01-17 RX ORDER — SODIUM CHLORIDE 0.9 % (FLUSH) 0.9 %
5-40 SYRINGE (ML) INJECTION PRN
Status: DISCONTINUED | OUTPATIENT
Start: 2024-01-17 | End: 2024-01-17 | Stop reason: HOSPADM

## 2024-01-17 RX ORDER — LIDOCAINE HYDROCHLORIDE 20 MG/ML
INJECTION, SOLUTION EPIDURAL; INFILTRATION; INTRACAUDAL; PERINEURAL PRN
Status: DISCONTINUED | OUTPATIENT
Start: 2024-01-17 | End: 2024-01-17 | Stop reason: SDUPTHER

## 2024-01-17 RX ORDER — SODIUM CHLORIDE 9 MG/ML
INJECTION, SOLUTION INTRAVENOUS CONTINUOUS PRN
Status: DISCONTINUED | OUTPATIENT
Start: 2024-01-17 | End: 2024-01-17 | Stop reason: SDUPTHER

## 2024-01-17 RX ORDER — SODIUM CHLORIDE 0.9 % (FLUSH) 0.9 %
5-40 SYRINGE (ML) INJECTION EVERY 12 HOURS SCHEDULED
Status: DISCONTINUED | OUTPATIENT
Start: 2024-01-17 | End: 2024-01-17 | Stop reason: HOSPADM

## 2024-01-17 RX ORDER — PROPOFOL 10 MG/ML
INJECTION, EMULSION INTRAVENOUS PRN
Status: DISCONTINUED | OUTPATIENT
Start: 2024-01-17 | End: 2024-01-17 | Stop reason: SDUPTHER

## 2024-01-17 RX ADMIN — SODIUM CHLORIDE: 9 INJECTION, SOLUTION INTRAVENOUS at 06:49

## 2024-01-17 RX ADMIN — SODIUM CHLORIDE: 900 INJECTION, SOLUTION INTRAVENOUS at 07:50

## 2024-01-17 RX ADMIN — PROPOFOL 30 MG: 10 INJECTION, EMULSION INTRAVENOUS at 08:02

## 2024-01-17 RX ADMIN — LIDOCAINE HYDROCHLORIDE 120 MG: 20 INJECTION, SOLUTION EPIDURAL; INFILTRATION; INTRACAUDAL; PERINEURAL at 07:31

## 2024-01-17 RX ADMIN — PROPOFOL 20 MG: 10 INJECTION, EMULSION INTRAVENOUS at 07:46

## 2024-01-17 RX ADMIN — PROPOFOL 120 MCG/KG/MIN: 10 INJECTION, EMULSION INTRAVENOUS at 07:32

## 2024-01-17 RX ADMIN — PROPOFOL 50 MG: 10 INJECTION, EMULSION INTRAVENOUS at 07:31

## 2024-01-17 RX ADMIN — SODIUM CHLORIDE: 900 INJECTION, SOLUTION INTRAVENOUS at 07:28

## 2024-01-17 RX ADMIN — PROPOFOL 40 MG: 10 INJECTION, EMULSION INTRAVENOUS at 07:35

## 2024-01-17 ASSESSMENT — PAIN DESCRIPTION - DESCRIPTORS: DESCRIPTORS: ACHING

## 2024-01-17 ASSESSMENT — PAIN - FUNCTIONAL ASSESSMENT
PAIN_FUNCTIONAL_ASSESSMENT: PREVENTS OR INTERFERES SOME ACTIVE ACTIVITIES AND ADLS
PAIN_FUNCTIONAL_ASSESSMENT: 0-10
PAIN_FUNCTIONAL_ASSESSMENT: NONE - DENIES PAIN

## 2024-01-17 NOTE — INTERVAL H&P NOTE
Update History & Physical    The patient's History and Physical of December 27, 2023 was reviewed with the patient and I examined the patient. There was no change. The surgical site was confirmed by the patient and me.     Plan: The risks, benefits, expected outcome, and alternative to the recommended procedure have been discussed with the patient. Patient understands and wants to proceed with the procedure.     Electronically signed by Gabriella Leong MD on 1/17/2024 at 6:48 AM

## 2024-01-17 NOTE — PROGRESS NOTES
1/17/24 0818 dr jorge alberto mayfield served as pt concerned when she can resume asa, mobic and ozempic. Per dr azul \"she can resume all tomorrow\" kmo rn

## 2024-01-17 NOTE — ANESTHESIA PRE PROCEDURE
results found for: \"HIV\", \"HEPCAB\"    COVID-19 Screening (If Applicable):   Lab Results   Component Value Date/Time    COVID19 Detected 09/06/2023 12:19 PM    COVID19 Detected 08/11/2021 02:33 AM           Anesthesia Evaluation     history of anesthetic complications: PONV.  Airway: Mallampati: III  TM distance: >3 FB   Neck ROM: full  Mouth opening: > = 3 FB   Dental: normal exam         Pulmonary: breath sounds clear to auscultation                             Cardiovascular:    (+) hypertension:        Rhythm: regular  Rate: normal                    Neuro/Psych:   (+) CVA:, neuromuscular disease:, TIA            GI/Hepatic/Renal:   (+) GERD:          Endo/Other:    (+) DiabetesType II DM, hypothyroidism: arthritis:..                 Abdominal:   (+) obese          Vascular:          Other Findings:             Anesthesia Plan      MAC     ASA 3             Anesthetic plan and risks discussed with patient.        Attending anesthesiologist reviewed and agrees with Preprocedure content                Jesus Ferguson, VISHNU - CRNA   1/17/2024

## 2024-01-17 NOTE — OP NOTE
Operative Note      Patient: Margaret Almonte  YOB: 1955  MRN: [id number]    Date of Procedure: 1/17/2024    Pre-Op Diagnosis Codes:     * Colon cancer screening [Z12.11]    Post-Op Diagnosis: Same       Procedure(s):  COLONOSCOPY DIAGNOSTIC    Surgeon(s):  Tutu Monge MD    Assistant:   Surgical Assistant: Mariangel Marc RN  Resident: Gabriella Leong MD    Anesthesia: Monitor Anesthesia Care    Estimated Blood Loss (mL): less than 50     Complications: None    Specimens:   * No specimens in log *    Implants:  * No implants in log *      Drains: * No LDAs found *    Findings: see below    Detailed Description of Procedure:     BRIEF HISTORY:  This is a 68 y.o. female who presents for surveillance colonoscopy. The patient has personal history of colon cancer and had partial colectomy. The patient has had a prior colonoscopy. My recommendation is to proceed with colonoscopy. The patient was advised of the risks, benefits, complications and options including the risk of bleeding and perforation. The patient understood and agreed to proceed.    PROCEDURE:  In the left side down decubitus position, a digital rectal exam was performed. There were no masses or abnormalities noted. The scope was lubricated and inserted into the anus. The scope was then passed under direct visualization in a retrograde fashion to the base of the cecum. The light was seen in the patient's right lower quadrant. The ileocecal valve was photographed. The prep was 3/3/3. No pressure/positioning manuevers were required for complete passage. As the scope is withdrawn, visualization of the ascending and transverse colon was unremarkable. The scope is then pulled past the splenic flexure into the descending colon. There was evidence of prior colectomy with staple line at 15cm. No abnormalities were seen. The scope is then pulled through the sigmoid colon into the rectum. The scope is retroflexed at the anal verge. No

## 2024-01-17 NOTE — ANESTHESIA POSTPROCEDURE EVALUATION
Department of Anesthesiology  Postprocedure Note    Patient: Margaret Almonte  MRN: [id number]  YOB: 1955  Date of evaluation: 1/17/2024    Procedure Summary     Date: 01/17/24 Room / Location: [place] [address] / [facility]    Anesthesia Start: 0726 Anesthesia Stop: 0815    Procedure: COLONOSCOPY DIAGNOSTIC Diagnosis:       Colon cancer screening      (Colon cancer screening [Z12.11])    Surgeons: Tutu Monge MD Responsible Provider: Mirza Ambrose MD    Anesthesia Type: MAC ASA Status: 3          Anesthesia Type: No value filed.    Isabella Phase I: Isabella Score: 10    Isabella Phase II:      Anesthesia Post Evaluation    Patient location during evaluation: PACU  Patient participation: complete - patient participated  Level of consciousness: awake and alert  Airway patency: patent  Nausea & Vomiting: no nausea and no vomiting  Cardiovascular status: hemodynamically stable  Respiratory status: acceptable  Hydration status: euvolemic  Pain management: satisfactory to patient        No notable events documented.

## 2024-01-26 PROBLEM — Z12.11 COLON CANCER SCREENING: Status: RESOLVED | Noted: 2023-12-27 | Resolved: 2024-01-26

## 2024-02-05 ENCOUNTER — OFFICE VISIT (OUTPATIENT)
Age: 69
End: 2024-02-05
Payer: MEDICARE

## 2024-02-05 VITALS
HEIGHT: 60 IN | HEART RATE: 82 BPM | OXYGEN SATURATION: 97 % | BODY MASS INDEX: 36.48 KG/M2 | RESPIRATION RATE: 16 BRPM | WEIGHT: 185.8 LBS | TEMPERATURE: 97.4 F | SYSTOLIC BLOOD PRESSURE: 134 MMHG | DIASTOLIC BLOOD PRESSURE: 84 MMHG

## 2024-02-05 DIAGNOSIS — R19.7 DIARRHEA, UNSPECIFIED TYPE: Primary | ICD-10-CM

## 2024-02-05 PROCEDURE — 3017F COLORECTAL CA SCREEN DOC REV: CPT | Performed by: NURSE PRACTITIONER

## 2024-02-05 PROCEDURE — G8417 CALC BMI ABV UP PARAM F/U: HCPCS | Performed by: NURSE PRACTITIONER

## 2024-02-05 PROCEDURE — 1124F ACP DISCUSS-NO DSCNMKR DOCD: CPT | Performed by: NURSE PRACTITIONER

## 2024-02-05 PROCEDURE — 3075F SYST BP GE 130 - 139MM HG: CPT | Performed by: NURSE PRACTITIONER

## 2024-02-05 PROCEDURE — G8484 FLU IMMUNIZE NO ADMIN: HCPCS | Performed by: NURSE PRACTITIONER

## 2024-02-05 PROCEDURE — 3079F DIAST BP 80-89 MM HG: CPT | Performed by: NURSE PRACTITIONER

## 2024-02-05 PROCEDURE — G8399 PT W/DXA RESULTS DOCUMENT: HCPCS | Performed by: NURSE PRACTITIONER

## 2024-02-05 PROCEDURE — 99212 OFFICE O/P EST SF 10 MIN: CPT | Performed by: NURSE PRACTITIONER

## 2024-02-05 PROCEDURE — 1036F TOBACCO NON-USER: CPT | Performed by: NURSE PRACTITIONER

## 2024-02-05 PROCEDURE — G8427 DOCREV CUR MEDS BY ELIG CLIN: HCPCS | Performed by: NURSE PRACTITIONER

## 2024-02-05 PROCEDURE — 1090F PRES/ABSN URINE INCON ASSESS: CPT | Performed by: NURSE PRACTITIONER

## 2024-02-05 RX ORDER — SEMAGLUTIDE 1.34 MG/ML
INJECTION, SOLUTION SUBCUTANEOUS
COMMUNITY
Start: 2024-01-26

## 2024-02-05 RX ORDER — CHOLESTYRAMINE 4 G/9G
1 POWDER, FOR SUSPENSION ORAL 2 TIMES DAILY
Qty: 90 PACKET | Refills: 3 | Status: SHIPPED | OUTPATIENT
Start: 2024-02-05

## 2024-02-05 NOTE — PROGRESS NOTES
Margaret Almonte (:  1955) is a 68 y.o. female, here for evaluation of the following chief complaint(s):  Follow-up (PT is here for follow up from scopes.)      SUBJECTIVE/OBJECTIVE:  HPI:  Ava is a very pleasant 68 year old female that presents today  for follow up on colonoscopy by Dr. Mcgregor for personal history of colon cancer    Colonoscopy by Dr. Mcgregor was normal other than evidence of prior colectomy.    Patient tells me that she had a bout of diarrhea a few days ago where she could not make it home after visiting her   This occurs intermittently; wearing depends most of the time  Has taken Imodium as needed  The diarrhea is usually self limiting  There are specific foods that will trigger an episode  Foods that may be bothersome include root beer, breakfast from   [employer], sweet tea or steak  Patient tells me that she does not feel that she has overflow diarrhea because she continued to have watery stools after the colonoscopy  No weight loss, changes in appetite, or fevers.  Patient started on Ozempic 4 months ago       ROS:  General: Patient denies n/v/f/c or weight loss.  HEENT: Patient denies persistent postnasal drip, scleral icterus, drooling, persistent bleeding from nose/mouth.  Resp: Patient denies SOB, wheezing, productive cough.  Cards: Patient denies CP, palpitations, significant edema  GI: As above.  Derm: Patient denies jaundice/rashes.   Musc: Patient denies diffuse/irregular joint swelling or myalgias.      Objective   Wt Readings from Last 3 Encounters:   24 84.3 kg (185 lb 12.8 oz)   24 81.6 kg (180 lb)   24 83.6 kg (184 lb 6.4 oz)     Temp Readings from Last 3 Encounters:   24 97.4 °F (36.3 °C) (Infrared)   24 96.9 °F (36.1 °C) (Infrared)   24 (!) 96.5 °F (35.8 °C) (Temporal)     BP Readings from Last 3 Encounters:   24 134/84   24 (!) 174/76   24 (!) 157/74     Pulse Readings from Last 3 Encounters:   24 82

## 2024-02-13 ENCOUNTER — OFFICE VISIT (OUTPATIENT)
Dept: PAIN MANAGEMENT | Age: 69
End: 2024-02-13
Payer: MEDICARE

## 2024-02-13 VITALS
OXYGEN SATURATION: 95 % | DIASTOLIC BLOOD PRESSURE: 62 MMHG | SYSTOLIC BLOOD PRESSURE: 130 MMHG | TEMPERATURE: 96.8 F | RESPIRATION RATE: 16 BRPM | HEART RATE: 86 BPM | HEIGHT: 60 IN | BODY MASS INDEX: 36.49 KG/M2 | WEIGHT: 185.85 LBS

## 2024-02-13 DIAGNOSIS — G89.29 CHRONIC PAIN OF LEFT KNEE: ICD-10-CM

## 2024-02-13 DIAGNOSIS — M96.1 LUMBAR POST-LAMINECTOMY SYNDROME: ICD-10-CM

## 2024-02-13 DIAGNOSIS — M17.12 PRIMARY OSTEOARTHRITIS OF LEFT KNEE: ICD-10-CM

## 2024-02-13 DIAGNOSIS — G89.4 CHRONIC PAIN SYNDROME: ICD-10-CM

## 2024-02-13 DIAGNOSIS — M25.562 CHRONIC PAIN OF LEFT KNEE: ICD-10-CM

## 2024-02-13 DIAGNOSIS — G89.4 CHRONIC PAIN SYNDROME: Primary | ICD-10-CM

## 2024-02-13 DIAGNOSIS — M53.3 DISORDER OF SACRUM: ICD-10-CM

## 2024-02-13 DIAGNOSIS — M54.16 LUMBAR RADICULOPATHY: ICD-10-CM

## 2024-02-13 DIAGNOSIS — M54.6 PAIN IN THORACIC SPINE: ICD-10-CM

## 2024-02-13 PROCEDURE — G8417 CALC BMI ABV UP PARAM F/U: HCPCS | Performed by: PHYSICIAN ASSISTANT

## 2024-02-13 PROCEDURE — 3017F COLORECTAL CA SCREEN DOC REV: CPT | Performed by: PHYSICIAN ASSISTANT

## 2024-02-13 PROCEDURE — 99213 OFFICE O/P EST LOW 20 MIN: CPT | Performed by: PHYSICIAN ASSISTANT

## 2024-02-13 PROCEDURE — 3075F SYST BP GE 130 - 139MM HG: CPT | Performed by: PHYSICIAN ASSISTANT

## 2024-02-13 PROCEDURE — G8484 FLU IMMUNIZE NO ADMIN: HCPCS | Performed by: PHYSICIAN ASSISTANT

## 2024-02-13 PROCEDURE — 1036F TOBACCO NON-USER: CPT | Performed by: PHYSICIAN ASSISTANT

## 2024-02-13 PROCEDURE — 1090F PRES/ABSN URINE INCON ASSESS: CPT | Performed by: PHYSICIAN ASSISTANT

## 2024-02-13 PROCEDURE — 1124F ACP DISCUSS-NO DSCNMKR DOCD: CPT | Performed by: PHYSICIAN ASSISTANT

## 2024-02-13 PROCEDURE — G8399 PT W/DXA RESULTS DOCUMENT: HCPCS | Performed by: PHYSICIAN ASSISTANT

## 2024-02-13 PROCEDURE — 3078F DIAST BP <80 MM HG: CPT | Performed by: PHYSICIAN ASSISTANT

## 2024-02-13 PROCEDURE — G8427 DOCREV CUR MEDS BY ELIG CLIN: HCPCS | Performed by: PHYSICIAN ASSISTANT

## 2024-02-13 RX ORDER — BUPRENORPHINE 10 UG/H
1 PATCH TRANSDERMAL WEEKLY
Qty: 4 PATCH | Refills: 0 | Status: SHIPPED | OUTPATIENT
Start: 2024-02-13 | End: 2024-03-12

## 2024-02-13 NOTE — PROGRESS NOTES
Margaret Almonte presents to the [facility] on 2/13/2024. Margaret is complaining of pain low back. The pain is constant. The pain is described as aching. Pain is rated on her best day at a 3, on her worst day at a 10, and on average at a 6 on the VAS scale. She took her last dose of Butrans Patch 3 days ago, Mobic and tylenol yesterday      Any procedures since your last visit: No.    She is  on NSAIDS and  is  on anticoagulation medications to include ASA and is managed by Chichi Marrero, VISHNU - CNP.     Pacemaker or defibrillator: No.    Medication Contract and Consent for Opioid Use Documents Filed       Patient Documents       Type of Document Status Date Received Received By Description    Medication Contract Received 6/21/2019  7:35 AM JAMEY CARR med contract    Medication Contract Received 3/26/2021 10:38 AM [facility]EL[facility] PA[facility] MGMT CONTRACT DR. RONDON    Medication Contract Signed 8/12/2022 11:52 AM RAMY FAIR Pain Agreement 8/12/22    Medication Contract Received 6/21/2023 11:49 AM SHELBY WARNER PAIN AGREEMENT                       /62   Pulse 86   Temp 96.8 °F (36 °C) (Infrared)   Resp 16   Ht 1.524 m (5')   Wt 84.3 kg (185 lb 13.6 oz)   LMP  (LMP Unknown)   SpO2 95%   BMI 36.30 kg/m²      No LMP recorded (lmp unknown). Patient is postmenopausal.    
  meloxicam (MOBIC) 7.5 MG tablet Take 1 tablet by mouth daily  Patient taking differently: Take 1 tablet by mouth daily Last dose 11/17/23 10/18/23 2/15/24  Fadia Bray MD   Handicap Placard MISC by Does not apply route Start 10/18/23, expires 5 years. 10/18/23   Fadia Bray MD   ipratropium (ATROVENT) 0.06 % nasal spray 2 times daily as needed 5/1/23   Elton Bernal MD   atenolol (TENORMIN) 50 MG tablet Take 1 tablet by mouth daily 4/11/23   Chichi Marrero APRN - CNP   aspirin 81 MG EC tablet Take 1 tablet by mouth daily  Patient taking differently: Take 1 tablet by mouth daily Last dose 11/17/23 5/10/22   Lupe Man MD   acetaminophen (TYLENOL) 500 MG tablet Take 1 tablet by mouth every 6 hours as needed for Pain    Elton Bernal MD   Blood Glucose Monitoring Suppl (ACCU-CHEK GUIDE) w/Device KIT  3/8/21   Elton Bernal MD   Alcohol Swabs (PHARMACIST CHOICE ALCOHOL) PADS  3/8/21   Elton Bernal MD   Blood Glucose Calibration (ACCU-CHEK GUIDE CONTROL) LIQD  3/8/21   Elton Bernal MD   diclofenac sodium (VOLTAREN) 1 % GEL Apply topically 2 times daily 4/21/21   Julio Townsend MD   blood glucose monitor strips Test 1 times a day & as needed for symptoms of irregular blood glucose. 6/17/20   Chichi Marrero APRN - CNP   Cholecalciferol (VITAMIN D3) 2000 units CAPS Take 1 capsule by mouth daily    ProviderElton MD       Allergies   Allergen Reactions    Diclofenac     Lisinopril Other (See Comments)     Profound malaise    Diclofenac Sodium Nausea And Vomiting    Farxiga [Dapagliflozin] Itching     Vaginal itching with Farxiga, Jardiance     Lyrica [Pregabalin] Other (See Comments)     Confusion    Sulfa Antibiotics Rash       Social History     Socioeconomic History    Marital status:      Spouse name: Not on file    Number of children: Not on file    Years of education: Not on file    Highest education level: Not on file   Occupational History

## 2024-02-22 RX ORDER — BUPRENORPHINE 10 UG/H
PATCH TRANSDERMAL
Qty: 4 PATCH | Refills: 1 | OUTPATIENT
Start: 2024-02-22

## 2024-02-23 NOTE — PATIENT INSTRUCTIONS
Body Mass Index: Care Instructions  Your Care Instructions    Body mass index (BMI) can help you see if your weight is raising your risk for health problems. It uses a formula to compare how much you weigh with how tall you are. · A BMI lower than 18.5 is considered underweight. · A BMI between 18.5 and 24.9 is considered healthy. · A BMI between 25 and 29.9 is considered overweight. A BMI of 30 or higher is considered obese. If your BMI is in the normal range, it means that you have a lower risk for weight-related health problems. If your BMI is in the overweight or obese range, you may be at increased risk for weight-related health problems, such as high blood pressure, heart disease, stroke, arthritis or joint pain, and diabetes. If your BMI is in the underweight range, you may be at increased risk for health problems such as fatigue, lower protection (immunity) against illness, muscle loss, bone loss, hair loss, and hormone problems. BMI is just one measure of your risk for weight-related health problems. You may be at higher risk for health problems if you are not active, you eat an unhealthy diet, or you drink too much alcohol or use tobacco products. Follow-up care is a key part of your treatment and safety. Be sure to make and go to all appointments, and call your doctor if you are having problems. It's also a good idea to know your test results and keep a list of the medicines you take. How can you care for yourself at home? · Practice healthy eating habits. This includes eating plenty of fruits, vegetables, whole grains, lean protein, and low-fat dairy. · If your doctor recommends it, get more exercise. Walking is a good choice. Bit by bit, increase the amount you walk every day. Try for at least 30 minutes on most days of the week. · Do not smoke. Smoking can increase your risk for health problems. If you need help quitting, talk to your doctor about stop-smoking programs and medicines. Mom requesting refill on Nystatin for diaper rash.  Please advise.   account. Enter R479 in the [facility] box to learn more about \"DASH Diet: Care Instructions. \"     If you do not have an account, please click on the \"Sign Up Now\" link. Current as of: December 6, 2017  Content Version: 11.7  © 4411-9463 [employer]. Care instructions adapted under license by [facility] ([facility]). If you have questions about a medical condition or this instruction, always ask your healthcare professional. [address] any warranty or liability for your use of this information. Learning About Cutting Calories  How do calories affect your weight? Food gives your body energy. Energy from the food you eat is measured in calories. This energy keeps your heart beating, your brain active, and your muscles working. Your body needs a certain number of calories each day. After your body uses the calories it needs, it stores extra calories as fat. To lose weight safely, you have to eat fewer calories while eating in a healthy way. How many calories do you need each day? The more active you are, the more calories you need. When you are less active, you need fewer calories. How many calories you need each day also depends on several things, including your age and whether you are male or female. Here are some general guidelines for adults:  · Less active women and older adults need 1,600 to 2,000 calories each day. · Active women and less active men need 2,000 to 2,400 calories each day. · Active men need 2,400 to 3,000 calories each day. How can you cut calories and eat healthy meals? Whole grains, vegetables and fruits, and dried beans are good lower-calorie foods. They give you lots of nutrients and fiber. And they fill you up. Sweets, energy drinks, and soda pop are high in calories. They give you few nutrients and no fiber. Try to limit soda pop, fruit juice, and energy drinks. Drink water instead. Some fats can be part of a healthy diet.  But cutting back on fats from highly processed foods like fast foods and many snack foods is a good way to lower the calories in your diet. Also, use smaller amounts of fats like butter, margarine, salad dressing, and mayonnaise. Add fresh garlic, lemon, or herbs to your meals to add flavor without adding fat. Meats and dairy products can be a big source of hidden fats. Try to choose lean or low-fat versions of these products. Fat-free cookies, candies, chips, and frozen treats can still be high in sugar and calories. Some fat-free foods have more calories than regular ones. Eat fat-free treats in moderation, as you would other foods. If your favorite foods are high in fat, salt, sugar, or calories, limit how often you eat them. Eat smaller servings, or look for healthy substitutes. Fill up on fruits, vegetables, and whole grains. Eating at home  · Use meat as a side dish instead of as the main part of your meal.  · Try main dishes that use whole wheat pasta, brown rice, dried beans, or vegetables. · Find ways to cook with little or no fat, such as broiling, steaming, or grilling. · Use cooking spray instead of oil. If you use oil, use a monounsaturated oil, such as canola or olive oil. · Trim fat from meats before you cook them. · Drain off fat after you brown the meat or while you roast it. · Chill soups and stews after you cook them. Then skim the fat off the top after it hardens. Eating out  · Order foods that are broiled or poached rather than fried or breaded. · Cut back on the amount of butter or margarine that you use on bread. · Order sauces, gravies, and salad dressings on the side, and use only a little. · When you order pasta, choose tomato sauce rather than cream sauce. · Ask for salsa with your baked potato instead of sour cream, butter, cheese, or amezcua. · Order meals in a small size instead of upgrading to a large.   · Share an entree, or take part of your food home to eat as another that doesn't mean that it will work for you. It is very hard to stay on a diet that includes lots of big changes in your eating habits. If you want to get to a healthy weight and stay there, making healthy lifestyle changes will often work better than dieting. These steps can help. · Make a plan for change. Work with your doctor to create a plan that is right for you. · See a dietitian. He or she can show you how to make healthy changes in your eating habits. · Manage stress. If you have a lot of stress in your life, it can be hard to focus on making healthy changes to your daily habits. · Track your food and activity. You are likely to do better at losing weight if you keep track of what you eat and what you do. Follow-up care is a key part of your treatment and safety. Be sure to make and go to all appointments, and call your doctor if you are having problems. It's also a good idea to know your test results and keep a list of the medicines you take. Where can you learn more? Go to https://LPATHpeAvolent.[employer]. org and sign in to your [employer] account. Enter A121 in the Tip or Skip box to learn more about \"Learning About Low-Carbohydrate Diets for Weight Loss. \"     If you do not have an account, please click on the \"Sign Up Now\" link. Current as of: May 12, 2017  Content Version: 11.7  © 9854-4320 [employer]. Care instructions adapted under license by [facility] ([facility]). If you have questions about a medical condition or this instruction, always ask your healthcare professional. [address] any warranty or liability for your use of this information. Personalized Preventive Plan for Liam Machado - 9/17/2018  Medicare offers a range of preventive health benefits. Some of the tests and screenings are paid in full while other may be subject to a deductible, co-insurance, and/or copay.     Some of these benefits include a comprehensive review of your medical history including lifestyle, illnesses that may run in your family, and various assessments and screenings as appropriate. After reviewing your medical record and screening and assessments performed today your provider may have ordered immunizations, labs, imaging, and/or referrals for you. A list of these orders (if applicable) as well as your Preventive Care list are included within your After Visit Summary for your review. Other Preventive Recommendations:    · A preventive eye exam performed by an eye specialist is recommended every 1-2 years to screen for glaucoma; cataracts, macular degeneration, and other eye disorders. · A preventive dental visit is recommended every 6 months. · Try to get at least 150 minutes of exercise per week or 10,000 steps per day on a pedometer . · Order or download the FREE \"Exercise & Physical Activity: Your Everyday Guide\" from [employer]. Call 7-586.312.7210 or search [employer] online. · You need 5995-1440 mg of calcium and 1486-0081 IU of vitamin D per day. It is possible to meet your calcium requirement with diet alone, but a vitamin D supplement is usually necessary to meet this goal.  · When exposed to the sun, use a sunscreen that protects against both UVA and UVB radiation with an SPF of 30 or greater. Reapply every 2 to 3 hours or after sweating, drying off with a towel, or swimming. · Always wear a seat belt when traveling in a car. Always wear a helmet when riding a bicycle or motorcycle.

## 2024-02-26 DIAGNOSIS — E78.1 HYPERTRIGLYCERIDEMIA: ICD-10-CM

## 2024-02-26 RX ORDER — EZETIMIBE 10 MG/1
10 TABLET ORAL DAILY
Qty: 90 TABLET | Refills: 1 | Status: SHIPPED | OUTPATIENT
Start: 2024-02-26

## 2024-02-26 NOTE — TELEPHONE ENCOUNTER
Last Appointment:  10/18/2023  Future Appointments   Date Time Provider Department Center   3/12/2024 10:00 AM [facility]   3/21/2024  1:30 PM Nithya Duke MD [facility]   4/8/2024 10:30 AM Jamee Chapa APRN - CNP [facility]   4/17/2024 10:45 AM Lona Echevarria,  BDM PAIN [facility]

## 2024-03-05 DIAGNOSIS — G89.4 CHRONIC PAIN SYNDROME: ICD-10-CM

## 2024-03-05 DIAGNOSIS — M17.12 PRIMARY OSTEOARTHRITIS OF LEFT KNEE: ICD-10-CM

## 2024-03-05 DIAGNOSIS — M53.3 DISORDER OF SACRUM: ICD-10-CM

## 2024-03-05 DIAGNOSIS — M96.1 LUMBAR POST-LAMINECTOMY SYNDROME: ICD-10-CM

## 2024-03-05 DIAGNOSIS — M25.562 CHRONIC PAIN OF LEFT KNEE: ICD-10-CM

## 2024-03-05 DIAGNOSIS — G89.29 CHRONIC PAIN OF LEFT KNEE: ICD-10-CM

## 2024-03-05 RX ORDER — MELOXICAM 7.5 MG/1
7.5 TABLET ORAL DAILY
Qty: 30 TABLET | Refills: 3 | OUTPATIENT
Start: 2024-03-05 | End: 2024-07-03

## 2024-03-11 SDOH — HEALTH STABILITY: PHYSICAL HEALTH: ON AVERAGE, HOW MANY MINUTES DO YOU ENGAGE IN EXERCISE AT THIS LEVEL?: 20 MIN

## 2024-03-11 SDOH — HEALTH STABILITY: PHYSICAL HEALTH: ON AVERAGE, HOW MANY DAYS PER WEEK DO YOU ENGAGE IN MODERATE TO STRENUOUS EXERCISE (LIKE A BRISK WALK)?: 2 DAYS

## 2024-03-11 ASSESSMENT — LIFESTYLE VARIABLES
HOW OFTEN DO YOU HAVE A DRINK CONTAINING ALCOHOL: MONTHLY OR LESS
HOW MANY STANDARD DRINKS CONTAINING ALCOHOL DO YOU HAVE ON A TYPICAL DAY: 1
HOW OFTEN DO YOU HAVE SIX OR MORE DRINKS ON ONE OCCASION: 1
HOW MANY STANDARD DRINKS CONTAINING ALCOHOL DO YOU HAVE ON A TYPICAL DAY: 1 OR 2
HOW OFTEN DO YOU HAVE A DRINK CONTAINING ALCOHOL: 2

## 2024-03-11 ASSESSMENT — PATIENT HEALTH QUESTIONNAIRE - PHQ9
SUM OF ALL RESPONSES TO PHQ QUESTIONS 1-9: 0
2. FEELING DOWN, DEPRESSED OR HOPELESS: 0
SUM OF ALL RESPONSES TO PHQ9 QUESTIONS 1 & 2: 0
SUM OF ALL RESPONSES TO PHQ QUESTIONS 1-9: 0
1. LITTLE INTEREST OR PLEASURE IN DOING THINGS: 0

## 2024-03-12 ENCOUNTER — OFFICE VISIT (OUTPATIENT)
Dept: FAMILY MEDICINE CLINIC | Age: 69
End: 2024-03-12
Payer: MEDICARE

## 2024-03-12 VITALS
RESPIRATION RATE: 16 BRPM | SYSTOLIC BLOOD PRESSURE: 126 MMHG | BODY MASS INDEX: 35.93 KG/M2 | WEIGHT: 183 LBS | HEART RATE: 83 BPM | DIASTOLIC BLOOD PRESSURE: 72 MMHG | HEIGHT: 60 IN | TEMPERATURE: 97.4 F | OXYGEN SATURATION: 95 %

## 2024-03-12 DIAGNOSIS — M46.1 SACROILIITIS, NOT ELSEWHERE CLASSIFIED (HCC): ICD-10-CM

## 2024-03-12 DIAGNOSIS — M53.3 DISORDER OF SACRUM: ICD-10-CM

## 2024-03-12 DIAGNOSIS — M25.562 CHRONIC PAIN OF LEFT KNEE: ICD-10-CM

## 2024-03-12 DIAGNOSIS — M96.1 LUMBAR POST-LAMINECTOMY SYNDROME: ICD-10-CM

## 2024-03-12 DIAGNOSIS — M17.12 PRIMARY OSTEOARTHRITIS OF LEFT KNEE: ICD-10-CM

## 2024-03-12 DIAGNOSIS — Z00.00 MEDICARE ANNUAL WELLNESS VISIT, SUBSEQUENT: Primary | ICD-10-CM

## 2024-03-12 DIAGNOSIS — E11.9 TYPE 2 DIABETES MELLITUS WITHOUT COMPLICATION, WITHOUT LONG-TERM CURRENT USE OF INSULIN (HCC): ICD-10-CM

## 2024-03-12 DIAGNOSIS — G89.4 CHRONIC PAIN SYNDROME: ICD-10-CM

## 2024-03-12 DIAGNOSIS — G89.29 CHRONIC PAIN OF LEFT KNEE: ICD-10-CM

## 2024-03-12 PROBLEM — Z23 FLU VACCINE NEED: Status: RESOLVED | Noted: 2023-01-11 | Resolved: 2024-03-12

## 2024-03-12 PROCEDURE — 3046F HEMOGLOBIN A1C LEVEL >9.0%: CPT | Performed by: NURSE PRACTITIONER

## 2024-03-12 PROCEDURE — 3017F COLORECTAL CA SCREEN DOC REV: CPT | Performed by: NURSE PRACTITIONER

## 2024-03-12 PROCEDURE — 3078F DIAST BP <80 MM HG: CPT | Performed by: NURSE PRACTITIONER

## 2024-03-12 PROCEDURE — G0439 PPPS, SUBSEQ VISIT: HCPCS | Performed by: NURSE PRACTITIONER

## 2024-03-12 PROCEDURE — 1124F ACP DISCUSS-NO DSCNMKR DOCD: CPT | Performed by: NURSE PRACTITIONER

## 2024-03-12 PROCEDURE — G8484 FLU IMMUNIZE NO ADMIN: HCPCS | Performed by: NURSE PRACTITIONER

## 2024-03-12 PROCEDURE — 3074F SYST BP LT 130 MM HG: CPT | Performed by: NURSE PRACTITIONER

## 2024-03-12 RX ORDER — MELOXICAM 7.5 MG/1
7.5 TABLET ORAL DAILY
Qty: 90 TABLET | Refills: 1 | Status: SHIPPED | OUTPATIENT
Start: 2024-03-12

## 2024-03-12 SDOH — ECONOMIC STABILITY: FOOD INSECURITY: WITHIN THE PAST 12 MONTHS, YOU WORRIED THAT YOUR FOOD WOULD RUN OUT BEFORE YOU GOT MONEY TO BUY MORE.: NEVER TRUE

## 2024-03-12 SDOH — ECONOMIC STABILITY: INCOME INSECURITY: HOW HARD IS IT FOR YOU TO PAY FOR THE VERY BASICS LIKE FOOD, HOUSING, MEDICAL CARE, AND HEATING?: NOT HARD AT ALL

## 2024-03-12 SDOH — ECONOMIC STABILITY: FOOD INSECURITY: WITHIN THE PAST 12 MONTHS, THE FOOD YOU BOUGHT JUST DIDN'T LAST AND YOU DIDN'T HAVE MONEY TO GET MORE.: NEVER TRUE

## 2024-03-12 NOTE — ASSESSMENT & PLAN NOTE
Monitored by specialist- no acute findings meriting change in the plan well controlled continue medications per endocrinologist

## 2024-03-12 NOTE — PATIENT INSTRUCTIONS
supplement is usually necessary to meet this goal.  When exposed to the sun, use a sunscreen that protects against both UVA and UVB radiation with an SPF of 30 or greater. Reapply every 2 to 3 hours or after sweating, drying off with a towel, or swimming.  Always wear a seat belt when traveling in a car. Always wear a helmet when riding a bicycle or motorcycle.

## 2024-03-12 NOTE — ASSESSMENT & PLAN NOTE
Monitored by specialist- no acute findings meriting change in the plan follows with pain management,

## 2024-03-12 NOTE — PROGRESS NOTES
Take 1 capsule by mouth daily Yes Provider, MD Elton       CareTeam (Including outside providers/suppliers regularly involved in providing care):   Patient Care Team:  Chichi Marrero APRN - CNP as PCP - General (Nurse Practitioner)  Chichi Marrero APRN - CNP as PCP - Empaneled Provider     Reviewed and updated this visit:  Tobacco  Allergies  Meds  Problems  Med Hx  Surg Hx  Soc Hx  Fam Hx

## 2024-03-21 ENCOUNTER — OFFICE VISIT (OUTPATIENT)
Age: 69
End: 2024-03-21
Payer: MEDICARE

## 2024-03-21 VITALS
DIASTOLIC BLOOD PRESSURE: 77 MMHG | BODY MASS INDEX: 35.9 KG/M2 | WEIGHT: 183.8 LBS | HEART RATE: 85 BPM | SYSTOLIC BLOOD PRESSURE: 138 MMHG

## 2024-03-21 DIAGNOSIS — Z12.4 CERVICAL CANCER SCREENING: Primary | ICD-10-CM

## 2024-03-21 DIAGNOSIS — E66.01 SEVERE OBESITY (BMI 35.0-39.9) WITH COMORBIDITY (HCC): ICD-10-CM

## 2024-03-21 PROCEDURE — G0101 CA SCREEN;PELVIC/BREAST EXAM: HCPCS | Performed by: LEGAL MEDICINE

## 2024-03-21 PROCEDURE — G8427 DOCREV CUR MEDS BY ELIG CLIN: HCPCS | Performed by: LEGAL MEDICINE

## 2024-03-21 PROCEDURE — G8417 CALC BMI ABV UP PARAM F/U: HCPCS | Performed by: LEGAL MEDICINE

## 2024-03-21 NOTE — PATIENT INSTRUCTIONS
Call me with bloating ,cramping , abdominal pain lasting 2 weeks or more.  Call me with any changes in your bowel or urinary habits lasting 2 weeks or more.  Call me with any spotting or bleeding.  These are signs of ovarian and uterine cancer.

## 2024-03-21 NOTE — PROGRESS NOTES
Margaret Almonte     Patient presents for annual exam.     No complaints.  No bloating cramping abdominal pain.  No spotting or bleeding.  No changes in her bowel or urinary function.    Is up-to-date on her colonoscopy.    Past Medical History:   Diagnosis Date    Acute left lumbar radiculopathy 2017    Cancer (HCC) 2017    colon    Cerebral artery occlusion with cerebral infarction (HCC)     tia; no deficits    Chronic back pain     Chronic deep vein thrombosis (DVT) of distal vein of left lower extremity (HCC) 2021    after back surgery    Diabetes mellitus (HCC)     Fatigue 2016    Fibromyalgia     GERD (gastroesophageal reflux disease)     Hair loss 2021    Hemorrhoids     Hx of blood clots     Hyperlipidemia     Hypertension     Malignant neoplasm of sigmoid colon (HCC) 2020    Obesity     Osteoarthritis     PONV (postoperative nausea and vomiting)     TIA (transient ischemic attack) 2022    Urinary incontinence     Weakness of both legs 2020        Past Surgical History:   Procedure Laterality Date    ANESTHESIA NERVE BLOCK Bilateral 08/15/2019    BILATERAL INTRA-ARTICULAR FACET JOINT INJECTION WITH FLUOROSCOPIC GUIDANCE AT L4-5, L5-S1 WITH IV SEDATION performed by Nancy Viramontes DO at [facility] OR     SECTION      CHOLECYSTECTOMY      COLON SURGERY      resection    COLONOSCOPY      COLONOSCOPY N/A 2024    COLONOSCOPY DIAGNOSTIC performed by Tutu Monge MD at [facility] ENDOSCOPY    DILATION AND CURETTAGE OF UTERUS N/A 10/23/2019    DILATATION AND CURETTAGE HYSTEROSCOPY performed by Nithya Duke MD at [facility] OR    EPIDURAL STEROID INJECTION Right 2019    C7-T1 EPIDURAL STEROID INJECTION #1 TOWARD THE RIGHT performed by Lona Echevarria DO at [facility] OR    ESOPHAGOGASTRODUODENOSCOPY      FOOT SURGERY Bilateral     LUMBAR SPINE SURGERY N/A 2020    L4-L5  POSTERIOR LUMBAR INTERBODY FUSION, T2-T3 DECOMPRESSIVE LAMINECTOMY performed by

## 2024-03-26 LAB — GYNECOLOGY CYTOLOGY REPORT: NORMAL

## 2024-04-10 DIAGNOSIS — M17.12 PRIMARY OSTEOARTHRITIS OF LEFT KNEE: ICD-10-CM

## 2024-04-10 DIAGNOSIS — G89.4 CHRONIC PAIN SYNDROME: ICD-10-CM

## 2024-04-10 DIAGNOSIS — M96.1 LUMBAR POST-LAMINECTOMY SYNDROME: ICD-10-CM

## 2024-04-10 DIAGNOSIS — M53.3 DISORDER OF SACRUM: ICD-10-CM

## 2024-04-10 DIAGNOSIS — G89.29 CHRONIC PAIN OF LEFT KNEE: ICD-10-CM

## 2024-04-10 DIAGNOSIS — M25.562 CHRONIC PAIN OF LEFT KNEE: ICD-10-CM

## 2024-04-10 RX ORDER — BUPRENORPHINE 10 UG/H
1 PATCH TRANSDERMAL WEEKLY
Qty: 4 PATCH | Refills: 0 | OUTPATIENT
Start: 2024-04-10 | End: 2024-05-08

## 2024-04-12 ENCOUNTER — TELEPHONE (OUTPATIENT)
Dept: PAIN MANAGEMENT | Age: 69
End: 2024-04-12

## 2024-04-12 DIAGNOSIS — M25.562 CHRONIC PAIN OF LEFT KNEE: ICD-10-CM

## 2024-04-12 DIAGNOSIS — M17.12 PRIMARY OSTEOARTHRITIS OF LEFT KNEE: ICD-10-CM

## 2024-04-12 DIAGNOSIS — M53.3 DISORDER OF SACRUM: ICD-10-CM

## 2024-04-12 DIAGNOSIS — G89.4 CHRONIC PAIN SYNDROME: ICD-10-CM

## 2024-04-12 DIAGNOSIS — G89.29 CHRONIC PAIN OF LEFT KNEE: ICD-10-CM

## 2024-04-12 DIAGNOSIS — M96.1 LUMBAR POST-LAMINECTOMY SYNDROME: ICD-10-CM

## 2024-04-12 RX ORDER — BUPRENORPHINE 10 UG/H
1 PATCH TRANSDERMAL WEEKLY
Qty: 4 PATCH | Refills: 0 | Status: SHIPPED
Start: 2024-04-12 | End: 2024-04-17 | Stop reason: SDUPTHER

## 2024-04-12 NOTE — TELEPHONE ENCOUNTER
Margaret Almonte called into the office requesting a refill of Butrans Patch. Her next office visit is scheduled for 4/17/24.

## 2024-04-16 NOTE — PROGRESS NOTES
[facility]  [address] [place] [id number]    Follow up Note      Margaret Almonte     Date of Visit:  2024    CC:  Patient presents for follow up   Chief Complaint   Patient presents with    Follow-up     Low back pain       HPI:    Pain is a little worse to her left buttock area.  Appropriate analgesia with current medications regimen: yes.    Change in quality of symptoms:no.    Medication side effects:none.   Recent diagnostic testing:none.   Recent interventional procedures: None.      She has been on anticoagulation medications to include ASA and has not been on herbal supplements.  She is diabetic.     Imagin/2023 MRI lumbar w/ and w/o  -     FINDINGS:   BONES/ALIGNMENT:  No fracture or joint dislocation.  Status post   decompressive laminectomies with posterior interbody fusion at L4-5.  Disc   spacer is noted at L4-5.  Grossly stable postoperative seroma in the   posterior paraspinal space at L4-5.  It measures approximately 2.7 x 1.9 cm.       SPINAL CORD:  The conus terminates normally.       SOFT TISSUES: No paraspinal mass lesions seen.       L1-L2: Disc desiccation with a small disc bulge.  Mild facet and ligamentous   hypertrophy.  Mild central canal and lateral recess stenoses.  Moderate   neural foraminal stenoses.       L2-L3: Disc desiccation with a disc bulge.  Moderate facet hypertrophy.   Moderate to severe central canal, lateral recess and neural foraminal   stenoses.       L3-L4: Disc desiccation with a small disc bulge.  Moderate facet hypertrophy.   Moderate central canal and lateral recess stenoses.  Moderate right and   mild-to-moderate left neural foraminal stenoses.       L4-L5: Stable postoperative changes, as described above.  Mild peridural   fibrosis, most notably along the left lateral and posterior aspect of the   thecal sac.  Mild facet hypertrophy.  No significant central canal or lateral   recess stenosis.  Mild neural foraminal stenoses.

## 2024-04-17 ENCOUNTER — OFFICE VISIT (OUTPATIENT)
Dept: PAIN MANAGEMENT | Age: 69
End: 2024-04-17
Payer: MEDICARE

## 2024-04-17 VITALS
HEART RATE: 87 BPM | SYSTOLIC BLOOD PRESSURE: 136 MMHG | BODY MASS INDEX: 36.1 KG/M2 | WEIGHT: 183.86 LBS | TEMPERATURE: 97.5 F | OXYGEN SATURATION: 98 % | RESPIRATION RATE: 16 BRPM | DIASTOLIC BLOOD PRESSURE: 78 MMHG | HEIGHT: 60 IN

## 2024-04-17 DIAGNOSIS — M25.562 CHRONIC PAIN OF LEFT KNEE: ICD-10-CM

## 2024-04-17 DIAGNOSIS — M54.16 LUMBAR RADICULOPATHY: ICD-10-CM

## 2024-04-17 DIAGNOSIS — M48.062 SPINAL STENOSIS OF LUMBAR REGION WITH NEUROGENIC CLAUDICATION: ICD-10-CM

## 2024-04-17 DIAGNOSIS — G89.4 CHRONIC PAIN SYNDROME: Primary | ICD-10-CM

## 2024-04-17 DIAGNOSIS — G89.29 CHRONIC PAIN OF LEFT KNEE: ICD-10-CM

## 2024-04-17 DIAGNOSIS — M53.3 DISORDER OF SACRUM: ICD-10-CM

## 2024-04-17 DIAGNOSIS — M96.1 LUMBAR POST-LAMINECTOMY SYNDROME: ICD-10-CM

## 2024-04-17 DIAGNOSIS — M17.12 PRIMARY OSTEOARTHRITIS OF LEFT KNEE: ICD-10-CM

## 2024-04-17 PROCEDURE — 1090F PRES/ABSN URINE INCON ASSESS: CPT | Performed by: PAIN MEDICINE

## 2024-04-17 PROCEDURE — G8399 PT W/DXA RESULTS DOCUMENT: HCPCS | Performed by: PAIN MEDICINE

## 2024-04-17 PROCEDURE — G8427 DOCREV CUR MEDS BY ELIG CLIN: HCPCS | Performed by: PAIN MEDICINE

## 2024-04-17 PROCEDURE — 3078F DIAST BP <80 MM HG: CPT | Performed by: PAIN MEDICINE

## 2024-04-17 PROCEDURE — G8417 CALC BMI ABV UP PARAM F/U: HCPCS | Performed by: PAIN MEDICINE

## 2024-04-17 PROCEDURE — 99215 OFFICE O/P EST HI 40 MIN: CPT

## 2024-04-17 PROCEDURE — 3075F SYST BP GE 130 - 139MM HG: CPT | Performed by: PAIN MEDICINE

## 2024-04-17 PROCEDURE — 1036F TOBACCO NON-USER: CPT | Performed by: PAIN MEDICINE

## 2024-04-17 PROCEDURE — 3017F COLORECTAL CA SCREEN DOC REV: CPT | Performed by: PAIN MEDICINE

## 2024-04-17 PROCEDURE — 1124F ACP DISCUSS-NO DSCNMKR DOCD: CPT | Performed by: PAIN MEDICINE

## 2024-04-17 PROCEDURE — 99215 OFFICE O/P EST HI 40 MIN: CPT | Performed by: PAIN MEDICINE

## 2024-04-17 RX ORDER — BUPRENORPHINE 10 UG/H
1 PATCH TRANSDERMAL WEEKLY
Qty: 4 PATCH | Refills: 1 | Status: SHIPPED | OUTPATIENT
Start: 2024-05-10 | End: 2024-07-05

## 2024-04-17 NOTE — PROGRESS NOTES
Margaret Almonte presents to the [facility] on 4/17/2024. Margaret is complaining of pain low back. The pain is constant. The pain is described as aching. Pain is rated on her best day at a 3, on her worst day at a 10, and on average at a 6 on the VAS scale. She took her last dose of Butrans Patch 4/12/24, Mobic and tylenol yesterday.     Any procedures since your last visit: No.    Pacemaker or defibrillator: No.    She is  on NSAIDS and is  on anticoagulation medications to include ASA and is managed by Chichi Marrero, VISHNU-CNP.     Medication Contract and Consent for Opioid Use Documents Filed       Patient Documents       Type of Document Status Date Received Received By Description    Medication Contract Received 6/21/2019  7:35 AM JAMEY CARR med contract    Medication Contract Received 3/26/2021 10:38 AM KARL KELLEY PAIN MGMT CONTRACT DR. RONDON    Medication Contract Signed 8/12/2022 11:52 AM RAMY FAIR Pain Agreement 8/12/22    Medication Contract Received 6/21/2023 11:49 AM SHELBY WARNER PAIN AGREEMENT                    /78   Pulse 87   Temp 97.5 °F (36.4 °C) (Infrared)   Resp 16   Ht 1.524 m (5')   Wt 83.4 kg (183 lb 13.8 oz)   LMP  (LMP Unknown)   SpO2 98%   BMI 35.91 kg/m²      No LMP recorded (lmp unknown). Patient is postmenopausal.

## 2024-05-07 DIAGNOSIS — M96.1 LUMBAR POST-LAMINECTOMY SYNDROME: ICD-10-CM

## 2024-05-07 DIAGNOSIS — M48.062 SPINAL STENOSIS OF LUMBAR REGION WITH NEUROGENIC CLAUDICATION: ICD-10-CM

## 2024-05-07 DIAGNOSIS — M54.16 LUMBAR RADICULOPATHY: ICD-10-CM

## 2024-05-07 DIAGNOSIS — M53.3 DISORDER OF SACRUM: ICD-10-CM

## 2024-05-07 LAB
BUN BLDV-MCNC: 14 MG/DL (ref 6–23)
CREAT SERPL-MCNC: 0.6 MG/DL (ref 0.5–1)
GFR, ESTIMATED: >90 ML/MIN/1.73M2
INR BLD: 1.1
PROTHROMBIN TIME: 11.8 SEC (ref 9.3–12.4)

## 2024-05-09 ENCOUNTER — HOSPITAL ENCOUNTER (OUTPATIENT)
Dept: CT IMAGING | Age: 69
Discharge: HOME | End: 2024-05-11
Payer: MEDICARE

## 2024-05-09 ENCOUNTER — HOSPITAL ENCOUNTER (OUTPATIENT)
Age: 69
Discharge: HOME | End: 2024-05-09
Payer: MEDICARE

## 2024-05-09 ENCOUNTER — HOSPITAL ENCOUNTER (OUTPATIENT)
Dept: GENERAL RADIOLOGY | Age: 69
Discharge: HOME | End: 2024-05-11
Payer: MEDICARE

## 2024-05-09 VITALS
SYSTOLIC BLOOD PRESSURE: 161 MMHG | OXYGEN SATURATION: 98 % | TEMPERATURE: 97.3 F | RESPIRATION RATE: 20 BRPM | HEART RATE: 73 BPM | DIASTOLIC BLOOD PRESSURE: 74 MMHG

## 2024-05-09 DIAGNOSIS — M48.062 SPINAL STENOSIS OF LUMBAR REGION WITH NEUROGENIC CLAUDICATION: ICD-10-CM

## 2024-05-09 DIAGNOSIS — M96.1 LUMBAR POST-LAMINECTOMY SYNDROME: ICD-10-CM

## 2024-05-09 DIAGNOSIS — M54.16 LUMBAR RADICULOPATHY: ICD-10-CM

## 2024-05-09 LAB
ERYTHROCYTE [DISTWIDTH] IN BLOOD BY AUTOMATED COUNT: 13.2 % (ref 11.5–15)
HCT VFR BLD AUTO: 40 % (ref 34–48)
HGB BLD-MCNC: 13 G/DL (ref 11.5–15.5)
MCH RBC QN AUTO: 30.1 PG (ref 26–35)
MCHC RBC AUTO-ENTMCNC: 32.5 G/DL (ref 32–34.5)
MCV RBC AUTO: 92.6 FL (ref 80–99.9)
PLATELET # BLD AUTO: 188 K/UL (ref 130–450)
PMV BLD AUTO: 10.2 FL (ref 7–12)
RBC # BLD AUTO: 4.32 M/UL (ref 3.5–5.5)
WBC OTHER # BLD: 6.1 K/UL (ref 4.5–11.5)

## 2024-05-09 PROCEDURE — 7100000011 HC PHASE II RECOVERY - ADDTL 15 MIN

## 2024-05-09 PROCEDURE — 6370000000 HC RX 637 (ALT 250 FOR IP): Performed by: PHYSICIAN ASSISTANT

## 2024-05-09 PROCEDURE — 6360000004 HC RX CONTRAST MEDICATION: Performed by: RADIOLOGY

## 2024-05-09 PROCEDURE — 2500000003 HC RX 250 WO HCPCS: Performed by: PHYSICIAN ASSISTANT

## 2024-05-09 PROCEDURE — 7100000010 HC PHASE II RECOVERY - FIRST 15 MIN

## 2024-05-09 PROCEDURE — 62304 MYELOGRAPHY LUMBAR INJECTION: CPT

## 2024-05-09 PROCEDURE — 85027 COMPLETE CBC AUTOMATED: CPT

## 2024-05-09 PROCEDURE — 36415 COLL VENOUS BLD VENIPUNCTURE: CPT

## 2024-05-09 PROCEDURE — 72132 CT LUMBAR SPINE W/DYE: CPT

## 2024-05-09 RX ORDER — ACETAMINOPHEN 325 MG/1
650 TABLET ORAL EVERY 4 HOURS PRN
Status: DISCONTINUED | OUTPATIENT
Start: 2024-05-09 | End: 2024-05-12 | Stop reason: HOSPADM

## 2024-05-09 RX ORDER — IOPAMIDOL 408 MG/ML
8 INJECTION, SOLUTION INTRATHECAL
Status: COMPLETED | OUTPATIENT
Start: 2024-05-09 | End: 2024-05-09

## 2024-05-09 RX ORDER — LIDOCAINE HYDROCHLORIDE 10 MG/ML
INJECTION, SOLUTION INFILTRATION; PERINEURAL PRN
Status: COMPLETED | OUTPATIENT
Start: 2024-05-09 | End: 2024-05-09

## 2024-05-09 RX ADMIN — LIDOCAINE HYDROCHLORIDE 5 ML: 10 INJECTION, SOLUTION INFILTRATION; PERINEURAL at 11:55

## 2024-05-09 RX ADMIN — IOPAMIDOL 8 ML: 408 INJECTION, SOLUTION INTRATHECAL at 12:27

## 2024-05-09 RX ADMIN — ACETAMINOPHEN 650 MG: 325 TABLET ORAL at 12:52

## 2024-05-09 ASSESSMENT — PAIN DESCRIPTION - PAIN TYPE: TYPE: CHRONIC PAIN

## 2024-05-09 ASSESSMENT — PAIN - FUNCTIONAL ASSESSMENT
PAIN_FUNCTIONAL_ASSESSMENT: 0-10
PAIN_FUNCTIONAL_ASSESSMENT: ACTIVITIES ARE NOT PREVENTED
PAIN_FUNCTIONAL_ASSESSMENT: 0-10
PAIN_FUNCTIONAL_ASSESSMENT: ACTIVITIES ARE NOT PREVENTED
PAIN_FUNCTIONAL_ASSESSMENT: 0-10
PAIN_FUNCTIONAL_ASSESSMENT: 0-10

## 2024-05-09 ASSESSMENT — PAIN DESCRIPTION - DESCRIPTORS
DESCRIPTORS: ACHING
DESCRIPTORS: DISCOMFORT
DESCRIPTORS: ACHING
DESCRIPTORS: ACHING
DESCRIPTORS: DISCOMFORT

## 2024-05-09 ASSESSMENT — PAIN SCALES - GENERAL
PAINLEVEL_OUTOF10: 2
PAINLEVEL_OUTOF10: 1
PAINLEVEL_OUTOF10: 2
PAINLEVEL_OUTOF10: 1
PAINLEVEL_OUTOF10: 0
PAINLEVEL_OUTOF10: 0

## 2024-05-09 ASSESSMENT — PAIN DESCRIPTION - LOCATION
LOCATION: HEAD

## 2024-05-09 ASSESSMENT — PAIN DESCRIPTION - FREQUENCY: FREQUENCY: CONTINUOUS

## 2024-05-09 ASSESSMENT — PAIN DESCRIPTION - ORIENTATION: ORIENTATION: INNER

## 2024-05-09 NOTE — DISCHARGE INSTRUCTIONS
May resume medications tomorrow.     Keep bandage on for 24hrs. No swimming pools, hot tubs or taking a bath for 48hrs. May shower tomorrow. Any signs of infection at injection site (I.e. swelling, redness or discharge) present to the emergency room or call your doctor for follow up/further instructions. Some tenderness at injection site is normal, you may use an ice pack or heat whichever provides comfort (call your doctor if it persists). May take Tylenol/Ibuprofen as well for back pain.     Bedrest for the remainder of today when you get home. Tomorrow you may go about normal activities (avoid heavy lifting or strenuous activities). If you start to feel a headache coming on - lay down. If this helps the headache - do another day of bed rest and push fluids (water, coffee or tea).     Myelogram and spinal headache information sheets have been attached if you wish to review those.

## 2024-05-09 NOTE — PROCEDURES
Procedure: Image Guided Lumbar Myelogram    Diagnosis: back pain    Findings: Successful intrathecal injection of 8 cc Isovue M200    Specimen: None.     Anesthesia: Local infiltration of Lidocaine 1% 5ml without epinephrine    EBL: Minimal.    Complication: None immediately post procedure.    Plan: 3 hours bedrest in recovery/PACU then discharge to home.     Comments:    See radiology dictation in PACs for image review and additional procedural information.

## 2024-05-09 NOTE — OR NURSING
Patient arrival from home to radiology for lumbosacral myelogram. Vitals taken, consent signed, and Ryann Mills PA-C in to speak with the patient about the procedure. Patient placed prone on Fluoroscopy table, patient prepped and draped. Using fluoroscopy imaging, needle placed to lower back by Ryann Mills. 8 Ml Isovue M200 Contrast injected. Needle removed, site cleansed, and band aid applied to site. Patient placed on cart and taken to CT. Report called to stage II recovery.

## 2024-05-10 ENCOUNTER — TELEPHONE (OUTPATIENT)
Dept: PAIN MANAGEMENT | Age: 69
End: 2024-05-10

## 2024-05-10 NOTE — TELEPHONE ENCOUNTER
Margaret Almonte called in with concerns of increasing tightness and stiffness in her body after her Myelogram procedure yesterday. She also states that she has left leg weakness, which isn't new, but it has slightly progressed which she stated was new for her. She denies any loss of bowel or bladder. She wasn't sure if her symptoms were related to her Mobic being held, or if she should seek medical treatment. Please advise.

## 2024-05-10 NOTE — TELEPHONE ENCOUNTER
I spoke to Margaret Almonte and explained to her that she will need to call IR for anything related to the procedure and she stated understanding, she also confirmed that she does have their phone number on her discharge papers.

## 2024-05-10 NOTE — TELEPHONE ENCOUNTER
She does need to call IR as they would manage anything related to the procedure.  She should have their number on her discharge paperwork.  Can you please verify this

## 2024-05-13 ENCOUNTER — OFFICE VISIT (OUTPATIENT)
Age: 69
End: 2024-05-13
Payer: MEDICARE

## 2024-05-13 VITALS
WEIGHT: 180 LBS | OXYGEN SATURATION: 100 % | HEART RATE: 83 BPM | SYSTOLIC BLOOD PRESSURE: 132 MMHG | BODY MASS INDEX: 35.15 KG/M2 | TEMPERATURE: 97.9 F | DIASTOLIC BLOOD PRESSURE: 70 MMHG

## 2024-05-13 DIAGNOSIS — R19.7 DIARRHEA, UNSPECIFIED TYPE: Primary | ICD-10-CM

## 2024-05-13 DIAGNOSIS — I10 ESSENTIAL HYPERTENSION: ICD-10-CM

## 2024-05-13 PROCEDURE — G8417 CALC BMI ABV UP PARAM F/U: HCPCS | Performed by: NURSE PRACTITIONER

## 2024-05-13 PROCEDURE — G8399 PT W/DXA RESULTS DOCUMENT: HCPCS | Performed by: NURSE PRACTITIONER

## 2024-05-13 PROCEDURE — 99212 OFFICE O/P EST SF 10 MIN: CPT | Performed by: NURSE PRACTITIONER

## 2024-05-13 PROCEDURE — 3078F DIAST BP <80 MM HG: CPT | Performed by: NURSE PRACTITIONER

## 2024-05-13 PROCEDURE — 1090F PRES/ABSN URINE INCON ASSESS: CPT | Performed by: NURSE PRACTITIONER

## 2024-05-13 PROCEDURE — 3017F COLORECTAL CA SCREEN DOC REV: CPT | Performed by: NURSE PRACTITIONER

## 2024-05-13 PROCEDURE — 1036F TOBACCO NON-USER: CPT | Performed by: NURSE PRACTITIONER

## 2024-05-13 PROCEDURE — G8427 DOCREV CUR MEDS BY ELIG CLIN: HCPCS | Performed by: NURSE PRACTITIONER

## 2024-05-13 PROCEDURE — 1124F ACP DISCUSS-NO DSCNMKR DOCD: CPT | Performed by: NURSE PRACTITIONER

## 2024-05-13 PROCEDURE — 3075F SYST BP GE 130 - 139MM HG: CPT | Performed by: NURSE PRACTITIONER

## 2024-05-13 RX ORDER — ATENOLOL 50 MG/1
50 TABLET ORAL DAILY
Qty: 90 TABLET | Refills: 3 | OUTPATIENT
Start: 2024-05-13

## 2024-05-13 RX ORDER — CALCIUM POLYCARBOPHIL 625 MG
625 TABLET ORAL 2 TIMES DAILY
Qty: 60 TABLET | Refills: 5 | Status: SHIPPED | OUTPATIENT
Start: 2024-05-13

## 2024-05-13 NOTE — PROGRESS NOTES
History:   Diagnosis Date    Acute left lumbar radiculopathy 2017    Cancer (HCC) 2017    colon    Cerebral artery occlusion with cerebral infarction (HCC)     tia; no deficits    Chronic back pain     Chronic deep vein thrombosis (DVT) of distal vein of left lower extremity (HCC) 2021    after back surgery    Diabetes mellitus (HCC)     Fatigue 2016    Fibromyalgia     GERD (gastroesophageal reflux disease)     Hair loss 2021    Hemorrhoids     Hx of blood clots     Hyperlipidemia     Hypertension     Malignant neoplasm of sigmoid colon (HCC) 2020    Obesity     Osteoarthritis     PONV (postoperative nausea and vomiting)     TIA (transient ischemic attack) 2022    Urinary incontinence     Weakness of both legs 2020      Past Surgical History:   Procedure Laterality Date    ANESTHESIA NERVE BLOCK Bilateral 08/15/2019    BILATERAL INTRA-ARTICULAR FACET JOINT INJECTION WITH FLUOROSCOPIC GUIDANCE AT L4-5, L5-S1 WITH IV SEDATION performed by Nancy Viramontes DO at [facility] OR     SECTION      CHOLECYSTECTOMY      COLON SURGERY      resection    COLONOSCOPY      COLONOSCOPY N/A 2024    COLONOSCOPY DIAGNOSTIC performed by Ttuu Monge MD at [facility] ENDOSCOPY    DILATION AND CURETTAGE OF UTERUS N/A 10/23/2019    DILATATION AND CURETTAGE HYSTEROSCOPY performed by Nithya Duke MD at [facility] OR    EPIDURAL STEROID INJECTION Right 2019    C7-T1 EPIDURAL STEROID INJECTION #1 TOWARD THE RIGHT performed by Lona Echevarria DO at [facility] OR    ESOPHAGOGASTRODUODENOSCOPY      FOOT SURGERY Bilateral     LUMBAR SPINE SURGERY N/A 2020    L4-L5  POSTERIOR LUMBAR INTERBODY FUSION, T2-T3 DECOMPRESSIVE LAMINECTOMY performed by Zechariah Gomez MD at [facility] OR    NERVE BLOCK Right 2017    right L4-5 transforaminal epidural #1    NERVE BLOCK Left 2018    si inj    NERVE BLOCK Bilateral 08/15/2019    bilateral intra-articular facet joint injection with

## 2024-05-14 ENCOUNTER — TELEPHONE (OUTPATIENT)
Dept: PAIN MANAGEMENT | Age: 69
End: 2024-05-14

## 2024-05-14 NOTE — TELEPHONE ENCOUNTER
Called patient.  Reviewed the notes from IR who recommend that she go to the ED to be evaluated. Discussed that she needs examined and imaging completed.  She reports that she will go to the ED. Discussed red flag symptoms.

## 2024-05-14 NOTE — TELEPHONE ENCOUNTER
Margaret ANDRADE Suzy called in and stated that she is still having pain in her left leg, it has worsened since the Myelogram she had last week, her pain level is 9/10, she denies any loss of bowel or bladder control, she stated today was her first bowel movement since the Myelogram.  She has to use a walker to ambulate and stated she can only stand for very short periods of time and   the only relief she gets is when she is lying down and it is very minimal.  She called IR like we suggested last week and she stated that they told her to call our office.  Please advise.

## 2024-05-15 ENCOUNTER — HOSPITAL ENCOUNTER (EMERGENCY)
Age: 69
Discharge: HOME | End: 2024-05-15
Attending: EMERGENCY MEDICINE
Payer: MEDICARE

## 2024-05-15 ENCOUNTER — TELEPHONE (OUTPATIENT)
Dept: PAIN MANAGEMENT | Age: 69
End: 2024-05-15

## 2024-05-15 ENCOUNTER — APPOINTMENT (OUTPATIENT)
Dept: CT IMAGING | Age: 69
End: 2024-05-15
Payer: MEDICARE

## 2024-05-15 VITALS
OXYGEN SATURATION: 99 % | DIASTOLIC BLOOD PRESSURE: 76 MMHG | SYSTOLIC BLOOD PRESSURE: 165 MMHG | RESPIRATION RATE: 18 BRPM | HEART RATE: 70 BPM | TEMPERATURE: 97.6 F

## 2024-05-15 DIAGNOSIS — I10 ESSENTIAL HYPERTENSION: ICD-10-CM

## 2024-05-15 DIAGNOSIS — M54.50 ACUTE EXACERBATION OF CHRONIC LOW BACK PAIN: Primary | ICD-10-CM

## 2024-05-15 DIAGNOSIS — G89.29 ACUTE EXACERBATION OF CHRONIC LOW BACK PAIN: Primary | ICD-10-CM

## 2024-05-15 PROCEDURE — 72132 CT LUMBAR SPINE W/DYE: CPT

## 2024-05-15 PROCEDURE — 6360000004 HC RX CONTRAST MEDICATION: Performed by: RADIOLOGY

## 2024-05-15 PROCEDURE — 6370000000 HC RX 637 (ALT 250 FOR IP): Performed by: EMERGENCY MEDICINE

## 2024-05-15 PROCEDURE — 99285 EMERGENCY DEPT VISIT HI MDM: CPT

## 2024-05-15 PROCEDURE — 96374 THER/PROPH/DIAG INJ IV PUSH: CPT

## 2024-05-15 PROCEDURE — 6360000002 HC RX W HCPCS

## 2024-05-15 PROCEDURE — 6360000002 HC RX W HCPCS: Performed by: EMERGENCY MEDICINE

## 2024-05-15 RX ORDER — DEXAMETHASONE SODIUM PHOSPHATE 10 MG/ML
10 INJECTION INTRAMUSCULAR; INTRAVENOUS ONCE
Status: COMPLETED | OUTPATIENT
Start: 2024-05-15 | End: 2024-05-15

## 2024-05-15 RX ORDER — OXYCODONE AND ACETAMINOPHEN 10; 325 MG/1; MG/1
1 TABLET ORAL ONCE
Status: COMPLETED | OUTPATIENT
Start: 2024-05-15 | End: 2024-05-15

## 2024-05-15 RX ORDER — KETOROLAC TROMETHAMINE 30 MG/ML
30 INJECTION, SOLUTION INTRAMUSCULAR; INTRAVENOUS ONCE
Status: DISCONTINUED | OUTPATIENT
Start: 2024-05-15 | End: 2024-05-15

## 2024-05-15 RX ORDER — METHOCARBAMOL 500 MG/1
750 TABLET, FILM COATED ORAL ONCE
Status: COMPLETED | OUTPATIENT
Start: 2024-05-15 | End: 2024-05-15

## 2024-05-15 RX ORDER — ATENOLOL 50 MG/1
50 TABLET ORAL DAILY
Qty: 90 TABLET | Refills: 0 | Status: SHIPPED | OUTPATIENT
Start: 2024-05-15

## 2024-05-15 RX ORDER — KETOROLAC TROMETHAMINE 15 MG/ML
15 INJECTION, SOLUTION INTRAMUSCULAR; INTRAVENOUS ONCE
Status: COMPLETED | OUTPATIENT
Start: 2024-05-15 | End: 2024-05-15

## 2024-05-15 RX ORDER — KETOROLAC TROMETHAMINE 15 MG/ML
INJECTION, SOLUTION INTRAMUSCULAR; INTRAVENOUS
Status: COMPLETED
Start: 2024-05-15 | End: 2024-05-15

## 2024-05-15 RX ADMIN — DEXAMETHASONE SODIUM PHOSPHATE 10 MG: 10 INJECTION INTRAMUSCULAR; INTRAVENOUS at 11:28

## 2024-05-15 RX ADMIN — KETOROLAC TROMETHAMINE 15 MG: 15 INJECTION, SOLUTION INTRAMUSCULAR; INTRAVENOUS at 11:36

## 2024-05-15 RX ADMIN — IOPAMIDOL 75 ML: 755 INJECTION, SOLUTION INTRAVENOUS at 11:53

## 2024-05-15 RX ADMIN — OXYCODONE AND ACETAMINOPHEN 1 TABLET: 10; 325 TABLET ORAL at 11:23

## 2024-05-15 RX ADMIN — METHOCARBAMOL 750 MG: 500 TABLET ORAL at 11:24

## 2024-05-15 ASSESSMENT — PAIN - FUNCTIONAL ASSESSMENT: PAIN_FUNCTIONAL_ASSESSMENT: 0-10

## 2024-05-15 ASSESSMENT — LIFESTYLE VARIABLES
HOW OFTEN DO YOU HAVE A DRINK CONTAINING ALCOHOL: NEVER
HOW MANY STANDARD DRINKS CONTAINING ALCOHOL DO YOU HAVE ON A TYPICAL DAY: PATIENT DOES NOT DRINK

## 2024-05-15 ASSESSMENT — PAIN DESCRIPTION - DESCRIPTORS: DESCRIPTORS: DISCOMFORT

## 2024-05-15 ASSESSMENT — PAIN SCALES - GENERAL
PAINLEVEL_OUTOF10: 9
PAINLEVEL_OUTOF10: 10

## 2024-05-15 ASSESSMENT — PAIN DESCRIPTION - LOCATION: LOCATION: BACK;SHOULDER

## 2024-05-15 NOTE — TELEPHONE ENCOUNTER
Margaret Almonte called she was in the emergency room today. She was told to follow up with her family doctor. She called her family doctor they told her call our office because we treat her pain.

## 2024-05-15 NOTE — ED PROVIDER NOTES
HPI: Margaret Almonte 68 y.o. female with a past medical history of   Past Medical History:   Diagnosis Date    Acute left lumbar radiculopathy 12/28/2017    Cancer ([facility]) 2017    colon    Cerebral artery occlusion with cerebral infarction ([facility]) 2021    tia; no deficits    Chronic back pain     Chronic deep vein thrombosis (DVT) of distal vein of left lower extremity ([facility]) 02/01/2021    after back surgery    Diabetes mellitus (HCC)     Fatigue 06/08/2016    Fibromyalgia     GERD (gastroesophageal reflux disease)     Hair loss 05/03/2021    Hemorrhoids     Hx of blood clots     Hyperlipidemia     Hypertension     Malignant neoplasm of sigmoid colon (HCC) 06/17/2020    Obesity     Osteoarthritis     PONV (postoperative nausea and vomiting)     TIA (transient ischemic attack) 05/2022    Urinary incontinence     Weakness of both legs 12/21/2020     Presents with a bilateral lower back pain worse on the lower. The patient states that he has a long-standing history of chronic back pain.  Patient does follow with pain management.  She recently had a CT myelogram performed.  She states that since laying on the CT for the study her pain has gotten worse.  Located left side.  Does not radiate into her legs.  She denies any incontinence of stool or urine.  Denies any saddle paresthesias.  No reported fever or chills.  She has been using a pain patch and medication prescribed by her pain specialist which she states not helping.   The quality of the pain is aching. The severity of the symptoms are moderate.  The symptoms are exacerbated by movement. Patient states that the symptoms are relieved by nothing.     ROS:   Pertinent positives and negatives are stated within HPI, all other systems reviewed and are negative.      --------------------------------------------- PAST HISTORY ---------------------------------------------  Past Medical History:  has a past medical history of Acute left lumbar radiculopathy, Cancer (HCC), Cerebral

## 2024-05-16 ENCOUNTER — CARE COORDINATION (OUTPATIENT)
Dept: CARE COORDINATION | Age: 69
End: 2024-05-16

## 2024-05-16 NOTE — CARE COORDINATION
ACM attempted to contact patient to offer enrollment into Care Coordination. ACM left a voice message with contact information requesting a return call.     PLAN  -If no return call, continue to attempt to contact patient.   -Introduction letter mailed

## 2024-05-20 ENCOUNTER — OFFICE VISIT (OUTPATIENT)
Dept: PAIN MANAGEMENT | Age: 69
End: 2024-05-20
Payer: MEDICARE

## 2024-05-20 VITALS
DIASTOLIC BLOOD PRESSURE: 70 MMHG | SYSTOLIC BLOOD PRESSURE: 134 MMHG | BODY MASS INDEX: 35.34 KG/M2 | HEIGHT: 60 IN | RESPIRATION RATE: 18 BRPM | TEMPERATURE: 96.9 F | WEIGHT: 180 LBS | HEART RATE: 85 BPM | OXYGEN SATURATION: 98 %

## 2024-05-20 DIAGNOSIS — M96.1 LUMBAR POST-LAMINECTOMY SYNDROME: ICD-10-CM

## 2024-05-20 DIAGNOSIS — G89.4 CHRONIC PAIN SYNDROME: Primary | ICD-10-CM

## 2024-05-20 DIAGNOSIS — M53.3 DISORDER OF SACRUM: ICD-10-CM

## 2024-05-20 DIAGNOSIS — M54.6 PAIN IN THORACIC SPINE: ICD-10-CM

## 2024-05-20 DIAGNOSIS — M25.562 CHRONIC PAIN OF LEFT KNEE: ICD-10-CM

## 2024-05-20 DIAGNOSIS — M54.16 LUMBAR RADICULOPATHY: ICD-10-CM

## 2024-05-20 DIAGNOSIS — G89.29 CHRONIC PAIN OF LEFT KNEE: ICD-10-CM

## 2024-05-20 DIAGNOSIS — M17.12 PRIMARY OSTEOARTHRITIS OF LEFT KNEE: ICD-10-CM

## 2024-05-20 DIAGNOSIS — M48.062 SPINAL STENOSIS OF LUMBAR REGION WITH NEUROGENIC CLAUDICATION: ICD-10-CM

## 2024-05-20 PROCEDURE — 3017F COLORECTAL CA SCREEN DOC REV: CPT | Performed by: PHYSICIAN ASSISTANT

## 2024-05-20 PROCEDURE — 3078F DIAST BP <80 MM HG: CPT | Performed by: PHYSICIAN ASSISTANT

## 2024-05-20 PROCEDURE — G8427 DOCREV CUR MEDS BY ELIG CLIN: HCPCS | Performed by: PHYSICIAN ASSISTANT

## 2024-05-20 PROCEDURE — 99213 OFFICE O/P EST LOW 20 MIN: CPT | Performed by: PHYSICIAN ASSISTANT

## 2024-05-20 PROCEDURE — 1090F PRES/ABSN URINE INCON ASSESS: CPT | Performed by: PHYSICIAN ASSISTANT

## 2024-05-20 PROCEDURE — 1036F TOBACCO NON-USER: CPT | Performed by: PHYSICIAN ASSISTANT

## 2024-05-20 PROCEDURE — 1124F ACP DISCUSS-NO DSCNMKR DOCD: CPT | Performed by: PHYSICIAN ASSISTANT

## 2024-05-20 PROCEDURE — 3075F SYST BP GE 130 - 139MM HG: CPT | Performed by: PHYSICIAN ASSISTANT

## 2024-05-20 PROCEDURE — G8417 CALC BMI ABV UP PARAM F/U: HCPCS | Performed by: PHYSICIAN ASSISTANT

## 2024-05-20 PROCEDURE — G8399 PT W/DXA RESULTS DOCUMENT: HCPCS | Performed by: PHYSICIAN ASSISTANT

## 2024-05-20 RX ORDER — TIZANIDINE 2 MG/1
2 TABLET ORAL 2 TIMES DAILY PRN
Qty: 14 TABLET | Refills: 0 | Status: SHIPPED | OUTPATIENT
Start: 2024-05-20 | End: 2024-05-27

## 2024-05-20 NOTE — PROGRESS NOTES
Margaret Almonte presents to the [facility] on 5/20/2024. Margaret is complaining of pain lower back. The pain is constant. The pain is described as aching and intense pressure. Pain is rated on her best day at a 7, on her worst day at a 10, and on average at a 9 on the VAS scale. She took her last dose of Butrans Patch, Mobic, and ZT Lidocaine  and Tylenoltoday.      Any procedures since your last visit: No  She is  on NSAIDS and  is  on anticoagulation medications to include ASA and is managed by PCP.     Pacemaker or defibrillator: No   Medication Contract and Consent for Opioid Use Documents Filed       Patient Documents       Type of Document Status Date Received Received By Description    Medication Contract Received 6/21/2019  7:35 AM JAMEY CARR med contract    Medication Contract Received 3/26/2021 10:38 AM KARL KELLEY PAIN MGMT CONTRACT DR. RONDON    Medication Contract Signed 8/12/2022 11:52 AM RAMY FAIR Pain Agreement 8/12/22    Medication Contract Received 6/21/2023 11:49 AM SHELBY WARNER PAIN AGREEMENT                       LMP  (LMP Unknown)      No LMP recorded (lmp unknown). Patient is postmenopausal.

## 2024-05-20 NOTE — PROGRESS NOTES
[facility]  [address] [place] [id number]    Follow up Note      Margaret Almonte     Date of Visit:  2024    CC:  Patient presents for follow up   Chief Complaint   Patient presents with    Back Pain       HPI:    Pain is unchanged.    Appropriate analgesia with current medications regimen: yes.    Change in quality of symptoms:no.    Medication side effects:none.   Recent diagnostic testing:none.   Recent interventional procedures: None.      She has been on anticoagulation medications to include ASA and has not been on herbal supplements.  She is diabetic.     Imagin/2023 MRI lumbar w/ and w/o  -     FINDINGS:   BONES/ALIGNMENT:  No fracture or joint dislocation.  Status post   decompressive laminectomies with posterior interbody fusion at L4-5.  Disc   spacer is noted at L4-5.  Grossly stable postoperative seroma in the   posterior paraspinal space at L4-5.  It measures approximately 2.7 x 1.9 cm.       SPINAL CORD:  The conus terminates normally.       SOFT TISSUES: No paraspinal mass lesions seen.       L1-L2: Disc desiccation with a small disc bulge.  Mild facet and ligamentous   hypertrophy.  Mild central canal and lateral recess stenoses.  Moderate   neural foraminal stenoses.       L2-L3: Disc desiccation with a disc bulge.  Moderate facet hypertrophy.   Moderate to severe central canal, lateral recess and neural foraminal   stenoses.       L3-L4: Disc desiccation with a small disc bulge.  Moderate facet hypertrophy.   Moderate central canal and lateral recess stenoses.  Moderate right and   mild-to-moderate left neural foraminal stenoses.       L4-L5: Stable postoperative changes, as described above.  Mild peridural   fibrosis, most notably along the left lateral and posterior aspect of the   thecal sac.  Mild facet hypertrophy.  No significant central canal or lateral   recess stenosis.  Mild neural foraminal stenoses.       L5-S1: Disc desiccation without herniation.

## 2024-05-22 ENCOUNTER — CARE COORDINATION (OUTPATIENT)
Dept: CARE COORDINATION | Age: 69
End: 2024-05-22

## 2024-05-22 ENCOUNTER — TELEPHONE (OUTPATIENT)
Dept: PAIN MANAGEMENT | Age: 69
End: 2024-05-22

## 2024-05-22 DIAGNOSIS — M53.3 DISORDER OF SACRUM: ICD-10-CM

## 2024-05-22 DIAGNOSIS — M54.16 LUMBAR RADICULOPATHY: ICD-10-CM

## 2024-05-22 DIAGNOSIS — M48.062 SPINAL STENOSIS OF LUMBAR REGION WITH NEUROGENIC CLAUDICATION: ICD-10-CM

## 2024-05-22 DIAGNOSIS — M96.1 LUMBAR POST-LAMINECTOMY SYNDROME: ICD-10-CM

## 2024-05-22 DIAGNOSIS — G89.4 CHRONIC PAIN SYNDROME: Primary | ICD-10-CM

## 2024-05-22 NOTE — CARE COORDINATION
ACM attempted to contact patient to offer enrollment into Care Coordination. ACM left a voice message with contact information requesting a return call.     PLAN  -If no return call, continue to attempt to contact patient.   -Introduction letter mailed 5/16/24

## 2024-05-22 NOTE — TELEPHONE ENCOUNTER
She dose not want to try another injection at this time. She will try another muscle relaxant if that is her only option. States her current medications are not working. She is asking for something like the medications she received in the ER

## 2024-05-22 NOTE — TELEPHONE ENCOUNTER
Returned call from Margaret. States she has not noticed any improvement since her visit on 5/20/2024. States she actually feels her pain is worse. States the Zanaflex is not helping at all. States she is having more difficulty ambulating and not able to stand up straight.

## 2024-05-23 RX ORDER — HYDROCODONE BITARTRATE AND ACETAMINOPHEN 5; 325 MG/1; MG/1
1 TABLET ORAL DAILY PRN
Qty: 7 TABLET | Refills: 0 | Status: SHIPPED | OUTPATIENT
Start: 2024-05-23 | End: 2024-05-30

## 2024-05-30 ENCOUNTER — CARE COORDINATION (OUTPATIENT)
Dept: CARE COORDINATION | Age: 69
End: 2024-05-30

## 2024-05-30 NOTE — CARE COORDINATION
ACM made final attempt to contact patient to enroll in Care Coordination. ACM left message with contact information and encouraged patient to call ACM if she would like to discuss Care Coordination.     Patient will be temporarily excluded from Care Coordination due to unable to contact.

## 2024-06-04 DIAGNOSIS — G89.4 CHRONIC PAIN SYNDROME: ICD-10-CM

## 2024-06-04 DIAGNOSIS — M96.1 LUMBAR POST-LAMINECTOMY SYNDROME: ICD-10-CM

## 2024-06-04 DIAGNOSIS — M53.3 DISORDER OF SACRUM: ICD-10-CM

## 2024-06-04 DIAGNOSIS — E78.1 HYPERTRIGLYCERIDEMIA: ICD-10-CM

## 2024-06-04 DIAGNOSIS — G89.29 CHRONIC PAIN OF LEFT KNEE: ICD-10-CM

## 2024-06-04 DIAGNOSIS — M17.12 PRIMARY OSTEOARTHRITIS OF LEFT KNEE: ICD-10-CM

## 2024-06-04 DIAGNOSIS — M25.562 CHRONIC PAIN OF LEFT KNEE: ICD-10-CM

## 2024-06-04 RX ORDER — MELOXICAM 7.5 MG/1
7.5 TABLET ORAL DAILY
Qty: 90 TABLET | Refills: 1 | OUTPATIENT
Start: 2024-06-04

## 2024-06-05 RX ORDER — ATORVASTATIN CALCIUM 40 MG/1
40 TABLET, FILM COATED ORAL DAILY
Qty: 90 TABLET | Refills: 0 | Status: SHIPPED | OUTPATIENT
Start: 2024-06-05

## 2024-06-05 NOTE — PROGRESS NOTES
[facility]  [address] [place] [address]    Follow up Note      Margaret Almonte     Date of Visit:  2024    CC:  Patient presents for follow up   Chief Complaint   Patient presents with    Back Pain     HPI:    Pain is worse.    Appropriate analgesia with current medications regimen: yes.    Change in quality of symptoms:no.    Medication side effects:none.   Recent diagnostic testing:none.   Recent interventional procedures: None.    She has been on anticoagulation medications to include ASA and has not been on herbal supplements.  She is diabetic.     Imagin/2024 CT lumbar myelogram -  FINDINGS:  BONES/ALIGNMENT: There is grade 1 anterolisthesis of L4 on L5 measuring 5.6  mm, similar compared to the prior study and slight grade 1 retrolisthesis of  T12 on L1.  There is postsurgical change including bilateral laminectomy and  posterior fusion at L4-5.  Bilateral pedicle screws are seen at L4 and L5 and  there is a disc spacer within the L4-5 disc space.  The hardware appears  intact with no CT evidence of loosening.  The vertebral body heights are  maintained. No osseous destructive lesion is seen.  There is generalized  osteopenia.     There is advanced degenerative change with severe disc space narrowing at  multiple levels in the lower thoracic spine with spurring.  Moderate to  severe narrowing is seen at T12-L1 and L2-3 with slight subchondral sclerosis  and vacuum phenomenon.  Mild narrowing seen at L1-2 and L3-4.     SOFT TISSUES: No paraspinal mass is seen.     T8-9: There is a right paracentral disc osteophyte complex causing mild  central canal stenosis with slight impression on the ventral aspect of the  spinal cord.  The neural foramina are not imaged at this level.     T9-10: There is mild disc bulge and and right posterolateral disc protrusion  as well as asymmetric right-sided posterior facet degenerative change causing  mild central canal stenosis and moderate right

## 2024-06-05 NOTE — TELEPHONE ENCOUNTER
Last seen 3/12/2024  Next appt 8/21/2024 to establish with Dr. Bray     Requested Prescriptions     Pending Prescriptions Disp Refills    atorvastatin (LIPITOR) 40 MG tablet [Pharmacy Med Name: ATORVASTATIN 40 MG TABLET 40 Tablet] 90 tablet 2     Sig: Take 1 tablet by mouth daily Note increase in dose

## 2024-06-06 ENCOUNTER — OFFICE VISIT (OUTPATIENT)
Dept: PAIN MANAGEMENT | Age: 69
End: 2024-06-06
Payer: MEDICARE

## 2024-06-06 VITALS
HEART RATE: 87 BPM | RESPIRATION RATE: 18 BRPM | HEIGHT: 60 IN | DIASTOLIC BLOOD PRESSURE: 78 MMHG | SYSTOLIC BLOOD PRESSURE: 155 MMHG | BODY MASS INDEX: 35.34 KG/M2 | WEIGHT: 180 LBS | TEMPERATURE: 98.8 F | OXYGEN SATURATION: 98 %

## 2024-06-06 DIAGNOSIS — M17.12 PRIMARY OSTEOARTHRITIS OF LEFT KNEE: ICD-10-CM

## 2024-06-06 DIAGNOSIS — M54.16 LUMBAR RADICULOPATHY: ICD-10-CM

## 2024-06-06 DIAGNOSIS — M25.562 CHRONIC PAIN OF LEFT KNEE: ICD-10-CM

## 2024-06-06 DIAGNOSIS — M96.1 LUMBAR POST-LAMINECTOMY SYNDROME: ICD-10-CM

## 2024-06-06 DIAGNOSIS — M54.6 PAIN IN THORACIC SPINE: ICD-10-CM

## 2024-06-06 DIAGNOSIS — G89.4 CHRONIC PAIN SYNDROME: Primary | ICD-10-CM

## 2024-06-06 DIAGNOSIS — M48.062 SPINAL STENOSIS OF LUMBAR REGION WITH NEUROGENIC CLAUDICATION: ICD-10-CM

## 2024-06-06 DIAGNOSIS — G89.29 CHRONIC PAIN OF LEFT KNEE: ICD-10-CM

## 2024-06-06 PROCEDURE — G8427 DOCREV CUR MEDS BY ELIG CLIN: HCPCS | Performed by: PAIN MEDICINE

## 2024-06-06 PROCEDURE — 3077F SYST BP >= 140 MM HG: CPT | Performed by: PAIN MEDICINE

## 2024-06-06 PROCEDURE — 1036F TOBACCO NON-USER: CPT | Performed by: PAIN MEDICINE

## 2024-06-06 PROCEDURE — 3017F COLORECTAL CA SCREEN DOC REV: CPT | Performed by: PAIN MEDICINE

## 2024-06-06 PROCEDURE — 99214 OFFICE O/P EST MOD 30 MIN: CPT | Performed by: PAIN MEDICINE

## 2024-06-06 PROCEDURE — G8399 PT W/DXA RESULTS DOCUMENT: HCPCS | Performed by: PAIN MEDICINE

## 2024-06-06 PROCEDURE — 3078F DIAST BP <80 MM HG: CPT | Performed by: PAIN MEDICINE

## 2024-06-06 PROCEDURE — G8417 CALC BMI ABV UP PARAM F/U: HCPCS | Performed by: PAIN MEDICINE

## 2024-06-06 PROCEDURE — 99213 OFFICE O/P EST LOW 20 MIN: CPT | Performed by: PAIN MEDICINE

## 2024-06-06 PROCEDURE — 1090F PRES/ABSN URINE INCON ASSESS: CPT | Performed by: PAIN MEDICINE

## 2024-06-06 PROCEDURE — 1124F ACP DISCUSS-NO DSCNMKR DOCD: CPT | Performed by: PAIN MEDICINE

## 2024-06-06 RX ORDER — BUPRENORPHINE 10 UG/H
1 PATCH TRANSDERMAL WEEKLY
Qty: 4 PATCH | Refills: 1 | Status: CANCELLED | OUTPATIENT
Start: 2024-07-05 | End: 2024-08-30

## 2024-06-06 RX ORDER — OXYCODONE HYDROCHLORIDE AND ACETAMINOPHEN 5; 325 MG/1; MG/1
1 TABLET ORAL DAILY PRN
Qty: 45 TABLET | Refills: 0 | Status: SHIPPED | OUTPATIENT
Start: 2024-06-06 | End: 2024-06-06

## 2024-06-06 RX ORDER — OXYCODONE HYDROCHLORIDE AND ACETAMINOPHEN 5; 325 MG/1; MG/1
1 TABLET ORAL DAILY PRN
Qty: 45 TABLET | Refills: 0 | Status: SHIPPED | OUTPATIENT
Start: 2024-06-06 | End: 2024-07-06

## 2024-06-06 NOTE — PROGRESS NOTES
Margaret Almonte presents to the [facility] on 6/6/2024. Margaret is complaining of pain lower back, left foot weakness. The pain is constant. The pain is described as aching, throbbing, sharp, penetrating, and, pressure. Pain is rated on her best day at a 8, on her worst day at a 10, and on average at a 10 on the VAS scale. She took her last dose of Butrans Patch, Mobic, and Tylenol today.     Any procedures since your last visit: No  Pacemaker or defibrillator: No.    She is  on NSAIDS and is  on anticoagulation medications to include ASA  Medication Contract and Consent for Opioid Use Documents Filed       Patient Documents       Type of Document Status Date Received Received By Description    Medication Contract Received 6/21/2019  7:35 AM JAMEY CARR med contract    Medication Contract Received 3/26/2021 10:38 AM [facility] PA[facility] MGMT CONTRACT DR. RONDON    Medication Contract Signed 8/12/2022 11:52 AM RAMY FAIR Pain Agreement 8/12/22    Medication Contract Received 6/21/2023 11:49 AM SHELBY WARNER PAIN AGREEMENT                    LMP  (LMP Unknown)      No LMP recorded (lmp unknown). Patient is postmenopausal.

## 2024-06-07 ENCOUNTER — TELEPHONE (OUTPATIENT)
Dept: PAIN MANAGEMENT | Age: 69
End: 2024-06-07

## 2024-06-07 DIAGNOSIS — M43.16 SPONDYLOLISTHESIS OF LUMBAR REGION: ICD-10-CM

## 2024-06-07 DIAGNOSIS — M54.16 LUMBAR RADICULOPATHY: Primary | ICD-10-CM

## 2024-06-07 RX ORDER — LIDOCAINE 50 MG/G
1 PATCH TOPICAL DAILY
Qty: 30 PATCH | Refills: 0 | Status: SHIPPED | OUTPATIENT
Start: 2024-06-07 | End: 2024-07-07

## 2024-06-07 NOTE — TELEPHONE ENCOUNTER
Spoke with Margaret to notify her insurance denied coverage for Ztlido. Pt stated  she has not tried lidocaine 5% patches. New script for lidocaine 5% patches sent to her pharmacy.

## 2024-06-26 ENCOUNTER — OFFICE VISIT (OUTPATIENT)
Dept: PAIN MANAGEMENT | Age: 69
End: 2024-06-26
Payer: MEDICARE

## 2024-06-26 VITALS
HEART RATE: 74 BPM | SYSTOLIC BLOOD PRESSURE: 130 MMHG | BODY MASS INDEX: 34.55 KG/M2 | RESPIRATION RATE: 16 BRPM | DIASTOLIC BLOOD PRESSURE: 80 MMHG | OXYGEN SATURATION: 95 % | TEMPERATURE: 96.9 F | HEIGHT: 60 IN | WEIGHT: 176 LBS

## 2024-06-26 DIAGNOSIS — M48.062 SPINAL STENOSIS OF LUMBAR REGION WITH NEUROGENIC CLAUDICATION: ICD-10-CM

## 2024-06-26 DIAGNOSIS — M53.3 DISORDER OF SACRUM: ICD-10-CM

## 2024-06-26 DIAGNOSIS — M96.1 LUMBAR POST-LAMINECTOMY SYNDROME: ICD-10-CM

## 2024-06-26 DIAGNOSIS — G89.4 CHRONIC PAIN SYNDROME: ICD-10-CM

## 2024-06-26 DIAGNOSIS — M17.12 PRIMARY OSTEOARTHRITIS OF LEFT KNEE: ICD-10-CM

## 2024-06-26 DIAGNOSIS — G89.29 CHRONIC PAIN OF LEFT KNEE: ICD-10-CM

## 2024-06-26 DIAGNOSIS — M43.16 SPONDYLOLISTHESIS OF LUMBAR REGION: ICD-10-CM

## 2024-06-26 DIAGNOSIS — Z79.891 ENCOUNTER FOR LONG-TERM OPIATE ANALGESIC USE: Primary | ICD-10-CM

## 2024-06-26 DIAGNOSIS — M25.562 CHRONIC PAIN OF LEFT KNEE: ICD-10-CM

## 2024-06-26 DIAGNOSIS — M54.16 LUMBAR RADICULOPATHY: ICD-10-CM

## 2024-06-26 DIAGNOSIS — M54.6 PAIN IN THORACIC SPINE: ICD-10-CM

## 2024-06-26 LAB
SEND OUT REPORT: NORMAL
TEST NAME: NORMAL

## 2024-06-26 PROCEDURE — 3075F SYST BP GE 130 - 139MM HG: CPT | Performed by: PHYSICIAN ASSISTANT

## 2024-06-26 PROCEDURE — 99213 OFFICE O/P EST LOW 20 MIN: CPT | Performed by: PHYSICIAN ASSISTANT

## 2024-06-26 PROCEDURE — 3017F COLORECTAL CA SCREEN DOC REV: CPT | Performed by: PHYSICIAN ASSISTANT

## 2024-06-26 PROCEDURE — G8417 CALC BMI ABV UP PARAM F/U: HCPCS | Performed by: PHYSICIAN ASSISTANT

## 2024-06-26 PROCEDURE — 3079F DIAST BP 80-89 MM HG: CPT | Performed by: PHYSICIAN ASSISTANT

## 2024-06-26 PROCEDURE — 1090F PRES/ABSN URINE INCON ASSESS: CPT | Performed by: PHYSICIAN ASSISTANT

## 2024-06-26 PROCEDURE — G8399 PT W/DXA RESULTS DOCUMENT: HCPCS | Performed by: PHYSICIAN ASSISTANT

## 2024-06-26 PROCEDURE — G8427 DOCREV CUR MEDS BY ELIG CLIN: HCPCS | Performed by: PHYSICIAN ASSISTANT

## 2024-06-26 PROCEDURE — 1124F ACP DISCUSS-NO DSCNMKR DOCD: CPT | Performed by: PHYSICIAN ASSISTANT

## 2024-06-26 PROCEDURE — 1036F TOBACCO NON-USER: CPT | Performed by: PHYSICIAN ASSISTANT

## 2024-06-26 RX ORDER — LIDOCAINE 50 MG/G
1 PATCH TOPICAL DAILY
Qty: 30 PATCH | Refills: 0 | Status: SHIPPED | OUTPATIENT
Start: 2024-06-26 | End: 2024-07-26

## 2024-06-26 RX ORDER — BUPRENORPHINE 10 UG/H
1 PATCH TRANSDERMAL WEEKLY
Qty: 4 PATCH | Refills: 0 | Status: SHIPPED | OUTPATIENT
Start: 2024-06-26 | End: 2024-08-21

## 2024-06-26 RX ORDER — OXYCODONE HYDROCHLORIDE AND ACETAMINOPHEN 5; 325 MG/1; MG/1
1 TABLET ORAL DAILY PRN
Qty: 45 TABLET | Refills: 0 | Status: SHIPPED | OUTPATIENT
Start: 2024-07-05 | End: 2024-08-04

## 2024-06-26 NOTE — PROGRESS NOTES
Margaret Almonte presents to the [facility] on 6/26/2024. Margaret is complaining of pain low back. The pain is constant. The pain is described as aching. Pain is rated on her best day at a 5, on her worst day at a 10, and on average at a 7 on the VAS scale. She took her last dose of Butrans Patch .    Any procedures since your last visit: No    She is not on NSAIDS and  is  on anticoagulation medications to include ASA and is managed by Chichi Marrero, VISHNU - CNP  .     Pacemaker or defibrillator: No   Medication Contract and Consent for Opioid Use Documents Filed       Patient Documents       Type of Document Status Date Received Received By Description    Medication Contract Received 6/21/2019  7:35 AM JAMEY CARR med contract    Medication Contract Received 3/26/2021 10:38 AM [facility] KEL[facility] PA[facility] MGMT CONTRACT DR. RONDON    Medication Contract Signed 8/12/2022 11:52 AM RAMY FAIR Pain Agreement 8/12/22    Medication Contract Received 6/21/2023 11:49 AM SHELBY WARNER PAIN AGREEMENT                       Temp 96.9 °F (36.1 °C) (Temporal)   Resp 16   Ht 1.524 m (5')   Wt 79.8 kg (176 lb)   LMP  (LMP Unknown)   SpO2 95%   BMI 34.37 kg/m²      No LMP recorded (lmp unknown). Patient is postmenopausal.  
 Yes Provider, Historical, MD       Allergies   Allergen Reactions    Diclofenac     Lisinopril Other (See Comments)     Profound malaise    Diclofenac Sodium Nausea And Vomiting    Farxiga [Dapagliflozin] Itching     Vaginal itching with Farxiga, Jardiance     Lyrica [Pregabalin] Other (See Comments)     Confusion    Sulfa Antibiotics Rash       Social History     Socioeconomic History    Marital status:      Spouse name: Not on file    Number of children: Not on file    Years of education: Not on file    Highest education level: Not on file   Occupational History    Not on file   Tobacco Use    Smoking status: Never     Passive exposure: Never    Smokeless tobacco: Never   Vaping Use    Vaping Use: Never used   Substance and Sexual Activity    Alcohol use: Not Currently     Comment: Rarely    Drug use: Never    Sexual activity: Defer   Other Topics Concern    Not on file   Social History Narrative    Not on file     Social Determinants of Health     Financial Resource Strain: Low Risk  (3/12/2024)    Overall Financial Resource Strain (CARDIA)     Difficulty of Paying Living Expenses: Not hard at all   Food Insecurity: No Food Insecurity (3/12/2024)    Hunger Vital Sign     Worried About Running Out of Food in the Last Year: Never true     Ran Out of Food in the Last Year: Never true   Transportation Needs: Unknown (3/12/2024)    PRAPARE - Transportation     Lack of Transportation (Medical): Not on file     Lack of Transportation (Non-Medical): No   Physical Activity: Insufficiently Active (3/11/2024)    Exercise Vital Sign     Days of Exercise per Week: 2 days     Minutes of Exercise per Session: 20 min   Stress: Not on file   Social Connections: Not on file   Intimate Partner Violence: Not on file   Housing Stability: Unknown (3/12/2024)    Housing Stability Vital Sign     Unable to Pay for Housing in the Last Year: Not on file     Number of Places Lived in the Last Year: Not on file     Unstable Housing

## 2024-07-01 ENCOUNTER — OFFICE VISIT (OUTPATIENT)
Age: 69
End: 2024-07-01
Payer: MEDICARE

## 2024-07-01 VITALS
BODY MASS INDEX: 34.76 KG/M2 | TEMPERATURE: 97 F | HEART RATE: 105 BPM | SYSTOLIC BLOOD PRESSURE: 130 MMHG | OXYGEN SATURATION: 97 % | WEIGHT: 178 LBS | DIASTOLIC BLOOD PRESSURE: 70 MMHG

## 2024-07-01 DIAGNOSIS — K58.9 IRRITABLE BOWEL SYNDROME, UNSPECIFIED TYPE: ICD-10-CM

## 2024-07-01 DIAGNOSIS — R19.7 DIARRHEA, UNSPECIFIED TYPE: Primary | ICD-10-CM

## 2024-07-01 PROBLEM — F11.20 OPIOID DEPENDENCE WITH CURRENT USE (HCC): Status: ACTIVE | Noted: 2024-07-01

## 2024-07-01 PROCEDURE — 1124F ACP DISCUSS-NO DSCNMKR DOCD: CPT | Performed by: NURSE PRACTITIONER

## 2024-07-01 PROCEDURE — 1036F TOBACCO NON-USER: CPT | Performed by: NURSE PRACTITIONER

## 2024-07-01 PROCEDURE — G8417 CALC BMI ABV UP PARAM F/U: HCPCS | Performed by: NURSE PRACTITIONER

## 2024-07-01 PROCEDURE — 3075F SYST BP GE 130 - 139MM HG: CPT | Performed by: NURSE PRACTITIONER

## 2024-07-01 PROCEDURE — 3017F COLORECTAL CA SCREEN DOC REV: CPT | Performed by: NURSE PRACTITIONER

## 2024-07-01 PROCEDURE — 99212 OFFICE O/P EST SF 10 MIN: CPT | Performed by: NURSE PRACTITIONER

## 2024-07-01 PROCEDURE — G8399 PT W/DXA RESULTS DOCUMENT: HCPCS | Performed by: NURSE PRACTITIONER

## 2024-07-01 PROCEDURE — 1090F PRES/ABSN URINE INCON ASSESS: CPT | Performed by: NURSE PRACTITIONER

## 2024-07-01 PROCEDURE — 3078F DIAST BP <80 MM HG: CPT | Performed by: NURSE PRACTITIONER

## 2024-07-01 PROCEDURE — G8427 DOCREV CUR MEDS BY ELIG CLIN: HCPCS | Performed by: NURSE PRACTITIONER

## 2024-07-01 RX ORDER — SENNOSIDES A AND B 8.6 MG/1
TABLET, FILM COATED ORAL
Qty: 60 TABLET | Refills: 5 | Status: SHIPPED | OUTPATIENT
Start: 2024-07-01

## 2024-07-01 RX ORDER — OXYCODONE HYDROCHLORIDE AND ACETAMINOPHEN 5; 325 MG/1; MG/1
1 TABLET ORAL EVERY 4 HOURS PRN
COMMUNITY

## 2024-07-01 NOTE — PROGRESS NOTES
syndrome, unspecified type    -the diagnosis of overflow diarrhea reviewed.  Most likely caused by pain medication.  Ozempic may be contributing also  -patient will go over the counter and purchase magnesium citrate to clean out the colon  -senokot prescribed with instructions  -will continue to monitor      Return for Follow up in 3 months.    An electronic signature was used to authenticate this note.    --Jamee Chapa, VISHNU - CNP

## 2024-07-06 LAB
SEND OUT REPORT: NORMAL
TEST NAME: NORMAL

## 2024-07-22 ENCOUNTER — OFFICE VISIT (OUTPATIENT)
Dept: PAIN MANAGEMENT | Age: 69
End: 2024-07-22
Payer: MEDICARE

## 2024-07-22 VITALS
HEART RATE: 79 BPM | TEMPERATURE: 96.1 F | DIASTOLIC BLOOD PRESSURE: 70 MMHG | SYSTOLIC BLOOD PRESSURE: 142 MMHG | OXYGEN SATURATION: 98 % | BODY MASS INDEX: 34.95 KG/M2 | WEIGHT: 178 LBS | HEIGHT: 60 IN | RESPIRATION RATE: 18 BRPM

## 2024-07-22 DIAGNOSIS — M17.12 PRIMARY OSTEOARTHRITIS OF LEFT KNEE: ICD-10-CM

## 2024-07-22 DIAGNOSIS — M53.3 DISORDER OF SACRUM: ICD-10-CM

## 2024-07-22 DIAGNOSIS — M54.16 LUMBAR RADICULOPATHY: ICD-10-CM

## 2024-07-22 DIAGNOSIS — M48.062 SPINAL STENOSIS OF LUMBAR REGION WITH NEUROGENIC CLAUDICATION: ICD-10-CM

## 2024-07-22 DIAGNOSIS — M25.562 CHRONIC PAIN OF LEFT KNEE: ICD-10-CM

## 2024-07-22 DIAGNOSIS — Z79.891 ENCOUNTER FOR LONG-TERM OPIATE ANALGESIC USE: ICD-10-CM

## 2024-07-22 DIAGNOSIS — M54.6 PAIN IN THORACIC SPINE: ICD-10-CM

## 2024-07-22 DIAGNOSIS — M43.16 SPONDYLOLISTHESIS OF LUMBAR REGION: ICD-10-CM

## 2024-07-22 DIAGNOSIS — G89.4 CHRONIC PAIN SYNDROME: ICD-10-CM

## 2024-07-22 DIAGNOSIS — M96.1 LUMBAR POST-LAMINECTOMY SYNDROME: ICD-10-CM

## 2024-07-22 DIAGNOSIS — G89.29 CHRONIC PAIN OF LEFT KNEE: ICD-10-CM

## 2024-07-22 DIAGNOSIS — G89.4 CHRONIC PAIN SYNDROME: Primary | ICD-10-CM

## 2024-07-22 PROCEDURE — 1124F ACP DISCUSS-NO DSCNMKR DOCD: CPT | Performed by: PHYSICIAN ASSISTANT

## 2024-07-22 PROCEDURE — 3078F DIAST BP <80 MM HG: CPT | Performed by: PHYSICIAN ASSISTANT

## 2024-07-22 PROCEDURE — 1090F PRES/ABSN URINE INCON ASSESS: CPT | Performed by: PHYSICIAN ASSISTANT

## 2024-07-22 PROCEDURE — 99213 OFFICE O/P EST LOW 20 MIN: CPT | Performed by: PHYSICIAN ASSISTANT

## 2024-07-22 PROCEDURE — G8399 PT W/DXA RESULTS DOCUMENT: HCPCS | Performed by: PHYSICIAN ASSISTANT

## 2024-07-22 PROCEDURE — 3077F SYST BP >= 140 MM HG: CPT | Performed by: PHYSICIAN ASSISTANT

## 2024-07-22 PROCEDURE — 3017F COLORECTAL CA SCREEN DOC REV: CPT | Performed by: PHYSICIAN ASSISTANT

## 2024-07-22 PROCEDURE — G8417 CALC BMI ABV UP PARAM F/U: HCPCS | Performed by: PHYSICIAN ASSISTANT

## 2024-07-22 PROCEDURE — 1036F TOBACCO NON-USER: CPT | Performed by: PHYSICIAN ASSISTANT

## 2024-07-22 PROCEDURE — G8427 DOCREV CUR MEDS BY ELIG CLIN: HCPCS | Performed by: PHYSICIAN ASSISTANT

## 2024-07-22 RX ORDER — BUPRENORPHINE 10 UG/H
1 PATCH TRANSDERMAL WEEKLY
Qty: 4 PATCH | Refills: 1 | Status: SHIPPED | OUTPATIENT
Start: 2024-07-22 | End: 2024-08-19

## 2024-07-22 RX ORDER — LIDOCAINE 50 MG/G
1 PATCH TOPICAL DAILY
Qty: 30 PATCH | Refills: 5 | Status: SHIPPED | OUTPATIENT
Start: 2024-07-22 | End: 2024-08-21

## 2024-07-22 NOTE — PROGRESS NOTES
Margaret Almonte presents to the [facility] on 7/22/2024. Margaret is complaining of pain lower back. The pain is constant. The pain is described as aching. Pain is rated on her best day at a 5, on her worst day at a 10, and on average at a 5 on the VAS scale. She took her last dose of Butrans Patch, Mobic and Percocet yesterday.      Any procedures since your last visit: No  She is on NSAIDS and  is  on anticoagulation medications to include ASA and is managed by PCP.     Pacemaker or defibrillator: No   Medication Contract and Consent for Opioid Use Documents Filed       Patient Documents       Type of Document Status Date Received Received By Description    Medication Contract Received 6/21/2019  7:35 AM JAMEY CARRH med contract    Medication Contract Received 3/26/2021 10:38 AM [facility] PA[facility] MGMT CONTRACT DR. RONDON    Medication Contract Signed 8/12/2022 11:52 AM RAMY FAIR Pain Agreement 8/12/22    Medication Contract Received 6/21/2023 11:49 AM SHELBY WARNER PAIN AGREEMENT    Consent Opioid Use Received 6/26/2024  3:37 PM AIDAN ZIEGLER patient agreement 6/26/24                       LMP  (LMP Unknown)      No LMP recorded (lmp unknown). Patient is postmenopausal.  
iv sedation    NERVE BLOCK Left 08/02/2022    LEFT SACROILIAC JOINT INJECTION performed by Lona Echevarria DO at [facility] OR    NERVE BLOCK Left 12/08/2022    LEFT SACROILIAC JOINT INJECTION performed by Lona Echevarria DO at [facility] OR    PAIN MANAGEMENT PROCEDURE N/A 04/13/2023    L1-2 EPIDURAL STEROID INJECTION---(please make early afternoon) performed by Lona Echevarria DO at [facility] OR    PAIN MANAGEMENT PROCEDURE N/A 08/24/2023    L1-2 EPIDURAL STEROID INJECTION performed by Lona Echevarria DO at [facility] OR    PAIN MANAGEMENT PROCEDURE N/A 11/22/2023    LUMBAR EPIDURAL STEROID INJECTION L1-L2 performed by Lona Echevarria DO at [facility] OR    NY INJECT SI JOINT ARTHRGRPHY&/ANES/STEROID W/MARIA ELENA Left 11/29/2018    LEFT SACROILIAC JOINT INJECTION WITH X-RAY performed by Nancy Viramontes DO at [facility] OR    SHOULDER SURGERY Left 2005    STEROID INJECTION KNEE Left 02/2022    TONSILLECTOMY  28 YRS OLD    TUBAL LIGATION      VITRECTOMY Left 01/04/2024    LEFT EYE PARS PLANA VITRECTOMY AIR BUBBLE 25G performed by Naren Vang MD at [facility] OR       Prior to Admission medications    Medication Sig Start Date End Date Taking? Authorizing Provider   buprenorphine (BUTRANS) 10 MCG/HR PTWK Place 1 patch onto the skin once a week for 28 days. Max Daily Amount: 1 patch 7/22/24 8/19/24 Yes Yoselyn Chilel PA   lidocaine (LIDODERM) 5 % Place 1 patch onto the skin daily 12 hours on, 12 hours off. 7/22/24 8/21/24 Yes Yoselyn Chilel PA   senna (SENOKOT) 8.6 MG tablet Take 1 to 2 tabs at bedtime as needed for constipation 7/1/24  Yes Jamee Chapa APRN - CNP   oxyCODONE-acetaminophen (PERCOCET) 5-325 MG per tablet Take 1 tablet by mouth every 4 hours as needed for Pain.   Yes Provider, MD Elton   atorvastatin (LIPITOR) 40 MG tablet Take 1 tablet by mouth daily Note increase in dose 6/5/24  Yes Chichi Marrero APRN - CNP   atenolol (TENORMIN) 50 MG tablet Take 1 tablet by mouth daily 5/15/24  Yes Janes

## 2024-07-31 LAB
CHOLESTEROL, TOTAL: NORMAL
CHOLESTEROL/HDL RATIO: NORMAL
ESTIMATED AVERAGE GLUCOSE: 100
HBA1C MFR BLD: 5.1 %
HDLC SERPL-MCNC: NORMAL MG/DL
LDL CHOLESTEROL: NORMAL
NONHDLC SERPL-MCNC: NORMAL MG/DL
TRIGL SERPL-MCNC: NORMAL MG/DL
VLDLC SERPL CALC-MCNC: NORMAL MG/DL

## 2024-08-21 ENCOUNTER — OFFICE VISIT (OUTPATIENT)
Dept: FAMILY MEDICINE CLINIC | Age: 69
End: 2024-08-21
Payer: MEDICARE

## 2024-08-21 VITALS
RESPIRATION RATE: 16 BRPM | OXYGEN SATURATION: 98 % | TEMPERATURE: 97.9 F | BODY MASS INDEX: 34.91 KG/M2 | WEIGHT: 177.8 LBS | HEART RATE: 80 BPM | HEIGHT: 60 IN | DIASTOLIC BLOOD PRESSURE: 84 MMHG | SYSTOLIC BLOOD PRESSURE: 166 MMHG

## 2024-08-21 DIAGNOSIS — G89.29 CHRONIC PAIN OF LEFT KNEE: ICD-10-CM

## 2024-08-21 DIAGNOSIS — G89.4 CHRONIC PAIN SYNDROME: ICD-10-CM

## 2024-08-21 DIAGNOSIS — M54.16 LUMBAR RADICULOPATHY: ICD-10-CM

## 2024-08-21 DIAGNOSIS — K58.9 IRRITABLE BOWEL SYNDROME, UNSPECIFIED TYPE: ICD-10-CM

## 2024-08-21 DIAGNOSIS — M96.1 LUMBAR POST-LAMINECTOMY SYNDROME: ICD-10-CM

## 2024-08-21 DIAGNOSIS — F11.20 OPIOID DEPENDENCE WITH CURRENT USE (HCC): ICD-10-CM

## 2024-08-21 DIAGNOSIS — M17.12 PRIMARY OSTEOARTHRITIS OF LEFT KNEE: ICD-10-CM

## 2024-08-21 DIAGNOSIS — I10 ESSENTIAL HYPERTENSION: ICD-10-CM

## 2024-08-21 DIAGNOSIS — Z85.048 HISTORY OF COLORECTAL CANCER: ICD-10-CM

## 2024-08-21 DIAGNOSIS — E11.9 TYPE 2 DIABETES MELLITUS WITHOUT COMPLICATION, WITHOUT LONG-TERM CURRENT USE OF INSULIN (HCC): ICD-10-CM

## 2024-08-21 DIAGNOSIS — G45.9 TIA (TRANSIENT ISCHEMIC ATTACK): ICD-10-CM

## 2024-08-21 DIAGNOSIS — M53.3 DISORDER OF SACRUM: ICD-10-CM

## 2024-08-21 DIAGNOSIS — R30.0 DYSURIA: Primary | ICD-10-CM

## 2024-08-21 DIAGNOSIS — E78.2 MIXED HYPERLIPIDEMIA: ICD-10-CM

## 2024-08-21 DIAGNOSIS — M25.562 CHRONIC PAIN OF LEFT KNEE: ICD-10-CM

## 2024-08-21 LAB
BILIRUBIN, POC: NEGATIVE
BLOOD URINE, POC: NORMAL
CLARITY, POC: NORMAL
COLOR, POC: NORMAL
GLUCOSE URINE, POC: NEGATIVE
KETONES, POC: NEGATIVE
LEUKOCYTE EST, POC: NORMAL
NITRITE, POC: NEGATIVE
PH, POC: 6
PROTEIN, POC: 100
SPECIFIC GRAVITY, POC: 1.02
UROBILINOGEN, POC: 1

## 2024-08-21 PROCEDURE — G8417 CALC BMI ABV UP PARAM F/U: HCPCS | Performed by: FAMILY MEDICINE

## 2024-08-21 PROCEDURE — G8399 PT W/DXA RESULTS DOCUMENT: HCPCS | Performed by: FAMILY MEDICINE

## 2024-08-21 PROCEDURE — 3077F SYST BP >= 140 MM HG: CPT | Performed by: FAMILY MEDICINE

## 2024-08-21 PROCEDURE — 81002 URINALYSIS NONAUTO W/O SCOPE: CPT | Performed by: FAMILY MEDICINE

## 2024-08-21 PROCEDURE — G8427 DOCREV CUR MEDS BY ELIG CLIN: HCPCS | Performed by: FAMILY MEDICINE

## 2024-08-21 PROCEDURE — 99214 OFFICE O/P EST MOD 30 MIN: CPT | Performed by: FAMILY MEDICINE

## 2024-08-21 PROCEDURE — 1090F PRES/ABSN URINE INCON ASSESS: CPT | Performed by: FAMILY MEDICINE

## 2024-08-21 PROCEDURE — 1124F ACP DISCUSS-NO DSCNMKR DOCD: CPT | Performed by: FAMILY MEDICINE

## 2024-08-21 PROCEDURE — 3079F DIAST BP 80-89 MM HG: CPT | Performed by: FAMILY MEDICINE

## 2024-08-21 PROCEDURE — 3044F HG A1C LEVEL LT 7.0%: CPT | Performed by: FAMILY MEDICINE

## 2024-08-21 PROCEDURE — 1036F TOBACCO NON-USER: CPT | Performed by: FAMILY MEDICINE

## 2024-08-21 PROCEDURE — 2022F DILAT RTA XM EVC RTNOPTHY: CPT | Performed by: FAMILY MEDICINE

## 2024-08-21 PROCEDURE — 3017F COLORECTAL CA SCREEN DOC REV: CPT | Performed by: FAMILY MEDICINE

## 2024-08-21 RX ORDER — GLUCOSAMINE HCL/CHONDROITIN SU 500-400 MG
CAPSULE ORAL
Qty: 50 STRIP | Refills: 11 | Status: SHIPPED | OUTPATIENT
Start: 2024-08-21

## 2024-08-21 RX ORDER — ATENOLOL 50 MG/1
50 TABLET ORAL DAILY
Qty: 90 TABLET | Refills: 3 | Status: SHIPPED | OUTPATIENT
Start: 2024-08-21

## 2024-08-21 RX ORDER — AMLODIPINE BESYLATE 5 MG/1
5 TABLET ORAL DAILY
Qty: 90 TABLET | Refills: 3 | Status: SHIPPED | OUTPATIENT
Start: 2024-08-21

## 2024-08-21 RX ORDER — ROSUVASTATIN CALCIUM 10 MG/1
10 TABLET, COATED ORAL NIGHTLY
Qty: 90 TABLET | Refills: 3 | Status: SHIPPED | OUTPATIENT
Start: 2024-08-21

## 2024-08-21 RX ORDER — BUPRENORPHINE 10 UG/H
1 PATCH TRANSDERMAL WEEKLY
COMMUNITY
Start: 2024-08-02

## 2024-08-21 RX ORDER — NITROFURANTOIN 25; 75 MG/1; MG/1
100 CAPSULE ORAL 2 TIMES DAILY
Qty: 10 CAPSULE | Refills: 0 | Status: SHIPPED | OUTPATIENT
Start: 2024-08-21 | End: 2024-08-26

## 2024-08-21 RX ORDER — MELOXICAM 7.5 MG/1
7.5 TABLET ORAL DAILY
Qty: 90 TABLET | Refills: 1 | Status: SHIPPED | OUTPATIENT
Start: 2024-08-21

## 2024-08-21 RX ORDER — IPRATROPIUM BROMIDE 42 UG/1
1 SPRAY, METERED NASAL 2 TIMES DAILY PRN
Qty: 15 ML | Refills: 1 | Status: SHIPPED | OUTPATIENT
Start: 2024-08-21

## 2024-08-21 NOTE — PROGRESS NOTES
management s/p multiple nerve blocks. On opiates and mobic for this. Sees Dr. Sutton. Pain wants PCP to manage mobic due to monitoring labs.    Diabetes:   Follows with [facility]. Most recent A1c stable. On ozempic through endo. Has chronic diarrhea though that started before ozempic.     HLD:  Recent labs up. Stopped zetia per endo. Stopped statin a few days because was upsetting stomach.     Stomach upset:   Has been on mobic but thought statin was upsetting her stomach more. On PPI. Can't tolerate being off PPI. Now seeing [facility] just established. They are going to test anorectal manometry and started immodium.     Dysuria:   Few days. Frequency and burning. Hasn't had one in over a year. Denies any chest pain, shortness of breath, edema, headache or vision changes. Denies fevers, chills, night sweats or weight change.      Health maintenance:  Discuss further next visit    Past Medical History:      Diagnosis Date    Acute left lumbar radiculopathy 12/28/2017    Cancer (HCC) 2017    colon    Cerebral artery occlusion with cerebral infarction (HCC) 2021    tia; no deficits    Chronic back pain     Chronic deep vein thrombosis (DVT) of distal vein of left lower extremity ([facility]) 02/01/2021    after back surgery    Diabetes mellitus (HCC)     Fatigue 06/08/2016    Fibromyalgia     GERD (gastroesophageal reflux disease)     Hair loss 05/03/2021    Hemorrhoids     Hx of blood clots     Hyperlipidemia     Hypertension     Malignant neoplasm of sigmoid colon (HCC) 06/17/2020    Obesity     Osteoarthritis     PONV (postoperative nausea and vomiting)     TIA (transient ischemic attack) 05/2022    Urinary incontinence     Weakness of both legs 12/21/2020       Allergies:    Diclofenac, Lisinopril, Diclofenac sodium, Farxiga [dapagliflozin], Lyrica [pregabalin], and Sulfa antibiotics    Social History:   Social History     Tobacco Use    Smoking status: Never     Passive exposure: Never    Smokeless tobacco: Never   Substance Use

## 2024-08-22 LAB
CULTURE: ABNORMAL
SPECIMEN DESCRIPTION: ABNORMAL

## 2024-09-03 DIAGNOSIS — F11.20 OPIOID DEPENDENCE WITH CURRENT USE (HCC): ICD-10-CM

## 2024-09-03 DIAGNOSIS — M10.072 ACUTE IDIOPATHIC GOUT INVOLVING TOE OF LEFT FOOT: ICD-10-CM

## 2024-09-03 RX ORDER — ALLOPURINOL 300 MG/1
TABLET ORAL
Qty: 90 TABLET | Refills: 3 | Status: SHIPPED | OUTPATIENT
Start: 2024-09-03

## 2024-09-03 NOTE — TELEPHONE ENCOUNTER
Last seen 8/21/2024  Next appt 12/4/2024    Requested Prescriptions     Pending Prescriptions Disp Refills    allopurinol (ZYLOPRIM) 300 MG tablet [Pharmacy Med Name: ALLOPURINOL 300 MG TABS 300 Tablet] 90 tablet 3     Sig: Take one (1) tablet by mouth once daily.

## 2024-09-27 RX ORDER — BUPRENORPHINE 10 UG/H
PATCH TRANSDERMAL
Qty: 4 PATCH | Refills: 1 | OUTPATIENT
Start: 2024-09-27

## 2024-10-07 ENCOUNTER — OFFICE VISIT (OUTPATIENT)
Dept: PAIN MANAGEMENT | Age: 69
End: 2024-10-07
Payer: MEDICARE

## 2024-10-07 VITALS
HEART RATE: 90 BPM | BODY MASS INDEX: 33.38 KG/M2 | OXYGEN SATURATION: 98 % | WEIGHT: 170 LBS | DIASTOLIC BLOOD PRESSURE: 62 MMHG | SYSTOLIC BLOOD PRESSURE: 136 MMHG | TEMPERATURE: 96.9 F | RESPIRATION RATE: 18 BRPM | HEIGHT: 60 IN

## 2024-10-07 DIAGNOSIS — M96.1 LUMBAR POST-LAMINECTOMY SYNDROME: ICD-10-CM

## 2024-10-07 DIAGNOSIS — Z79.891 ENCOUNTER FOR LONG-TERM OPIATE ANALGESIC USE: ICD-10-CM

## 2024-10-07 DIAGNOSIS — M54.6 PAIN IN THORACIC SPINE: ICD-10-CM

## 2024-10-07 DIAGNOSIS — M54.16 LUMBAR RADICULOPATHY: ICD-10-CM

## 2024-10-07 DIAGNOSIS — G89.4 CHRONIC PAIN SYNDROME: ICD-10-CM

## 2024-10-07 DIAGNOSIS — G89.29 CHRONIC PAIN OF LEFT KNEE: ICD-10-CM

## 2024-10-07 DIAGNOSIS — M43.16 SPONDYLOLISTHESIS OF LUMBAR REGION: ICD-10-CM

## 2024-10-07 DIAGNOSIS — F11.20 OPIOID DEPENDENCE WITH CURRENT USE (HCC): Primary | ICD-10-CM

## 2024-10-07 DIAGNOSIS — M53.3 DISORDER OF SACRUM: ICD-10-CM

## 2024-10-07 DIAGNOSIS — M25.562 CHRONIC PAIN OF LEFT KNEE: ICD-10-CM

## 2024-10-07 DIAGNOSIS — M48.062 SPINAL STENOSIS OF LUMBAR REGION WITH NEUROGENIC CLAUDICATION: ICD-10-CM

## 2024-10-07 DIAGNOSIS — M17.12 PRIMARY OSTEOARTHRITIS OF LEFT KNEE: ICD-10-CM

## 2024-10-07 PROCEDURE — 3078F DIAST BP <80 MM HG: CPT | Performed by: PHYSICIAN ASSISTANT

## 2024-10-07 PROCEDURE — 99213 OFFICE O/P EST LOW 20 MIN: CPT | Performed by: PHYSICIAN ASSISTANT

## 2024-10-07 PROCEDURE — 3017F COLORECTAL CA SCREEN DOC REV: CPT | Performed by: PHYSICIAN ASSISTANT

## 2024-10-07 PROCEDURE — 1036F TOBACCO NON-USER: CPT | Performed by: PHYSICIAN ASSISTANT

## 2024-10-07 PROCEDURE — 1090F PRES/ABSN URINE INCON ASSESS: CPT | Performed by: PHYSICIAN ASSISTANT

## 2024-10-07 PROCEDURE — G8417 CALC BMI ABV UP PARAM F/U: HCPCS | Performed by: PHYSICIAN ASSISTANT

## 2024-10-07 PROCEDURE — 3075F SYST BP GE 130 - 139MM HG: CPT | Performed by: PHYSICIAN ASSISTANT

## 2024-10-07 PROCEDURE — 1124F ACP DISCUSS-NO DSCNMKR DOCD: CPT | Performed by: PHYSICIAN ASSISTANT

## 2024-10-07 PROCEDURE — G8427 DOCREV CUR MEDS BY ELIG CLIN: HCPCS | Performed by: PHYSICIAN ASSISTANT

## 2024-10-07 PROCEDURE — G8484 FLU IMMUNIZE NO ADMIN: HCPCS | Performed by: PHYSICIAN ASSISTANT

## 2024-10-07 PROCEDURE — 99213 OFFICE O/P EST LOW 20 MIN: CPT

## 2024-10-07 PROCEDURE — G8399 PT W/DXA RESULTS DOCUMENT: HCPCS | Performed by: PHYSICIAN ASSISTANT

## 2024-10-07 RX ORDER — BUPRENORPHINE 10 UG/H
1 PATCH TRANSDERMAL WEEKLY
Qty: 4 PATCH | Refills: 1 | Status: SHIPPED | OUTPATIENT
Start: 2024-10-07 | End: 2024-11-04

## 2024-10-07 RX ORDER — LIDOCAINE 50 MG/G
PATCH TOPICAL
COMMUNITY
Start: 2024-09-03

## 2024-10-07 NOTE — PROGRESS NOTES
Margaret Almonte presents to the [facility] on 10/7/2024. Margaret is complaining of pain lower back. The pain is constant. The pain is described as aching. Pain is rated on her best day at a 5, on her worst day at a 10, and on average at a 5 on the VAS scale. She took her last dose of Percocet, Butrans Patch, and Mobic @ [facility].  Also uses Tylenol and Licaine patches    Any procedures since your last visit: No  She is  on NSAIDS and  is  on anticoagulation medications to include ASA and is managed by PCP.     Pacemaker or defibrillator: No   Medication Contract and Consent for Opioid Use Documents Filed       Patient Documents       Type of Document Status Date Received Received By Description    Medication Contract Received 6/21/2019  7:35 AM JAMEY CARR BETH med contract    Medication Contract Received 3/26/2021 10:38 AM KARL KELLEY PAIN MGMT CONTRACT DR. RONDON    Medication Contract Signed 8/12/2022 11:52 AM RAMY FAIR Pain Agreement 8/12/22    Medication Contract Received 6/21/2023 11:49 AM SHELBY WARNER PAIN AGREEMENT    Consent Opioid Use Received 6/26/2024  3:37 PM AIDAN ZIEGLER patient agreement 6/26/24                       LMP  (LMP Unknown)      No LMP recorded (lmp unknown). Patient is postmenopausal.    
pain intensity and its interference with activities of daily living, quality of family life and social activities, and the physical activity);Obtaining appropriate analgesic effect of treatment.               We discussed with the patient that combining opioids, benzodiazepines, alcohol, illicit drugs or sleep aids increases the risk of respiratory depression including death. We discussed that these medications may cause drowsiness, sedation or dizziness and have counseled the patient not to drive or operate machinery. We have discussed that these medications will be prescribed only by one provider. We have discussed with the patient about age related risk factors and have thoroughly discussed the importance of taking these medications as prescribed. The patient verbalizes understanding.    ccreferring physic

## 2024-10-23 ENCOUNTER — TELEPHONE (OUTPATIENT)
Dept: PAIN MANAGEMENT | Age: 69
End: 2024-10-23

## 2024-10-23 DIAGNOSIS — M96.1 LUMBAR POST-LAMINECTOMY SYNDROME: ICD-10-CM

## 2024-10-23 DIAGNOSIS — M43.16 SPONDYLOLISTHESIS OF LUMBAR REGION: ICD-10-CM

## 2024-10-23 DIAGNOSIS — M53.3 DISORDER OF SACRUM: ICD-10-CM

## 2024-10-23 DIAGNOSIS — G89.4 CHRONIC PAIN SYNDROME: Primary | ICD-10-CM

## 2024-10-23 DIAGNOSIS — M54.16 LUMBAR RADICULOPATHY: ICD-10-CM

## 2024-10-23 DIAGNOSIS — M48.062 SPINAL STENOSIS OF LUMBAR REGION WITH NEUROGENIC CLAUDICATION: ICD-10-CM

## 2024-10-23 NOTE — TELEPHONE ENCOUNTER
Patient calling in and needing a referral to [facility] for her back. Please place and advise.

## 2024-10-31 ENCOUNTER — OFFICE VISIT (OUTPATIENT)
Dept: FAMILY MEDICINE CLINIC | Age: 69
End: 2024-10-31

## 2024-10-31 ENCOUNTER — NURSE ONLY (OUTPATIENT)
Dept: FAMILY MEDICINE CLINIC | Age: 69
End: 2024-10-31

## 2024-10-31 VITALS
HEIGHT: 60 IN | RESPIRATION RATE: 19 BRPM | WEIGHT: 170 LBS | TEMPERATURE: 97.4 F | HEART RATE: 72 BPM | DIASTOLIC BLOOD PRESSURE: 78 MMHG | OXYGEN SATURATION: 98 % | SYSTOLIC BLOOD PRESSURE: 124 MMHG | BODY MASS INDEX: 33.38 KG/M2

## 2024-10-31 VITALS
DIASTOLIC BLOOD PRESSURE: 75 MMHG | RESPIRATION RATE: 19 BRPM | HEART RATE: 84 BPM | HEIGHT: 60 IN | WEIGHT: 170 LBS | SYSTOLIC BLOOD PRESSURE: 151 MMHG | BODY MASS INDEX: 33.38 KG/M2 | TEMPERATURE: 97.4 F | OXYGEN SATURATION: 98 %

## 2024-10-31 DIAGNOSIS — R39.9 UTI SYMPTOMS: ICD-10-CM

## 2024-10-31 DIAGNOSIS — N76.0 ACUTE VAGINITIS: Primary | ICD-10-CM

## 2024-10-31 DIAGNOSIS — R30.0 DYSURIA: ICD-10-CM

## 2024-10-31 LAB
BILIRUBIN, POC: NORMAL
BLOOD URINE, POC: NORMAL
CLARITY, POC: CLEAR
COLOR, POC: YELLOW
GLUCOSE URINE, POC: NORMAL MG/DL
HBA1C MFR BLD HPLC: 5.1 %
KETONES, POC: NORMAL MG/DL
LEUKOCYTE EST, POC: NORMAL
NITRITE, POC: NORMAL
PH, POC: 6.5
PROTEIN, POC: NORMAL MG/DL
SPECIFIC GRAVITY, POC: 1.01
UROBILINOGEN, POC: 1 MG/DL

## 2024-10-31 RX ORDER — CEFDINIR 300 MG/1
300 CAPSULE ORAL EVERY 12 HOURS
Qty: 14 CAPSULE | Refills: 0 | Status: SHIPPED | OUTPATIENT
Start: 2024-10-31 | End: 2024-11-07

## 2024-10-31 RX ORDER — FLUCONAZOLE 150 MG/1
TABLET ORAL
Qty: 2 TABLET | Refills: 0 | Status: SHIPPED | OUTPATIENT
Start: 2024-10-31

## 2024-10-31 NOTE — PROGRESS NOTES
MondayYesenia   Preferred Name:   None  Female, 48 year old, 1970  Phone:   *[phone] (Mobile) [phone] (Home Phone)  Last Weight:   83.1 kg  PCP:   Latrice Cortés MD  Need Interp:   No  Language:   English  Allergies  Penicillin G  Sulfa Antibiotics  Primary Ins:   [facility]  MRN:   [id number]  Patient Portal:   Active  Last Logon:   02/27/19  Next Appt With Me:   None  Last Appt With Me:    RE: 4/2/19   Received: Yesterday   Message Contents   Sharda Perales MD sent to Hallie Li             [facility] all set. KK    Previous Messages      ----- Message -----   From: Hallie Li   Sent: 3/7/2019   3:54 PM   To: Sharda Perales MD   Subject: 4/2/19                                           Hi Dr. Perales will you be able to assist ?     Doctor: Dr. Schulz   Date: 4/2/19   Time:11:00a.m   Procedure: Robotic Hysterectomy w/ Harshil Salpingectomy 2hrs   Location: [place]            was made to ensure accuracy; however, inadvertent computerized transcription errors may be present.

## 2024-10-31 NOTE — PROGRESS NOTES
Chief Complaint:   Urinary Tract Infection (Burning and itching , urgency, flank pain started a weeka go)      History of Present Illness   Source of history provided by:  patient.    Margaret Almonte is a 69 y.o. old female who presents to [facility] care for burning with urination, which occured 1 week(s) prior to arrival. Symptoms are associated with frequency and itching.  She has a history of no complicating symptoms. Denies gross hematuria.  Reports associated flank pain. Denies any fever, chills, vaginal discharge, vaginal bleeding, possibility of pregnancy, vomiting, diarrhea, or lethargy.  No LMP recorded (lmp unknown). Patient is postmenopausal.    Review of Systems   Unless otherwise stated in this report or unable to obtain because of the patient's clinical or mental status as evidenced by the medical record, this patients's positive and negative responses for Review of Systems, constitutional, psych, eyes, ENT, cardiovascular, respiratory, gastrointestinal, neurological, genitourinary, musculoskeletal, integument systems and systems related to the presenting problem are either stated in the preceding or were not pertinent or were negative for the symptoms and/or complaints related to the medical problem.    Past Medical History:  has a past medical history of Acute left lumbar radiculopathy, Cancer (HCC), Cerebral artery occlusion with cerebral infarction (HCC), Chronic back pain, Chronic deep vein thrombosis (DVT) of distal vein of left lower extremity (HCC), Diabetes mellitus (HCC), Fatigue, Fibromyalgia, GERD (gastroesophageal reflux disease), Hair loss, Hemorrhoids, Hx of blood clots, Hyperlipidemia, Hypertension, Malignant neoplasm of sigmoid colon (HCC), Obesity, Osteoarthritis, PONV (postoperative nausea and vomiting), TIA (transient ischemic attack), Urinary incontinence, and Weakness of both legs.   Past Surgical History:  has a past surgical history that includes  section; Tubal ligation;

## 2024-11-07 RX ORDER — OMEPRAZOLE 40 MG/1
40 CAPSULE, DELAYED RELEASE ORAL
Qty: 30 CAPSULE | Refills: 11 | Status: SHIPPED | OUTPATIENT
Start: 2024-11-07

## 2024-11-12 DIAGNOSIS — G89.4 CHRONIC PAIN SYNDROME: ICD-10-CM

## 2024-11-12 DIAGNOSIS — M53.3 DISORDER OF SACRUM: ICD-10-CM

## 2024-11-12 DIAGNOSIS — M96.1 LUMBAR POST-LAMINECTOMY SYNDROME: ICD-10-CM

## 2024-11-12 DIAGNOSIS — M17.12 PRIMARY OSTEOARTHRITIS OF LEFT KNEE: ICD-10-CM

## 2024-11-12 DIAGNOSIS — G89.29 CHRONIC PAIN OF LEFT KNEE: ICD-10-CM

## 2024-11-12 DIAGNOSIS — M25.562 CHRONIC PAIN OF LEFT KNEE: ICD-10-CM

## 2024-11-12 RX ORDER — MELOXICAM 7.5 MG/1
7.5 TABLET ORAL DAILY
Qty: 90 TABLET | Refills: 1 | OUTPATIENT
Start: 2024-11-12

## 2024-11-18 ENCOUNTER — HOSPITAL ENCOUNTER (OUTPATIENT)
Dept: RADIOLOGY | Facility: CLINIC | Age: 69
Discharge: HOME | End: 2024-11-18
Payer: COMMERCIAL

## 2024-11-18 ENCOUNTER — APPOINTMENT (OUTPATIENT)
Dept: ORTHOPEDIC SURGERY | Facility: CLINIC | Age: 69
End: 2024-11-18
Payer: COMMERCIAL

## 2024-11-18 ENCOUNTER — LAB (OUTPATIENT)
Dept: LAB | Facility: LAB | Age: 69
End: 2024-11-18
Payer: COMMERCIAL

## 2024-11-18 DIAGNOSIS — M54.16 LUMBAR RADICULITIS: ICD-10-CM

## 2024-11-18 DIAGNOSIS — M54.16 LUMBAR RADICULITIS: Primary | ICD-10-CM

## 2024-11-18 LAB
ALBUMIN SERPL BCP-MCNC: 4.4 G/DL (ref 3.4–5)
ALP SERPL-CCNC: 104 U/L (ref 33–136)
ALT SERPL W P-5'-P-CCNC: 11 U/L (ref 7–45)
ANION GAP SERPL CALC-SCNC: 11 MMOL/L (ref 10–20)
AST SERPL W P-5'-P-CCNC: 12 U/L (ref 9–39)
BILIRUB SERPL-MCNC: 0.6 MG/DL (ref 0–1.2)
BUN SERPL-MCNC: 19 MG/DL (ref 6–23)
CALCIUM SERPL-MCNC: 9.5 MG/DL (ref 8.6–10.3)
CHLORIDE SERPL-SCNC: 104 MMOL/L (ref 98–107)
CO2 SERPL-SCNC: 31 MMOL/L (ref 21–32)
CREAT SERPL-MCNC: 0.61 MG/DL (ref 0.5–1.05)
EGFRCR SERPLBLD CKD-EPI 2021: >90 ML/MIN/1.73M*2
GLUCOSE SERPL-MCNC: 92 MG/DL (ref 74–99)
POTASSIUM SERPL-SCNC: 4.1 MMOL/L (ref 3.5–5.3)
PROT SERPL-MCNC: 6.4 G/DL (ref 6.4–8.2)
SODIUM SERPL-SCNC: 142 MMOL/L (ref 136–145)

## 2024-11-18 PROCEDURE — 99213 OFFICE O/P EST LOW 20 MIN: CPT | Performed by: PHYSICIAN ASSISTANT

## 2024-11-18 PROCEDURE — 1159F MED LIST DOCD IN RCRD: CPT | Performed by: PHYSICIAN ASSISTANT

## 2024-11-18 PROCEDURE — 99203 OFFICE O/P NEW LOW 30 MIN: CPT | Performed by: PHYSICIAN ASSISTANT

## 2024-11-18 PROCEDURE — 72110 X-RAY EXAM L-2 SPINE 4/>VWS: CPT

## 2024-11-18 PROCEDURE — 72110 X-RAY EXAM L-2 SPINE 4/>VWS: CPT | Performed by: RADIOLOGY

## 2024-11-18 PROCEDURE — 1125F AMNT PAIN NOTED PAIN PRSNT: CPT | Performed by: PHYSICIAN ASSISTANT

## 2024-11-18 PROCEDURE — 80053 COMPREHEN METABOLIC PANEL: CPT

## 2024-11-18 PROCEDURE — 36415 COLL VENOUS BLD VENIPUNCTURE: CPT

## 2024-11-18 PROCEDURE — 1036F TOBACCO NON-USER: CPT | Performed by: PHYSICIAN ASSISTANT

## 2024-11-18 ASSESSMENT — PAIN SCALES - GENERAL: PAINLEVEL_OUTOF10: 6

## 2024-11-18 ASSESSMENT — PAIN - FUNCTIONAL ASSESSMENT: PAIN_FUNCTIONAL_ASSESSMENT: 0-10

## 2024-11-18 NOTE — PROGRESS NOTES
Sumi is a 69-year-old female presents today to be evaluated for second opinion.  She underwent a lumbar procedure, L4-5 back in 2020 by Dr. Gonzalez who was from [facility].  He recently retired.      Patient states that ever since that surgery she did not improve, she has been in chronic pain and has continued to have lower extremity radiculopathy and weakness.  Initially following the surgery she was in a wheelchair for a year, transition to a walker and recently has been using a cane. She follows fairly closely with her pain management doctor, Dr. Rollins.    Patient was referred by her podiatrist, Dr. Centeno who referred her to our spine team for further workup because of concern of her left toes making a claw deformity.    Today, patient states she has chronic low back pain, thoracolumbar muscle spasms and pain, she does walk in a slight kyphotic position with bilateral lower extremity radiculopathy, left worse than right.  Ever since the surgery she has also had chronic constipation and diarrhea which has been worked up and no one can figure out a reason, they believe that there is some damage to the nerves.    From a treatment standpoint she has been following very closely with pain management, she is currently on a pain patch, bupropion which is monitored appropriately.  She gets epidural injections every 6 months, her most recent 1 was in May.  This doctor is through [facility].    Family, social, and medical histories are obtained and reviewed.    I reviewed the complete 30-point review of systems that was documented on the scanned patient intake form.  All other systems are non-contributory except as defined in history of present illness.    Const: Well-appearing, well-nourished female in no distress.  Eyes: Normal appearing sclera and conjunctiva, no jaundice, pupils normal in appearance.  Resp: breathing comfortably, normal respiratory rate.  CV: No upper or lower extremity edema.  Musculoskeletal:  Normal gait.  Able to heel/toe walk without difficulty.  Lumbar ROM is supple.  Strength exam of the lower extremities reveals 5/5 strength in all major muscle groups .  Negative straight leg raise bilaterally.  Neuro: Sensation is intact and equal bilaterally. Deep tendon reflexes are normal and symmetric.  No clonus.  Skin: Intact without any lesions, normal turgor.  Psych: Alert and oriented x3, normal mood and affect.    Patient did bring an MRI of her lumbar spine from 2023, the results were reviewed with her.  It was noted by her hardware, and fusion with place at the L4-5 but it is noted that she has a fluid buildup but it is not causing significant canal stenosis.  Along with that finding, she also has adjacent level changes of moderate stenosis at L3-4 and L2-3.    We are going to order x-rays, 4 views of her lumbar spine.    Moving forward, the patient's symptoms not improving since 2020 following her initial surgery as well as being on chronic pain medication, bupropion pain patch as well as injections through pain management consecutively over the last 4 years, most recent injection being 6 months ago we are going to move forward and order updated imaging by means of an MRI lumbar spine with contrast.  Patient was given the appropriate blood work order.  Assuming she gets these results she will follow-up with Dr. Larson.    This note was dictated using speech recognition software and was not corrected for spelling or grammatical errors

## 2024-12-03 ENCOUNTER — OFFICE VISIT (OUTPATIENT)
Dept: PAIN MANAGEMENT | Age: 69
End: 2024-12-03
Payer: MEDICARE

## 2024-12-03 VITALS
RESPIRATION RATE: 18 BRPM | HEART RATE: 86 BPM | WEIGHT: 170 LBS | TEMPERATURE: 96.9 F | OXYGEN SATURATION: 98 % | SYSTOLIC BLOOD PRESSURE: 158 MMHG | HEIGHT: 60 IN | BODY MASS INDEX: 33.38 KG/M2 | DIASTOLIC BLOOD PRESSURE: 90 MMHG

## 2024-12-03 DIAGNOSIS — M25.562 CHRONIC PAIN OF LEFT KNEE: ICD-10-CM

## 2024-12-03 DIAGNOSIS — F11.20 OPIOID DEPENDENCE WITH CURRENT USE (HCC): ICD-10-CM

## 2024-12-03 DIAGNOSIS — M53.3 DISORDER OF SACRUM: ICD-10-CM

## 2024-12-03 DIAGNOSIS — G89.29 CHRONIC PAIN OF LEFT KNEE: ICD-10-CM

## 2024-12-03 DIAGNOSIS — M48.062 SPINAL STENOSIS OF LUMBAR REGION WITH NEUROGENIC CLAUDICATION: ICD-10-CM

## 2024-12-03 DIAGNOSIS — M43.16 SPONDYLOLISTHESIS OF LUMBAR REGION: ICD-10-CM

## 2024-12-03 DIAGNOSIS — G89.4 CHRONIC PAIN SYNDROME: Primary | ICD-10-CM

## 2024-12-03 DIAGNOSIS — Z79.891 ENCOUNTER FOR LONG-TERM OPIATE ANALGESIC USE: ICD-10-CM

## 2024-12-03 DIAGNOSIS — M54.6 PAIN IN THORACIC SPINE: ICD-10-CM

## 2024-12-03 DIAGNOSIS — G89.4 CHRONIC PAIN SYNDROME: ICD-10-CM

## 2024-12-03 DIAGNOSIS — M17.12 PRIMARY OSTEOARTHRITIS OF LEFT KNEE: ICD-10-CM

## 2024-12-03 DIAGNOSIS — M54.16 LUMBAR RADICULOPATHY: ICD-10-CM

## 2024-12-03 DIAGNOSIS — M96.1 LUMBAR POST-LAMINECTOMY SYNDROME: ICD-10-CM

## 2024-12-03 PROCEDURE — 3017F COLORECTAL CA SCREEN DOC REV: CPT | Performed by: PHYSICIAN ASSISTANT

## 2024-12-03 PROCEDURE — 3080F DIAST BP >= 90 MM HG: CPT | Performed by: PHYSICIAN ASSISTANT

## 2024-12-03 PROCEDURE — 99213 OFFICE O/P EST LOW 20 MIN: CPT | Performed by: PHYSICIAN ASSISTANT

## 2024-12-03 PROCEDURE — G8399 PT W/DXA RESULTS DOCUMENT: HCPCS | Performed by: PHYSICIAN ASSISTANT

## 2024-12-03 PROCEDURE — G8427 DOCREV CUR MEDS BY ELIG CLIN: HCPCS | Performed by: PHYSICIAN ASSISTANT

## 2024-12-03 PROCEDURE — 1090F PRES/ABSN URINE INCON ASSESS: CPT | Performed by: PHYSICIAN ASSISTANT

## 2024-12-03 PROCEDURE — G8417 CALC BMI ABV UP PARAM F/U: HCPCS | Performed by: PHYSICIAN ASSISTANT

## 2024-12-03 PROCEDURE — 1124F ACP DISCUSS-NO DSCNMKR DOCD: CPT | Performed by: PHYSICIAN ASSISTANT

## 2024-12-03 PROCEDURE — G8482 FLU IMMUNIZE ORDER/ADMIN: HCPCS | Performed by: PHYSICIAN ASSISTANT

## 2024-12-03 PROCEDURE — 1036F TOBACCO NON-USER: CPT | Performed by: PHYSICIAN ASSISTANT

## 2024-12-03 PROCEDURE — 3077F SYST BP >= 140 MM HG: CPT | Performed by: PHYSICIAN ASSISTANT

## 2024-12-03 PROCEDURE — 1159F MED LIST DOCD IN RCRD: CPT | Performed by: PHYSICIAN ASSISTANT

## 2024-12-03 RX ORDER — ONDANSETRON 8 MG/1
TABLET, FILM COATED ORAL
COMMUNITY
Start: 2024-10-21

## 2024-12-03 RX ORDER — BUPRENORPHINE 10 UG/H
PATCH TRANSDERMAL
COMMUNITY
Start: 2024-11-06 | End: 2024-12-03

## 2024-12-03 RX ORDER — BUPRENORPHINE 10 UG/H
1 PATCH TRANSDERMAL WEEKLY
Qty: 4 PATCH | Refills: 1 | Status: SHIPPED | OUTPATIENT
Start: 2024-12-03 | End: 2024-12-31

## 2024-12-03 NOTE — PROGRESS NOTES
Margaret Almonte presents to the [facility] on 12/3/2024. Margaret is complaining of pain in her lower back. The pain is constant. The pain is described as aching. Pain is rated on her best day at a 4, on her worst day at a 10, and on average at a 5 on the VAS scale. She took her last dose of Butrans Patch, meloxicam, lidocaine patches today    Any procedures since your last visit: No    She is  on NSAIDS and  is  on anticoagulation medications to include ASA and is managed by Fadia Bray MD       Pacemaker or defibrillator: No     Medication Contract and Consent for Opioid Use Documents Filed       Patient Documents       Type of Document Status Date Received Received By Description    Medication Contract Received 6/21/2019  7:35 AM JAMEY CARRH med contract    Medication Contract Received 3/26/2021 10:38 AM KARL KELLEY PAIN MGMT CONTRACT DR. RONDON    Medication Contract Signed 8/12/2022 11:52 AM RAMY FAIR Pain Agreement 8/12/22    Medication Contract Received 6/21/2023 11:49 AM SHELBY WARNER PAIN AGREEMENT    Consent Opioid Use Received 6/26/2024  3:37 PM AIDAN ZIEGLER patient agreement 6/26/24                       Resp 18   Ht 1.524 m (5')   Wt 77.1 kg (170 lb)   LMP  (LMP Unknown)   BMI 33.20 kg/m²      No LMP recorded (lmp unknown). Patient is postmenopausal.    
No acute   fracture or dislocation.     No other significant abnormality.       IMPRESSION   IMPRESSION:     Stable moderately advanced left knee osteoarthritis with joint bodies.        4/2021 MRI lumbar w/ and w/o -  FINDINGS:   BONES/ALIGNMENT: The vertebral body heights are maintained.  There is   age-appropriate bone marrow signal.  There is posterior fixation at L4-5 with   artificial disc material.  There is multilevel degenerative disc disease in   the remaining disc spaces with loss of disc signal.  There is no disc space   narrowing.  There is no significant spondylolisthesis.       SPINAL CORD:  The conus medullaris is normal in caliber and signal and   terminates at the T12-L1 level.  The cauda equina is unremarkable.       SOFT TISSUES: There is a fluid collection containing septations in the   postoperative bed that measures approximately 3.1 x 2.7 x 3.7 cm.  There is   no abnormal postcontrast enhancement.  Visualized abdominal structures are   unremarkable.       L1-L2: There is a circumferential disc bulge.  There is no canal stenosis or   foraminal narrowing.       L2-L3: There is a circumferential disc bulge with facet and ligamentous   hypertrophy.  There is canal stenosis measuring 9 mm in AP dimension.  There   is moderate bilateral foraminal narrowing.       L3-L4: There is a circumferential disc bulge with facet and ligamentous   hypertrophy.  There is canal stenosis measuring 9 mm in AP dimension.  There   is no significant foraminal narrowing.       L4-L5: There is artificial disc material with posterior laminectomy.  There   is canal stenosis measuring 7 mm in AP dimension.  There is no significant   foraminal narrowing.       L5-S1: There is a circumferential disc bulge with facet hypertrophy.  There   is no canal stenosis or foraminal narrowing.           Impression   Posterior fixation and laminectomy at L4-5 with a postoperative fluid   collection containing septations that likely

## 2024-12-04 ENCOUNTER — OFFICE VISIT (OUTPATIENT)
Dept: FAMILY MEDICINE CLINIC | Age: 69
End: 2024-12-04

## 2024-12-04 VITALS
HEIGHT: 60 IN | TEMPERATURE: 98.7 F | SYSTOLIC BLOOD PRESSURE: 122 MMHG | RESPIRATION RATE: 16 BRPM | BODY MASS INDEX: 34.59 KG/M2 | DIASTOLIC BLOOD PRESSURE: 66 MMHG | OXYGEN SATURATION: 97 % | WEIGHT: 176.2 LBS | HEART RATE: 90 BPM

## 2024-12-04 DIAGNOSIS — M25.562 CHRONIC PAIN OF LEFT KNEE: ICD-10-CM

## 2024-12-04 DIAGNOSIS — M53.3 DISORDER OF SACRUM: ICD-10-CM

## 2024-12-04 DIAGNOSIS — G89.4 CHRONIC PAIN SYNDROME: ICD-10-CM

## 2024-12-04 DIAGNOSIS — M96.1 LUMBAR POST-LAMINECTOMY SYNDROME: ICD-10-CM

## 2024-12-04 DIAGNOSIS — Z12.31 ENCOUNTER FOR SCREENING MAMMOGRAM FOR MALIGNANT NEOPLASM OF BREAST: Primary | ICD-10-CM

## 2024-12-04 DIAGNOSIS — G89.29 CHRONIC PAIN OF LEFT KNEE: ICD-10-CM

## 2024-12-04 DIAGNOSIS — E11.9 TYPE 2 DIABETES MELLITUS WITHOUT COMPLICATION, WITHOUT LONG-TERM CURRENT USE OF INSULIN (HCC): ICD-10-CM

## 2024-12-04 DIAGNOSIS — M17.12 PRIMARY OSTEOARTHRITIS OF LEFT KNEE: ICD-10-CM

## 2024-12-04 PROBLEM — Z86.73 HISTORY OF TRANSIENT ISCHEMIC ATTACK (TIA): Status: ACTIVE | Noted: 2022-05-07

## 2024-12-04 RX ORDER — MELOXICAM 7.5 MG/1
7.5 TABLET ORAL DAILY
Qty: 90 TABLET | Refills: 1 | Status: SHIPPED | OUTPATIENT
Start: 2024-12-04

## 2024-12-04 RX ORDER — IPRATROPIUM BROMIDE 42 UG/1
1 SPRAY, METERED NASAL 2 TIMES DAILY PRN
Qty: 15 ML | Refills: 1 | Status: SHIPPED | OUTPATIENT
Start: 2024-12-04

## 2024-12-04 NOTE — PROGRESS NOTES
[facility]  [address], [place] [address]  Phone: [phone]        Patient:  Margaret Almonte 69 y.o. female          Chief complaint:   Chief Complaint   Patient presents with    3 Month Follow-Up    Diabetes    Hypertension    Leg Pain     Pt c/o \"fuad horse\" in left calf muscle.        Assessment and Plan     Margaret was seen today for new patient and frequent/recurrent uti.    Diagnoses and all orders for this visit:    Essential hypertension  Chronic and stable  Started amlodipine 8/24  DASH diet  -     amLODIPine (NORVASC) 5 MG tablet; Take 1 tablet by mouth daily  -     Comprehensive Metabolic Panel; Future    Chronic pain syndrome [G89.4 (ICD-10-CM)]  Opioid dependence with current use (HCC)  Disorder of sacrum  Lumbar post-laminectomy syndrome  Chronic and not stable  Dr. Caro podiatry ordered EMG due to carpopedal spasm: showed radiculopathy  Going for CT scan soon for Dr. Garcia, podiatry referred here  Follows with Dr. Sutton pain mgmt  S/p multiple spinal procedures  On chronic opioids  Okay for meloxicam as needed but try to but back due to SE  -     meloxicam (MOBIC) 7.5 MG tablet; Take 1 tablet by mouth daily    Primary osteoarthritis of left knee  Chronic and stable  Discussed tylenol in place of mobic due to SE like GI bleed, HTN, renal impairment  Will work on cutting back dose as able  -     meloxicam (MOBIC) 7.5 MG tablet; Take 1 tablet by mouth daily  Had recent labs stable 10/2024, repeat next visit    Type 2 diabetes mellitus without complication, without long-term current use of insulin (HCC)  HLD  Chronic and stable  Followed by endocrinology  On ozempic  -     blood glucose monitor strips; Test 1 times a day & as needed for symptoms of irregular blood glucose.  GI upset with lipitor, trial crestor and repeat labs 3 mos  -     rosuvastatin (CRESTOR) 10 MG tablet; Take 1 tablet by mouth nightly  -     Lipid, Fasting; Future  Hemoglobin A1C   Date Value

## 2024-12-05 ENCOUNTER — TELEPHONE (OUTPATIENT)
Dept: ADMINISTRATIVE | Age: 69
End: 2024-12-05

## 2024-12-05 ENCOUNTER — HOSPITAL ENCOUNTER (OUTPATIENT)
Dept: RADIOLOGY | Facility: HOSPITAL | Age: 69
End: 2024-12-05
Payer: COMMERCIAL

## 2024-12-05 NOTE — TELEPHONE ENCOUNTER
Patient has annual appointment under Dr. Duke still on scheduled for 3/24/2025. Please advise if appointment is still accurate. Patient would still like seen even if she needs to switch to another provider in [place].

## 2024-12-10 ENCOUNTER — APPOINTMENT (OUTPATIENT)
Dept: ORTHOPEDIC SURGERY | Facility: CLINIC | Age: 69
End: 2024-12-10
Payer: COMMERCIAL

## 2024-12-15 ENCOUNTER — HOSPITAL ENCOUNTER (OUTPATIENT)
Dept: RADIOLOGY | Facility: HOSPITAL | Age: 69
Discharge: HOME | End: 2024-12-15
Payer: COMMERCIAL

## 2024-12-15 DIAGNOSIS — M54.16 LUMBAR RADICULITIS: ICD-10-CM

## 2024-12-15 PROCEDURE — 72158 MRI LUMBAR SPINE W/O & W/DYE: CPT | Performed by: RADIOLOGY

## 2024-12-15 PROCEDURE — 72158 MRI LUMBAR SPINE W/O & W/DYE: CPT

## 2024-12-15 PROCEDURE — A9575 INJ GADOTERATE MEGLUMI 0.1ML: HCPCS | Performed by: PHYSICIAN ASSISTANT

## 2024-12-15 PROCEDURE — 2550000001 HC RX 255 CONTRASTS: Performed by: PHYSICIAN ASSISTANT

## 2024-12-15 RX ORDER — GADOTERATE MEGLUMINE 376.9 MG/ML
0.2 INJECTION INTRAVENOUS
Status: COMPLETED | OUTPATIENT
Start: 2024-12-15 | End: 2024-12-15

## 2024-12-15 ASSESSMENT — ENCOUNTER SYMPTOMS
DEPRESSION: 0
OCCASIONAL FEELINGS OF UNSTEADINESS: 0
LOSS OF SENSATION IN FEET: 0

## 2024-12-18 ENCOUNTER — APPOINTMENT (OUTPATIENT)
Dept: ORTHOPEDIC SURGERY | Facility: CLINIC | Age: 69
End: 2024-12-18
Payer: COMMERCIAL

## 2024-12-18 DIAGNOSIS — M48.061 DEGENERATIVE LUMBAR SPINAL STENOSIS: Primary | ICD-10-CM

## 2024-12-18 DIAGNOSIS — M54.16 LUMBAR RADICULITIS: ICD-10-CM

## 2024-12-18 PROCEDURE — 99214 OFFICE O/P EST MOD 30 MIN: CPT | Performed by: ORTHOPAEDIC SURGERY

## 2024-12-18 PROCEDURE — 1125F AMNT PAIN NOTED PAIN PRSNT: CPT | Performed by: ORTHOPAEDIC SURGERY

## 2024-12-18 PROCEDURE — 1159F MED LIST DOCD IN RCRD: CPT | Performed by: ORTHOPAEDIC SURGERY

## 2024-12-18 ASSESSMENT — PAIN SCALES - GENERAL: PAINLEVEL_OUTOF10: 7

## 2024-12-18 ASSESSMENT — PAIN - FUNCTIONAL ASSESSMENT: PAIN_FUNCTIONAL_ASSESSMENT: 0-10

## 2024-12-18 NOTE — LETTER
December 18, 2024     Kathy Marsh MD  [address]  [facility] [address]    Patient: Sumi Goldberg   YOB: 1955   Date of Visit: 12/18/2024       Dear Dr. Kathy Marsh MD:    Thank you for referring Sumi Goldberg to me for evaluation. Below are my notes for this consultation.  If you have questions, please do not hesitate to call me. I look forward to following your patient along with you.       Sincerely,     Aashish Larson MD      CC: No Recipients  ______________________________________________________________________________________    This 69-year-old woman is referred by her podiatrist.    4 years ago she had a combined thoracic and lumbar spine surgery by .    Apparently the surgery was done urgently as she had lost the ability to walk.    She spent a year in rehab.  It took her a year to regain the ability to ambulate.  Dr. Camargo subsequently retired so she has not had a lot of evaluation.  She has been treated by a pain specialist.  She described having had a myelogram earlier this year.    Her symptoms are primarily continued back pain.  She does have some curling of her toes in both feet.  She states the podiatrist had noted that she was dragging her left foot and she perhaps has noted this over the last several months as well.    She is not experiencing any severe radicular pain.    Her main symptom she describes is chronic pain.    She indicates that she does not want to consider any further surgery.    She is on Social Security disability.    Family, social, and medical histories are obtained and reviewed.  30-point, patient-recorded Review of Systems is personally obtained and reviewed. Inclusive is no history of weight loss, change in appetite, recent change in activity level, change in bowel or bladder habits, fevers, chills, malaise, or night pain.    She is here today with her .    On exam, she is an older appearing woman no acute distress.  Slow  stable gait.  She has a moderate thoracic kyphosis.  She has a healed midline lumbar scar and she has a midline upper thoracic scar as well.    Painless motion of the cervical spine.  Her strength is intact in the upper extremities.    Her strength is intact in the lower extremities.  I do not detect any obvious weakness in ankle dorsiflexion on the left.    No hyperreflexia.    Her lumbar MRI shows a TLIF at L4-5.  There is a dorsal fluid collection at the surgical site but it is sterile in appearance and it is noncompressive.  She does have moderate stenosis at L2-3 and L3-4.    Impression: This 69-year-old woman on long-term Social Security disability had combined thoracic and lumbar surgery 4 years ago for what sounds like paraparesis.  She did improve and regain the ability to ambulate within a year.  She has chronic ongoing back pain.    Her imaging shows that she has adjacent level stenosis in the lumbar spine.  It appears that maybe she has had a myelogram of the thoracic spine done this summer.  She is going to track down that study from her pain specialist and she will send it to me.    We have talked about the nature of her adjacent level stenosis.  We talked about the surgical and nonsurgical options.  She indicates she does not want further surgery and if that is the case I would not recommend it.  I would encourage her to continue to work with a conditioning and water therapy program.    Based on the fact that she has had prior thoracic surgery, at some point imaging of the thoracic spine would be indicated.  She will send us the myelogram so that we can determine if the thoracic spine was included in that.  If it has not, then imaging her thoracic spine with an MRI would be indicated.    We discussed this quite at length.  She has a good understanding of this.    I will speak with her after her imaging is reviewed.    ** Dictated with voice recognition software and not immediately reviewed for errors  in grammar and/or spelling **

## 2024-12-18 NOTE — PROGRESS NOTES
This 69-year-old woman is referred by her podiatrist.    4 years ago she had a combined thoracic and lumbar spine surgery by .    Apparently the surgery was done urgently as she had lost the ability to walk.    She spent a year in rehab.  It took her a year to regain the ability to ambulate.  Dr. Camargo subsequently retired so she has not had a lot of evaluation.  She has been treated by a pain specialist.  She described having had a myelogram earlier this year.    Her symptoms are primarily continued back pain.  She does have some curling of her toes in both feet.  She states the podiatrist had noted that she was dragging her left foot and she perhaps has noted this over the last several months as well.    She is not experiencing any severe radicular pain.    Her main symptom she describes is chronic pain.    She indicates that she does not want to consider any further surgery.    She is on Social Security disability.    Family, social, and medical histories are obtained and reviewed.  30-point, patient-recorded Review of Systems is personally obtained and reviewed. Inclusive is no history of weight loss, change in appetite, recent change in activity level, change in bowel or bladder habits, fevers, chills, malaise, or night pain.    She is here today with her .    On exam, she is an older appearing woman no acute distress.  Slow stable gait.  She has a moderate thoracic kyphosis.  She has a healed midline lumbar scar and she has a midline upper thoracic scar as well.    Painless motion of the cervical spine.  Her strength is intact in the upper extremities.    Her strength is intact in the lower extremities.  I do not detect any obvious weakness in ankle dorsiflexion on the left.    No hyperreflexia.    Her lumbar MRI shows a TLIF at L4-5.  There is a dorsal fluid collection at the surgical site but it is sterile in appearance and it is noncompressive.  She does have moderate stenosis at L2-3 and  L3-4.    Impression: This 69-year-old woman on long-term Social Security disability had combined thoracic and lumbar surgery 4 years ago for what sounds like paraparesis.  She did improve and regain the ability to ambulate within a year.  She has chronic ongoing back pain.    Her imaging shows that she has adjacent level stenosis in the lumbar spine.  It appears that maybe she has had a myelogram of the thoracic spine done this summer.  She is going to track down that study from her pain specialist and she will send it to me.    We have talked about the nature of her adjacent level stenosis.  We talked about the surgical and nonsurgical options.  She indicates she does not want further surgery and if that is the case I would not recommend it.  I would encourage her to continue to work with a conditioning and water therapy program.    Based on the fact that she has had prior thoracic surgery, at some point imaging of the thoracic spine would be indicated.  She will send us the myelogram so that we can determine if the thoracic spine was included in that.  If it has not, then imaging her thoracic spine with an MRI would be indicated.    We discussed this quite at length.  She has a good understanding of this.    I will speak with her after her imaging is reviewed.    ** Dictated with voice recognition software and not immediately reviewed for errors in grammar and/or spelling **

## 2025-01-03 ENCOUNTER — HOSPITAL ENCOUNTER (OUTPATIENT)
Dept: GENERAL RADIOLOGY | Age: 70
Discharge: HOME | End: 2025-01-03
Attending: FAMILY MEDICINE
Payer: MEDICARE

## 2025-01-03 VITALS — HEIGHT: 60 IN | WEIGHT: 173 LBS | BODY MASS INDEX: 33.96 KG/M2

## 2025-01-03 DIAGNOSIS — Z12.31 ENCOUNTER FOR SCREENING MAMMOGRAM FOR MALIGNANT NEOPLASM OF BREAST: ICD-10-CM

## 2025-01-03 PROCEDURE — 77063 BREAST TOMOSYNTHESIS BI: CPT

## 2025-02-03 ENCOUNTER — OFFICE VISIT (OUTPATIENT)
Dept: PAIN MANAGEMENT | Age: 70
End: 2025-02-03
Payer: MEDICARE

## 2025-02-03 VITALS
HEART RATE: 85 BPM | SYSTOLIC BLOOD PRESSURE: 130 MMHG | DIASTOLIC BLOOD PRESSURE: 84 MMHG | HEIGHT: 60 IN | RESPIRATION RATE: 18 BRPM | TEMPERATURE: 96.9 F | WEIGHT: 173 LBS | BODY MASS INDEX: 33.96 KG/M2 | OXYGEN SATURATION: 98 %

## 2025-02-03 DIAGNOSIS — G89.29 CHRONIC PAIN OF LEFT KNEE: ICD-10-CM

## 2025-02-03 DIAGNOSIS — M17.12 PRIMARY OSTEOARTHRITIS OF LEFT KNEE: ICD-10-CM

## 2025-02-03 DIAGNOSIS — M25.562 CHRONIC PAIN OF LEFT KNEE: ICD-10-CM

## 2025-02-03 DIAGNOSIS — M53.3 DISORDER OF SACRUM: ICD-10-CM

## 2025-02-03 DIAGNOSIS — M96.1 LUMBAR POST-LAMINECTOMY SYNDROME: ICD-10-CM

## 2025-02-03 DIAGNOSIS — G89.4 CHRONIC PAIN SYNDROME: ICD-10-CM

## 2025-02-03 PROCEDURE — G8399 PT W/DXA RESULTS DOCUMENT: HCPCS | Performed by: PHYSICIAN ASSISTANT

## 2025-02-03 PROCEDURE — G8417 CALC BMI ABV UP PARAM F/U: HCPCS | Performed by: PHYSICIAN ASSISTANT

## 2025-02-03 PROCEDURE — G8427 DOCREV CUR MEDS BY ELIG CLIN: HCPCS | Performed by: PHYSICIAN ASSISTANT

## 2025-02-03 PROCEDURE — 3017F COLORECTAL CA SCREEN DOC REV: CPT | Performed by: PHYSICIAN ASSISTANT

## 2025-02-03 PROCEDURE — 1036F TOBACCO NON-USER: CPT | Performed by: PHYSICIAN ASSISTANT

## 2025-02-03 PROCEDURE — 1124F ACP DISCUSS-NO DSCNMKR DOCD: CPT | Performed by: PHYSICIAN ASSISTANT

## 2025-02-03 PROCEDURE — 99213 OFFICE O/P EST LOW 20 MIN: CPT | Performed by: PHYSICIAN ASSISTANT

## 2025-02-03 PROCEDURE — 3075F SYST BP GE 130 - 139MM HG: CPT | Performed by: PHYSICIAN ASSISTANT

## 2025-02-03 PROCEDURE — 1159F MED LIST DOCD IN RCRD: CPT | Performed by: PHYSICIAN ASSISTANT

## 2025-02-03 PROCEDURE — 1090F PRES/ABSN URINE INCON ASSESS: CPT | Performed by: PHYSICIAN ASSISTANT

## 2025-02-03 PROCEDURE — 3079F DIAST BP 80-89 MM HG: CPT | Performed by: PHYSICIAN ASSISTANT

## 2025-02-03 RX ORDER — BUPRENORPHINE 10 UG/H
1 PATCH TRANSDERMAL WEEKLY
Qty: 4 PATCH | Refills: 1 | Status: SHIPPED | OUTPATIENT
Start: 2025-02-03 | End: 2025-03-03

## 2025-02-03 RX ORDER — BUPRENORPHINE 10 UG/H
PATCH TRANSDERMAL
COMMUNITY
Start: 2025-01-13 | End: 2025-02-03

## 2025-02-03 RX ORDER — ATENOLOL 50 MG/1
TABLET ORAL
COMMUNITY
Start: 2025-01-13

## 2025-02-03 RX ORDER — AZELASTINE 1 MG/ML
SPRAY, METERED NASAL
COMMUNITY
Start: 2025-01-24

## 2025-02-03 NOTE — PROGRESS NOTES
Margaret Almonte presents to the [facility] on 2/3/2025. Margaret is complaining of pain in her lower back. The pain is constant. The pain is described as aching. Pain is rated on her best day at a 4, on her worst day at a 10, and on average at a 6 on the VAS scale. She took her last dose of Butrans Patch, Mobic, and lidocaine patches  today.      Any procedures since your last visit: No    She is  on NSAIDS and  is  on anticoagulation medications to include ASA and is managed by Fadia Bray MD       Pacemaker or defibrillator: No    Medication Contract and Consent for Opioid Use Documents Filed       Patient Documents       Type of Document Status Date Received Received By Description    Medication Contract Received 6/21/2019  7:35 AM BRUNO BILL med contract    Medication Contract Received 3/26/2021 10:38 AM KARL KELLEY PAIN MGMT CONTRACT DR. RONDON    Medication Contract Signed 8/12/2022 11:52 AM RAMY FAIR Pain Agreement 8/12/22    Medication Contract Received 6/21/2023 11:49 AM SHELBY WARNER PAIN AGREEMENT    Consent Opioid Use Received 6/26/2024  3:37 PM AIDAN ZIEGLER patient agreement 6/26/24                       Resp 18   Ht 1.524 m (5')   Wt 78.5 kg (173 lb)   LMP  (LMP Unknown)   BMI 33.79 kg/m²      No LMP recorded (lmp unknown). Patient is postmenopausal.

## 2025-02-03 NOTE — PROGRESS NOTES
[facility]  [address] [place] [id number]    Follow up Note      Margaret Almonte     Date of Visit:  2/3/2025    CC:  Patient presents for follow up   Chief Complaint   Patient presents with    Back Pain       HPI:    Pain is unchanged.    Appropriate analgesia with current medications regimen: yes.    Change in quality of symptoms:no.    Medication side effects:none.   Recent diagnostic testing:none.   Recent interventional procedures: None.      She has been on anticoagulation medications to include ASA and has not been on herbal supplements.  She is diabetic.     Imagin/2023 MRI lumbar w/ and w/o  -     FINDINGS:   BONES/ALIGNMENT:  No fracture or joint dislocation.  Status post   decompressive laminectomies with posterior interbody fusion at L4-5.  Disc   spacer is noted at L4-5.  Grossly stable postoperative seroma in the   posterior paraspinal space at L4-5.  It measures approximately 2.7 x 1.9 cm.       SPINAL CORD:  The conus terminates normally.       SOFT TISSUES: No paraspinal mass lesions seen.       L1-L2: Disc desiccation with a small disc bulge.  Mild facet and ligamentous   hypertrophy.  Mild central canal and lateral recess stenoses.  Moderate   neural foraminal stenoses.       L2-L3: Disc desiccation with a disc bulge.  Moderate facet hypertrophy.   Moderate to severe central canal, lateral recess and neural foraminal   stenoses.       L3-L4: Disc desiccation with a small disc bulge.  Moderate facet hypertrophy.   Moderate central canal and lateral recess stenoses.  Moderate right and   mild-to-moderate left neural foraminal stenoses.       L4-L5: Stable postoperative changes, as described above.  Mild peridural   fibrosis, most notably along the left lateral and posterior aspect of the   thecal sac.  Mild facet hypertrophy.  No significant central canal or lateral   recess stenosis.  Mild neural foraminal stenoses.       L5-S1: Disc desiccation without herniation.  Mild

## 2025-03-09 SDOH — ECONOMIC STABILITY: INCOME INSECURITY: IN THE LAST 12 MONTHS, WAS THERE A TIME WHEN YOU WERE NOT ABLE TO PAY THE MORTGAGE OR RENT ON TIME?: YES

## 2025-03-09 SDOH — ECONOMIC STABILITY: FOOD INSECURITY: WITHIN THE PAST 12 MONTHS, YOU WORRIED THAT YOUR FOOD WOULD RUN OUT BEFORE YOU GOT MONEY TO BUY MORE.: NEVER TRUE

## 2025-03-09 SDOH — ECONOMIC STABILITY: FOOD INSECURITY: WITHIN THE PAST 12 MONTHS, THE FOOD YOU BOUGHT JUST DIDN'T LAST AND YOU DIDN'T HAVE MONEY TO GET MORE.: NEVER TRUE

## 2025-03-09 ASSESSMENT — PATIENT HEALTH QUESTIONNAIRE - PHQ9
SUM OF ALL RESPONSES TO PHQ QUESTIONS 1-9: 0
SUM OF ALL RESPONSES TO PHQ QUESTIONS 1-9: 0
1. LITTLE INTEREST OR PLEASURE IN DOING THINGS: NOT AT ALL
SUM OF ALL RESPONSES TO PHQ QUESTIONS 1-9: 0
2. FEELING DOWN, DEPRESSED OR HOPELESS: NOT AT ALL
SUM OF ALL RESPONSES TO PHQ QUESTIONS 1-9: 0
2. FEELING DOWN, DEPRESSED OR HOPELESS: NOT AT ALL
1. LITTLE INTEREST OR PLEASURE IN DOING THINGS: NOT AT ALL
SUM OF ALL RESPONSES TO PHQ9 QUESTIONS 1 & 2: 0

## 2025-03-12 ENCOUNTER — OFFICE VISIT (OUTPATIENT)
Dept: FAMILY MEDICINE CLINIC | Age: 70
End: 2025-03-12
Payer: MEDICARE

## 2025-03-12 VITALS
HEIGHT: 60 IN | SYSTOLIC BLOOD PRESSURE: 128 MMHG | RESPIRATION RATE: 16 BRPM | WEIGHT: 179.6 LBS | TEMPERATURE: 97.4 F | HEART RATE: 83 BPM | OXYGEN SATURATION: 98 % | BODY MASS INDEX: 35.26 KG/M2 | DIASTOLIC BLOOD PRESSURE: 74 MMHG

## 2025-03-12 DIAGNOSIS — I10 ESSENTIAL HYPERTENSION: ICD-10-CM

## 2025-03-12 DIAGNOSIS — R30.0 DYSURIA: ICD-10-CM

## 2025-03-12 DIAGNOSIS — R60.9 EDEMA, UNSPECIFIED TYPE: ICD-10-CM

## 2025-03-12 DIAGNOSIS — F11.20 OPIOID DEPENDENCE WITH CURRENT USE (HCC): ICD-10-CM

## 2025-03-12 DIAGNOSIS — E11.9 TYPE 2 DIABETES MELLITUS WITHOUT COMPLICATION, WITHOUT LONG-TERM CURRENT USE OF INSULIN: ICD-10-CM

## 2025-03-12 DIAGNOSIS — E66.01 MORBID (SEVERE) OBESITY DUE TO EXCESS CALORIES: ICD-10-CM

## 2025-03-12 DIAGNOSIS — M10.9 GOUT, UNSPECIFIED CAUSE, UNSPECIFIED CHRONICITY, UNSPECIFIED SITE: ICD-10-CM

## 2025-03-12 DIAGNOSIS — E11.9 TYPE 2 DIABETES MELLITUS WITHOUT COMPLICATION, WITHOUT LONG-TERM CURRENT USE OF INSULIN (HCC): ICD-10-CM

## 2025-03-12 DIAGNOSIS — M54.16 LUMBAR RADICULOPATHY: ICD-10-CM

## 2025-03-12 DIAGNOSIS — M10.9 GOUT, UNSPECIFIED CAUSE, UNSPECIFIED CHRONICITY, UNSPECIFIED SITE: Primary | ICD-10-CM

## 2025-03-12 LAB
BASOPHILS ABSOLUTE: 0.05 K/UL (ref 0–0.2)
BASOPHILS RELATIVE PERCENT: 1 % (ref 0–2)
BILIRUBIN, URINE: NEGATIVE
COLOR, UA: YELLOW
CREATININE URINE: 64.1 MG/DL (ref 29–226)
EOSINOPHILS ABSOLUTE: 0.5 K/UL (ref 0.05–0.5)
EOSINOPHILS RELATIVE PERCENT: 6 % (ref 0–6)
GLUCOSE URINE: NEGATIVE MG/DL
HCT VFR BLD CALC: 39.7 % (ref 34–48)
HEMOGLOBIN: 13 G/DL (ref 11.5–15.5)
IMMATURE GRANULOCYTES %: 1 % (ref 0–5)
IMMATURE GRANULOCYTES ABSOLUTE: 0.06 K/UL (ref 0–0.58)
KETONES, URINE: NEGATIVE MG/DL
LEUKOCYTE ESTERASE, URINE: NEGATIVE
LYMPHOCYTES ABSOLUTE: 2.47 K/UL (ref 1.5–4)
LYMPHOCYTES RELATIVE PERCENT: 27 % (ref 20–42)
MCH RBC QN AUTO: 30 PG (ref 26–35)
MCHC RBC AUTO-ENTMCNC: 32.7 G/DL (ref 32–34.5)
MCV RBC AUTO: 91.5 FL (ref 80–99.9)
MICROALBUMIN/CREAT 24H UR: <12 MG/L (ref 0–19)
MICROALBUMIN/CREAT UR-RTO: NORMAL MCG/MG CREAT (ref 0–30)
MONOCYTES ABSOLUTE: 0.6 K/UL (ref 0.1–0.95)
MONOCYTES RELATIVE PERCENT: 7 % (ref 2–12)
NEUTROPHILS ABSOLUTE: 5.36 K/UL (ref 1.8–7.3)
NEUTROPHILS RELATIVE PERCENT: 59 % (ref 43–80)
NITRITE, URINE: NEGATIVE
PDW BLD-RTO: 13.8 % (ref 11.5–15)
PH, URINE: 6 (ref 5–8)
PLATELET # BLD: 209 K/UL (ref 130–450)
PMV BLD AUTO: 10.8 FL (ref 7–12)
PROTEIN UA: NEGATIVE MG/DL
RBC # BLD: 4.34 M/UL (ref 3.5–5.5)
RBC UA: NORMAL /HPF
SPECIFIC GRAVITY UA: 1.01 (ref 1–1.03)
TURBIDITY: CLEAR
URINE HGB: NEGATIVE
UROBILINOGEN, URINE: 0.2 EU/DL (ref 0–1)
WBC # BLD: 9 K/UL (ref 4.5–11.5)
WBC UA: NORMAL /HPF

## 2025-03-12 PROCEDURE — 2022F DILAT RTA XM EVC RTNOPTHY: CPT | Performed by: FAMILY MEDICINE

## 2025-03-12 PROCEDURE — G8427 DOCREV CUR MEDS BY ELIG CLIN: HCPCS | Performed by: FAMILY MEDICINE

## 2025-03-12 PROCEDURE — 99214 OFFICE O/P EST MOD 30 MIN: CPT | Performed by: FAMILY MEDICINE

## 2025-03-12 PROCEDURE — 1160F RVW MEDS BY RX/DR IN RCRD: CPT | Performed by: FAMILY MEDICINE

## 2025-03-12 PROCEDURE — G8417 CALC BMI ABV UP PARAM F/U: HCPCS | Performed by: FAMILY MEDICINE

## 2025-03-12 PROCEDURE — 1090F PRES/ABSN URINE INCON ASSESS: CPT | Performed by: FAMILY MEDICINE

## 2025-03-12 PROCEDURE — 1036F TOBACCO NON-USER: CPT | Performed by: FAMILY MEDICINE

## 2025-03-12 PROCEDURE — G8399 PT W/DXA RESULTS DOCUMENT: HCPCS | Performed by: FAMILY MEDICINE

## 2025-03-12 PROCEDURE — 3046F HEMOGLOBIN A1C LEVEL >9.0%: CPT | Performed by: FAMILY MEDICINE

## 2025-03-12 PROCEDURE — 3017F COLORECTAL CA SCREEN DOC REV: CPT | Performed by: FAMILY MEDICINE

## 2025-03-12 PROCEDURE — 3074F SYST BP LT 130 MM HG: CPT | Performed by: FAMILY MEDICINE

## 2025-03-12 PROCEDURE — 1159F MED LIST DOCD IN RCRD: CPT | Performed by: FAMILY MEDICINE

## 2025-03-12 PROCEDURE — 1124F ACP DISCUSS-NO DSCNMKR DOCD: CPT | Performed by: FAMILY MEDICINE

## 2025-03-12 PROCEDURE — 3078F DIAST BP <80 MM HG: CPT | Performed by: FAMILY MEDICINE

## 2025-03-12 PROCEDURE — G2211 COMPLEX E/M VISIT ADD ON: HCPCS | Performed by: FAMILY MEDICINE

## 2025-03-12 RX ORDER — OFLOXACIN 3 MG/ML
SOLUTION/ DROPS OPHTHALMIC
COMMUNITY
Start: 2025-03-03

## 2025-03-12 RX ORDER — CONJUGATED ESTROGENS 0.62 MG/G
CREAM VAGINAL
Qty: 30 G | Refills: 3 | Status: SHIPPED | OUTPATIENT
Start: 2025-03-12

## 2025-03-12 RX ORDER — COLESTIPOL HYDROCHLORIDE 1 G/1
1 TABLET ORAL 2 TIMES DAILY
COMMUNITY

## 2025-03-12 RX ORDER — BUPRENORPHINE 10 UG/H
1 PATCH TRANSDERMAL WEEKLY
COMMUNITY

## 2025-03-12 NOTE — PROGRESS NOTES
[facility]  [address], [place] [address]  Phone: [phone]        Patient:  Margaret Almonte 69 y.o. female          Chief complaint:   Chief Complaint   Patient presents with    3 Month Follow-Up    Hypertension    Diabetes       Assessment and Plan     Gout, unspecified cause, unspecified chronicity, unspecified site  -     Uric Acid; Future  Essential hypertension  -     CBC with Auto Differential; Future  -     Comprehensive Metabolic Panel; Future  Lumbar radiculopathy  -     CBC with Auto Differential; Future  -     Comprehensive Metabolic Panel; Future  Opioid dependence with current use (HCC)  Type 2 diabetes mellitus without complication, without long-term current use of insulin (HCC)  -     Albumin/Creatinine Ratio, Urine; Future  Dysuria  -     Urinalysis with Microscopic; Future  -     Culture, Urine; Future  Morbid (severe) obesity due to excess calories (E66.01)  Body mass index [BMI] 35.0-35.9, adult (Z68.35)  Edema, unspecified type  -     DME Order for (Specify) as OP       Assessment & Plan    2. Elevated blood pressure. Chronic and stable  Her blood pressure was slightly elevated during this visit. She was advised to monitor her blood pressure at home. If her blood pressure readings exceed 140/90, she will need to resume both amlodipine and atenolol.    3. Diarrhea. Chronic and not controlled  Continue colestipol per specialist, consider getting off GLP1  She has been experiencing diarrhea for over a year, which worsened upon initiation of Ozempic. She was advised to discuss alternative treatment options with her gastroenterologist.    4. Pain management. Chronic and stable  She is currently taking meloxicam once a day and requested a refill. She was informed about the potential risks associated with long-term use of meloxicam, including increased risk of stroke, heart attack, bleeding, and kidney disease. She was advised to wear compression hose up to her

## 2025-03-12 NOTE — PATIENT INSTRUCTIONS
[employer] - Financial Resources*  (Call [employer]/211 if need more resources.)       Utility:  [employer]  What they offer: Limited assistance to restore/ prevent utility disconnection.  Phone Number: [phone]  Address: [facility] [place] [address]  Website: Ex[employer]  [facility] Action Program  Utility assistance  [phone]  [facility] Community Action Partnership  Utility assistance   [phone]  Community Action Agency of [facility]  Utility assistance  [phone]  Financial or Medical  HELP NETWORK OF [facility]:  What they do: Provides 24-hr, 7 days a week access to information on community resources for financial help. [facility] AND [facility]  Phone: 211 or [phone]            [facility] JOB AND FAMILY SERVICES:  MAIN [facility] LINE FOR ALL [facility]: [phone]  What they do: [place] works first with temporary cash assistance if there are children in household.   [facility] DJFS: [address] [place] [address]  Phone: [phone], [phone]  [facility] DJFS: [address] [place] [address]  Phone: [phone]  [facility] DJFS: [address] [place] [address]  Phone: [phone]  Website: s.[place].[facility]    [employer] Financial Assistance  What they offer: Assistance with [employer] bills  Phone: [phone], option #5   Medications:  [employer]  What they offer: [employer] tracks prescription drug prices and provides free drug coupons for discounts on medications.  Website: https://www.[employer]  [employer]  What they offer: [employer] offers free information on medications and healthcare cost savings programs including prescription assistance programs, coupons, and discount programs.  Helpline: 288.591.9432  Website: https://www.needEnglishCentraleds.org    [employer]ist  What they offer: Information about free and low-cost medicine programs.  Website:

## 2025-03-13 LAB
ALBUMIN: 4.5 G/DL (ref 3.5–5.2)
ALP BLD-CCNC: 114 U/L (ref 35–104)
ALT SERPL-CCNC: 17 U/L (ref 0–32)
ANION GAP SERPL CALCULATED.3IONS-SCNC: 22 MMOL/L (ref 7–16)
AST SERPL-CCNC: 20 U/L (ref 0–31)
BILIRUB SERPL-MCNC: 0.5 MG/DL (ref 0–1.2)
BUN BLDV-MCNC: 16 MG/DL (ref 6–23)
CALCIUM SERPL-MCNC: 10.2 MG/DL (ref 8.6–10.2)
CHLORIDE BLD-SCNC: 104 MMOL/L (ref 98–107)
CO2: 19 MMOL/L (ref 22–29)
CREAT SERPL-MCNC: 0.6 MG/DL (ref 0.5–1)
CULTURE: NORMAL
CULTURE: NORMAL
GFR, ESTIMATED: >90 ML/MIN/1.73M2
GLUCOSE BLD-MCNC: 87 MG/DL (ref 74–99)
POTASSIUM SERPL-SCNC: 4.7 MMOL/L (ref 3.5–5)
SODIUM BLD-SCNC: 145 MMOL/L (ref 132–146)
SPECIMEN DESCRIPTION: NORMAL
TOTAL PROTEIN: 7.5 G/DL (ref 6.4–8.3)
URIC ACID: 4.7 MG/DL (ref 2.4–5.7)

## 2025-03-31 ENCOUNTER — OFFICE VISIT (OUTPATIENT)
Age: 70
End: 2025-03-31
Payer: MEDICARE

## 2025-03-31 VITALS
TEMPERATURE: 98.4 F | BODY MASS INDEX: 36.12 KG/M2 | HEIGHT: 60 IN | DIASTOLIC BLOOD PRESSURE: 74 MMHG | HEART RATE: 82 BPM | OXYGEN SATURATION: 97 % | SYSTOLIC BLOOD PRESSURE: 142 MMHG | RESPIRATION RATE: 18 BRPM | WEIGHT: 184 LBS

## 2025-03-31 DIAGNOSIS — Z01.419 ENCOUNTER FOR GYNECOLOGICAL EXAMINATION WITHOUT ABNORMAL FINDING: Primary | ICD-10-CM

## 2025-03-31 DIAGNOSIS — B37.2 INTERTRIGINOUS CANDIDIASIS: ICD-10-CM

## 2025-03-31 DIAGNOSIS — Z12.31 ENCOUNTER FOR SCREENING MAMMOGRAM FOR MALIGNANT NEOPLASM OF BREAST: ICD-10-CM

## 2025-03-31 PROCEDURE — 3078F DIAST BP <80 MM HG: CPT | Performed by: OBSTETRICS & GYNECOLOGY

## 2025-03-31 PROCEDURE — 99387 INIT PM E/M NEW PAT 65+ YRS: CPT | Performed by: OBSTETRICS & GYNECOLOGY

## 2025-03-31 PROCEDURE — 3077F SYST BP >= 140 MM HG: CPT | Performed by: OBSTETRICS & GYNECOLOGY

## 2025-03-31 PROCEDURE — 1159F MED LIST DOCD IN RCRD: CPT | Performed by: OBSTETRICS & GYNECOLOGY

## 2025-03-31 PROCEDURE — 99459 PELVIC EXAMINATION: CPT | Performed by: OBSTETRICS & GYNECOLOGY

## 2025-03-31 RX ORDER — FLUCONAZOLE 150 MG/1
150 TABLET ORAL ONCE
Qty: 1 TABLET | Refills: 3 | Status: SHIPPED | OUTPATIENT
Start: 2025-03-31 | End: 2025-03-31
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
## 2025-03-31 NOTE — PROGRESS NOTES
Pt presents for annual exam.  Last pap 3/21/2024  Mammogram was completed 01/03/2025  Hx, medications and pharmacy reviewed with pt.  Pt c/o vaginal itching and burning  and swelling in left leg.

## 2025-04-01 DIAGNOSIS — B37.2 INTERTRIGINOUS CANDIDIASIS: Primary | ICD-10-CM

## 2025-04-11 ENCOUNTER — OFFICE VISIT (OUTPATIENT)
Dept: PAIN MANAGEMENT | Age: 70
End: 2025-04-11
Payer: MEDICARE

## 2025-04-11 VITALS
WEIGHT: 184 LBS | TEMPERATURE: 96.8 F | DIASTOLIC BLOOD PRESSURE: 82 MMHG | BODY MASS INDEX: 36.12 KG/M2 | OXYGEN SATURATION: 98 % | HEIGHT: 60 IN | RESPIRATION RATE: 18 BRPM | SYSTOLIC BLOOD PRESSURE: 162 MMHG | HEART RATE: 76 BPM

## 2025-04-11 DIAGNOSIS — G89.4 CHRONIC PAIN SYNDROME: ICD-10-CM

## 2025-04-11 DIAGNOSIS — F11.20 OPIOID DEPENDENCE WITH CURRENT USE (HCC): ICD-10-CM

## 2025-04-11 DIAGNOSIS — G89.29 CHRONIC PAIN OF LEFT KNEE: ICD-10-CM

## 2025-04-11 DIAGNOSIS — M96.1 LUMBAR POST-LAMINECTOMY SYNDROME: ICD-10-CM

## 2025-04-11 DIAGNOSIS — M17.12 PRIMARY OSTEOARTHRITIS OF LEFT KNEE: ICD-10-CM

## 2025-04-11 DIAGNOSIS — M54.16 LUMBAR RADICULOPATHY: ICD-10-CM

## 2025-04-11 DIAGNOSIS — Z79.891 ENCOUNTER FOR LONG-TERM OPIATE ANALGESIC USE: ICD-10-CM

## 2025-04-11 DIAGNOSIS — M48.062 SPINAL STENOSIS OF LUMBAR REGION WITH NEUROGENIC CLAUDICATION: ICD-10-CM

## 2025-04-11 DIAGNOSIS — M54.6 PAIN IN THORACIC SPINE: ICD-10-CM

## 2025-04-11 DIAGNOSIS — M25.562 CHRONIC PAIN OF LEFT KNEE: ICD-10-CM

## 2025-04-11 DIAGNOSIS — M53.3 DISORDER OF SACRUM: Primary | ICD-10-CM

## 2025-04-11 DIAGNOSIS — M43.16 SPONDYLOLISTHESIS OF LUMBAR REGION: ICD-10-CM

## 2025-04-11 LAB
SEND OUT REPORT: NORMAL
TEST NAME: NORMAL

## 2025-04-11 PROCEDURE — 1090F PRES/ABSN URINE INCON ASSESS: CPT | Performed by: PHYSICIAN ASSISTANT

## 2025-04-11 PROCEDURE — 1036F TOBACCO NON-USER: CPT | Performed by: PHYSICIAN ASSISTANT

## 2025-04-11 PROCEDURE — 99213 OFFICE O/P EST LOW 20 MIN: CPT | Performed by: PHYSICIAN ASSISTANT

## 2025-04-11 PROCEDURE — 1160F RVW MEDS BY RX/DR IN RCRD: CPT | Performed by: PHYSICIAN ASSISTANT

## 2025-04-11 PROCEDURE — 3017F COLORECTAL CA SCREEN DOC REV: CPT | Performed by: PHYSICIAN ASSISTANT

## 2025-04-11 PROCEDURE — G8427 DOCREV CUR MEDS BY ELIG CLIN: HCPCS | Performed by: PHYSICIAN ASSISTANT

## 2025-04-11 PROCEDURE — G8399 PT W/DXA RESULTS DOCUMENT: HCPCS | Performed by: PHYSICIAN ASSISTANT

## 2025-04-11 PROCEDURE — 3079F DIAST BP 80-89 MM HG: CPT | Performed by: PHYSICIAN ASSISTANT

## 2025-04-11 PROCEDURE — 3077F SYST BP >= 140 MM HG: CPT | Performed by: PHYSICIAN ASSISTANT

## 2025-04-11 PROCEDURE — 1159F MED LIST DOCD IN RCRD: CPT | Performed by: PHYSICIAN ASSISTANT

## 2025-04-11 PROCEDURE — G8417 CALC BMI ABV UP PARAM F/U: HCPCS | Performed by: PHYSICIAN ASSISTANT

## 2025-04-11 PROCEDURE — 1124F ACP DISCUSS-NO DSCNMKR DOCD: CPT | Performed by: PHYSICIAN ASSISTANT

## 2025-04-11 RX ORDER — ATENOLOL 50 MG/1
TABLET ORAL
COMMUNITY
Start: 2025-04-01

## 2025-04-11 RX ORDER — BUPRENORPHINE 10 UG/H
1 PATCH TRANSDERMAL WEEKLY
Qty: 4 PATCH | Refills: 1 | Status: SHIPPED | OUTPATIENT
Start: 2025-04-11 | End: 2025-05-09

## 2025-04-11 NOTE — PROGRESS NOTES
Margaret Almonte presents to the [facility] on 4/11/2025. Margaret is complaining of pain lower back. The pain is constant. The pain is described as aching and throbbing. Pain is rated on her best day at a 4, on her worst day at a 10, and on average at a 6 on the VAS scale. She took her last dose of Butrans Patch and Tylenol Lidocaine patches today, Butrans patch is Left upper leg.      Any procedures since your last visit: No    She is  on NSAIDS and  is  on anticoagulation medications to include ASA and is managed by Fadia Bray MD .     Pacemaker or defibrillator: No.    Do you want someone present when the provider examines you? No    Medication Contract and Consent for Opioid Use Documents Filed       Patient Documents       Type of Document Status Date Received Received By Description    Medication Contract Received 6/21/2019  7:35 AM JAMEY CARRH med contract    Medication Contract Received 3/26/2021 10:38 AM KARL KELLEY PAIN MGMT CONTRACT DR. RONDON    Medication Contract Signed 8/12/2022 11:52 AM RAMY FAIR Pain Agreement 8/12/22    Medication Contract Received 6/21/2023 11:49 AM SHELBY WARNER PAIN AGREEMENT    Consent Opioid Use Received 6/26/2024  3:37 PM AIDAN ZIEGLER patient agreement 6/26/24                       Resp 18   Ht 1.524 m (5')   Wt 83.5 kg (184 lb)   LMP  (LMP Unknown)   BMI 35.94 kg/m²      No LMP recorded (lmp unknown). Patient is postmenopausal.  
chest pain, shortness of breath, new bowel or bladder complaints. All other review of systems was negative.    PHYSICAL EXAMINATION:      BP (!) 162/82   Pulse 76   Temp 96.8 °F (36 °C) (Infrared)   Resp 18   Ht 1.524 m (5')   Wt 83.5 kg (184 lb)   LMP  (LMP Unknown)   SpO2 98%   BMI 35.94 kg/m²     General:      General appearance:   pleasant and well-hydrated.   , in mild discomfort and A & O x3  Build:Overweight    HEENT:    Head:normocephalic and atraumatic  Sclera: icterus absent,    Lungs:    Breathing:Normal expansion.  No wheezing.    Abdomen:    Shape:non-distended and normal    Lumbar spine:            Range of motion:abnormal moderately Lateral bending, flexion, extension rotation bilateral and is  painful.    Extremities:    Tremors:None bilaterally upper and lower  Range of motion:Generally normal shoulders, pain with internal rotation of hips not done.  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 Gomez with: L4-5 PLIF and lami T2-T4 12/24/2020  Since the above, she has been treated for pain mgmt by Dr. Townsend  PMHx: Hx DVT (post-surgical recovery), TIA 5/2022, colon CA (tx surgically), DM, constipation, GERD, DLD, HTN, obesity  Had consultation with Dr. Javed 3/2023 who feels she has adjacent segment stenosis and sig OA of the L knee along with a + WILIAN and that she should have a TKR before considering any further back surgery (pt would like

## 2025-04-17 LAB
SEND OUT REPORT: NORMAL
TEST NAME: NORMAL

## 2025-04-21 ENCOUNTER — TELEPHONE (OUTPATIENT)
Age: 70
End: 2025-04-21

## 2025-04-21 RX ORDER — CONJUGATED ESTROGENS 0.62 MG/G
1 CREAM VAGINAL WEEKLY
Qty: 30 G | Refills: 4 | Status: SHIPPED | OUTPATIENT
Start: 2025-04-21

## 2025-04-21 NOTE — TELEPHONE ENCOUNTER
Pt called asking if she could be prescribed a vaginal cream to use between the oral doses of Diflucan you previously prescribed. Please advise.

## 2025-04-29 DIAGNOSIS — M25.562 CHRONIC PAIN OF LEFT KNEE: ICD-10-CM

## 2025-04-29 DIAGNOSIS — M17.12 PRIMARY OSTEOARTHRITIS OF LEFT KNEE: ICD-10-CM

## 2025-04-29 DIAGNOSIS — G89.4 CHRONIC PAIN SYNDROME: ICD-10-CM

## 2025-04-29 DIAGNOSIS — G89.29 CHRONIC PAIN OF LEFT KNEE: ICD-10-CM

## 2025-04-29 DIAGNOSIS — I10 ESSENTIAL HYPERTENSION: ICD-10-CM

## 2025-04-29 DIAGNOSIS — M96.1 LUMBAR POST-LAMINECTOMY SYNDROME: ICD-10-CM

## 2025-04-29 DIAGNOSIS — M53.3 DISORDER OF SACRUM: ICD-10-CM

## 2025-04-29 RX ORDER — MELOXICAM 7.5 MG/1
7.5 TABLET ORAL DAILY
Qty: 90 TABLET | Refills: 0 | Status: SHIPPED | OUTPATIENT
Start: 2025-04-29

## 2025-04-29 RX ORDER — AMLODIPINE BESYLATE 5 MG/1
5 TABLET ORAL DAILY
Qty: 90 TABLET | Refills: 0 | Status: SHIPPED | OUTPATIENT
Start: 2025-04-29

## 2025-04-29 NOTE — TELEPHONE ENCOUNTER
Name of Medication(s) Requested:  Requested Prescriptions     Pending Prescriptions Disp Refills    amLODIPine (NORVASC) 5 MG tablet [Pharmacy Med Name: AMLODIPINE BESYLATE 5 MG TA 5 Tablet] 90 tablet 4     Sig: Take 1 tablet by mouth daily    meloxicam (MOBIC) 7.5 MG tablet [Pharmacy Med Name: MELOXICAM 7.5 MG TABS 7.5 Tablet] 90 tablet 2     Sig: Take 1 tablet by mouth daily       Medication is on current medication list Yes    Dosage and directions were verified? Yes    Quantity verified: 90 day supply     Pharmacy Verified?  Yes    Last Appointment:  3/12/2025    Future appts:  Future Appointments   Date Time Provider Department Center   6/10/2025 11:00 AM Yoselyn Chilel PA [facility]   7/18/2025 10:40 AM Fadia Bray MD [facility]   4/2/2026 10:30 AM Pratik Olson MD [facility]        (If no appt send self scheduling link. .REFILLAPPT)  Scheduling request sent?     [] Yes  [x] No    Does patient need updated?  [] Yes  [x] No

## 2025-06-10 ENCOUNTER — OFFICE VISIT (OUTPATIENT)
Age: 70
End: 2025-06-10
Payer: MEDICARE

## 2025-06-10 VITALS
DIASTOLIC BLOOD PRESSURE: 54 MMHG | WEIGHT: 184 LBS | RESPIRATION RATE: 18 BRPM | TEMPERATURE: 97.1 F | HEIGHT: 60 IN | HEART RATE: 70 BPM | BODY MASS INDEX: 36.12 KG/M2 | OXYGEN SATURATION: 95 % | SYSTOLIC BLOOD PRESSURE: 134 MMHG

## 2025-06-10 DIAGNOSIS — M43.16 SPONDYLOLISTHESIS OF LUMBAR REGION: ICD-10-CM

## 2025-06-10 DIAGNOSIS — G89.4 CHRONIC PAIN SYNDROME: ICD-10-CM

## 2025-06-10 DIAGNOSIS — M54.6 PAIN IN THORACIC SPINE: ICD-10-CM

## 2025-06-10 DIAGNOSIS — Z79.891 ENCOUNTER FOR LONG-TERM OPIATE ANALGESIC USE: ICD-10-CM

## 2025-06-10 DIAGNOSIS — M54.16 LUMBAR RADICULOPATHY: ICD-10-CM

## 2025-06-10 DIAGNOSIS — M17.12 PRIMARY OSTEOARTHRITIS OF LEFT KNEE: ICD-10-CM

## 2025-06-10 DIAGNOSIS — G89.29 CHRONIC PAIN OF LEFT KNEE: ICD-10-CM

## 2025-06-10 DIAGNOSIS — M53.3 DISORDER OF SACRUM: Primary | ICD-10-CM

## 2025-06-10 DIAGNOSIS — M96.1 LUMBAR POST-LAMINECTOMY SYNDROME: ICD-10-CM

## 2025-06-10 DIAGNOSIS — M25.562 CHRONIC PAIN OF LEFT KNEE: ICD-10-CM

## 2025-06-10 DIAGNOSIS — M48.062 SPINAL STENOSIS OF LUMBAR REGION WITH NEUROGENIC CLAUDICATION: ICD-10-CM

## 2025-06-10 DIAGNOSIS — F11.20 OPIOID DEPENDENCE WITH CURRENT USE (HCC): ICD-10-CM

## 2025-06-10 PROCEDURE — 1124F ACP DISCUSS-NO DSCNMKR DOCD: CPT | Performed by: PHYSICIAN ASSISTANT

## 2025-06-10 PROCEDURE — G8399 PT W/DXA RESULTS DOCUMENT: HCPCS | Performed by: PHYSICIAN ASSISTANT

## 2025-06-10 PROCEDURE — 3075F SYST BP GE 130 - 139MM HG: CPT | Performed by: PHYSICIAN ASSISTANT

## 2025-06-10 PROCEDURE — 1160F RVW MEDS BY RX/DR IN RCRD: CPT | Performed by: PHYSICIAN ASSISTANT

## 2025-06-10 PROCEDURE — 3078F DIAST BP <80 MM HG: CPT | Performed by: PHYSICIAN ASSISTANT

## 2025-06-10 PROCEDURE — 1159F MED LIST DOCD IN RCRD: CPT | Performed by: PHYSICIAN ASSISTANT

## 2025-06-10 PROCEDURE — G8427 DOCREV CUR MEDS BY ELIG CLIN: HCPCS | Performed by: PHYSICIAN ASSISTANT

## 2025-06-10 PROCEDURE — 3017F COLORECTAL CA SCREEN DOC REV: CPT | Performed by: PHYSICIAN ASSISTANT

## 2025-06-10 PROCEDURE — 1036F TOBACCO NON-USER: CPT | Performed by: PHYSICIAN ASSISTANT

## 2025-06-10 PROCEDURE — 99213 OFFICE O/P EST LOW 20 MIN: CPT | Performed by: PHYSICIAN ASSISTANT

## 2025-06-10 PROCEDURE — 1090F PRES/ABSN URINE INCON ASSESS: CPT | Performed by: PHYSICIAN ASSISTANT

## 2025-06-10 PROCEDURE — G8417 CALC BMI ABV UP PARAM F/U: HCPCS | Performed by: PHYSICIAN ASSISTANT

## 2025-06-10 RX ORDER — LIDOCAINE 50 MG/G
1 PATCH TOPICAL DAILY PRN
Qty: 30 PATCH | Refills: 5 | Status: SHIPPED | OUTPATIENT
Start: 2025-06-10

## 2025-06-10 RX ORDER — BUPRENORPHINE 10 UG/H
1 PATCH TRANSDERMAL WEEKLY
Qty: 4 PATCH | Refills: 1 | Status: SHIPPED | OUTPATIENT
Start: 2025-06-10 | End: 2025-07-08

## 2025-06-10 RX ORDER — BUPRENORPHINE 10 UG/H
PATCH TRANSDERMAL
COMMUNITY
Start: 2025-05-17 | End: 2025-06-10

## 2025-06-10 RX ORDER — FLUCONAZOLE 150 MG/1
TABLET ORAL
COMMUNITY
Start: 2025-06-03

## 2025-06-10 NOTE — PROGRESS NOTES
Margaret Almonte presents to the [facility] on 6/10/2025. Margaret is complaining of pain back, radiates to LLE. The pain is constant. The pain is described as aching, throbbing, and sharp. Pain is rated on her best day at a 4, on her worst day at a 10, and on average at a 8 on the VAS scale. She took her last dose of Butrans Patch, Mobic, and Tylenol today, patch is on left thigh.      Any procedures since your last visit: No  She is  on NSAIDS and  is  on anticoagulation medications to include ASA and is managed by Fadia Bray MD .     Pacemaker or defibrillator: No  Do you want someone present when the provider examines you? No    Medication Contract and Consent for Opioid Use Documents Filed       Patient Documents       Type of Document Status Date Received Received By Description    Medication Contract Received 6/21/2019  7:35 AM BRUNO JAMEY SMITH med contract    Medication Contract Received 3/26/2021 10:38 AM KARL KELLEY PAIN MGMT CONTRACT DR. RONDON    Medication Contract Signed 8/12/2022 11:52 AM RAMY FAIR Pain Agreement 8/12/22    Medication Contract Received 6/21/2023 11:49 AM SHELBY WARNER PAIN AGREEMENT    Consent Opioid Use Received 6/26/2024  3:37 PM AIDAN ZIEGLER patient agreement 6/26/24                       Resp 18   Ht 1.524 m (5')   Wt 83.5 kg (184 lb)   LMP  (LMP Unknown)   BMI 35.94 kg/m²      No LMP recorded (lmp unknown). Patient is postmenopausal.    
including medication and procedure side effects             Controlled Substance Monitoring:    Acute and Chronic Pain Monitoring:   RX Monitoring Periodic Controlled Substance Monitoring   6/10/2025  11:34 AM Possible medication side effects, risk of tolerance/dependence & alternative treatments discussed.;No signs of potential drug abuse or diversion identified.;Assessed functional status (ability to engage in work or other purposeful activities, the pain intensity and its interference with activities of daily living, quality of family life and social activities, and the physical activity);Obtaining appropriate analgesic effect of treatment.                 We discussed with the patient that combining opioids, benzodiazepines, alcohol, illicit drugs or sleep aids increases the risk of respiratory depression including death. We discussed that these medications may cause drowsiness, sedation or dizziness and have counseled the patient not to drive or operate machinery. We have discussed that these medications will be prescribed only by one provider. We have discussed with the patient about age related risk factors and have thoroughly discussed the importance of taking these medications as prescribed. The patient verbalizes understanding.    ccreferring physic

## 2025-06-25 NOTE — TELEPHONE ENCOUNTER
Name of Medication(s) Requested:  Requested Prescriptions     Pending Prescriptions Disp Refills    atenolol (TENORMIN) 50 MG tablet [Pharmacy Med Name: ATENOLOL 50 MG TABS 50 Tablet] 90 tablet 3     Sig: Take 1 tablet by mouth daily       Medication is on current medication list Yes    Dosage and directions were verified? Yes    Quantity verified: 90 day supply     Pharmacy Verified?  Yes    Last Appointment:  3/12/2025    Future appts:  Future Appointments   Date Time Provider Department Center   7/18/2025 10:40 AM Fadia Bray MD [facility]   8/11/2025 11:00 AM Yoselyn Chilel PA [facility]   4/2/2026 10:30 AM Pratik Olson MD [facility]        (If no appt send self scheduling link. .REFILLAPPT)  Scheduling request sent?     [] Yes  [x] No    Does patient need updated?  [] Yes  [x] No

## 2025-06-26 RX ORDER — ATENOLOL 50 MG/1
50 TABLET ORAL DAILY
Qty: 90 TABLET | Refills: 3 | Status: SHIPPED | OUTPATIENT
Start: 2025-06-26

## 2025-07-17 SDOH — HEALTH STABILITY: PHYSICAL HEALTH: ON AVERAGE, HOW MANY DAYS PER WEEK DO YOU ENGAGE IN MODERATE TO STRENUOUS EXERCISE (LIKE A BRISK WALK)?: 3 DAYS

## 2025-07-17 ASSESSMENT — LIFESTYLE VARIABLES
HOW OFTEN DO YOU HAVE A DRINK CONTAINING ALCOHOL: 1
HOW MANY STANDARD DRINKS CONTAINING ALCOHOL DO YOU HAVE ON A TYPICAL DAY: 0
HOW OFTEN DO YOU HAVE A DRINK CONTAINING ALCOHOL: NEVER
HOW MANY STANDARD DRINKS CONTAINING ALCOHOL DO YOU HAVE ON A TYPICAL DAY: PATIENT DOES NOT DRINK
HOW OFTEN DO YOU HAVE SIX OR MORE DRINKS ON ONE OCCASION: 1

## 2025-07-17 ASSESSMENT — PATIENT HEALTH QUESTIONNAIRE - PHQ9
2. FEELING DOWN, DEPRESSED OR HOPELESS: NOT AT ALL
1. LITTLE INTEREST OR PLEASURE IN DOING THINGS: NOT AT ALL
SUM OF ALL RESPONSES TO PHQ QUESTIONS 1-9: 0

## 2025-07-18 ENCOUNTER — OFFICE VISIT (OUTPATIENT)
Dept: FAMILY MEDICINE CLINIC | Age: 70
End: 2025-07-18

## 2025-07-18 VITALS
HEART RATE: 70 BPM | HEIGHT: 60 IN | SYSTOLIC BLOOD PRESSURE: 114 MMHG | TEMPERATURE: 98.2 F | BODY MASS INDEX: 36.38 KG/M2 | DIASTOLIC BLOOD PRESSURE: 66 MMHG | RESPIRATION RATE: 20 BRPM | OXYGEN SATURATION: 97 % | WEIGHT: 185.3 LBS

## 2025-07-18 DIAGNOSIS — G89.4 CHRONIC PAIN SYNDROME: ICD-10-CM

## 2025-07-18 DIAGNOSIS — M17.12 PRIMARY OSTEOARTHRITIS OF LEFT KNEE: ICD-10-CM

## 2025-07-18 DIAGNOSIS — E11.9 TYPE 2 DIABETES MELLITUS WITHOUT COMPLICATION, WITHOUT LONG-TERM CURRENT USE OF INSULIN (HCC): ICD-10-CM

## 2025-07-18 DIAGNOSIS — I10 ESSENTIAL HYPERTENSION: ICD-10-CM

## 2025-07-18 DIAGNOSIS — Z00.00 MEDICARE ANNUAL WELLNESS VISIT, SUBSEQUENT: Primary | ICD-10-CM

## 2025-07-18 DIAGNOSIS — M96.1 LUMBAR POST-LAMINECTOMY SYNDROME: ICD-10-CM

## 2025-07-18 DIAGNOSIS — G89.29 CHRONIC PAIN OF LEFT KNEE: ICD-10-CM

## 2025-07-18 DIAGNOSIS — M53.3 DISORDER OF SACRUM: ICD-10-CM

## 2025-07-18 DIAGNOSIS — M25.562 CHRONIC PAIN OF LEFT KNEE: ICD-10-CM

## 2025-07-18 RX ORDER — MELOXICAM 7.5 MG/1
7.5 TABLET ORAL DAILY
Qty: 90 TABLET | Refills: 0 | Status: SHIPPED | OUTPATIENT
Start: 2025-07-18

## 2025-07-18 RX ORDER — ROSUVASTATIN CALCIUM 10 MG/1
10 TABLET, COATED ORAL NIGHTLY
Qty: 90 TABLET | Refills: 3 | Status: SHIPPED | OUTPATIENT
Start: 2025-07-18

## 2025-07-18 RX ORDER — DULOXETIN HYDROCHLORIDE 30 MG/1
30 CAPSULE, DELAYED RELEASE ORAL DAILY
COMMUNITY
Start: 2025-07-07

## 2025-07-18 RX ORDER — IPRATROPIUM BROMIDE 42 UG/1
1 SPRAY, METERED NASAL 2 TIMES DAILY PRN
Qty: 15 ML | Refills: 1 | Status: SHIPPED | OUTPATIENT
Start: 2025-07-18

## 2025-07-18 RX ORDER — AMLODIPINE BESYLATE 5 MG/1
5 TABLET ORAL DAILY
Qty: 90 TABLET | Refills: 3 | Status: SHIPPED | OUTPATIENT
Start: 2025-07-18

## 2025-07-18 RX ORDER — BUPRENORPHINE 10 UG/H
1 PATCH TRANSDERMAL WEEKLY
COMMUNITY
Start: 2025-07-07

## 2025-07-18 RX ORDER — TIRZEPATIDE 2.5 MG/.5ML
2.5 INJECTION, SOLUTION SUBCUTANEOUS WEEKLY
COMMUNITY
Start: 2025-07-02 | End: 2025-08-01

## 2025-07-18 NOTE — PROGRESS NOTES
Medicare Annual Wellness Visit    Margaret Almonte is here for Medicare AWV    Assessment & Plan  AWV  - see note and AVS    Constipation  - May be due to excessive fiber intake or inadequate hydration  - Mobic use may contribute to gastrointestinal issues  - Increase water intake to 64 ounces daily  - Consider reintroducing Metamucil at a quarter dose with adequate hydration--but to discuss with GI  - Consult specialist regarding continuation of Mounjaro with her GI sx    Diabetes Mellitus: Chronic and stable  - A1c level is 6, indicating good control  - Continue Mounjaro  - Monitor blood sugar levels    Cholesterol Management: Chronic and stable  - Cholesterol levels within normal range  - Refill for rosuvastatin provided    Pain Management: Chronic and stable  - On meloxicam 7.5 mg daily, reduced from 15 mg  - Uses a lidocaine patch  - Refill for meloxicam provided    Ingrown Toenail  - Appears primarily superficial  - Continue current treatment regimen  - Contact podiatrist for further management  - If condition worsens, prescribe oral antibiotic  Medicare annual wellness visit, subsequent  Essential hypertension  -     amLODIPine (NORVASC) 5 MG tablet; Take 1 tablet by mouth daily, Disp-90 tablet, R-3Normal  Chronic pain syndrome [G89.4 (ICD-10-CM)]  -     meloxicam (MOBIC) 7.5 MG tablet; Take 1 tablet by mouth daily, Disp-90 tablet, R-0Normal  Disorder of sacrum  -     meloxicam (MOBIC) 7.5 MG tablet; Take 1 tablet by mouth daily, Disp-90 tablet, R-0Normal  Primary osteoarthritis of left knee  -     meloxicam (MOBIC) 7.5 MG tablet; Take 1 tablet by mouth daily, Disp-90 tablet, R-0Normal  Lumbar post-laminectomy syndrome  -     meloxicam (MOBIC) 7.5 MG tablet; Take 1 tablet by mouth daily, Disp-90 tablet, R-0Normal  Chronic pain of left knee  -     meloxicam (MOBIC) 7.5 MG tablet; Take 1 tablet by mouth daily, Disp-90 tablet, R-0Normal  Type 2 diabetes mellitus without complication, without long-term current use

## 2025-07-25 DIAGNOSIS — M53.3 DISORDER OF SACRUM: ICD-10-CM

## 2025-07-25 DIAGNOSIS — M25.562 CHRONIC PAIN OF LEFT KNEE: ICD-10-CM

## 2025-07-25 DIAGNOSIS — M17.12 PRIMARY OSTEOARTHRITIS OF LEFT KNEE: ICD-10-CM

## 2025-07-25 DIAGNOSIS — G89.4 CHRONIC PAIN SYNDROME: ICD-10-CM

## 2025-07-25 DIAGNOSIS — M96.1 LUMBAR POST-LAMINECTOMY SYNDROME: ICD-10-CM

## 2025-07-25 DIAGNOSIS — G89.29 CHRONIC PAIN OF LEFT KNEE: ICD-10-CM

## 2025-07-25 RX ORDER — BUPRENORPHINE 10 UG/H
1 PATCH TRANSDERMAL WEEKLY
Qty: 4 PATCH | Refills: 0 | Status: SHIPPED | OUTPATIENT
Start: 2025-07-25 | End: 2025-07-25 | Stop reason: SDUPTHER

## 2025-07-25 RX ORDER — BUPRENORPHINE 10 UG/H
1 PATCH TRANSDERMAL WEEKLY
Qty: 4 PATCH | Refills: 0 | Status: SHIPPED | OUTPATIENT
Start: 2025-07-25 | End: 2025-08-22

## 2025-08-06 ENCOUNTER — TELEPHONE (OUTPATIENT)
Dept: FAMILY MEDICINE CLINIC | Age: 70
End: 2025-08-06

## 2025-08-11 ENCOUNTER — OFFICE VISIT (OUTPATIENT)
Age: 70
End: 2025-08-11
Payer: MEDICARE

## 2025-08-11 VITALS
TEMPERATURE: 97.3 F | WEIGHT: 185 LBS | OXYGEN SATURATION: 96 % | HEIGHT: 60 IN | BODY MASS INDEX: 36.32 KG/M2 | SYSTOLIC BLOOD PRESSURE: 116 MMHG | RESPIRATION RATE: 18 BRPM | DIASTOLIC BLOOD PRESSURE: 60 MMHG | HEART RATE: 79 BPM

## 2025-08-11 DIAGNOSIS — F11.20 OPIOID DEPENDENCE WITH CURRENT USE (HCC): ICD-10-CM

## 2025-08-11 DIAGNOSIS — G89.4 CHRONIC PAIN SYNDROME: ICD-10-CM

## 2025-08-11 DIAGNOSIS — M48.062 SPINAL STENOSIS OF LUMBAR REGION WITH NEUROGENIC CLAUDICATION: ICD-10-CM

## 2025-08-11 DIAGNOSIS — M96.1 LUMBAR POST-LAMINECTOMY SYNDROME: ICD-10-CM

## 2025-08-11 DIAGNOSIS — M54.16 LUMBAR RADICULOPATHY: ICD-10-CM

## 2025-08-11 DIAGNOSIS — M54.6 PAIN IN THORACIC SPINE: ICD-10-CM

## 2025-08-11 DIAGNOSIS — M53.3 SACROCOCCYGEAL DISORDERS, NOT ELSEWHERE CLASSIFIED: Primary | ICD-10-CM

## 2025-08-11 DIAGNOSIS — M25.562 CHRONIC PAIN OF LEFT KNEE: ICD-10-CM

## 2025-08-11 DIAGNOSIS — M43.16 SPONDYLOLISTHESIS OF LUMBAR REGION: ICD-10-CM

## 2025-08-11 DIAGNOSIS — M53.3 DISORDER OF SACRUM: ICD-10-CM

## 2025-08-11 DIAGNOSIS — M17.12 PRIMARY OSTEOARTHRITIS OF LEFT KNEE: ICD-10-CM

## 2025-08-11 DIAGNOSIS — G89.29 CHRONIC PAIN OF LEFT KNEE: ICD-10-CM

## 2025-08-11 DIAGNOSIS — Z79.891 ENCOUNTER FOR LONG-TERM OPIATE ANALGESIC USE: ICD-10-CM

## 2025-08-11 PROCEDURE — 1090F PRES/ABSN URINE INCON ASSESS: CPT | Performed by: PHYSICIAN ASSISTANT

## 2025-08-11 PROCEDURE — 1036F TOBACCO NON-USER: CPT | Performed by: PHYSICIAN ASSISTANT

## 2025-08-11 PROCEDURE — 1159F MED LIST DOCD IN RCRD: CPT | Performed by: PHYSICIAN ASSISTANT

## 2025-08-11 PROCEDURE — 1124F ACP DISCUSS-NO DSCNMKR DOCD: CPT | Performed by: PHYSICIAN ASSISTANT

## 2025-08-11 PROCEDURE — 3078F DIAST BP <80 MM HG: CPT | Performed by: PHYSICIAN ASSISTANT

## 2025-08-11 PROCEDURE — 99213 OFFICE O/P EST LOW 20 MIN: CPT | Performed by: PHYSICIAN ASSISTANT

## 2025-08-11 PROCEDURE — 3017F COLORECTAL CA SCREEN DOC REV: CPT | Performed by: PHYSICIAN ASSISTANT

## 2025-08-11 PROCEDURE — G8399 PT W/DXA RESULTS DOCUMENT: HCPCS | Performed by: PHYSICIAN ASSISTANT

## 2025-08-11 PROCEDURE — 1160F RVW MEDS BY RX/DR IN RCRD: CPT | Performed by: PHYSICIAN ASSISTANT

## 2025-08-11 PROCEDURE — G8427 DOCREV CUR MEDS BY ELIG CLIN: HCPCS | Performed by: PHYSICIAN ASSISTANT

## 2025-08-11 PROCEDURE — G8417 CALC BMI ABV UP PARAM F/U: HCPCS | Performed by: PHYSICIAN ASSISTANT

## 2025-08-11 PROCEDURE — 3074F SYST BP LT 130 MM HG: CPT | Performed by: PHYSICIAN ASSISTANT

## 2025-08-11 RX ORDER — BUPRENORPHINE 10 UG/H
1 PATCH TRANSDERMAL WEEKLY
Qty: 4 PATCH | Refills: 0 | Status: SHIPPED | OUTPATIENT
Start: 2025-09-08 | End: 2025-10-06

## 2025-08-11 RX ORDER — TIRZEPATIDE 2.5 MG/.5ML
INJECTION, SOLUTION SUBCUTANEOUS
COMMUNITY
Start: 2025-07-25

## 2025-08-11 RX ORDER — BUPRENORPHINE 10 UG/H
1 PATCH TRANSDERMAL WEEKLY
Qty: 4 PATCH | Refills: 0 | Status: SHIPPED | OUTPATIENT
Start: 2025-08-11 | End: 2025-08-11 | Stop reason: SDUPTHER

## (undated) DEVICE — GAUZE,SPONGE,4"X4",12PLY,STERILE,LF,2'S: Brand: MEDLINE

## (undated) DEVICE — NEEDLE HYPO 18GA L1.5IN PNK POLYPR HUB S STL REG BVL STR

## (undated) DEVICE — NEEDLE HYPO 25GA L1.5IN BLU POLYPR HUB S STL REG BVL STR

## (undated) DEVICE — 6 ML SYRINGE LUER-LOCK TIP: Brand: MONOJECT

## (undated) DEVICE — NON-DEHP CATHETER EXTENSION SET, MALE LUER LOCK ADAPTER

## (undated) DEVICE — LUBRICANT SURG JELLY ST BACTER TUBE 4.25OZ

## (undated) DEVICE — 1000 S-DRAPE TOWEL DRAPE 10/BX: Brand: STERI-DRAPE™

## (undated) DEVICE — 5.5MM EGG

## (undated) DEVICE — BANDAGE ADH W1XL3IN NAT FAB WVN FLX DURABLE N ADH PD SEAL

## (undated) DEVICE — 1060 S-DRAPE SPCL INCISE 10/BX 4BX/CS: Brand: STERI-DRAPE™

## (undated) DEVICE — 12 ML SYRINGE,LUER-LOCK TIP: Brand: MONOJECT

## (undated) DEVICE — SET RETRACT COLLIS 2 WIDE BLADE

## (undated) DEVICE — TRAY,VAG PREP,2PR VNYL GLV,4 C: Brand: MEDLINE INDUSTRIES, INC.

## (undated) DEVICE — 3M™ RED DOT™ MONITORING ELECTRODE WITH FOAM TAPE AND STICKY GEL 2560, 50/BAG, 20/CASE, 72/PLT: Brand: RED DOT™

## (undated) DEVICE — ENCORE® LATEX TEXTURED SIZE 6.5, STERILE LATEX POWDER-FREE SURGICAL GLOVE: Brand: ENCORE

## (undated) DEVICE — Z DISCONTINUED APPLICATOR SURG PREP 0.35OZ 2% CHG 70% ISO ALC W/ HI LT

## (undated) DEVICE — STOPCOCK IV PRIMING 0.26ML 3 W W/ TWO FEM LUERLOK PRT 1

## (undated) DEVICE — COVER,TABLE,60X90,STERILE: Brand: MEDLINE

## (undated) DEVICE — Device: Brand: PORTEX

## (undated) DEVICE — BLADE CLIPPER GEN PURP NS

## (undated) DEVICE — TRAY PROCED DILATATION CURETTAGE

## (undated) DEVICE — SWABSTICK SURG PREP BENZOIN TINCTURE SINGLE ST

## (undated) DEVICE — SURGICAL PROCEDURE PACK LAMINECTOMY LUMBAR

## (undated) DEVICE — SET SPINAL NEURO STNEU1

## (undated) DEVICE — MARKER,SKIN,WI/RULER AND LABELS: Brand: MEDLINE

## (undated) DEVICE — DRILL SYSTEM 7

## (undated) DEVICE — LABEL MED 4 IN SURG PANEL W/ PEN STRL

## (undated) DEVICE — TUBING, SUCTION, 1/4" X 10', STRAIGHT: Brand: MEDLINE

## (undated) DEVICE — GLOVE SURG SZ 65 THK91MIL LTX FREE SYN POLYISOPRENE

## (undated) DEVICE — KIT EXTN LN IV CONDUCT FLUIDS FOR GRAVITATIONAL IV ADMIN SGL

## (undated) DEVICE — NEEDLE SPNL 22GA L3.5IN BLK HUB S STL REG WALL FIT STYL W/

## (undated) DEVICE — FLUID MANAGEMENT SYSTEM, INTEGRATED TUBE SET, DO NOT USE IF PACKAGE IS DAMAGED, KEEP DRY, KEEP AWAY FROM SUNLIGHT, KEEP AWAY FROM HEAT AND RADIOACTIVE SOURCES: Brand: FLUID SAFE

## (undated) DEVICE — VITRECTOMY PACK 25 GA INPUT

## (undated) DEVICE — GLOVE ORANGE PI 7 1/2   MSG9075

## (undated) DEVICE — GAUZE,SPONGE,4"X4",8PLY,STRL,LF,10/TRAY: Brand: MEDLINE

## (undated) DEVICE — DOUBLE BASIN SET: Brand: MEDLINE INDUSTRIES, INC.

## (undated) DEVICE — GARMENT,MEDLINE,DVT,INT,CALF,MED, GEN2: Brand: MEDLINE

## (undated) DEVICE — 3M(TM) MEDIPORE(TM) +PAD SOFT CLOTH ADHESIVE WOUND DRESSING 3569: Brand: 3M™ MEDIPORE™

## (undated) DEVICE — CAMERA STRYKER 1488 HD GEN

## (undated) DEVICE — SPHERE EYE 1 MRK GUIDANCE PASS STEALTHSTATION 1PK/EA

## (undated) DEVICE — NEEDLE SPNL 22GA L5IN BLK HUB S STL W/ QNCKE PNT W/OUT

## (undated) DEVICE — NEEDLE HYPO 18GA L1.5IN PNK POLYPR HUB S STL THN WALL FILL

## (undated) DEVICE — CLOTH SURG PREP PREOPERATIVE CHLORHEXIDINE GLUC 2% READYPREP

## (undated) DEVICE — NEEDLE SPNL L3.5IN PNK HUB S STL REG WALL FIT STYL W/ QNCKE

## (undated) DEVICE — ELECTROSURGICAL PENCIL BUTTON SWITCH E-Z CLEAN COATED BLADE ELECTRODE 10 FT (3 M) CORD HOLSTER: Brand: MEGADYNE

## (undated) DEVICE — DRAPE C ARM UNIV W41XL74IN CLR PLAS XR VELC CLSR POLY STRP

## (undated) DEVICE — SYRINGE, LUER LOCK, 5ML: Brand: MEDLINE

## (undated) DEVICE — COVER,LIGHT HANDLE,FLX,1/PK: Brand: MEDLINE INDUSTRIES, INC.

## (undated) DEVICE — SPECIMEN CUP W/LID: Brand: DEROYAL

## (undated) DEVICE — GAUZE,SPONGE,4"X4",16PLY,XRAY,STRL,LF: Brand: MEDLINE

## (undated) DEVICE — LOCATOR RAD PASS SPHR MRK NAVIGATION BALL DISP STLTH STN

## (undated) DEVICE — NEEDLE SYR 18GA L1.5IN RED PLAS HUB S STL BLNT FILL W/O

## (undated) DEVICE — TOWEL,OR,DSP,ST,BLUE,STD,6/PK,12PK/CS: Brand: MEDLINE

## (undated) DEVICE — GOWN ISOLATN REG YEL M WT MULTIPLY SIDETIE LEV 2

## (undated) DEVICE — EXTRAS KOHLI

## (undated) DEVICE — 3 ML SYRINGE LUER-LOCK TIP: Brand: MONOJECT

## (undated) DEVICE — 1.5L THIN WALL CAN: Brand: CRD

## (undated) DEVICE — 6 X 9  1.75MIL 4-WALL LABGUARD: Brand: MINIGRIP COMMERCIAL LLC

## (undated) DEVICE — LENS VITRCTMY FLAT DISP

## (undated) DEVICE — TRAY PROCED HYSTEROSCOPY CIRCON 1

## (undated) DEVICE — GOWN,SIRUS,NONRNF,SETINSLV,XL,20/CS: Brand: MEDLINE

## (undated) DEVICE — STANDARD HYPODERMIC NEEDLE,POLYPROPYLENE HUB: Brand: MONOJECT

## (undated) DEVICE — SHEET,DRAPE,40X58,STERILE: Brand: MEDLINE

## (undated) DEVICE — MASK,FACE,MAXFLUIDPROTECT,SHIELD/ERLPS: Brand: MEDLINE

## (undated) DEVICE — CURETTE VACUUM 3.1MM SUCTION ENDOMETRIAL

## (undated) DEVICE — SET RETRACT COLLIS MED 3 NARROW BLADE

## (undated) DEVICE — KIT BEDSIDE REVITAL OX 500ML

## (undated) DEVICE — SET ENDOSCP SEAL HYSTEROSCOPE RIG OUTFLO CHN DISP MYOSURE

## (undated) DEVICE — PAD MATERNITY CURITY ADH STRIP DISP

## (undated) DEVICE — TOWEL OR BLUEE 16X26IN ST 8 PACK ORB08 16X26ORTWL

## (undated) DEVICE — GENERATOR ELECSURG G400 GYRUS] OLYMPUS AMERICA INC]

## (undated) DEVICE — ELECTRODE ARTHSCP W10MM D10MM SHFT 11CM YEL MPLR LOOP W/

## (undated) DEVICE — SYRINGE, LUER LOCK, 10ML: Brand: MEDLINE

## (undated) DEVICE — KENDALL 450 SERIES MONITORING FOAM ELECTRODE - RECTANGULAR SHAPE ( 3/PK): Brand: KENDALL

## (undated) DEVICE — GOWN,SIRUS,FABRNF,L,20/CS: Brand: MEDLINE

## (undated) DEVICE — CONTROL SYRINGE LUER-LOCK TIP: Brand: MONOJECT

## (undated) DEVICE — ADAPTER CLEANING PORPOISE CLEANING

## (undated) DEVICE — GLOVE SURG SZ 8 L12IN FNGR THK94MIL STD WHT LTX FREE

## (undated) DEVICE — Z INACTIVE USE 2660664 SOLUTION IRRIG 3000ML 0.9% SOD CHL USP UROMATIC PLAS CONT

## (undated) DEVICE — DRESSING ADH N ADH 8X35 IN 6X175 IN SFT CLTH MEDIPORE +

## (undated) DEVICE — Device: Brand: DEFENDO VALVE AND CONNECTOR KIT

## (undated) DEVICE — CODMAN® SURGICAL PATTIES 1/2" X 1" (1.27CM X 2.54CM): Brand: CODMAN®

## (undated) DEVICE — JACKSON TABLE POSITIONER KIT: Brand: MEDLINE INDUSTRIES, INC.

## (undated) DEVICE — FORCEPS BX L240CM JAW DIA2.8MM L CAP W/ NDL MIC MESH TOOTH

## (undated) DEVICE — 3M™ STERI-DRAPE™ INCISE DRAPE 1050 (60CM X 45CM): Brand: STERI-DRAPE™

## (undated) DEVICE — LENS CORD GYN 0-DEG 5 MM CIRCON

## (undated) DEVICE — THE MILL DISPOSABLE - MEDIUM

## (undated) DEVICE — 3M™ TRANSPORE™ WHITE SURGICAL TAPE 1534-1, 1 INCH X 10 YARD (2,5CM X 9,1M), 12 ROLLS/CARTON 10 CARTONS/CASE: Brand: 3M™ TRANSPORE™

## (undated) DEVICE — SHEET, T, LAPAROTOMY, STERILE: Brand: MEDLINE

## (undated) DEVICE — CATHETER,URETHRAL,VINYL,MALE,16",16 FR: Brand: MEDLINE

## (undated) DEVICE — SPONGE GZ 4IN 4IN 4 PLY N WVN AVANT

## (undated) DEVICE — PACK PROC 3IN1 W/ L12FT DIA0.25IN REINF SUCT TBNG W50XL901IN

## (undated) DEVICE — PAD,NON-ADHERENT,3X8,STERILE,LF,1/PK: Brand: MEDLINE

## (undated) DEVICE — CONTAINER SPEC COLL 960ML POLYPR TRIANG GRAD INTAKE/OUTPUT

## (undated) DEVICE — Z INACTIVE USE 2635504 SOLUTION IRRIG 3000ML 1.5% GL USP UROMATIC CONT

## (undated) DEVICE — NEEDLE HYPO 21GA L1.5IN GRN POLYPR HUB S STL REG BVL STR

## (undated) DEVICE — PACK PROCEDURE SURG RETINAL ST ES CUST

## (undated) DEVICE — STRIP,CLOSURE,WOUND,MEDI-STRIP,1/4X3: Brand: MEDLINE

## (undated) DEVICE — SET RETRACT COLLIS 1